# Patient Record
Sex: MALE | Race: WHITE | NOT HISPANIC OR LATINO | Employment: OTHER | URBAN - METROPOLITAN AREA
[De-identification: names, ages, dates, MRNs, and addresses within clinical notes are randomized per-mention and may not be internally consistent; named-entity substitution may affect disease eponyms.]

---

## 2017-03-01 RX ORDER — MELOXICAM 15 MG/1
TABLET ORAL
Qty: 30 TABLET | Refills: 2 | Status: SHIPPED | OUTPATIENT
Start: 2017-03-01 | End: 2017-06-02 | Stop reason: SDUPTHER

## 2017-03-10 ENCOUNTER — OFFICE VISIT (OUTPATIENT)
Dept: INTERNAL MEDICINE | Facility: CLINIC | Age: 67
End: 2017-03-10

## 2017-03-10 VITALS
HEIGHT: 70 IN | HEART RATE: 65 BPM | WEIGHT: 233 LBS | OXYGEN SATURATION: 95 % | TEMPERATURE: 97.7 F | BODY MASS INDEX: 33.36 KG/M2 | DIASTOLIC BLOOD PRESSURE: 68 MMHG | SYSTOLIC BLOOD PRESSURE: 130 MMHG

## 2017-03-10 DIAGNOSIS — M51.36 DEGENERATION OF INTERVERTEBRAL DISC OF LUMBAR REGION: ICD-10-CM

## 2017-03-10 DIAGNOSIS — I10 BENIGN ESSENTIAL HYPERTENSION: ICD-10-CM

## 2017-03-10 DIAGNOSIS — Z00.00 WELCOME TO MEDICARE PREVENTIVE VISIT: Primary | ICD-10-CM

## 2017-03-10 DIAGNOSIS — E78.00 HYPERCHOLESTEROLEMIA: ICD-10-CM

## 2017-03-10 DIAGNOSIS — F32.89 OTHER DEPRESSION: ICD-10-CM

## 2017-03-10 DIAGNOSIS — M15.9 PRIMARY OSTEOARTHRITIS INVOLVING MULTIPLE JOINTS: ICD-10-CM

## 2017-03-10 LAB
ALBUMIN SERPL-MCNC: 4.9 G/DL (ref 3.5–5.2)
ALBUMIN/GLOB SERPL: 1.8 G/DL
ALP SERPL-CCNC: 67 U/L (ref 39–117)
ALT SERPL-CCNC: 31 U/L (ref 1–41)
APPEARANCE UR: CLEAR
AST SERPL-CCNC: 27 U/L (ref 1–40)
BACTERIA #/AREA URNS HPF: NORMAL /HPF
BASOPHILS # BLD AUTO: 0.04 10*3/MM3 (ref 0–0.2)
BASOPHILS NFR BLD AUTO: 0.5 % (ref 0–1.5)
BILIRUB SERPL-MCNC: 1.5 MG/DL (ref 0.1–1.2)
BILIRUB UR QL STRIP: NEGATIVE
BUN SERPL-MCNC: 22 MG/DL (ref 8–23)
BUN/CREAT SERPL: 21 (ref 7–25)
CALCIUM SERPL-MCNC: 10.1 MG/DL (ref 8.6–10.5)
CASTS URNS MICRO: NORMAL
CHLORIDE SERPL-SCNC: 97 MMOL/L (ref 98–107)
CHOLEST SERPL-MCNC: 161 MG/DL (ref 0–200)
CO2 SERPL-SCNC: 29.1 MMOL/L (ref 22–29)
COLOR UR: YELLOW
CREAT SERPL-MCNC: 1.05 MG/DL (ref 0.76–1.27)
EOSINOPHIL # BLD AUTO: 0.3 10*3/MM3 (ref 0–0.7)
EOSINOPHIL NFR BLD AUTO: 4 % (ref 0.3–6.2)
EPI CELLS #/AREA URNS HPF: NORMAL /HPF
ERYTHROCYTE [DISTWIDTH] IN BLOOD BY AUTOMATED COUNT: 13.8 % (ref 11.5–14.5)
GLOBULIN SER CALC-MCNC: 2.8 GM/DL
GLUCOSE SERPL-MCNC: 149 MG/DL (ref 65–99)
GLUCOSE UR QL: NEGATIVE
HCT VFR BLD AUTO: 41.4 % (ref 40.4–52.2)
HDLC SERPL-MCNC: 60 MG/DL (ref 40–60)
HGB BLD-MCNC: 13.5 G/DL (ref 13.7–17.6)
HGB UR QL STRIP: NEGATIVE
IMM GRANULOCYTES # BLD: 0 10*3/MM3 (ref 0–0.03)
IMM GRANULOCYTES NFR BLD: 0 % (ref 0–0.5)
KETONES UR QL STRIP: NEGATIVE
LDLC SERPL CALC-MCNC: 78 MG/DL (ref 0–100)
LDLC/HDLC SERPL: 1.3 {RATIO}
LEUKOCYTE ESTERASE UR QL STRIP: NEGATIVE
LYMPHOCYTES # BLD AUTO: 1.78 10*3/MM3 (ref 0.9–4.8)
LYMPHOCYTES NFR BLD AUTO: 24 % (ref 19.6–45.3)
MCH RBC QN AUTO: 32.5 PG (ref 27–32.7)
MCHC RBC AUTO-ENTMCNC: 32.6 G/DL (ref 32.6–36.4)
MCV RBC AUTO: 99.5 FL (ref 79.8–96.2)
MONOCYTES # BLD AUTO: 0.48 10*3/MM3 (ref 0.2–1.2)
MONOCYTES NFR BLD AUTO: 6.5 % (ref 5–12)
NEUTROPHILS # BLD AUTO: 4.81 10*3/MM3 (ref 1.9–8.1)
NEUTROPHILS NFR BLD AUTO: 65 % (ref 42.7–76)
NITRITE UR QL STRIP: NEGATIVE
PH UR STRIP: 7 [PH] (ref 5–8)
PLATELET # BLD AUTO: 160 10*3/MM3 (ref 140–500)
POTASSIUM SERPL-SCNC: 4.6 MMOL/L (ref 3.5–5.2)
PROT SERPL-MCNC: 7.7 G/DL (ref 6–8.5)
PROT UR QL STRIP: NEGATIVE
RBC # BLD AUTO: 4.16 10*6/MM3 (ref 4.6–6)
RBC #/AREA URNS HPF: NORMAL /HPF
SODIUM SERPL-SCNC: 138 MMOL/L (ref 136–145)
SP GR UR: 1.02 (ref 1–1.03)
T4 FREE SERPL-MCNC: 1.29 NG/DL (ref 0.93–1.7)
TRIGL SERPL-MCNC: 116 MG/DL (ref 0–150)
TSH SERPL DL<=0.005 MIU/L-ACNC: 1.85 MIU/ML (ref 0.27–4.2)
UROBILINOGEN UR STRIP-MCNC: (no result) MG/DL
VLDLC SERPL CALC-MCNC: 23.2 MG/DL (ref 5–40)
WBC # BLD AUTO: 7.41 10*3/MM3 (ref 4.5–10.7)
WBC #/AREA URNS HPF: NORMAL /HPF

## 2017-03-10 PROCEDURE — G0403 EKG FOR INITIAL PREVENT EXAM: HCPCS | Performed by: INTERNAL MEDICINE

## 2017-03-10 PROCEDURE — 99214 OFFICE O/P EST MOD 30 MIN: CPT | Performed by: INTERNAL MEDICINE

## 2017-03-10 PROCEDURE — G0402 INITIAL PREVENTIVE EXAM: HCPCS | Performed by: INTERNAL MEDICINE

## 2017-03-10 NOTE — PROGRESS NOTES
Procedure     ECG 12 Lead  Date/Time: 3/10/2017 10:31 AM  Performed by: EDILIA CRUMP  Authorized by: EDILIA CRUMP   Comparison: not compared with previous ECG   Previous ECG: no previous ECG available  Rhythm: sinus rhythm  Rate: normal  Conduction: conduction normal  ST Segments: ST segments normal  T Waves: T waves normal  QRS axis: normal  Other: no other findings  Clinical impression: normal ECG

## 2017-03-21 NOTE — PROGRESS NOTES
Subjective   Taz Dickey is a 66 y.o. male.   He is here today for welcome to Medicare preventive visit along with hypertension hyperlipidemia lumbar DDD depression osteoarthritis of multiple joints and he has no new complaints  History of Present Illness   He is here today for welcome to Medicare preventive visit along with hypertension hyper lipidemia lumbar DDD depression osteoarthritis of joints and he has no new complaints  The following portions of the patient's history were reviewed and updated as appropriate: allergies, current medications, past family history, past medical history, past social history, past surgical history and problem list.    Review of Systems   All other systems reviewed and are negative.      Objective   Physical Exam   Constitutional: He is oriented to person, place, and time. Vital signs are normal. He appears well-developed and well-nourished. He is active.   HENT:   Head: Normocephalic and atraumatic.   Right Ear: Hearing, tympanic membrane, external ear and ear canal normal.   Left Ear: Hearing, tympanic membrane, external ear and ear canal normal.   Nose: Nose normal.   Mouth/Throat: Oropharynx is clear and moist.   Eyes: Conjunctivae, EOM and lids are normal. Pupils are equal, round, and reactive to light. Right eye exhibits no discharge. Left eye exhibits no discharge.   Neck: Trachea normal, normal range of motion, full passive range of motion without pain and phonation normal. Neck supple. Carotid bruit is not present. No edema present. No thyroid mass and no thyromegaly present.   Cardiovascular: Normal rate, regular rhythm, normal heart sounds, intact distal pulses and normal pulses.  Exam reveals no gallop and no friction rub.    No murmur heard.  Pulmonary/Chest: Effort normal and breath sounds normal. No respiratory distress. He has no wheezes. He has no rales.   Abdominal: Soft. Normal appearance, normal aorta and bowel sounds are normal. He exhibits no distension,  no abdominal bruit and no mass. There is no hepatosplenomegaly. There is no tenderness. There is no rebound, no guarding and no CVA tenderness. No hernia. Hernia confirmed negative in the right inguinal area and confirmed negative in the left inguinal area.   Musculoskeletal: Normal range of motion. He exhibits no edema or tenderness.       Vascular Status -  His exam exhibits right foot vasculature normal. His exam exhibits no right foot edema. His exam exhibits left foot vasculature normal. His exam exhibits no left foot edema.   Skin Integrity  -  His right foot skin is intact.     Taz 's left foot skin is intact. .  Lymphadenopathy:     He has no cervical adenopathy.     He has no axillary adenopathy.        Right: No inguinal and no supraclavicular adenopathy present.        Left: No inguinal and no supraclavicular adenopathy present.   Neurological: He is alert and oriented to person, place, and time. He has normal strength. No cranial nerve deficit or sensory deficit. He exhibits normal muscle tone. He displays a negative Romberg sign. Coordination normal.   Skin: Skin is warm, dry and intact. No cyanosis. Nails show no clubbing.   Psychiatric: He has a normal mood and affect. His speech is normal and behavior is normal. Judgment and thought content normal. Cognition and memory are normal.   Nursing note and vitals reviewed.      Assessment/Plan   Diagnoses and all orders for this visit:    Welcome to Medicare preventive visit  -     Lipid Panel With LDL / HDL Ratio  -     CBC & Differential  -     Comprehensive Metabolic Panel  -     T4, Free  -     TSH  -     Urinalysis With Microscopic  -     ECG 12 Lead    Benign essential hypertension  -     Lipid Panel With LDL / HDL Ratio  -     CBC & Differential  -     Comprehensive Metabolic Panel  -     T4, Free  -     TSH  -     Urinalysis With Microscopic    Hypercholesterolemia  -     Lipid Panel With LDL / HDL Ratio  -     CBC & Differential  -      Comprehensive Metabolic Panel  -     T4, Free  -     TSH  -     Urinalysis With Microscopic    Degeneration of intervertebral disc of lumbar region  -     Lipid Panel With LDL / HDL Ratio  -     CBC & Differential  -     Comprehensive Metabolic Panel  -     T4, Free  -     TSH  -     Urinalysis With Microscopic    Other depression  -     Lipid Panel With LDL / HDL Ratio  -     CBC & Differential  -     Comprehensive Metabolic Panel  -     T4, Free  -     TSH  -     Urinalysis With Microscopic    Primary osteoarthritis involving multiple joints  -     Lipid Panel With LDL / HDL Ratio  -     CBC & Differential  -     Comprehensive Metabolic Panel  -     T4, Free  -     TSH  -     Urinalysis With Microscopic    Other orders  -     Microscopic Examination      Welcome to Medicare preventive visit follow-up recommendations  Depression stable on current medication  Osteoarthritis of multiple joints supportive meds  Lumbar DDD supportive meds and some weight loss  Hyperlipidemia keep LDL less than 70 with proper diet exercise medication  Hypertension well-controlled on current medication

## 2017-03-21 NOTE — PROGRESS NOTES
QUICK REFERENCE INFORMATION:  The ABCs of the Annual Wellness Visit    Welcome to Medicare Visit    HEALTH RISK ASSESSMENT    1950    Recent Hospitalizations:  No recent hospitalization(s)..      Current Medical Providers:  Patient Care Team:  Conor Fontenot MD as PCP - General (Internal Medicine)  Conor Fontenot MD as PCP - Family Medicine      Smoking Status:  History   Smoking Status   • Never Smoker   Smokeless Tobacco   • Not on file       Alcohol Consumption:  History   Alcohol Use   • Yes     Comment: social       Depression Screen:   PHQ-9 Depression Screening 3/10/2017   Little interest or pleasure in doing things 0   Feeling down, depressed, or hopeless 0   Trouble falling or staying asleep, or sleeping too much 0   Feeling tired or having little energy 0   Poor appetite or overeating 0   Feeling bad about yourself - or that you are a failure or have let yourself or your family down 0   Trouble concentrating on things, such as reading the newspaper or watching television 0   Moving or speaking so slowly that other people could have noticed. Or the opposite - being so fidgety or restless that you have been moving around a lot more than usual 0   Thoughts that you would be better off dead, or of hurting yourself in some way 0   PHQ-9 Total Score 0   If you checked off any problems, how difficult have these problems made it for you to do your work, take care of things at home, or get along with other people? Not difficult at all       Health Habits and Functional and Cognitive Screening:  Functional & Cognitive Status 3/10/2017   Do you have difficulty preparing food and eating? No   Do you have difficulty bathing yourself? No   Do you have difficulty getting dressed? No   Do you have difficulty using the toilet? No   Do you have difficulty moving around from place to place? No   In the past year have you fallen or experienced a near fall? No   Do you need help using the phone?  No   Are you deaf  or do you have serious difficulty hearing?  No   Do you need help with transportation? No   Do you need help shopping? No   Do you need help preparing meals?  No   Do you need help with housework?  No   Do you need help with laundry? No   Do you need help taking your medications? No   Do you need help managing money? No       Health Habits  Current Diet: Unhealthy Diet  Dental Exam: Up to date  Eye Exam: Up to date  Exercise (times per week): 10 times per week  Current Exercise Activities Include: Gardening        Does the patient have evidence of cognitive impairment? No    Asprin use counseling?yes      Recent Lab Results:  CMP:  Lab Results   Component Value Date     (H) 03/10/2017    BUN 22 03/10/2017    CREATININE 1.05 03/10/2017    EGFRIFNONA 71 03/10/2017    EGFRIFAFRI 86 03/10/2017    BCR 21.0 03/10/2017     03/10/2017    K 4.6 03/10/2017    CO2 29.1 (H) 03/10/2017    CALCIUM 10.1 03/10/2017    PROTENTOTREF 7.7 03/10/2017    ALBUMIN 4.90 03/10/2017    LABGLOBREF 2.8 03/10/2017    LABIL2 1.8 03/10/2017    BILITOT 1.5 (H) 03/10/2017    ALKPHOS 67 03/10/2017    AST 27 03/10/2017    ALT 31 03/10/2017     Lipid Panel:  Lab Results   Component Value Date    CHLPL 161 03/10/2017    TRIG 116 03/10/2017    HDL 60 03/10/2017    VLDL 23.2 03/10/2017    LDL 78 03/10/2017     LDL:     HbA1c:  Lab Results   Component Value Date    HGBA1C 6.04 (H) 09/08/2016     Urine Microalbumin:     Visual Acuity:  No exam data present    Age-appropriate Screening Schedule:  Refer to the list below for future screening recommendations based on patient's age, sex and/or medical conditions. Orders for these recommended tests are listed in the plan section. The patient has been provided with a written plan.    Health Maintenance   Topic Date Due   • TDAP/TD VACCINES (1 - Tdap) 07/20/1970   • ZOSTER VACCINE  02/25/2016   • PNEUMOCOCCAL VACCINES (65+ LOW/MEDIUM RISK) (2 of 2 - PPSV23) 07/20/2016   • LIPID PANEL  03/10/2018   •  COLONOSCOPY  03/10/2027   • INFLUENZA VACCINE  Completed        Subjective   History of Present Illness    Taz Dickey is a 66 y.o. male an established patient presenting for a Welcome to Medicare Visit.     The following portions of the patient's history were reviewed and updated as appropriate: allergies, current medications, past family history, past medical history, past social history, past surgical history and problem list.    Outpatient Medications Prior to Visit   Medication Sig Dispense Refill   • aspirin 81 MG EC tablet Take 81 mg by mouth daily.     • atorvastatin (LIPITOR) 40 MG tablet TAKE ONE TABLET BY MOUTH DAILY 90 tablet 1   • cyclobenzaprine (FLEXERIL) 10 MG tablet Take by mouth 3 (three) times a day as needed.     • lisinopril-hydrochlorothiazide (PRINZIDE,ZESTORETIC) 20-12.5 MG per tablet TAKE ONE TABLET BY MOUTH DAILY 90 tablet 1   • meloxicam (MOBIC) 15 MG tablet TAKE ONE TABLET BY MOUTH DAILY 30 tablet 2   • metFORMIN (GLUCOPHAGE) 500 MG tablet TAKE ONE TABLET BY MOUTH DAILY WITH DINNER 90 tablet 0   • tadalafil (CIALIS) 5 MG tablet Take by mouth.       No facility-administered medications prior to visit.        Patient Active Problem List   Diagnosis   • Degeneration of intervertebral disc of lumbar region   • Lumbar radiculopathy   • Benign essential hypertension   • Depression   • Erectile dysfunction of nonorganic origin   • Hypercholesterolemia   • Hyperglycemia   • Hypertension   • Osteoarthritis   • Tinnitus of both ears   • Encounter for screening colonoscopy   • Welcome to Medicare preventive visit       Advanced Care Planning:  has NO advanced directive - not interested in additional information    Identification of Risk Factors:  Risk factors include: cardiovascular risk.    Review of Systems    Compared to one year ago, the patient feels his physical health is the same.  Compared to one year ago, the patient feels his mental health is the same.    Objective    Physical  "Exam    Vitals:    03/10/17 0940 03/10/17 1028   BP: 142/76 130/68   BP Location: Left arm    Patient Position: Sitting    Cuff Size: Adult    Pulse: 65    Temp: 97.7 °F (36.5 °C)    TempSrc: Tympanic    SpO2: 95%    Weight: 233 lb (106 kg)    Height: 70\" (177.8 cm)        Body mass index is 33.43 kg/(m^2).  Discussed the patient's BMI with him. The BMI is above average; BMI management plan is completed.    Procedure   Procedures       Assessment/Plan   Patient Self-Management and Personalized Health Advice  The patient has been provided with information about: weight management and preventive services including:   · Nutrition counseling provided.    Visit Diagnoses:    ICD-10-CM ICD-9-CM   1. Welcome to Medicare preventive visit Z00.00 V70.0   2. Benign essential hypertension I10 401.1   3. Hypercholesterolemia E78.00 272.0   4. Degeneration of intervertebral disc of lumbar region M51.36 722.52   5. Other depression F32.89    6. Primary osteoarthritis involving multiple joints M15.0 715.09       Orders Placed This Encounter   Procedures   • Lipid Panel With LDL / HDL Ratio   • Comprehensive Metabolic Panel   • T4, Free   • TSH   • Microscopic Examination   • ECG 12 Lead     This order was created via procedure documentation       Outpatient Encounter Prescriptions as of 3/10/2017   Medication Sig Dispense Refill   • aspirin 81 MG EC tablet Take 81 mg by mouth daily.     • atorvastatin (LIPITOR) 40 MG tablet TAKE ONE TABLET BY MOUTH DAILY 90 tablet 1   • cyclobenzaprine (FLEXERIL) 10 MG tablet Take by mouth 3 (three) times a day as needed.     • lisinopril-hydrochlorothiazide (PRINZIDE,ZESTORETIC) 20-12.5 MG per tablet TAKE ONE TABLET BY MOUTH DAILY 90 tablet 1   • meloxicam (MOBIC) 15 MG tablet TAKE ONE TABLET BY MOUTH DAILY 30 tablet 2   • metFORMIN (GLUCOPHAGE) 500 MG tablet TAKE ONE TABLET BY MOUTH DAILY WITH DINNER 90 tablet 0   • tadalafil (CIALIS) 5 MG tablet Take by mouth.       No facility-administered " encounter medications on file as of 3/10/2017.        Reviewed use of high risk medication in the elderly: yes  Reviewed for potential of harmful drug interactions in the elderly: yes    Follow Up:  Return in about 6 months (around 9/10/2017).     An After Visit Summary and PPPS with all of these plans were given to the patient.

## 2017-03-21 NOTE — PATIENT INSTRUCTIONS
Medicare Wellness  Personal Prevention Plan of Service     Date of Office Visit:  03/10/2017  Encounter Provider:  Conor Fontenot MD  Place of Service:  [facility]  Patient Name: Taz Dickey  :  1950    As part of the Medicare Wellness portion of your visit today, we are providing you with this personalized preventive plan of services (PPPS). This plan is based upon recommendations of the [place] Preventive Services Task Force (USPSTF) and the Advisory Committee on Immunization Practices (ACIP).    This lists the preventive care services that should be considered, and provides dates of when you are due. Items listed as completed are up-to-date and do not require any further intervention.    Health Maintenance   Topic Date Due   • TDAP/TD VACCINES (1 - Tdap) 1970   • ZOSTER VACCINE  2016   • MEDICARE ANNUAL WELLNESS  03/10/2018   • LIPID PANEL  03/10/2018   • COLONOSCOPY  03/10/2027   • HEPATITIS C SCREENING  Addressed   • INFLUENZA VACCINE  Completed   • PNEUMOCOCCAL VACCINES (65+ LOW/MEDIUM RISK)  Completed

## 2017-06-02 RX ORDER — MELOXICAM 15 MG/1
TABLET ORAL
Qty: 30 TABLET | Refills: 1 | Status: SHIPPED | OUTPATIENT
Start: 2017-06-02 | End: 2017-08-06 | Stop reason: SDUPTHER

## 2017-06-26 RX ORDER — LISINOPRIL AND HYDROCHLOROTHIAZIDE 20; 12.5 MG/1; MG/1
TABLET ORAL
Qty: 90 TABLET | Refills: 0 | Status: SHIPPED | OUTPATIENT
Start: 2017-06-26 | End: 2017-10-23 | Stop reason: SDUPTHER

## 2017-06-26 RX ORDER — ATORVASTATIN CALCIUM 40 MG/1
TABLET, FILM COATED ORAL
Qty: 90 TABLET | Refills: 0 | Status: SHIPPED | OUTPATIENT
Start: 2017-06-26 | End: 2017-10-08 | Stop reason: SDUPTHER

## 2017-08-07 RX ORDER — MELOXICAM 15 MG/1
TABLET ORAL
Qty: 30 TABLET | Refills: 0 | Status: SHIPPED | OUTPATIENT
Start: 2017-08-07 | End: 2017-09-12 | Stop reason: SDUPTHER

## 2017-09-12 RX ORDER — MELOXICAM 15 MG/1
TABLET ORAL
Qty: 30 TABLET | Refills: 0 | Status: SHIPPED | OUTPATIENT
Start: 2017-09-12 | End: 2017-10-11 | Stop reason: SDUPTHER

## 2017-09-25 ENCOUNTER — OFFICE VISIT (OUTPATIENT)
Dept: INTERNAL MEDICINE | Facility: CLINIC | Age: 67
End: 2017-09-25

## 2017-09-25 VITALS
SYSTOLIC BLOOD PRESSURE: 135 MMHG | HEIGHT: 70 IN | OXYGEN SATURATION: 95 % | TEMPERATURE: 98.2 F | BODY MASS INDEX: 30.75 KG/M2 | WEIGHT: 214.8 LBS | DIASTOLIC BLOOD PRESSURE: 78 MMHG | HEART RATE: 60 BPM

## 2017-09-25 DIAGNOSIS — M15.9 PRIMARY OSTEOARTHRITIS INVOLVING MULTIPLE JOINTS: ICD-10-CM

## 2017-09-25 DIAGNOSIS — F52.21 ERECTILE DYSFUNCTION OF NONORGANIC ORIGIN: ICD-10-CM

## 2017-09-25 DIAGNOSIS — R73.9 HYPERGLYCEMIA: ICD-10-CM

## 2017-09-25 DIAGNOSIS — F32.89 OTHER DEPRESSION: ICD-10-CM

## 2017-09-25 DIAGNOSIS — R35.1 NOCTURIA: ICD-10-CM

## 2017-09-25 DIAGNOSIS — I10 ESSENTIAL HYPERTENSION: Primary | ICD-10-CM

## 2017-09-25 DIAGNOSIS — E78.00 HYPERCHOLESTEROLEMIA: ICD-10-CM

## 2017-09-25 DIAGNOSIS — M51.36 DEGENERATION OF INTERVERTEBRAL DISC OF LUMBAR REGION: ICD-10-CM

## 2017-09-25 LAB
ALBUMIN SERPL-MCNC: 4.4 G/DL (ref 3.5–5.2)
ALBUMIN/GLOB SERPL: 2.2 G/DL
ALP SERPL-CCNC: 45 U/L (ref 39–117)
ALT SERPL-CCNC: 23 U/L (ref 1–41)
APPEARANCE UR: CLEAR
AST SERPL-CCNC: 27 U/L (ref 1–40)
BACTERIA #/AREA URNS HPF: NORMAL /HPF
BASOPHILS # BLD AUTO: 0.07 10*3/MM3 (ref 0–0.2)
BASOPHILS NFR BLD AUTO: 1.1 % (ref 0–1.5)
BILIRUB SERPL-MCNC: 0.7 MG/DL (ref 0.1–1.2)
BILIRUB UR QL STRIP: NEGATIVE
BUN SERPL-MCNC: 21 MG/DL (ref 8–23)
BUN/CREAT SERPL: 20.6 (ref 7–25)
CALCIUM SERPL-MCNC: 9.5 MG/DL (ref 8.6–10.5)
CASTS URNS MICRO: NORMAL
CHLORIDE SERPL-SCNC: 102 MMOL/L (ref 98–107)
CO2 SERPL-SCNC: 28.3 MMOL/L (ref 22–29)
COLOR UR: YELLOW
CREAT SERPL-MCNC: 1.02 MG/DL (ref 0.76–1.27)
EOSINOPHIL # BLD AUTO: 0.15 10*3/MM3 (ref 0–0.7)
EOSINOPHIL NFR BLD AUTO: 2.3 % (ref 0.3–6.2)
EPI CELLS #/AREA URNS HPF: NORMAL /HPF
ERYTHROCYTE [DISTWIDTH] IN BLOOD BY AUTOMATED COUNT: 14.5 % (ref 11.5–14.5)
GLOBULIN SER CALC-MCNC: 2 GM/DL
GLUCOSE SERPL-MCNC: 122 MG/DL (ref 65–99)
GLUCOSE UR QL: NEGATIVE
HBA1C MFR BLD: 5.8 % (ref 4.8–5.6)
HCT VFR BLD AUTO: 37.6 % (ref 40.4–52.2)
HGB BLD-MCNC: 12 G/DL (ref 13.7–17.6)
HGB UR QL STRIP: NEGATIVE
IMM GRANULOCYTES # BLD: 0.02 10*3/MM3 (ref 0–0.03)
IMM GRANULOCYTES NFR BLD: 0.3 % (ref 0–0.5)
KETONES UR QL STRIP: NEGATIVE
LEUKOCYTE ESTERASE UR QL STRIP: NEGATIVE
LYMPHOCYTES # BLD AUTO: 1.34 10*3/MM3 (ref 0.9–4.8)
LYMPHOCYTES NFR BLD AUTO: 20.8 % (ref 19.6–45.3)
MCH RBC QN AUTO: 33.1 PG (ref 27–32.7)
MCHC RBC AUTO-ENTMCNC: 31.9 G/DL (ref 32.6–36.4)
MCV RBC AUTO: 103.6 FL (ref 79.8–96.2)
MONOCYTES # BLD AUTO: 0.47 10*3/MM3 (ref 0.2–1.2)
MONOCYTES NFR BLD AUTO: 7.3 % (ref 5–12)
NEUTROPHILS # BLD AUTO: 4.38 10*3/MM3 (ref 1.9–8.1)
NEUTROPHILS NFR BLD AUTO: 68.2 % (ref 42.7–76)
NITRITE UR QL STRIP: NEGATIVE
NRBC BLD AUTO-RTO: 0 /100 WBC (ref 0–0)
PH UR STRIP: 7.5 [PH] (ref 5–8)
PLATELET # BLD AUTO: 155 10*3/MM3 (ref 140–500)
POTASSIUM SERPL-SCNC: 5.3 MMOL/L (ref 3.5–5.2)
PROT SERPL-MCNC: 6.4 G/DL (ref 6–8.5)
PROT UR QL STRIP: NEGATIVE
PSA SERPL-MCNC: 1.1 NG/ML (ref 0–4)
RBC # BLD AUTO: 3.63 10*6/MM3 (ref 4.6–6)
RBC #/AREA URNS HPF: NORMAL /HPF
SODIUM SERPL-SCNC: 140 MMOL/L (ref 136–145)
SP GR UR: 1.02 (ref 1–1.03)
T4 FREE SERPL-MCNC: 1.2 NG/DL (ref 0.93–1.7)
TSH SERPL DL<=0.005 MIU/L-ACNC: 1.74 MIU/ML (ref 0.27–4.2)
UROBILINOGEN UR STRIP-MCNC: (no result) MG/DL
WBC # BLD AUTO: 6.43 10*3/MM3 (ref 4.5–10.7)
WBC #/AREA URNS HPF: NORMAL /HPF

## 2017-09-25 PROCEDURE — 99214 OFFICE O/P EST MOD 30 MIN: CPT | Performed by: INTERNAL MEDICINE

## 2017-10-08 NOTE — PROGRESS NOTES
Subjective   Taz Dickey is a 67 y.o. male.   He is here today for hypertension hyperlipidemia lumbar DDD hyperglycemia depression osteoarthritis of multiple joints ED nocturia  History of Present Illness   He is here today for hypertension up lipidemia lumbar DDD hyperglycemia depression osteoarthritis of multiple joints ED and nocturia  The following portions of the patient's history were reviewed and updated as appropriate: allergies, current medications, past family history, past medical history, past social history, past surgical history and problem list.    Review of Systems   Genitourinary: Positive for difficulty urinating.        Erectile dysfunction   Musculoskeletal: Positive for arthralgias.   All other systems reviewed and are negative.      Objective   Physical Exam   Constitutional: He is oriented to person, place, and time. He appears well-developed and well-nourished. He is cooperative.   HENT:   Head: Normocephalic and atraumatic.   Right Ear: Hearing, tympanic membrane, external ear and ear canal normal.   Left Ear: Hearing, tympanic membrane, external ear and ear canal normal.   Nose: Nose normal.   Mouth/Throat: Uvula is midline, oropharynx is clear and moist and mucous membranes are normal.   Eyes: Conjunctivae, EOM and lids are normal. Pupils are equal, round, and reactive to light.   Neck: Phonation normal. Neck supple. Carotid bruit is not present.   Cardiovascular: Normal rate, regular rhythm and normal heart sounds.  Exam reveals no gallop and no friction rub.    No murmur heard.  Pulmonary/Chest: Effort normal and breath sounds normal. No respiratory distress.   Abdominal: Soft. Bowel sounds are normal. He exhibits no distension and no mass. There is no hepatosplenomegaly. There is no tenderness. There is no rebound and no guarding. No hernia.   Musculoskeletal: He exhibits no edema.   Neurological: He is alert and oriented to person, place, and time. Coordination and gait normal.    Skin: Skin is warm and dry.   Psychiatric: He has a normal mood and affect. His speech is normal and behavior is normal. Judgment and thought content normal.   Nursing note and vitals reviewed.      Assessment/Plan   Diagnoses and all orders for this visit:    Essential hypertension  -     Hemoglobin A1c  -     CBC & Differential  -     Comprehensive Metabolic Panel  -     T4, Free  -     TSH  -     Urinalysis With Microscopic  -     PSA    Hypercholesterolemia  -     Hemoglobin A1c  -     CBC & Differential  -     Comprehensive Metabolic Panel  -     T4, Free  -     TSH  -     Urinalysis With Microscopic  -     PSA    Degeneration of intervertebral disc of lumbar region  -     Hemoglobin A1c  -     CBC & Differential  -     Comprehensive Metabolic Panel  -     T4, Free  -     TSH  -     Urinalysis With Microscopic  -     PSA    Hyperglycemia  -     Hemoglobin A1c  -     CBC & Differential  -     Comprehensive Metabolic Panel  -     T4, Free  -     TSH  -     Urinalysis With Microscopic  -     PSA    Other depression  -     Hemoglobin A1c  -     CBC & Differential  -     Comprehensive Metabolic Panel  -     T4, Free  -     TSH  -     Urinalysis With Microscopic  -     PSA    Primary osteoarthritis involving multiple joints  -     Hemoglobin A1c  -     CBC & Differential  -     Comprehensive Metabolic Panel  -     T4, Free  -     TSH  -     Urinalysis With Microscopic  -     PSA    Erectile dysfunction of nonorganic origin  -     Hemoglobin A1c  -     CBC & Differential  -     Comprehensive Metabolic Panel  -     T4, Free  -     TSH  -     Urinalysis With Microscopic  -     PSA    Nocturia  -     Hemoglobin A1c  -     CBC & Differential  -     Comprehensive Metabolic Panel  -     T4, Free  -     TSH  -     Urinalysis With Microscopic  -     PSA    Other orders  -     Microscopic Examination      Hypertension well-controlled on current medication  Osteoporosis we'll joints supportive meds  Erectile dysfunction  supportive meds  Nocturia check PSA  Depression stable on current medication  Hyperglycemia follow hemoglobin A 1C  Lumbar DDD supportive meds physical therapy  Hyperlipidemia keep LDL less than 70 with proper diet exercise medication

## 2017-10-09 RX ORDER — ATORVASTATIN CALCIUM 40 MG/1
TABLET, FILM COATED ORAL
Qty: 90 TABLET | Refills: 0 | Status: SHIPPED | OUTPATIENT
Start: 2017-10-09 | End: 2018-01-28 | Stop reason: SDUPTHER

## 2017-10-11 DIAGNOSIS — D64.9 ANEMIA, UNSPECIFIED TYPE: Primary | ICD-10-CM

## 2017-10-11 DIAGNOSIS — D72.829 LEUKOCYTOSIS, UNSPECIFIED TYPE: ICD-10-CM

## 2017-10-11 RX ORDER — MELOXICAM 15 MG/1
TABLET ORAL
Qty: 30 TABLET | Refills: 0 | Status: SHIPPED | OUTPATIENT
Start: 2017-10-11 | End: 2017-11-27 | Stop reason: SDUPTHER

## 2017-10-16 ENCOUNTER — RESULTS ENCOUNTER (OUTPATIENT)
Dept: INTERNAL MEDICINE | Facility: CLINIC | Age: 67
End: 2017-10-16

## 2017-10-16 DIAGNOSIS — D64.9 ANEMIA, UNSPECIFIED TYPE: ICD-10-CM

## 2017-10-16 DIAGNOSIS — D72.829 LEUKOCYTOSIS, UNSPECIFIED TYPE: ICD-10-CM

## 2017-10-23 RX ORDER — LISINOPRIL AND HYDROCHLOROTHIAZIDE 20; 12.5 MG/1; MG/1
TABLET ORAL
Qty: 90 TABLET | Refills: 2 | Status: SHIPPED | OUTPATIENT
Start: 2017-10-23 | End: 2018-08-10 | Stop reason: SDUPTHER

## 2017-10-27 ENCOUNTER — CLINICAL SUPPORT (OUTPATIENT)
Dept: INTERNAL MEDICINE | Facility: CLINIC | Age: 67
End: 2017-10-27

## 2017-10-27 DIAGNOSIS — Z23 INFLUENZA VACCINE NEEDED: Primary | ICD-10-CM

## 2017-10-27 LAB
FERRITIN SERPL-MCNC: 107.8 NG/ML (ref 30–400)
FOLATE SERPL-MCNC: 6.81 NG/ML (ref 4.78–24.2)
IRON SERPL-MCNC: 84 MCG/DL (ref 59–158)
VIT B12 SERPL-MCNC: 300 PG/ML (ref 211–946)

## 2017-10-27 PROCEDURE — 90662 IIV NO PRSV INCREASED AG IM: CPT | Performed by: INTERNAL MEDICINE

## 2017-10-27 PROCEDURE — 90471 IMMUNIZATION ADMIN: CPT | Performed by: INTERNAL MEDICINE

## 2017-11-28 RX ORDER — MELOXICAM 15 MG/1
TABLET ORAL
Qty: 30 TABLET | Refills: 3 | Status: SHIPPED | OUTPATIENT
Start: 2017-11-28 | End: 2018-04-12 | Stop reason: SDUPTHER

## 2018-01-29 RX ORDER — ATORVASTATIN CALCIUM 40 MG/1
TABLET, FILM COATED ORAL
Qty: 90 TABLET | Refills: 0 | Status: SHIPPED | OUTPATIENT
Start: 2018-01-29 | End: 2018-05-07 | Stop reason: SDUPTHER

## 2018-02-28 ENCOUNTER — ON CAMPUS - OUTPATIENT (OUTPATIENT)
Dept: URBAN - METROPOLITAN AREA HOSPITAL 114 | Facility: HOSPITAL | Age: 68
End: 2018-02-28

## 2018-02-28 ENCOUNTER — HOSPITAL ENCOUNTER (OUTPATIENT)
Facility: HOSPITAL | Age: 68
Setting detail: HOSPITAL OUTPATIENT SURGERY
Discharge: HOME | End: 2018-02-28
Attending: INTERNAL MEDICINE | Admitting: INTERNAL MEDICINE

## 2018-02-28 ENCOUNTER — ANESTHESIA EVENT (OUTPATIENT)
Dept: GASTROENTEROLOGY | Facility: HOSPITAL | Age: 68
End: 2018-02-28

## 2018-02-28 ENCOUNTER — ANESTHESIA (OUTPATIENT)
Dept: GASTROENTEROLOGY | Facility: HOSPITAL | Age: 68
End: 2018-02-28

## 2018-02-28 VITALS
RESPIRATION RATE: 14 BRPM | BODY MASS INDEX: 32.86 KG/M2 | HEIGHT: 70 IN | TEMPERATURE: 98.2 F | WEIGHT: 229.5 LBS | DIASTOLIC BLOOD PRESSURE: 57 MMHG | HEART RATE: 57 BPM | SYSTOLIC BLOOD PRESSURE: 108 MMHG | OXYGEN SATURATION: 99 %

## 2018-02-28 DIAGNOSIS — Z86.010 PERSONAL HISTORY OF COLONIC POLYPS: ICD-10-CM

## 2018-02-28 DIAGNOSIS — K63.5 POLYP OF COLON: ICD-10-CM

## 2018-02-28 LAB
GLUCOSE BLDC GLUCOMTR-MCNC: 159 MG/DL (ref 70–130)
GLUCOSE BLDC GLUCOMTR-MCNC: 159 MG/DL (ref 70–130)

## 2018-02-28 PROCEDURE — 45380 COLONOSCOPY AND BIOPSY: CPT | Performed by: INTERNAL MEDICINE

## 2018-02-28 PROCEDURE — 25010000002 PROPOFOL 10 MG/ML EMULSION: Performed by: ANESTHESIOLOGY

## 2018-02-28 PROCEDURE — 88305 TISSUE EXAM BY PATHOLOGIST: CPT | Performed by: INTERNAL MEDICINE

## 2018-02-28 PROCEDURE — 82962 GLUCOSE BLOOD TEST: CPT

## 2018-02-28 RX ORDER — PROPOFOL 10 MG/ML
VIAL (ML) INTRAVENOUS AS NEEDED
Status: DISCONTINUED | OUTPATIENT
Start: 2018-02-28 | End: 2018-02-28 | Stop reason: SURG

## 2018-02-28 RX ORDER — SODIUM CHLORIDE, SODIUM LACTATE, POTASSIUM CHLORIDE, CALCIUM CHLORIDE 600; 310; 30; 20 MG/100ML; MG/100ML; MG/100ML; MG/100ML
30 INJECTION, SOLUTION INTRAVENOUS CONTINUOUS PRN
Status: DISCONTINUED | OUTPATIENT
Start: 2018-02-28 | End: 2018-02-28 | Stop reason: HOSPADM

## 2018-02-28 RX ORDER — LIDOCAINE HYDROCHLORIDE 20 MG/ML
INJECTION, SOLUTION INFILTRATION; PERINEURAL AS NEEDED
Status: DISCONTINUED | OUTPATIENT
Start: 2018-02-28 | End: 2018-02-28 | Stop reason: SURG

## 2018-02-28 RX ORDER — SODIUM CHLORIDE 0.9 % (FLUSH) 0.9 %
1-10 SYRINGE (ML) INJECTION AS NEEDED
Status: DISCONTINUED | OUTPATIENT
Start: 2018-02-28 | End: 2018-02-28 | Stop reason: HOSPADM

## 2018-02-28 RX ADMIN — PROPOFOL 200 MG: 10 INJECTION, EMULSION INTRAVENOUS at 08:40

## 2018-02-28 RX ADMIN — LIDOCAINE HYDROCHLORIDE 100 MG: 20 INJECTION, SOLUTION INFILTRATION; PERINEURAL at 08:23

## 2018-02-28 RX ADMIN — PROPOFOL 200 MG: 10 INJECTION, EMULSION INTRAVENOUS at 08:23

## 2018-02-28 RX ADMIN — SODIUM CHLORIDE, POTASSIUM CHLORIDE, SODIUM LACTATE AND CALCIUM CHLORIDE 30 ML/HR: 600; 310; 30; 20 INJECTION, SOLUTION INTRAVENOUS at 07:56

## 2018-02-28 NOTE — ANESTHESIA POSTPROCEDURE EVALUATION
"Patient: Taz Dickey    Procedure Summary     Date Anesthesia Start Anesthesia Stop Room / Location    02/28/18 0823 0855  [facility]       Procedure Diagnosis Surgeon Provider    COLONOSCOPY to TI and cecum with polypectomy (N/A ) No diagnosis on file. MD Anushka Thakkar MD          Anesthesia Type: MAC  Last vitals  BP   108/57 (02/28/18 0916)   Temp   36.8 °C (98.2 °F) (02/28/18 0857)   Pulse   57 (02/28/18 0916)   Resp   14 (02/28/18 0916)     SpO2   99 % (02/28/18 0916)     Post Anesthesia Care and Evaluation    Patient location during evaluation: bedside  Patient participation: complete - patient participated  Level of consciousness: awake and alert  Pain management: adequate  Airway patency: patent  Anesthetic complications: No anesthetic complications  PONV Status: none  Cardiovascular status: acceptable  Respiratory status: acceptable  Hydration status: acceptable    Comments: /57 (BP Location: Right arm, Patient Position: Sitting)  Pulse 57  Temp 36.8 °C (98.2 °F) (Oral)   Resp 14  Ht 177.8 cm (70\")  Wt 104 kg (229 lb 8 oz)  SpO2 99%  BMI 32.93 kg/m2        "

## 2018-02-28 NOTE — ANESTHESIA PREPROCEDURE EVALUATION
Anesthesia Evaluation     Patient summary reviewed and Nursing notes reviewed                Airway   Mallampati: I  TM distance: >3 FB  Neck ROM: limited  possible difficult intubation  Dental - normal exam   (+) edentulous    Pulmonary - normal exam   Cardiovascular - normal exam    (+) hypertension (one med) well controlled, hyperlipidemia,       Neuro/Psych  GI/Hepatic/Renal/Endo    (+) obesity,       Musculoskeletal     Abdominal  - normal exam    Bowel sounds: normal.   Substance History      OB/GYN          Other                      Anesthesia Plan    ASA 2     MAC     Anesthetic plan and risks discussed with patient.

## 2018-03-01 LAB
CYTO UR: NORMAL
LAB AP CASE REPORT: NORMAL
Lab: NORMAL
PATH REPORT.FINAL DX SPEC: NORMAL
PATH REPORT.GROSS SPEC: NORMAL

## 2018-03-20 ENCOUNTER — APPOINTMENT (OUTPATIENT)
Dept: GENERAL RADIOLOGY | Facility: HOSPITAL | Age: 68
End: 2018-03-20

## 2018-03-20 ENCOUNTER — HOSPITAL ENCOUNTER (EMERGENCY)
Facility: HOSPITAL | Age: 68
Discharge: HOME | End: 2018-03-20
Attending: EMERGENCY MEDICINE | Admitting: EMERGENCY MEDICINE

## 2018-03-20 VITALS
WEIGHT: 229 LBS | RESPIRATION RATE: 16 BRPM | HEART RATE: 72 BPM | HEIGHT: 70 IN | SYSTOLIC BLOOD PRESSURE: 126 MMHG | DIASTOLIC BLOOD PRESSURE: 80 MMHG | OXYGEN SATURATION: 95 % | BODY MASS INDEX: 32.78 KG/M2 | TEMPERATURE: 98.6 F

## 2018-03-20 DIAGNOSIS — S16.1XXA STRAIN OF NECK MUSCLE, INITIAL ENCOUNTER: ICD-10-CM
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
## 2018-03-20 PROCEDURE — 72050 X-RAY EXAM NECK SPINE 4/5VWS: CPT

## 2018-03-20 PROCEDURE — 99283 EMERGENCY DEPT VISIT LOW MDM: CPT
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
## 2018-03-20 NOTE — ED NOTES
Pt out of c-collar per ED MD when RN entered room to re-assess.  Pt resting comfortably, in no apparent distress.     Jose Cross RN  03/20/18 1037

## 2018-04-04 ENCOUNTER — OFFICE VISIT (OUTPATIENT)
Dept: INTERNAL MEDICINE | Facility: CLINIC | Age: 68
End: 2018-04-04

## 2018-04-04 VITALS
BODY MASS INDEX: 33.93 KG/M2 | OXYGEN SATURATION: 95 % | TEMPERATURE: 97.8 F | HEIGHT: 70 IN | DIASTOLIC BLOOD PRESSURE: 83 MMHG | SYSTOLIC BLOOD PRESSURE: 163 MMHG | WEIGHT: 237 LBS | HEART RATE: 72 BPM

## 2018-04-04 DIAGNOSIS — E78.00 HYPERCHOLESTEROLEMIA: ICD-10-CM

## 2018-04-04 DIAGNOSIS — F52.21 ERECTILE DYSFUNCTION OF NONORGANIC ORIGIN: ICD-10-CM

## 2018-04-04 DIAGNOSIS — R73.9 HYPERGLYCEMIA: ICD-10-CM

## 2018-04-04 DIAGNOSIS — Z00.00 MEDICARE ANNUAL WELLNESS VISIT, SUBSEQUENT: Primary | ICD-10-CM

## 2018-04-04 DIAGNOSIS — M51.36 DEGENERATION OF INTERVERTEBRAL DISC OF LUMBAR REGION: ICD-10-CM

## 2018-04-04 DIAGNOSIS — I10 ESSENTIAL HYPERTENSION: ICD-10-CM

## 2018-04-04 LAB
ALBUMIN SERPL-MCNC: 4.6 G/DL (ref 3.5–5.2)
ALBUMIN/GLOB SERPL: 1.8 G/DL
ALP SERPL-CCNC: 57 U/L (ref 39–117)
ALT SERPL-CCNC: 30 U/L (ref 1–41)
APPEARANCE UR: CLEAR
AST SERPL-CCNC: 28 U/L (ref 1–40)
BACTERIA #/AREA URNS HPF: NORMAL /HPF
BASOPHILS # BLD AUTO: 0.02 10*3/MM3 (ref 0–0.2)
BASOPHILS NFR BLD AUTO: 0.4 % (ref 0–1.5)
BILIRUB SERPL-MCNC: 1.4 MG/DL (ref 0.1–1.2)
BILIRUB UR QL STRIP: NEGATIVE
BUN SERPL-MCNC: 17 MG/DL (ref 8–23)
BUN/CREAT SERPL: 16.8 (ref 7–25)
CALCIUM SERPL-MCNC: 10.3 MG/DL (ref 8.6–10.5)
CASTS URNS MICRO: NORMAL
CHLORIDE SERPL-SCNC: 101 MMOL/L (ref 98–107)
CHOLEST SERPL-MCNC: 148 MG/DL (ref 0–200)
CO2 SERPL-SCNC: 28.5 MMOL/L (ref 22–29)
COLOR UR: YELLOW
CREAT SERPL-MCNC: 1.01 MG/DL (ref 0.76–1.27)
EOSINOPHIL # BLD AUTO: 0.14 10*3/MM3 (ref 0–0.7)
EOSINOPHIL NFR BLD AUTO: 2.8 % (ref 0.3–6.2)
EPI CELLS #/AREA URNS HPF: NORMAL /HPF
ERYTHROCYTE [DISTWIDTH] IN BLOOD BY AUTOMATED COUNT: 13.5 % (ref 11.5–14.5)
GFR SERPLBLD CREATININE-BSD FMLA CKD-EPI: 74 ML/MIN/1.73
GFR SERPLBLD CREATININE-BSD FMLA CKD-EPI: 90 ML/MIN/1.73
GLOBULIN SER CALC-MCNC: 2.5 GM/DL
GLUCOSE SERPL-MCNC: 131 MG/DL (ref 65–99)
GLUCOSE UR QL: NEGATIVE
HBA1C MFR BLD: 6.6 % (ref 4.8–5.6)
HCT VFR BLD AUTO: 40.8 % (ref 40.4–52.2)
HDLC SERPL-MCNC: 52 MG/DL (ref 40–60)
HGB BLD-MCNC: 12.6 G/DL (ref 13.7–17.6)
HGB UR QL STRIP: NEGATIVE
IMM GRANULOCYTES # BLD: 0 10*3/MM3 (ref 0–0.03)
IMM GRANULOCYTES NFR BLD: 0 % (ref 0–0.5)
KETONES UR QL STRIP: NEGATIVE
LDLC SERPL CALC-MCNC: 77 MG/DL (ref 0–100)
LDLC/HDLC SERPL: 1.49 {RATIO}
LEUKOCYTE ESTERASE UR QL STRIP: NEGATIVE
LYMPHOCYTES # BLD AUTO: 1.34 10*3/MM3 (ref 0.9–4.8)
LYMPHOCYTES NFR BLD AUTO: 27.1 % (ref 19.6–45.3)
MCH RBC QN AUTO: 32.4 PG (ref 27–32.7)
MCHC RBC AUTO-ENTMCNC: 30.9 G/DL (ref 32.6–36.4)
MCV RBC AUTO: 104.9 FL (ref 79.8–96.2)
MONOCYTES # BLD AUTO: 0.43 10*3/MM3 (ref 0.2–1.2)
MONOCYTES NFR BLD AUTO: 8.7 % (ref 5–12)
NEUTROPHILS # BLD AUTO: 3.01 10*3/MM3 (ref 1.9–8.1)
NEUTROPHILS NFR BLD AUTO: 61 % (ref 42.7–76)
NITRITE UR QL STRIP: NEGATIVE
PH UR STRIP: 8 [PH] (ref 5–8)
PLATELET # BLD AUTO: 133 10*3/MM3 (ref 140–500)
POTASSIUM SERPL-SCNC: 4.6 MMOL/L (ref 3.5–5.2)
PROT SERPL-MCNC: 7.1 G/DL (ref 6–8.5)
PROT UR QL STRIP: NEGATIVE
PSA SERPL-MCNC: 0.88 NG/ML (ref 0–4)
RBC # BLD AUTO: 3.89 10*6/MM3 (ref 4.6–6)
RBC #/AREA URNS HPF: NORMAL /HPF
SODIUM SERPL-SCNC: 144 MMOL/L (ref 136–145)
SP GR UR: 1.02 (ref 1–1.03)
T4 FREE SERPL-MCNC: 1.12 NG/DL (ref 0.93–1.7)
TRIGL SERPL-MCNC: 93 MG/DL (ref 0–150)
TSH SERPL DL<=0.005 MIU/L-ACNC: 2.55 MIU/ML (ref 0.27–4.2)
UROBILINOGEN UR STRIP-MCNC: (no result) MG/DL
VLDLC SERPL CALC-MCNC: 18.6 MG/DL (ref 5–40)
WBC # BLD AUTO: 4.94 10*3/MM3 (ref 4.5–10.7)
WBC #/AREA URNS HPF: NORMAL /HPF

## 2018-04-04 PROCEDURE — G0439 PPPS, SUBSEQ VISIT: HCPCS | Performed by: INTERNAL MEDICINE

## 2018-04-04 PROCEDURE — 96160 PT-FOCUSED HLTH RISK ASSMT: CPT | Performed by: INTERNAL MEDICINE

## 2018-04-04 PROCEDURE — 99214 OFFICE O/P EST MOD 30 MIN: CPT | Performed by: INTERNAL MEDICINE

## 2018-04-04 NOTE — PROGRESS NOTES
QUICK REFERENCE INFORMATION:  The ABCs of the Annual Wellness Visit    Subsequent Medicare Wellness Visit    HEALTH RISK ASSESSMENT    1950    Recent Hospitalizations:  No hospitalization(s) within the last year..        Current Medical Providers:  Patient Care Team:  Conor Fontenot MD as PCP - General (Internal Medicine)  Conor Fontenot MD as PCP - Family Medicine        Smoking Status:  History   Smoking Status   • Current Some Day Smoker   • Types: Cigars   Smokeless Tobacco   • Never Used       Alcohol Consumption:  History   Alcohol Use   • Yes     Comment: social       Depression Screen:   PHQ-2/PHQ-9 Depression Screening 4/4/2018   Little interest or pleasure in doing things 0   Feeling down, depressed, or hopeless 0   Trouble falling or staying asleep, or sleeping too much -   Feeling tired or having little energy -   Poor appetite or overeating -   Feeling bad about yourself - or that you are a failure or have let yourself or your family down -   Trouble concentrating on things, such as reading the newspaper or watching television -   Moving or speaking so slowly that other people could have noticed. Or the opposite - being so fidgety or restless that you have been moving around a lot more than usual -   Thoughts that you would be better off dead, or of hurting yourself in some way -   Total Score 0   If you checked off any problems, how difficult have these problems made it for you to do your work, take care of things at home, or get along with other people? -       Health Habits and Functional and Cognitive Screening:  Functional & Cognitive Status 4/4/2018   Do you have difficulty preparing food and eating? -   Do you have difficulty bathing yourself, getting dressed or grooming yourself? -   Do you have difficulty using the toilet? -   Do you have difficulty moving around from place to place? -   Do you have trouble with steps or getting out of a bed or a chair? -   In the past year have  you fallen or experienced a near fall? -   Current Diet -   Dental Exam -   Eye Exam -   Exercise (times per week) -   Current Exercise Activities Include -   Do you need help using the phone?  -   Are you deaf or do you have serious difficulty hearing?  -   Do you need help with transportation? -   Do you need help shopping? -   Do you need help preparing meals?  -   Do you need help with housework?  -   Do you need help with laundry? -   Do you need help taking your medications? -   Do you need help managing money? -   Do you ever drive or ride in a car without wearing a seat belt? -   Have you felt unusual stress, anger or loneliness in the last month? -   Who do you live with? -   If you need help, do you have trouble finding someone available to you? -   Have you been bothered in the last four weeks by sexual problems? -   Do you have difficulty concentrating, remembering or making decisions? No           Does the patient have evidence of cognitive impairment? No    Aspirin use counseling: Taking ASA appropriately as indicated      Recent Lab Results:  CMP:  Lab Results   Component Value Date     (H) 09/25/2017    BUN 21 09/25/2017    CREATININE 1.02 09/25/2017    EGFRIFNONA 73 09/25/2017    EGFRIFAFRI 89 09/25/2017    BCR 20.6 09/25/2017     09/25/2017    K 5.3 (H) 09/25/2017    CO2 28.3 09/25/2017    CALCIUM 9.5 09/25/2017    PROTENTOTREF 6.4 09/25/2017    ALBUMIN 4.40 09/25/2017    LABGLOBREF 2.0 09/25/2017    LABIL2 2.2 09/25/2017    BILITOT 0.7 09/25/2017    ALKPHOS 45 09/25/2017    AST 27 09/25/2017    ALT 23 09/25/2017     Lipid Panel:  Lab Results   Component Value Date    TRIG 116 03/10/2017    HDL 60 03/10/2017    VLDL 23.2 03/10/2017    LDLHDL 1.30 03/10/2017     HbA1c:  Lab Results   Component Value Date    HGBA1C 5.80 (H) 09/25/2017       Visual Acuity:  No exam data present    Age-appropriate Screening Schedule:  Refer to the list below for future screening recommendations based on  patient's age, sex and/or medical conditions. Orders for these recommended tests are listed in the plan section. The patient has been provided with a written plan.    Health Maintenance   Topic Date Due   • LIPID PANEL  03/10/2018   • TDAP/TD VACCINES (1 - Tdap) 09/24/2018 (Originally 7/20/1969)   • COLONOSCOPY  02/28/2028   • INFLUENZA VACCINE  Addressed   • PNEUMOCOCCAL VACCINES (65+ LOW/MEDIUM RISK)  Completed   • ZOSTER VACCINE  Addressed        Subjective   History of Present Illness    Taz Dickey is a 67 y.o. male who presents for an Subsequent Wellness Visit.    The following portions of the patient's history were reviewed and updated as appropriate: allergies, current medications, past family history, past medical history, past social history, past surgical history and problem list.    Outpatient Medications Prior to Visit   Medication Sig Dispense Refill   • aspirin 81 MG EC tablet Take 81 mg by mouth daily.     • atorvastatin (LIPITOR) 40 MG tablet TAKE ONE TABLET BY MOUTH DAILY 90 tablet 0   • lisinopril-hydrochlorothiazide (PRINZIDE,ZESTORETIC) 20-12.5 MG per tablet TAKE ONE TABLET BY MOUTH DAILY 90 tablet 2   • meloxicam (MOBIC) 15 MG tablet TAKE ONE TABLET BY MOUTH DAILY 30 tablet 3   • metFORMIN (GLUCOPHAGE) 500 MG tablet TAKE ONE TABLET BY MOUTH DAILY WITH DINNER 90 tablet 0   • tadalafil (CIALIS) 5 MG tablet Take by mouth.       No facility-administered medications prior to visit.        Patient Active Problem List   Diagnosis   • Degeneration of intervertebral disc of lumbar region   • Lumbar radiculopathy   • Benign essential hypertension   • Depression   • Erectile dysfunction of nonorganic origin   • Hypercholesterolemia   • Hyperglycemia   • Hypertension   • Osteoarthritis   • Tinnitus of both ears   • Encounter for screening colonoscopy   • Welcome to Medicare preventive visit   • Nocturia       Advance Care Planning:  has NO advance directive - not interested in additional  "information    Identification of Risk Factors:  Risk factors include: weight .    Review of Systems    Compared to one year ago, the patient feels his physical health is the same.  Compared to one year ago, the patient feels his mental health is the same.    Objective     Physical Exam    Vitals:    04/04/18 0857   BP: 163/83   BP Location: Left arm   Patient Position: Sitting   Cuff Size: Adult   Pulse: 72   Temp: 97.8 °F (36.6 °C)   TempSrc: Oral   SpO2: 95%   Weight: 108 kg (237 lb)   Height: 177.8 cm (70\")   PainSc:   4       Body mass index is 34.01 kg/m².  Discussed the patient's BMI with him. BMI is above normal parameters. Follow-up plan includes:  exercise counseling and nutrition counseling.    Assessment/Plan   Patient Self-Management and Personalized Health Advice  The patient has been provided with information about: weight management and preventive services including:   · Nutrition counseling provided.    Visit Diagnoses:  No diagnosis found.    No orders of the defined types were placed in this encounter.      Outpatient Encounter Prescriptions as of 4/4/2018   Medication Sig Dispense Refill   • aspirin 81 MG EC tablet Take 81 mg by mouth daily.     • atorvastatin (LIPITOR) 40 MG tablet TAKE ONE TABLET BY MOUTH DAILY 90 tablet 0   • lisinopril-hydrochlorothiazide (PRINZIDE,ZESTORETIC) 20-12.5 MG per tablet TAKE ONE TABLET BY MOUTH DAILY 90 tablet 2   • meloxicam (MOBIC) 15 MG tablet TAKE ONE TABLET BY MOUTH DAILY 30 tablet 3   • metFORMIN (GLUCOPHAGE) 500 MG tablet TAKE ONE TABLET BY MOUTH DAILY WITH DINNER 90 tablet 0   • tadalafil (CIALIS) 5 MG tablet Take by mouth.       No facility-administered encounter medications on file as of 4/4/2018.        Reviewed use of high risk medication in the elderly: yes  Reviewed for potential of harmful drug interactions in the elderly: yes    Follow Up:  No Follow-up on file.     An After Visit Summary and PPPS with all of these plans were given to the patient.  "

## 2018-04-04 NOTE — PATIENT INSTRUCTIONS
Medicare Wellness  Personal Prevention Plan of Service     Date of Office Visit:  2018  Encounter Provider:  Conor Fontenot MD  Place of Service:  [facility]  Patient Name: Taz Dickey  :  1950    As part of the Medicare Wellness portion of your visit today, we are providing you with this personalized preventive plan of services (PPPS). This plan is based upon recommendations of the [place] Preventive Services Task Force (USPSTF) and the Advisory Committee on Immunization Practices (ACIP).    This lists the preventive care services that should be considered, and provides dates of when you are due. Items listed as completed are up-to-date and do not require any further intervention.    Health Maintenance   Topic Date Due   • MEDICARE ANNUAL WELLNESS  03/10/2018   • LIPID PANEL  03/10/2018   • AAA SCREEN (ONE-TIME)  2018   • TDAP/TD VACCINES (1 - Tdap) 2018 (Originally 1970)   • COLONOSCOPY  2028   • HEPATITIS C SCREENING  Addressed   • INFLUENZA VACCINE  Addressed   • PNEUMOCOCCAL VACCINES (65+ LOW/MEDIUM RISK)  Completed   • ZOSTER VACCINE  Addressed       Orders Placed This Encounter   Procedures   • Hemoglobin A1c   • Lipid Panel With LDL / HDL Ratio   • Comprehensive Metabolic Panel   • T4, Free   • TSH   • PSA DIAGNOSTIC   • CBC & Differential     Order Specific Question:   Manual Differential     Answer:   No   • Urinalysis With Microscopic - Urine, Clean Catch       Return in about 1 year (around 2019) for Medicare Wellness.

## 2018-04-12 RX ORDER — MELOXICAM 15 MG/1
TABLET ORAL
Qty: 30 TABLET | Refills: 2 | Status: SHIPPED | OUTPATIENT
Start: 2018-04-12 | End: 2018-07-13 | Stop reason: SDUPTHER

## 2018-04-15 NOTE — PROGRESS NOTES
Subjective   Taz Dickey is a 67 y.o. male.   He is here today for Medicare annual wellness visit subsequent along with follow-up for hypertension hyperlipidemia lumbar DDD hyperglycemia and erectile dysfunction and everything is stable  History of Present Illness   He is here today for Medicare annual wellness visit subsequent and he is eating healthier along with follow-up for hypertension which is well-controlled on current medication hyper lipidemia which is stable on current medication lumbar DDD which is tolerable but comes and goes with cold weather and hyperglycemia which has been stable and erectile dysfunction which is an ongoing problem for which medication helps  The following portions of the patient's history were reviewed and updated as appropriate: allergies, current medications, past family history, past medical history, past social history, past surgical history and problem list.    Review of Systems   Genitourinary:        Erectile dysfunction   Musculoskeletal: Positive for back pain.   All other systems reviewed and are negative.      Objective   Physical Exam   Constitutional: He is oriented to person, place, and time. He appears well-developed and well-nourished. He is cooperative.   HENT:   Head: Normocephalic and atraumatic.   Right Ear: Hearing, tympanic membrane, external ear and ear canal normal.   Left Ear: Hearing, tympanic membrane, external ear and ear canal normal.   Nose: Nose normal.   Mouth/Throat: Uvula is midline, oropharynx is clear and moist and mucous membranes are normal.   Eyes: Conjunctivae, EOM and lids are normal. Pupils are equal, round, and reactive to light.   Neck: Phonation normal. Neck supple. Carotid bruit is not present.   Cardiovascular: Normal rate, regular rhythm and normal heart sounds.  Exam reveals no gallop and no friction rub.    No murmur heard.  Pulmonary/Chest: Effort normal and breath sounds normal. No respiratory distress.   Abdominal: Soft. Bowel  sounds are normal. He exhibits no distension and no mass. There is no hepatosplenomegaly. There is no tenderness. There is no rebound and no guarding. No hernia.   Musculoskeletal: He exhibits no edema.   Neurological: He is alert and oriented to person, place, and time. Coordination and gait normal.   Skin: Skin is warm and dry.   Psychiatric: He has a normal mood and affect. His speech is normal and behavior is normal. Judgment and thought content normal.   Nursing note and vitals reviewed.      Assessment/Plan   Diagnoses and all orders for this visit:    Medicare annual wellness visit, subsequent    Essential hypertension  -     Hemoglobin A1c  -     Lipid Panel With LDL / HDL Ratio  -     CBC & Differential  -     Comprehensive Metabolic Panel  -     T4, Free  -     TSH  -     Urinalysis With Microscopic - Urine, Clean Catch  -     PSA DIAGNOSTIC    Hypercholesterolemia  -     Hemoglobin A1c  -     Lipid Panel With LDL / HDL Ratio  -     CBC & Differential  -     Comprehensive Metabolic Panel  -     T4, Free  -     TSH  -     Urinalysis With Microscopic - Urine, Clean Catch  -     PSA DIAGNOSTIC    Degeneration of intervertebral disc of lumbar region    Hyperglycemia  -     Hemoglobin A1c    Erectile dysfunction of nonorganic origin    Other orders  -     Microscopic Examination      Medicare annual wellness visit subsequent no new real recommendations he is doing very well  Hyper lipidemia stable on current medication and we will check lipid panel and liver enzymes  Lumbar DDD stable at this time but he does have back pain occasionally but he fights throughout  Hyper glycemia has been stable and we will check hemoglobin A1c  Erectile dysfunction we will use supportive meds for this as well  Hypertension has been well-controlled on current medication no changes needed and the 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

## 2018-05-07 RX ORDER — ATORVASTATIN CALCIUM 40 MG/1
TABLET, FILM COATED ORAL
Qty: 90 TABLET | Refills: 0 | Status: SHIPPED | OUTPATIENT
Start: 2018-05-07 | End: 2018-08-04 | Stop reason: SDUPTHER

## 2018-07-16 RX ORDER — MELOXICAM 15 MG/1
TABLET ORAL
Qty: 30 TABLET | Refills: 1 | Status: SHIPPED | OUTPATIENT
Start: 2018-07-16 | End: 2018-09-24 | Stop reason: SDUPTHER

## 2018-07-20 RX ORDER — TADALAFIL 5 MG
TABLET ORAL
Qty: 30 TABLET | Refills: 1 | Status: SHIPPED | OUTPATIENT
Start: 2018-07-20 | End: 2021-01-25 | Stop reason: ALTCHOICE

## 2018-08-06 RX ORDER — ATORVASTATIN CALCIUM 40 MG/1
TABLET, FILM COATED ORAL
Qty: 90 TABLET | Refills: 0 | Status: SHIPPED | OUTPATIENT
Start: 2018-08-06 | End: 2019-03-12 | Stop reason: SDUPTHER

## 2018-08-13 RX ORDER — ATORVASTATIN CALCIUM 40 MG/1
TABLET, FILM COATED ORAL
Qty: 90 TABLET | Refills: 0 | Status: SHIPPED | OUTPATIENT
Start: 2018-08-13 | End: 2019-03-11 | Stop reason: SDUPTHER

## 2018-08-13 RX ORDER — LISINOPRIL AND HYDROCHLOROTHIAZIDE 20; 12.5 MG/1; MG/1
TABLET ORAL
Qty: 90 TABLET | Refills: 1 | Status: SHIPPED | OUTPATIENT
Start: 2018-08-13 | End: 2019-03-11 | Stop reason: SDUPTHER

## 2018-09-24 RX ORDER — MELOXICAM 15 MG/1
TABLET ORAL
Qty: 30 TABLET | Refills: 0 | Status: SHIPPED | OUTPATIENT
Start: 2018-09-24 | End: 2018-10-24 | Stop reason: SDUPTHER

## 2018-10-18 ENCOUNTER — CLINICAL SUPPORT (OUTPATIENT)
Dept: INTERNAL MEDICINE | Facility: CLINIC | Age: 68
End: 2018-10-18

## 2018-10-18 DIAGNOSIS — Z23 FLU VACCINE NEED: Primary | ICD-10-CM

## 2018-10-18 PROCEDURE — 90662 IIV NO PRSV INCREASED AG IM: CPT | Performed by: INTERNAL MEDICINE

## 2018-10-18 PROCEDURE — G0008 ADMIN INFLUENZA VIRUS VAC: HCPCS | Performed by: INTERNAL MEDICINE

## 2018-10-25 RX ORDER — MELOXICAM 15 MG/1
TABLET ORAL
Qty: 30 TABLET | Refills: 0 | Status: SHIPPED | OUTPATIENT
Start: 2018-10-25 | End: 2018-12-06 | Stop reason: SDUPTHER

## 2018-12-06 RX ORDER — MELOXICAM 15 MG/1
TABLET ORAL
Qty: 90 TABLET | Refills: 2 | Status: SHIPPED | OUTPATIENT
Start: 2018-12-06 | End: 2019-10-08

## 2019-03-12 RX ORDER — ATORVASTATIN CALCIUM 40 MG/1
TABLET, FILM COATED ORAL
Qty: 90 TABLET | Refills: 0 | Status: SHIPPED | OUTPATIENT
Start: 2019-03-12 | End: 2019-06-16 | Stop reason: SDUPTHER

## 2019-03-12 RX ORDER — LISINOPRIL AND HYDROCHLOROTHIAZIDE 20; 12.5 MG/1; MG/1
TABLET ORAL
Qty: 90 TABLET | Refills: 0 | Status: SHIPPED | OUTPATIENT
Start: 2019-03-12 | End: 2019-06-16 | Stop reason: SDUPTHER

## 2019-04-08 ENCOUNTER — OFFICE VISIT (OUTPATIENT)
Dept: INTERNAL MEDICINE | Facility: CLINIC | Age: 69
End: 2019-04-08

## 2019-04-08 VITALS
TEMPERATURE: 98.6 F | HEIGHT: 70 IN | RESPIRATION RATE: 17 BRPM | OXYGEN SATURATION: 96 % | DIASTOLIC BLOOD PRESSURE: 87 MMHG | HEART RATE: 54 BPM | WEIGHT: 234 LBS | BODY MASS INDEX: 33.5 KG/M2 | SYSTOLIC BLOOD PRESSURE: 135 MMHG

## 2019-04-08 DIAGNOSIS — I10 ESSENTIAL HYPERTENSION: Primary | ICD-10-CM

## 2019-04-08 DIAGNOSIS — R35.1 NOCTURIA: ICD-10-CM

## 2019-04-08 DIAGNOSIS — E78.00 HYPERCHOLESTEROLEMIA: ICD-10-CM

## 2019-04-08 DIAGNOSIS — R73.9 HYPERGLYCEMIA: ICD-10-CM

## 2019-04-08 LAB
ALBUMIN SERPL-MCNC: 4.9 G/DL (ref 3.5–5.2)
ALBUMIN/GLOB SERPL: 2.1 G/DL
ALP SERPL-CCNC: 59 U/L (ref 39–117)
ALT SERPL-CCNC: 33 U/L (ref 1–41)
APPEARANCE UR: CLEAR
AST SERPL-CCNC: 31 U/L (ref 1–40)
BACTERIA #/AREA URNS HPF: NORMAL /HPF
BASOPHILS # BLD AUTO: 0.05 10*3/MM3 (ref 0–0.2)
BASOPHILS NFR BLD AUTO: 1 % (ref 0–1.5)
BILIRUB SERPL-MCNC: 0.9 MG/DL (ref 0.2–1.2)
BILIRUB UR QL STRIP: NEGATIVE
BUN SERPL-MCNC: 18 MG/DL (ref 8–23)
BUN/CREAT SERPL: 17.6 (ref 7–25)
CALCIUM SERPL-MCNC: 9.8 MG/DL (ref 8.6–10.5)
CASTS URNS MICRO: NORMAL
CHLORIDE SERPL-SCNC: 100 MMOL/L (ref 98–107)
CHOLEST SERPL-MCNC: 153 MG/DL (ref 0–200)
CO2 SERPL-SCNC: 28.3 MMOL/L (ref 22–29)
COLOR UR: YELLOW
CREAT SERPL-MCNC: 1.02 MG/DL (ref 0.76–1.27)
EOSINOPHIL # BLD AUTO: 0.12 10*3/MM3 (ref 0–0.4)
EOSINOPHIL NFR BLD AUTO: 2.4 % (ref 0.3–6.2)
EPI CELLS #/AREA URNS HPF: NORMAL /HPF
ERYTHROCYTE [DISTWIDTH] IN BLOOD BY AUTOMATED COUNT: 13.3 % (ref 12.3–15.4)
GLOBULIN SER CALC-MCNC: 2.3 GM/DL
GLUCOSE SERPL-MCNC: 150 MG/DL (ref 65–99)
GLUCOSE UR QL: NEGATIVE
HBA1C MFR BLD: 6.7 % (ref 4.8–5.6)
HCT VFR BLD AUTO: 41.3 % (ref 37.5–51)
HDLC SERPL-MCNC: 56 MG/DL (ref 40–60)
HGB BLD-MCNC: 13.1 G/DL (ref 13–17.7)
HGB UR QL STRIP: NEGATIVE
IMM GRANULOCYTES # BLD AUTO: 0.01 10*3/MM3 (ref 0–0.05)
IMM GRANULOCYTES NFR BLD AUTO: 0.2 % (ref 0–0.5)
KETONES UR QL STRIP: NEGATIVE
LDLC SERPL CALC-MCNC: 78 MG/DL (ref 0–100)
LDLC/HDLC SERPL: 1.4 {RATIO}
LEUKOCYTE ESTERASE UR QL STRIP: NEGATIVE
LYMPHOCYTES # BLD AUTO: 1.08 10*3/MM3 (ref 0.7–3.1)
LYMPHOCYTES NFR BLD AUTO: 21.3 % (ref 19.6–45.3)
MCH RBC QN AUTO: 32.6 PG (ref 26.6–33)
MCHC RBC AUTO-ENTMCNC: 31.7 G/DL (ref 31.5–35.7)
MCV RBC AUTO: 102.7 FL (ref 79–97)
MONOCYTES # BLD AUTO: 0.48 10*3/MM3 (ref 0.1–0.9)
MONOCYTES NFR BLD AUTO: 9.4 % (ref 5–12)
NEUTROPHILS # BLD AUTO: 3.34 10*3/MM3 (ref 1.4–7)
NEUTROPHILS NFR BLD AUTO: 65.7 % (ref 42.7–76)
NITRITE UR QL STRIP: NEGATIVE
NRBC BLD AUTO-RTO: 0 /100 WBC (ref 0–0)
PH UR STRIP: 6.5 [PH] (ref 5–8)
PLATELET # BLD AUTO: 135 10*3/MM3 (ref 140–450)
POTASSIUM SERPL-SCNC: 4.4 MMOL/L (ref 3.5–5.2)
PROT SERPL-MCNC: 7.2 G/DL (ref 6–8.5)
PROT UR QL STRIP: NEGATIVE
PSA SERPL-MCNC: 0.83 NG/ML (ref 0–4)
RBC # BLD AUTO: 4.02 10*6/MM3 (ref 4.14–5.8)
RBC #/AREA URNS HPF: NORMAL /HPF
SODIUM SERPL-SCNC: 138 MMOL/L (ref 136–145)
SP GR UR: 1.02 (ref 1–1.03)
T4 FREE SERPL-MCNC: 1.25 NG/DL (ref 0.93–1.7)
TRIGL SERPL-MCNC: 93 MG/DL (ref 0–150)
TSH SERPL DL<=0.005 MIU/L-ACNC: 1.93 MIU/ML (ref 0.27–4.2)
UROBILINOGEN UR STRIP-MCNC: (no result) MG/DL
VLDLC SERPL CALC-MCNC: 18.6 MG/DL
WBC # BLD AUTO: 5.08 10*3/MM3 (ref 3.4–10.8)
WBC #/AREA URNS HPF: NORMAL /HPF

## 2019-04-08 PROCEDURE — 99214 OFFICE O/P EST MOD 30 MIN: CPT | Performed by: INTERNAL MEDICINE

## 2019-05-04 NOTE — PROGRESS NOTES
Subjective   Taz Dickey is a 68 y.o. male.   He is here for follow-up for hypertension hyperlipidemia nocturia hyperglycemia  History of Present Illness   He is here today for follow-up for hypertension which is stable on current medication hyperlipidemia which has been stable on Lipitor nocturia which is stable at this time on Cialis and hyperglycemia which has been stable with no evidence of diabetes at this point  The following portions of the patient's history were reviewed and updated as appropriate: allergies, current medications, past family history, past medical history, past social history, past surgical history and problem list.    Review of Systems   Constitutional: Negative for fatigue.   Endocrine: Negative for polydipsia and polyuria.   Genitourinary: Negative for difficulty urinating.   Neurological: Negative for weakness.   Psychiatric/Behavioral: Negative for dysphoric mood.   All other systems reviewed and are negative.      Objective   Physical Exam   Constitutional: He is oriented to person, place, and time. He appears well-developed and well-nourished. He is cooperative.   HENT:   Head: Normocephalic and atraumatic.   Right Ear: Hearing, tympanic membrane, external ear and ear canal normal.   Left Ear: Hearing, tympanic membrane, external ear and ear canal normal.   Nose: Nose normal.   Mouth/Throat: Uvula is midline, oropharynx is clear and moist and mucous membranes are normal.   Eyes: Conjunctivae, EOM and lids are normal. Pupils are equal, round, and reactive to light.   Neck: Phonation normal. Neck supple. Carotid bruit is not present.   Cardiovascular: Normal rate, regular rhythm and normal heart sounds. Exam reveals no gallop and no friction rub.   No murmur heard.  Pulmonary/Chest: Effort normal and breath sounds normal. No respiratory distress.   Abdominal: Soft. Bowel sounds are normal. He exhibits no distension and no mass. There is no hepatosplenomegaly. There is no tenderness.  There is no rebound and no guarding. No hernia.   Musculoskeletal: He exhibits no edema.   Neurological: He is alert and oriented to person, place, and time. Coordination and gait normal.   Skin: Skin is warm and dry.   Psychiatric: He has a normal mood and affect. His speech is normal and behavior is normal. Judgment and thought content normal.   Nursing note and vitals reviewed.      Assessment/Plan   Diagnoses and all orders for this visit:    Essential hypertension    Hypercholesterolemia  -     Lipid Panel With LDL / HDL Ratio  -     CBC & Differential  -     Comprehensive Metabolic Panel  -     T4, Free  -     TSH  -     Urinalysis With Microscopic - Urine, Clean Catch    Nocturia  -     PSA DIAGNOSTIC    Hyperglycemia  -     Hemoglobin A1c    Other orders  -     Microscopic Examination -      Hypertension well-controlled on current medication no changes  Hyperlipidemia has been stable on Lipitor we will check labs today  Nocturia stable on Cialis daily and no changes needed we will check PSA  Hyperglycemia no evidence of diabetes at this point we will check hemoglobin A1c

## 2019-06-17 RX ORDER — LISINOPRIL AND HYDROCHLOROTHIAZIDE 20; 12.5 MG/1; MG/1
TABLET ORAL
Qty: 90 TABLET | Refills: 0 | Status: SHIPPED | OUTPATIENT
Start: 2019-06-17 | End: 2019-09-22 | Stop reason: SDUPTHER

## 2019-06-17 RX ORDER — ATORVASTATIN CALCIUM 40 MG/1
TABLET, FILM COATED ORAL
Qty: 90 TABLET | Refills: 0 | Status: SHIPPED | OUTPATIENT
Start: 2019-06-17 | End: 2019-09-22 | Stop reason: SDUPTHER

## 2019-09-23 RX ORDER — LISINOPRIL AND HYDROCHLOROTHIAZIDE 20; 12.5 MG/1; MG/1
TABLET ORAL
Qty: 90 TABLET | Refills: 0 | Status: SHIPPED | OUTPATIENT
Start: 2019-09-23 | End: 2019-12-30

## 2019-09-23 RX ORDER — ATORVASTATIN CALCIUM 40 MG/1
TABLET, FILM COATED ORAL
Qty: 90 TABLET | Refills: 0 | Status: SHIPPED | OUTPATIENT
Start: 2019-09-23 | End: 2019-12-30

## 2019-10-08 ENCOUNTER — OFFICE VISIT (OUTPATIENT)
Dept: INTERNAL MEDICINE | Facility: CLINIC | Age: 69
End: 2019-10-08

## 2019-10-08 VITALS
SYSTOLIC BLOOD PRESSURE: 138 MMHG | HEART RATE: 56 BPM | OXYGEN SATURATION: 97 % | BODY MASS INDEX: 30.49 KG/M2 | HEIGHT: 70 IN | WEIGHT: 213 LBS | RESPIRATION RATE: 16 BRPM | DIASTOLIC BLOOD PRESSURE: 86 MMHG | TEMPERATURE: 98.4 F

## 2019-10-08 DIAGNOSIS — I10 BENIGN ESSENTIAL HYPERTENSION: ICD-10-CM

## 2019-10-08 DIAGNOSIS — Z23 NEEDS FLU SHOT: ICD-10-CM

## 2019-10-08 DIAGNOSIS — E78.00 HYPERCHOLESTEROLEMIA: ICD-10-CM

## 2019-10-08 DIAGNOSIS — M15.9 PRIMARY OSTEOARTHRITIS INVOLVING MULTIPLE JOINTS: ICD-10-CM

## 2019-10-08 DIAGNOSIS — E11.9 TYPE 2 DIABETES MELLITUS WITHOUT COMPLICATION, WITHOUT LONG-TERM CURRENT USE OF INSULIN (HCC): ICD-10-CM

## 2019-10-08 DIAGNOSIS — M51.36 DEGENERATION OF INTERVERTEBRAL DISC OF LUMBAR REGION: ICD-10-CM

## 2019-10-08 DIAGNOSIS — Z76.89 ENCOUNTER TO ESTABLISH CARE: Primary | ICD-10-CM

## 2019-10-08 LAB
ALBUMIN SERPL-MCNC: 4.5 G/DL (ref 3.5–5.2)
ALBUMIN/GLOB SERPL: 1.8 G/DL
ALP SERPL-CCNC: 69 U/L (ref 39–117)
ALT SERPL-CCNC: 37 U/L (ref 1–41)
AST SERPL-CCNC: 25 U/L (ref 1–40)
BASOPHILS # BLD AUTO: 0.04 10*3/MM3 (ref 0–0.2)
BASOPHILS NFR BLD AUTO: 0.9 % (ref 0–1.5)
BILIRUB SERPL-MCNC: 0.8 MG/DL (ref 0.2–1.2)
BUN SERPL-MCNC: 21 MG/DL (ref 8–23)
BUN/CREAT SERPL: 18.4 (ref 7–25)
CALCIUM SERPL-MCNC: 9.6 MG/DL (ref 8.6–10.5)
CHLORIDE SERPL-SCNC: 99 MMOL/L (ref 98–107)
CHOLEST SERPL-MCNC: 171 MG/DL (ref 0–200)
CO2 SERPL-SCNC: 27.9 MMOL/L (ref 22–29)
CREAT SERPL-MCNC: 1.14 MG/DL (ref 0.76–1.27)
EOSINOPHIL # BLD AUTO: 0.21 10*3/MM3 (ref 0–0.4)
EOSINOPHIL NFR BLD AUTO: 4.9 % (ref 0.3–6.2)
ERYTHROCYTE [DISTWIDTH] IN BLOOD BY AUTOMATED COUNT: 13.2 % (ref 12.3–15.4)
GLOBULIN SER CALC-MCNC: 2.5 GM/DL
GLUCOSE SERPL-MCNC: 124 MG/DL (ref 65–99)
HBA1C MFR BLD: 6.3 % (ref 4.8–5.6)
HCT VFR BLD AUTO: 36.2 % (ref 37.5–51)
HDLC SERPL-MCNC: 57 MG/DL (ref 40–60)
HGB BLD-MCNC: 11.9 G/DL (ref 13–17.7)
IMM GRANULOCYTES # BLD AUTO: 0.02 10*3/MM3 (ref 0–0.05)
IMM GRANULOCYTES NFR BLD AUTO: 0.5 % (ref 0–0.5)
LDLC SERPL CALC-MCNC: 90 MG/DL (ref 0–100)
LYMPHOCYTES # BLD AUTO: 0.72 10*3/MM3 (ref 0.7–3.1)
LYMPHOCYTES NFR BLD AUTO: 16.8 % (ref 19.6–45.3)
MCH RBC QN AUTO: 32.9 PG (ref 26.6–33)
MCHC RBC AUTO-ENTMCNC: 32.9 G/DL (ref 31.5–35.7)
MCV RBC AUTO: 100 FL (ref 79–97)
MONOCYTES # BLD AUTO: 0.46 10*3/MM3 (ref 0.1–0.9)
MONOCYTES NFR BLD AUTO: 10.7 % (ref 5–12)
NEUTROPHILS # BLD AUTO: 2.83 10*3/MM3 (ref 1.7–7)
NEUTROPHILS NFR BLD AUTO: 66.2 % (ref 42.7–76)
NRBC BLD AUTO-RTO: 0 /100 WBC (ref 0–0.2)
PLATELET # BLD AUTO: 146 10*3/MM3 (ref 140–450)
POTASSIUM SERPL-SCNC: 4.5 MMOL/L (ref 3.5–5.2)
PROT SERPL-MCNC: 7 G/DL (ref 6–8.5)
RBC # BLD AUTO: 3.62 10*6/MM3 (ref 4.14–5.8)
SODIUM SERPL-SCNC: 137 MMOL/L (ref 136–145)
TRIGL SERPL-MCNC: 119 MG/DL (ref 0–150)
VLDLC SERPL CALC-MCNC: 23.8 MG/DL
WBC # BLD AUTO: 4.28 10*3/MM3 (ref 3.4–10.8)

## 2019-10-08 PROCEDURE — 90653 IIV ADJUVANT VACCINE IM: CPT | Performed by: NURSE PRACTITIONER

## 2019-10-08 PROCEDURE — 99214 OFFICE O/P EST MOD 30 MIN: CPT | Performed by: NURSE PRACTITIONER

## 2019-10-08 PROCEDURE — G0008 ADMIN INFLUENZA VIRUS VAC: HCPCS | Performed by: NURSE PRACTITIONER

## 2019-10-08 RX ORDER — DICLOFENAC SODIUM 75 MG/1
TABLET, DELAYED RELEASE ORAL
COMMUNITY
Start: 2019-09-08 | End: 2020-07-31 | Stop reason: SDUPTHER

## 2019-10-08 NOTE — PATIENT INSTRUCTIONS
"Hypertension  Hypertension, commonly called high blood pressure, is when the force of blood pumping through the arteries is too strong. The arteries are the blood vessels that carry blood from the heart throughout the body. Hypertension forces the heart to work harder to pump blood and may cause arteries to become narrow or stiff. Having untreated or uncontrolled hypertension can cause heart attacks, strokes, kidney disease, and other problems.  A blood pressure reading consists of a higher number over a lower number. Ideally, your blood pressure should be below 120/80. The first (\"top\") number is called the systolic pressure. It is a measure of the pressure in your arteries as your heart beats. The second (\"bottom\") number is called the diastolic pressure. It is a measure of the pressure in your arteries as the heart relaxes.  What are the causes?  The cause of this condition is not known.  What increases the risk?  Some risk factors for high blood pressure are under your control. Others are not.  Factors you can change  · Smoking.  · Having type 2 diabetes mellitus, high cholesterol, or both.  · Not getting enough exercise or physical activity.  · Being overweight.  · Having too much fat, sugar, calories, or salt (sodium) in your diet.  · Drinking too much alcohol.  Factors that are difficult or impossible to change  · Having chronic kidney disease.  · Having a family history of high blood pressure.  · Age. Risk increases with age.  · Race. You may be at higher risk if you are -American.  · Gender. Men are at higher risk than women before age 45. After age 65, women are at higher risk than men.  · Having obstructive sleep apnea.  · Stress.  What are the signs or symptoms?  Extremely high blood pressure (hypertensive crisis) may cause:  · Headache.  · Anxiety.  · Shortness of breath.  · Nosebleed.  · Nausea and vomiting.  · Severe chest pain.  · Jerky movements you cannot control (seizures).  How is this " diagnosed?  This condition is diagnosed by measuring your blood pressure while you are seated, with your arm resting on a surface. The cuff of the blood pressure monitor will be placed directly against the skin of your upper arm at the level of your heart. It should be measured at least twice using the same arm. Certain conditions can cause a difference in blood pressure between your right and left arms.  Certain factors can cause blood pressure readings to be lower or higher than normal (elevated) for a short period of time:  · When your blood pressure is higher when you are in a health care provider's office than when you are at home, this is called white coat hypertension. Most people with this condition do not need medicines.  · When your blood pressure is higher at home than when you are in a health care provider's office, this is called masked hypertension. Most people with this condition may need medicines to control blood pressure.  If you have a high blood pressure reading during one visit or you have normal blood pressure with other risk factors:  · You may be asked to return on a different day to have your blood pressure checked again.  · You may be asked to monitor your blood pressure at home for 1 week or longer.  If you are diagnosed with hypertension, you may have other blood or imaging tests to help your health care provider understand your overall risk for other conditions.  How is this treated?  This condition is treated by making healthy lifestyle changes, such as eating healthy foods, exercising more, and reducing your alcohol intake. Your health care provider may prescribe medicine if lifestyle changes are not enough to get your blood pressure under control, and if:  · Your systolic blood pressure is above 130.  · Your diastolic blood pressure is above 80.  Your personal target blood pressure may vary depending on your medical conditions, your age, and other factors.  Follow these instructions  at home:  Eating and drinking    · Eat a diet that is high in fiber and potassium, and low in sodium, added sugar, and fat. An example eating plan is called the DASH (Dietary Approaches to Stop Hypertension) diet. To eat this way:  ? Eat plenty of fresh fruits and vegetables. Try to fill half of your plate at each meal with fruits and vegetables.  ? Eat whole grains, such as whole wheat pasta, brown rice, or whole grain bread. Fill about one quarter of your plate with whole grains.  ? Eat or drink low-fat dairy products, such as skim milk or low-fat yogurt.  ? Avoid fatty cuts of meat, processed or cured meats, and poultry with skin. Fill about one quarter of your plate with lean proteins, such as fish, chicken without skin, beans, eggs, and tofu.  ? Avoid premade and processed foods. These tend to be higher in sodium, added sugar, and fat.  · Reduce your daily sodium intake. Most people with hypertension should eat less than 1,500 mg of sodium a day.  · Limit alcohol intake to no more than 1 drink a day for nonpregnant women and 2 drinks a day for men. One drink equals 12 oz of beer, 5 oz of wine, or 1½ oz of hard liquor.  Lifestyle    · Work with your health care provider to maintain a healthy body weight or to lose weight. Ask what an ideal weight is for you.  · Get at least 30 minutes of exercise that causes your heart to beat faster (aerobic exercise) most days of the week. Activities may include walking, swimming, or biking.  · Include exercise to strengthen your muscles (resistance exercise), such as pilates or lifting weights, as part of your weekly exercise routine. Try to do these types of exercises for 30 minutes at least 3 days a week.  · Do not use any products that contain nicotine or tobacco, such as cigarettes and e-cigarettes. If you need help quitting, ask your health care provider.  · Monitor your blood pressure at home as told by your health care provider.  · Keep all follow-up visits as told by  your health care provider. This is important.  Medicines  · Take over-the-counter and prescription medicines only as told by your health care provider. Follow directions carefully. Blood pressure medicines must be taken as prescribed.  · Do not skip doses of blood pressure medicine. Doing this puts you at risk for problems and can make the medicine less effective.  · Ask your health care provider about side effects or reactions to medicines that you should watch for.  Contact a health care provider if:  · You think you are having a reaction to a medicine you are taking.  · You have headaches that keep coming back (recurring).  · You feel dizzy.  · You have swelling in your ankles.  · You have trouble with your vision.  Get help right away if:  · You develop a severe headache or confusion.  · You have unusual weakness or numbness.  · You feel faint.  · You have severe pain in your chest or abdomen.  · You vomit repeatedly.  · You have trouble breathing.  Summary  · Hypertension is when the force of blood pumping through your arteries is too strong. If this condition is not controlled, it may put you at risk for serious complications.  · Your personal target blood pressure may vary depending on your medical conditions, your age, and other factors. For most people, a normal blood pressure is less than 120/80.  · Hypertension is treated with lifestyle changes, medicines, or a combination of both. Lifestyle changes include weight loss, eating a healthy, low-sodium diet, exercising more, and limiting alcohol.  This information is not intended to replace advice given to you by your health care provider. Make sure you discuss any questions you have with your health care provider.  Document Released: 12/18/2006 Document Revised: 11/15/2017 Document Reviewed: 11/15/2017  [employer] Interactive Patient Education © 2019 [employer]  Zoster Vaccine, Recombinant injection  What is this medicine?  ZOSTER VACCINE (ZOS ter vak SEEN)  is used to prevent shingles in adults 50 years old and over. This vaccine is not used to treat shingles or nerve pain from shingles.  This medicine may be used for other purposes; ask your health care provider or pharmacist if you have questions.  COMMON BRAND NAME(S): SHINGRIX  What should I tell my health care provider before I take this medicine?  They need to know if you have any of these conditions:  -blood disorders or disease  -cancer like leukemia or lymphoma  -immune system problems or therapy  -an unusual or allergic reaction to vaccines, other medications, foods, dyes, or preservatives  -pregnant or trying to get pregnant  -breast-feeding  How should I use this medicine?  This vaccine is for injection in a muscle. It is given by a health care professional.  Talk to your pediatrician regarding the use of this medicine in children. This medicine is not approved for use in children.  Overdosage: If you think you have taken too much of this medicine contact a poison control center or emergency room at once.  NOTE: This medicine is only for you. Do not share this medicine with others.  What if I miss a dose?  Keep appointments for follow-up (booster) doses as directed. It is important not to miss your dose. Call your doctor or health care professional if you are unable to keep an appointment.  What may interact with this medicine?  -medicines that suppress your immune system  -medicines to treat cancer  -steroid medicines like prednisone or cortisone  This list may not describe all possible interactions. Give your health care provider a list of all the medicines, herbs, non-prescription drugs, or dietary supplements you use. Also tell them if you smoke, drink alcohol, or use illegal drugs. Some items may interact with your medicine.  What should I watch for while using this medicine?  Visit your doctor for regular check ups.  This vaccine, like all vaccines, may not fully protect everyone.  What side effects  may I notice from receiving this medicine?  Side effects that you should report to your doctor or health care professional as soon as possible:  -allergic reactions like skin rash, itching or hives, swelling of the face, lips, or tongue  -breathing problems  Side effects that usually do not require medical attention (report these to your doctor or health care professional if they continue or are bothersome):  -chills  -headache  -fever  -nausea, vomiting  -redness, warmth, pain, swelling or itching at site where injected  -tiredness  This list may not describe all possible side effects. Call your doctor for medical advice about side effects. You may report side effects to FDA at 1-087-VPV-5577.  Where should I keep my medicine?  This vaccine is only given in a clinic, pharmacy, doctor's office, or other health care setting and will not be stored at home.  NOTE: This sheet is a summary. It may not cover all possible information. If you have questions about this medicine, talk to your doctor, pharmacist, or health care provider.  © 2019 [employer] (2018-07-30 13:20:30)

## 2019-10-08 NOTE — PROGRESS NOTES
Subjective   Taz Dickey is a 69 y.o. male.   CC: Establish care, follow-up on hyperlipidemia, hypertension, type 2 diabetes    Patient presents for six-month follow-up and transfer of care from Dr. Fontenot.  This is a 61-year-old male former patient of Dr. Fontenot.  This patient is new to me.    He has a history of hyperlipidemia and takes atorvastatin 40 mg daily reporting excellent compliance and toleration of this medication.    For hypertension he takes lisinopril with hydrochlorothiazide 20-12.5 mg daily.  He reports that he does not routinely check his blood pressures at home.  Blood pressure today is mildly elevated at 138/86 on manual recheck.  He denies headache, visual changes, shortness of breath or chest discomfort.    He has a history of lumbar degenerative disease and osteoarthritis.  He has seen Dr. Terrell, orthopedic surgery for this issue and is now following with Dr. Pleitez, pain management.  Recently changed from meloxicam to diclofenac 75 mg daily.  He reports that he has not seen much difference in the pain with the change from meloxicam to diclofenac.  He is getting periodic epidural injections with pain management.  He is using a straight cane for ambulatory stability.  He reports that the pain often originates in the lower back and radiates around to his right leg, although sometimes he has no pain in the low back, only leg pain.    For type 2 diabetes he takes metformin 500 mg daily with dinner.  Last A1c was 6.7 on 4/8/2019.    He denies development of any other new issues today.         The following portions of the patient's history were reviewed and updated as appropriate: allergies, current medications, past family history, past medical history, past social history, past surgical history and problem list.    Review of Systems   Constitutional: Negative for activity change, chills, fatigue, fever, unexpected weight gain and unexpected weight loss.   HENT: Negative for  "congestion, hearing loss, postnasal drip, sinus pressure, sneezing, sore throat, swollen glands and tinnitus.    Eyes: Negative for photophobia, pain and visual disturbance.   Respiratory: Negative for cough, chest tightness, shortness of breath and wheezing.    Cardiovascular: Negative for chest pain, palpitations and leg swelling.   Gastrointestinal: Negative for abdominal distention, abdominal pain, constipation, diarrhea, nausea and vomiting.   Endocrine: Negative for polydipsia, polyphagia and polyuria.   Genitourinary: Negative for dysuria, frequency, hematuria and urgency.   Musculoskeletal: Positive for arthralgias and back pain.   Neurological: Negative for dizziness, weakness, numbness and headache.   All other systems reviewed and are negative.      Objective    /86 Comment: manual recheck  Pulse 56   Temp 98.4 °F (36.9 °C) (Oral)   Resp 16   Ht 177.8 cm (70\")   Wt 96.6 kg (213 lb)   SpO2 97%   BMI 30.56 kg/m²     Physical Exam   Constitutional: He is oriented to person, place, and time. He appears well-developed and well-nourished. No distress.   HENT:   Head: Normocephalic and atraumatic.   Right Ear: External ear normal.   Left Ear: External ear normal.   Nose: Nose normal.   Mouth/Throat: Oropharynx is clear and moist.   Eyes: EOM are normal. Pupils are equal, round, and reactive to light.   Neck: Normal range of motion. Neck supple. No JVD present. No tracheal deviation present. No thyromegaly present.   No carotid bruits auscultated   Cardiovascular: Normal rate, regular rhythm, normal heart sounds and intact distal pulses. Exam reveals no gallop and no friction rub.   No murmur heard.  No peripheral edema.  Posterior tib pulses 2+ and equal bilaterally.   Pulmonary/Chest: Effort normal and breath sounds normal. No stridor. No respiratory distress. He has no wheezes. He has no rales. He exhibits no tenderness.   Lungs are CTA bilaterally   Abdominal: Soft. Bowel sounds are normal. He " exhibits no distension. There is no tenderness.   Musculoskeletal: Normal range of motion.   Lymphadenopathy:     He has no cervical adenopathy.   Neurological: He is alert and oriented to person, place, and time.   Skin: Skin is warm and dry. Capillary refill takes less than 2 seconds. He is not diaphoretic.   Psychiatric: He has a normal mood and affect. His behavior is normal. Judgment and thought content normal.   Nursing note and vitals reviewed.    Current outpatient and discharge medications have been reconciled for the patient.  Reviewed by: JULIA Quesada      Assessment/Plan   Taz was seen today for follow-up.    Diagnoses and all orders for this visit:    Encounter to establish care    Benign essential hypertension  -     CBC & Differential  -     Comprehensive metabolic panel    Hypercholesterolemia  -     Lipid panel    Primary osteoarthritis involving multiple joints    Type 2 diabetes mellitus without complication, without long-term current use of insulin (CMS/[facility])  -     Hemoglobin A1c    Degeneration of intervertebral disc of lumbar region    Needs flu shot  -     Fluad Tri 65yr+    -Establish care, Healthcare maintenance: He will get his flu shot today.  We discussed the Shingrix vaccine and he will pursue this at his pharmacy and is provided with written education regarding the vaccine.    -Hypertension: Blood pressure is a bit elevated today at 138/86 and continue lisinopril hydrochlorothiazide 20-12.5 mg daily.  He does not routinely monitor it at home and I have asked him to purchase an over-the-counter blood pressure cuff and monitor his blood pressure a few times weekly we discussed dietary sodium intake and lifestyle modifications that he may make to help further lower his blood pressure.  He will let me know if he is seeing readings consistently greater than 130s over 80s at home at which time we will titrate the lisinopril with hydrochlorthiazide if needed.    -Hyperlipidemia:  Continue atorvastatin 40 mg daily.  We will check a lipid panel today and adjust therapy if needed based on labs.    -Degeneration of intervertebral disks of lumbar region, primary osteoarthritis: He is now taking diclofenac instead of meloxicam.  Reports not much change in his pain level.  Getting periodic epidural injections with Dr. Pleitez, pain management.  He sees pain management next week per patient.    -Type 2 diabetes: Continue metformin 500 mg daily.  We will check an A1c today and adjust therapy if needed based on labs.    -We will contact patient with results of his labs and any further recommendations.  Follow-up PRN and I will see him back in 6 months for follow-up on chronic conditions, Healthcare maintenance and a Medicare wellness visit.

## 2019-10-09 DIAGNOSIS — D75.89 MACROCYTOSIS: ICD-10-CM

## 2019-10-09 DIAGNOSIS — R71.0 DECREASED HEMOGLOBIN: Primary | ICD-10-CM

## 2019-10-09 NOTE — PROGRESS NOTES
Please notify patient that his blood count looks stable but is showing a slightly decreased hemoglobin level along with some signs of possible B12 or folate deficiency.  I would like to recheck a blood count along with folate and B12 level in 1 month.  I have ordered the labs, please make 1 month lab appointment.  Metabolic panel looks stable and A1c is stable at 6.3.  Please let me know of any questions or concerns.

## 2019-11-08 LAB
BASOPHILS # BLD AUTO: 0.02 10*3/MM3 (ref 0–0.2)
BASOPHILS NFR BLD AUTO: 0.2 % (ref 0–1.5)
EOSINOPHIL # BLD AUTO: 0 10*3/MM3 (ref 0–0.4)
EOSINOPHIL NFR BLD AUTO: 0 % (ref 0.3–6.2)
ERYTHROCYTE [DISTWIDTH] IN BLOOD BY AUTOMATED COUNT: 12.9 % (ref 12.3–15.4)
FOLATE SERPL-MCNC: 13 NG/ML (ref 4.78–24.2)
HCT VFR BLD AUTO: 35.6 % (ref 37.5–51)
HGB BLD-MCNC: 11.9 G/DL (ref 13–17.7)
IMM GRANULOCYTES # BLD AUTO: 0.07 10*3/MM3 (ref 0–0.05)
IMM GRANULOCYTES NFR BLD AUTO: 0.8 % (ref 0–0.5)
LYMPHOCYTES # BLD AUTO: 0.81 10*3/MM3 (ref 0.7–3.1)
LYMPHOCYTES NFR BLD AUTO: 9.3 % (ref 19.6–45.3)
MCH RBC QN AUTO: 33.6 PG (ref 26.6–33)
MCHC RBC AUTO-ENTMCNC: 33.4 G/DL (ref 31.5–35.7)
MCV RBC AUTO: 100.6 FL (ref 79–97)
MONOCYTES # BLD AUTO: 0.52 10*3/MM3 (ref 0.1–0.9)
MONOCYTES NFR BLD AUTO: 6 % (ref 5–12)
NEUTROPHILS # BLD AUTO: 7.3 10*3/MM3 (ref 1.7–7)
NEUTROPHILS NFR BLD AUTO: 83.7 % (ref 42.7–76)
NRBC BLD AUTO-RTO: 0 /100 WBC (ref 0–0.2)
PLATELET # BLD AUTO: 201 10*3/MM3 (ref 140–450)
RBC # BLD AUTO: 3.54 10*6/MM3 (ref 4.14–5.8)
VIT B12 SERPL-MCNC: 323 PG/ML (ref 211–946)
WBC # BLD AUTO: 8.72 10*3/MM3 (ref 3.4–10.8)

## 2019-11-09 ENCOUNTER — RESULTS ENCOUNTER (OUTPATIENT)
Dept: INTERNAL MEDICINE | Facility: CLINIC | Age: 69
End: 2019-11-09

## 2019-11-09 DIAGNOSIS — D75.89 MACROCYTOSIS: ICD-10-CM

## 2019-11-09 DIAGNOSIS — R71.0 DECREASED HEMOGLOBIN: ICD-10-CM

## 2019-11-12 ENCOUNTER — TELEPHONE (OUTPATIENT)
Dept: INTERNAL MEDICINE | Facility: CLINIC | Age: 69
End: 2019-11-12

## 2019-11-12 DIAGNOSIS — R79.89 ABNORMAL CBC: Primary | ICD-10-CM

## 2019-11-12 NOTE — TELEPHONE ENCOUNTER
Labs ordered    Pt scheduled    Pt stated he was going to call, back after he came back from being out of town to schedule his one month lab appt

## 2019-11-12 NOTE — TELEPHONE ENCOUNTER
----- Message from JULIA Quesada sent at 11/12/2019 12:24 PM EST -----  Please notify patient that his blood count is showing that his hemoglobin is stable and not dropping.  Folate and B12 are normal.  There are a couple of abnormalities in his white blood cells in that his lymphocytes, which are a type of white blood c  ell are a bit low.  Please order repeat CBC for 1 month to ensure resolution.

## 2019-11-12 NOTE — PROGRESS NOTES
Please notify patient that his blood count is showing that his hemoglobin is stable and not dropping.  Folate and B12 are normal.  There are a couple of abnormalities in his white blood cells in that his lymphocytes, which are a type of white blood cell are a bit low.  Please order repeat CBC for 1 month to ensure resolution.

## 2019-12-03 LAB
BASOPHILS # BLD AUTO: 0.05 10*3/MM3 (ref 0–0.2)
BASOPHILS NFR BLD AUTO: 0.9 % (ref 0–1.5)
EOSINOPHIL # BLD AUTO: 0.15 10*3/MM3 (ref 0–0.4)
EOSINOPHIL NFR BLD AUTO: 2.7 % (ref 0.3–6.2)
ERYTHROCYTE [DISTWIDTH] IN BLOOD BY AUTOMATED COUNT: 12.4 % (ref 12.3–15.4)
HCT VFR BLD AUTO: 37.9 % (ref 37.5–51)
HGB BLD-MCNC: 12.6 G/DL (ref 13–17.7)
IMM GRANULOCYTES # BLD AUTO: 0.04 10*3/MM3 (ref 0–0.05)
IMM GRANULOCYTES NFR BLD AUTO: 0.7 % (ref 0–0.5)
LYMPHOCYTES # BLD AUTO: 1.02 10*3/MM3 (ref 0.7–3.1)
LYMPHOCYTES NFR BLD AUTO: 18.4 % (ref 19.6–45.3)
MCH RBC QN AUTO: 33.8 PG (ref 26.6–33)
MCHC RBC AUTO-ENTMCNC: 33.2 G/DL (ref 31.5–35.7)
MCV RBC AUTO: 101.6 FL (ref 79–97)
MONOCYTES # BLD AUTO: 0.61 10*3/MM3 (ref 0.1–0.9)
MONOCYTES NFR BLD AUTO: 11 % (ref 5–12)
NEUTROPHILS # BLD AUTO: 3.66 10*3/MM3 (ref 1.7–7)
NEUTROPHILS NFR BLD AUTO: 66.3 % (ref 42.7–76)
NRBC BLD AUTO-RTO: 0 /100 WBC (ref 0–0.2)
PLATELET # BLD AUTO: 140 10*3/MM3 (ref 140–450)
RBC # BLD AUTO: 3.73 10*6/MM3 (ref 4.14–5.8)
WBC # BLD AUTO: 5.53 10*3/MM3 (ref 3.4–10.8)

## 2019-12-30 RX ORDER — LISINOPRIL AND HYDROCHLOROTHIAZIDE 20; 12.5 MG/1; MG/1
TABLET ORAL
Qty: 90 TABLET | Refills: 1 | Status: SHIPPED | OUTPATIENT
Start: 2019-12-30 | End: 2020-07-01

## 2019-12-30 RX ORDER — ATORVASTATIN CALCIUM 40 MG/1
TABLET, FILM COATED ORAL
Qty: 90 TABLET | Refills: 1 | Status: SHIPPED | OUTPATIENT
Start: 2019-12-30 | End: 2020-07-01

## 2020-07-01 RX ORDER — ATORVASTATIN CALCIUM 40 MG/1
TABLET, FILM COATED ORAL
Qty: 90 TABLET | Refills: 0 | Status: SHIPPED | OUTPATIENT
Start: 2020-07-01 | End: 2020-10-08

## 2020-07-01 RX ORDER — LISINOPRIL AND HYDROCHLOROTHIAZIDE 20; 12.5 MG/1; MG/1
TABLET ORAL
Qty: 90 TABLET | Refills: 0 | Status: SHIPPED | OUTPATIENT
Start: 2020-07-01 | End: 2020-10-08

## 2020-07-30 ENCOUNTER — TELEPHONE (OUTPATIENT)
Dept: INTERNAL MEDICINE | Facility: CLINIC | Age: 70
End: 2020-07-30

## 2020-07-30 NOTE — TELEPHONE ENCOUNTER
He reports he needs a newer one since that one is from almost one year ago.  He said symptoms are worse since then.  He has pain in right leg all the way to the ankle.

## 2020-07-30 NOTE — TELEPHONE ENCOUNTER
Patient calling and needs a referral for a MRI. He has surgery set up for Tues 9/1 and has to have MRI done and results back before surgery. He has to have the disk with him when he sees the DR. Rutledge call back [phone]

## 2020-07-31 ENCOUNTER — OFFICE VISIT (OUTPATIENT)
Dept: INTERNAL MEDICINE | Facility: CLINIC | Age: 70
End: 2020-07-31

## 2020-07-31 VITALS
WEIGHT: 212 LBS | TEMPERATURE: 98.4 F | DIASTOLIC BLOOD PRESSURE: 72 MMHG | OXYGEN SATURATION: 97 % | HEIGHT: 70 IN | HEART RATE: 55 BPM | SYSTOLIC BLOOD PRESSURE: 162 MMHG | RESPIRATION RATE: 16 BRPM | BODY MASS INDEX: 30.35 KG/M2

## 2020-07-31 DIAGNOSIS — Z23 NEED FOR 23-POLYVALENT PNEUMOCOCCAL POLYSACCHARIDE VACCINE: ICD-10-CM

## 2020-07-31 DIAGNOSIS — R35.1 NOCTURIA: ICD-10-CM

## 2020-07-31 DIAGNOSIS — E78.00 HYPERCHOLESTEROLEMIA: ICD-10-CM

## 2020-07-31 DIAGNOSIS — Z00.00 HEALTHCARE MAINTENANCE: ICD-10-CM

## 2020-07-31 DIAGNOSIS — F52.21 ERECTILE DYSFUNCTION OF NONORGANIC ORIGIN: ICD-10-CM

## 2020-07-31 DIAGNOSIS — Z12.5 PROSTATE CANCER SCREENING: ICD-10-CM

## 2020-07-31 DIAGNOSIS — Z00.00 MEDICARE ANNUAL WELLNESS VISIT, SUBSEQUENT: Primary | ICD-10-CM

## 2020-07-31 DIAGNOSIS — M51.36 DEGENERATION OF INTERVERTEBRAL DISC OF LUMBAR REGION: ICD-10-CM

## 2020-07-31 DIAGNOSIS — I10 BENIGN ESSENTIAL HYPERTENSION: ICD-10-CM

## 2020-07-31 DIAGNOSIS — E11.9 TYPE 2 DIABETES MELLITUS WITHOUT COMPLICATION, WITHOUT LONG-TERM CURRENT USE OF INSULIN (HCC): ICD-10-CM

## 2020-07-31 PROCEDURE — G0009 ADMIN PNEUMOCOCCAL VACCINE: HCPCS | Performed by: NURSE PRACTITIONER

## 2020-07-31 PROCEDURE — 90732 PPSV23 VACC 2 YRS+ SUBQ/IM: CPT | Performed by: NURSE PRACTITIONER

## 2020-07-31 PROCEDURE — 96160 PT-FOCUSED HLTH RISK ASSMT: CPT | Performed by: NURSE PRACTITIONER

## 2020-07-31 PROCEDURE — G0439 PPPS, SUBSEQ VISIT: HCPCS | Performed by: NURSE PRACTITIONER

## 2020-07-31 RX ORDER — DICLOFENAC SODIUM 75 MG/1
75 TABLET, DELAYED RELEASE ORAL 2 TIMES DAILY
Qty: 180 TABLET | Refills: 1 | Status: SHIPPED | OUTPATIENT
Start: 2020-07-31 | End: 2021-01-25

## 2020-07-31 RX ORDER — GABAPENTIN 300 MG/1
300 CAPSULE ORAL
COMMUNITY
Start: 2020-07-11 | End: 2021-04-29 | Stop reason: SDUPTHER

## 2020-07-31 NOTE — PROGRESS NOTES
The ABCs of the Annual Wellness Visit  Subsequent Medicare Wellness Visit    Chief Complaint   Patient presents with   • Medicare Wellness-subsequent     Pt presents here today for his medicare wellness visit.       Subjective   History of Present Illness:  Taz Dickey is a 70 y.o. male who presents for a Subsequent Medicare Wellness Visit.    HEALTH RISK ASSESSMENT    Recent Hospitalizations:  No hospitalization(s) within the last year.    Current Medical Providers:  Patient Care Team:  Jade Aranda APRN as PCP - General (Nurse Practitioner)  Jeffrey Garcia MD as Surgeon (Neurosurgery)  Wallace Pleitez MD as Consulting Physician (Pain Medicine)    Smoking Status:  Social History     Tobacco Use   Smoking Status Current Some Day Smoker   • Types: Cigars   Smokeless Tobacco Never Used       Alcohol Consumption:  Social History     Substance and Sexual Activity   Alcohol Use Yes    Comment: social       Depression Screen:   PHQ-2/PHQ-9 Depression Screening 7/31/2020   Little interest or pleasure in doing things 0   Feeling down, depressed, or hopeless 0   Trouble falling or staying asleep, or sleeping too much -   Feeling tired or having little energy -   Poor appetite or overeating -   Feeling bad about yourself - or that you are a failure or have let yourself or your family down -   Trouble concentrating on things, such as reading the newspaper or watching television -   Moving or speaking so slowly that other people could have noticed. Or the opposite - being so fidgety or restless that you have been moving around a lot more than usual -   Thoughts that you would be better off dead, or of hurting yourself in some way -   Total Score 0   If you checked off any problems, how difficult have these problems made it for you to do your work, take care of things at home, or get along with other people? -       Fall Risk Screen:  STEADI Fall Risk Assessment was completed, and patient is at LOW risk for  falls.Assessment completed on:7/31/2020    Health Habits and Functional and Cognitive Screening:  Functional & Cognitive Status 7/31/2020   Do you have difficulty preparing food and eating? No   Do you have difficulty bathing yourself, getting dressed or grooming yourself? No   Do you have difficulty using the toilet? No   Do you have difficulty moving around from place to place? No   Do you have trouble with steps or getting out of a bed or a chair? Yes   Current Diet Well Balanced Diet   Dental Exam Up to date   Eye Exam Not up to date   Exercise (times per week) 3 times per week   Current Exercise Activities Include Gardening   Do you need help using the phone?  No   Are you deaf or do you have serious difficulty hearing?  No   Do you need help with transportation? Yes   Do you need help shopping? No   Do you need help preparing meals?  No   Do you need help with housework?  No   Do you need help with laundry? No   Do you need help taking your medications? No   Do you need help managing money? No   Do you ever drive or ride in a car without wearing a seat belt? No   Have you felt unusual stress, anger or loneliness in the last month? No   Who do you live with? Spouse   If you need help, do you have trouble finding someone available to you? No   Have you been bothered in the last four weeks by sexual problems? No   Do you have difficulty concentrating, remembering or making decisions? No         Does the patient have evidence of cognitive impairment? No    Asprin use counseling:Taking ASA appropriately as indicated    Age-appropriate Screening Schedule:  Refer to the list below for future screening recommendations based on patient's age, sex and/or medical conditions. Orders for these recommended tests are listed in the plan section. The patient has been provided with a written plan.    Health Maintenance   Topic Date Due   • URINE MICROALBUMIN  1950   • TDAP/TD VACCINES (1 - Tdap) 07/20/1962   • DIABETIC  FOOT EXAM  02/25/2016   • DIABETIC EYE EXAM  02/25/2016   • ZOSTER VACCINE (2 of 2) 11/20/2017   • HEMOGLOBIN A1C  04/08/2020   • INFLUENZA VACCINE  08/01/2020   • LIPID PANEL  10/08/2020   • COLONOSCOPY  02/28/2028          The following portions of the patient's history were reviewed and updated as appropriate: allergies, current medications, past family history, past medical history, past social history, past surgical history and problem list.    Outpatient Medications Prior to Visit   Medication Sig Dispense Refill   • aspirin 81 MG EC tablet Take 81 mg by mouth daily.     • atorvastatin (LIPITOR) 40 MG tablet TAKE ONE TABLET BY MOUTH DAILY 90 tablet 0   • CIALIS 5 MG tablet TAKE ONE TABLET BY MOUTH DAILY IF NEEDED 30 tablet 1   • diclofenac (VOLTAREN) 75 MG EC tablet      • gabapentin (NEURONTIN) 300 MG capsule      • lisinopril-hydrochlorothiazide (PRINZIDE,ZESTORETIC) 20-12.5 MG per tablet TAKE ONE TABLET BY MOUTH DAILY 90 tablet 0   • metFORMIN (GLUCOPHAGE) 500 MG tablet TAKE ONE TABLET BY MOUTH DAILY WITH DUINNER 30 tablet 4     No facility-administered medications prior to visit.        Patient Active Problem List   Diagnosis   • Degeneration of intervertebral disc of lumbar region   • Lumbar radiculopathy   • Benign essential hypertension   • Erectile dysfunction of nonorganic origin   • Hypercholesterolemia   • Hyperglycemia   • Hypertension   • Osteoarthritis   • Tinnitus of both ears   • Encounter for screening colonoscopy   • Medicare annual wellness visit, subsequent   • Nocturia   • Type 2 diabetes mellitus without complication, without long-term current use of insulin (CMS/[facility])       Advanced Care Planning:  ACP discussion was held with the patient during this visit. Patient has an advance directive (not in EMR), copy requested.    Review of Systems   Constitutional: Negative for activity change, chills, fatigue, fever and unexpected weight change.   HENT: Negative for congestion, hearing loss,  "postnasal drip, sinus pressure, sinus pain, sneezing, sore throat and tinnitus.    Eyes: Negative for photophobia, pain and visual disturbance.   Respiratory: Negative for cough, chest tightness, shortness of breath and wheezing.    Cardiovascular: Negative for chest pain, palpitations and leg swelling.   Gastrointestinal: Negative for abdominal distention, abdominal pain, constipation, diarrhea, nausea and vomiting.   Endocrine: Negative for polydipsia, polyphagia and polyuria.   Genitourinary: Negative for dysuria, frequency, hematuria and urgency.   Musculoskeletal: Positive for back pain.   Neurological: Negative for dizziness, weakness, numbness and headaches.   All other systems reviewed and are negative.      Compared to one year ago, the patient feels his physical health is worse.  Compared to one year ago, the patient feels his mental health is the same.    Reviewed chart for potential of high risk medication in the elderly: yes  Reviewed chart for potential of harmful drug interactions in the elderly:yes    Objective         Vitals:    07/31/20 0835   BP: 162/72   BP Location: Left arm   Patient Position: Sitting   Cuff Size: Adult   Pulse: 55   Resp: 16   Temp: 98.4 °F (36.9 °C)   TempSrc: Oral   SpO2: 97%   Weight: 96.2 kg (212 lb)   Height: 177.8 cm (70\")       Body mass index is 30.42 kg/m².  Discussed the patient's BMI with him. The BMI is above average; no BMI management plan is appropriate..    Physical Exam   Constitutional: He is oriented to person, place, and time. He appears well-developed and well-nourished. No distress.   HENT:   Head: Normocephalic and atraumatic.   Eyes: Pupils are equal, round, and reactive to light. EOM are normal.   Neck: Normal range of motion. Neck supple. No JVD present. No tracheal deviation present. No thyromegaly present.   No carotid bruits auscultated   Cardiovascular: Normal rate, regular rhythm, normal heart sounds and intact distal pulses. Exam reveals no " gallop and no friction rub.   No murmur heard.  No peripheral edema.  Posterior tib pulses 2+ and equal bilaterally.   Pulmonary/Chest: Effort normal and breath sounds normal. No stridor. No respiratory distress. He has no wheezes. He has no rales. He exhibits no tenderness.   Lungs CTA bilaterally   Abdominal: Soft. Bowel sounds are normal. He exhibits no distension. There is no tenderness.   Musculoskeletal: Normal range of motion.   Lymphadenopathy:     He has no cervical adenopathy.   Neurological: He is alert and oriented to person, place, and time.   Skin: Skin is warm and dry. Capillary refill takes less than 2 seconds. He is not diaphoretic.   Psychiatric: He has a normal mood and affect. His behavior is normal. Judgment and thought content normal.   Nursing note and vitals reviewed.            Assessment/Plan   Medicare Risks and Personalized Health Plan  CMS Preventative Services Quick Reference  Advance Directive Discussion  Breast Cancer/Mammogram Screening  Cardiovascular risk  Chronic Pain   Dementia/Memory   Fall Risk  Hearing Problem  Inactivity/Sedentary  Obesity/Overweight   Polypharmacy  Prostate Cancer Screening     The above risks/problems have been discussed with the patient.  Pertinent information has been shared with the patient in the After Visit Summary.  Follow up plans and orders are seen below in the Assessment/Plan Section.    Diagnoses and all orders for this visit:    1. Medicare annual wellness visit, subsequent (Primary)    2. Hypercholesterolemia  -     Lipid panel    3. Benign essential hypertension  -     CBC No Differential  -     Comprehensive metabolic panel  -     TSH  -     T4, Free    4. Degeneration of intervertebral disc of lumbar region  -     diclofenac (VOLTAREN) 75 MG EC tablet; Take 1 tablet by mouth 2 (Two) Times a Day.  Dispense: 180 tablet; Refill: 1    5. Type 2 diabetes mellitus without complication, without long-term current use of insulin (CMS/[facility])  -      Hemoglobin A1c  -     MicroAlbumin, Urine, Random - Urine, Clean Catch  -     Ambulatory Referral for Diabetic Eye Exam-Ophthalmology    6. Erectile dysfunction of nonorganic origin  -     PSA DIAGNOSTIC ONLY    7. Healthcare maintenance  -     Pneumococcal Polysaccharide Vaccine 23-Valent (PPSV23) Greater Than or Equal To 3yo Subcutaneous / IM    8. Prostate cancer screening  -     PSA DIAGNOSTIC ONLY    9. Need for 23-polyvalent pneumococcal polysaccharide vaccine  -     Pneumococcal Polysaccharide Vaccine 23-Valent (PPSV23) Greater Than or Equal To 3yo Subcutaneous / IM    10. Nocturia  -     PSA DIAGNOSTIC ONLY      Follow Up:  No follow-ups on file.     An After Visit Summary and PPPS were given to the patient.     -Annual Medicare wellness visit performed.  Discussed health maintenance.  Due for Pneumo 23 today which we will administer.  PSA today.  Discussed health maintenance recommendations including diet and exercise recommendations.    -Hyperlipidemia: Continue atorvastatin 40 mg daily.  Lipid panel today and we will adjust therapy if needed based on labs.    -Hypertension: Blood pressure high today at 162/72.  He has not been checking BP at home consistently.  Endorses good compliance with his lisinopril-HCTZ 20-12.5 mg daily.  Discussed lowering dietary sodium intake and increasing physical activity.  We will have a 3-week telephone visit for follow-up on blood pressure with his home readings over the next 3 weeks.    -Lumbar degenerative disc disease: Following with pain management, Dr. Pleitez for epidural injections and gabapentin as well as Dr. Garcia, neurosurgery for upcoming possible lumbar fusion per patient.  He has an appointment with neurosurgery on 9/1/2020 for discussion on this.  He is getting an updated MRI soon.  Per patient this has been ordered by pain management.    -Type 2 diabetes: Continue metformin 500 mg daily.  A1c and microalbumin today we will adjust therapy if needed based on  labs.    -ED: Stable with Cialis, continue current therapy.    -We will contact patient with his lab results and any further recommendations.  Follow-up PRN and I will see him back in 6 months for routine health maintenance/follow-up on chronic conditions.

## 2020-07-31 NOTE — PATIENT INSTRUCTIONS
Medicare Wellness  Personal Prevention Plan of Service     Date of Office Visit:  2020  Encounter Provider:  JULIA Quesada  Place of Service:  [facility]  Patient Name: Taz Dickey  :  1950    As part of the Medicare Wellness portion of your visit today, we are providing you with this personalized preventive plan of services (PPPS). This plan is based upon recommendations of the [place] Preventive Services Task Force (USPSTF) and the Advisory Committee on Immunization Practices (ACIP).    This lists the preventive care services that should be considered, and provides dates of when you are due. Items listed as completed are up-to-date and do not require any further intervention.    Health Maintenance   Topic Date Due   • URINE MICROALBUMIN  1950   • TDAP/TD VACCINES (1 - Tdap) 1962   • DIABETIC FOOT EXAM  2016   • DIABETIC EYE EXAM  2016   • Pneumococcal Vaccine Once at 65 Years Old  2016   • ZOSTER VACCINE (2 of 2) 2017   • AAA SCREEN (ONE-TIME)  2018   • MEDICARE ANNUAL WELLNESS  2019   • HEMOGLOBIN A1C  2020   • INFLUENZA VACCINE  2020   • LIPID PANEL  10/08/2020   • COLONOSCOPY  2028   • HEPATITIS C SCREENING  Addressed       Orders Placed This Encounter   Procedures   • Pneumococcal Polysaccharide Vaccine 23-Valent (PPSV23) Greater Than or Equal To 1yo Subcutaneous / IM   • CBC No Differential   • Comprehensive metabolic panel   • Lipid panel   • TSH   • T4, Free   • Hemoglobin A1c   • MicroAlbumin, Urine, Random - Urine, Clean Catch   • PSA DIAGNOSTIC ONLY       No follow-ups on file.

## 2020-08-01 LAB
ALBUMIN SERPL-MCNC: 4.5 G/DL (ref 3.5–5.2)
ALBUMIN/GLOB SERPL: 2.1 G/DL
ALP SERPL-CCNC: 58 U/L (ref 39–117)
ALT SERPL-CCNC: 59 U/L (ref 1–41)
AST SERPL-CCNC: 38 U/L (ref 1–40)
BILIRUB SERPL-MCNC: 0.7 MG/DL (ref 0–1.2)
BUN SERPL-MCNC: 31 MG/DL (ref 8–23)
BUN/CREAT SERPL: 26.1 (ref 7–25)
CALCIUM SERPL-MCNC: 9.3 MG/DL (ref 8.6–10.5)
CHLORIDE SERPL-SCNC: 100 MMOL/L (ref 98–107)
CHOLEST SERPL-MCNC: 159 MG/DL (ref 0–200)
CO2 SERPL-SCNC: 26.8 MMOL/L (ref 22–29)
CREAT SERPL-MCNC: 1.19 MG/DL (ref 0.76–1.27)
ERYTHROCYTE [DISTWIDTH] IN BLOOD BY AUTOMATED COUNT: 13.9 % (ref 12.3–15.4)
GLOBULIN SER CALC-MCNC: 2.1 GM/DL
GLUCOSE SERPL-MCNC: 105 MG/DL (ref 65–99)
HBA1C MFR BLD: 6 % (ref 4.8–5.6)
HCT VFR BLD AUTO: 36.7 % (ref 37.5–51)
HDLC SERPL-MCNC: 59 MG/DL (ref 40–60)
HGB BLD-MCNC: 11.8 G/DL (ref 13–17.7)
LDLC SERPL CALC-MCNC: 85 MG/DL (ref 0–100)
MCH RBC QN AUTO: 32.7 PG (ref 26.6–33)
MCHC RBC AUTO-ENTMCNC: 32.2 G/DL (ref 31.5–35.7)
MCV RBC AUTO: 101.7 FL (ref 79–97)
MICROALBUMIN UR-MCNC: 36.1 UG/ML
PLATELET # BLD AUTO: 135 10*3/MM3 (ref 140–450)
POTASSIUM SERPL-SCNC: 5.2 MMOL/L (ref 3.5–5.2)
PROT SERPL-MCNC: 6.6 G/DL (ref 6–8.5)
PSA SERPL-MCNC: 0.74 NG/ML (ref 0–4)
RBC # BLD AUTO: 3.61 10*6/MM3 (ref 4.14–5.8)
SODIUM SERPL-SCNC: 136 MMOL/L (ref 136–145)
T4 FREE SERPL-MCNC: 1.35 NG/DL (ref 0.93–1.7)
TRIGL SERPL-MCNC: 75 MG/DL (ref 0–150)
TSH SERPL DL<=0.005 MIU/L-ACNC: 2.37 UIU/ML (ref 0.27–4.2)
VLDLC SERPL CALC-MCNC: 15 MG/DL
WBC # BLD AUTO: 6.1 10*3/MM3 (ref 3.4–10.8)

## 2020-08-21 ENCOUNTER — OFFICE VISIT (OUTPATIENT)
Dept: INTERNAL MEDICINE | Facility: CLINIC | Age: 70
End: 2020-08-21

## 2020-08-21 DIAGNOSIS — I10 BENIGN ESSENTIAL HYPERTENSION: Primary | ICD-10-CM

## 2020-08-21 DIAGNOSIS — R25.2 LEG CRAMPS: ICD-10-CM

## 2020-08-21 PROCEDURE — 99213 OFFICE O/P EST LOW 20 MIN: CPT | Performed by: NURSE PRACTITIONER

## 2020-08-21 NOTE — PATIENT INSTRUCTIONS
Muscle Cramps and Spasms  Muscle cramps and spasms occur when a muscle or muscles tighten and you have no control over this tightening (involuntary muscle contraction). They are a common problem and can develop in any muscle. The most common place is in the calf muscles of the leg. Muscle cramps and muscle spasms are both involuntary muscle contractions, but there are some differences between the two:  · Muscle cramps are painful. They come and go and may last for a few seconds or up to 15 minutes. Muscle cramps are often more forceful and last longer than muscle spasms.  · Muscle spasms may or may not be painful. They may also last just a few seconds or much longer.  Certain medical conditions, such as diabetes or Parkinson's disease, can make it more likely to develop cramps or spasms. However, cramps or spasms are usually not caused by a serious underlying problem. Common causes include:  · Doing more physical work or exercise than your body is ready for (overexertion).  · Overuse from repeating certain movements too many times.  · Remaining in a certain position for a long period of time.  · Improper preparation, form, or technique while playing a sport or doing an activity.  · Dehydration.  · Injury.  · Side effects of some medicines.  · Abnormally low levels of the salts and minerals in your blood (electrolytes), especially potassium and calcium. This could happen if you are taking water pills (diuretics) or if you are pregnant.  In many cases, the cause of muscle cramps or spasms is not known.  Follow these instructions at home:  Managing pain and stiffness         · Try massaging, stretching, and relaxing the affected muscle. Do this for several minutes at a time.  · If directed, apply heat to tight or tense muscles as often as told by your health care provider. Use the heat source that your health care provider recommends, such as a moist heat pack or a heating pad.  ? Place a towel between your skin and  the heat source.  ? Leave the heat on for 20-30 minutes.  ? Remove the heat if your skin turns bright red. This is especially important if you are unable to feel pain, heat, or cold. You may have a greater risk of getting burned.  · If directed, put ice on the affected area. This may help if you are sore or have pain after a cramp or spasm.  ? Put ice in a plastic bag.  ? Place a towel between your skin and the bag.  ? Leave the ice on for 20 minutes, 2-3 times a day.  · Try taking hot showers or baths to help relax tight muscles.  Eating and drinking  · Drink enough fluid to keep your urine pale yellow. Staying well hydrated may help prevent cramps or spasms.  · Eat a healthy diet that includes plenty of nutrients to help your muscles function. A healthy diet includes fruits and vegetables, lean protein, whole grains, and low-fat or nonfat dairy products.  General instructions  · If you are having frequent cramps, avoid intense exercise for several days.  · Take over-the-counter and prescription medicines only as told by your health care provider.  · Pay attention to any changes in your symptoms.  · Keep all follow-up visits as told by your health care provider. This is important.  Contact a health care provider if:  · Your cramps or spasms get more severe or happen more often.  · Your cramps or spasms do not improve over time.  Summary  · Muscle cramps and spasms occur when a muscle or muscles tighten and you have no control over this tightening (involuntary muscle contraction).  · The most common place for cramps or spasms to occur is in the calf muscles of the leg.  · Massaging, stretching, and relaxing the affected muscle may relieve the cramp or spasm.  · Drink enough fluid to keep your urine pale yellow. Staying well hydrated may help prevent cramps or spasms.  This information is not intended to replace advice given to you by your health care provider. Make sure you discuss any questions you have with your  health care provider.  Document Released: 06/09/2003 Document Revised: 05/13/2019 Document Reviewed: 05/13/2019  Elsevier Patient Education © 2020 [employer]

## 2020-08-21 NOTE — PROGRESS NOTES
"Subjective   Taz Dickey is a 70 y.o. male.   Chief Complaint   Patient presents with   • Hypertension     3 week follow up   • Muscle Pain     leg cramps, worse at night     You have chosen to receive care through a telephone visit. Do you consent to use a telephone visit for your medical care today? Yes    Patient presents for telephone visit, 3-week follow-up on high blood pressure.  This is a 70-year-old male.  I saw him in the office on 7/31/2020 at which time blood pressure was high at 162/72.  I encouraged him to lower dietary sodium, increase physical activity and continue lisinopril-hydrochlorothiazide 20-12.5 mg daily.  He has been logging his daily blood pressures since our visit.  Blood pressures have been consistently in the 130s over 80s or lower.  He denies headache, visual changes, shortness of breath or chest discomfort.  Blood pressure readings are dictated below.    He also complains of worsening bilateral lower extremity cramps, especially at night.  He has had leg cramps off and on \"for my entire life\" but over the past 2 to 3 days they have worsened.  No swelling in the legs or erythema.  These are intermittent but persistent over the past 2 days.  He has been drinking Gatorade and trying to drink plenty of water.  Denies any medication changes preceding the cramps.    He denies development of any other new issues today.        139/82    128/65    128/65    125/67    136/77    124/73       The following portions of the patient's history were reviewed and updated as appropriate: allergies, current medications, past family history, past medical history, past social history, past surgical history and problem list.    Review of Systems   Constitutional: Negative for activity change, chills, fatigue, fever, unexpected weight gain and unexpected weight loss.   HENT: Negative for congestion, hearing loss, postnasal drip, sinus pressure, sneezing, sore throat, swollen glands and tinnitus.    Eyes: " Negative for photophobia, pain and visual disturbance.   Respiratory: Negative for cough, chest tightness, shortness of breath and wheezing.    Cardiovascular: Negative for chest pain, palpitations and leg swelling.   Gastrointestinal: Negative for abdominal distention, abdominal pain, constipation, diarrhea, nausea and vomiting.   Endocrine: Negative for polydipsia, polyphagia and polyuria.   Genitourinary: Negative for dysuria, frequency, hematuria and urgency.   Musculoskeletal: Positive for myalgias (  Bilateral leg cramps x2 days).   Neurological: Negative for dizziness, weakness, numbness and headache.   All other systems reviewed and are negative.      Objective    There were no vitals filed for this visit.    Physical Exam   Constitutional: He is oriented to person, place, and time.   Neurological: He is oriented to person, place, and time.   Psychiatric: Thought content normal.     Current outpatient and discharge medications have been reconciled for the patient.  Reviewed by: JULIA Quesada      Assessment/Plan   Taz was seen today for hypertension and muscle pain.    Diagnoses and all orders for this visit:    Benign essential hypertension    Leg cramps      -Hypertension: Blood pressures at home well controlled.  Continue lisinopril-hydrochlorothiazide 20-12.5 mg daily with routine home monitoring for goal of less than 130/80.  He will watch his dietary sodium intake as well.    -Leg cramps: Encouraged increasing hydration and trying Pedialyte along with potassium rich foods.  I encouraged warm soaks before bed as well.  If this persists or worsen   as of next week he will let me know so we can get him in for an appointment for check of magnesium, vitamin D and other electrolytes.    Follow-up PRN if symptoms persist or worsen and at next scheduled office visit.    This visit was conducted via telephone therefore no physical exam was performed.    This visit has been rescheduled as a phone visit  to comply with patient safety concerns in accordance with CDC recommendations. Total time of discussion was 13 minutes.

## 2020-09-01 ENCOUNTER — OFFICE VISIT (OUTPATIENT)
Dept: NEUROSURGERY | Facility: CLINIC | Age: 70
End: 2020-09-01

## 2020-09-01 VITALS
WEIGHT: 212 LBS | SYSTOLIC BLOOD PRESSURE: 121 MMHG | BODY MASS INDEX: 30.35 KG/M2 | HEIGHT: 70 IN | HEART RATE: 70 BPM | DIASTOLIC BLOOD PRESSURE: 72 MMHG | TEMPERATURE: 97.1 F

## 2020-09-01 DIAGNOSIS — M54.16 LUMBAR RADICULOPATHY: Primary | ICD-10-CM

## 2020-09-01 PROCEDURE — 99204 OFFICE O/P NEW MOD 45 MIN: CPT | Performed by: NEUROLOGICAL SURGERY

## 2020-09-01 RX ORDER — DEXAMETHASONE 4 MG/1
8 TABLET ORAL TAKE AS DIRECTED
Qty: 2 TABLET | Refills: 0 | Status: SHIPPED | OUTPATIENT
Start: 2020-09-01 | End: 2020-09-10 | Stop reason: HOSPADM

## 2020-09-10 ENCOUNTER — HOSPITAL ENCOUNTER (OUTPATIENT)
Dept: CT IMAGING | Facility: HOSPITAL | Age: 70
Discharge: HOME | End: 2020-09-10

## 2020-09-10 ENCOUNTER — HOSPITAL ENCOUNTER (OUTPATIENT)
Dept: GENERAL RADIOLOGY | Facility: HOSPITAL | Age: 70
Discharge: HOME | End: 2020-09-10

## 2020-09-10 VITALS
TEMPERATURE: 98.6 F | WEIGHT: 215 LBS | DIASTOLIC BLOOD PRESSURE: 68 MMHG | RESPIRATION RATE: 16 BRPM | HEART RATE: 57 BPM | SYSTOLIC BLOOD PRESSURE: 122 MMHG | OXYGEN SATURATION: 98 % | BODY MASS INDEX: 30.78 KG/M2 | HEIGHT: 70 IN

## 2020-09-10 DIAGNOSIS — M54.16 LUMBAR RADICULOPATHY: ICD-10-CM

## 2020-09-10 PROCEDURE — 72114 X-RAY EXAM L-S SPINE BENDING: CPT

## 2020-09-10 PROCEDURE — 25010000003 LIDOCAINE 1 % SOLUTION: Performed by: NEUROLOGICAL SURGERY

## 2020-09-10 PROCEDURE — 72132 CT LUMBAR SPINE W/DYE: CPT

## 2020-09-10 PROCEDURE — 72240 MYELOGRAPHY NECK SPINE: CPT

## 2020-09-10 PROCEDURE — 0 IOPAMIDOL 41 % SOLUTION: Performed by: NEUROLOGICAL SURGERY

## 2020-09-10 PROCEDURE — 62304 MYELOGRAPHY LUMBAR INJECTION: CPT

## 2020-09-10 PROCEDURE — 62284 INJECTION FOR MYELOGRAM: CPT

## 2020-09-10 RX ORDER — ACETAMINOPHEN 325 MG/1
650 TABLET ORAL EVERY 4 HOURS PRN
Status: DISCONTINUED | OUTPATIENT
Start: 2020-09-10 | End: 2020-09-11 | Stop reason: HOSPADM

## 2020-09-10 RX ORDER — LIDOCAINE HYDROCHLORIDE 10 MG/ML
10 INJECTION, SOLUTION INFILTRATION; PERINEURAL ONCE
Status: COMPLETED | OUTPATIENT
Start: 2020-09-10 | End: 2020-09-10

## 2020-09-10 RX ORDER — HYDROCODONE BITARTRATE AND ACETAMINOPHEN 5; 325 MG/1; MG/1
1 TABLET ORAL EVERY 4 HOURS PRN
Status: DISCONTINUED | OUTPATIENT
Start: 2020-09-10 | End: 2020-09-11 | Stop reason: HOSPADM

## 2020-09-10 RX ADMIN — LIDOCAINE HYDROCHLORIDE 4 ML: 10 INJECTION, SOLUTION INFILTRATION; PERINEURAL at 07:24

## 2020-09-10 RX ADMIN — IOPAMIDOL 20 ML: 408 INJECTION, SOLUTION INTRATHECAL at 07:24

## 2020-09-10 NOTE — DISCHARGE INSTRUCTIONS
EDUCATION /DISCHARGE INSTRUCTIONS:    A myelogram is a special radiology procedure of the spinal cord, spinal nerves and other related structures.  You will be awake during the examination.  An area of your lower back will be cleansed with an antiseptic solution.  The physician will inject a numbing medication in your lower back.  While your back is numb, a needle will be placed in the lower back area.  A small amount of spinal fluid may be withdrawn and sent to the lab if ordered by your physician. While the needle is in the back, an injection of a contrast material (xray dye) will be given through the needle.  The contrast material will allow the physician to see the spinal cord and spinal nerves.  Once injected, the needle will be removed and a band aid will be placed over the injection site.  The table will be tilted during the process to allow the contrast material to flow to particular areas in the spine.  Following the injection and xrays, you will be taken to the CT scan where more pictures will be taken. After the procedure is finished, the contrast material will be absorbed by your body and eliminated through your kidneys.  The radiologist will study and interpret your myelogram and send the results to your physician.  Procedure risks of a myelogram include, but are not limited to:  *  Bleeding   *  seizure  *  Infection   *  Headache, possibly severe requiring  *  Contrast reaction      a blood patch  *  Nerve or cord injury  *  Paralysis and death  Benefits of the procedure:  *  Best examination for delineating pathology related to spinal cord compression from a    disc and/or nerve root compression  Alternatives to the procedure:  MRI - a non invasive procedure requiring intravenous contrast injection.  Cannot be done on patients with certain pacemakers or metal in the body.  MRI risks include possible reaction to the contrast material, movement of metal located in the body.Benefit to MRI:  Non-invasive  and usually painless procedure.  THIS EDUCATION INFORMATION WAS REVIEWED PRIOR TO PROCEDURE AND CONSENT. Patient initials________________Time__0700________________    24 hour rest period ends ____________________.  Important information following your myelogram:  * ACTIVITY:   *  Lie down with your head elevated no more than 2 pillows high today & tonight  *  Sit up to eat your meals and use the restroom, otherwise, lie down.  *  Remain less active for two to three days.  *  Do not drive for 48 hours following a myelogram  *  You may remove the bandage and shower in the morning  *  Increase your fluids for the next 24 hours.  Caffeinated drinks are encouraged.  Resume taking  (Glucophage/Metformin) in 48 hrs. Your next dose will be: _Saturday Sept 12 the evening dose_____________  Resume taking blood thinner or aspirin on __9/11/20 after 1100_______________________________    CALL YOUR PHYSICIAN FOR THE FOLLOWING:  * Pain at the injection site  * Reddness, swelling, bruising or drainage at the injection site.  * A fever by mouth of 101.0 or any new symptoms  Headaches are a common side effect after a myelogram.  If you get a headache, you should stay flat in bed and drink plenty of fluids. If the headache persist and does not go away with rest/medication, CALL Dr. Garcia at [phone]

## 2020-09-11 ENCOUNTER — TELEPHONE (OUTPATIENT)
Dept: INTERVENTIONAL RADIOLOGY/VASCULAR | Facility: HOSPITAL | Age: 70
End: 2020-09-11

## 2020-09-15 NOTE — PROGRESS NOTES
Subjective   History of Present Illness: Taz Dickey is a 70 y.o. male is here today for follow-up with a new Lumbar Myelogram that was ordered at his last office visit for back and right leg pain.    Today in the office the patient's symptoms are unchanged from his previous visit.    Patient is wearing a mask in our office today.      History of Present Illness    This patient continues with pain in his back with radiation primarily into his right leg.  The pain is sharp and stabbing and quite severe.    The following portions of the patient's history were reviewed and updated as appropriate: allergies, current medications, past family history, past medical history, past social history, past surgical history and problem list.    Review of Systems   Constitutional: Positive for activity change.   Respiratory: Negative for chest tightness and shortness of breath.    Cardiovascular: Negative for chest pain.   Musculoskeletal: Positive for back pain, gait problem and myalgias.        Buttocks and leg pain       I have reviewed the review of systems as documented by my MA.      Objective     Vitals:    09/17/20 1033   BP: 129/74   Pulse: 75   Temp: 97.7 °F (36.5 °C)     There is no height or weight on file to calculate BMI.      Physical Exam  Eyes:      Extraocular Movements: EOM normal.      Pupils: Pupils are equal, round, and reactive to light.   Neurological:      Mental Status: He is alert and oriented to person, place, and time.      Coordination: Finger-Nose-Finger Test and Heel to Shin Test normal.      Gait: Gait is intact.      Deep Tendon Reflexes:      Reflex Scores:       Tricep reflexes are 2+ on the right side and 2+ on the left side.       Bicep reflexes are 2+ on the right side and 2+ on the left side.       Brachioradialis reflexes are 2+ on the right side and 2+ on the left side.       Patellar reflexes are 2+ on the right side and 2+ on the left side.       Achilles reflexes are 2+ on the right  side and 2+ on the left side.  Psychiatric:         Speech: Speech normal.       Neurologic Exam     Mental Status   Oriented to person, place, and time.   Registration of memory: Good recent and remote memory.   Attention: normal. Concentration: normal.   Speech: speech is normal   Level of consciousness: alert  Knowledge: consistent with education.     Cranial Nerves     CN II   Visual fields full to confrontation.   Visual acuity: normal    CN III, IV, VI   Pupils are equal, round, and reactive to light.  Extraocular motions are normal.     CN V   Facial sensation intact.   Right corneal reflex: normal  Left corneal reflex: normal    CN VII   Facial expression full, symmetric.   Right facial weakness: none  Left facial weakness: none    CN VIII   Hearing: intact    CN IX, X   Palate: symmetric    CN XI   Right sternocleidomastoid strength: normal  Left sternocleidomastoid strength: normal    CN XII   Tongue: not atrophic  Tongue deviation: none    Motor Exam   Muscle bulk: normal  Right arm tone: normal  Left arm tone: normal  Right leg tone: normal  Left leg tone: normal    Strength   Strength 5/5 except as noted.     Sensory Exam   Light touch normal.     Gait, Coordination, and Reflexes     Gait  Gait: normal    Coordination   Finger to nose coordination: normal  Heel to shin coordination: normal    Reflexes   Right brachioradialis: 2+  Left brachioradialis: 2+  Right biceps: 2+  Left biceps: 2+  Right triceps: 2+  Left triceps: 2+  Right patellar: 2+  Left patellar: 2+  Right achilles: 2+  Left achilles: 2+  Right : 2+  Left : 2+          Assessment/Plan   Independent Review of Radiographic Studies:      I personally reviewed the images from the following studies.    I reviewed his plain films, myelogram, and CT scan myself.  The plain films did not show any evidence of abnormal movement on flexion and extension but there is a grade 1 spondylolisthesis of L4 on L5.  On the myelogram itself there is  severe stenosis at the L4-5 level.  The other levels mostly look okay.  There is a little lateral recess stenosis at L2-3.  On the post myelographic CT scan the lower thoracic spine looks okay.  The upper lumbar spine down to L4 looks okay as well.  L4-5 shows severe stenosis and L5-S1 mostly looks okay.  I think that if any surgery were done he would require a decompression and fusion at L4-5.  I do not think he has had previous surgery at that level.    Medical Decision Making:      I told the patient about the imaging.  I told him we could try some epidural blocks and physical therapy but they are less likely to work with this degree of stenosis.  His other option is to proceed with surgery.  I told the patient about the risks, complications and expected outcome of the lumbar surgery.  I explained that there was an 80% chance of getting rid of the pain in the leg.  I explained that there would still be back pain after the surgery.  Initially this will be quite severe but will improve over time.  There is a 2 or 3% chance of infection, bleeding, CSF leak, damage to the nerve as a result of surgery, paralysis, as well as anesthetic risk.  There is a 10% chance of recurrent problems.  There is a 10% chance of nonunion or failure of the instrumentation.  We discussed the postoperative hospital and home course.  The patient does ask to proceed.    He will need to be scheduled for a: Lumbar 4 5 laminectomy with a posterior lateral fusion and instrumentation and possible interbody fusion    Taz was seen today for follow-up.    Diagnoses and all orders for this visit:    Lumbar radiculopathy      Return for 2-3 week post op.

## 2020-09-17 ENCOUNTER — PREP FOR SURGERY (OUTPATIENT)
Dept: OTHER | Facility: HOSPITAL | Age: 70
End: 2020-09-17

## 2020-09-17 ENCOUNTER — OFFICE VISIT (OUTPATIENT)
Dept: NEUROSURGERY | Facility: CLINIC | Age: 70
End: 2020-09-17

## 2020-09-17 VITALS — SYSTOLIC BLOOD PRESSURE: 129 MMHG | DIASTOLIC BLOOD PRESSURE: 74 MMHG | HEART RATE: 75 BPM | TEMPERATURE: 97.7 F

## 2020-09-17 DIAGNOSIS — M54.16 LUMBAR RADICULOPATHY: Primary | ICD-10-CM

## 2020-09-17 PROCEDURE — 99213 OFFICE O/P EST LOW 20 MIN: CPT | Performed by: NEUROLOGICAL SURGERY

## 2020-09-17 RX ORDER — CEFAZOLIN SODIUM 2 G/100ML
2 INJECTION, SOLUTION INTRAVENOUS ONCE
Status: CANCELLED | OUTPATIENT
Start: 2020-11-18 | End: 2020-09-17

## 2020-10-08 RX ORDER — LISINOPRIL AND HYDROCHLOROTHIAZIDE 20; 12.5 MG/1; MG/1
TABLET ORAL
Qty: 90 TABLET | Refills: 0 | Status: SHIPPED | OUTPATIENT
Start: 2020-10-08 | End: 2021-01-18

## 2020-10-08 RX ORDER — ATORVASTATIN CALCIUM 40 MG/1
TABLET, FILM COATED ORAL
Qty: 90 TABLET | Refills: 0 | Status: SHIPPED | OUTPATIENT
Start: 2020-10-08 | End: 2021-01-18

## 2020-10-23 ENCOUNTER — TELEPHONE (OUTPATIENT)
Dept: NEUROSURGERY | Facility: CLINIC | Age: 70
End: 2020-10-23

## 2020-10-23 NOTE — TELEPHONE ENCOUNTER
I l/m for pt to tell him that his 11/5 visit has been rescheduled. Dr. Garcia is out of the office that week. I also changed that visit to an in office visit because I need his surgery forms completed.

## 2020-11-02 ENCOUNTER — TRANSCRIBE ORDERS (OUTPATIENT)
Dept: PREADMISSION TESTING | Facility: HOSPITAL | Age: 70
End: 2020-11-02

## 2020-11-02 DIAGNOSIS — Z01.818 OTHER SPECIFIED PRE-OPERATIVE EXAMINATION: Primary | ICD-10-CM

## 2020-11-04 NOTE — PROGRESS NOTES
Subjective   History of Present Illness: Taz Dickey is a 70 y.o. male is here today for follow-up. Mr. Dickey was last seen 9/17/2020 for back and right leg pain.    You have chosen to receive care through a telephone visit. Do you consent to use a telephone visit for your medical care today? Yes    We had a telephone visit today.  The patient was at home and I was in the office.  We talked for 5 minutes.    History of Present Illness     The patient continues with pain in his back with radiation down his right leg.  It goes into his right posterior lateral thigh posterior lateral calf and into his right ankle.  The pain is sharp and stabbing and quite severe.    The following portions of the patient's history were reviewed and updated as appropriate: allergies, current medications, past family history, past medical history, past social history, past surgical history and problem list.    Review of Systems   Respiratory: Negative for chest tightness and shortness of breath.    Cardiovascular: Negative for chest pain.   Musculoskeletal: Positive for back pain.        Right leg pain   Neurological: Negative.        I have reviewed the review of systems as documented by my MA.      Objective         Physical Exam  Neurological:      Mental Status: He is oriented to person, place, and time.       Neurologic Exam     Mental Status   Oriented to person, place, and time.           Assessment/Plan   Independent Review of Radiographic Studies:      I personally reviewed the images from the following studies.    I again reviewed his plain films, myelogram, and CT scan myself.  There is severe stenosis at L4-5.  There is a grade 1 spondylolisthesis at that level.  It does not appear he has had previous surgery at that level.    Medical Decision Making:      I again discussed the surgery with him.  I told the patient about the risks, complications and expected outcome of the lumbar surgery.  I explained that there was an 80%  chance of getting rid of the pain in the leg.  I explained that there would still be back pain after the surgery.  Initially this will be quite severe but will improve over time.  There is a 2 or 3% chance of infection, bleeding, CSF leak, damage to the nerve as a result of surgery, paralysis, as well as anesthetic risk.  There is a 10% chance of recurrent problems.  There is a 10% chance of nonunion or failure of the instrumentation.  We discussed the postoperative hospital and home course.  The patient does ask proceed.    He will need to be scheduled for a: Lumbar 4 5 laminectomy and fusion with instrumentation    Diagnoses and all orders for this visit:    1. Lumbar radiculopathy (Primary)    2. Spondylolisthesis of lumbar region      Return for 2-3 week post op.

## 2020-11-05 ENCOUNTER — APPOINTMENT (OUTPATIENT)
Dept: PREADMISSION TESTING | Facility: HOSPITAL | Age: 70
End: 2020-11-05

## 2020-11-05 VITALS
SYSTOLIC BLOOD PRESSURE: 131 MMHG | WEIGHT: 215.7 LBS | RESPIRATION RATE: 18 BRPM | OXYGEN SATURATION: 98 % | BODY MASS INDEX: 30.88 KG/M2 | DIASTOLIC BLOOD PRESSURE: 67 MMHG | TEMPERATURE: 98.5 F | HEIGHT: 70 IN | HEART RATE: 73 BPM

## 2020-11-05 LAB
ANION GAP SERPL CALCULATED.3IONS-SCNC: 7.3 MMOL/L (ref 5–15)
BUN SERPL-MCNC: 25 MG/DL (ref 8–23)
BUN/CREAT SERPL: 23.1 (ref 7–25)
CALCIUM SPEC-SCNC: 9.5 MG/DL (ref 8.6–10.5)
CHLORIDE SERPL-SCNC: 100 MMOL/L (ref 98–107)
CO2 SERPL-SCNC: 28.7 MMOL/L (ref 22–29)
CREAT SERPL-MCNC: 1.08 MG/DL (ref 0.76–1.27)
DEPRECATED RDW RBC AUTO: 49 FL (ref 37–54)
ERYTHROCYTE [DISTWIDTH] IN BLOOD BY AUTOMATED COUNT: 12.8 % (ref 12.3–15.4)
GFR SERPL CREATININE-BSD FRML MDRD: 68 ML/MIN/1.73
GLUCOSE SERPL-MCNC: 89 MG/DL (ref 65–99)
HCT VFR BLD AUTO: 35 % (ref 37.5–51)
HGB BLD-MCNC: 11.8 G/DL (ref 13–17.7)
MCH RBC QN AUTO: 34.7 PG (ref 26.6–33)
MCHC RBC AUTO-ENTMCNC: 33.7 G/DL (ref 31.5–35.7)
MCV RBC AUTO: 102.9 FL (ref 79–97)
PLATELET # BLD AUTO: 177 10*3/MM3 (ref 140–450)
PMV BLD AUTO: 10.4 FL (ref 6–12)
POTASSIUM SERPL-SCNC: 5.1 MMOL/L (ref 3.5–5.2)
RBC # BLD AUTO: 3.4 10*6/MM3 (ref 4.14–5.8)
SODIUM SERPL-SCNC: 136 MMOL/L (ref 136–145)
WBC # BLD AUTO: 6.15 10*3/MM3 (ref 3.4–10.8)

## 2020-11-05 PROCEDURE — 85027 COMPLETE CBC AUTOMATED: CPT | Performed by: NEUROLOGICAL SURGERY

## 2020-11-05 PROCEDURE — 93010 ELECTROCARDIOGRAM REPORT: CPT | Performed by: INTERNAL MEDICINE

## 2020-11-05 PROCEDURE — 93005 ELECTROCARDIOGRAM TRACING: CPT

## 2020-11-05 PROCEDURE — 36415 COLL VENOUS BLD VENIPUNCTURE: CPT

## 2020-11-05 PROCEDURE — 80048 BASIC METABOLIC PNL TOTAL CA: CPT | Performed by: NEUROLOGICAL SURGERY

## 2020-11-05 RX ORDER — ACETAMINOPHEN 500 MG
500 TABLET ORAL EVERY 6 HOURS PRN
COMMUNITY

## 2020-11-05 NOTE — DISCHARGE INSTRUCTIONS
Take the following medications the morning of surgery:  GABAPENTIN    If you are on prescription narcotic pain medication to control your pain you may also take that medication the morning of surgery.    General Instructions: CLEAR LIQUIDS UNTIL 6:30 AM MORNING OF SURGERY  • Do not eat solid food after midnight the night before surgery.  • You may drink clear liquids day of surgery but must stop at least one hour before your hospital arrival time.  • It is beneficial for you to have a clear drink that contains carbohydrates the day of surgery.  We suggest a 12 to 20 ounce bottle of Gatorade or Powerade for non-diabetic patients or a 12 to 20 ounce bottle of G2 or Powerade Zero for diabetic patients. (Pediatric patients, are not advised to drink a 12 to 20 ounce carbohydrate drink)    Clear liquids are liquids you can see through.  Nothing red in color.     Plain water                               Sports drinks  Sodas                                   Gelatin (Jell-O)  Fruit juices without pulp such as white grape juice and apple juice  Popsicles that contain no fruit or yogurt  Tea or coffee (no cream or milk added)  Gatorade / Powerade  G2 / Powerade Zero    • Infants may have breast milk up to four hours before surgery.  • Infants drinking formula may drink formula up to six hours before surgery.   • Patients who avoid smoking, chewing tobacco and alcohol for 4 weeks prior to surgery have a reduced risk of post-operative complications.  Quit smoking as many days before surgery as you can.  • Do not smoke, use chewing tobacco or drink alcohol the day of surgery.   • If applicable bring your C-PAP/ BI-PAP machine.  • Bring any papers given to you in the doctor’s office.  • Wear clean comfortable clothes.  • Do not wear contact lenses, false eyelashes or make-up.  Bring a case for your glasses.   • Bring crutches or walker if applicable.  • Remove all piercings.  Leave jewelry and any other valuables at  home.  • Hair extensions with metal clips must be removed prior to surgery.  • The Pre-Admission Testing nurse will instruct you to bring medications if unable to obtain an accurate list in Pre-Admission Testing.        If you were given a blood bank ID arm band remember to bring it with you the day of surgery.    Preventing a Surgical Site Infection:  • For 2 to 3 days before surgery, avoid shaving with a razor because the razor can irritate skin and make it easier to develop an infection.    • Any areas of open skin can increase the risk of a post-operative wound infection by allowing bacteria to enter and travel throughout the body.  Notify your surgeon if you have any skin wounds / rashes even if it is not near the expected surgical site.  The area will need assessed to determine if surgery should be delayed until it is healed.  • The night prior to surgery shower using a fresh bar of anti-bacterial soap (such as Dial) and clean washcloth.  Sleep in a clean bed with clean clothing.  Do not allow pets to sleep with you.  • Shower on the morning of surgery using a fresh bar of anti-bacterial soap (such as Dial) and clean washcloth.  Dry with a clean towel and dress in clean clothing.  • Ask your surgeon if you will be receiving antibiotics prior to surgery.  • Make sure you, your family, and all healthcare providers clean their hands with soap and water or an alcohol based hand  before caring for you or your wound.    Day of surgery: 11/18/2020 ARRIVAL TIME 7:30 AM  Your arrival time is approximately two hours before your scheduled surgery time.  Upon arrival, a Pre-op nurse and Anesthesiologist will review your health history, obtain vital signs, and answer questions you may have.  The only belongings needed at this time will be a list of your home medications and if applicable your C-PAP/BI-PAP machine.  If you are staying overnight your family can leave the rest of your belongings in the car and bring  them to your room later.  A Pre-op nurse will start an IV and you may receive medication in preparation for surgery, including something to help you relax.  While you are in surgery your family should notify the waiting room  if they leave the waiting room area and provide a contact phone number.    Please be aware that surgery does come with discomfort.  We want to make every effort to control your discomfort so please discuss any uncontrolled symptoms with your nurse.   Your doctor will most likely have prescribed pain medications.      If you are going home after surgery you will receive individualized written care instructions before being discharged.  A responsible adult must drive you to and from the hospital on the day of your surgery and stay with you for 24 hours.    If you are staying overnight following surgery, you will be transported to your hospital room following the recovery period.  [facility] has all private rooms.    If you have any questions please call Pre-Admission Testing at [phone].  Deductibles and co-payments are collected on the day of service. Please be prepared to pay the required co-pay, deductible or deposit on the day of service as defined by your plan.    Patient Education for Self-Quarantine Process    Following your COVID testing, we strongly recommend that you do not leave your home after you have been tested for COVID except to get medical care. This includes not going to work, school or to public areas.  If this is not possible for you to do please limit your activities to only required outings.  Be sure to wear a mask when you are with other people, practice social distancing and wash your hands frequently.      The following items provide additional details to keep you safe.  • Wash your hands with soap and water frequently for at least 20 seconds.   • Avoid touching your eyes, nose and mouth with unwashed hands.  • Do not share anything -  utensils, towels, food from the same bowl.   • Have your own utensils, drinking glass, dishes, towels and bedding.   • Do not have visitors.   • Do use FaceTime to stay in touch with family and friends.  • You should stay in a specific room away from others if possible.   • Stay at least 6 feet away from others in the home if you cannot have a dedicated room to yourself.   • Do not snuggle with your pet. While the CDC says there is no evidence that pets can spread COVID-19 or be infected from humans, it is probably best to avoid “petting, snuggling, being kissed or licked and sharing food (during self-quarantine)”, according to the CDC.   • Sanitize household surfaces daily. Include all high touch areas (door handles, light switches, phones, countertops, etc.)  • Do not share a bathroom with others, if possible.   • Wear a mask around others in your home if you are unable to stay in a separate room or 6 feet apart. If  you are unable to wear a mask, have your family member wear a mask if they must be within 6 feet of you.   Call your surgeon immediately if you experience any of the following symptoms:  • Sore Throat  • Shortness of Breath or difficulty breathing  • Cough  • Chills  • Body soreness or muscle pain  • Headache  • Fever  • New loss of taste or smell  • Do not arrive for your surgery ill.  Your procedure will need to be rescheduled to another time.  You will need to call your physician before the day of surgery to avoid any unnecessary exposure to hospital staff as well as other patients.    CHLORHEXIDINE CLOTH INSTRUCTIONS  The morning of surgery follow these instructions using the Chlorhexidine cloths you've been given.  These steps reduce bacteria on the body.  Do not use the cloths near your eyes, ears mouth, genitalia or on open wounds.  Throw the cloths away after use but do not try to flush them down a toilet.      • Open and remove one cloth at a time from the package.    • Leave the cloth  unfolded and begin the bathing.  • Massage the skin with the cloths using gentle pressure to remove bacteria.  Do not scrub harshly.   • Follow the steps below with one 2% CHG cloth per area (6 total cloths).  • One cloth for neck, shoulders and chest.  • One cloth for both arms, hands, fingers and underarms (do underarms last).  • One cloth for the abdomen followed by groin.  • One cloth for right leg and foot including between the toes.  • One cloth for left leg and foot including between the toes.  • The last cloth is to be used for the back of the neck, back and buttocks.    Allow the CHG to air dry 3 minutes on the skin which will give it time to work and decrease the chance of irritation.  The skin may feel sticky until it is dry.  Do not rinse with water or any other liquid or you will lose the beneficial effects of the CHG.  If mild skin irritation occurs, do rinse the skin to remove the CHG.  Report this to the nurse at time of admission.  Do not apply lotions, creams, ointments, deodorants or perfumes after using the clothes. Dress in clean clothes before coming to the hospital.

## 2020-11-10 ENCOUNTER — OFFICE VISIT (OUTPATIENT)
Dept: NEUROSURGERY | Facility: CLINIC | Age: 70
End: 2020-11-10

## 2020-11-10 DIAGNOSIS — M54.16 LUMBAR RADICULOPATHY: Primary | ICD-10-CM

## 2020-11-10 DIAGNOSIS — M43.16 SPONDYLOLISTHESIS OF LUMBAR REGION: ICD-10-CM

## 2020-11-10 LAB — QT INTERVAL: 442 MS

## 2020-11-10 PROCEDURE — 99441 PR PHYS/QHP TELEPHONE EVALUATION 5-10 MIN: CPT | Performed by: NEUROLOGICAL SURGERY

## 2020-11-16 ENCOUNTER — LAB (OUTPATIENT)
Dept: LAB | Facility: HOSPITAL | Age: 70
End: 2020-11-16

## 2020-11-16 DIAGNOSIS — Z01.818 OTHER SPECIFIED PRE-OPERATIVE EXAMINATION: ICD-10-CM

## 2020-11-16 PROCEDURE — C9803 HOPD COVID-19 SPEC COLLECT: HCPCS

## 2020-11-16 PROCEDURE — U0004 COV-19 TEST NON-CDC HGH THRU: HCPCS

## 2020-11-17 ENCOUNTER — ANESTHESIA EVENT (OUTPATIENT)
Dept: PERIOP | Facility: HOSPITAL | Age: 70
End: 2020-11-17

## 2020-11-17 LAB — SARS-COV-2 RNA RESP QL NAA+PROBE: NOT DETECTED

## 2020-11-18 ENCOUNTER — APPOINTMENT (OUTPATIENT)
Dept: GENERAL RADIOLOGY | Facility: HOSPITAL | Age: 70
End: 2020-11-18

## 2020-11-18 ENCOUNTER — HOSPITAL ENCOUNTER (OUTPATIENT)
Facility: HOSPITAL | Age: 70
Setting detail: OBSERVATION
Discharge: HOME | End: 2020-11-23
Attending: NEUROLOGICAL SURGERY | Admitting: NEUROLOGICAL SURGERY

## 2020-11-18 ENCOUNTER — ANESTHESIA (OUTPATIENT)
Dept: PERIOP | Facility: HOSPITAL | Age: 70
End: 2020-11-18

## 2020-11-18 DIAGNOSIS — M54.16 LUMBAR RADICULOPATHY: Primary | ICD-10-CM

## 2020-11-18 DIAGNOSIS — M43.16 SPONDYLOLISTHESIS OF LUMBAR REGION: ICD-10-CM

## 2020-11-18 DIAGNOSIS — M51.36 DEGENERATION OF INTERVERTEBRAL DISC OF LUMBAR REGION: ICD-10-CM

## 2020-11-18 LAB
GLUCOSE BLDC GLUCOMTR-MCNC: 123 MG/DL (ref 70–130)
GLUCOSE BLDC GLUCOMTR-MCNC: 135 MG/DL (ref 70–130)

## 2020-11-18 PROCEDURE — 63710000001 ASPIRIN 81 MG TABLET DELAYED-RELEASE: Performed by: NEUROLOGICAL SURGERY

## 2020-11-18 PROCEDURE — 63710000001 HYDROCHLOROTHIAZIDE 12.5 MG CAPSULE 100 EACH BOTTLE: Performed by: NEUROLOGICAL SURGERY

## 2020-11-18 PROCEDURE — 25010000002 MIDAZOLAM PER 1 MG: Performed by: ANESTHESIOLOGY

## 2020-11-18 PROCEDURE — 22633 ARTHRD CMBN 1NTRSPC LUMBAR: CPT | Performed by: NEUROLOGICAL SURGERY

## 2020-11-18 PROCEDURE — 25010000002 MORPHINE PER 10 MG: Performed by: NEUROLOGICAL SURGERY

## 2020-11-18 PROCEDURE — 25810000003 SODIUM CHLORIDE 0.9 % WITH KCL 20 MEQ 20-0.9 MEQ/L-% SOLUTION: Performed by: NEUROLOGICAL SURGERY

## 2020-11-18 PROCEDURE — 25010000002 HYDROMORPHONE PER 4 MG: Performed by: NURSE ANESTHETIST, CERTIFIED REGISTERED

## 2020-11-18 PROCEDURE — 63710000001 ATORVASTATIN 20 MG TABLET: Performed by: NEUROLOGICAL SURGERY

## 2020-11-18 PROCEDURE — 25010000002 MORPHINE PER 10 MG: Performed by: NURSE PRACTITIONER

## 2020-11-18 PROCEDURE — C1713 ANCHOR/SCREW BN/BN,TIS/BN: HCPCS | Performed by: NEUROLOGICAL SURGERY

## 2020-11-18 PROCEDURE — A9270 NON-COVERED ITEM OR SERVICE: HCPCS | Performed by: NEUROLOGICAL SURGERY

## 2020-11-18 PROCEDURE — 25010000002 PROPOFOL 10 MG/ML EMULSION: Performed by: NURSE ANESTHETIST, CERTIFIED REGISTERED

## 2020-11-18 PROCEDURE — 25010000002 VANCOMYCIN 1 G RECONSTITUTED SOLUTION 1 EACH VIAL: Performed by: NEUROLOGICAL SURGERY

## 2020-11-18 PROCEDURE — G0378 HOSPITAL OBSERVATION PER HR: HCPCS

## 2020-11-18 PROCEDURE — 25010000002 HEPARIN (PORCINE) PER 1000 UNITS: Performed by: NEUROLOGICAL SURGERY

## 2020-11-18 PROCEDURE — 63710000001 GABAPENTIN 300 MG CAPSULE: Performed by: NEUROLOGICAL SURGERY

## 2020-11-18 PROCEDURE — 25010000003 CEFAZOLIN IN DEXTROSE 2-4 GM/100ML-% SOLUTION: Performed by: NEUROLOGICAL SURGERY

## 2020-11-18 PROCEDURE — 22840 INSERT SPINE FIXATION DEVICE: CPT | Performed by: NEUROLOGICAL SURGERY

## 2020-11-18 PROCEDURE — 63710000001 HYDROCODONE-ACETAMINOPHEN 5-325 MG TABLET: Performed by: NEUROLOGICAL SURGERY

## 2020-11-18 PROCEDURE — 25010000002 LIDOCAINE PER 10 MG: Performed by: NURSE ANESTHETIST, CERTIFIED REGISTERED

## 2020-11-18 PROCEDURE — 25010000002 MAGNESIUM SULFATE PER 500 MG OF MAGNESIUM: Performed by: NURSE ANESTHETIST, CERTIFIED REGISTERED

## 2020-11-18 PROCEDURE — 82962 GLUCOSE BLOOD TEST: CPT

## 2020-11-18 PROCEDURE — 25010000002 FENTANYL CITRATE (PF) 100 MCG/2ML SOLUTION: Performed by: NURSE ANESTHETIST, CERTIFIED REGISTERED

## 2020-11-18 PROCEDURE — 76000 FLUOROSCOPY <1 HR PHYS/QHP: CPT

## 2020-11-18 PROCEDURE — 63710000001 LISINOPRIL 20 MG TABLET 1,000 EACH BOTTLE: Performed by: NEUROLOGICAL SURGERY

## 2020-11-18 PROCEDURE — 72100 X-RAY EXAM L-S SPINE 2/3 VWS: CPT

## 2020-11-18 PROCEDURE — 25010000002 NEOSTIGMINE PER 0.5 MG: Performed by: NURSE ANESTHETIST, CERTIFIED REGISTERED

## 2020-11-18 PROCEDURE — 22853 INSJ BIOMECHANICAL DEVICE: CPT | Performed by: NEUROLOGICAL SURGERY

## 2020-11-18 PROCEDURE — 25010000002 ONDANSETRON PER 1 MG: Performed by: NURSE ANESTHETIST, CERTIFIED REGISTERED

## 2020-11-18 PROCEDURE — 25810000003 SODIUM CHLORIDE 0.9 % WITH KCL 20 MEQ 20-0.9 MEQ/L-% SOLUTION: Performed by: NURSE PRACTITIONER

## 2020-11-18 DEVICE — DBM 7509145 MAGNIFUSE 1.75 X 5 CM
Type: IMPLANTABLE DEVICE | Site: SPINE LUMBAR | Status: FUNCTIONAL
Brand: MAGNIFUSE® BONE GRAFT

## 2020-11-18 DEVICE — SPACR TLIF/DLIF ADAPTIX 24X11MM: Type: IMPLANTABLE DEVICE | Status: FUNCTIONAL

## 2020-11-18 DEVICE — SET SCREW 5440030 4.75 TI NS BRK OFF
Type: IMPLANTABLE DEVICE | Status: FUNCTIONAL
Brand: CD HORIZON® SPINAL SYSTEM

## 2020-11-18 DEVICE — FLOSEAL HEMOSTATIC MATRIX, 5ML
Type: IMPLANTABLE DEVICE | Site: SPINE LUMBAR | Status: FUNCTIONAL
Brand: FLOSEAL HEMOSTATIC MATRIX

## 2020-11-18 DEVICE — SSC BONE WAX
Type: IMPLANTABLE DEVICE | Site: SPINE LUMBAR | Status: FUNCTIONAL
Brand: SSC BONE WAX

## 2020-11-18 DEVICE — ROD 1475501030 4.75 CCM NS CURV 30MM
Type: IMPLANTABLE DEVICE | Status: FUNCTIONAL
Brand: CD HORIZON® SPINAL SYSTEM

## 2020-11-18 DEVICE — PUTTY DBM GRAFTON 6CC: Type: IMPLANTABLE DEVICE | Status: FUNCTIONAL

## 2020-11-18 DEVICE — SCREW 54840046555 4.75 ATS MAS 6.5X55
Type: IMPLANTABLE DEVICE | Status: FUNCTIONAL
Brand: CD HORIZON® SPINAL SYSTEM

## 2020-11-18 DEVICE — SEALANT WND FIBRIN TISSEEL PREFIL/SYR/PRIMAFZ 4ML: Type: IMPLANTABLE DEVICE | Site: SPINE LUMBAR | Status: FUNCTIONAL

## 2020-11-18 RX ORDER — SODIUM CHLORIDE 0.9 % (FLUSH) 0.9 %
3 SYRINGE (ML) INJECTION EVERY 12 HOURS SCHEDULED
Status: DISCONTINUED | OUTPATIENT
Start: 2020-11-18 | End: 2020-11-18 | Stop reason: HOSPADM

## 2020-11-18 RX ORDER — LIDOCAINE HYDROCHLORIDE 20 MG/ML
INJECTION, SOLUTION INFILTRATION; PERINEURAL AS NEEDED
Status: DISCONTINUED | OUTPATIENT
Start: 2020-11-18 | End: 2020-11-18 | Stop reason: SURG

## 2020-11-18 RX ORDER — PROMETHAZINE HYDROCHLORIDE 25 MG/1
25 SUPPOSITORY RECTAL ONCE AS NEEDED
Status: DISCONTINUED | OUTPATIENT
Start: 2020-11-18 | End: 2020-11-18 | Stop reason: HOSPADM

## 2020-11-18 RX ORDER — SODIUM CHLORIDE 9 MG/ML
INJECTION, SOLUTION INTRAVENOUS AS NEEDED
Status: DISCONTINUED | OUTPATIENT
Start: 2020-11-18 | End: 2020-11-18 | Stop reason: HOSPADM

## 2020-11-18 RX ORDER — DIPHENHYDRAMINE HYDROCHLORIDE 50 MG/ML
12.5 INJECTION INTRAMUSCULAR; INTRAVENOUS
Status: DISCONTINUED | OUTPATIENT
Start: 2020-11-18 | End: 2020-11-18 | Stop reason: HOSPADM

## 2020-11-18 RX ORDER — NALOXONE HCL 0.4 MG/ML
0.4 VIAL (ML) INJECTION
Status: DISCONTINUED | OUTPATIENT
Start: 2020-11-18 | End: 2020-11-20

## 2020-11-18 RX ORDER — ATORVASTATIN CALCIUM 20 MG/1
40 TABLET, FILM COATED ORAL NIGHTLY
Status: DISCONTINUED | OUTPATIENT
Start: 2020-11-18 | End: 2020-11-19

## 2020-11-18 RX ORDER — EPHEDRINE SULFATE 50 MG/ML
INJECTION, SOLUTION INTRAVENOUS AS NEEDED
Status: DISCONTINUED | OUTPATIENT
Start: 2020-11-18 | End: 2020-11-18 | Stop reason: SURG

## 2020-11-18 RX ORDER — KETAMINE HYDROCHLORIDE 10 MG/ML
INJECTION INTRAMUSCULAR; INTRAVENOUS AS NEEDED
Status: DISCONTINUED | OUTPATIENT
Start: 2020-11-18 | End: 2020-11-18 | Stop reason: SURG

## 2020-11-18 RX ORDER — ONDANSETRON 2 MG/ML
4 INJECTION INTRAMUSCULAR; INTRAVENOUS ONCE AS NEEDED
Status: DISCONTINUED | OUTPATIENT
Start: 2020-11-18 | End: 2020-11-18 | Stop reason: HOSPADM

## 2020-11-18 RX ORDER — DIPHENHYDRAMINE HCL 25 MG
25 CAPSULE ORAL
Status: DISCONTINUED | OUTPATIENT
Start: 2020-11-18 | End: 2020-11-18 | Stop reason: HOSPADM

## 2020-11-18 RX ORDER — HYDROCODONE BITARTRATE AND ACETAMINOPHEN 7.5; 325 MG/1; MG/1
1 TABLET ORAL ONCE AS NEEDED
Status: DISCONTINUED | OUTPATIENT
Start: 2020-11-18 | End: 2020-11-18 | Stop reason: HOSPADM

## 2020-11-18 RX ORDER — FAMOTIDINE 10 MG/ML
20 INJECTION, SOLUTION INTRAVENOUS ONCE
Status: COMPLETED | OUTPATIENT
Start: 2020-11-18 | End: 2020-11-18

## 2020-11-18 RX ORDER — GLYCOPYRROLATE 0.2 MG/ML
INJECTION INTRAMUSCULAR; INTRAVENOUS AS NEEDED
Status: DISCONTINUED | OUTPATIENT
Start: 2020-11-18 | End: 2020-11-18 | Stop reason: SURG

## 2020-11-18 RX ORDER — ASPIRIN 81 MG/1
81 TABLET ORAL DAILY
Status: DISCONTINUED | OUTPATIENT
Start: 2020-11-18 | End: 2020-11-23 | Stop reason: HOSPADM

## 2020-11-18 RX ORDER — MIDAZOLAM HYDROCHLORIDE 1 MG/ML
1 INJECTION INTRAMUSCULAR; INTRAVENOUS
Status: COMPLETED | OUTPATIENT
Start: 2020-11-18 | End: 2020-11-18

## 2020-11-18 RX ORDER — PROPOFOL 10 MG/ML
VIAL (ML) INTRAVENOUS AS NEEDED
Status: DISCONTINUED | OUTPATIENT
Start: 2020-11-18 | End: 2020-11-18 | Stop reason: SURG

## 2020-11-18 RX ORDER — MORPHINE SULFATE 2 MG/ML
2 INJECTION, SOLUTION INTRAMUSCULAR; INTRAVENOUS EVERY 4 HOURS PRN
Status: DISCONTINUED | OUTPATIENT
Start: 2020-11-18 | End: 2020-11-20

## 2020-11-18 RX ORDER — SODIUM CHLORIDE 0.9 % (FLUSH) 0.9 %
3 SYRINGE (ML) INJECTION EVERY 12 HOURS SCHEDULED
Status: DISCONTINUED | OUTPATIENT
Start: 2020-11-18 | End: 2020-11-20

## 2020-11-18 RX ORDER — HYDRALAZINE HYDROCHLORIDE 10 MG/1
20 TABLET, FILM COATED ORAL ONCE AS NEEDED
Status: DISCONTINUED | OUTPATIENT
Start: 2020-11-18 | End: 2020-11-18 | Stop reason: HOSPADM

## 2020-11-18 RX ORDER — SODIUM CHLORIDE 0.9 % (FLUSH) 0.9 %
3-10 SYRINGE (ML) INJECTION AS NEEDED
Status: DISCONTINUED | OUTPATIENT
Start: 2020-11-18 | End: 2020-11-18 | Stop reason: HOSPADM

## 2020-11-18 RX ORDER — LIDOCAINE HYDROCHLORIDE 10 MG/ML
0.5 INJECTION, SOLUTION EPIDURAL; INFILTRATION; INTRACAUDAL; PERINEURAL ONCE AS NEEDED
Status: DISCONTINUED | OUTPATIENT
Start: 2020-11-18 | End: 2020-11-18 | Stop reason: HOSPADM

## 2020-11-18 RX ORDER — GABAPENTIN 300 MG/1
600 CAPSULE ORAL EVERY 8 HOURS SCHEDULED
Status: DISCONTINUED | OUTPATIENT
Start: 2020-11-18 | End: 2020-11-20

## 2020-11-18 RX ORDER — HYDROMORPHONE HYDROCHLORIDE 1 MG/ML
0.5 INJECTION, SOLUTION INTRAMUSCULAR; INTRAVENOUS; SUBCUTANEOUS
Status: DISCONTINUED | OUTPATIENT
Start: 2020-11-18 | End: 2020-11-18 | Stop reason: HOSPADM

## 2020-11-18 RX ORDER — NALOXONE HCL 0.4 MG/ML
0.2 VIAL (ML) INJECTION AS NEEDED
Status: DISCONTINUED | OUTPATIENT
Start: 2020-11-18 | End: 2020-11-18 | Stop reason: HOSPADM

## 2020-11-18 RX ORDER — ONDANSETRON 4 MG/1
4 TABLET, FILM COATED ORAL EVERY 6 HOURS PRN
Status: DISCONTINUED | OUTPATIENT
Start: 2020-11-18 | End: 2020-11-21

## 2020-11-18 RX ORDER — EPHEDRINE SULFATE 50 MG/ML
5 INJECTION, SOLUTION INTRAVENOUS ONCE AS NEEDED
Status: DISCONTINUED | OUTPATIENT
Start: 2020-11-18 | End: 2020-11-18 | Stop reason: HOSPADM

## 2020-11-18 RX ORDER — HYDROCODONE BITARTRATE AND ACETAMINOPHEN 5; 325 MG/1; MG/1
1 TABLET ORAL EVERY 4 HOURS PRN
Status: DISCONTINUED | OUTPATIENT
Start: 2020-11-18 | End: 2020-11-23 | Stop reason: HOSPADM

## 2020-11-18 RX ORDER — FLUMAZENIL 0.1 MG/ML
0.2 INJECTION INTRAVENOUS AS NEEDED
Status: DISCONTINUED | OUTPATIENT
Start: 2020-11-18 | End: 2020-11-18 | Stop reason: HOSPADM

## 2020-11-18 RX ORDER — ROCURONIUM BROMIDE 10 MG/ML
INJECTION, SOLUTION INTRAVENOUS AS NEEDED
Status: DISCONTINUED | OUTPATIENT
Start: 2020-11-18 | End: 2020-11-18 | Stop reason: SURG

## 2020-11-18 RX ORDER — OXYCODONE AND ACETAMINOPHEN 7.5; 325 MG/1; MG/1
1 TABLET ORAL ONCE AS NEEDED
Status: DISCONTINUED | OUTPATIENT
Start: 2020-11-18 | End: 2020-11-18 | Stop reason: HOSPADM

## 2020-11-18 RX ORDER — LIDOCAINE HYDROCHLORIDE ANHYDROUS AND DEXTROSE MONOHYDRATE 5; 400 G/100ML; MG/100ML
INJECTION, SOLUTION INTRAVENOUS CONTINUOUS PRN
Status: DISCONTINUED | OUTPATIENT
Start: 2020-11-18 | End: 2020-11-18 | Stop reason: SURG

## 2020-11-18 RX ORDER — SODIUM CHLORIDE 9 MG/ML
INJECTION, SOLUTION INTRAVENOUS CONTINUOUS PRN
Status: DISCONTINUED | OUTPATIENT
Start: 2020-11-18 | End: 2020-11-18 | Stop reason: SURG

## 2020-11-18 RX ORDER — ONDANSETRON 2 MG/ML
4 INJECTION INTRAMUSCULAR; INTRAVENOUS EVERY 6 HOURS PRN
Status: DISCONTINUED | OUTPATIENT
Start: 2020-11-18 | End: 2020-11-23 | Stop reason: HOSPADM

## 2020-11-18 RX ORDER — HEPARIN SODIUM 10000 [USP'U]/ML
INJECTION, SOLUTION INTRAVENOUS; SUBCUTANEOUS AS NEEDED
Status: DISCONTINUED | OUTPATIENT
Start: 2020-11-18 | End: 2020-11-18 | Stop reason: HOSPADM

## 2020-11-18 RX ORDER — MAGNESIUM SULFATE HEPTAHYDRATE 500 MG/ML
INJECTION, SOLUTION INTRAMUSCULAR; INTRAVENOUS AS NEEDED
Status: DISCONTINUED | OUTPATIENT
Start: 2020-11-18 | End: 2020-11-18 | Stop reason: SURG

## 2020-11-18 RX ORDER — MAGNESIUM HYDROXIDE 1200 MG/15ML
LIQUID ORAL AS NEEDED
Status: DISCONTINUED | OUTPATIENT
Start: 2020-11-18 | End: 2020-11-18 | Stop reason: HOSPADM

## 2020-11-18 RX ORDER — LABETALOL HYDROCHLORIDE 5 MG/ML
5 INJECTION, SOLUTION INTRAVENOUS
Status: DISCONTINUED | OUTPATIENT
Start: 2020-11-18 | End: 2020-11-18 | Stop reason: HOSPADM

## 2020-11-18 RX ORDER — SODIUM CHLORIDE AND POTASSIUM CHLORIDE 150; 900 MG/100ML; MG/100ML
50 INJECTION, SOLUTION INTRAVENOUS CONTINUOUS
Status: DISCONTINUED | OUTPATIENT
Start: 2020-11-18 | End: 2020-11-19

## 2020-11-18 RX ORDER — FENTANYL CITRATE 50 UG/ML
50 INJECTION, SOLUTION INTRAMUSCULAR; INTRAVENOUS
Status: DISCONTINUED | OUTPATIENT
Start: 2020-11-18 | End: 2020-11-18 | Stop reason: HOSPADM

## 2020-11-18 RX ORDER — HYDROMORPHONE HCL 110MG/55ML
PATIENT CONTROLLED ANALGESIA SYRINGE INTRAVENOUS AS NEEDED
Status: DISCONTINUED | OUTPATIENT
Start: 2020-11-18 | End: 2020-11-18 | Stop reason: SURG

## 2020-11-18 RX ORDER — FENTANYL CITRATE 50 UG/ML
INJECTION, SOLUTION INTRAMUSCULAR; INTRAVENOUS AS NEEDED
Status: DISCONTINUED | OUTPATIENT
Start: 2020-11-18 | End: 2020-11-18 | Stop reason: SURG

## 2020-11-18 RX ORDER — SODIUM CHLORIDE 0.9 % (FLUSH) 0.9 %
10 SYRINGE (ML) INJECTION AS NEEDED
Status: DISCONTINUED | OUTPATIENT
Start: 2020-11-18 | End: 2020-11-23 | Stop reason: HOSPADM

## 2020-11-18 RX ORDER — SODIUM CHLORIDE, SODIUM LACTATE, POTASSIUM CHLORIDE, CALCIUM CHLORIDE 600; 310; 30; 20 MG/100ML; MG/100ML; MG/100ML; MG/100ML
9 INJECTION, SOLUTION INTRAVENOUS CONTINUOUS
Status: DISCONTINUED | OUTPATIENT
Start: 2020-11-18 | End: 2020-11-18

## 2020-11-18 RX ORDER — PROMETHAZINE HYDROCHLORIDE 25 MG/1
25 TABLET ORAL ONCE AS NEEDED
Status: DISCONTINUED | OUTPATIENT
Start: 2020-11-18 | End: 2020-11-18 | Stop reason: HOSPADM

## 2020-11-18 RX ORDER — CEFAZOLIN SODIUM 2 G/100ML
2 INJECTION, SOLUTION INTRAVENOUS ONCE
Status: COMPLETED | OUTPATIENT
Start: 2020-11-18 | End: 2020-11-18

## 2020-11-18 RX ORDER — ONDANSETRON 2 MG/ML
INJECTION INTRAMUSCULAR; INTRAVENOUS AS NEEDED
Status: DISCONTINUED | OUTPATIENT
Start: 2020-11-18 | End: 2020-11-18 | Stop reason: SURG

## 2020-11-18 RX ADMIN — KETAMINE HYDROCHLORIDE 50 MG: 10 INJECTION INTRAMUSCULAR; INTRAVENOUS at 10:32

## 2020-11-18 RX ADMIN — CEFAZOLIN SODIUM 2 G: 2 INJECTION, SOLUTION INTRAVENOUS at 09:56

## 2020-11-18 RX ADMIN — GABAPENTIN 600 MG: 300 CAPSULE ORAL at 20:19

## 2020-11-18 RX ADMIN — EPHEDRINE SULFATE 10 MG: 50 INJECTION INTRAVENOUS at 11:23

## 2020-11-18 RX ADMIN — PROPOFOL 25 MCG/KG/MIN: 10 INJECTION, EMULSION INTRAVENOUS at 11:37

## 2020-11-18 RX ADMIN — HYDROCODONE BITARTRATE AND ACETAMINOPHEN 1 TABLET: 5; 325 TABLET ORAL at 15:51

## 2020-11-18 RX ADMIN — ONDANSETRON HYDROCHLORIDE 4 MG: 2 SOLUTION INTRAMUSCULAR; INTRAVENOUS at 13:06

## 2020-11-18 RX ADMIN — MORPHINE SULFATE 2 MG: 2 INJECTION, SOLUTION INTRAMUSCULAR; INTRAVENOUS at 17:15

## 2020-11-18 RX ADMIN — ROCURONIUM BROMIDE 50 MG: 10 INJECTION INTRAVENOUS at 10:03

## 2020-11-18 RX ADMIN — SODIUM CHLORIDE: 9 INJECTION, SOLUTION INTRAVENOUS at 12:56

## 2020-11-18 RX ADMIN — ATORVASTATIN CALCIUM 40 MG: 20 TABLET, FILM COATED ORAL at 20:19

## 2020-11-18 RX ADMIN — LISINOPRIL: 20 TABLET ORAL at 17:15

## 2020-11-18 RX ADMIN — NEOSTIGMINE METHYLSULFATE 3 MG: 1 INJECTION INTRAMUSCULAR; INTRAVENOUS; SUBCUTANEOUS at 12:48

## 2020-11-18 RX ADMIN — ASPIRIN 81 MG: 81 TABLET, COATED ORAL at 15:51

## 2020-11-18 RX ADMIN — SODIUM CHLORIDE, POTASSIUM CHLORIDE, SODIUM LACTATE AND CALCIUM CHLORIDE 9 ML/HR: 600; 310; 30; 20 INJECTION, SOLUTION INTRAVENOUS at 08:21

## 2020-11-18 RX ADMIN — FENTANYL CITRATE 100 MCG: 50 INJECTION INTRAMUSCULAR; INTRAVENOUS at 09:58

## 2020-11-18 RX ADMIN — FENTANYL CITRATE 50 MCG: 50 INJECTION, SOLUTION INTRAMUSCULAR; INTRAVENOUS at 14:20

## 2020-11-18 RX ADMIN — KETAMINE HYDROCHLORIDE 10 MG: 10 INJECTION INTRAMUSCULAR; INTRAVENOUS at 11:30

## 2020-11-18 RX ADMIN — HYDROCODONE BITARTRATE AND ACETAMINOPHEN 1 TABLET: 5; 325 TABLET ORAL at 20:19

## 2020-11-18 RX ADMIN — MIDAZOLAM 1 MG: 1 INJECTION INTRAMUSCULAR; INTRAVENOUS at 08:30

## 2020-11-18 RX ADMIN — PROPOFOL 150 MG: 10 INJECTION, EMULSION INTRAVENOUS at 10:02

## 2020-11-18 RX ADMIN — MORPHINE SULFATE 2 MG: 2 INJECTION, SOLUTION INTRAMUSCULAR; INTRAVENOUS at 21:42

## 2020-11-18 RX ADMIN — FENTANYL CITRATE 50 MCG: 50 INJECTION, SOLUTION INTRAMUSCULAR; INTRAVENOUS at 13:55

## 2020-11-18 RX ADMIN — HYDROMORPHONE HYDROCHLORIDE 0.5 MG: 2 INJECTION, SOLUTION INTRAMUSCULAR; INTRAVENOUS; SUBCUTANEOUS at 12:54

## 2020-11-18 RX ADMIN — GLYCOPYRROLATE 0.6 MG: 0.2 INJECTION INTRAMUSCULAR; INTRAVENOUS at 12:48

## 2020-11-18 RX ADMIN — LIDOCAINE HYDROCHLORIDE 100 MG: 20 INJECTION, SOLUTION INFILTRATION; PERINEURAL at 10:02

## 2020-11-18 RX ADMIN — FAMOTIDINE 20 MG: 10 INJECTION INTRAVENOUS at 08:30

## 2020-11-18 RX ADMIN — EPHEDRINE SULFATE 10 MG: 50 INJECTION INTRAVENOUS at 12:29

## 2020-11-18 RX ADMIN — GABAPENTIN 600 MG: 300 CAPSULE ORAL at 15:51

## 2020-11-18 RX ADMIN — MAGNESIUM SULFATE HEPTAHYDRATE 2 G: 500 INJECTION, SOLUTION INTRAMUSCULAR; INTRAVENOUS at 10:36

## 2020-11-18 RX ADMIN — HYDROMORPHONE HYDROCHLORIDE 0.5 MG: 2 INJECTION, SOLUTION INTRAMUSCULAR; INTRAVENOUS; SUBCUTANEOUS at 13:00

## 2020-11-18 RX ADMIN — LIDOCAINE HYDROCHLORIDE ANHYDROUS AND DEXTROSE MONOHYDRATE 2 MG/MIN: .4; 5 INJECTION, SOLUTION INTRAVENOUS at 10:38

## 2020-11-18 RX ADMIN — POTASSIUM CHLORIDE AND SODIUM CHLORIDE 100 ML/HR: 900; 150 INJECTION, SOLUTION INTRAVENOUS at 17:15

## 2020-11-18 RX ADMIN — EPHEDRINE SULFATE 10 MG: 50 INJECTION INTRAVENOUS at 13:27

## 2020-11-18 RX ADMIN — ROCURONIUM BROMIDE 10 MG: 10 INJECTION INTRAVENOUS at 12:01

## 2020-11-18 RX ADMIN — SODIUM CHLORIDE, PRESERVATIVE FREE 3 ML: 5 INJECTION INTRAVENOUS at 20:19

## 2020-11-18 RX ADMIN — MIDAZOLAM 1 MG: 1 INJECTION INTRAMUSCULAR; INTRAVENOUS at 08:56

## 2020-11-18 RX ADMIN — KETAMINE HYDROCHLORIDE 10 MG: 10 INJECTION INTRAMUSCULAR; INTRAVENOUS at 12:36

## 2020-11-18 NOTE — ANESTHESIA POSTPROCEDURE EVALUATION
"Patient: Taz Dickey    Procedure Summary     Date: 11/18/20 Room / Location: [facility] OR  / [facility] MAIN OR    Anesthesia Start: 0956 Anesthesia Stop: 1341    Procedure: Lumbar 4 5 laminectomy with a posterior lateral fusion and instrumentation and interbody fusion (N/A Spine Lumbar) Diagnosis:       Lumbar radiculopathy      (Lumbar radiculopathy [M54.16])    Surgeon: Jeffrey Garcia MD Provider: Cheryl Jung MD    Anesthesia Type: general ASA Status: 3          Anesthesia Type: general    Vitals  Vitals Value Taken Time   /77 11/18/20 1430   Temp 36.7 °C (98 °F) 11/18/20 1334   Pulse 76 11/18/20 1443   Resp 16 11/18/20 1415   SpO2 96 % 11/18/20 1443   Vitals shown include unvalidated device data.        Post Anesthesia Care and Evaluation    Patient location during evaluation: bedside  Patient participation: complete - patient participated  Level of consciousness: awake  Pain management: adequate  Airway patency: patent  Anesthetic complications: No anesthetic complications    Cardiovascular status: acceptable  Respiratory status: acceptable  Hydration status: acceptable    Comments: */77 (BP Location: Left arm, Patient Position: Lying)   Pulse 75   Temp 36.7 °C (98 °F) (Oral)   Resp 16   Ht 177.8 cm (70\")   Wt 96.8 kg (213 lb 4.8 oz)   SpO2 99%   BMI 30.61 kg/m²         "

## 2020-11-18 NOTE — ANESTHESIA PREPROCEDURE EVALUATION
Anesthesia Evaluation     history of anesthetic complications: PONV               Airway   Mallampati: I  TM distance: >3 FB  Neck ROM: full  No difficulty expected  Dental    (+) upper dentures and lower dentures    Pulmonary - normal exam   (+) a smoker Current,   Cardiovascular - normal exam    (+) hypertension, DVT, hyperlipidemia,       Neuro/Psych  (+) numbness, psychiatric history,     GI/Hepatic/Renal/Endo    (+)   diabetes mellitus type 2,     Musculoskeletal     Abdominal    Substance History      OB/GYN          Other   arthritis,                      Anesthesia Plan    ASA 3     general     intravenous induction     Anesthetic plan, all risks, benefits, and alternatives have been provided, discussed and informed consent has been obtained with: patient.

## 2020-11-18 NOTE — ANESTHESIA PROCEDURE NOTES
Airway  Urgency: elective    Date/Time: 11/18/2020 10:05 AM  Airway not difficult    General Information and Staff    Patient location during procedure: OR  Anesthesiologist: Cheryl Jung MD  CRNA: Vy Warren CRNA    Indications and Patient Condition  Indications for airway management: airway protection    Preoxygenated: yes  Mask difficulty assessment: 2 - vent by mask + OA or adjuvant +/- NMBA    Final Airway Details  Final airway type: endotracheal airway      Successful airway: ETT  Cuffed: yes   Successful intubation technique: direct laryngoscopy  Facilitating devices/methods: cricoid pressure  Endotracheal tube insertion site: oral  Blade: Victoria  Blade size: 2  ETT size (mm): 7.5  Cormack-Lehane Classification: grade I - full view of glottis  Placement verified by: chest auscultation and capnometry   Cuff volume (mL): 5  Measured from: lips  ETT/EBT  to lips (cm): 22  Number of attempts at approach: 1  Assessment: lips, teeth, and gum same as pre-op    Additional Comments  EBBS: ETCO2+: Atraumatic intubation

## 2020-11-19 ENCOUNTER — APPOINTMENT (OUTPATIENT)
Dept: GENERAL RADIOLOGY | Facility: HOSPITAL | Age: 70
End: 2020-11-19

## 2020-11-19 LAB
BASOPHILS # BLD AUTO: 0.05 10*3/MM3 (ref 0–0.2)
BASOPHILS NFR BLD AUTO: 0.8 % (ref 0–1.5)
DEPRECATED RDW RBC AUTO: 44.9 FL (ref 37–54)
EOSINOPHIL # BLD AUTO: 0.09 10*3/MM3 (ref 0–0.4)
EOSINOPHIL NFR BLD AUTO: 1.5 % (ref 0.3–6.2)
ERYTHROCYTE [DISTWIDTH] IN BLOOD BY AUTOMATED COUNT: 12.2 % (ref 12.3–15.4)
GLUCOSE BLDC GLUCOMTR-MCNC: 140 MG/DL (ref 70–130)
GLUCOSE BLDC GLUCOMTR-MCNC: 141 MG/DL (ref 70–130)
GLUCOSE BLDC GLUCOMTR-MCNC: 158 MG/DL (ref 70–130)
HCT VFR BLD AUTO: 28.2 % (ref 37.5–51)
HGB BLD-MCNC: 9.4 G/DL (ref 13–17.7)
IMM GRANULOCYTES # BLD AUTO: 0.02 10*3/MM3 (ref 0–0.05)
IMM GRANULOCYTES NFR BLD AUTO: 0.3 % (ref 0–0.5)
LYMPHOCYTES # BLD AUTO: 0.92 10*3/MM3 (ref 0.7–3.1)
LYMPHOCYTES NFR BLD AUTO: 15.4 % (ref 19.6–45.3)
MCH RBC QN AUTO: 33.8 PG (ref 26.6–33)
MCHC RBC AUTO-ENTMCNC: 33.3 G/DL (ref 31.5–35.7)
MCV RBC AUTO: 101.4 FL (ref 79–97)
MONOCYTES # BLD AUTO: 0.71 10*3/MM3 (ref 0.1–0.9)
MONOCYTES NFR BLD AUTO: 11.9 % (ref 5–12)
NEUTROPHILS NFR BLD AUTO: 4.18 10*3/MM3 (ref 1.7–7)
NEUTROPHILS NFR BLD AUTO: 70.1 % (ref 42.7–76)
NRBC BLD AUTO-RTO: 0 /100 WBC (ref 0–0.2)
PLATELET # BLD AUTO: 139 10*3/MM3 (ref 140–450)
PMV BLD AUTO: 10.4 FL (ref 6–12)
RBC # BLD AUTO: 2.78 10*6/MM3 (ref 4.14–5.8)
WBC # BLD AUTO: 5.97 10*3/MM3 (ref 3.4–10.8)

## 2020-11-19 PROCEDURE — 25010000002 MORPHINE PER 10 MG: Performed by: NURSE PRACTITIONER

## 2020-11-19 PROCEDURE — 63710000001 METHOCARBAMOL 750 MG TABLET: Performed by: NURSE PRACTITIONER

## 2020-11-19 PROCEDURE — 82962 GLUCOSE BLOOD TEST: CPT

## 2020-11-19 PROCEDURE — 85025 COMPLETE CBC W/AUTO DIFF WBC: CPT | Performed by: NEUROLOGICAL SURGERY

## 2020-11-19 PROCEDURE — 63710000001 ASPIRIN 81 MG TABLET DELAYED-RELEASE: Performed by: NEUROLOGICAL SURGERY

## 2020-11-19 PROCEDURE — A9270 NON-COVERED ITEM OR SERVICE: HCPCS | Performed by: NURSE PRACTITIONER

## 2020-11-19 PROCEDURE — A9270 NON-COVERED ITEM OR SERVICE: HCPCS | Performed by: NEUROLOGICAL SURGERY

## 2020-11-19 PROCEDURE — 99024 POSTOP FOLLOW-UP VISIT: CPT | Performed by: NURSE PRACTITIONER

## 2020-11-19 PROCEDURE — 63710000001 GABAPENTIN 300 MG CAPSULE: Performed by: NEUROLOGICAL SURGERY

## 2020-11-19 PROCEDURE — 63710000001 ATORVASTATIN 20 MG TABLET: Performed by: HOSPITALIST

## 2020-11-19 PROCEDURE — 72100 X-RAY EXAM L-S SPINE 2/3 VWS: CPT

## 2020-11-19 PROCEDURE — A9270 NON-COVERED ITEM OR SERVICE: HCPCS | Performed by: HOSPITALIST

## 2020-11-19 PROCEDURE — G0378 HOSPITAL OBSERVATION PER HR: HCPCS

## 2020-11-19 PROCEDURE — 25010000002 MORPHINE PER 10 MG: Performed by: NEUROLOGICAL SURGERY

## 2020-11-19 PROCEDURE — 63710000001 HYDROCODONE-ACETAMINOPHEN 5-325 MG TABLET: Performed by: NEUROLOGICAL SURGERY

## 2020-11-19 RX ORDER — METHOCARBAMOL 750 MG/1
750 TABLET, FILM COATED ORAL 4 TIMES DAILY
Status: DISCONTINUED | OUTPATIENT
Start: 2020-11-19 | End: 2020-11-20

## 2020-11-19 RX ORDER — ATORVASTATIN CALCIUM 20 MG/1
10 TABLET, FILM COATED ORAL NIGHTLY
Status: DISCONTINUED | OUTPATIENT
Start: 2020-11-19 | End: 2020-11-23 | Stop reason: HOSPADM

## 2020-11-19 RX ORDER — PANTOPRAZOLE SODIUM 40 MG/1
40 TABLET, DELAYED RELEASE ORAL
Status: DISCONTINUED | OUTPATIENT
Start: 2020-11-20 | End: 2020-11-23 | Stop reason: HOSPADM

## 2020-11-19 RX ADMIN — MORPHINE SULFATE 2 MG: 2 INJECTION, SOLUTION INTRAMUSCULAR; INTRAVENOUS at 19:59

## 2020-11-19 RX ADMIN — SODIUM CHLORIDE, PRESERVATIVE FREE 3 ML: 5 INJECTION INTRAVENOUS at 20:01

## 2020-11-19 RX ADMIN — METHOCARBAMOL TABLETS 750 MG: 750 TABLET, COATED ORAL at 17:45

## 2020-11-19 RX ADMIN — HYDROCODONE BITARTRATE AND ACETAMINOPHEN 1 TABLET: 5; 325 TABLET ORAL at 05:21

## 2020-11-19 RX ADMIN — GABAPENTIN 600 MG: 300 CAPSULE ORAL at 05:21

## 2020-11-19 RX ADMIN — GABAPENTIN 600 MG: 300 CAPSULE ORAL at 13:07

## 2020-11-19 RX ADMIN — HYDROCODONE BITARTRATE AND ACETAMINOPHEN 1 TABLET: 5; 325 TABLET ORAL at 01:10

## 2020-11-19 RX ADMIN — MORPHINE SULFATE 2 MG: 2 INJECTION, SOLUTION INTRAMUSCULAR; INTRAVENOUS at 11:14

## 2020-11-19 RX ADMIN — ASPIRIN 81 MG: 81 TABLET, COATED ORAL at 08:48

## 2020-11-19 RX ADMIN — MORPHINE SULFATE 2 MG: 2 INJECTION, SOLUTION INTRAMUSCULAR; INTRAVENOUS at 01:49

## 2020-11-19 RX ADMIN — METHOCARBAMOL TABLETS 750 MG: 750 TABLET, COATED ORAL at 20:01

## 2020-11-19 RX ADMIN — HYDROCODONE BITARTRATE AND ACETAMINOPHEN 1 TABLET: 5; 325 TABLET ORAL at 21:50

## 2020-11-19 RX ADMIN — GABAPENTIN 600 MG: 300 CAPSULE ORAL at 21:52

## 2020-11-19 RX ADMIN — HYDROCODONE BITARTRATE AND ACETAMINOPHEN 1 TABLET: 5; 325 TABLET ORAL at 08:51

## 2020-11-19 RX ADMIN — MORPHINE SULFATE 2 MG: 2 INJECTION, SOLUTION INTRAMUSCULAR; INTRAVENOUS at 06:18

## 2020-11-19 RX ADMIN — HYDROCODONE BITARTRATE AND ACETAMINOPHEN 1 TABLET: 5; 325 TABLET ORAL at 13:07

## 2020-11-19 RX ADMIN — ATORVASTATIN CALCIUM 10 MG: 20 TABLET, FILM COATED ORAL at 20:01

## 2020-11-19 RX ADMIN — HYDROCODONE BITARTRATE AND ACETAMINOPHEN 1 TABLET: 5; 325 TABLET ORAL at 17:45

## 2020-11-20 LAB
ALBUMIN SERPL-MCNC: 3.6 G/DL (ref 3.5–5.2)
ALBUMIN/GLOB SERPL: 1.3 G/DL
ALP SERPL-CCNC: 57 U/L (ref 39–117)
ALT SERPL W P-5'-P-CCNC: 17 U/L (ref 1–41)
ANION GAP SERPL CALCULATED.3IONS-SCNC: 7.4 MMOL/L (ref 5–15)
AST SERPL-CCNC: 23 U/L (ref 1–40)
BILIRUB SERPL-MCNC: 1.2 MG/DL (ref 0–1.2)
BUN SERPL-MCNC: 12 MG/DL (ref 8–23)
BUN/CREAT SERPL: 16.2 (ref 7–25)
CALCIUM SPEC-SCNC: 8.7 MG/DL (ref 8.6–10.5)
CHLORIDE SERPL-SCNC: 96 MMOL/L (ref 98–107)
CHOLEST SERPL-MCNC: 137 MG/DL (ref 0–200)
CO2 SERPL-SCNC: 27.6 MMOL/L (ref 22–29)
CREAT SERPL-MCNC: 0.74 MG/DL (ref 0.76–1.27)
DEPRECATED RDW RBC AUTO: 43.9 FL (ref 37–54)
ERYTHROCYTE [DISTWIDTH] IN BLOOD BY AUTOMATED COUNT: 12 % (ref 12.3–15.4)
GFR SERPL CREATININE-BSD FRML MDRD: 105 ML/MIN/1.73
GLOBULIN UR ELPH-MCNC: 2.7 GM/DL
GLUCOSE BLDC GLUCOMTR-MCNC: 137 MG/DL (ref 70–130)
GLUCOSE BLDC GLUCOMTR-MCNC: 151 MG/DL (ref 70–130)
GLUCOSE BLDC GLUCOMTR-MCNC: 152 MG/DL (ref 70–130)
GLUCOSE BLDC GLUCOMTR-MCNC: 188 MG/DL (ref 70–130)
GLUCOSE SERPL-MCNC: 140 MG/DL (ref 65–99)
HBA1C MFR BLD: 5.4 % (ref 4.8–5.6)
HCT VFR BLD AUTO: 31.1 % (ref 37.5–51)
HDLC SERPL-MCNC: 55 MG/DL (ref 40–60)
HGB BLD-MCNC: 10.2 G/DL (ref 13–17.7)
LDLC SERPL CALC-MCNC: 68 MG/DL (ref 0–100)
LDLC/HDLC SERPL: 1.24 {RATIO}
MCH RBC QN AUTO: 32.9 PG (ref 26.6–33)
MCHC RBC AUTO-ENTMCNC: 32.8 G/DL (ref 31.5–35.7)
MCV RBC AUTO: 100.3 FL (ref 79–97)
NT-PROBNP SERPL-MCNC: 638.5 PG/ML (ref 0–900)
PLATELET # BLD AUTO: 160 10*3/MM3 (ref 140–450)
PMV BLD AUTO: 10.4 FL (ref 6–12)
POTASSIUM SERPL-SCNC: 4.1 MMOL/L (ref 3.5–5.2)
PROT SERPL-MCNC: 6.3 G/DL (ref 6–8.5)
RBC # BLD AUTO: 3.1 10*6/MM3 (ref 4.14–5.8)
SODIUM SERPL-SCNC: 131 MMOL/L (ref 136–145)
TRIGL SERPL-MCNC: 68 MG/DL (ref 0–150)
TSH SERPL DL<=0.05 MIU/L-ACNC: 0.4 UIU/ML (ref 0.27–4.2)
VLDLC SERPL-MCNC: 14 MG/DL (ref 5–40)
WBC # BLD AUTO: 10.62 10*3/MM3 (ref 3.4–10.8)

## 2020-11-20 PROCEDURE — 84443 ASSAY THYROID STIM HORMONE: CPT | Performed by: HOSPITALIST

## 2020-11-20 PROCEDURE — A9270 NON-COVERED ITEM OR SERVICE: HCPCS | Performed by: HOSPITALIST

## 2020-11-20 PROCEDURE — 63710000001 ASPIRIN 81 MG TABLET DELAYED-RELEASE: Performed by: NEUROLOGICAL SURGERY

## 2020-11-20 PROCEDURE — 63710000001 GABAPENTIN 300 MG CAPSULE: Performed by: NEUROLOGICAL SURGERY

## 2020-11-20 PROCEDURE — 97162 PT EVAL MOD COMPLEX 30 MIN: CPT | Performed by: PHYSICAL THERAPIST

## 2020-11-20 PROCEDURE — 25010000002 MORPHINE PER 10 MG: Performed by: NURSE PRACTITIONER

## 2020-11-20 PROCEDURE — 80053 COMPREHEN METABOLIC PANEL: CPT | Performed by: HOSPITALIST

## 2020-11-20 PROCEDURE — 99024 POSTOP FOLLOW-UP VISIT: CPT | Performed by: NURSE PRACTITIONER

## 2020-11-20 PROCEDURE — A9270 NON-COVERED ITEM OR SERVICE: HCPCS | Performed by: NURSE PRACTITIONER

## 2020-11-20 PROCEDURE — 82962 GLUCOSE BLOOD TEST: CPT

## 2020-11-20 PROCEDURE — 80061 LIPID PANEL: CPT | Performed by: HOSPITALIST

## 2020-11-20 PROCEDURE — A9270 NON-COVERED ITEM OR SERVICE: HCPCS | Performed by: NEUROLOGICAL SURGERY

## 2020-11-20 PROCEDURE — 83880 ASSAY OF NATRIURETIC PEPTIDE: CPT | Performed by: HOSPITALIST

## 2020-11-20 PROCEDURE — 63710000001 GABAPENTIN 300 MG CAPSULE: Performed by: NURSE PRACTITIONER

## 2020-11-20 PROCEDURE — 63710000001 INSULIN LISPRO (HUMAN) PER 5 UNITS: Performed by: HOSPITALIST

## 2020-11-20 PROCEDURE — G0378 HOSPITAL OBSERVATION PER HR: HCPCS

## 2020-11-20 PROCEDURE — 83036 HEMOGLOBIN GLYCOSYLATED A1C: CPT | Performed by: HOSPITALIST

## 2020-11-20 PROCEDURE — 85027 COMPLETE CBC AUTOMATED: CPT | Performed by: NURSE PRACTITIONER

## 2020-11-20 PROCEDURE — 63710000001 HYDROCODONE-ACETAMINOPHEN 5-325 MG TABLET: Performed by: NEUROLOGICAL SURGERY

## 2020-11-20 PROCEDURE — 97110 THERAPEUTIC EXERCISES: CPT | Performed by: PHYSICAL THERAPIST

## 2020-11-20 PROCEDURE — 63710000001 PANTOPRAZOLE 40 MG TABLET DELAYED-RELEASE: Performed by: HOSPITALIST

## 2020-11-20 PROCEDURE — 63710000001 METHOCARBAMOL 500 MG TABLET: Performed by: NURSE PRACTITIONER

## 2020-11-20 PROCEDURE — 63710000001 METHOCARBAMOL 750 MG TABLET: Performed by: NURSE PRACTITIONER

## 2020-11-20 RX ORDER — METHOCARBAMOL 500 MG/1
1000 TABLET, FILM COATED ORAL EVERY 6 HOURS SCHEDULED
Status: COMPLETED | OUTPATIENT
Start: 2020-11-20 | End: 2020-11-21

## 2020-11-20 RX ORDER — GABAPENTIN 300 MG/1
600 CAPSULE ORAL
Status: DISCONTINUED | OUTPATIENT
Start: 2020-11-20 | End: 2020-11-20

## 2020-11-20 RX ORDER — GABAPENTIN 300 MG/1
900 CAPSULE ORAL NIGHTLY
Status: DISCONTINUED | OUTPATIENT
Start: 2020-11-20 | End: 2020-11-23 | Stop reason: HOSPADM

## 2020-11-20 RX ORDER — GABAPENTIN 300 MG/1
600 CAPSULE ORAL
Status: DISCONTINUED | OUTPATIENT
Start: 2020-11-20 | End: 2020-11-23 | Stop reason: HOSPADM

## 2020-11-20 RX ADMIN — GABAPENTIN 600 MG: 300 CAPSULE ORAL at 06:22

## 2020-11-20 RX ADMIN — INSULIN LISPRO 2 UNITS: 100 INJECTION, SOLUTION INTRAVENOUS; SUBCUTANEOUS at 11:14

## 2020-11-20 RX ADMIN — MORPHINE SULFATE 2 MG: 2 INJECTION, SOLUTION INTRAMUSCULAR; INTRAVENOUS at 00:07

## 2020-11-20 RX ADMIN — PANTOPRAZOLE SODIUM 40 MG: 40 TABLET, DELAYED RELEASE ORAL at 06:22

## 2020-11-20 RX ADMIN — HYDROCODONE BITARTRATE AND ACETAMINOPHEN 1 TABLET: 5; 325 TABLET ORAL at 13:42

## 2020-11-20 RX ADMIN — MORPHINE SULFATE 2 MG: 2 INJECTION, SOLUTION INTRAMUSCULAR; INTRAVENOUS at 04:25

## 2020-11-20 RX ADMIN — HYDROCODONE BITARTRATE AND ACETAMINOPHEN 1 TABLET: 5; 325 TABLET ORAL at 06:22

## 2020-11-20 RX ADMIN — HYDROCODONE BITARTRATE AND ACETAMINOPHEN 1 TABLET: 5; 325 TABLET ORAL at 10:07

## 2020-11-20 RX ADMIN — ASPIRIN 81 MG: 81 TABLET, COATED ORAL at 08:25

## 2020-11-20 RX ADMIN — ATORVASTATIN CALCIUM 10 MG: 20 TABLET, FILM COATED ORAL at 21:45

## 2020-11-20 RX ADMIN — GABAPENTIN 900 MG: 300 CAPSULE ORAL at 21:45

## 2020-11-20 RX ADMIN — SODIUM CHLORIDE, PRESERVATIVE FREE 3 ML: 5 INJECTION INTRAVENOUS at 08:25

## 2020-11-20 RX ADMIN — HYDROCODONE BITARTRATE AND ACETAMINOPHEN 1 TABLET: 5; 325 TABLET ORAL at 21:45

## 2020-11-20 RX ADMIN — HYDROCODONE BITARTRATE AND ACETAMINOPHEN 1 TABLET: 5; 325 TABLET ORAL at 17:34

## 2020-11-20 RX ADMIN — METHOCARBAMOL TABLETS 750 MG: 750 TABLET, COATED ORAL at 08:25

## 2020-11-20 RX ADMIN — METHOCARBAMOL TABLETS 1000 MG: 500 TABLET, COATED ORAL at 12:46

## 2020-11-20 RX ADMIN — GABAPENTIN 600 MG: 300 CAPSULE ORAL at 15:26

## 2020-11-20 RX ADMIN — METHOCARBAMOL TABLETS 1000 MG: 500 TABLET, COATED ORAL at 17:34

## 2020-11-21 LAB
ANION GAP SERPL CALCULATED.3IONS-SCNC: 5.9 MMOL/L (ref 5–15)
BUN SERPL-MCNC: 16 MG/DL (ref 8–23)
BUN/CREAT SERPL: 20.5 (ref 7–25)
CALCIUM SPEC-SCNC: 8.8 MG/DL (ref 8.6–10.5)
CHLORIDE SERPL-SCNC: 94 MMOL/L (ref 98–107)
CO2 SERPL-SCNC: 30.1 MMOL/L (ref 22–29)
CREAT SERPL-MCNC: 0.78 MG/DL (ref 0.76–1.27)
DEPRECATED RDW RBC AUTO: 42.6 FL (ref 37–54)
ERYTHROCYTE [DISTWIDTH] IN BLOOD BY AUTOMATED COUNT: 11.8 % (ref 12.3–15.4)
GFR SERPL CREATININE-BSD FRML MDRD: 99 ML/MIN/1.73
GLUCOSE BLDC GLUCOMTR-MCNC: 151 MG/DL (ref 70–130)
GLUCOSE BLDC GLUCOMTR-MCNC: 184 MG/DL (ref 70–130)
GLUCOSE BLDC GLUCOMTR-MCNC: 211 MG/DL (ref 70–130)
GLUCOSE BLDC GLUCOMTR-MCNC: 244 MG/DL (ref 70–130)
GLUCOSE SERPL-MCNC: 143 MG/DL (ref 65–99)
HCT VFR BLD AUTO: 29.6 % (ref 37.5–51)
HGB BLD-MCNC: 9.9 G/DL (ref 13–17.7)
MCH RBC QN AUTO: 32.9 PG (ref 26.6–33)
MCHC RBC AUTO-ENTMCNC: 33.4 G/DL (ref 31.5–35.7)
MCV RBC AUTO: 98.3 FL (ref 79–97)
PLATELET # BLD AUTO: 160 10*3/MM3 (ref 140–450)
PMV BLD AUTO: 10.3 FL (ref 6–12)
POTASSIUM SERPL-SCNC: 4 MMOL/L (ref 3.5–5.2)
RBC # BLD AUTO: 3.01 10*6/MM3 (ref 4.14–5.8)
SODIUM SERPL-SCNC: 130 MMOL/L (ref 136–145)
WBC # BLD AUTO: 8.99 10*3/MM3 (ref 3.4–10.8)

## 2020-11-21 PROCEDURE — 99024 POSTOP FOLLOW-UP VISIT: CPT | Performed by: NURSE PRACTITIONER

## 2020-11-21 PROCEDURE — G0378 HOSPITAL OBSERVATION PER HR: HCPCS

## 2020-11-21 PROCEDURE — 80048 BASIC METABOLIC PNL TOTAL CA: CPT | Performed by: HOSPITALIST

## 2020-11-21 PROCEDURE — 85027 COMPLETE CBC AUTOMATED: CPT | Performed by: NURSE PRACTITIONER

## 2020-11-21 PROCEDURE — 97535 SELF CARE MNGMENT TRAINING: CPT

## 2020-11-21 PROCEDURE — 82962 GLUCOSE BLOOD TEST: CPT

## 2020-11-21 PROCEDURE — 97530 THERAPEUTIC ACTIVITIES: CPT

## 2020-11-21 PROCEDURE — 97110 THERAPEUTIC EXERCISES: CPT | Performed by: PHYSICAL THERAPIST

## 2020-11-21 PROCEDURE — 25010000003 HYDROCORTISONE SOD SUCCINATE PF 250 MG RECONSTITUTED SOLUTION: Performed by: NURSE PRACTITIONER

## 2020-11-21 PROCEDURE — 97166 OT EVAL MOD COMPLEX 45 MIN: CPT

## 2020-11-21 PROCEDURE — 63710000001 INSULIN LISPRO (HUMAN) PER 5 UNITS: Performed by: HOSPITALIST

## 2020-11-21 RX ADMIN — HYDROCODONE BITARTRATE AND ACETAMINOPHEN 1 TABLET: 5; 325 TABLET ORAL at 13:05

## 2020-11-21 RX ADMIN — HYDROCODONE BITARTRATE AND ACETAMINOPHEN 1 TABLET: 5; 325 TABLET ORAL at 09:28

## 2020-11-21 RX ADMIN — ASPIRIN 81 MG: 81 TABLET, COATED ORAL at 09:30

## 2020-11-21 RX ADMIN — METHOCARBAMOL TABLETS 1000 MG: 500 TABLET, COATED ORAL at 11:38

## 2020-11-21 RX ADMIN — INSULIN LISPRO 3 UNITS: 100 INJECTION, SOLUTION INTRAVENOUS; SUBCUTANEOUS at 16:54

## 2020-11-21 RX ADMIN — HYDROCODONE BITARTRATE AND ACETAMINOPHEN 1 TABLET: 5; 325 TABLET ORAL at 05:28

## 2020-11-21 RX ADMIN — GABAPENTIN 900 MG: 300 CAPSULE ORAL at 20:49

## 2020-11-21 RX ADMIN — HYDROCODONE BITARTRATE AND ACETAMINOPHEN 1 TABLET: 5; 325 TABLET ORAL at 16:54

## 2020-11-21 RX ADMIN — GABAPENTIN 600 MG: 300 CAPSULE ORAL at 05:31

## 2020-11-21 RX ADMIN — METHOCARBAMOL TABLETS 1000 MG: 500 TABLET, COATED ORAL at 00:17

## 2020-11-21 RX ADMIN — HYDROCORTISONE SODIUM SUCCINATE 250 MG: 250 INJECTION, POWDER, FOR SOLUTION INTRAMUSCULAR; INTRAVENOUS at 18:47

## 2020-11-21 RX ADMIN — METHOCARBAMOL TABLETS 1000 MG: 500 TABLET, COATED ORAL at 18:47

## 2020-11-21 RX ADMIN — PANTOPRAZOLE SODIUM 40 MG: 40 TABLET, DELAYED RELEASE ORAL at 05:29

## 2020-11-21 RX ADMIN — ATORVASTATIN CALCIUM 10 MG: 20 TABLET, FILM COATED ORAL at 20:49

## 2020-11-21 RX ADMIN — HYDROCODONE BITARTRATE AND ACETAMINOPHEN 1 TABLET: 5; 325 TABLET ORAL at 21:16

## 2020-11-21 RX ADMIN — HYDROCORTISONE SODIUM SUCCINATE 250 MG: 250 INJECTION, POWDER, FOR SOLUTION INTRAMUSCULAR; INTRAVENOUS at 13:13

## 2020-11-21 RX ADMIN — INSULIN LISPRO 2 UNITS: 100 INJECTION, SOLUTION INTRAVENOUS; SUBCUTANEOUS at 09:28

## 2020-11-21 RX ADMIN — METHOCARBAMOL TABLETS 1000 MG: 500 TABLET, COATED ORAL at 05:29

## 2020-11-21 RX ADMIN — GABAPENTIN 600 MG: 300 CAPSULE ORAL at 13:08

## 2020-11-22 LAB
ANION GAP SERPL CALCULATED.3IONS-SCNC: 7.6 MMOL/L (ref 5–15)
BUN SERPL-MCNC: 20 MG/DL (ref 8–23)
BUN/CREAT SERPL: 26.3 (ref 7–25)
CALCIUM SPEC-SCNC: 9.2 MG/DL (ref 8.6–10.5)
CHLORIDE SERPL-SCNC: 93 MMOL/L (ref 98–107)
CO2 SERPL-SCNC: 30.4 MMOL/L (ref 22–29)
CREAT SERPL-MCNC: 0.76 MG/DL (ref 0.76–1.27)
DEPRECATED RDW RBC AUTO: 41.7 FL (ref 37–54)
ERYTHROCYTE [DISTWIDTH] IN BLOOD BY AUTOMATED COUNT: 11.5 % (ref 12.3–15.4)
GFR SERPL CREATININE-BSD FRML MDRD: 102 ML/MIN/1.73
GLUCOSE BLDC GLUCOMTR-MCNC: 165 MG/DL (ref 70–130)
GLUCOSE BLDC GLUCOMTR-MCNC: 189 MG/DL (ref 70–130)
GLUCOSE BLDC GLUCOMTR-MCNC: 195 MG/DL (ref 70–130)
GLUCOSE BLDC GLUCOMTR-MCNC: 223 MG/DL (ref 70–130)
GLUCOSE SERPL-MCNC: 181 MG/DL (ref 65–99)
HCT VFR BLD AUTO: 31.1 % (ref 37.5–51)
HGB BLD-MCNC: 10.2 G/DL (ref 13–17.7)
MCH RBC QN AUTO: 32.2 PG (ref 26.6–33)
MCHC RBC AUTO-ENTMCNC: 32.8 G/DL (ref 31.5–35.7)
MCV RBC AUTO: 98.1 FL (ref 79–97)
PLATELET # BLD AUTO: 185 10*3/MM3 (ref 140–450)
PMV BLD AUTO: 10.4 FL (ref 6–12)
POTASSIUM SERPL-SCNC: 4.1 MMOL/L (ref 3.5–5.2)
RBC # BLD AUTO: 3.17 10*6/MM3 (ref 4.14–5.8)
SODIUM SERPL-SCNC: 131 MMOL/L (ref 136–145)
WBC # BLD AUTO: 7.07 10*3/MM3 (ref 3.4–10.8)

## 2020-11-22 PROCEDURE — 85027 COMPLETE CBC AUTOMATED: CPT | Performed by: NURSE PRACTITIONER

## 2020-11-22 PROCEDURE — 63710000001 INSULIN LISPRO (HUMAN) PER 5 UNITS: Performed by: HOSPITALIST

## 2020-11-22 PROCEDURE — G0378 HOSPITAL OBSERVATION PER HR: HCPCS

## 2020-11-22 PROCEDURE — 63710000001 INSULIN GLARGINE PER 5 UNITS: Performed by: HOSPITALIST

## 2020-11-22 PROCEDURE — 80048 BASIC METABOLIC PNL TOTAL CA: CPT | Performed by: HOSPITALIST

## 2020-11-22 PROCEDURE — 97110 THERAPEUTIC EXERCISES: CPT | Performed by: PHYSICAL THERAPIST

## 2020-11-22 PROCEDURE — 82962 GLUCOSE BLOOD TEST: CPT

## 2020-11-22 PROCEDURE — 25010000003 HYDROCORTISONE SOD SUCCINATE PF 250 MG RECONSTITUTED SOLUTION: Performed by: NURSE PRACTITIONER

## 2020-11-22 PROCEDURE — 99024 POSTOP FOLLOW-UP VISIT: CPT | Performed by: NURSE PRACTITIONER

## 2020-11-22 RX ORDER — METHOCARBAMOL 500 MG/1
1000 TABLET, FILM COATED ORAL EVERY 6 HOURS SCHEDULED
Status: DISCONTINUED | OUTPATIENT
Start: 2020-11-22 | End: 2020-11-23 | Stop reason: HOSPADM

## 2020-11-22 RX ORDER — INSULIN GLARGINE 100 [IU]/ML
10 INJECTION, SOLUTION SUBCUTANEOUS NIGHTLY
Status: DISCONTINUED | OUTPATIENT
Start: 2020-11-22 | End: 2020-11-23

## 2020-11-22 RX ADMIN — HYDROCORTISONE SODIUM SUCCINATE 250 MG: 250 INJECTION, POWDER, FOR SOLUTION INTRAMUSCULAR; INTRAVENOUS at 06:35

## 2020-11-22 RX ADMIN — HYDROCORTISONE SODIUM SUCCINATE 250 MG: 250 INJECTION, POWDER, FOR SOLUTION INTRAMUSCULAR; INTRAVENOUS at 20:06

## 2020-11-22 RX ADMIN — HYDROCODONE BITARTRATE AND ACETAMINOPHEN 1 TABLET: 5; 325 TABLET ORAL at 06:35

## 2020-11-22 RX ADMIN — ASPIRIN 81 MG: 81 TABLET, COATED ORAL at 08:16

## 2020-11-22 RX ADMIN — HYDROCORTISONE SODIUM SUCCINATE 250 MG: 250 INJECTION, POWDER, FOR SOLUTION INTRAMUSCULAR; INTRAVENOUS at 00:43

## 2020-11-22 RX ADMIN — INSULIN LISPRO 2 UNITS: 100 INJECTION, SOLUTION INTRAVENOUS; SUBCUTANEOUS at 16:30

## 2020-11-22 RX ADMIN — GABAPENTIN 900 MG: 300 CAPSULE ORAL at 22:02

## 2020-11-22 RX ADMIN — GABAPENTIN 600 MG: 300 CAPSULE ORAL at 06:34

## 2020-11-22 RX ADMIN — HYDROCODONE BITARTRATE AND ACETAMINOPHEN 1 TABLET: 5; 325 TABLET ORAL at 14:42

## 2020-11-22 RX ADMIN — INSULIN GLARGINE 10 UNITS: 100 INJECTION, SOLUTION SUBCUTANEOUS at 22:01

## 2020-11-22 RX ADMIN — INSULIN LISPRO 2 UNITS: 100 INJECTION, SOLUTION INTRAVENOUS; SUBCUTANEOUS at 08:16

## 2020-11-22 RX ADMIN — INSULIN LISPRO 2 UNITS: 100 INJECTION, SOLUTION INTRAVENOUS; SUBCUTANEOUS at 12:37

## 2020-11-22 RX ADMIN — HYDROCODONE BITARTRATE AND ACETAMINOPHEN 1 TABLET: 5; 325 TABLET ORAL at 18:24

## 2020-11-22 RX ADMIN — PANTOPRAZOLE SODIUM 40 MG: 40 TABLET, DELAYED RELEASE ORAL at 06:35

## 2020-11-22 RX ADMIN — HYDROCODONE BITARTRATE AND ACETAMINOPHEN 1 TABLET: 5; 325 TABLET ORAL at 10:46

## 2020-11-22 RX ADMIN — HYDROCORTISONE SODIUM SUCCINATE 250 MG: 250 INJECTION, POWDER, FOR SOLUTION INTRAMUSCULAR; INTRAVENOUS at 12:37

## 2020-11-22 RX ADMIN — METHOCARBAMOL TABLETS 1000 MG: 500 TABLET, COATED ORAL at 13:45

## 2020-11-22 RX ADMIN — GABAPENTIN 600 MG: 300 CAPSULE ORAL at 12:36

## 2020-11-22 RX ADMIN — METHOCARBAMOL TABLETS 1000 MG: 500 TABLET, COATED ORAL at 20:06

## 2020-11-22 RX ADMIN — ATORVASTATIN CALCIUM 10 MG: 20 TABLET, FILM COATED ORAL at 20:06

## 2020-11-23 ENCOUNTER — READMISSION MANAGEMENT (OUTPATIENT)
Dept: CALL CENTER | Facility: HOSPITAL | Age: 70
End: 2020-11-23

## 2020-11-23 VITALS
RESPIRATION RATE: 16 BRPM | DIASTOLIC BLOOD PRESSURE: 72 MMHG | TEMPERATURE: 97.1 F | OXYGEN SATURATION: 98 % | HEART RATE: 80 BPM | SYSTOLIC BLOOD PRESSURE: 129 MMHG | WEIGHT: 213.41 LBS | BODY MASS INDEX: 30.55 KG/M2 | HEIGHT: 70 IN

## 2020-11-23 LAB
ANION GAP SERPL CALCULATED.3IONS-SCNC: 6.1 MMOL/L (ref 5–15)
BUN SERPL-MCNC: 26 MG/DL (ref 8–23)
BUN/CREAT SERPL: 37.1 (ref 7–25)
CALCIUM SPEC-SCNC: 9.3 MG/DL (ref 8.6–10.5)
CHLORIDE SERPL-SCNC: 99 MMOL/L (ref 98–107)
CO2 SERPL-SCNC: 31.9 MMOL/L (ref 22–29)
CREAT SERPL-MCNC: 0.7 MG/DL (ref 0.76–1.27)
DEPRECATED RDW RBC AUTO: 42.8 FL (ref 37–54)
ERYTHROCYTE [DISTWIDTH] IN BLOOD BY AUTOMATED COUNT: 11.9 % (ref 12.3–15.4)
GFR SERPL CREATININE-BSD FRML MDRD: 112 ML/MIN/1.73
GLUCOSE BLDC GLUCOMTR-MCNC: 196 MG/DL (ref 70–130)
GLUCOSE BLDC GLUCOMTR-MCNC: 218 MG/DL (ref 70–130)
GLUCOSE SERPL-MCNC: 189 MG/DL (ref 65–99)
HCT VFR BLD AUTO: 30 % (ref 37.5–51)
HGB BLD-MCNC: 10.1 G/DL (ref 13–17.7)
MCH RBC QN AUTO: 33.1 PG (ref 26.6–33)
MCHC RBC AUTO-ENTMCNC: 33.7 G/DL (ref 31.5–35.7)
MCV RBC AUTO: 98.4 FL (ref 79–97)
PLATELET # BLD AUTO: 213 10*3/MM3 (ref 140–450)
PMV BLD AUTO: 10.1 FL (ref 6–12)
POTASSIUM SERPL-SCNC: 4.2 MMOL/L (ref 3.5–5.2)
RBC # BLD AUTO: 3.05 10*6/MM3 (ref 4.14–5.8)
SODIUM SERPL-SCNC: 137 MMOL/L (ref 136–145)
WBC # BLD AUTO: 6.81 10*3/MM3 (ref 3.4–10.8)

## 2020-11-23 PROCEDURE — G0378 HOSPITAL OBSERVATION PER HR: HCPCS

## 2020-11-23 PROCEDURE — 63710000001 INSULIN LISPRO (HUMAN) PER 5 UNITS: Performed by: HOSPITALIST

## 2020-11-23 PROCEDURE — 85027 COMPLETE CBC AUTOMATED: CPT | Performed by: NURSE PRACTITIONER

## 2020-11-23 PROCEDURE — 82962 GLUCOSE BLOOD TEST: CPT

## 2020-11-23 PROCEDURE — 99024 POSTOP FOLLOW-UP VISIT: CPT | Performed by: NURSE PRACTITIONER

## 2020-11-23 PROCEDURE — 97110 THERAPEUTIC EXERCISES: CPT

## 2020-11-23 PROCEDURE — 97535 SELF CARE MNGMENT TRAINING: CPT

## 2020-11-23 PROCEDURE — 25010000003 HYDROCORTISONE SOD SUCCINATE PF 250 MG RECONSTITUTED SOLUTION: Performed by: NURSE PRACTITIONER

## 2020-11-23 PROCEDURE — 80048 BASIC METABOLIC PNL TOTAL CA: CPT | Performed by: HOSPITALIST

## 2020-11-23 PROCEDURE — 97530 THERAPEUTIC ACTIVITIES: CPT

## 2020-11-23 RX ORDER — INSULIN GLARGINE 100 [IU]/ML
15 INJECTION, SOLUTION SUBCUTANEOUS NIGHTLY
Status: DISCONTINUED | OUTPATIENT
Start: 2020-11-23 | End: 2020-11-23 | Stop reason: HOSPADM

## 2020-11-23 RX ORDER — HYDROCODONE BITARTRATE AND ACETAMINOPHEN 5; 325 MG/1; MG/1
1 TABLET ORAL EVERY 4 HOURS PRN
Qty: 40 TABLET | Refills: 0 | Status: SHIPPED | OUTPATIENT
Start: 2020-11-23 | End: 2020-11-30

## 2020-11-23 RX ORDER — METHOCARBAMOL 500 MG/1
1000 TABLET, FILM COATED ORAL EVERY 6 HOURS SCHEDULED
Qty: 40 TABLET | Refills: 0 | Status: SHIPPED | OUTPATIENT
Start: 2020-11-23 | End: 2020-11-30 | Stop reason: SDUPTHER

## 2020-11-23 RX ORDER — METHYLPREDNISOLONE 4 MG/1
TABLET ORAL
Qty: 21 TABLET | Refills: 0 | Status: SHIPPED | OUTPATIENT
Start: 2020-11-23 | End: 2021-01-25

## 2020-11-23 RX ADMIN — GABAPENTIN 600 MG: 300 CAPSULE ORAL at 05:51

## 2020-11-23 RX ADMIN — INSULIN LISPRO 3 UNITS: 100 INJECTION, SOLUTION INTRAVENOUS; SUBCUTANEOUS at 07:55

## 2020-11-23 RX ADMIN — METHOCARBAMOL TABLETS 1000 MG: 500 TABLET, COATED ORAL at 02:34

## 2020-11-23 RX ADMIN — METHOCARBAMOL TABLETS 1000 MG: 500 TABLET, COATED ORAL at 07:55

## 2020-11-23 RX ADMIN — METHOCARBAMOL TABLETS 1000 MG: 500 TABLET, COATED ORAL at 13:18

## 2020-11-23 RX ADMIN — HYDROCORTISONE SODIUM SUCCINATE 250 MG: 250 INJECTION, POWDER, FOR SOLUTION INTRAMUSCULAR; INTRAVENOUS at 07:55

## 2020-11-23 RX ADMIN — ASPIRIN 81 MG: 81 TABLET, COATED ORAL at 07:55

## 2020-11-23 RX ADMIN — HYDROCODONE BITARTRATE AND ACETAMINOPHEN 1 TABLET: 5; 325 TABLET ORAL at 05:51

## 2020-11-23 RX ADMIN — HYDROCORTISONE SODIUM SUCCINATE 250 MG: 250 INJECTION, POWDER, FOR SOLUTION INTRAMUSCULAR; INTRAVENOUS at 02:34

## 2020-11-23 RX ADMIN — PANTOPRAZOLE SODIUM 40 MG: 40 TABLET, DELAYED RELEASE ORAL at 05:51

## 2020-11-23 RX ADMIN — INSULIN LISPRO 2 UNITS: 100 INJECTION, SOLUTION INTRAVENOUS; SUBCUTANEOUS at 11:34

## 2020-11-23 NOTE — OUTREACH NOTE
Prep Survey      Responses   [facility] facility patient discharged from?  [place]   Is LACE score < 7 ?  Yes   Eligibility  [facility]   Date of Admission  11/18/20   Date of Discharge  11/23/20   Discharge Disposition  Home or Self Care   Discharge diagnosis  Lumbar laminectomy and fusion   Does the patient have one of the following disease processes/diagnoses(primary or secondary)?  General Surgery   Does the patient have Home health ordered?  Yes   What is the Home health agency?   [facility]   Is there a DME ordered?  No   Prep survey completed?  Yes          Vanessa Larose RN

## 2020-11-24 ENCOUNTER — TRANSITIONAL CARE MANAGEMENT TELEPHONE ENCOUNTER (OUTPATIENT)
Dept: CALL CENTER | Facility: HOSPITAL | Age: 70
End: 2020-11-24

## 2020-11-24 NOTE — OUTREACH NOTE
Call Center TCM Note      Responses   [facility] patient discharged from?  [place]   Does the patient have one of the following disease processes/diagnoses(primary or secondary)?  General Surgery   TCM attempt successful?  Yes   Discharge diagnosis  Lumbar laminectomy and fusion   Meds reviewed with patient/caregiver?  Yes   Is the patient having any side effects they believe may be caused by any medication additions or changes?  No   Does the patient have all medications related to this admission filled (includes all antibiotics, pain medications, etc.)  Yes   Is the patient taking all medications as directed (includes completed medication regime)?  Yes   Does the patient have a follow up appointment scheduled with their surgeon?  Yes   Has the patient kept scheduled appointments due by today?  Yes   Comments  POST OP 12/03/2020   What is the Home health agency?   [facility]   Has home health visited the patient within 72 hours of discharge?  Yes   Psychosocial issues?  No   Did the patient receive a copy of their discharge instructions?  Yes   Nursing interventions  Reviewed instructions with patient   What is the patient's perception of their health status since discharge?  Improving   Nursing interventions  Nurse provided patient education   Is the patient /caregiver able to teach back basic post-op care?  Continue use of incentive spirometry at least 1 week post discharge, Drive as instructed by MD in discharge instructions, No tub bath, swimming, or hot tub until instructed by MD, Do not remove steri-strips, Practice 'cough and deep breath', Take showers only when approved by MD-sponge bathe until then, Keep incision areas clean,dry and protected, Lifting as instructed by MD in discharge instructions   Is the patient/caregiver able to teach back signs and symptoms of incisional infection?  Increased redness, swelling or pain at the incisonal site, Fever, Increased drainage or bleeding, Pus or odor from  incision   Is the patient/caregiver able to teach back steps to recovery at home?  Set small, achievable goals for return to baseline health, Eat a well-balance diet, Weigh daily, Make a list of questions for surgeon's appointment, Rest and rebuild strength, gradually increase activity, Practice good oral hygiene   Is the patient/caregiver able to teach back the hierarchy of who to call/visit for symptoms/problems? PCP, Specialist, Home health nurse, Urgent Care, ED, 911  Yes   Additional teach back comments  Pt knows he needs to quit smoking   Wrap up additional comments  Pt doing well s/p Lumbar Fusion sx. [facility] has seen pt and checked incision, good report there. Pt has post op on 12/03/2020 with ortho surgeon, and for now wishes to keep scheduled fwp with PCP in 01/2021          Maida Mills MA    11/24/2020, 11:58 EST

## 2020-11-25 ENCOUNTER — TELEPHONE (OUTPATIENT)
Dept: NEUROSURGERY | Facility: CLINIC | Age: 70
End: 2020-11-25

## 2020-11-25 NOTE — TELEPHONE ENCOUNTER
Patient called today about being constipated, he has not had a bowel movement since surgery. I asked him if he was taking any stool softeners? He said no he is not. I recommended that he take some stool softeners while he is taking the Norco, I also gave him other options that include milk of magnesia, Miralax, stool softeners such as Colace, and rectal suppositories. He will try the stool softeners first and I told him that someone is always on call for our office if he has any problems later on.

## 2020-11-30 RX ORDER — METHOCARBAMOL 500 MG/1
1000 TABLET, FILM COATED ORAL EVERY 6 HOURS SCHEDULED
Qty: 40 TABLET | Refills: 0 | Status: SHIPPED | OUTPATIENT
Start: 2020-11-30 | End: 2020-12-07

## 2020-12-03 ENCOUNTER — OFFICE VISIT (OUTPATIENT)
Dept: NEUROSURGERY | Facility: CLINIC | Age: 70
End: 2020-12-03

## 2020-12-03 VITALS
WEIGHT: 195 LBS | HEART RATE: 92 BPM | HEIGHT: 70 IN | BODY MASS INDEX: 27.92 KG/M2 | DIASTOLIC BLOOD PRESSURE: 87 MMHG | SYSTOLIC BLOOD PRESSURE: 129 MMHG | TEMPERATURE: 98.2 F

## 2020-12-03 DIAGNOSIS — Z09 SURGICAL FOLLOWUP VISIT: Primary | ICD-10-CM

## 2020-12-03 DIAGNOSIS — M54.16 LEFT LUMBAR RADICULITIS: ICD-10-CM

## 2020-12-03 PROCEDURE — 99024 POSTOP FOLLOW-UP VISIT: CPT | Performed by: NURSE PRACTITIONER

## 2020-12-03 NOTE — PROGRESS NOTES
"Subjective   History of Present Illness: Taz Dickey is a 70 y.o. male is here today for follow-up.  He is 2 weeks out form an L4-5 laminectomy by Dr. Garcia 11/18/20.     He is currently getting PT twice a week. He denies any problems with his incision. He has had no fever or chills. He takes Hydrocodone 5/325 prn and Gabapentin 600/600/900.     History of Present Illness     Mr. Dickey is now 2 weeks out from the above-stated surgery.  He had quite a bit of leg pain in the immediate postop phase.  He was started on IV steroids and muscle relaxers for the back pain.  He was eventually sent home with an oral steroid taper which she completed.  Despite all this, the patient is still having persistent pain in the left buttock and the posterior thigh and calf.  The right leg pain that he was having before surgery has resolved.  The pain in his leg is present with walking and is inhibiting his ability to stand for any prolonged period of time. He states that he has lost about 30 pounds since surgery.  He states that he has gained approximately 4 to 5 pounds back.         Review of Systems   Musculoskeletal: Positive for back pain and gait problem.   Neurological: Positive for weakness.       Objective     Vitals:    12/03/20 1435   BP: 129/87   Pulse: 92   Temp: 98.2 °F (36.8 °C)   Weight: 88.5 kg (195 lb)   Height: 177.8 cm (70\")     Body mass index is 27.98 kg/m².      Physical Exam     Lumbar incision is healing well.  No redness, swelling, or drainage.  No calf swelling or tenderness to palpation bilaterally.    Assessment/Plan     Medical Decision Making:      Mr. Dickey was seen in the office today for postoperative evaluation.  Given the new, persistent pain in the left leg, I have ordered a CT scan of the lumbar spine without contrast.  I discussed this with Dr. Garcia. He will see the patient back in the office once the CT has been completed.      Diagnoses and all orders for this visit:    1. Surgical followup " visit (Primary)  -     CT Lumbar Spine Without Contrast; Future    2. Left lumbar radiculitis  -     CT Lumbar Spine Without Contrast; Future      Return for after radiographic imaging, Dr. Garcia.

## 2020-12-07 ENCOUNTER — APPOINTMENT (OUTPATIENT)
Dept: CT IMAGING | Facility: HOSPITAL | Age: 70
End: 2020-12-07

## 2020-12-07 ENCOUNTER — HOSPITAL ENCOUNTER (OUTPATIENT)
Dept: CT IMAGING | Facility: HOSPITAL | Age: 70
Discharge: HOME | End: 2020-12-07
Admitting: NURSE PRACTITIONER

## 2020-12-07 DIAGNOSIS — Z09 SURGICAL FOLLOWUP VISIT: ICD-10-CM

## 2020-12-07 DIAGNOSIS — M54.16 LEFT LUMBAR RADICULITIS: ICD-10-CM

## 2020-12-07 PROCEDURE — 72131 CT LUMBAR SPINE W/O DYE: CPT

## 2020-12-08 NOTE — PROGRESS NOTES
Spoke with Dr. Garcia about the CT results. Dr. Garcia will evaluate the patient further/make recommendations at scheduled office visit in two days.

## 2020-12-09 NOTE — PROGRESS NOTES
"Subjective   History of Present Illness: Taz Dickey is a 70 y.o. male is here today for follow-up with a new CT Lumbar that was ordered at his last visit with Cheryl 12/3/2020 for back pain. Mr. Dickey had Bilateral L4-5 laminectomy, medial facetectomy, aggressive foraminotomies with a posterior lateral fusion and interbody fusion and instrumentation done 11/18/2020.    Today his pain has improved.    Patient is wearing a mask in our office today.    History of Present Illness    This patient returns today.  When he was here last he was having severe pain in his left leg to where he could not hardly even stand on it.  That has improved.  He still has a little pain in his right leg and some back pain as expected.    The following portions of the patient's history were reviewed and updated as appropriate: allergies, current medications, past family history, past medical history, past social history, past surgical history and problem list.    Review of Systems   Constitutional: Positive for activity change.   Respiratory: Negative for chest tightness and shortness of breath.    Cardiovascular: Negative for chest pain.   Musculoskeletal: Positive for back pain and gait problem.       I have reviewed the review of systems as documented by my MA.      Objective     Vitals:    12/10/20 1344   BP: 128/76   Pulse: 80   Temp: 98.5 °F (36.9 °C)   Weight: 92.5 kg (204 lb)   Height: 177.8 cm (70\")     Body mass index is 29.27 kg/m².      Physical Exam  Neurological:      Mental Status: He is alert and oriented to person, place, and time.       Neurologic Exam     Mental Status   Oriented to person, place, and time.           Assessment/Plan   Independent Review of Radiographic Studies:      I personally reviewed the images from the following studies.    I reviewed his CT of the lumbar spine done on 7 December.  This does show that the screw at L4 on the right side is somewhat laterally placed and there is a fracture of the " transverse process but he is supported by cages.  There is no evidence of instability.  There is nothing on the left side.    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  I recommended we wait another couple of weeks and then get him started on some formal lumbar physical therapy for leg strengthening and stretching.  I will see him again in about 6 weeks with an x-ray.    Diagnoses and all orders for this visit:    1. Spondylolisthesis of lumbar region (Primary)  -     Ambulatory Referral to Physical Therapy  -     XR Spine Lumbar 2 or 3 View; Future      Return in about 6 weeks (around 1/21/2021).

## 2020-12-10 ENCOUNTER — OFFICE VISIT (OUTPATIENT)
Dept: NEUROSURGERY | Facility: CLINIC | Age: 70
End: 2020-12-10

## 2020-12-10 VITALS
WEIGHT: 204 LBS | BODY MASS INDEX: 29.2 KG/M2 | HEIGHT: 70 IN | SYSTOLIC BLOOD PRESSURE: 128 MMHG | HEART RATE: 80 BPM | TEMPERATURE: 98.5 F | DIASTOLIC BLOOD PRESSURE: 76 MMHG

## 2020-12-10 DIAGNOSIS — M43.16 SPONDYLOLISTHESIS OF LUMBAR REGION: Primary | ICD-10-CM

## 2020-12-10 PROCEDURE — 99024 POSTOP FOLLOW-UP VISIT: CPT | Performed by: NEUROLOGICAL SURGERY

## 2020-12-29 ENCOUNTER — HOSPITAL ENCOUNTER (OUTPATIENT)
Dept: PHYSICAL THERAPY | Facility: HOSPITAL | Age: 70
Setting detail: THERAPIES SERIES
Discharge: HOME | End: 2020-12-29

## 2020-12-29 DIAGNOSIS — Z98.1 S/P LUMBAR FUSION: Primary | ICD-10-CM

## 2020-12-29 DIAGNOSIS — M53.86 DECREASED ROM OF LUMBAR SPINE: ICD-10-CM

## 2020-12-29 DIAGNOSIS — Z47.89 ORTHOPEDIC AFTERCARE: ICD-10-CM

## 2020-12-29 PROCEDURE — 97110 THERAPEUTIC EXERCISES: CPT

## 2020-12-29 PROCEDURE — 97162 PT EVAL MOD COMPLEX 30 MIN: CPT

## 2020-12-29 NOTE — THERAPY EVALUATION
Outpatient Physical Therapy Ortho Initial Evaluation  [facility]     Patient Name: Taz Dickey  : 1950  MRN: [id number]  Today's Date: 2020      Visit Date: 2020    Patient Active Problem List   Diagnosis   • Spondylolisthesis of lumbar region   • Lumbar radiculopathy   • Benign essential hypertension   • Erectile dysfunction of nonorganic origin   • Hypercholesterolemia   • Hyperglycemia   • Hypertension   • Osteoarthritis   • Tinnitus of both ears   • Encounter for screening colonoscopy   • Medicare annual wellness visit, subsequent   • Nocturia   • Type 2 diabetes mellitus without complication, without long-term current use of insulin (CMS/[facility])        Past Medical History:   Diagnosis Date   • Arthritis    • Colon polyps    • Depression    • DVT (deep venous thrombosis) (CMS/[facility])     IN LEFT LEG   • Full dentures    • Hyperlipidemia    • Hypertension    • Numbness and tingling     RIGHT FOOT   • PONV (postoperative nausea and vomiting)    • Right leg pain    • Type 2 diabetes mellitus without complication, without long-term current use of insulin (CMS/[facility]) 10/8/2019        Past Surgical History:   Procedure Laterality Date   • CERVICAL DISCECTOMY LAMINECTOMY DECOMPRESSION POSTERIOR FUSION WITH INSTRUMENTATION     • COLONOSCOPY N/A 2018    Procedure: COLONOSCOPY to TI and cecum with polypectomy;  Surgeon: Anil Garces MD;  Location: [facility] ENDOSCOPY;  Service:    • JOINT REPLACEMENT      LEFT HIP   • KNEE ARTHROSCOPY Left    • LUMBAR DISCECTOMY FUSION INSTRUMENTATION N/A 2020    Procedure: Lumbar 4 5 laminectomy with a posterior lateral fusion and instrumentation and interbody fusion;  Surgeon: Jeffrey Garcia MD;  Location: [facility] MAIN OR;  Service: Neurosurgery;  Laterality: N/A;   • VASECTOMY         Visit Dx:     ICD-10-CM ICD-9-CM   1. S/P lumbar fusion  Z98.1 V45.4   2. Orthopedic aftercare  Z47.89 V54.9   3. Decreased ROM of lumbar spine  M53.86 719.58  "        Patient History     Row Name 12/29/20 0800             History    Chief Complaint  Joint stiffness;Pain  -      Type of Pain  Back pain  -      Date Current Problem(s) Began  11/18/20 chronic 9/1/2019  -      Brief Description of Current Complaint  Pt. presents to therapy s/p L4-5 lami with fusion 11/18/2020. Pt. states he is getting better with mobility at this time, difficulty remains at end of the day trying to bring R LE into bed, sitting for long periods of time. Pt. states prior to surgery he was using SPC, he is currently using rwx for ambulation. Pt. states that pain is still present in R LE that was present prior, however, duration of pain is less. Pt. states pain is \"jerky\" and sudden but does not last. Pt. had tried epidurals prior to surgery for pain relief which were helpful temporarily but did not provide lasting relief. Pt. has had HH PT after surgery which were focused on walking and bed mobility.   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  epidural  -      Related/Recent Hospitalizations  Yes  -      Surgery/Hospitalization  11/18/2020  -         Pain     Pain Location  Back  -      Pain at Present  0  -      Pain at Worst  9  -      Pain Frequency  Intermittent  -      Pain Description  Shooting;Sharp  -      What Performance Factors Make the Current Problem(s) WORSE?  getting into/out of bed  -      What Performance Factors Make the Current Problem(s) BETTER?  standing up or laying flat  -      Tolerance Time- Standing  30 minutes  -      Tolerance Time- Sitting  30 minutes  -      Tolerance Time- Walking  1 mile  -      Is your sleep disturbed?  No  -      Difficulties at work?  not " working  -      Difficulties with ADL's?  yes  -         Fall Risk Assessment    Any falls in the past year:  No  -MH         Services    Prior Rehab/Home Health Experiences  Yes  -      When was the prior experience with Rehab/Home Health  2016  -      Where was the prior experience with Rehab/[facility] Health  [facility]  -         Daily Activities    Primary Language  English  -      How does patient learn best?  Listening;Demonstration;Reading  -      Does patient have problems with the following?  None  -      Barriers to learning  None  -      Pt Participated in POC and Goals  Yes  -         Safety    Are you being hurt, hit, or frightened by anyone at home or in your life?  No  -MH      Are you being neglected by a caregiver  No  -        User Key  (r) = Recorded By, (t) = Taken By, (c) = Cosigned By    Initials Name Provider Type    Beverly Santoro, PT Physical Therapist          PT Ortho     Row Name 12/29/20 0800       Subjective Pain    Pre-Treatment Pain Level  0  -    Post-Treatment Pain Level  0  -    Subjective Pain Comment  with movement of R LE 9/10  -       Posture/Observations    Observations  Incision healing  -    Posture/Observations Comments  forward flexed; well healed incision  -       DTR- Lower Quarter Clearing    Patellar tendon (L2-4)  Right:;0- No response;Left:;1- Minimal response  -    Achilles tendon (S1-2)  Bilateral:;2- Normal response  -       Sensory Screen for Light Touch- Lower Quarter Clearing    L1 (inguinal area)  Bilateral:;Intact  -    L2 (anterior mid thigh)  Bilateral:;Intact  -    L3 (distal anterior thigh)  Bilateral:;Intact  -    L4 (medial lower leg/foot)  Bilateral:;Intact  -    L5 (lateral lower leg/great toe)  Bilateral:;Intact  -       Myotomal Screen- Lower Quarter Clearing    Hip flexion (L2)  Left:;4+ (Good +);Right:;4 (Good)  -    Knee extension (L3)  Left:;4+ (Good +);Right:;4 (Good)  -    Ankle DF  (L4)  Bilateral:;5 (Normal)  -    Ankle PF (S1)  Bilateral:;5 (Normal)  -    Knee flexion (S2)  Left:;4 (Good);Right:;3+ (Fair +)  -       Lumbar ROM Screen- Lower Quarter Clearing    Lumbar Flexion  Impaired 50% of full  -MH    Lumbar Extension  Impaired to neutral  -    Lumbar Lateral Flexion  Impaired 25% of full   -MH    Lumbar Rotation  Impaired 50% of full  -MH       Lumbosacral Palpation    SI  Tender  -    Piriformis  Guarded/taut  -    Quadratus Lumborum  Right:;Guarded/taut;Tender  -    Erector Spinae (Paraspinals)  Right:;Tender;Guarded/taut  -MH       General ROM    GENERAL ROM COMMENTS  B hip and knee WFL  -       MMT (Manual Muscle Testing)    Rt Lower Ext  Rt Hip Extension;Rt Hip ABduction  -MH    Lt Lower Ext  Lt Hip Extension;Lt Hip ABduction  -MH       MMT Right Lower Ext    Rt Hip Extension MMT, Gross Movement  -- unable to assess  -MH    Rt Hip ABduction MMT, Gross Movement  -- unable assess  -MH       MMT Left Lower Ext    Lt Hip Extension MMT, Gross Movement  -- unable to assess  -MH    Lt Hip ABduction MMT, Gross Movement  -- unable to assess  -       Flexibility    Flexibility Tested?  Lower Extremity  -       Lower Extremity Flexibility    Hamstrings  Left:;Moderately limited;Right: R difficult to assess secondary to acuity of pain  -       Balance Skills Training    SLS  L LE <3 sec; R LE unable  -       Gait/Stairs (Locomotion)    Assistive Device (Gait)  walker, front-wheeled  -    Pattern (Gait)  step-through  -    Deviations/Abnormal Patterns (Gait)  antalgic;stride length decreased;gait speed decreased  -    Comment (Gait/Stairs)  decreased foot clearnace bilaterally, decreased toe off, forward flexed posture,. shortened stride length on L  -MH      User Key  (r) = Recorded By, (t) = Taken By, (c) = Cosigned By    Initials Name Provider Type    Beverly Santoro PT Physical Therapist                      Therapy Education  Education Details: Educated on  PT role and POC; discussed anatomy of spine/relation to symptoms. Expected outcomes/timeframe for healing. Access Code: [email or web address]  Given: HEP, Symptoms/condition management, Pain management, Posture/body mechanics, Mobility training  Program: New  How Provided: Verbal, Demonstration, Written  Provided to: Patient  Level of Understanding: Teach back education performed, Verbalized, Demonstrated     PT OP Goals     Row Name 12/29/20 0900          PT Short Term Goals    STG Date to Achieve  01/12/21  -     STG 1  Pt. Will be independent with initial HEP to improve self-management of condition.  -     STG 1 Progress  New  [facility]     STG 2  Pt. Will demonstrate proper log roll technique without cueing to reduce lumbar strain and preserve spine.  -     STG 2 Progress  New  [facility]     STG 3  Pt. will ambulate with swing-through gait pattern and upright posture with rwx to improve mechanics to safely transition to ambulation with SPC.  -     STG 3 Progress  [facility]        Long Term Goals    LTG 1  Pt. Will be independent with advanced HEP to improve long-term management of condition and independence.  -     LTG 1 Progress  New  [facility]     LTG 2  Pt. Will score </= 40% on Modified Oswestry (from 74% on initial evaluation) to indicate improved perception of disability.  -     LTG 2 Progress  New  [facility]     LTG 3  Pt. will demonstrate near normal gait pattern with equal stride length and heel strike with SPC to improve mobility and ability to participate in community activities.  -     LTG 3 Progress  New  [facility]     LTG 4  Pt. will report intensity of pain </= 4/10 pain down into R LE to improve QOL and ease with transitional movements.  -     LTG 5  Pt. will increase R LE strength to >/= 4+/5 to improve ease with transtional movements and stability.  -[facility] 5 Progress  New  [facility]        Time Calculation    PT Goal Re-Cert Due Date  03/29/21  -       User Key  (r) = Recorded By, (t) = Taken By, (c) = Cosigned By     "Initials Name Provider Type     Beverly Garcia, PT Physical Therapist          PT Assessment/Plan     Row Name 12/29/20 0925          PT Assessment    Functional Limitations  Impaired gait;Impaired locomotion;Performance in work activities;Performance in leisure activities  -     Impairments  Balance;Endurance;Gait;Impaired flexibility;Impaired muscle power;Impaired reflex integrity;Joint mobility;Locomotion;Muscle strength;Pain;Poor body mechanics;Posture;Range of motion  -     Assessment Comments  Taz Dickey is a 70 y.o. year-old male referred to physical therapy s/p L4-5 laminectomy with fusion 11/18/2020. Pt. Reports pain began in September 2019 with unknown MICHAEL, pt. States he tried epidurals and was unsuccessful with long term pain relief and therefore decided to undergo surgery. Pt. Reports pain was primarily into R LE prior to surgery which has persisted since, however, duration of symptoms has decreased. Pt. States pain is \"jerky\" and shooting in nature with intensity of 9/10 when it occurs. Pain typically occurs with transitional movements and moving R LE.  He presents with a evolving clinical presentation. He has comorbidities and personal factors of chronicity of symptoms, previous failed conservative treatment, dural tear following surgery, and reports of losing 35 lbs. While in hospital and upon D/C that may affect his progress in the plan of care. Self scored disability measure of Modified Oswestry was a 74% (where 100% is full disability). He demonstrated decreased lumbar ROM, poor gait mechanics, difficulty with transitional movements, absent patellar reflex on R, intensity of pain with movement of R LE impacting objective assessment. Pt. Denies any red flags, incision well healing. Signs and symptoms are consistent with referring diagnosis. He is appropriate for skilled therapy services at this time to address deficits and improve ease with ADLs and improve mobility as pt. Would like to " be able to return to working within his [employer] business.  -     Please refer to paper survey for additional self-reported information  Yes  -MH     Rehab Potential  Fair  -     Patient/caregiver participated in establishment of treatment plan and goals  Yes  -     Patient would benefit from skilled therapy intervention  Yes  -        PT Plan    PT Frequency  2x/week  -     Predicted Duration of Therapy Intervention (PT)  6 weeks (12 visits)  -     Planned CPT's?  PT EVAL MOD COMPLELITY: 95762;PT RE-EVAL: 44334;PT THER PROC EA 15 MIN: 35088;PT THER ACT EA 15 MIN: 89549;PT MANUAL THERAPY EA 15 MIN: 89327;PT NEUROMUSC RE-EDUCATION EA 15 MIN: 68403;PT GAIT TRAINING EA 15 MIN: 64850;PT HOT OR COLD PACK TREAT MCARE;PT ELECTRICAL STIM UNATTEND: ;PT ULTRASOUND EA 15 MIN: 59105;PT THER PROC GROUP: 73860;PT THER MASS EA 15 MIN: 65189;PT THER SUPP EA 15 MIN  -     PT Plan Comments  Assess response to initial HEP; progress as able (consider warm up on NuStep, mini bridge, hip add, S/L arc small range, H/L abd) Manual PRN; E-Stim for pain control if needed?  -       User Key  (r) = Recorded By, (t) = Taken By, (c) = Cosigned By    Initials Name Provider Type     Beverly Garcia, PT Physical Therapist            OP Exercises     Row Name 12/29/20 0800             Subjective Pain    Pre-Treatment Pain Level  0  -MH      Post-Treatment Pain Level  0  -      Subjective Pain Comment  with movement of R LE 9/10  -         Total Minutes    13108 - PT Therapeutic Exercise Minutes  10  -MH         Exercise 1    Exercise Name 1  LTR  -MH      Cueing 1  Verbal  -MH      Reps 1  10e  -MH         Exercise 2    Exercise Name 2  PPT  -MH      Cueing 2  Verbal  -      Reps 2  10  -MH      Time 2  5  -MH         Exercise 3    Exercise Name 3  seated sciatic nerve floss   -MH      Cueing 3  Verbal  -MH      Reps 3  10  -MH        User Key  (r) = Recorded By, (t) = Taken By, (c) = Cosigned By    Initials Name  Provider Type     Beverly Garcia, PT Physical Therapist                        Outcome Measure Options: Modifed Owestry  Modified Oswestry  Modified Oswestry Score/Comments: 74% (37/50)      Time Calculation:     Start Time: 0831  Stop Time: 0914  Time Calculation (min): 43 min  Total Timed Code Minutes- PT: 10 minute(s)     Therapy Charges for Today     Code Description Service Date Service Provider Modifiers Qty    78839541952 HC PT THER PROC EA 15 MIN 12/29/2020 Beverly Garcia, PT GP 1    [id number]  PT EVAL MOD COMPLEXITY 2 12/29/2020 Beverly Garcia, PT GP 1          PT G-Codes  Outcome Measure Options: Modifed Owestry  Modified Oswestry Score/Comments: 74% (37/50)         Beverly Garcia PT  12/29/2020

## 2021-01-06 ENCOUNTER — HOSPITAL ENCOUNTER (OUTPATIENT)
Dept: PHYSICAL THERAPY | Facility: HOSPITAL | Age: 71
Setting detail: THERAPIES SERIES
Discharge: HOME | End: 2021-01-06

## 2021-01-06 DIAGNOSIS — Z98.1 S/P LUMBAR FUSION: Primary | ICD-10-CM

## 2021-01-06 DIAGNOSIS — M53.86 DECREASED ROM OF LUMBAR SPINE: ICD-10-CM

## 2021-01-06 DIAGNOSIS — Z47.89 ORTHOPEDIC AFTERCARE: ICD-10-CM

## 2021-01-06 PROCEDURE — 97140 MANUAL THERAPY 1/> REGIONS: CPT | Performed by: PHYSICAL THERAPIST

## 2021-01-06 PROCEDURE — 97110 THERAPEUTIC EXERCISES: CPT | Performed by: PHYSICAL THERAPIST

## 2021-01-06 NOTE — THERAPY TREATMENT NOTE
Outpatient Physical Therapy Ortho Treatment Note  [facility]     Patient Name: Taz Dickey  : 1950  MRN: [id number]  Today's Date: 2021      Visit Date: 2021    Visit Dx:    ICD-10-CM ICD-9-CM   1. S/P lumbar fusion  Z98.1 V45.4   2. Orthopedic aftercare  Z47.89 V54.9   3. Decreased ROM of lumbar spine  M53.86 719.58       Patient Active Problem List   Diagnosis   • Spondylolisthesis of lumbar region   • Lumbar radiculopathy   • Benign essential hypertension   • Erectile dysfunction of nonorganic origin   • Hypercholesterolemia   • Hyperglycemia   • Hypertension   • Osteoarthritis   • Tinnitus of both ears   • Encounter for screening colonoscopy   • Medicare annual wellness visit, subsequent   • Nocturia   • Type 2 diabetes mellitus without complication, without long-term current use of insulin (CMS/[facility])        Past Medical History:   Diagnosis Date   • Arthritis    • Colon polyps    • Depression    • DVT (deep venous thrombosis) (CMS/[facility])     IN LEFT LEG   • Full dentures    • Hyperlipidemia    • Hypertension    • Numbness and tingling     RIGHT FOOT   • PONV (postoperative nausea and vomiting)    • Right leg pain    • Type 2 diabetes mellitus without complication, without long-term current use of insulin (CMS/[facility]) 10/8/2019        Past Surgical History:   Procedure Laterality Date   • CERVICAL DISCECTOMY LAMINECTOMY DECOMPRESSION POSTERIOR FUSION WITH INSTRUMENTATION     • COLONOSCOPY N/A 2018    Procedure: COLONOSCOPY to TI and cecum with polypectomy;  Surgeon: Anil Garces MD;  Location: [facility] ENDOSCOPY;  Service:    • JOINT REPLACEMENT      LEFT HIP   • KNEE ARTHROSCOPY Left    • LUMBAR DISCECTOMY FUSION INSTRUMENTATION N/A 2020    Procedure: Lumbar 4 5 laminectomy with a posterior lateral fusion and instrumentation and interbody fusion;  Surgeon: Jeffrey Garcia MD;  Location: [facility] MAIN OR;  Service: Neurosurgery;  Laterality: N/A;   • VASECTOMY         PT Ortho      Row Name 01/06/21 0915       Subjective Comments    Subjective Comments  Reports no LBP, but c/o R LE pain to knee that occurs with walking & causes difficulty getting into the car.  -JS       Subjective Pain    Able to rate subjective pain?  yes  -JS    Pre-Treatment Pain Level  9  -JS    Subjective Pain Comment  9/10 with walking, 0/10 at rest  -JS       Posture/Observations    Posture/Observations Comments  Ambulates with rolling walker, back brace when up  -JS       Transfers    Comment (Transfers)  Sit->Supine with min A, increased R LE pain. Requires cueing for log rolling   -JS       Gait/Stairs (Locomotion)    Comment (Gait/Stairs)  Amb with increased trunk lean & increased UE support during R stance phase, shortened step length on L.   -JS      User Key  (r) = Recorded By, (t) = Taken By, (c) = Cosigned By    Initials Name Provider Type    Mary Michele, PT Physical Therapist                      PT Assessment/Plan     Row Name 01/06/21 0915          PT Assessment    Assessment Comments  Pt presents with continued use of rolling walker for ambulation with R LE pain limiting mobility.  Gaurding with functional movements and transfers with difficulty lifting R LE onto Nustep and difficulty performing log rolling from sit to supine. Much improvement in mechanics with supine to sit transfer after cues. Pt able to add further core strengthening & LE strengthening without increased symptoms.  -JS        PT Plan    PT Plan Comments  Continue core/LE strengthening, ROM, and functional transfer training.  -JS       User Key  (r) = Recorded By, (t) = Taken By, (c) = Cosigned By    Initials Name Provider Type    Mary Michele, PT Physical Therapist            OP Exercises     Row Name 01/06/21 0915             Subjective Comments    Subjective Comments  Reports no LBP, but c/o R LE pain to knee that occurs with walking & causes difficulty getting into the car.  -JS         Subjective Pain    Able to rate  subjective pain?  yes  -JS      Pre-Treatment Pain Level  9  -JS      Subjective Pain Comment  9/10 with walking, 0/10 at rest  -JS         Total Minutes    01403 - PT Therapeutic Exercise Minutes  30  -JS      89580 - PT Manual Therapy Minutes  15  -JS         Exercise 1    Exercise Name 1  LTR  -JS      Cueing 1  Verbal  -JS      Reps 1  10  -JS         Exercise 2    Exercise Name 2  PPT  -JS      Cueing 2  Verbal  -JS      Reps 2  10  -JS      Time 2  5  -JS         Exercise 3    Exercise Name 3  seated sciatic nerve floss   -JS      Cueing 3  Verbal  -JS      Reps 3  10  -JS         Exercise 4    Exercise Name 4  NuStep L1  -JS      Time 4  5  -JS      Additional Comments  Requires assistance placing R foot  -JS         Exercise 5    Exercise Name 5  Seated LAQ  -JS      Reps 5  10  -JS         Exercise 6    Exercise Name 6  Hooklying hip abd  -JS      Cueing 6  Verbal;Tactile;Demo  -JS      Reps 6  10  -JS      Additional Comments  RTB  -JS         Exercise 7    Exercise Name 7  Hooklying hip add  -JS      Cueing 7  Verbal;Tactile;Demo  -JS      Reps 7  10  -JS      Additional Comments  Small ball  -JS        User Key  (r) = Recorded By, (t) = Taken By, (c) = Cosigned By    Initials Name Provider Type    JS Mary Sanabria, PT Physical Therapist                      Manual Rx (last 36 hours)      Manual Treatments     Row Name 01/06/21 0915             Total Minutes    01823 - PT Manual Therapy Minutes  15  -JS         Manual Rx 1    Manual Rx 1 Location  STM R lumbar spine, piriformis, ITB  -JS      Manual Rx 1 Duration  15 min  -JS        User Key  (r) = Recorded By, (t) = Taken By, (c) = Cosigned By    Initials Name Provider Type    Mary Micheel, PT Physical Therapist          PT OP Goals     Row Name 01/06/21 0915          PT Short Term Goals    STG Date to Achieve  01/12/21  -JS     STG 1  Pt. Will be independent with initial HEP to improve self-management of condition.  -JS     STG 1 Progress  Ongoing  -      STG 2  Pt. Will demonstrate proper log roll technique without cueing to reduce lumbar strain and preserve spine.  -JS     STG 2 Progress  Ongoing  -JS     STG 3  Pt. will ambulate with swing-through gait pattern and upright posture with rwx to improve mechanics to safely transition to ambulation with SPC.  -JS     STG 3 Progress  Ongoing  -JS        Long Term Goals    LTG 1  Pt. Will be independent with advanced HEP to improve long-term management of condition and independence.  -JS     LTG 1 Progress  Ongoing  -JS     LTG 2  Pt. Will score </= 40% on Modified Oswestry (from 74% on initial evaluation) to indicate improved perception of disability.  -JS     LTG 2 Progress  Ongoing  -JS     LTG 3  Pt. will demonstrate near normal gait pattern with equal stride length and heel strike with SPC to improve mobility and ability to participate in community activities.  -JS     LTG 3 Progress  Ongoing  -JS     LTG 4  Pt. will report intensity of pain </= 4/10 pain down into R LE to improve QOL and ease with transitional movements.  -JS     LTG 4 Progress  Ongoing  -JS     LTG 5  Pt. will increase R LE strength to >/= 4+/5 to improve ease with transtional movements and stability.  -JS     LTG 5 Progress  Ongoing  -JS       User Key  (r) = Recorded By, (t) = Taken By, (c) = Cosigned By    Initials Name Provider Type    Mary Michele PT Physical Therapist          Therapy Education  Education Details: Review HEP adding new exercises with written instruction. YTB issued (red not available)  Given: HEP  Program: Reinforced  How Provided: Verbal, Demonstration, Written  Provided to: Patient  Level of Understanding: Teach back education performed, Verbalized, Demonstrated              Time Calculation:   Start Time: 0915  Stop Time: 1000  Time Calculation (min): 45 min  Therapy Charges for Today     Code Description Service Date Service Provider Modifiers Qty    83228725004 HC PT THER PROC EA 15 MIN 1/6/2021 Mary Sanabria, TOLU GP  2    [id number]  PT MANUAL THERAPY EA 15 MIN 1/6/2021 Mary Sanabria, PT GP 1                    Mary Sanabria, PT  1/6/2021

## 2021-01-08 ENCOUNTER — HOSPITAL ENCOUNTER (OUTPATIENT)
Dept: PHYSICAL THERAPY | Facility: HOSPITAL | Age: 71
Setting detail: THERAPIES SERIES
Discharge: HOME | End: 2021-01-08

## 2021-01-08 DIAGNOSIS — Z98.1 S/P LUMBAR FUSION: Primary | ICD-10-CM

## 2021-01-08 DIAGNOSIS — Z47.89 ORTHOPEDIC AFTERCARE: ICD-10-CM

## 2021-01-08 DIAGNOSIS — M53.86 DECREASED ROM OF LUMBAR SPINE: ICD-10-CM

## 2021-01-08 PROCEDURE — 97110 THERAPEUTIC EXERCISES: CPT | Performed by: PHYSICAL THERAPIST

## 2021-01-08 PROCEDURE — 97140 MANUAL THERAPY 1/> REGIONS: CPT | Performed by: PHYSICAL THERAPIST

## 2021-01-08 NOTE — THERAPY TREATMENT NOTE
Outpatient Physical Therapy Ortho Treatment Note  [facility]     Patient Name: Taz Dickey  : 1950  MRN: [id number]  Today's Date: 2021      Visit Date: 2021    Visit Dx:    ICD-10-CM ICD-9-CM   1. S/P lumbar fusion  Z98.1 V45.4   2. Orthopedic aftercare  Z47.89 V54.9   3. Decreased ROM of lumbar spine  M53.86 719.58       Patient Active Problem List   Diagnosis   • Spondylolisthesis of lumbar region   • Lumbar radiculopathy   • Benign essential hypertension   • Erectile dysfunction of nonorganic origin   • Hypercholesterolemia   • Hyperglycemia   • Hypertension   • Osteoarthritis   • Tinnitus of both ears   • Encounter for screening colonoscopy   • Medicare annual wellness visit, subsequent   • Nocturia   • Type 2 diabetes mellitus without complication, without long-term current use of insulin (CMS/[facility])        Past Medical History:   Diagnosis Date   • Arthritis    • Colon polyps    • Depression    • DVT (deep venous thrombosis) (CMS/[facility])     IN LEFT LEG   • Full dentures    • Hyperlipidemia    • Hypertension    • Numbness and tingling     RIGHT FOOT   • PONV (postoperative nausea and vomiting)    • Right leg pain    • Type 2 diabetes mellitus without complication, without long-term current use of insulin (CMS/[facility]) 10/8/2019        Past Surgical History:   Procedure Laterality Date   • CERVICAL DISCECTOMY LAMINECTOMY DECOMPRESSION POSTERIOR FUSION WITH INSTRUMENTATION     • COLONOSCOPY N/A 2018    Procedure: COLONOSCOPY to TI and cecum with polypectomy;  Surgeon: Anil Garces MD;  Location: [facility] ENDOSCOPY;  Service:    • JOINT REPLACEMENT      LEFT HIP   • KNEE ARTHROSCOPY Left    • LUMBAR DISCECTOMY FUSION INSTRUMENTATION N/A 2020    Procedure: Lumbar 4 5 laminectomy with a posterior lateral fusion and instrumentation and interbody fusion;  Surgeon: Jeffrey Garcia MD;  Location: [facility] MAIN OR;  Service: Neurosurgery;  Laterality: N/A;   • VASECTOMY         PT Ortho   "   Row Name 01/08/21 0830       Subjective Comments    Subjective Comments  \"I felt good after last visit, but you wore me out\". R LE pain to knee today.  -JS       Subjective Pain    Able to rate subjective pain?  yes  -JS    Pre-Treatment Pain Level  3  -JS      User Key  (r) = Recorded By, (t) = Taken By, (c) = Cosigned By    Initials Name Provider Type    Mary Michele PT Physical Therapist                      PT Assessment/Plan     Row Name 01/08/21 0830          PT Assessment    Assessment Comments  Pt presents with continued R hip & LE pain though improvement in subjective report of pain from last visit. Demonstrates improvement in ability to perform sit<->supine transfer today though continues to requires minimal assistance & cueing for sit -> supine portion of transfer.  Ambulates with decreased core stabilization and R weight shifting with increased trunk flexion & UE support required during R stance phase of gait. Much improvement following pre-gait march in place & gait training with cues for core stabilization & upright posture.  -JS        PT Plan    PT Plan Comments  Continue further gait training, core stabilization, strengthening & ROM. Continue further transfer training with supine<-> sit transfer.Consider sit to stand & sidestepping next visit.  -JS       User Key  (r) = Recorded By, (t) = Taken By, (c) = Cosigned By    Initials Name Provider Type    Mary Michele PT Physical Therapist            OP Exercises     Row Name 01/08/21 0830             Subjective Comments    Subjective Comments  \"I felt good after last visit, but you wore me out\". R LE pain to knee today.  -JS         Subjective Pain    Able to rate subjective pain?  yes  -JS      Pre-Treatment Pain Level  3  -JS         Total Minutes    67899 - Gait Training Minutes   5  -JS      08950 - PT Therapeutic Exercise Minutes  30  -JS      73619 - PT Manual Therapy Minutes  10  -JS         Exercise 1    Exercise Name 1  LTR  -JS      " Cueing 1  Verbal;Tactile  -JS      Reps 1  10  -JS      Additional Comments  Restricted mobility & R hip tightness/pain rotating to L  -JS         Exercise 2    Exercise Name 2  PPT  -JS      Cueing 2  Verbal  -JS      Reps 2  10  -JS      Time 2  5  -JS         Exercise 3    Exercise Name 3  seated sciatic nerve floss   -JS      Cueing 3  Verbal  -JS      Reps 3  10  -JS         Exercise 4    Exercise Name 4  NuStep L1  -JS      Time 4  5  -JS      Additional Comments  Minimal assistance placing R foot on pedal  -JS         Exercise 5    Exercise Name 5  Seated LAQ  -JS      Cueing 5  Verbal  -JS      Reps 5  10  -JS         Exercise 6    Exercise Name 6  Hooklying hip abd  -JS      Cueing 6  Verbal;Tactile;Demo  -JS      Reps 6  10  -JS      Additional Comments  RTB  -JS         Exercise 7    Exercise Name 7  Hooklying hip add  -JS      Cueing 7  Verbal;Tactile;Demo  -JS      Reps 7  10  -JS      Additional Comments  Small ball  -JS         Exercise 8    Exercise Name 8  TrA isometric in hooklying  -JS      Cueing 8  Verbal;Tactile;Demo  -JS      Reps 8  10  -JS      Time 8  5 sec hold  -JS         Exercise 9    Exercise Name 9  March in place with TrA- Pregait ex  -JS      Cueing 9  Verbal;Demo cues for TrA, upright posture  -JS      Reps 9  10  -JS      Additional Comments  slow speed with focus on posture  -JS         Exercise 10    Exercise Name 10  Gait training with rollilng walker  -JS      Cueing 10  Verbal;Demo Cues for TrA, upright posture jael during R stance phase  -JS      Time 10  5 min  -JS        User Key  (r) = Recorded By, (t) = Taken By, (c) = Cosigned By    Initials Name Provider Type    JS Mary Sanabria, PT Physical Therapist                      Manual Rx (last 36 hours)      Manual Treatments     Row Name 01/08/21 0830             Total Minutes    08599 - PT Manual Therapy Minutes  10  -JS         Manual Rx 1    Manual Rx 1 Location  STM R lumbar spine, piriformis, ITB in L sidelying with  pillow between knees  -JS      Manual Rx 1 Duration  10   -JS        User Key  (r) = Recorded By, (t) = Taken By, (c) = Cosigned By    Initials Name Provider Type    Mary Michele, PT Physical Therapist          PT OP Goals     Row Name 01/08/21 0830          PT Short Term Goals    STG Date to Achieve  01/12/21  -JS     STG 1  Pt. Will be independent with initial HEP to improve self-management of condition.  -JS     STG 1 Progress  Ongoing;Progressing  -JS     STG 1 Progress Comments  Continued need for intermittent cueing  -JS     STG 2  Pt. Will demonstrate proper log roll technique without cueing to reduce lumbar strain and preserve spine.  -JS     STG 2 Progress  Ongoing;Progressing  -JS     STG 2 Progress Comments  Improved ability to perform log roll, though continued cue & assistance for sit to supine  -JS     STG 3  Pt. will ambulate with swing-through gait pattern and upright posture with rwx to improve mechanics to safely transition to ambulation with SPC.  -JS     STG 3 Progress  Ongoing  -JS        Long Term Goals    LTG 1  Pt. Will be independent with advanced HEP to improve long-term management of condition and independence.  -JS     LTG 1 Progress  Ongoing  -JS     LTG 2  Pt. Will score </= 40% on Modified Oswestry (from 74% on initial evaluation) to indicate improved perception of disability.  -JS     LTG 2 Progress  Ongoing  -JS     LTG 3  Pt. will demonstrate near normal gait pattern with equal stride length and heel strike with SPC to improve mobility and ability to participate in community activities.  -JS     LTG 3 Progress  Ongoing  -JS     LTG 4  Pt. will report intensity of pain </= 4/10 pain down into R LE to improve QOL and ease with transitional movements.  -JS     LTG 4 Progress  Ongoing  -JS     LTG 5  Pt. will increase R LE strength to >/= 4+/5 to improve ease with transtional movements and stability.  -JS     LTG 5 Progress  Ongoing  -JS       User Key  (r) = Recorded By, (t) = Taken  By, (c) = Cosigned By    Initials Name Provider Type    Mary Michele, PT Physical Therapist          Therapy Education  Education Details: Reviewed HEP, progressing core strengthening/stabilization & education on gait training with focus on core stab & posture  Given: HEP, Mobility training  Program: Reinforced  How Provided: Verbal, Demonstration  Provided to: Patient  Level of Understanding: Teach back education performed, Verbalized, Demonstrated              Time Calculation:   Start Time: 0830  Stop Time: 0915  Time Calculation (min): 45 min  Therapy Charges for Today     Code Description Service Date Service Provider Modifiers Qty    48203061194  PT THER PROC EA 15 MIN 1/8/2021 Mary Sanabria, PT GP 2    [id number] HC PT MANUAL THERAPY EA 15 MIN 1/8/2021 Mary Sanabria, PT GP 1                    Mary Sanabria PT  1/8/2021

## 2021-01-11 ENCOUNTER — HOSPITAL ENCOUNTER (OUTPATIENT)
Dept: PHYSICAL THERAPY | Facility: HOSPITAL | Age: 71
Setting detail: THERAPIES SERIES
Discharge: HOME | End: 2021-01-11

## 2021-01-11 DIAGNOSIS — Z98.1 S/P LUMBAR FUSION: Primary | ICD-10-CM

## 2021-01-11 DIAGNOSIS — M53.86 DECREASED ROM OF LUMBAR SPINE: ICD-10-CM

## 2021-01-11 DIAGNOSIS — Z47.89 ORTHOPEDIC AFTERCARE: ICD-10-CM

## 2021-01-11 PROCEDURE — 97110 THERAPEUTIC EXERCISES: CPT

## 2021-01-11 PROCEDURE — 97530 THERAPEUTIC ACTIVITIES: CPT

## 2021-01-11 NOTE — THERAPY TREATMENT NOTE
Outpatient Physical Therapy Ortho Treatment Note  [facility]     Patient Name: Taz Dickey  : 1950  MRN: [id number]  Today's Date: 2021      Visit Date: 2021    Visit Dx:    ICD-10-CM ICD-9-CM   1. S/P lumbar fusion  Z98.1 V45.4   2. Orthopedic aftercare  Z47.89 V54.9   3. Decreased ROM of lumbar spine  M53.86 719.58       Patient Active Problem List   Diagnosis   • Spondylolisthesis of lumbar region   • Lumbar radiculopathy   • Benign essential hypertension   • Erectile dysfunction of nonorganic origin   • Hypercholesterolemia   • Hyperglycemia   • Hypertension   • Osteoarthritis   • Tinnitus of both ears   • Encounter for screening colonoscopy   • Medicare annual wellness visit, subsequent   • Nocturia   • Type 2 diabetes mellitus without complication, without long-term current use of insulin (CMS/[facility])        Past Medical History:   Diagnosis Date   • Arthritis    • Colon polyps    • Depression    • DVT (deep venous thrombosis) (CMS/[facility])     IN LEFT LEG   • Full dentures    • Hyperlipidemia    • Hypertension    • Numbness and tingling     RIGHT FOOT   • PONV (postoperative nausea and vomiting)    • Right leg pain    • Type 2 diabetes mellitus without complication, without long-term current use of insulin (CMS/[facility]) 10/8/2019        Past Surgical History:   Procedure Laterality Date   • CERVICAL DISCECTOMY LAMINECTOMY DECOMPRESSION POSTERIOR FUSION WITH INSTRUMENTATION     • COLONOSCOPY N/A 2018    Procedure: COLONOSCOPY to TI and cecum with polypectomy;  Surgeon: Anil Garces MD;  Location: [facility] ENDOSCOPY;  Service:    • JOINT REPLACEMENT      LEFT HIP   • KNEE ARTHROSCOPY Left    • LUMBAR DISCECTOMY FUSION INSTRUMENTATION N/A 2020    Procedure: Lumbar 4 5 laminectomy with a posterior lateral fusion and instrumentation and interbody fusion;  Surgeon: Jeffrey Garcia MD;  Location: [facility] MAIN OR;  Service: Neurosurgery;  Laterality: N/A;   • VASECTOMY    "                      PT Assessment/Plan     Row Name 01/11/21 0900          PT Assessment    Assessment Comments  Pt continues to report cimilar pain as last session with 9/10 pain with \"uncontrolled\" movements however log roll technique improved pain if performing to the left. Added weight shifting  with sit to stand to encourage improved R LE weight bearing. Also added side steps, pt with difficulty going L d/t dec strength RLE in WBing. He requires seated rest at end of standing activities d/t fatigue, denies significantly increased pain. He remains a good candidate for skilled PT to address limitations in strength, functional mobillity, and pain.  -RS        PT Plan    PT Plan Comments  Cont standing strengthening as able  -RS       User Key  (r) = Recorded By, (t) = Taken By, (c) = Cosigned By    Initials Name Provider Type    RS Sienna Oleary, PT Physical Therapist            OP Exercises     Row Name 01/11/21 0800             Subjective Comments    Subjective Comments  Felt ok after last time, gets stiff when sits too long.  -RS         Subjective Pain    Able to rate subjective pain?  yes  -RS      Pre-Treatment Pain Level  3  -RS         Total Minutes    70190 - PT Therapeutic Exercise Minutes  32  -RS      75815 - PT Therapeutic Activity Minutes  8  -RS         Exercise 1    Exercise Name 1  LTR  -RS      Cueing 1  Verbal;Tactile  -RS      Reps 1  10  -RS      Additional Comments  Restricted mobility & R hip tightness/pain rotating to L  -RS         Exercise 2    Exercise Name 2  PPT  -RS      Cueing 2  Verbal  -RS      Reps 2  10  -RS      Time 2  5  -RS         Exercise 3    Exercise Name 3  seated sciatic nerve floss   -RS      Cueing 3  Verbal  -RS      Reps 3  10  -RS      Additional Comments  AP  -RS         Exercise 4    Exercise Name 4  NuStep L1  -RS      Reps 4  UE and LE  -RS      Time 4  5  -RS      Additional Comments  assist with RLE  -RS         Exercise 5    Exercise Name 5  Seated LAQ "  -RS      Cueing 5  Verbal  -RS      Reps 5  15  -RS         Exercise 6    Exercise Name 6  Hooklying hip abd  -RS      Cueing 6  Verbal;Tactile;Demo  -RS      Sets 6  2  -RS      Reps 6  10  -RS      Additional Comments  RTB  -RS         Exercise 7    Exercise Name 7  Hooklying hip add  -RS      Cueing 7  Verbal;Tactile;Demo  -RS      Reps 7  10  -RS      Additional Comments  small ball  -RS         Exercise 8    Exercise Name 8  TrA isometric in hooklying  -RS      Cueing 8  Verbal;Tactile;Demo  -RS      Reps 8  10  -RS      Time 8  5 sec hold  -RS         Exercise 9    Exercise Name 9  STS from mat with foam under hips  -RS      Cueing 9  Verbal;Demo  -RS      Sets 9  2  -RS      Reps 9  4  -RS      Time 9  green band around hips to encourage wt shift R  -RS         Exercise 10    Exercise Name 10  Gait training with rollilng walker  -RS      Cueing 10  Verbal;Demo Cues for TrA, upright posture jael during R stance phase  -RS      Time 10  3 min  -RS      Additional Comments  cues for upright posture  -RS         Exercise 11    Exercise Name 11  side steps at mirror  -RS      Cueing 11  Verbal;Demo  -RS      Reps 11  3x3 steps each way  -RS      Additional Comments  cues for toes forward  -RS         Exercise 12    Exercise Name 12  wt shifting GTB at hips to encourage RLE WB  -RS      Reps 12  5  -RS      Additional Comments  in standing  -RS        User Key  (r) = Recorded By, (t) = Taken By, (c) = Cosigned By    Initials Name Provider Type    RS Sienna Oleary PT Physical Therapist                      Manual Rx (last 36 hours)      Manual Treatments     Row Name 01/11/21 0700             Manual Rx 1    Manual Rx 1 Location  --  -RS      Manual Rx 1 Duration  --  -RS        User Key  (r) = Recorded By, (t) = Taken By, (c) = Cosigned By    Initials Name Provider Type    RS Sienna Oleary PT Physical Therapist          PT OP Goals     Row Name 01/11/21 0800          PT Short Term Goals    STG Date to  Achieve  01/12/21  -RS     STG 1  Pt. Will be independent with initial HEP to improve self-management of condition.  -RS     STG 1 Progress  Ongoing;Progressing  -RS     STG 2  Pt. Will demonstrate proper log roll technique without cueing to reduce lumbar strain and preserve spine.  -RS     STG 2 Progress  Ongoing;Progressing  -RS     STG 3  Pt. will ambulate with swing-through gait pattern and upright posture with rwx to improve mechanics to safely transition to ambulation with SPC.  -RS     STG 3 Progress  Ongoing  -RS     STG 3 Progress Comments  limited d/t R LE limitations in strength and mobility  -RS        Long Term Goals    LTG 1  Pt. Will be independent with advanced HEP to improve long-term management of condition and independence.  -RS     LTG 1 Progress  Ongoing  -RS     LTG 2  Pt. Will score </= 40% on Modified Oswestry (from 74% on initial evaluation) to indicate improved perception of disability.  -RS     LTG 2 Progress  Ongoing  -RS     LTG 3  Pt. will demonstrate near normal gait pattern with equal stride length and heel strike with SPC to improve mobility and ability to participate in community activities.  -RS     LTG 3 Progress  Ongoing  -RS     LTG 4  Pt. will report intensity of pain </= 4/10 pain down into R LE to improve QOL and ease with transitional movements.  -RS     LTG 4 Progress  Ongoing  -RS     LTG 5  Pt. will increase R LE strength to >/= 4+/5 to improve ease with transtional movements and stability.  -RS     LTG 5 Progress  Ongoing  -RS       User Key  (r) = Recorded By, (t) = Taken By, (c) = Cosigned By    Initials Name Provider Type    RS Sienna Oleary PT Physical Therapist                         Time Calculation:   Start Time: 0830  Stop Time: 0915  Time Calculation (min): 45 min  Therapy Charges for Today     Code Description Service Date Service Provider Modifiers Qty    [id number]  PT THER PROC EA 15 MIN 1/11/2021 Sienna Oleary, PT GP 2    [id number]  PT  THERAPEUTIC ACT EA 15 MIN 1/11/2021 Sienna Oleary, PT GP 1                    Sienna Oleary, PT  1/11/2021

## 2021-01-14 ENCOUNTER — HOSPITAL ENCOUNTER (OUTPATIENT)
Dept: PHYSICAL THERAPY | Facility: HOSPITAL | Age: 71
Setting detail: THERAPIES SERIES
Discharge: HOME | End: 2021-01-14

## 2021-01-14 DIAGNOSIS — Z98.1 S/P LUMBAR FUSION: Primary | ICD-10-CM

## 2021-01-14 DIAGNOSIS — M53.86 DECREASED ROM OF LUMBAR SPINE: ICD-10-CM

## 2021-01-14 DIAGNOSIS — Z47.89 ORTHOPEDIC AFTERCARE: ICD-10-CM

## 2021-01-14 PROCEDURE — 97530 THERAPEUTIC ACTIVITIES: CPT

## 2021-01-14 PROCEDURE — 97110 THERAPEUTIC EXERCISES: CPT

## 2021-01-14 NOTE — THERAPY TREATMENT NOTE
Outpatient Physical Therapy Ortho Treatment Note  [facility]     Patient Name: Taz Dickey  : 1950  MRN: [id number]  Today's Date: 2021      Visit Date: 2021    Visit Dx:    ICD-10-CM ICD-9-CM   1. S/P lumbar fusion  Z98.1 V45.4   2. Orthopedic aftercare  Z47.89 V54.9   3. Decreased ROM of lumbar spine  M53.86 719.58       Patient Active Problem List   Diagnosis   • Spondylolisthesis of lumbar region   • Lumbar radiculopathy   • Benign essential hypertension   • Erectile dysfunction of nonorganic origin   • Hypercholesterolemia   • Hyperglycemia   • Hypertension   • Osteoarthritis   • Tinnitus of both ears   • Encounter for screening colonoscopy   • Medicare annual wellness visit, subsequent   • Nocturia   • Type 2 diabetes mellitus without complication, without long-term current use of insulin (CMS/[facility])        Past Medical History:   Diagnosis Date   • Arthritis    • Colon polyps    • Depression    • DVT (deep venous thrombosis) (CMS/[facility])     IN LEFT LEG   • Full dentures    • Hyperlipidemia    • Hypertension    • Numbness and tingling     RIGHT FOOT   • PONV (postoperative nausea and vomiting)    • Right leg pain    • Type 2 diabetes mellitus without complication, without long-term current use of insulin (CMS/[facility]) 10/8/2019        Past Surgical History:   Procedure Laterality Date   • CERVICAL DISCECTOMY LAMINECTOMY DECOMPRESSION POSTERIOR FUSION WITH INSTRUMENTATION     • COLONOSCOPY N/A 2018    Procedure: COLONOSCOPY to TI and cecum with polypectomy;  Surgeon: Anil Garces MD;  Location: [facility] ENDOSCOPY;  Service:    • JOINT REPLACEMENT      LEFT HIP   • KNEE ARTHROSCOPY Left    • LUMBAR DISCECTOMY FUSION INSTRUMENTATION N/A 2020    Procedure: Lumbar 4 5 laminectomy with a posterior lateral fusion and instrumentation and interbody fusion;  Surgeon: Jeffrey Garcia MD;  Location: [facility] MAIN OR;  Service: Neurosurgery;  Laterality: N/A;   • VASECTOMY    "                      PT Assessment/Plan     Row Name 01/14/21 1046          PT Assessment    Assessment Comments  Pt. presents with similar reports of intermittent 9/10 \"shooting\" pains, however, reports reduced soreness following last session and has noticed improved mobility. Pt. tolerated session well and required no seated rest breaks with standing activity this date. Continued to cue patient for log roll technique and standing posture as pt. remains forward flexed with rwx. Pt. tolerated addition/progression of ther ex this date to work on lateral weight shift to R and increase stance time on R to normalize gait mechanics.  -        PT Plan    PT Plan Comments  Continue to progress standing ther ex; work on gait mechanics  -       User Key  (r) = Recorded By, (t) = Taken By, (c) = Cosigned By    Initials Name Provider Type     Beverly Garcia, PT Physical Therapist            OP Exercises     Row Name 01/14/21 0900             Subjective Comments    Subjective Comments  I feel pretty good, still get those shooting pains 9/10 but I have tried walking without my walker, it didnt look pretty but I wanted to see if I could  -         Subjective Pain    Able to rate subjective pain?  yes  -      Pre-Treatment Pain Level  3  -         Total Minutes    05168 - PT Therapeutic Exercise Minutes  35  -MH      46137 - PT Therapeutic Activity Minutes  10  -MH         Exercise 1    Exercise Name 1  LTR  -MH      Cueing 1  Verbal;Tactile  -MH      Reps 1  10  -MH         Exercise 2    Exercise Name 2  PPT  -MH      Cueing 2  Verbal  -MH      Reps 2  10  -MH      Time 2  5  -MH         Exercise 3    Exercise Name 3  seated sciatic nerve floss   -MH      Cueing 3  Verbal  -MH      Reps 3  10  -MH         Exercise 4    Exercise Name 4  NuStep L1  -MH      Reps 4  UE and LE  -MH      Time 4  5  -MH         Exercise 5    Exercise Name 5  Seated LAQ  -MH      Cueing 5  Verbal  -MH      Reps 5  15  -MH         Exercise " "6    Exercise Name 6  Hooklying hip abd  -MH      Cueing 6  Verbal;Tactile;Demo  -MH      Sets 6  2  -MH      Reps 6  10  -MH      Additional Comments  RTB  -MH         Exercise 7    Exercise Name 7  Hooklying hip add  -MH      Cueing 7  Verbal;Tactile;Demo  -MH      Reps 7  10  -MH      Additional Comments  small ball  -MH         Exercise 8    Exercise Name 8  TrA isometric in hooklying  -MH      Cueing 8  Verbal;Tactile;Demo  -MH      Reps 8  10  -MH      Time 8  5 sec hold  -MH         Exercise 9    Exercise Name 9  STS from mat with foam under hips  -MH      Cueing 9  Verbal;Demo  -MH      Sets 9  2  -MH      Reps 9  4  -MH      Time 9  green band around hips to encourage wt shift R  -MH         Exercise 10    Exercise Name 10  Gait training with rollilng walker  -      Cueing 10  Verbal;Demo Cues for TrA, upright posture jael during R stance phase  -      Time 10  3 min  -MH         Exercise 11    Exercise Name 11  side steps at mirror  -MH      Cueing 11  Verbal;Demo  -      Reps 11  3x3 steps each way  -MH      Additional Comments  toes forward  -         Exercise 12    Exercise Name 12  wt shifting GTB at hips to encourage RLE WB  -MH      Reps 12  10  -MH      Additional Comments  in standing  -MH         Exercise 13    Exercise Name 13  small step up  -      Cueing 13  Verbal  -MH      Reps 13  10e  -MH      Additional Comments  B UE support; 4\"  -MH         Exercise 14    Exercise Name 14  tandem stance front foot on foam  -MH      Cueing 14  Verbal  -MH      Reps 14  2  -MH      Time 14  20  -MH         Exercise 15    Exercise Name 15  becky becky in // bars  -MH      Cueing 15  Verbal;Demo  -MH      Reps 15  10  -MH      Additional Comments  focus on weight shift on R   -MH        User Key  (r) = Recorded By, (t) = Taken By, (c) = Cosigned By    Initials Name Provider Type    MH Beverly Garcia, PT Physical Therapist                       PT OP Goals     Row Name 01/14/21 0900          PT Short " Term Goals    STG Date to Achieve  01/12/21  -     STG 1  Pt. Will be independent with initial HEP to improve self-management of condition.  -     STG 1 Progress  Ongoing;Progressing  -     STG 2  Pt. Will demonstrate proper log roll technique without cueing to reduce lumbar strain and preserve spine.  -     STG 2 Progress  Ongoing;Progressing  -     STG 3  Pt. will ambulate with swing-through gait pattern and upright posture with rwx to improve mechanics to safely transition to ambulation with SPC.  -     STG 3 Progress  Ongoing  -        Long Term Goals    LTG 1  Pt. Will be independent with advanced HEP to improve long-term management of condition and independence.  -     LTG 1 Progress  Ongoing  -     LTG 2  Pt. Will score </= 40% on Modified Oswestry (from 74% on initial evaluation) to indicate improved perception of disability.  -     LTG 2 Progress  Ongoing  -     LTG 3  Pt. will demonstrate near normal gait pattern with equal stride length and heel strike with SPC to improve mobility and ability to participate in community activities.  -     LTG 3 Progress  Ongoing  -     LTG 4  Pt. will report intensity of pain </= 4/10 pain down into R LE to improve QOL and ease with transitional movements.  -     LTG 4 Progress  Ongoing  -     LTG 5  Pt. will increase R LE strength to >/= 4+/5 to improve ease with transtional movements and stability.  -     LTG 5 Progress  Ongoing  -       User Key  (r) = Recorded By, (t) = Taken By, (c) = Cosigned By    Initials Name Provider Type    Beverly Santoro, PT Physical Therapist          Therapy Education  Education Details: Continued to review log roll and standing posture  Given: Mobility training, Symptoms/condition management, Posture/body mechanics  Program: Reinforced  How Provided: Verbal, Demonstration  Provided to: Patient  Level of Understanding: Verbalized, Demonstrated              Time Calculation:   Start Time: 0958  Stop Time:  1044  Time Calculation (min): 46 min  Total Timed Code Minutes- PT: 45 minute(s)  Therapy Charges for Today     Code Description Service Date Service Provider Modifiers Qty    [id number]  PT THERAPEUTIC ACT EA 15 MIN 1/14/2021 Beverly Garcia, PT GP 1    [id number]  PT THER PROC EA 15 MIN 1/14/2021 Beverly Garcia, PT GP 2                    Beverly Garcia, PT  1/14/2021

## 2021-01-18 ENCOUNTER — HOSPITAL ENCOUNTER (OUTPATIENT)
Dept: GENERAL RADIOLOGY | Facility: HOSPITAL | Age: 71
Discharge: HOME | End: 2021-01-18
Admitting: NEUROLOGICAL SURGERY

## 2021-01-18 ENCOUNTER — HOSPITAL ENCOUNTER (OUTPATIENT)
Dept: PHYSICAL THERAPY | Facility: HOSPITAL | Age: 71
Setting detail: THERAPIES SERIES
Discharge: HOME | End: 2021-01-18

## 2021-01-18 DIAGNOSIS — Z98.1 S/P LUMBAR FUSION: Primary | ICD-10-CM

## 2021-01-18 DIAGNOSIS — M53.86 DECREASED ROM OF LUMBAR SPINE: ICD-10-CM

## 2021-01-18 DIAGNOSIS — Z47.89 ORTHOPEDIC AFTERCARE: ICD-10-CM

## 2021-01-18 DIAGNOSIS — M43.16 SPONDYLOLISTHESIS OF LUMBAR REGION: ICD-10-CM

## 2021-01-18 PROCEDURE — 97110 THERAPEUTIC EXERCISES: CPT

## 2021-01-18 PROCEDURE — 72100 X-RAY EXAM L-S SPINE 2/3 VWS: CPT

## 2021-01-18 PROCEDURE — 97116 GAIT TRAINING THERAPY: CPT

## 2021-01-18 RX ORDER — LISINOPRIL AND HYDROCHLOROTHIAZIDE 20; 12.5 MG/1; MG/1
1 TABLET ORAL NIGHTLY
Qty: 90 TABLET | Refills: 1 | Status: SHIPPED | OUTPATIENT
Start: 2021-01-18 | End: 2021-08-03 | Stop reason: SDUPTHER

## 2021-01-18 RX ORDER — ATORVASTATIN CALCIUM 40 MG/1
TABLET, FILM COATED ORAL
Qty: 90 TABLET | Refills: 1 | Status: SHIPPED | OUTPATIENT
Start: 2021-01-18 | End: 2021-08-03 | Stop reason: SDUPTHER

## 2021-01-18 NOTE — THERAPY TREATMENT NOTE
Outpatient Physical Therapy Ortho Treatment Note  [facility]     Patient Name: Taz Dickey  : 1950  MRN: [id number]  Today's Date: 2021      Visit Date: 2021    Visit Dx:    ICD-10-CM ICD-9-CM   1. S/P lumbar fusion  Z98.1 V45.4   2. Orthopedic aftercare  Z47.89 V54.9   3. Decreased ROM of lumbar spine  M53.86 719.58       Patient Active Problem List   Diagnosis   • Spondylolisthesis of lumbar region   • Lumbar radiculopathy   • Benign essential hypertension   • Erectile dysfunction of nonorganic origin   • Hypercholesterolemia   • Hyperglycemia   • Hypertension   • Osteoarthritis   • Tinnitus of both ears   • Encounter for screening colonoscopy   • Medicare annual wellness visit, subsequent   • Nocturia   • Type 2 diabetes mellitus without complication, without long-term current use of insulin (CMS/[facility])        Past Medical History:   Diagnosis Date   • Arthritis    • Colon polyps    • Depression    • DVT (deep venous thrombosis) (CMS/[facility])     IN LEFT LEG   • Full dentures    • Hyperlipidemia    • Hypertension    • Numbness and tingling     RIGHT FOOT   • PONV (postoperative nausea and vomiting)    • Right leg pain    • Type 2 diabetes mellitus without complication, without long-term current use of insulin (CMS/[facility]) 10/8/2019        Past Surgical History:   Procedure Laterality Date   • CERVICAL DISCECTOMY LAMINECTOMY DECOMPRESSION POSTERIOR FUSION WITH INSTRUMENTATION     • COLONOSCOPY N/A 2018    Procedure: COLONOSCOPY to TI and cecum with polypectomy;  Surgeon: Anil Garces MD;  Location: [facility] ENDOSCOPY;  Service:    • JOINT REPLACEMENT      LEFT HIP   • KNEE ARTHROSCOPY Left    • LUMBAR DISCECTOMY FUSION INSTRUMENTATION N/A 2020    Procedure: Lumbar 4 5 laminectomy with a posterior lateral fusion and instrumentation and interbody fusion;  Surgeon: Jeffrey Garcia MD;  Location: [facility] MAIN OR;  Service: Neurosurgery;  Laterality: N/A;   • VASECTOMY    "                      PT Assessment/Plan     Row Name 01/18/21 0900          PT Assessment    Assessment Comments  Pt reports soreness for about 1 day after last session however has noticed slight decrease in frequency of shooting pain in R LE. He continues to demonstrate antalgic gait with step to pattern and forward trunk flexion when using the walker. Pt had radiographs after todays appointments, therefore performed gait training over  2 ramps with RW while ambulating to other building, cues for upright posture and equal \"step sounds\" as pt tends to scuff L foot. Pt tolerates this treatment session well however continues to be limited in functional activity tolerance, strength, and balance and demonstrates the need for continued skilled PT.  -RS        PT Plan    PT Plan Comments  COntinue small step up, gait training  -RS       User Key  (r) = Recorded By, (t) = Taken By, (c) = Cosigned By    Initials Name Provider Type    RS Sienna Oleary, PT Physical Therapist            OP Exercises     Row Name 01/18/21 0800             Subjective Comments    Subjective Comments  Felt sore for a day after but that is typical, less of the shooting/severe pain  -RS         Subjective Pain    Able to rate subjective pain?  yes  -RS      Pre-Treatment Pain Level  3  -RS         Total Minutes    55988 - Gait Training Minutes   8  -RS      84701 - PT Therapeutic Exercise Minutes  33  -RS         Exercise 1    Exercise Name 1  LTR  -RS      Cueing 1  Verbal;Tactile  -RS      Reps 1  10  -RS         Exercise 2    Exercise Name 2  PPT  -RS      Cueing 2  Verbal  -RS      Reps 2  10  -RS      Time 2  5  -RS         Exercise 3    Exercise Name 3  seated sciatic nerve floss   -RS      Cueing 3  Verbal  -RS      Reps 3  10  -RS         Exercise 4    Exercise Name 4  NuStep L1  -RS      Reps 4  UE and LE  -RS      Time 4  5  -RS         Exercise 5    Exercise Name 5  Seated LAQ  -RS      Cueing 5  Verbal  -RS      Reps 5  10  -RS      " Additional Comments  2#  -RS         Exercise 6    Exercise Name 6  Hooklying hip abd  -RS      Cueing 6  Verbal;Tactile;Demo  -RS      Sets 6  2  -RS      Reps 6  10  -RS      Additional Comments  GTB  -RS         Exercise 7    Exercise Name 7  Hooklying hip add  -RS      Cueing 7  Verbal;Tactile;Demo  -RS      Reps 7  10  -RS      Additional Comments  small ball  -RS         Exercise 8    Exercise Name 8  TrA isometric in hooklying  -RS      Cueing 8  --  -RS      Reps 8  10  -RS      Time 8  --  -RS         Exercise 9    Exercise Name 9  STS from mat with foam under hips  -RS      Cueing 9  Verbal;Demo  -RS      Sets 9  2  -RS      Reps 9  4  -RS      Time 9  green band around hips to encourage wt shift R  -RS         Exercise 10    Exercise Name 10  Gait training with rollilng walker  -RS      Cueing 10  Verbal;Demo cues for equal step sounds, upright trunk  -RS      Time 10  8 min  -RS      Additional Comments  2 ramps, walking over to main hospital for radiographs  -RS         Exercise 11    Exercise Name 11  side steps at mirror  -RS      Cueing 11  Verbal;Demo  -RS      Reps 11  3x3 steps each way  -RS      Additional Comments  toes forward  -RS         Exercise 12    Exercise Name 12  wt shifting GTB at hips to encourage RLE WB  -RS      Reps 12  10  -RS      Additional Comments  in standing  -RS         Exercise 13    Exercise Name 13  small step up  -RS      Cueing 13  --  -RS      Reps 13  --  -RS      Additional Comments  next time  -RS         Exercise 14    Exercise Name 14  tandem stance front foot on foam  -RS      Cueing 14  --  -RS      Reps 14  --  -RS      Time 14  --  -RS      Additional Comments  next time  -RS         Exercise 15    Exercise Name 15  becky becky in // bars  -RS      Cueing 15  --  -RS      Reps 15  --  -RS      Additional Comments  next time  -RS        User Key  (r) = Recorded By, (t) = Taken By, (c) = Cosigned By    Initials Name Provider Type    RS Sienna Oleary, PT  Physical Therapist                       PT OP Goals     Row Name 01/18/21 0900          PT Short Term Goals    STG Date to Achieve  01/12/21  -RS     STG 1  Pt. Will be independent with initial HEP to improve self-management of condition.  -RS     STG 1 Progress  Ongoing;Progressing  -RS     STG 2  Pt. Will demonstrate proper log roll technique without cueing to reduce lumbar strain and preserve spine.  -RS     STG 2 Progress  Ongoing;Progressing  -RS     STG 2 Progress Comments  req inc time but able to complete from mat  -RS     STG 3  Pt. will ambulate with swing-through gait pattern and upright posture with rwx to improve mechanics to safely transition to ambulation with SPC.  -RS     STG 3 Progress  Ongoing  -RS     STG 3 Progress Comments  step to pattern  -RS        Long Term Goals    LTG 1  Pt. Will be independent with advanced HEP to improve long-term management of condition and independence.  -RS     LTG 1 Progress  Ongoing  -RS     LTG 2  Pt. Will score </= 40% on Modified Oswestry (from 74% on initial evaluation) to indicate improved perception of disability.  -RS     LTG 2 Progress  Ongoing  -RS     LTG 3  Pt. will demonstrate near normal gait pattern with equal stride length and heel strike with SPC to improve mobility and ability to participate in community activities.  -RS     LTG 3 Progress  Ongoing  -RS     LTG 4  Pt. will report intensity of pain </= 4/10 pain down into R LE to improve QOL and ease with transitional movements.  -RS     LTG 4 Progress  Ongoing  -RS     LTG 5  Pt. will increase R LE strength to >/= 4+/5 to improve ease with transtional movements and stability.  -RS     LTG 5 Progress  Ongoing  -RS       User Key  (r) = Recorded By, (t) = Taken By, (c) = Cosigned By    Initials Name Provider Type    Sienna Klein, PT Physical Therapist                         Time Calculation:   Start Time: 0830  Stop Time: 0915  Time Calculation (min): 45 min  Therapy Charges for Today      Code Description Service Date Service Provider Modifiers Qty    [id number]  PT THER PROC EA 15 MIN 1/18/2021 Sienna Oleary, PT GP 2    [id number] HC GAIT TRAINING EA 15 MIN 1/18/2021 Sienna Oleary, PT GP 1                    Sienna Oleary, PT  1/18/2021

## 2021-01-18 NOTE — PROGRESS NOTES
"Subjective   Patient ID: Taz Dickey is a 70 y.o. male is here today for follow-up with a new Lumbar XR. Mr. Dickey had Bilateral L4-5 laminectomy, medial facetectomy, aggressive foraminotomies with posterior lateral fusion and interbody fusion and instrumentation done on 11/18/2020. He was referred to physical therapy.    Today he is feeling stronger. He says the nerve pain is better but when he gets in certain positions it gets bad. Patient denies bowel/bladder incontinence.      Patient, provider and MA are all wearing a mask in our office today.    History of Present Illness     This patient is gradually beginning to feel a little bit better.  As the pain that we he was having in his leg has improved over the last several days.    The following portions of the patient's history were reviewed and updated as appropriate: allergies, current medications, past family history, past medical history, past social history, past surgical history and problem list.    Review of Systems   Constitutional: Negative for chills and fever.   HENT: Negative for congestion.    Genitourinary: Negative for difficulty urinating and dysuria.   Musculoskeletal: Positive for back pain and gait problem. Negative for neck pain.   Neurological: Positive for weakness and numbness.        N/T Bilateral feet       I have reviewed the review of systems as documented by my MA.      Objective     Vitals:    01/21/21 1149   BP: 124/79   Cuff Size: Adult   Pulse: 96   Temp: 97.1 °F (36.2 °C)   Weight: 92.5 kg (204 lb)   Height: 177.8 cm (70\")     Body mass index is 29.27 kg/m².      Physical Exam  Neurological:      Mental Status: He is alert and oriented to person, place, and time.       Neurologic Exam     Mental Status   Oriented to person, place, and time.           Assessment/Plan   Independent Review of Radiographic Studies:      I personally reviewed the images from the following studies.    I reviewed his x-rays done on the 18th.  This " seems to show good alignment at L4-5 and good posterior lateral fusion.    Medical Decision Making:      I told the patient I think his recovery is going okay.  He is only a little over 2 months out from surgery.  His x-rays look good.  We will plan to check him again in about 4 weeks with another x-ray.    Diagnoses and all orders for this visit:    1. Follow-up examination following surgery (Primary)  -     XR Spine Lumbar Complete With Flex & Ext; Future      Return in about 4 weeks (around 2/18/2021).

## 2021-01-21 ENCOUNTER — OFFICE VISIT (OUTPATIENT)
Dept: NEUROSURGERY | Facility: CLINIC | Age: 71
End: 2021-01-21

## 2021-01-21 ENCOUNTER — HOSPITAL ENCOUNTER (OUTPATIENT)
Dept: PHYSICAL THERAPY | Facility: HOSPITAL | Age: 71
Setting detail: THERAPIES SERIES
Discharge: HOME | End: 2021-01-21

## 2021-01-21 VITALS
SYSTOLIC BLOOD PRESSURE: 124 MMHG | TEMPERATURE: 97.1 F | DIASTOLIC BLOOD PRESSURE: 79 MMHG | BODY MASS INDEX: 29.2 KG/M2 | HEIGHT: 70 IN | WEIGHT: 204 LBS | HEART RATE: 96 BPM

## 2021-01-21 DIAGNOSIS — Z09 FOLLOW-UP EXAMINATION FOLLOWING SURGERY: Primary | ICD-10-CM

## 2021-01-21 DIAGNOSIS — Z98.1 S/P LUMBAR FUSION: Primary | ICD-10-CM

## 2021-01-21 DIAGNOSIS — Z47.89 ORTHOPEDIC AFTERCARE: ICD-10-CM

## 2021-01-21 DIAGNOSIS — M53.86 DECREASED ROM OF LUMBAR SPINE: ICD-10-CM

## 2021-01-21 PROCEDURE — 97530 THERAPEUTIC ACTIVITIES: CPT

## 2021-01-21 PROCEDURE — 97110 THERAPEUTIC EXERCISES: CPT

## 2021-01-21 PROCEDURE — 99024 POSTOP FOLLOW-UP VISIT: CPT | Performed by: NEUROLOGICAL SURGERY

## 2021-01-21 NOTE — THERAPY TREATMENT NOTE
Outpatient Physical Therapy Ortho Treatment Note  [facility]     Patient Name: Taz Dickey  : 1950  MRN: [id number]  Today's Date: 2021      Visit Date: 2021    Visit Dx:    ICD-10-CM ICD-9-CM   1. S/P lumbar fusion  Z98.1 V45.4   2. Orthopedic aftercare  Z47.89 V54.9   3. Decreased ROM of lumbar spine  M53.86 719.58       Patient Active Problem List   Diagnosis   • Spondylolisthesis of lumbar region   • Lumbar radiculopathy   • Benign essential hypertension   • Erectile dysfunction of nonorganic origin   • Hypercholesterolemia   • Hyperglycemia   • Hypertension   • Osteoarthritis   • Tinnitus of both ears   • Encounter for screening colonoscopy   • Medicare annual wellness visit, subsequent   • Nocturia   • Type 2 diabetes mellitus without complication, without long-term current use of insulin (CMS/[facility])        Past Medical History:   Diagnosis Date   • Arthritis    • Colon polyps    • Depression    • DVT (deep venous thrombosis) (CMS/[facility])     IN LEFT LEG   • Full dentures    • Hyperlipidemia    • Hypertension    • Numbness and tingling     RIGHT FOOT   • PONV (postoperative nausea and vomiting)    • Right leg pain    • Type 2 diabetes mellitus without complication, without long-term current use of insulin (CMS/[facility]) 10/8/2019        Past Surgical History:   Procedure Laterality Date   • CERVICAL DISCECTOMY LAMINECTOMY DECOMPRESSION POSTERIOR FUSION WITH INSTRUMENTATION     • COLONOSCOPY N/A 2018    Procedure: COLONOSCOPY to TI and cecum with polypectomy;  Surgeon: Anil Garces MD;  Location: [facility] ENDOSCOPY;  Service:    • JOINT REPLACEMENT      LEFT HIP   • KNEE ARTHROSCOPY Left    • LUMBAR DISCECTOMY FUSION INSTRUMENTATION N/A 2020    Procedure: Lumbar 4 5 laminectomy with a posterior lateral fusion and instrumentation and interbody fusion;  Surgeon: Jeffrey Garcia MD;  Location: [facility] MAIN OR;  Service: Neurosurgery;  Laterality: N/A;   • VASECTOMY    "                      PT Assessment/Plan     Row Name 01/21/21 1135          PT Assessment    Assessment Comments  Pt. continues to report soreness following therapy but overall reduction in frequency of \"shooting\" pains. Worked on gait mechcanics and improving upright posture, cues for heel strike to toe off and weight shift onto R during gait. Pt. continues with decreased foot clerance on R, but does improve when shoes are tied, therefore encouraged pt. to keep shoes tighter for improved safety. Pt. with increased difficulty with slow marches and demonstrates weak hip flexion R>L. Pt. does fatigue quickly but required no seated rest breaks throughout session.  -        PT Plan    PT Plan Comments  consider wall wash for upright posture  -       User Key  (r) = Recorded By, (t) = Taken By, (c) = Cosigned By    Initials Name Provider Type     Beverly Garcia, PT Physical Therapist            OP Exercises     Row Name 01/21/21 1000             Subjective Comments    Subjective Comments  I was sore for a day and a half last time, but the frequency continues to decrease with the shooting pains  -         Subjective Pain    Able to rate subjective pain?  yes  -      Pre-Treatment Pain Level  3  -      Subjective Pain Comment  stiffness  -         Total Minutes    76771 - PT Therapeutic Exercise Minutes  30  -MH      47603 - PT Therapeutic Activity Minutes  12  -MH         Exercise 1    Exercise Name 1  LTR  -      Cueing 1  Verbal;Tactile  -      Reps 1  10  -         Exercise 2    Exercise Name 2  PPT  -      Cueing 2  Verbal  -      Reps 2  10  -MH      Time 2  5  -MH         Exercise 3    Exercise Name 3  seated sciatic nerve floss   -      Cueing 3  Verbal  -      Reps 3  10  -      Additional Comments  AP  -         Exercise 4    Exercise Name 4  NuStep L3  -      Reps 4  UE and LE  -      Time 4  5  -MH      Additional Comments  assist with placing R LE on pedal  -         " "Exercise 5    Exercise Name 5  Seated LAQ  -      Cueing 5  Verbal  -MH      Reps 5  15e  -MH      Additional Comments  2#  -         Exercise 6    Exercise Name 6  Hooklying hip abd  -      Cueing 6  Verbal;Tactile;Demo  -      Sets 6  2  -MH      Reps 6  10  -MH      Additional Comments  GTB  -         Exercise 7    Exercise Name 7  Hooklying hip add  -      Cueing 7  Verbal;Tactile;Demo  -      Reps 7  10  -      Time 7  3-5 sec  -      Additional Comments  small ball + glute set  -         Exercise 8    Exercise Name 8  TrA isometric in hooklying  -      Reps 8  10  -MH      Time 8  3-5 sec  -         Exercise 9    Exercise Name 9  STS from mat with foam under hips  -      Cueing 9  Verbal;Demo  -      Sets 9  2  -MH      Reps 9  4  -MH      Time 9  green band around hips to encourage wt shift R  -         Exercise 10    Exercise Name 10  Gait training with rolling walker  -      Cueing 10  Verbal;Demo cues for equal step sounds, upright trunk  -      Time 10  --  -      Additional Comments  heel strike, glute squeeze when shifting weight, longer step length   -         Exercise 11    Exercise Name 11  side steps at mirror  -      Cueing 11  --  -MH      Reps 11  --  -MH      Additional Comments  resume next session  -         Exercise 12    Exercise Name 12  wt shifting GTB at hips to encourage RLE WB  -      Reps 12  10  -      Additional Comments  in standing  -         Exercise 13    Exercise Name 13  small step up  -      Cueing 13  Verbal  -      Reps 13  10e  -      Additional Comments  4\"  -         Exercise 14    Exercise Name 14  tandem stance front foot on foam  -      Additional Comments  next time  -         Exercise 15    Exercise Name 15  becky becky in // bars  -         Exercise 16    Exercise Name 16  anterior lateral weight shift   -      Cueing 16  Verbal  -      Reps 16  10  -MH      Additional Comments  GTB around waist, squeeze " quad   -         Exercise 17    Exercise Name 17  worked on standing posture, tucking hips, upright trunk  -      Time 17  2 min  -      Additional Comments  avoid flexing at hips and leaning forward onto walker  -         Exercise 18    Exercise Name 18  slow, high march at counter  -      Cueing 18  Verbal  -      Reps 18  2 laps  -      Additional Comments  increased difficulty with R  -        User Key  (r) = Recorded By, (t) = Taken By, (c) = Cosigned By    Initials Name Provider Type     Beverly Garcia, PT Physical Therapist                       PT OP Goals     Row Name 01/21/21 1100          PT Short Term Goals    STG Date to Achieve  01/12/21  -     STG 1  Pt. Will be independent with initial HEP to improve self-management of condition.  -     STG 1 Progress  Ongoing;Progressing  -     STG 2  Pt. Will demonstrate proper log roll technique without cueing to reduce lumbar strain and preserve spine.  -     STG 2 Progress  Met  -     STG 3  Pt. will ambulate with swing-through gait pattern and upright posture with rwx to improve mechanics to safely transition to ambulation with SPC.  -     STG 3 Progress  Ongoing  -        Long Term Goals    LTG 1  Pt. Will be independent with advanced HEP to improve long-term management of condition and independence.  -     LTG 1 Progress  Ongoing  -     LTG 2  Pt. Will score </= 40% on Modified Oswestry (from 74% on initial evaluation) to indicate improved perception of disability.  -     LTG 2 Progress  Ongoing  [facility]     LTG 3  Pt. will demonstrate near normal gait pattern with equal stride length and heel strike with SPC to improve mobility and ability to participate in community activities.  -     LTG 3 Progress  Ongoing  [facility]     LTG 4  Pt. will report intensity of pain </= 4/10 pain down into R LE to improve QOL and ease with transitional movements.  -     LTG 4 Progress  Ongoing  [facility]     LTG 5  Pt. will increase R LE strength to  >/= 4+/5 to improve ease with transtional movements and stability.  -     LTG 5 Progress  Ongoing  -       User Key  (r) = Recorded By, (t) = Taken By, (c) = Cosigned By    Initials Name Provider Type     Beverly Garcia PT Physical Therapist          Therapy Education  Education Details: Proper standing posture, emphasis on weight shift and heel strike with gait  Given: Mobility training, Symptoms/condition management, Posture/body mechanics  Program: Reinforced  How Provided: Verbal, Demonstration  Provided to: Patient  Level of Understanding: Verbalized, Demonstrated              Time Calculation:   Start Time: 1047  Stop Time: 1130  Time Calculation (min): 43 min  Total Timed Code Minutes- PT: 42 minute(s)  Therapy Charges for Today     Code Description Service Date Service Provider Modifiers Qty    [id number]  PT THERAPEUTIC ACT EA 15 MIN 1/21/2021 Beverly Garcia, PT GP 1    [id number]  PT THER PROC EA 15 MIN 1/21/2021 Beverly Garcia, PT GP 2                    Beverly Garcia PT  1/21/2021

## 2021-01-25 ENCOUNTER — OFFICE VISIT (OUTPATIENT)
Dept: INTERNAL MEDICINE | Facility: CLINIC | Age: 71
End: 2021-01-25

## 2021-01-25 VITALS
HEART RATE: 78 BPM | TEMPERATURE: 97.8 F | OXYGEN SATURATION: 99 % | DIASTOLIC BLOOD PRESSURE: 78 MMHG | WEIGHT: 210 LBS | BODY MASS INDEX: 30.06 KG/M2 | SYSTOLIC BLOOD PRESSURE: 132 MMHG | RESPIRATION RATE: 16 BRPM | HEIGHT: 70 IN

## 2021-01-25 DIAGNOSIS — E11.9 TYPE 2 DIABETES MELLITUS WITHOUT COMPLICATION, WITHOUT LONG-TERM CURRENT USE OF INSULIN (HCC): ICD-10-CM

## 2021-01-25 DIAGNOSIS — Z00.00 HEALTHCARE MAINTENANCE: ICD-10-CM

## 2021-01-25 DIAGNOSIS — M15.9 PRIMARY OSTEOARTHRITIS INVOLVING MULTIPLE JOINTS: ICD-10-CM

## 2021-01-25 DIAGNOSIS — I10 BENIGN ESSENTIAL HYPERTENSION: Primary | ICD-10-CM

## 2021-01-25 DIAGNOSIS — L29.9 ITCHING: ICD-10-CM

## 2021-01-25 DIAGNOSIS — E78.00 HYPERCHOLESTEROLEMIA: ICD-10-CM

## 2021-01-25 DIAGNOSIS — M43.16 SPONDYLOLISTHESIS OF LUMBAR REGION: ICD-10-CM

## 2021-01-25 DIAGNOSIS — F52.21 ERECTILE DYSFUNCTION OF NONORGANIC ORIGIN: ICD-10-CM

## 2021-01-25 LAB
ALBUMIN SERPL-MCNC: 4.4 G/DL (ref 3.5–5.2)
ALBUMIN/GLOB SERPL: 1.8 G/DL
ALP SERPL-CCNC: 84 U/L (ref 39–117)
ALT SERPL-CCNC: 19 U/L (ref 1–41)
AST SERPL-CCNC: 21 U/L (ref 1–40)
BILIRUB SERPL-MCNC: 0.8 MG/DL (ref 0–1.2)
BUN SERPL-MCNC: 14 MG/DL (ref 8–23)
BUN/CREAT SERPL: 15.6 (ref 7–25)
CALCIUM SERPL-MCNC: 9.9 MG/DL (ref 8.6–10.5)
CHLORIDE SERPL-SCNC: 99 MMOL/L (ref 98–107)
CO2 SERPL-SCNC: 30 MMOL/L (ref 22–29)
CREAT SERPL-MCNC: 0.9 MG/DL (ref 0.76–1.27)
ERYTHROCYTE [DISTWIDTH] IN BLOOD BY AUTOMATED COUNT: 13 % (ref 12.3–15.4)
GLOBULIN SER CALC-MCNC: 2.5 GM/DL
GLUCOSE SERPL-MCNC: 111 MG/DL (ref 65–99)
HCT VFR BLD AUTO: 38.1 % (ref 37.5–51)
HGB BLD-MCNC: 12.5 G/DL (ref 13–17.7)
MCH RBC QN AUTO: 31.6 PG (ref 26.6–33)
MCHC RBC AUTO-ENTMCNC: 32.8 G/DL (ref 31.5–35.7)
MCV RBC AUTO: 96.5 FL (ref 79–97)
PLATELET # BLD AUTO: 200 10*3/MM3 (ref 140–450)
POTASSIUM SERPL-SCNC: 4.4 MMOL/L (ref 3.5–5.2)
PROT SERPL-MCNC: 6.9 G/DL (ref 6–8.5)
RBC # BLD AUTO: 3.95 10*6/MM3 (ref 4.14–5.8)
SODIUM SERPL-SCNC: 140 MMOL/L (ref 136–145)
WBC # BLD AUTO: 5.78 10*3/MM3 (ref 3.4–10.8)

## 2021-01-25 PROCEDURE — 99214 OFFICE O/P EST MOD 30 MIN: CPT | Performed by: NURSE PRACTITIONER

## 2021-01-25 RX ORDER — TADALAFIL 5 MG/1
5 TABLET ORAL DAILY PRN
Qty: 30 TABLET | Refills: 2 | Status: SHIPPED | OUTPATIENT
Start: 2021-01-25

## 2021-01-25 RX ORDER — HYDROXYZINE HYDROCHLORIDE 25 MG/1
25 TABLET, FILM COATED ORAL EVERY 8 HOURS PRN
Qty: 30 TABLET | Refills: 2 | Status: SHIPPED | OUTPATIENT
Start: 2021-01-25 | End: 2021-08-03

## 2021-01-25 NOTE — PROGRESS NOTES
"Chief Complaint  Hypertension    Subjective          Taz Dickey presents to [facility] for   Patient presents for 6-month follow-up.  This is a 70-year-old male.    He has lumbar spondylosis and had a lumbar fusion with Dr. Garcia, neurosurgery in November 2020.  Previously was seeing pain management but is not anymore since the surgery.  He currently takes gabapentin 600 mg in the morning, 600 mg midday and 900 mg at night.  This was previously prescribed by pain management.  He would like me to take over this prescription.  He is currently working with physical therapy postoperatively.  Still using a standard walker but making progress as far as strength and mobility.    He has generalized osteoarthritis.  This has been relatively well controlled recently.    He has type 2 diabetes and takes Metformin 500 mg daily.  Last A1c checked on 11/20/2020 was 5.4, well-controlled.    He has hypertension and takes lisinopril-HCTZ 20-12.5 mg daily reporting good compliance with this medication.  Blood pressures have been well controlled.    He has hyperlipidemia and takes atorvastatin 40 mg daily reporting good compliance with this medication.  Lipid panel on 11/20/2020 was well controlled.    Reports that he is doing well and denies development of any other new issues today.      Objective   Vital Signs:   /78   Pulse 78   Temp 97.8 °F (36.6 °C) (Skin)   Resp 16   Ht 177.8 cm (70\")   Wt 95.3 kg (210 lb)   SpO2 99%   BMI 30.13 kg/m²     Physical Exam  Vitals signs and nursing note reviewed.   Constitutional:       Appearance: He is well-developed.   HENT:      Head: Atraumatic.   Eyes:      Pupils: Pupils are equal, round, and reactive to light.   Neck:      Musculoskeletal: Normal range of motion and neck supple.   Cardiovascular:      Rate and Rhythm: Normal rate and regular rhythm.      Pulses: Normal pulses.           Dorsalis pedis pulses are 2+ on the right side and " 2+ on the left side.        Posterior tibial pulses are 2+ on the right side and 2+ on the left side.      Heart sounds: Normal heart sounds.      Comments: No peripheral edema.  Pulmonary:      Effort: Pulmonary effort is normal. No respiratory distress.      Breath sounds: Normal breath sounds. No stridor. No wheezing, rhonchi or rales.   Chest:      Chest wall: No tenderness.   Abdominal:      General: Bowel sounds are normal. There is no distension.      Palpations: Abdomen is soft. There is no mass.      Tenderness: There is no abdominal tenderness. There is no right CVA tenderness, left CVA tenderness, guarding or rebound.      Hernia: No hernia is present.   Musculoskeletal: Normal range of motion.      Right foot: Normal range of motion. No deformity, bunion, Charcot foot, foot drop or prominent metatarsal heads.      Left foot: Normal range of motion. No deformity, bunion, Charcot foot, foot drop or prominent metatarsal heads.   Feet:      Right foot:      Protective Sensation: 8 sites tested. 8 sites sensed.      Skin integrity: Skin integrity normal.      Left foot:      Protective Sensation: 8 sites tested. 8 sites sensed.      Skin integrity: Skin integrity normal.      Comments: Diabetic Foot Exam Performed    Skin:     General: Skin is warm and dry.      Capillary Refill: Capillary refill takes less than 2 seconds.   Neurological:      General: No focal deficit present.      Mental Status: He is alert and oriented to person, place, and time. Mental status is at baseline.   Psychiatric:         Mood and Affect: Mood normal.         Behavior: Behavior normal.         Thought Content: Thought content normal.         Judgment: Judgment normal.        Result Review :   The following data was reviewed by: JULIA Quesada on 01/25/2021:  Common labs    Common Labsle 11/21/20 11/21/20 11/22/20 11/22/20 11/23/20 11/23/20    0445 0446 0430 0430 0521 0521   BUN 16   20  26 (A)   Creatinine 0.78   0.76  0.70  (A)   eGFR Non  Am 99   102  112   Sodium 130 (A)   131 (A)  137   Potassium 4.0   4.1  4.2   Chloride 94 (A)   93 (A)  99   Calcium 8.8   9.2  9.3   WBC  8.99 7.07  6.81    Hemoglobin  9.9 (A) 10.2 (A)  10.1 (A)    Hematocrit  29.6 (A) 31.1 (A)  30.0 (A)    Platelets  160 185  213    (A) Abnormal value            Data reviewed: Consultant notes Dr. Garcia, neurosurgery Nov surgery and office notes     Current outpatient and discharge medications have been reconciled for the patient.  Reviewed by: JULIA Quesada           Assessment and Plan    Problem List Items Addressed This Visit        Cardiac and Vasculature    Benign essential hypertension - Primary (Chronic)    Current Assessment & Plan     Well-controlled with lisinopril-HCTZ, continue current therapy with routine monitoring recommended for goal of less than 130/80.  DASH diet.         Relevant Orders    CBC No Differential    Comprehensive metabolic panel    Hypercholesterolemia (Chronic)    Current Assessment & Plan     Lipid panel normal on 11/20/2020.  Continue atorvastatin 40 mg daily, recheck of lipids at next follow-up.            Endocrine and Metabolic    Type 2 diabetes mellitus without complication, without long-term current use of insulin (CMS/[facility]) (Chronic)    Current Assessment & Plan     Well-controlled, last A1c 11/20/2020 was 5.4.  Continue Metformin 500 mg daily and recheck of A1c at next follow-up.            Genitourinary and Reproductive     Erectile dysfunction of nonorganic origin (Chronic)    Current Assessment & Plan     Refilled prescription for generic Cialis, tadalafil, 5 mg daily as needed.  Discussed risks of this medication and potential side effects.         Relevant Medications    tadalafil (CIALIS) 5 MG tablet       Health Encounters    Healthcare maintenance (Chronic)    Current Assessment & Plan     Routine diabetic eye exams recommended.    Diabetic foot exam performed.    Routine flu shots.             Musculoskeletal and Injuries    Spondylolisthesis of lumbar region (Chronic)    Current Assessment & Plan     Status post lumbar fusion in November 2020 with neurosurgery.  He will continue routine neurosurgery follow-ups and physical therapy.  No longer seeing pain management and he would like me to take over the gabapentin prescription.  I discussed this medication with him and potential side effects and we will continue his current dosing of 600 mg in the morning, 600 mg midday and 900 mg in the evening.  Routine [place] reviews and he signed a controlled substance agreement today.  This controls his radiculopathy and neuropathy well per patient.         Osteoarthritis (Chronic)    Current Assessment & Plan     Stable, can continue as needed Tylenol.           Other Visit Diagnoses     Itching        Present x2 weeks, no new medications.  No rash.  Prescribed hydroxyzine, follow-up if symptoms persist or worsen.    Relevant Medications    hydrOXYzine (ATARAX) 25 MG tablet        We will contact patient with his lab results and any further recommendations.  Follow-up as needed and I will see him back in 6 months for Medicare wellness visit.    Follow Up   Return in about 6 months (around 7/25/2021) for Medicare Wellness.  Patient was given instructions and counseling regarding his condition or for health maintenance advice. Please see specific information pulled into the AVS if appropriate.

## 2021-01-25 NOTE — ASSESSMENT & PLAN NOTE
Status post lumbar fusion in November 2020 with neurosurgery.  He will continue routine neurosurgery follow-ups and physical therapy.  No longer seeing pain management and he would like me to take over the gabapentin prescription.  I discussed this medication with him and potential side effects and we will continue his current dosing of 600 mg in the morning, 600 mg midday and 900 mg in the evening.  Routine [facility] reviews and he signed a controlled substance agreement today.  This controls his radiculopathy and neuropathy well per patient.

## 2021-01-25 NOTE — ASSESSMENT & PLAN NOTE
Well-controlled with lisinopril-HCTZ, continue current therapy with routine monitoring recommended for goal of less than 130/80.  DASH diet.

## 2021-01-25 NOTE — ASSESSMENT & PLAN NOTE
Well-controlled, last A1c 11/20/2020 was 5.4.  Continue Metformin 500 mg daily and recheck of A1c at next follow-up.

## 2021-01-25 NOTE — ASSESSMENT & PLAN NOTE
Refilled prescription for generic Cialis, tadalafil, 5 mg daily as needed.  Discussed risks of this medication and potential side effects.

## 2021-01-25 NOTE — ASSESSMENT & PLAN NOTE
Lipid panel normal on 11/20/2020.  Continue atorvastatin 40 mg daily, recheck of lipids at next follow-up.

## 2021-01-26 NOTE — PROGRESS NOTES
Good morning Mr. Dickey, your labs are back.  Hemoglobin level has improved by over 2 points in the past month which is great.  Normal white blood cells.  Metabolic panel looks stable including kidney, liver function and electrolytes.  Let me know if you have any questions or concerns and we will recheck routinely.  Have a great day,    JULIA Quesada

## 2021-02-01 ENCOUNTER — HOSPITAL ENCOUNTER (OUTPATIENT)
Dept: PHYSICAL THERAPY | Facility: HOSPITAL | Age: 71
Setting detail: THERAPIES SERIES
Discharge: HOME | End: 2021-02-01

## 2021-02-01 DIAGNOSIS — Z98.1 S/P LUMBAR FUSION: Primary | ICD-10-CM

## 2021-02-01 DIAGNOSIS — Z47.89 ORTHOPEDIC AFTERCARE: ICD-10-CM

## 2021-02-01 DIAGNOSIS — M53.86 DECREASED ROM OF LUMBAR SPINE: ICD-10-CM

## 2021-02-01 PROCEDURE — 97530 THERAPEUTIC ACTIVITIES: CPT

## 2021-02-01 PROCEDURE — 97110 THERAPEUTIC EXERCISES: CPT

## 2021-02-01 NOTE — THERAPY PROGRESS REPORT/RE-CERT
Outpatient Physical Therapy Ortho Progress Note  [facility]     Patient Name: Taz Dickey  : 1950  MRN: [id number]  Today's Date: 2021      Visit Date: 2021    Visit Dx:    ICD-10-CM ICD-9-CM   1. S/P lumbar fusion  Z98.1 V45.4   2. Orthopedic aftercare  Z47.89 V54.9   3. Decreased ROM of lumbar spine  M53.86 724.9       Patient Active Problem List   Diagnosis   • Spondylolisthesis of lumbar region   • Lumbar radiculopathy   • Benign essential hypertension   • Erectile dysfunction of nonorganic origin   • Hypercholesterolemia   • Osteoarthritis   • Tinnitus of both ears   • Encounter for screening colonoscopy   • Medicare annual wellness visit, subsequent   • Nocturia   • Type 2 diabetes mellitus without complication, without long-term current use of insulin (CMS/[facility])   • Healthcare maintenance        Past Medical History:   Diagnosis Date   • Arthritis    • Colon polyps    • Depression    • DVT (deep venous thrombosis) (CMS/[facility])     IN LEFT LEG   • Full dentures    • Hyperlipidemia    • Hypertension    • Numbness and tingling     RIGHT FOOT   • PONV (postoperative nausea and vomiting)    • Right leg pain    • Type 2 diabetes mellitus without complication, without long-term current use of insulin (CMS/[facility]) 10/8/2019        Past Surgical History:   Procedure Laterality Date   • CERVICAL DISCECTOMY LAMINECTOMY DECOMPRESSION POSTERIOR FUSION WITH INSTRUMENTATION     • COLONOSCOPY N/A 2018    Procedure: COLONOSCOPY to TI and cecum with polypectomy;  Surgeon: Anil Garces MD;  Location: [facility] ENDOSCOPY;  Service:    • JOINT REPLACEMENT      LEFT HIP   • KNEE ARTHROSCOPY Left    • LUMBAR DISCECTOMY FUSION INSTRUMENTATION N/A 2020    Procedure: Lumbar 4 5 laminectomy with a posterior lateral fusion and instrumentation and interbody fusion;  Surgeon: Jeffrey Garcia MD;  Location: [facility] MAIN OR;  Service: Neurosurgery;  Laterality: N/A;   • VASECTOMY                         PT  Assessment/Plan     Row Name 02/01/21 0952          PT Assessment    Functional Limitations  Impaired gait;Impaired locomotion;Performance in work activities;Performance in leisure activities  -     Impairments  Balance;Endurance;Gait;Impaired flexibility;Impaired muscle power;Impaired reflex integrity;Joint mobility;Locomotion;Muscle strength;Pain;Poor body mechanics;Posture;Range of motion  -     Assessment Comments  Taz Dickey has been seen for 8 physical therapy sessions s/p L4-5 lami with fusion 11/18/2020. Treatment has included therapeutic exercise, therapeutic activity, gait training and patient education with home exercise program . Progress to physical therapy goals is fair as pt. Has met 2/3 STGs, and progressing to 5 LTGs. Pt. Continues to ambulate with step-through pattern with rwx, although requiring less use of UE during ambulation for support. Pt. Notes reduced intensity and frequency in shooting pains down R LE and improved ease with STS transition and rolling bilaterally. Cues needed to improve weight shift to R with gait and STS. Pt. voices frustration over slow progress as he was hopeful to be ambulating without AD at this time, reminded pt. Of expectations/timeframe for healing.  He will benefit from continued skilled physical therapy to address remaining impairments and functional limitations.  -     Patient would benefit from skilled therapy intervention  Yes  -        PT Plan    PT Frequency  2x/week  -     Predicted Duration of Therapy Intervention (PT)  10-12 visits  -     Planned CPT's?  PT RE-EVAL: 59260;PT THER PROC EA 15 MIN: 89391;PT THER ACT EA 15 MIN: 60273;PT MANUAL THERAPY EA 15 MIN: 62858;PT NEUROMUSC RE-EDUCATION EA 15 MIN: 50886;PT GAIT TRAINING EA 15 MIN: 22624;PT SELF CARE/HOME MGMT/TRAIN EA 15: 63330;PT HOT OR COLD PACK TREAT [facility];PT ELECTRICAL STIM UNATTEND: ;PT ULTRASOUND EA 15 MIN: 77287;PT THER MASS EA 15 MIN: 01818;PT THER SUPP EA 15 MIN  [facility]      PT Plan Comments  progress as appropriate; practice ambulation with cane when appropriate  -       User Key  (r) = Recorded By, (t) = Taken By, (c) = Cosigned By    Initials Name Provider Type    Beverly Santoro, PT Physical Therapist            OP Exercises     Row Name 02/01/21 0800             Subjective Comments    Subjective Comments  I had a rough couple days with my knee, but the stabbing, shooting pains have decreased in intensity and frequency  -         Subjective Pain    Able to rate subjective pain?  yes  -      Pre-Treatment Pain Level  3  -MH         Total Minutes    22072 - PT Therapeutic Exercise Minutes  30  -MH      15621 - PT Therapeutic Activity Minutes  15  -MH         Exercise 1    Exercise Name 1  LTR  -MH      Cueing 1  Verbal;Tactile  -MH      Reps 1  10  -MH         Exercise 2    Exercise Name 2  PPT  -MH      Cueing 2  Verbal  -MH      Reps 2  10  -MH      Time 2  5  -MH         Exercise 3    Exercise Name 3  --  -MH      Cueing 3  --  -MH      Reps 3  --  -MH         Exercise 4    Exercise Name 4  NuStep L5  -MH      Reps 4  UE and LE  -MH      Time 4  5  -MH         Exercise 5    Exercise Name 5  Seated LAQ  -MH      Cueing 5  Verbal  -MH      Reps 5  15e  -MH      Additional Comments  3#  -MH         Exercise 6    Exercise Name 6  S/L clam  -MH      Cueing 6  Verbal;Tactile  -MH      Reps 6  10e  -MH         Exercise 7    Exercise Name 7  hip add + bridge  -MH      Cueing 7  Verbal;Tactile;Demo  -MH      Reps 7  10  -MH      Time 7  3-5 sec  -MH         Exercise 9    Exercise Name 9  STS from mat with foam under hips  -      Cueing 9  Verbal;Demo  -MH      Sets 9  2  -MH      Reps 9  5  -MH      Time 9  blue band around hips to encourage wt shift R  -MH         Exercise 10    Exercise Name 10  Gait training with rolling walker  -      Cueing 10  Verbal;Demo cues for equal step sounds, upright trunk  -         Exercise 11    Exercise Name 11  side steps in // bars  -    "   Cueing 11  Verbal  -      Reps 11  3 laps  -      Additional Comments  toes forward  -         Exercise 12    Exercise Name 12  wt shifting BTB at hips to encourage RLE WB  -      Reps 12  10  -      Additional Comments  in standing  -         Exercise 13    Exercise Name 13  small step up  -      Cueing 13  Verbal  -      Reps 13  10e  -      Additional Comments  4\" quick stretch to quad and tazctile cues at glutes on R for power/muscle recruitment  -         Exercise 14    Exercise Name 14  wall wash  -      Cueing 14  Verbal  -      Reps 14  10  -      Additional Comments  upright posture  -         Exercise 15    Exercise Name 15  retro step R with opp arm swing  -      Cueing 15  Verbal;Demo  -      Reps 15  10  -      Additional Comments  quad set  -         Exercise 16    Exercise Name 16  --  -      Cueing 16  --  -      Reps 16  --  -         Exercise 17    Exercise Name 17  standing HS curls  -      Cueing 17  Verbal  -      Reps 17  10e  -      Additional Comments  3#  -         Exercise 18    Exercise Name 18  slow, high march at counter  -      Cueing 18  Verbal  -      Reps 18  2 laps  -      Additional Comments  increased difficulty with stance on R  -        User Key  (r) = Recorded By, (t) = Taken By, (c) = Cosigned By    Initials Name Provider Type    Beverly Santoro, PT Physical Therapist                       PT OP Goals     Row Name 02/01/21 0900          PT Short Term Goals    STG Date to Achieve  01/12/21  -     STG 1  Pt. Will be independent with initial HEP to improve self-management of condition.  -     STG 1 Progress  Met  -     STG 2  Pt. Will demonstrate proper log roll technique without cueing to reduce lumbar strain and preserve spine.  -     STG 2 Progress  Met  [facility]     STG 3  Pt. will ambulate with swing-through gait pattern and upright posture with rwx to improve mechanics to safely transition to ambulation with " SPC.  -     STG 3 Progress  Ongoing;Progressing  -     STG 3 Progress Comments  step-through  -        Long Term Goals    LTG 1  Pt. Will be independent with advanced HEP to improve long-term management of condition and independence.  -     LTG 1 Progress  Ongoing  -     LTG 2  Pt. Will score </= 40% on Modified Oswestry (from 74% on initial evaluation) to indicate improved perception of disability.  -     LTG 2 Progress  Ongoing  -     LTG 3  Pt. will demonstrate near normal gait pattern with equal stride length and heel strike with SPC to improve mobility and ability to participate in community activities.  -     LTG 3 Progress  Ongoing  -     LTG 4  Pt. will report intensity of pain </= 4/10 pain down into R LE to improve QOL and ease with transitional movements.  -     LTG 4 Progress  Ongoing  -     LTG 4 Progress Comments  less frequent and intense  -     LTG 5  Pt. will increase R LE strength to >/= 4+/5 to improve ease with transtional movements and stability.  -     LTG 5 Progress  Ongoing  -       User Key  (r) = Recorded By, (t) = Taken By, (c) = Cosigned By    Initials Name Provider Type     Beverly Garcia PT Physical Therapist          Therapy Education  Education Details: Timeframe for healing/expectations  Given: Mobility training, Symptoms/condition management, Posture/body mechanics  Program: Reinforced  How Provided: Verbal, Demonstration  Provided to: Patient  Level of Understanding: Verbalized, Demonstrated              Time Calculation:   Start Time: 0831  Stop Time: 0917  Time Calculation (min): 46 min  Total Timed Code Minutes- PT: 45 minute(s)  Therapy Charges for Today     Code Description Service Date Service Provider Modifiers Qty    28727356415  PT THERAPEUTIC ACT EA 15 MIN 2/1/2021 Beverly Garcia, PT GP 1    [id number]  PT THER PROC EA 15 MIN 2/1/2021 Beverly Garcia, PT GP 2                    Beverly Garcia PT  2/1/2021

## 2021-02-04 ENCOUNTER — HOSPITAL ENCOUNTER (OUTPATIENT)
Dept: PHYSICAL THERAPY | Facility: HOSPITAL | Age: 71
Setting detail: THERAPIES SERIES
Discharge: HOME | End: 2021-02-04

## 2021-02-04 DIAGNOSIS — Z98.1 S/P LUMBAR FUSION: Primary | ICD-10-CM

## 2021-02-04 DIAGNOSIS — M53.86 DECREASED ROM OF LUMBAR SPINE: ICD-10-CM

## 2021-02-04 DIAGNOSIS — Z47.89 ORTHOPEDIC AFTERCARE: ICD-10-CM

## 2021-02-04 PROCEDURE — 97530 THERAPEUTIC ACTIVITIES: CPT

## 2021-02-04 PROCEDURE — 97110 THERAPEUTIC EXERCISES: CPT

## 2021-02-04 NOTE — THERAPY TREATMENT NOTE
Outpatient Physical Therapy Ortho Treatment Note  [facility]     Patient Name: Taz Dickey  : 1950  MRN: [id number]  Today's Date: 2021      Visit Date: 2021    Visit Dx:    ICD-10-CM ICD-9-CM   1. S/P lumbar fusion  Z98.1 V45.4   2. Orthopedic aftercare  Z47.89 V54.9   3. Decreased ROM of lumbar spine  M53.86 724.9       Patient Active Problem List   Diagnosis   • Spondylolisthesis of lumbar region   • Lumbar radiculopathy   • Benign essential hypertension   • Erectile dysfunction of nonorganic origin   • Hypercholesterolemia   • Osteoarthritis   • Tinnitus of both ears   • Encounter for screening colonoscopy   • Medicare annual wellness visit, subsequent   • Nocturia   • Type 2 diabetes mellitus without complication, without long-term current use of insulin (CMS/[facility])   • Healthcare maintenance        Past Medical History:   Diagnosis Date   • Arthritis    • Colon polyps    • Depression    • DVT (deep venous thrombosis) (CMS/[facility])     IN LEFT LEG   • Full dentures    • Hyperlipidemia    • Hypertension    • Numbness and tingling     RIGHT FOOT   • PONV (postoperative nausea and vomiting)    • Right leg pain    • Type 2 diabetes mellitus without complication, without long-term current use of insulin (CMS/[facility]) 10/8/2019        Past Surgical History:   Procedure Laterality Date   • CERVICAL DISCECTOMY LAMINECTOMY DECOMPRESSION POSTERIOR FUSION WITH INSTRUMENTATION     • COLONOSCOPY N/A 2018    Procedure: COLONOSCOPY to TI and cecum with polypectomy;  Surgeon: Anil Garces MD;  Location: [facility] ENDOSCOPY;  Service:    • JOINT REPLACEMENT      LEFT HIP   • KNEE ARTHROSCOPY Left    • LUMBAR DISCECTOMY FUSION INSTRUMENTATION N/A 2020    Procedure: Lumbar 4 5 laminectomy with a posterior lateral fusion and instrumentation and interbody fusion;  Surgeon: Jeffrey Garcia MD;  Location: [facility] MAIN OR;  Service: Neurosurgery;  Laterality: N/A;   • VASECTOMY                         PT  Assessment/Plan     Row Name 02/04/21 1000          PT Assessment    Assessment Comments  Mr. Dickey continues to progress toward functional goals, he demonstrates improved transitional position performance when lifting RLE as well as improved gait pattern with verbal cues. Focused on RLE WB tolerance  with weight shifting, TKE, becky becky, and gait training in // bars with LUE. Pt requires 3 short seated rest breaks however overall tolerance for standing activities is improving. He remains appropraite for skilled PT d/t limitations in safety with ambulation/home navigation, inc pain, dec strength.  -RS        PT Plan    PT Plan Comments  COnt to gait train with single UE, resume small step ups, weight shifting  -RS       User Key  (r) = Recorded By, (t) = Taken By, (c) = Cosigned By    Initials Name Provider Type    RS Sienna Oleary, PT Physical Therapist            OP Exercises     Row Name 02/04/21 0900             Subjective Comments    Subjective Comments  Pt abble to move R LE better when getting into/out of car. Shooting pain is still there but significantly less freuent.  -RS         Subjective Pain    Able to rate subjective pain?  yes  -RS      Subjective Pain Comment  2-3  -RS         Total Minutes    90868 - PT Therapeutic Exercise Minutes  25  -RS      97688 - PT Therapeutic Activity Minutes  18  -RS         Exercise 1    Exercise Name 1  LTR  -RS      Cueing 1  Verbal;Tactile  -RS      Reps 1  10  -RS         Exercise 2    Exercise Name 2  --  -RS      Cueing 2  --  -RS      Reps 2  --  -RS      Time 2  --  -RS         Exercise 4    Exercise Name 4  NuStep L5  -RS      Reps 4  UE and LE  -RS      Time 4  5  -RS         Exercise 5    Exercise Name 5  Seated LAQ  -RS      Cueing 5  --  -RS      Reps 5  --  -RS      Additional Comments  next time  -RS         Exercise 6    Exercise Name 6  S/L clam  -RS      Cueing 6  Verbal;Tactile  -RS      Reps 6  15 ea  -RS         Exercise 7    Exercise Name 7  hip  "add + bridge  -RS      Cueing 7  Verbal;Tactile;Demo  -RS      Reps 7  10  -RS      Time 7  3-5 sec  -RS         Exercise 9    Exercise Name 9  STS from mat with foam under hips  -RS      Cueing 9  Verbal;Demo  -RS      Sets 9  3  -RS      Reps 9  5  -RS      Time 9  blue band around hips to encourage wt shift R  -RS      Additional Comments  last set no band, 1st set no foam and using hands, second and third set foam and no UE  -RS         Exercise 10    Exercise Name 10  Gait training with rolling walker  -RS      Cueing 10  Verbal;Demo cues for equal step sounds, upright trunk  -RS      Time 10  7 min  -RS      Additional Comments  cues for upright posture and to \"push walker like a grocery cart\"  -RS         Exercise 11    Exercise Name 11  side steps in // bars  -RS      Cueing 11  Verbal  -RS      Reps 11  3 laps  -RS      Additional Comments  toes forward  -RS         Exercise 12    Exercise Name 12  weight shifting A/P transitioning to becky becky  -RS      Reps 12  --  -RS      Time 12  3 min  -RS         Exercise 13    Exercise Name 13  small step up  -RS      Cueing 13  --  -RS      Reps 13  --  -RS      Additional Comments  --  -RS         Exercise 14    Exercise Name 14  --  -RS      Cueing 14  --  -RS      Reps 14  --  -RS         Exercise 15    Exercise Name 15  retro step R with opp arm swing  -RS      Cueing 15  Verbal;Demo  -RS      Reps 15  10  -RS         Exercise 16    Exercise Name 16  TKE with TB  -RS      Cueing 16  Verbal;Demo  -RS      Reps 16  10  -RS      Additional Comments  RLE, GTB, tactile cue at quad  -RS         Exercise 17    Exercise Name 17  gait in // bars LUE on bar only  -RS      Cueing 17  Verbal;Demo  -RS      Reps 17  4 laps  -RS      Additional Comments  cues for upright posture  -RS         Exercise 18    Exercise Name 18  slow, high march at counter  -RS      Cueing 18  Verbal  -RS      Reps 18  2 laps  -RS        User Key  (r) = Recorded By, (t) = Taken By, (c) = Cosigned " By    Initials Name Provider Type    Sienna Klein PT Physical Therapist                       PT OP Goals     Row Name 02/04/21 0900          PT Short Term Goals    STG Date to Achieve  01/12/21  -RS     STG 1  Pt. Will be independent with initial HEP to improve self-management of condition.  -RS     STG 1 Progress  Met  -RS     STG 2  Pt. Will demonstrate proper log roll technique without cueing to reduce lumbar strain and preserve spine.  -RS     STG 2 Progress  Met  -RS     STG 3  Pt. will ambulate with swing-through gait pattern and upright posture with rwx to improve mechanics to safely transition to ambulation with SPC.  -RS     STG 3 Progress  Ongoing;Progressing  -RS     STG 3 Progress Comments  step through, improves with cues  -RS        Long Term Goals    LTG 1  Pt. Will be independent with advanced HEP to improve long-term management of condition and independence.  -RS     LTG 1 Progress  Ongoing  -RS     LTG 2  Pt. Will score </= 40% on Modified Oswestry (from 74% on initial evaluation) to indicate improved perception of disability.  -RS     LTG 2 Progress  Ongoing  -RS     LTG 3  Pt. will demonstrate near normal gait pattern with equal stride length and heel strike with SPC to improve mobility and ability to participate in community activities.  -RS     LTG 3 Progress  Ongoing  -RS     LTG 4  Pt. will report intensity of pain </= 4/10 pain down into R LE to improve QOL and ease with transitional movements.  -RS     LTG 4 Progress  Ongoing  -RS     LTG 5  Pt. will increase R LE strength to >/= 4+/5 to improve ease with transtional movements and stability.  -RS     LTG 5 Progress  Ongoing  -RS       User Key  (r) = Recorded By, (t) = Taken By, (c) = Cosigned By    Initials Name Provider Type    Sienna Klein, TOLU Physical Therapist                         Time Calculation:   Start Time: 0900  Stop Time: 0945  Time Calculation (min): 45 min  Therapy Charges for Today     Code Description  Service Date Service Provider Modifiers Qty    62415257567 HC PT THER PROC EA 15 MIN 2/4/2021 Sienna Oleary, PT  2    [id number] HC PT THERAPEUTIC ACT EA 15 MIN 2/4/2021 Sienna Oleary, PT  1                    Sienna Oleary, PT  2/4/2021

## 2021-02-08 ENCOUNTER — TELEPHONE (OUTPATIENT)
Dept: INTERNAL MEDICINE | Facility: CLINIC | Age: 71
End: 2021-02-08

## 2021-02-08 ENCOUNTER — TRANSCRIBE ORDERS (OUTPATIENT)
Dept: PHYSICAL THERAPY | Facility: HOSPITAL | Age: 71
End: 2021-02-08

## 2021-02-08 ENCOUNTER — HOSPITAL ENCOUNTER (OUTPATIENT)
Dept: PHYSICAL THERAPY | Facility: HOSPITAL | Age: 71
Setting detail: THERAPIES SERIES
Discharge: HOME | End: 2021-02-08

## 2021-02-08 DIAGNOSIS — M53.86 DECREASED ROM OF LUMBAR SPINE: ICD-10-CM

## 2021-02-08 DIAGNOSIS — Z98.1 S/P LUMBAR FUSION: Primary | ICD-10-CM

## 2021-02-08 DIAGNOSIS — Z47.89 ORTHOPEDIC AFTERCARE: ICD-10-CM

## 2021-02-08 PROCEDURE — 97110 THERAPEUTIC EXERCISES: CPT

## 2021-02-08 PROCEDURE — 97530 THERAPEUTIC ACTIVITIES: CPT

## 2021-02-08 NOTE — TELEPHONE ENCOUNTER
Perfect, do you mind bringing it back and putting it on the folder on my desk when you get a chance? Thank you!

## 2021-02-08 NOTE — TELEPHONE ENCOUNTER
Patient aware form has been completed and signed by Jade DUMONT. Place at the  ready for . Patient states he will be by tomorrow

## 2021-02-08 NOTE — THERAPY PROGRESS REPORT/RE-CERT
Outpatient Physical Therapy Ortho Progress Note  [facility]     Patient Name: Taz Dickey  : 1950  MRN: [id number]  Today's Date: 2021      Visit Date: 2021    Visit Dx:    ICD-10-CM ICD-9-CM   1. S/P lumbar fusion  Z98.1 V45.4   2. Orthopedic aftercare  Z47.89 V54.9   3. Decreased ROM of lumbar spine  M53.86 724.9       Patient Active Problem List   Diagnosis   • Spondylolisthesis of lumbar region   • Lumbar radiculopathy   • Benign essential hypertension   • Erectile dysfunction of nonorganic origin   • Hypercholesterolemia   • Osteoarthritis   • Tinnitus of both ears   • Encounter for screening colonoscopy   • Medicare annual wellness visit, subsequent   • Nocturia   • Type 2 diabetes mellitus without complication, without long-term current use of insulin (CMS/[facility])   • Healthcare maintenance        Past Medical History:   Diagnosis Date   • Arthritis    • Colon polyps    • Depression    • DVT (deep venous thrombosis) (CMS/[facility])     IN LEFT LEG   • Full dentures    • Hyperlipidemia    • Hypertension    • Numbness and tingling     RIGHT FOOT   • PONV (postoperative nausea and vomiting)    • Right leg pain    • Type 2 diabetes mellitus without complication, without long-term current use of insulin (CMS/[facility]) 10/8/2019        Past Surgical History:   Procedure Laterality Date   • CERVICAL DISCECTOMY LAMINECTOMY DECOMPRESSION POSTERIOR FUSION WITH INSTRUMENTATION     • COLONOSCOPY N/A 2018    Procedure: COLONOSCOPY to TI and cecum with polypectomy;  Surgeon: Anil Garces MD;  Location: [facility] ENDOSCOPY;  Service:    • JOINT REPLACEMENT      LEFT HIP   • KNEE ARTHROSCOPY Left    • LUMBAR DISCECTOMY FUSION INSTRUMENTATION N/A 2020    Procedure: Lumbar 4 5 laminectomy with a posterior lateral fusion and instrumentation and interbody fusion;  Surgeon: Jeffrey Garcia MD;  Location: [facility] MAIN OR;  Service: Neurosurgery;  Laterality: N/A;   • VASECTOMY         PT Ortho     Row  Name 02/08/21 0800       Myotomal Screen- Lower Quarter Clearing    Hip flexion (L2)  Left:;4+ (Good +);Right:;4- (Good -)  -    Knee extension (L3)  Bilateral:;4+ (Good +)  -    Ankle DF (L4)  Bilateral:;5 (Normal)  -    Knee flexion (S2)  Left:;4 (Good);Right:;3+ (Fair +)  -      User Key  (r) = Recorded By, (t) = Taken By, (c) = Cosigned By    Initials Name Provider Type     Beverly Garcia, PT Physical Therapist                      PT Assessment/Plan     Row Name 02/08/21 1050          PT Assessment    Functional Limitations  Impaired gait;Impaired locomotion;Performance in work activities;Performance in leisure activities  -     Impairments  Balance;Endurance;Gait;Impaired flexibility;Impaired muscle power;Impaired reflex integrity;Joint mobility;Locomotion;Muscle strength;Pain;Poor body mechanics;Posture;Range of motion  -     Assessment Comments  Taz Dickey has been seen for 10 physical therapy sessions s/p L4/5 lami with fusion 11/18/2020. Treatment has included therapeutic exercise, therapeutic activity, gait training and patient education with home exercise program . Progress to physical therapy goals is fair as pt. Has met 2/3 STGs, and progressing to 5 LTGs. Pt. Demonstrates improved ease with transitional movements including STS, supine<>sit, and rolling bilaterally. Pt. Overall pain levels reduced with primary complaint of stiffness, intensity and frequency of shooting pain decreasing and improvements in strength and function as indicated by Modified Oswestry score of 42% (down from 74% at initial evaluation, where 0% is no disability). Pt. Continues to ambulate with rwx and step-though gait pattern, cues to increase step length, upright posture, and to reduce use of UE on walker. Pt. Demonstrates weakness R LE> L LE, although improving with cues to weight shift and emphasis on WB'ing on R. Pt.  He will benefit from continued skilled physical therapy to address remaining impairments  and functional limitations and to improve balance and safety with ADLs.  -     Patient/caregiver participated in establishment of treatment plan and goals  Yes  -     Patient would benefit from skilled therapy intervention  Yes  -        PT Plan    PT Frequency  2x/week  -     Predicted Duration of Therapy Intervention (PT)  12 visits  -     Planned CPT's?  PT RE-EVAL: 57860;PT THER PROC EA 15 MIN: 79812;PT THER ACT EA 15 MIN: 88497;PT MANUAL THERAPY EA 15 MIN: 82782;PT NEUROMUSC RE-EDUCATION EA 15 MIN: 68504;PT GAIT TRAINING EA 15 MIN: 98966;PT HOT OR COLD PACK TREAT MCARE;PT SELF CARE/HOME MGMT/TRAIN EA 15: 96948;PT ELECTRICAL STIM UNATTEND: ;PT ULTRASOUND EA 15 MIN: 64977;PT THER MASS EA 15 MIN: 43324  -     PT Plan Comments  Continue with gait training, when appropriate progress to quad cane. Consider lateral step ups  -       User Key  (r) = Recorded By, (t) = Taken By, (c) = Cosigned By    Initials Name Provider Type     Beverly Garcia, PT Physical Therapist            OP Exercises     Row Name 02/08/21 0800             Subjective Comments    Subjective Comments  I think I am doing better, it is still stiff when standing too long  -         Subjective Pain    Able to rate subjective pain?  yes  -      Pre-Treatment Pain Level  3  -         Total Minutes    26165 - PT Therapeutic Exercise Minutes  30  -      94652 - PT Therapeutic Activity Minutes  12  -MH         Exercise 1    Exercise Name 1  --  -MH      Cueing 1  --  -MH      Reps 1  --  -         Exercise 4    Exercise Name 4  NuStep L5  -      Reps 4  UE and LE  -      Time 4  5  -         Exercise 5    Exercise Name 5  Seated LAQ  -      Cueing 5  Verbal  -MH      Reps 5  15e  -      Additional Comments  4#  -         Exercise 6    Exercise Name 6  S/L clam  -      Cueing 6  Verbal;Tactile  -MH      Reps 6  15 ea  -         Exercise 7    Exercise Name 7  hip add + bridge  -      Cueing 7   "Verbal;Tactile;Demo  -      Reps 7  15  -MH      Time 7  3-5 sec  -         Exercise 9    Exercise Name 9  STS from mat with foam under hips  -      Cueing 9  Verbal;Demo  -      Sets 9  3  -MH      Reps 9  5  -      Time 9  blue band around hips to encourage wt shift R  -      Additional Comments  slight staggered stance with R LE back  -         Exercise 10    Exercise Name 10  Gait training with rolling walker  -      Cueing 10  Verbal;Demo cues for equal step sounds, upright trunk  -      Time 10  5 min  -      Additional Comments  tactile cues for posture, increase step length on L, push rwx like cart, try not to bear weight through walker  -         Exercise 11    Exercise Name 11  side steps in // bars  -      Cueing 11  Verbal  -      Reps 11  3 laps  -      Additional Comments  toes forward  -         Exercise 12    Exercise Name 12  weight shifting A/P transitioning to becky becky  -      Time 12  --  -      Additional Comments  next time  -         Exercise 13    Exercise Name 13  small step up  -      Cueing 13  Verbal  -      Reps 13  10e  -      Additional Comments  4\", quick stretch to R quad on up, tactile cues at glutes. POWER up; B UE on // bars  -         Exercise 15    Exercise Name 15  retro step R with opp arm swing  -      Cueing 15  --  -      Reps 15  --  -      Additional Comments  next time  -         Exercise 16    Exercise Name 16  TKE with TB  -      Cueing 16  Verbal;Demo  -      Reps 16  10  -MH      Additional Comments  R LE, BTB, tactile cue at quad  -         Exercise 17    Exercise Name 17  gait in // bars LUE on bar only  -      Cueing 17  Verbal;Demo  -      Reps 17  4 laps  -         Exercise 18    Exercise Name 18  slow, high march at counter  -      Cueing 18  Verbal  -      Reps 18  2 laps  -      Additional Comments  L UE on // bars  -        User Key  (r) = Recorded By, (t) = Taken By, (c) = Cosigned By    " Initials Name Provider Type     NichoBeverly lunsford, PT Physical Therapist                       PT OP Goals     Row Name 02/08/21 0800          PT Short Term Goals    STG Date to Achieve  01/12/21  -     STG 1  Pt. Will be independent with initial HEP to improve self-management of condition.  -     STG 1 Progress  Met  -     STG 2  Pt. Will demonstrate proper log roll technique without cueing to reduce lumbar strain and preserve spine.  -     STG 2 Progress  Met  -     STG 3  Pt. will ambulate with swing-through gait pattern and upright posture with rwx to improve mechanics to safely transition to ambulation with SPC.  -     STG 3 Progress  Ongoing;Progressing  -     STG 3 Progress Comments  step-through, improves with cues. Continues to use rwx.  -        Long Term Goals    LTG 1  Pt. Will be independent with advanced HEP to improve long-term management of condition and independence.  -     LTG 1 Progress  Ongoing;Progressing  -     LTG 2  Pt. Will score </= 40% on Modified Oswestry (from 74% on initial evaluation) to indicate improved perception of disability.  -     LTG 2 Progress  Ongoing;Progressing  -     LTG 2 Progress Comments  42%  -     LTG 3  Pt. will demonstrate near normal gait pattern with equal stride length and heel strike with SPC to improve mobility and ability to participate in community activities.  -     LTG 3 Progress  Ongoing  -     LTG 3 Progress Comments  continues to ambulate with rwx for safety; working on 1 UE support in parallel bars  -     LTG 4  Pt. will report intensity of pain </= 4/10 pain down into R LE to improve QOL and ease with transitional movements.  -     [facility]  shooting pains 7/10, down from 9/10 at eval. Less frequent and intense  -     LT 5  Pt. will increase R LE strength to >/= 4+/5 to improve ease with transtional movements and stability.  -     LTG 5 Progress  Ongoing  -[facility]  5 Progress Comments  see ortho  -MH       User Key  (r) = Recorded By, (t) = Taken By, (c) = Cosigned By    Initials Name Provider Type     Beverly Garcia, TOLU Physical Therapist          Therapy Education  Education Details: Hold on progression to cane at home until practiced in clinic and demosntrates appropriaate balance and safety  Given: Mobility training, Symptoms/condition management, Posture/body mechanics  Program: Reinforced  How Provided: Verbal, Demonstration  Provided to: Patient  Level of Understanding: Verbalized, Demonstrated    Outcome Measure Options: Modifed Owestry  Modified Oswestry  Modified Oswestry Score/Comments: 42% (21/50)      Time Calculation:   Start Time: 0835  Stop Time: 0917  Time Calculation (min): 42 min  Total Timed Code Minutes- PT: 42 minute(s)  Therapy Charges for Today     Code Description Service Date Service Provider Modifiers Qty    [id number]  PT THERAPEUTIC ACT EA 15 MIN 2/8/2021 Beverly Garcia, PT GP 1    [id number]  PT THER PROC EA 15 MIN 2/8/2021 Beverly Garcia, PT GP 2          PT G-Codes  Outcome Measure Options: Modifed Owestry  Modified Oswestry Score/Comments: 42% (21/50)         Beverly Garcia PT  2/8/2021

## 2021-02-08 NOTE — TELEPHONE ENCOUNTER
PATIENT IS REQUESTING TO GET A HANDI CAP STICKER FROM [facility].         PATIENT CALL BACK:[phone]

## 2021-02-15 ENCOUNTER — HOSPITAL ENCOUNTER (OUTPATIENT)
Dept: PHYSICAL THERAPY | Facility: HOSPITAL | Age: 71
Setting detail: THERAPIES SERIES
Discharge: HOME | End: 2021-02-15

## 2021-02-15 DIAGNOSIS — Z98.1 S/P LUMBAR FUSION: Primary | ICD-10-CM

## 2021-02-15 DIAGNOSIS — Z47.89 ORTHOPEDIC AFTERCARE: ICD-10-CM

## 2021-02-15 DIAGNOSIS — M53.86 DECREASED ROM OF LUMBAR SPINE: ICD-10-CM

## 2021-02-15 PROCEDURE — 97530 THERAPEUTIC ACTIVITIES: CPT

## 2021-02-15 PROCEDURE — 97110 THERAPEUTIC EXERCISES: CPT

## 2021-02-15 NOTE — THERAPY TREATMENT NOTE
Outpatient Physical Therapy Ortho Treatment Note  [facility]     Patient Name: Taz Dickey  : 1950  MRN: [id number]  Today's Date: 2/15/2021      Visit Date: 02/15/2021    Visit Dx:    ICD-10-CM ICD-9-CM   1. S/P lumbar fusion  Z98.1 V45.4   2. Orthopedic aftercare  Z47.89 V54.9   3. Decreased ROM of lumbar spine  M53.86 724.9       Patient Active Problem List   Diagnosis   • Spondylolisthesis of lumbar region   • Lumbar radiculopathy   • Benign essential hypertension   • Erectile dysfunction of nonorganic origin   • Hypercholesterolemia   • Osteoarthritis   • Tinnitus of both ears   • Encounter for screening colonoscopy   • Medicare annual wellness visit, subsequent   • Nocturia   • Type 2 diabetes mellitus without complication, without long-term current use of insulin (CMS/[facility])   • Healthcare maintenance        Past Medical History:   Diagnosis Date   • Arthritis    • Colon polyps    • Depression    • DVT (deep venous thrombosis) (CMS/[facility])     IN LEFT LEG   • Full dentures    • Hyperlipidemia    • Hypertension    • Numbness and tingling     RIGHT FOOT   • PONV (postoperative nausea and vomiting)    • Right leg pain    • Type 2 diabetes mellitus without complication, without long-term current use of insulin (CMS/[facility]) 10/8/2019        Past Surgical History:   Procedure Laterality Date   • CERVICAL DISCECTOMY LAMINECTOMY DECOMPRESSION POSTERIOR FUSION WITH INSTRUMENTATION     • COLONOSCOPY N/A 2018    Procedure: COLONOSCOPY to TI and cecum with polypectomy;  Surgeon: Anil Garces MD;  Location: [facility] ENDOSCOPY;  Service:    • JOINT REPLACEMENT      LEFT HIP   • KNEE ARTHROSCOPY Left    • LUMBAR DISCECTOMY FUSION INSTRUMENTATION N/A 2020    Procedure: Lumbar 4 5 laminectomy with a posterior lateral fusion and instrumentation and interbody fusion;  Surgeon: Jeffrey Garcia MD;  Location: [facility] MAIN OR;  Service: Neurosurgery;  Laterality: N/A;   • VASECTOMY                          PT Assessment/Plan     Row Name 02/15/21 1232          PT Assessment    Assessment Comments  Mr. Dickey continues to progress with core and LE strengthening following a Lumbar fusion 11/18.  He continues to use a RWx, but is working toward using a cane. He shows no LOB, but does have some pain in R knee with sit-stand activity today,when we shifted more weight to R.  Also experienced pain with becky-becky step when weight was shifted to R LE.  Doing well with PPT in supine.  -LP     Please refer to paper survey for additional self-reported information  Yes  -LP     Rehab Potential  Good  -LP     Patient/caregiver participated in establishment of treatment plan and goals  Yes  -LP     Patient would benefit from skilled therapy intervention  Yes  -LP        PT Plan    PT Frequency  2x/week  -LP     Predicted Duration of Therapy Intervention (PT)  4 weeks  -LP     PT Plan Comments  Continue with current POC  -LP       User Key  (r) = Recorded By, (t) = Taken By, (c) = Cosigned By    Initials Name Provider Type    LP Vale Rivera, PT Physical Therapist            OP Exercises     Row Name 02/15/21 0900             Subjective Comments    Subjective Comments  I've been having some pain in the knee for the past week or so.  -LP         Subjective Pain    Able to rate subjective pain?  yes  -LP      Pre-Treatment Pain Level  3  -LP         Total Minutes    66626 - PT Therapeutic Exercise Minutes  30  -LP      37646 - PT Therapeutic Activity Minutes  12  -LP         Exercise 1    Exercise Name 1  LTR  -LP      Cueing 1  Verbal;Tactile  -LP      Reps 1  10  -LP      Additional Comments  limited range  -LP         Exercise 2    Exercise Name 2  PPT  -LP      Cueing 2  Verbal  -LP      Reps 2  10  -LP      Time 2  5s  -LP         Exercise 4    Exercise Name 4  NuStep L5  -LP      Cueing 4  Verbal  -LP      Time 4  5  -LP      Additional Comments  L5  -LP         Exercise 5    Exercise Name 5  Seated LAQ   "-LP      Cueing 5  Verbal  -LP      Reps 5  15  -LP      Additional Comments  4#  -LP         Exercise 6    Exercise Name 6  S/L clam  -LP      Cueing 6  Verbal;Tactile  -LP      Reps 6  15  -LP      Additional Comments  B  -LP         Exercise 7    Exercise Name 7  hip add + bridge  -LP      Cueing 7  Verbal;Tactile  -LP      Reps 7  15  -LP      Time 7  3-5 s  -LP      Additional Comments  cuing for T-A  -LP         Exercise 9    Exercise Name 9  STS from mat with foam under hips  -LP      Cueing 9  Verbal;Tactile;Demo  -LP      Reps 9  3  -LP      Additional Comments  tried R LE back, but pt continuously had pain in R knee.  Stopped after 3.  -LP         Exercise 10    Exercise Name 10  Gait training with rolling walker  -LP      Cueing 10  Verbal;Demo  -LP      Time 10  600'  -LP      Additional Comments  cues for standing straighter. Pt trying not to push on Rwx as much  -LP         Exercise 11    Exercise Name 11  side steps in // bars  -LP      Cueing 11  Verbal;Demo  -LP      Reps 11  4 laps  -LP      Additional Comments  cuing for toes  -LP         Exercise 12    Exercise Name 12  weight shifting A/P transitioning to becky becky  -LP      Cueing 12  Verbal;Tactile;Demo  -LP      Reps 12  10-15  -LP      Time 12  tactile cuing for weight shift and hip rotation  -LP      Additional Comments  tried with either leg forward- more trouble with R leg stationary-knee pain again.  -LP         Exercise 13    Exercise Name 13  small step up  -LP      Cueing 13  Verbal  -LP      Reps 13  10  -LP      Additional Comments  4\"step  -LP         Exercise 14    Exercise Name 14  lateral step up 4\"  -LP      Cueing 14  Verbal  -LP      Reps 14  7  -LP      Additional Comments  using bar  -LP         Exercise 16    Exercise Name 16  TKE with TB  -LP      Cueing 16  Verbal;Tactile;Demo  -LP      Reps 16  10  -LP      Additional Comments  GTB, pain in R knee again.  -LP         Exercise 17    Exercise Name 17  gait in // bars LUE on " bar only  -LP      Cueing 17  Verbal;Demo  -LP      Reps 17  4 laps  -LP      Additional Comments  helping to simulate/instruct for cane use  -LP        User Key  (r) = Recorded By, (t) = Taken By, (c) = Cosigned By    Initials Name Provider Type    Vale Gil, PT Physical Therapist                       PT OP Goals     Row Name 02/15/21 1200          PT Short Term Goals    STG 1  Pt. Will be independent with initial HEP to improve self-management of condition.  -LP     STG 1 Progress  Met  -LP     STG 2  Pt. Will demonstrate proper log roll technique without cueing to reduce lumbar strain and preserve spine.  -LP     STG 2 Progress  Met  -LP     STG 3  Pt. will ambulate with swing-through gait pattern and upright posture with rwx to improve mechanics to safely transition to ambulation with SPC.  -LP     STG 3 Progress  Ongoing;Progressing  -LP     STG 3 Progress Comments  using swing thru more, but still requiring some cuing for posture, which he is better with in IIbars  -LP        Long Term Goals    LTG 1  Pt. Will be independent with advanced HEP to improve long-term management of condition and independence.  -LP     LTG 1 Progress  Ongoing;Progressing  -LP     LTG 1 Progress Comments  demonstrates good knowledge of his HEP  -LP     LTG 2  Pt. Will score </= 40% on Modified Oswestry (from 74% on initial evaluation) to indicate improved perception of disability.  -LP     LTG 2 Progress  Ongoing;Progressing  -LP     LTG 3  Pt. will demonstrate near normal gait pattern with equal stride length and heel strike with SPC to improve mobility and ability to participate in community activities.  -LP     LTG 3 Progress  Ongoing  -LP     LTG 3 Progress Comments  working toward cane assist  -LP     LTG 4  Pt. will report intensity of pain </= 4/10 pain down into R LE to improve QOL and ease with transitional movements.  -LP     LTG 4 Progress  Ongoing  -LP     LTG 4 Progress Comments  Reporting a pain in his R  knee with certain positions.  -LP     LTG 5  Pt. will increase R LE strength to >/= 4+/5 to improve ease with transtional movements and stability.  -LP     LTG 5 Progress  Ongoing  -LP       User Key  (r) = Recorded By, (t) = Taken By, (c) = Cosigned By    Initials Name Provider Type    LP Vale Rivera PT Physical Therapist          Therapy Education  Education Details: use of cane, posture  Given: HEP, Posture/body mechanics, Symptoms/condition management, Fall prevention and home safety  Program: Reinforced  How Provided: Verbal, Demonstration  Provided to: Patient  Level of Understanding: Teach back education performed              Time Calculation:   Start Time: 0915  Stop Time: 1000  Time Calculation (min): 45 min  Therapy Charges for Today     Code Description Service Date Service Provider Modifiers Qty    [id number]  PT THER PROC EA 15 MIN 2/15/2021 Vale Rivera PT GP 2    [id number]  PT THERAPEUTIC ACT EA 15 MIN 2/15/2021 Vale Rivera, PT GP 1                    Vale Rivera PT  2/15/2021

## 2021-02-17 ENCOUNTER — HOSPITAL ENCOUNTER (OUTPATIENT)
Dept: PHYSICAL THERAPY | Facility: HOSPITAL | Age: 71
Setting detail: THERAPIES SERIES
Discharge: HOME | End: 2021-02-17

## 2021-02-17 ENCOUNTER — HOSPITAL ENCOUNTER (OUTPATIENT)
Dept: GENERAL RADIOLOGY | Facility: HOSPITAL | Age: 71
Discharge: HOME | End: 2021-02-17
Admitting: NEUROLOGICAL SURGERY

## 2021-02-17 DIAGNOSIS — Z47.89 ORTHOPEDIC AFTERCARE: ICD-10-CM

## 2021-02-17 DIAGNOSIS — Z09 FOLLOW-UP EXAMINATION FOLLOWING SURGERY: ICD-10-CM

## 2021-02-17 DIAGNOSIS — Z98.1 S/P LUMBAR FUSION: Primary | ICD-10-CM

## 2021-02-17 DIAGNOSIS — M53.86 DECREASED ROM OF LUMBAR SPINE: ICD-10-CM

## 2021-02-17 PROCEDURE — 97110 THERAPEUTIC EXERCISES: CPT

## 2021-02-17 PROCEDURE — 97530 THERAPEUTIC ACTIVITIES: CPT

## 2021-02-17 PROCEDURE — 72114 X-RAY EXAM L-S SPINE BENDING: CPT

## 2021-02-17 NOTE — THERAPY PROGRESS REPORT/RE-CERT
Outpatient Physical Therapy Ortho Progress Note  [facility]     Patient Name: Taz Dickey  : 1950  MRN: [id number]  Today's Date: 2021      Visit Date: 2021    Visit Dx:    ICD-10-CM ICD-9-CM   1. S/P lumbar fusion  Z98.1 V45.4   2. Orthopedic aftercare  Z47.89 V54.9   3. Decreased ROM of lumbar spine  M53.86 724.9       Patient Active Problem List   Diagnosis   • Spondylolisthesis of lumbar region   • Lumbar radiculopathy   • Benign essential hypertension   • Erectile dysfunction of nonorganic origin   • Hypercholesterolemia   • Osteoarthritis   • Tinnitus of both ears   • Encounter for screening colonoscopy   • Medicare annual wellness visit, subsequent   • Nocturia   • Type 2 diabetes mellitus without complication, without long-term current use of insulin (CMS/[facility])   • Healthcare maintenance        Past Medical History:   Diagnosis Date   • Arthritis    • Colon polyps    • Depression    • DVT (deep venous thrombosis) (CMS/[facility])     IN LEFT LEG   • Full dentures    • Hyperlipidemia    • Hypertension    • Numbness and tingling     RIGHT FOOT   • PONV (postoperative nausea and vomiting)    • Right leg pain    • Type 2 diabetes mellitus without complication, without long-term current use of insulin (CMS/[facility]) 10/8/2019        Past Surgical History:   Procedure Laterality Date   • CERVICAL DISCECTOMY LAMINECTOMY DECOMPRESSION POSTERIOR FUSION WITH INSTRUMENTATION     • COLONOSCOPY N/A 2018    Procedure: COLONOSCOPY to TI and cecum with polypectomy;  Surgeon: Anil Garces MD;  Location: [facility] ENDOSCOPY;  Service:    • JOINT REPLACEMENT      LEFT HIP   • KNEE ARTHROSCOPY Left    • LUMBAR DISCECTOMY FUSION INSTRUMENTATION N/A 2020    Procedure: Lumbar 4 5 laminectomy with a posterior lateral fusion and instrumentation and interbody fusion;  Surgeon: Jeffrey Garcia MD;  Location: [facility] MAIN OR;  Service: Neurosurgery;  Laterality: N/A;   • VASECTOMY                         PT  Assessment/Plan     Row Name 02/17/21 1047          PT Assessment    Functional Limitations  Impaired gait;Impaired locomotion;Performance in work activities;Performance in leisure activities  -     Impairments  Balance;Endurance;Gait;Impaired flexibility;Impaired muscle power;Impaired reflex integrity;Joint mobility;Locomotion;Muscle strength;Pain;Poor body mechanics;Posture;Range of motion  -     Assessment Comments  Taz Dickey has been seen for 12 physical therapy sessions s/p L4/5 lami with fusion 11/18/2020. Treatment has included therapeutic exercise, therapeutic activity, gait training and patient education with home exercise program . Progress to physical therapy goals is fair as pt. Has met 2/3 STGs, and 1/4 LTGs. He reports improved pain levels with reduction in frequency and intensity of pain with highest level 6/10. Pt. Continues to ambulate with rwx for safety but is progressing toward swing through gait, however is step-through when in community. Working with pt. On single UE support in parallel bars and at ballet bar to progress toward use of cane when appropriate. Pt. Continues with B LE weakness R>L, however, requires less assistance during transitional movements and improved weight bearing through R with gait and STS. He will benefit from continued skilled physical therapy to address remaining impairments and functional limitations.  -     Patient/caregiver participated in establishment of treatment plan and goals  Yes  -     Patient would benefit from skilled therapy intervention  Yes  -        PT Plan    PT Frequency  2x/week  -     Predicted Duration of Therapy Intervention (PT)  12 visits  -     Planned CPT's?  PT RE-EVAL: 44755;PT THER PROC EA 15 MIN: 00231;PT THER ACT EA 15 MIN: 03595;PT MANUAL THERAPY EA 15 MIN: 85349;PT NEUROMUSC RE-EDUCATION EA 15 MIN: 35860;PT GAIT TRAINING EA 15 MIN: 55135;PT SELF CARE/HOME MGMT/TRAIN EA 15: 26810;PT HOT OR COLD PACK TREAT [facility];PT  ELECTRICAL STIM UNATTEND: ;PT ULTRASOUND EA 15 MIN: 00854;PT SELF CARE/MGMT/TRAIN 15 MIN: 63181;PT THER PROC GROUP: 47133;PT THER MASS EA 15 MIN: 20255  -       User Key  (r) = Recorded By, (t) = Taken By, (c) = Cosigned By    Initials Name Provider Type     Beverly Garcia, PT Physical Therapist            OP Exercises     Row Name 02/17/21 0900             Subjective Comments    Subjective Comments  Doing better, my knees hurt  -         Subjective Pain    Able to rate subjective pain?  yes  -MH      Pre-Treatment Pain Level  3  -MH         Total Minutes    92371 - PT Therapeutic Exercise Minutes  30  -MH      91620 - PT Therapeutic Activity Minutes  15  -MH         Exercise 1    Exercise Name 1  LTR  -MH      Cueing 1  Verbal;Tactile  -MH      Reps 1  10  -MH         Exercise 2    Exercise Name 2  sciatic nerve floss  -MH      Cueing 2  Verbal  -MH      Reps 2  10  -MH      Time 2  5s  -MH         Exercise 4    Exercise Name 4  NuStep L5  -MH      Cueing 4  Verbal  -MH      Time 4  5  -MH         Exercise 5    Exercise Name 5  Seated LAQ  -MH      Cueing 5  Verbal  -MH      Reps 5  15  -MH      Additional Comments  5#  -MH         Exercise 6    Exercise Name 6  --  -MH      Cueing 6  --  -MH      Reps 6  --  -MH         Exercise 7    Exercise Name 7  --  -MH      Cueing 7  --  -MH      Reps 7  --  -MH      Time 7  --  -MH         Exercise 9    Exercise Name 9  STS from mat with foam under hips  -MH      Cueing 9  Verbal;Tactile;Demo  -MH      Sets 9  2  -MH      Reps 9  5  -MH      Additional Comments  R LE nack, BTB around hips  -MH         Exercise 10    Exercise Name 10  Gait training with rolling walker  -MH      Cueing 10  Verbal;Demo  -MH      Time 10  community  -      Additional Comments  from clinic to get X-Ray  -MH         Exercise 11    Exercise Name 11  side steps at ballet bar  -MH      Cueing 11  Verbal;Demo  -MH      Reps 11  3 laps  -MH      Additional Comments  cues for toes  -MH    "      Exercise 12    Exercise Name 12  [facility]  -      Cueing 12  Verbal;Tactile;Demo  -      Reps 12  10-15  -      Time 12  tactile cuing for weight shift and hip rotation  -      Additional Comments  L UE only  -         Exercise 13    Exercise Name 13  small step up  -      Cueing 13  Verbal  -      Reps 13  10  -      Time 13  increased difficulty with step up on R  -      Additional Comments  4\" step, quick stretch to quads and cued at glutes  -         Exercise 14    Exercise Name 14  lateral step up 4\"  -      Cueing 14  Verbal  -      Reps 14  10  -      Time 14  B UE for up on R  -      Additional Comments  using ballet bar  -         Exercise 16    Exercise Name 16  TKE with TB  -      Cueing 16  Verbal;Tactile;[facility]      Reps 16  10  -      Additional Comments  GTB  -         Exercise 17    Exercise Name 17  gait at ballet bar LUE on bar only  -      Cueing 17  Verbal;Demo  -      Reps 17  4 laps  -      Time 17  L UE to simulate use of cane  -      Additional Comments  after [facility]        User Key  (r) = Recorded By, (t) = Taken By, (c) = Cosigned By    Initials Name Provider Type     Beverly Garcia, PT Physical Therapist                       PT OP Goals     Row Name 02/17/21 1000          PT Short Term Goals    STG 1  Pt. Will be independent with initial HEP to improve self-management of condition.  -     STG 1 Progress  Met  -     STG 2  Pt. Will demonstrate proper log roll technique without cueing to reduce lumbar strain and preserve spine.  -     STG 2 Progress  Met  [facility]     STG 3  Pt. will ambulate with swing-through gait pattern and upright posture with rwx to improve mechanics to safely transition to ambulation with SPC.  -     STG 3 Progress  Ongoing;Progressing  -     STG 3 Progress Comments  working on swing through, more consistent and cues for upright posture more consistently  -        Long Term Goals    LTG 1  Pt. Will " be independent with advanced HEP to improve long-term management of condition and independence.  -     LTG 1 Progress  Met  -     LTG 2  Pt. Will score </= 40% on Modified Oswestry (from 74% on initial evaluation) to indicate improved perception of disability.  -     LTG 2 Progress  Ongoing;Progressing  -     LTG 2 Progress Comments  44%  -     LTG 3  Pt. will demonstrate near normal gait pattern with equal stride length and heel strike with SPC to improve mobility and ability to participate in community activities.  -     LTG 3 Progress  Ongoing  -     LTG 3 Progress Comments  using 1 UE in parallel bars or at ballet bar to simulate use of cane  [facility]  Pt. will report intensity of pain </= 4/10 pain down into R LE to improve QOL and ease with transitional movements.  -     LTG 4 Progress  Ongoing  -     LTG 4 Progress Comments  6/10  -[facility] 5  Pt. will increase R LE strength to >/= 4+/5 to improve ease with transtional movements and stability.  [facility]  Ongoing  -       User Key  (r) = Recorded By, (t) = Taken By, (c) = Cosigned By    Initials Name Provider Type     Beverly Garcia PT Physical Therapist          Therapy Education  Given: Posture/body mechanics, Mobility training  Program: Reinforced  How Provided: Verbal, Demonstration  Provided to: Patient  Level of Understanding: Verbalized, Demonstrated    Outcome Measure Options: Modifed Owestry  Modified Oswestry  Modified Oswestry Score/Comments: 44% (22/50)      Time Calculation:   Start Time: 0945  Stop Time: 1035  Time Calculation (min): 50 min  Total Timed Code Minutes- PT: 45 minute(s)  Therapy Charges for Today     Code Description Service Date Service Provider Modifiers Qty    [id number]  PT THERAPEUTIC ACT EA 15 MIN 2/17/2021 Beverly Garcia, PT GP 1    [id number]  PT THER PROC EA 15 MIN 2/17/2021 Beverly Garcia, PT GP 2          PT G-Codes  Outcome Measure Options: Modifed Owestry  Modified  Oswestry Score/Comments: 44% (22/50)         Beverly Garcia, PT  2/17/2021

## 2021-02-17 NOTE — PROGRESS NOTES
"Subjective   Patient ID: Taz Dickey is a 70 y.o. male is here today for 4 week follow-up with new XR Lumbar Spine.    Today patient is having pain in his R leg that radiates down to the R foot along with N/T. Patient can see a difference since he's started PT.     Patient, Provider, and MA are all wearing a mask in our office today.     History of Present Illness     This patient continues with pain in his back with radiation into his right leg.  He feels that he is better most of the pain that he has is around the knee and the lower thigh and upper calf.    The following portions of the patient's history were reviewed and updated as appropriate: allergies, current medications, past family history, past medical history, past social history, past surgical history and problem list.    Review of Systems   Constitutional: Negative for chills and fever.   HENT: Negative for congestion.    Genitourinary: Negative for difficulty urinating and dysuria.   Musculoskeletal: Positive for gait problem. Negative for back pain, neck pain and neck stiffness.   Neurological: Positive for weakness and numbness.        R leg/foot       I have reviewed the review of systems as documented by my MA.      Objective     Vitals:    02/23/21 1343   BP: 144/78   Cuff Size: Adult   Pulse: 75   Temp: 97.1 °F (36.2 °C)   Weight: 95.3 kg (210 lb)   Height: 177.8 cm (70\")     Body mass index is 30.13 kg/m².      Physical Exam  Neurological:      Mental Status: He is alert and oriented to person, place, and time.       Neurologic Exam     Mental Status   Oriented to person, place, and time.           Assessment/Plan   Independent Review of Radiographic Studies:      I personally reviewed the images from the following studies.    I reviewed his x-rays which were done on the 17th.  This shows good alignment of the construct at L4-5 with what appears to be solid fusion.    Medical Decision Making:      I told the patient about the imaging.  I " told him that from my point of view I think the surgery went well.  He does seem to be improving but I would like to check a bone scan just to be sure he does not have some major arthritic issue in his right knee.  We can do a telephone visit to go over those results and then I will see him again in about 3 months with another x-ray of his lumbar spine.    Diagnoses and all orders for this visit:    1. Lumbar radiculopathy (Primary)  -     XR Spine Lumbar Complete With Flex & Ext; Future    2. Acute pain of right knee  -     NM Bone Scan Whole Body; Future      Return in about 3 months (around 5/23/2021) for After radiology test.

## 2021-02-22 ENCOUNTER — HOSPITAL ENCOUNTER (OUTPATIENT)
Dept: PHYSICAL THERAPY | Facility: HOSPITAL | Age: 71
Setting detail: THERAPIES SERIES
Discharge: HOME | End: 2021-02-22

## 2021-02-22 DIAGNOSIS — M53.86 DECREASED ROM OF LUMBAR SPINE: ICD-10-CM

## 2021-02-22 DIAGNOSIS — Z98.1 S/P LUMBAR FUSION: Primary | ICD-10-CM

## 2021-02-22 DIAGNOSIS — Z47.89 ORTHOPEDIC AFTERCARE: ICD-10-CM

## 2021-02-22 PROCEDURE — 97530 THERAPEUTIC ACTIVITIES: CPT

## 2021-02-22 PROCEDURE — 97110 THERAPEUTIC EXERCISES: CPT

## 2021-02-22 NOTE — THERAPY TREATMENT NOTE
Outpatient Physical Therapy Ortho Treatment Note  [facility]     Patient Name: Taz Dickey  : 1950  MRN: [id number]  Today's Date: 2021      Visit Date: 2021    Visit Dx:    ICD-10-CM ICD-9-CM   1. S/P lumbar fusion  Z98.1 V45.4   2. Orthopedic aftercare  Z47.89 V54.9   3. Decreased ROM of lumbar spine  M53.86 724.9       Patient Active Problem List   Diagnosis   • Spondylolisthesis of lumbar region   • Lumbar radiculopathy   • Benign essential hypertension   • Erectile dysfunction of nonorganic origin   • Hypercholesterolemia   • Osteoarthritis   • Tinnitus of both ears   • Encounter for screening colonoscopy   • Medicare annual wellness visit, subsequent   • Nocturia   • Type 2 diabetes mellitus without complication, without long-term current use of insulin (CMS/[facility])   • Healthcare maintenance        Past Medical History:   Diagnosis Date   • Arthritis    • Colon polyps    • Depression    • DVT (deep venous thrombosis) (CMS/[facility])     IN LEFT LEG   • Full dentures    • Hyperlipidemia    • Hypertension    • Numbness and tingling     RIGHT FOOT   • PONV (postoperative nausea and vomiting)    • Right leg pain    • Type 2 diabetes mellitus without complication, without long-term current use of insulin (CMS/[facility]) 10/8/2019        Past Surgical History:   Procedure Laterality Date   • CERVICAL DISCECTOMY LAMINECTOMY DECOMPRESSION POSTERIOR FUSION WITH INSTRUMENTATION     • COLONOSCOPY N/A 2018    Procedure: COLONOSCOPY to TI and cecum with polypectomy;  Surgeon: Anil Garces MD;  Location: [facility] ENDOSCOPY;  Service:    • JOINT REPLACEMENT      LEFT HIP   • KNEE ARTHROSCOPY Left    • LUMBAR DISCECTOMY FUSION INSTRUMENTATION N/A 2020    Procedure: Lumbar 4 5 laminectomy with a posterior lateral fusion and instrumentation and interbody fusion;  Surgeon: Jeffrey Garcia MD;  Location: [facility] MAIN OR;  Service: Neurosurgery;  Laterality: N/A;   • VASECTOMY                          PT Assessment/Plan     Row Name 02/22/21 0919          PT Assessment    Assessment Comments  Mr Dickey is continuing to progress toward goals, following L-fusion.  He has most difficulty with pain in R LE, focusing around his knee.  Pt walks safely with st. cane, but still has more pain than when he uses his wx.  Posture is much better with cane.  -LP     Please refer to paper survey for additional self-reported information  Yes  -LP     Rehab Potential  Good  -LP     Patient/caregiver participated in establishment of treatment plan and goals  Yes  -LP     Patient would benefit from skilled therapy intervention  Yes  -LP        PT Plan    PT Frequency  2x/week  -LP     Predicted Duration of Therapy Intervention (PT)  4weeks  -LP     PT Plan Comments  cont. per current POC  -LP       User Key  (r) = Recorded By, (t) = Taken By, (c) = Cosigned By    Initials Name Provider Type    LP Vale Rivera, PT Physical Therapist            OP Exercises     Row Name 02/22/21 0800             Subjective Pain    Able to rate subjective pain?  yes  -LP      Pre-Treatment Pain Level  3  -LP      Subjective Pain Comment  6-7/10 pain in R knee intermittently-when he got up to stand  -LP         Total Minutes    64440 - PT Therapeutic Exercise Minutes  30  -LP      02818 - PT Therapeutic Activity Minutes  15  -LP         Exercise 1    Exercise Name 1  LTR  -LP      Cueing 1  Verbal;Tactile  -LP      Reps 1  5  -LP         Exercise 2    Exercise Name 2  sciatic nerve floss  -LP      Cueing 2  Verbal  -LP      Reps 2  10  -LP      Time 2  5s  -LP      Additional Comments  passively  -LP         Exercise 3    Exercise Name 3  HL hip add with ball and TA  -LP      Cueing 3  Verbal;Demo  -LP      Reps 3  10  -LP      Time 3  3s  -LP         Exercise 4    Exercise Name 4  NuStep L5  -LP      Cueing 4  Verbal  -LP      Time 4  5  -LP      Additional Comments  L5  -LP         Exercise 5    Exercise Name 5  Seated LAQ   "-LP      Cueing 5  Verbal  -LP      Reps 5  15  -LP      Additional Comments  5#  -LP         Exercise 7    Exercise Name 7  hip add + bridge  -LP      Cueing 7  Verbal;Tactile  -LP      Reps 7  10  -LP      Time 7  3-5s  -LP         Exercise 9    Exercise Name 9  STS from mat with foam under hips  -LP      Cueing 9  Verbal;Tactile;Demo  -LP      Sets 9  1  -LP      Reps 9  5  -LP      Additional Comments  keeping R LE back- no pain this time.  -LP         Exercise 10    Exercise Name 10  Gait training with str. cane  -LP      Cueing 10  Verbal;Demo  -LP      Time 10  350  -LP      Additional Comments  good sequencing, doesn't lean, good swing thru.  Just has more discomfort in R LE wth cane vs. wx  -LP         Exercise 11    Exercise Name 11  side steps at ballet bar  -LP      Cueing 11  Verbal;Demo  -LP      Reps 11  3  -LP         Exercise 12    Exercise Name 12  becky becky step  -LP      Cueing 12  Verbal;Tactile;Demo  -LP      Reps 12  10-15  -LP      Time 12  tactile cuing for weight shift  -LP         Exercise 13    Exercise Name 13  step up 4\"-leading with R  -LP      Cueing 13  Verbal;Demo  -LP      Reps 13  12  -LP      Additional Comments  tried step-over-did ok.  @ hand hold with all  -LP         Exercise 14    Exercise Name 14  lateral step up 4\"  -LP      Cueing 14  Verbal  -LP      Reps 14  10  -LP      Additional Comments  used hands  -LP         Exercise 16    Exercise Name 16  TKE with TB  -LP      Cueing 16  Verbal;Tactile;Demo  -LP      Reps 16  12  -LP      Additional Comments  GTB  -LP         Exercise 17    Exercise Name 17  gait in II bars  -LP      Cueing 17  Verbal;Demo  -LP      Reps 17  4-6 laps  -LP      Additional Comments  some marching steps using L hand onle intermittently  -LP        User Key  (r) = Recorded By, (t) = Taken By, (c) = Cosigned By    Initials Name Provider Type    Vale Gil, PT Physical Therapist                           Therapy Education  Given: HEP, " Symptoms/condition management, Fall prevention and home safety, Posture/body mechanics  Program: Reinforced, Progressed  How Provided: Verbal, Demonstration  Provided to: Patient  Level of Understanding: Teach back education performed              Time Calculation:   Start Time: 0830  Stop Time: 0915  Time Calculation (min): 45 min  Therapy Charges for Today     Code Description Service Date Service Provider Modifiers Qty    [id number]  PT THER PROC EA 15 MIN 2/22/2021 Vale Rivera, PT GP 2    [id number]  PT THERAPEUTIC ACT EA 15 MIN 2/22/2021 Vale Rivera, PT GP 1                    Vale Rivera, PT  2/22/2021

## 2021-02-23 ENCOUNTER — OFFICE VISIT (OUTPATIENT)
Dept: NEUROSURGERY | Facility: CLINIC | Age: 71
End: 2021-02-23

## 2021-02-23 VITALS
BODY MASS INDEX: 30.06 KG/M2 | TEMPERATURE: 97.1 F | HEIGHT: 70 IN | DIASTOLIC BLOOD PRESSURE: 78 MMHG | HEART RATE: 75 BPM | WEIGHT: 210 LBS | SYSTOLIC BLOOD PRESSURE: 144 MMHG

## 2021-02-23 DIAGNOSIS — M25.561 ACUTE PAIN OF RIGHT KNEE: ICD-10-CM

## 2021-02-23 DIAGNOSIS — M54.16 LUMBAR RADICULOPATHY: Primary | ICD-10-CM

## 2021-02-23 PROCEDURE — 99213 OFFICE O/P EST LOW 20 MIN: CPT | Performed by: NEUROLOGICAL SURGERY

## 2021-02-25 ENCOUNTER — HOSPITAL ENCOUNTER (OUTPATIENT)
Dept: PHYSICAL THERAPY | Facility: HOSPITAL | Age: 71
Setting detail: THERAPIES SERIES
Discharge: HOME | End: 2021-02-25

## 2021-02-25 DIAGNOSIS — Z98.1 S/P LUMBAR FUSION: Primary | ICD-10-CM

## 2021-02-25 DIAGNOSIS — M53.86 DECREASED ROM OF LUMBAR SPINE: ICD-10-CM

## 2021-02-25 DIAGNOSIS — Z47.89 ORTHOPEDIC AFTERCARE: ICD-10-CM

## 2021-02-25 PROCEDURE — 97530 THERAPEUTIC ACTIVITIES: CPT

## 2021-02-25 PROCEDURE — 97110 THERAPEUTIC EXERCISES: CPT

## 2021-02-25 NOTE — THERAPY TREATMENT NOTE
Outpatient Physical Therapy Ortho Treatment Note  [facility]     Patient Name: Taz Dickey  : 1950  MRN: [id number]  Today's Date: 2021      Visit Date: 2021    Visit Dx:    ICD-10-CM ICD-9-CM   1. S/P lumbar fusion  Z98.1 V45.4   2. Orthopedic aftercare  Z47.89 V54.9   3. Decreased ROM of lumbar spine  M53.86 724.9       Patient Active Problem List   Diagnosis   • Spondylolisthesis of lumbar region   • Lumbar radiculopathy   • Benign essential hypertension   • Erectile dysfunction of nonorganic origin   • Hypercholesterolemia   • Osteoarthritis   • Tinnitus of both ears   • Encounter for screening colonoscopy   • Medicare annual wellness visit, subsequent   • Nocturia   • Type 2 diabetes mellitus without complication, without long-term current use of insulin (CMS/Formerly [facility])   • Healthcare maintenance   • Acute pain of right knee        Past Medical History:   Diagnosis Date   • Arthritis    • Colon polyps    • Depression    • DVT (deep venous thrombosis) (CMS/Formerly [facility])     IN LEFT LEG   • Full dentures    • Hyperlipidemia    • Hypertension    • Numbness and tingling     RIGHT FOOT   • PONV (postoperative nausea and vomiting)    • Right leg pain    • Type 2 diabetes mellitus without complication, without long-term current use of insulin (CMS/Formerly [facility]) 10/8/2019        Past Surgical History:   Procedure Laterality Date   • CERVICAL DISCECTOMY LAMINECTOMY DECOMPRESSION POSTERIOR FUSION WITH INSTRUMENTATION     • COLONOSCOPY N/A 2018    Procedure: COLONOSCOPY to TI and cecum with polypectomy;  Surgeon: Anil Garces MD;  Location[place] [facility];  Service:    • JOINT REPLACEMENT      LEFT HIP   • KNEE ARTHROSCOPY Left    • LUMBAR DISCECTOMY FUSION INSTRUMENTATION N/A 2020    Procedure: Lumbar 4 5 laminectomy with a posterior lateral fusion and instrumentation and interbody fusion;  Surgeon: Jeffrey Garcia MD;  Location: [facility];  Service: Neurosurgery;  Laterality: N/A;   •  "VASECTOMY                         PT Assessment/Plan     Row Name 02/25/21 0900          PT Assessment    Assessment Comments  Mr. Dickey continues to progress well with therapy. Patient is improving in his gait while using his RW via step through gait pattern. Progressing patients ability to ambulate with SPC while in therapy. Continues to havve RLE pain with intermittent \"popping\". Mr. Dickey continues to benefit from skilled therapy to address functional impariments and promote community independence.  -RS (r) AB (t) RS (c)        PT Plan    PT Plan Comments  Continue to progress ability to ambulate with SPC with correct kinematics.   -RS (r) AB (t) RS (c)       User Key  (r) = Recorded By, (t) = Taken By, (c) = Cosigned By    Initials Name Provider Type    RS Sienna Oleary, PT Physical Therapist    AB Cole Bates, PT Student PT Student            OP Exercises     Row Name 02/25/21 0900             Subjective Comments    Subjective Comments  Reports that I am doing ok. I tried using my cane yesterday for a little bit.  -RS (r) AB (t) RS (c)         Subjective Pain    Able to rate subjective pain?  yes  -RS (r) AB (t) RS (c)      Pre-Treatment Pain Level  2  -RS (r) AB (t) RS (c)      Subjective Pain Comment  7/10 intermittent pain in the knee with movement  -RS (r) AB (t) RS (c)         Total Minutes    73853 - PT Therapeutic Exercise Minutes  30  -RS (r) AB (t) RS (c)      34990 - PT Therapeutic Activity Minutes  15  -RS (r) AB (t) RS (c)         Exercise 1    Exercise Name 1  LTR  -RS (r) AB (t) RS (c)      Cueing 1  Verbal;Tactile  -RS (r) AB (t) RS (c)      Reps 1  10 e  -RS (r) AB (t) RS (c)         Exercise 2    Exercise Name 2  sciatic nerve floss  -RS (r) AB (t) RS (c)      Cueing 2  Verbal  -RS (r) AB (t) RS (c)      Reps 2  10  -RS (r) AB (t) RS (c)      Time 2  5s  -RS (r) AB (t) RS (c)      Additional Comments  passively  -RS (r) AB (t) RS (c)         Exercise 3    Exercise Name 3  HL hip add " "with ball and TA  -RS (r) AB (t) RS (c)      Cueing 3  Verbal;Demo  -RS (r) AB (t) RS (c)      Reps 3  10  -RS (r) AB (t) RS (c)      Time 3  3s  -RS (r) AB (t) RS (c)         Exercise 4    Exercise Name 4  NuStep L5  -RS (r) AB (t) RS (c)      Cueing 4  Verbal  -RS (r) AB (t) RS (c)      Time 4  5  -RS (r) AB (t) RS (c)      Additional Comments  L5  -RS (r) AB (t) RS (c)         Exercise 5    Exercise Name 5  Seated LAQ  -RS (r) AB (t) RS (c)      Cueing 5  Verbal  -RS (r) AB (t) RS (c)      Reps 5  15  -RS (r) AB (t) RS (c)      Additional Comments  5#  -RS (r) AB (t) RS (c)         Exercise 7    Exercise Name 7  hip add + bridge  -RS (r) AB (t) RS (c)      Cueing 7  Verbal;Tactile  -RS (r) AB (t) RS (c)      Reps 7  10  -RS (r) AB (t) RS (c)      Time 7  3-5s  -RS (r) AB (t) RS (c)         Exercise 9    Exercise Name 9  STS from mat with foam under hips  -RS (r) AB (t) RS (c)      Cueing 9  Verbal;Tactile;Demo  -RS (r) AB (t) RS (c)      Sets 9  2  -RS (r) AB (t) RS (c)      Reps 9  5  -RS (r) AB (t) RS (c)      Additional Comments  RLE back  -RS (r) AB (t) RS (c)         Exercise 10    Exercise Name 10  Gait training with str. cane  -RS (r) AB (t) RS (c)      Cueing 10  Verbal;Demo  -RS (r) AB (t) RS (c)      Time 10  5 min  -RS (r) AB (t) RS (c)      Additional Comments  Cues for equal step length, proper stepping pattern, and heel/toe strike  -RS (r) AB (t) RS (c)         Exercise 11    Exercise Name 11  side steps at ballet bar  -RS (r) AB (t) RS (c)      Cueing 11  Verbal;Demo  -RS (r) AB (t) RS (c)      Reps 11  3  -RS (r) AB (t) RS (c)      Additional Comments  cueing for toes  -RS (r) AB (t) RS (c)         Exercise 12    Exercise Name 12  becky becky step  -RS (r) AB (t) RS (c)      Cueing 12  Verbal;Tactile;Demo  -RS (r) AB (t) RS (c)      Reps 12  10-15  -RS (r) AB (t) RS (c)      Time 12  tactile cuing for weight shift  -RS (r) AB (t) RS (c)         Exercise 13    Exercise Name 13  step up 4\"-leading with " "R  -RS (r) AB (t) RS (c)      Cueing 13  Verbal;Demo  -RS (r) AB (t) RS (c)      Reps 13  12  -RS (r) AB (t) RS (c)      Additional Comments  BUE hand support  -RS (r) AB (t) RS (c)         Exercise 14    Exercise Name 14  lateral step up 4\"  -RS (r) AB (t) RS (c)      Cueing 14  Verbal  -RS (r) AB (t) RS (c)      Reps 14  10  -RS (r) AB (t) RS (c)      Additional Comments  in // bars  -RS (r) AB (t) RS (c)         Exercise 15    Exercise Name 15  HL Hip ABD w/ RTB  -RS (r) AB (t) RS (c)      Reps 15  10  -RS (r) AB (t) RS (c)         Exercise 16    Exercise Name 16  TKE with TB  -RS (r) AB (t) RS (c)      Cueing 16  Verbal;Tactile;Demo  -RS (r) AB (t) RS (c)      Reps 16  12  -RS (r) AB (t) RS (c)      Additional Comments  GTB  -RS (r) AB (t) RS (c)         Exercise 17    Exercise Name 17  Anti rotations w/ RTB  -RS (r) AB (t) RS (c)      Cueing 17  Verbal;Demo  -RS (r) AB (t) RS (c)      Reps 17  10 e  -RS (r) AB (t) RS (c)      Additional Comments  Patient holds, therapist moves to apply tension  -RS (r) AB (t) RS (c)        User Key  (r) = Recorded By, (t) = Taken By, (c) = Cosigned By    Initials Name Provider Type    RS Sienna Oleary, PT Physical Therapist    AB Cole Bates, PT Student PT Student                       PT OP Goals     Row Name 02/25/21 0900          PT Short Term Goals    STG 1  Pt. Will be independent with initial HEP to improve self-management of condition.  -RS (r) AB (t) RS (c)     STG 1 Progress  Met  -RS (r) AB (t) RS (c)     STG 2  Pt. Will demonstrate proper log roll technique without cueing to reduce lumbar strain and preserve spine.  -RS (r) AB (t) RS (c)     STG 2 Progress  Met  -RS (r) AB (t) RS (c)     STG 3  Pt. will ambulate with swing-through gait pattern and upright posture with rwx to improve mechanics to safely transition to ambulation with SPC.  -RS (r) AB (t) RS (c)     STG 3 Progress  Ongoing;Progressing  -RS (r) AB (t) RS (c)        Long Term Goals    LTG 1  Pt. " Will be independent with advanced HEP to improve long-term management of condition and independence.  -RS (r) AB (t) RS (c)     LTG 1 Progress  Met  -RS (r) AB (t) RS (c)     LTG 2  Pt. Will score </= 40% on Modified Oswestry (from 74% on initial evaluation) to indicate improved perception of disability.  -RS (r) AB (t) RS (c)     LTG 2 Progress  Ongoing;Progressing  -RS (r) AB (t) RS (c)     LTG 3  Pt. will demonstrate near normal gait pattern with equal stride length and heel strike with SPC to improve mobility and ability to participate in community activities.  -RS (r) AB (t) RS (c)     LTG 3 Progress  Ongoing  -RS (r) AB (t) RS (c)     LTG 4  Pt. will report intensity of pain </= 4/10 pain down into R LE to improve QOL and ease with transitional movements.  -RS (r) AB (t) RS (c)     LTG 4 Progress  Ongoing  -RS (r) AB (t) RS (c)     LTG 5  Pt. will increase R LE strength to >/= 4+/5 to improve ease with transtional movements and stability.  -RS (r) AB (t) RS (c)     LTG 5 Progress  Ongoing  -RS (r) AB (t) RS (c)       User Key  (r) = Recorded By, (t) = Taken By, (c) = Cosigned By    Initials Name Provider Type    RS Sienna Oleary, PT Physical Therapist    Cole Salguero PT Student PT Student                         Time Calculation:   Start Time: 0900  Stop Time: 0945  Time Calculation (min): 45 min  Total Timed Code Minutes- PT: 40 minute(s)  Therapy Charges for Today     Code Description Service Date Service Provider Modifiers Qty    85510882922  PT THER PROC EA 15 MIN 2/25/2021 Cole Bates, PT Student GP 2    [id number]  PT THERAPEUTIC ACT EA 15 MIN 2/25/2021 Cole Bates PT Student GP 1                    TOLU Chaudhry  2/25/2021

## 2021-03-01 ENCOUNTER — HOSPITAL ENCOUNTER (OUTPATIENT)
Dept: PHYSICAL THERAPY | Facility: HOSPITAL | Age: 71
Setting detail: THERAPIES SERIES
Discharge: HOME | End: 2021-03-01

## 2021-03-01 DIAGNOSIS — M53.86 DECREASED ROM OF LUMBAR SPINE: ICD-10-CM

## 2021-03-01 DIAGNOSIS — Z98.1 S/P LUMBAR FUSION: Primary | ICD-10-CM

## 2021-03-01 DIAGNOSIS — Z47.89 ORTHOPEDIC AFTERCARE: ICD-10-CM

## 2021-03-01 PROCEDURE — 97530 THERAPEUTIC ACTIVITIES: CPT

## 2021-03-01 PROCEDURE — 97116 GAIT TRAINING THERAPY: CPT

## 2021-03-01 PROCEDURE — 97110 THERAPEUTIC EXERCISES: CPT

## 2021-03-01 NOTE — THERAPY TREATMENT NOTE
Outpatient Physical Therapy Ortho Treatment Note  [facility]     Patient Name: Taz Dickey  : 1950  MRN: [id number]  Today's Date: 3/1/2021      Visit Date: 2021    Visit Dx:    ICD-10-CM ICD-9-CM   1. S/P lumbar fusion  Z98.1 V45.4   2. Orthopedic aftercare  Z47.89 V54.9   3. Decreased ROM of lumbar spine  M53.86 724.9       Patient Active Problem List   Diagnosis   • Spondylolisthesis of lumbar region   • Lumbar radiculopathy   • Benign essential hypertension   • Erectile dysfunction of nonorganic origin   • Hypercholesterolemia   • Osteoarthritis   • Tinnitus of both ears   • Encounter for screening colonoscopy   • Medicare annual wellness visit, subsequent   • Nocturia   • Type 2 diabetes mellitus without complication, without long-term current use of insulin (CMS/[facility])   • Healthcare maintenance   • Acute pain of right knee        Past Medical History:   Diagnosis Date   • Arthritis    • Colon polyps    • Depression    • DVT (deep venous thrombosis) (CMS/[facility])     IN LEFT LEG   • Full dentures    • Hyperlipidemia    • Hypertension    • Numbness and tingling     RIGHT FOOT   • PONV (postoperative nausea and vomiting)    • Right leg pain    • Type 2 diabetes mellitus without complication, without long-term current use of insulin (CMS/[facility]) 10/8/2019        Past Surgical History:   Procedure Laterality Date   • CERVICAL DISCECTOMY LAMINECTOMY DECOMPRESSION POSTERIOR FUSION WITH INSTRUMENTATION     • COLONOSCOPY N/A 2018    Procedure: COLONOSCOPY to TI and cecum with polypectomy;  Surgeon: Anil Garces MD;  Location: [facility];  Service:    • JOINT REPLACEMENT      LEFT HIP   • KNEE ARTHROSCOPY Left    • LUMBAR DISCECTOMY FUSION INSTRUMENTATION N/A 2020    Procedure: Lumbar 4 5 laminectomy with a posterior lateral fusion and instrumentation and interbody fusion;  Surgeon: Jeffrey Garcia MD;  Location: [facility];  Service: Neurosurgery;  Laterality: N/A;   •  VASECTOMY                         PT Assessment/Plan     Row Name 03/01/21 0928          PT Assessment    Assessment Comments  Mr. Dickey presents to therapy with reports of trialing cane while walking into grocery store, pt. states he feels he may have over done it but it feeling stronger than before. Continued with ambulation with SPC in clinic with pt. demonstrating improved gait speed and upright posture with first lap in clinic, as pt. fatigues demonstrates forward flexed posture with step-to gait pattern. Pt. heel strike improved comparative to beginning of formal therpay with less reliance on AD for support. Pt. encouraged to perform reciprocal arm swing and reduce lateral trunk lean during ambulation. Spent session primarily in standing and challenged balance with non-compliant surfaces. Pt. notes ease with small step up and no longer requiring tactile cues for motor recruitment. Pt. R hip making audible pops at time but pt. reports only occasional pain with popping. Encouraged pt. to discuss hip and knee pain with MD to determine if anything additional can be done to help with the intermittent shooting pains.  -        PT Plan    PT Plan Comments  Progress with SPC and balance challenges  -       User Key  (r) = Recorded By, (t) = Taken By, (c) = Cosigned By    Initials Name Provider Type     Beverly Garcia, PT Physical Therapist            OP Exercises     Row Name 03/01/21 0800             Subjective Comments    Subjective Comments  I used my cane for a short distance into the grocery store but then used the buggie and I just think I got worn out  -         Subjective Pain    Able to rate subjective pain?  yes  -      Pre-Treatment Pain Level  2  -         Total Minutes    50164 - Gait Training Minutes   8  -      75227 - PT Therapeutic Exercise Minutes  30  -      69072 - PT Therapeutic Activity Minutes  16  -         Exercise 1    Exercise Name 1  --  -      Cueing 1  --  -       "Reps 1  --  -MH         Exercise 2    Exercise Name 2  --  -MH      Cueing 2  --  -MH      Reps 2  --  -MH      Time 2  --  -MH         Exercise 3    Exercise Name 3  seated EOB HS stretch  -MH      Cueing 3  Verbal  -MH      Reps 3  3e  -MH      Time 3  20sec  -MH         Exercise 4    Exercise Name 4  NuStep L5  -MH      Cueing 4  Verbal  -MH      Time 4  5  -MH      Additional Comments  L5 B UE/LE  -MH         Exercise 5    Exercise Name 5  Seated LAQ  -MH      Cueing 5  Verbal  -MH      Reps 5  15  -MH      Additional Comments  5#  -MH         Exercise 6    Exercise Name 6  narrow RHETT on airex  -MH      Cueing 6  Verbal  -MH      Reps 6  3  -MH      Time 6  30sec  -MH         Exercise 7    Exercise Name 7  semi-tandem stance  -MH      Cueing 7  Verbal;Demo  -MH      Reps 7  2e  -MH      Time 7  15sec  -MH      Additional Comments  front foot on airex  -MH         Exercise 9    Exercise Name 9  STS from mat with foam under hips  -      Cueing 9  Verbal;Tactile;Demo  -      Sets 9  2  -MH      Reps 9  5  -MH      Additional Comments  R LE back  -         Exercise 10    Exercise Name 10  Gait training with str. cane  -      Cueing 10  Verbal;Demo  -      Sets 10  cues for arm swing, step length, upright posture  -      Time 10  8 min  -MH      Additional Comments  in // bars and laps around clinic  -         Exercise 11    Exercise Name 11  side steps at ballet bar  -      Cueing 11  Verbal;Demo  -MH      Reps 11  3  -MH      Additional Comments  YTB at knees; cues for toes  -         Exercise 12    Exercise Name 12  becky becky step  -      Cueing 12  Verbal;Tactile;Demo  -      Reps 12  10-15  -MH      Time 12  tactile cuing for weight shift  -      Additional Comments  prior to gait training with cane  -MH         Exercise 13    Exercise Name 13  step up 4\"-leading with R  -MH      Cueing 13  Verbal;Demo  -      Reps 13  12  -MH      Additional Comments  B UE support; POWER up  -         " "Exercise 14    Exercise Name 14  lateral step up 4\"  -      Cueing 14  Verbal  -      Reps 14  12  -      Additional Comments  in // bars  -         Exercise 15    Exercise Name 15  BIG step with B shoulder flexion  -      Cueing 15  Verbal;Demo  -      Reps 15  10  -      Additional Comments  chest up  -         Exercise 16    Exercise Name 16  TKE with TB  -      Cueing 16  Verbal;Tactile;Demo  [facility]      Reps 16  12  -      Additional Comments  GTB  -         Exercise 17    Exercise Name 17  Anti rotations w/ RTB  -      Cueing 17  Verbal;Demo  -      Reps 17  10 e  -        User Key  (r) = Recorded By, (t) = Taken By, (c) = Cosigned By    Initials Name Provider Type     Beverly Garcia, PT Physical Therapist                       PT OP Goals     Row Name 03/01/21 0800          PT Short Term Goals    STG 1  Pt. Will be independent with initial HEP to improve self-management of condition.  -     STG 1 Progress  Met  -     STG 2  Pt. Will demonstrate proper log roll technique without cueing to reduce lumbar strain and preserve spine.  -     STG 2 Progress  Met  [facility]     STG 3  Pt. will ambulate with swing-through gait pattern and upright posture with rwx to improve mechanics to safely transition to ambulation with SPC.  -     STG 3 Progress  Ongoing;Progressing  -        Long Term Goals    LTG 1  Pt. Will be independent with advanced HEP to improve long-term management of condition and independence.  -     LTG 1 Progress  Met  [facility]     LTG 2  Pt. Will score </= 40% on Modified Oswestry (from 74% on initial evaluation) to indicate improved perception of disability.  -     LTG 2 Progress  Ongoing;Progressing  -     LTG 3  Pt. will demonstrate near normal gait pattern with equal stride length and heel strike with SPC to improve mobility and ability to participate in community activities.  -     LTG 3 Progress  Ongoing  -     LTG 3 Progress Comments  working with [employer]  -  "    LTG 4  Pt. will report intensity of pain </= 4/10 pain down into R LE to improve QOL and ease with transitional movements.  -     LTG 4 Progress  Ongoing  -     LT 5  Pt. will increase R LE strength to >/= 4+/5 to improve ease with transtional movements and stability.  -     LTG 5 Progress  Ongoing  -       User Key  (r) = Recorded By, (t) = Taken By, (c) = Cosigned By    Initials Name Provider Type     Beverly Garcia PT Physical Therapist          Therapy Education  Education Details: Educated on use of cane and importance of short distances intially to build endurance. Practice on level surfaces and in space where pt. is able to rest after to reduce fatigue and compensation patterns  Given: Mobility training, Posture/body mechanics  Program: Reinforced  How Provided: Verbal, Demonstration  Provided to: Patient  Level of Understanding: Verbalized, Demonstrated              Time Calculation:   Start Time: 0829  Stop Time: 0924  Time Calculation (min): 55 min  Total Timed Code Minutes- PT: 54 minute(s)  Therapy Charges for Today     Code Description Service Date Service Provider Modifiers Qty    37185308561  PT THERAPEUTIC ACT EA 15 MIN 3/1/2021 Beverly Garcia, PT GP 1    [id number] HC GAIT TRAINING EA 15 MIN 3/1/2021 Beverly Garcia, PT GP 1    [id number]  PT THER PROC EA 15 MIN 3/1/2021 Beverly Garcia, PT GP 2                    Beverly Garcia PT  3/1/2021

## 2021-03-03 NOTE — PROGRESS NOTES
Subjective   Patient ID: Taz Dickey is a 70 y.o. male is here today via telephone for follow-up with a new bone scan that was ordered 02.23.2021.    You have chosen to receive care through a telephone visit. Do you consent to use a telephone visit for your medical care today? Yes    We did a telephone visit today.  The patient was at home and I was in the office.  We talked for 5 minutes.    History of Present Illness     The patient continues with pain primarily in his right leg.  He has pain in his right hip and his right knee.  He has some radiating pain as well.  He is improving however.    The following portions of the patient's history were reviewed and updated as appropriate: allergies, current medications, past family history, past medical history, past social history, past surgical history and problem list.    Review of Systems   Constitutional: Negative for chills and fever.   HENT: Negative for congestion.    Genitourinary: Negative for difficulty urinating and dysuria.   Musculoskeletal: Positive for gait problem. Negative for back pain, neck pain and neck stiffness.   Neurological: Positive for weakness and numbness. Negative for headaches.       I have reviewed the review of systems as documented by my MA.      Objective     There were no vitals filed for this visit.  There is no height or weight on file to calculate BMI.      Physical Exam  Neurological:      Mental Status: He is oriented to person, place, and time.       Neurologic Exam     Mental Status   Oriented to person, place, and time.           Assessment/Plan   Independent Review of Radiographic Studies:      I personally reviewed the images from the following studies.    I reviewed his bone scan.  This shows marked increased uptake in his right hip but he has fairly significant increased uptake in his knees as well.  It seems to be worse on the right than the left.    Medical Decision Making:      I told the patient that we will check  him again in about 2 months but in the meantime I think he should be reevaluated by his orthopedic surgeon.  We will get him an appointment to that effect.    Diagnoses and all orders for this visit:    1. Acute pain of right knee (Primary)  -     Ambulatory Referral to Orthopedic Surgery    2. Lumbar radiculopathy      Return in about 2 months (around 5/9/2021).

## 2021-03-04 ENCOUNTER — HOSPITAL ENCOUNTER (OUTPATIENT)
Dept: PHYSICAL THERAPY | Facility: HOSPITAL | Age: 71
Setting detail: THERAPIES SERIES
Discharge: HOME | End: 2021-03-04

## 2021-03-04 DIAGNOSIS — M53.86 DECREASED ROM OF LUMBAR SPINE: ICD-10-CM

## 2021-03-04 DIAGNOSIS — Z47.89 ORTHOPEDIC AFTERCARE: ICD-10-CM

## 2021-03-04 DIAGNOSIS — Z98.1 S/P LUMBAR FUSION: Primary | ICD-10-CM

## 2021-03-04 PROCEDURE — 97116 GAIT TRAINING THERAPY: CPT

## 2021-03-04 PROCEDURE — 97530 THERAPEUTIC ACTIVITIES: CPT

## 2021-03-04 PROCEDURE — 97110 THERAPEUTIC EXERCISES: CPT

## 2021-03-04 NOTE — THERAPY TREATMENT NOTE
Outpatient Physical Therapy Ortho Treatment Note  [facility]     Patient Name: Taz Dickey  : 1950  MRN: [id number]  Today's Date: 3/4/2021      Visit Date: 2021    Visit Dx:    ICD-10-CM ICD-9-CM   1. S/P lumbar fusion  Z98.1 V45.4   2. Orthopedic aftercare  Z47.89 V54.9   3. Decreased ROM of lumbar spine  M53.86 724.9       Patient Active Problem List   Diagnosis   • Spondylolisthesis of lumbar region   • Lumbar radiculopathy   • Benign essential hypertension   • Erectile dysfunction of nonorganic origin   • Hypercholesterolemia   • Osteoarthritis   • Tinnitus of both ears   • Encounter for screening colonoscopy   • Medicare annual wellness visit, subsequent   • Nocturia   • Type 2 diabetes mellitus without complication, without long-term current use of insulin (CMS/[facility])   • Healthcare maintenance   • Acute pain of right knee        Past Medical History:   Diagnosis Date   • Arthritis    • Colon polyps    • Depression    • DVT (deep venous thrombosis) (CMS/[facility])     IN LEFT LEG   • Full dentures    • Hyperlipidemia    • Hypertension    • Numbness and tingling     RIGHT FOOT   • PONV (postoperative nausea and vomiting)    • Right leg pain    • Type 2 diabetes mellitus without complication, without long-term current use of insulin (CMS/[facility]) 10/8/2019        Past Surgical History:   Procedure Laterality Date   • CERVICAL DISCECTOMY LAMINECTOMY DECOMPRESSION POSTERIOR FUSION WITH INSTRUMENTATION     • COLONOSCOPY N/A 2018    Procedure: COLONOSCOPY to TI and cecum with polypectomy;  Surgeon: Anil Garces MD;  Location: [facility] ENDOSCOPY;  Service:    • JOINT REPLACEMENT      LEFT HIP   • KNEE ARTHROSCOPY Left    • LUMBAR DISCECTOMY FUSION INSTRUMENTATION N/A 2020    Procedure: Lumbar 4 5 laminectomy with a posterior lateral fusion and instrumentation and interbody fusion;  Surgeon: Jeffrey Garcia MD;  Location: [facility] MAIN OR;  Service: Neurosurgery;  Laterality: N/A;   •  VASECTOMY                         PT Assessment/Plan     Row Name 03/04/21 1000          PT Assessment    Assessment Comments  Pt reports to therapy with SPC and states that he was doing a lot of walking with his cane yesterday. He was out in the yard trying to  after the dog with only the support from a shovel. Educated patient on using the proper AD (SPC in home/known environments for short times and RW in yard/community) in order to decrease risk for falls. Progressed HEP to include standing HS curls. During sessions gait training, focused on avoidance of obstacles, bilateral arm swing, increase in gait speed. Patient still has intermittent bouts of sharp pain in RLE.  -RS        PT Plan    PT Plan Comments  Progress gait mechanics and safe mobility.   -RS (r) AB (t) RS (c)       User Key  (r) = Recorded By, (t) = Taken By, (c) = Cosigned By    Initials Name Provider Type    RS Sienna Oleary, PT Physical Therapist    AB Cole Bates, PT Student PT Student            OP Exercises     Row Name 03/04/21 0900             Subjective Pain    Able to rate subjective pain?  yes  -RS (r) AB (t) RS (c)      Pre-Treatment Pain Level  3  -RS (r) AB (t) RS (c)         Total Minutes    64874 - Gait Training Minutes   8  -RS (r) AB (t) RS (c)      64642 - PT Therapeutic Exercise Minutes  25  -RS (r) AB (t) RS (c)      11790 - PT Therapeutic Activity Minutes  20  -RS (r) AB (t) RS (c)         Exercise 3    Exercise Name 3  seated EOB HS stretch  -RS (r) AB (t) RS (c)      Cueing 3  Verbal  -RS (r) AB (t) RS (c)      Reps 3  3e  -RS (r) AB (t) RS (c)      Time 3  20sec  -RS (r) AB (t) RS (c)         Exercise 4    Exercise Name 4  NuStep L5  -RS (r) AB (t) RS (c)      Cueing 4  Verbal  -RS (r) AB (t) RS (c)      Time 4  5  -RS (r) AB (t) RS (c)      Additional Comments  L5 B UE/LE  -RS (r) AB (t) RS (c)         Exercise 5    Exercise Name 5  Seated LAQ  -RS (r) AB (t) RS (c)      Cueing 5  Verbal  -RS (r) AB (t) RS  "(c)      Sets 5  2  -RS (r) AB (t) RS (c)      Reps 5  10  -RS (r) AB (t) RS (c)      Additional Comments  5#  -RS (r) AB (t) RS (c)         Exercise 6    Exercise Name 6  narrow RHETT on airex  -RS (r) AB (t) RS (c)      Cueing 6  Verbal  -RS (r) AB (t) RS (c)      Sets 6  2  -RS (r) AB (t) RS (c)      Reps 6  3  -RS (r) AB (t) RS (c)      Time 6  30sec  -RS (r) AB (t) RS (c)         Exercise 9    Exercise Name 9  STS from mat with foam under hips  -RS (r) AB (t) RS (c)      Cueing 9  Verbal;Tactile;Demo  -RS (r) AB (t) RS (c)      Sets 9  2  -RS (r) AB (t) RS (c)      Reps 9  5  -RS (r) AB (t) RS (c)      Additional Comments  RLE back  -RS (r) AB (t) RS (c)         Exercise 10    Exercise Name 10  Gait training with str. cane  -RS (r) AB (t) RS (c)      Cueing 10  Verbal;Demo  -RS (r) AB (t) RS (c)      Sets 10  cues for arm swing, step length, upright posture  -RS (r) AB (t) RS (c)      Time 10  8 min  -RS (r) AB (t) RS (c)      Additional Comments  long hallway, obstacle course through cones   -RS (r) AB (t) RS (c)         Exercise 13    Exercise Name 13  step up 4\"-leading with R  -RS (r) AB (t) RS (c)      Cueing 13  Verbal;Demo  -RS (r) AB (t) RS (c)      Reps 13  12  -RS (r) AB (t) RS (c)      Additional Comments  BUE support  -RS (r) AB (t) RS (c)         Exercise 14    Exercise Name 14  lateral step up 4\"  -RS (r) AB (t) RS (c)      Cueing 14  Verbal  -RS (r) AB (t) RS (c)      Reps 14  12  -RS (r) AB (t) RS (c)      Additional Comments  in // bars  -RS (r) AB (t) RS (c)         Exercise 16    Exercise Name 16  TKE with TB  -RS (r) AB (t) RS (c)      Cueing 16  Verbal;Tactile;Demo  -RS (r) AB (t) RS (c)      Reps 16  12  -RS (r) AB (t) RS (c)      Additional Comments  GTB  -RS (r) AB (t) RS (c)         Exercise 18    Exercise Name 18  bending over to 4 inch box and grab cones.  -RS (r) AB (t) RS (c)      Cueing 18  Verbal;Demo  -RS (r) AB (t) RS (c)      Sets 18  1  -RS (r) AB (t) RS (c)      Reps 18  3x  " -RS (r) AB (t) RS (c)      Additional Comments  increased LBp, consider from elevated surface  -RS (r) AB (t) RS (c)        User Key  (r) = Recorded By, (t) = Taken By, (c) = Cosigned By    Initials Name Provider Type    RS Sienna Oleary, PT Physical Therapist    AB Cole Bates, PT Student PT Student                       PT OP Goals     Row Name 03/04/21 1000          PT Short Term Goals    STG 1  Pt. Will be independent with initial HEP to improve self-management of condition.  -RS (r) AB (t) RS (c)     STG 1 Progress  Met  -RS (r) AB (t) RS (c)     STG 2  Pt. Will demonstrate proper log roll technique without cueing to reduce lumbar strain and preserve spine.  -RS (r) AB (t) RS (c)     STG 2 Progress  Met  -RS (r) AB (t) RS (c)     STG 3  Pt. will ambulate with swing-through gait pattern and upright posture with rwx to improve mechanics to safely transition to ambulation with SPC.  -RS (r) AB (t) RS (c)     STG 3 Progress  Ongoing;Progressing  -RS (r) AB (t) RS (c)        Long Term Goals    LTG 1  Pt. Will be independent with advanced HEP to improve long-term management of condition and independence.  -RS (r) AB (t) RS (c)     LTG 1 Progress  Met  -RS (r) AB (t) RS (c)     LTG 2  Pt. Will score </= 40% on Modified Oswestry (from 74% on initial evaluation) to indicate improved perception of disability.  -RS (r) AB (t) RS (c)     LTG 2 Progress  Ongoing;Progressing  -RS (r) AB (t) RS (c)     LTG 3  Pt. will demonstrate near normal gait pattern with equal stride length and heel strike with SPC to improve mobility and ability to participate in community activities.  -RS (r) AB (t) RS (c)     LTG 3 Progress  Ongoing  -RS (r) AB (t) RS (c)     LTG 4  Pt. will report intensity of pain </= 4/10 pain down into R LE to improve QOL and ease with transitional movements.  -RS (r) AB (t) RS (c)     LTG 4 Progress  Ongoing  -RS (r) AB (t) RS (c)     LTG 5  Pt. will increase R LE strength to >/= 4+/5 to improve ease  with transtional movements and stability.  -RS (r) AB (t) RS (c)     LTG 5 Progress  Ongoing  -RS (r) AB (t) RS (c)       User Key  (r) = Recorded By, (t) = Taken By, (c) = Cosigned By    Initials Name Provider Type    RS Sienna Oleary, PT Physical Therapist    Cole Salguero, PT Student PT Student          Therapy Education  Education Details: [id number] and provided copy  Given: HEP, Posture/body mechanics, Fall prevention and home safety  Program: Progressed, Reinforced  How Provided: Verbal, Demonstration, Written  Provided to: Patient  Level of Understanding: Verbalized, Demonstrated              Time Calculation:   Start Time: 0945  Stop Time: 1030  Time Calculation (min): 45 min  Total Timed Code Minutes- PT: 40 minute(s)  Therapy Charges for Today     Code Description Service Date Service Provider Modifiers Qty    14339739086  PT THER PROC EA 15 MIN 3/4/2021 Cole Bates, PT Student GP 1    05536509555 HC GAIT TRAINING EA 15 MIN 3/4/2021 Cole Bates, PT Student GP 1    [id number]  PT THERAPEUTIC ACT EA 15 MIN 3/4/2021 Cole Bates PT Student GP 1                    Cole Bates PT Student  3/4/2021

## 2021-03-05 ENCOUNTER — HOSPITAL ENCOUNTER (OUTPATIENT)
Dept: NUCLEAR MEDICINE | Facility: HOSPITAL | Age: 71
Discharge: HOME | End: 2021-03-05

## 2021-03-05 DIAGNOSIS — M25.561 ACUTE PAIN OF RIGHT KNEE: ICD-10-CM

## 2021-03-05 PROCEDURE — 78306 BONE IMAGING WHOLE BODY: CPT

## 2021-03-05 PROCEDURE — 0 TECHNETIUM MEDRONATE KIT: Performed by: NEUROLOGICAL SURGERY

## 2021-03-05 PROCEDURE — A9503 TC99M MEDRONATE: HCPCS | Performed by: NEUROLOGICAL SURGERY

## 2021-03-05 RX ORDER — TC 99M MEDRONATE 20 MG/10ML
22.8 INJECTION, POWDER, LYOPHILIZED, FOR SOLUTION INTRAVENOUS
Status: COMPLETED | OUTPATIENT
Start: 2021-03-05 | End: 2021-03-05

## 2021-03-05 RX ADMIN — Medication 22.8 MILLICURIE: at 08:19

## 2021-03-08 ENCOUNTER — HOSPITAL ENCOUNTER (OUTPATIENT)
Dept: PHYSICAL THERAPY | Facility: HOSPITAL | Age: 71
Setting detail: THERAPIES SERIES
Discharge: HOME | End: 2021-03-08

## 2021-03-08 DIAGNOSIS — Z47.89 ORTHOPEDIC AFTERCARE: ICD-10-CM

## 2021-03-08 DIAGNOSIS — Z98.1 S/P LUMBAR FUSION: Primary | ICD-10-CM

## 2021-03-08 DIAGNOSIS — M53.86 DECREASED ROM OF LUMBAR SPINE: ICD-10-CM

## 2021-03-08 PROCEDURE — 97530 THERAPEUTIC ACTIVITIES: CPT

## 2021-03-08 PROCEDURE — 97116 GAIT TRAINING THERAPY: CPT

## 2021-03-08 PROCEDURE — 97110 THERAPEUTIC EXERCISES: CPT

## 2021-03-08 NOTE — THERAPY TREATMENT NOTE
Outpatient Physical Therapy Ortho Treatment Note  [facility]     Patient Name: Taz Dickey  : 1950  MRN: [id number]  Today's Date: 3/8/2021      Visit Date: 2021    Visit Dx:    ICD-10-CM ICD-9-CM   1. S/P lumbar fusion  Z98.1 V45.4   2. Orthopedic aftercare  Z47.89 V54.9   3. Decreased ROM of lumbar spine  M53.86 724.9       Patient Active Problem List   Diagnosis   • Spondylolisthesis of lumbar region   • Lumbar radiculopathy   • Benign essential hypertension   • Erectile dysfunction of nonorganic origin   • Hypercholesterolemia   • Osteoarthritis   • Tinnitus of both ears   • Encounter for screening colonoscopy   • Medicare annual wellness visit, subsequent   • Nocturia   • Type 2 diabetes mellitus without complication, without long-term current use of insulin (CMS/[facility])   • Healthcare maintenance   • Acute pain of right knee        Past Medical History:   Diagnosis Date   • Arthritis    • Colon polyps    • Depression    • DVT (deep venous thrombosis) (CMS/[facility])     IN LEFT LEG   • Full dentures    • Hyperlipidemia    • Hypertension    • Numbness and tingling     RIGHT FOOT   • PONV (postoperative nausea and vomiting)    • Right leg pain    • Type 2 diabetes mellitus without complication, without long-term current use of insulin (CMS/[facility]) 10/8/2019        Past Surgical History:   Procedure Laterality Date   • CERVICAL DISCECTOMY LAMINECTOMY DECOMPRESSION POSTERIOR FUSION WITH INSTRUMENTATION     • COLONOSCOPY N/A 2018    Procedure: COLONOSCOPY to TI and cecum with polypectomy;  Surgeon: Anil Garces MD;  Location: [facility] ENDOSCOPY;  Service:    • JOINT REPLACEMENT      LEFT HIP   • KNEE ARTHROSCOPY Left    • LUMBAR DISCECTOMY FUSION INSTRUMENTATION N/A 2020    Procedure: Lumbar 4 5 laminectomy with a posterior lateral fusion and instrumentation and interbody fusion;  Surgeon: Jeffrey Garcia MD;  Location: [facility] MAIN OR;  Service: Neurosurgery;  Laterality: N/A;   •  VASECTOMY                         PT Assessment/Plan     Row Name 03/08/21 0956          PT Assessment    Assessment Comments  Mr. Dickey presents with continued reports of transitioning to use of SPC more frequently, though does find himself fatiguing quickly. Reinforced importance of use of rwx in community/unfamiliar environments for safety and reduce risk of falls. Pt. tolerated session well, continued to challenge dtnamic balance. Added step over small cone, pt. with increased difficulty with R foot clearance and with changes in gait speed particularly acceleration. Pt. with short, intermittent seated rest breaks during gait training due to fatigue as much of session spent in standing. Cues when standing and with gait for upright posture, pt. tends to remain forward flexed after working on picking up object from elevated surface.  -        PT Plan    PT Plan Comments  Functional strengthening, progress to independent management  -       User Key  (r) = Recorded By, (t) = Taken By, (c) = Cosigned By    Initials Name Provider Type     Beverly Garcia, PT Physical Therapist            OP Exercises     Row Name 03/08/21 0900             Subjective Comments    Subjective Comments  I am using my cane more and feeling more confident  -         Subjective Pain    Able to rate subjective pain?  yes  -      Pre-Treatment Pain Level  3  -         Total Minutes    85209 - Gait Training Minutes   8  -      47254 - PT Therapeutic Exercise Minutes  17  -MH      40900 - PT Therapeutic Activity Minutes  20  -MH         Exercise 3    Exercise Name 3  seated EOB HS stretch  -MH      Cueing 3  Verbal  -MH      Reps 3  3e  -MH      Time 3  20sec  -MH         Exercise 4    Exercise Name 4  NuStep L5  -MH      Cueing 4  Verbal  -MH      Time 4  5  -MH      Additional Comments  L5 B UE/LE  -MH         Exercise 5    Exercise Name 5  Seated LAQ  -MH      Cueing 5  Verbal  -MH      Sets 5  2  -MH      Reps 5  10  -MH       "Additional Comments  5#  -         Exercise 6    Exercise Name 6  narrow RHETT on airex  -      Cueing 6  Verbal  -      Sets 6  2  -      Reps 6  3  -      Time 6  30sec  -         Exercise 7    Exercise Name 7  step forward with shoulder flexion  -      Cueing 7  Verbal  -      Reps 7  10  -      Additional Comments  for upright posture  -         Exercise 9    Exercise Name 9  STS from chair  -      Cueing 9  Verbal;Tactile;Demo  -      Sets 9  2  -      Reps 9  5  -      Additional Comments  R LE back  -         Exercise 10    Exercise Name 10  Gait training with str. cane  -      Cueing 10  Verbal;Demo  -      Sets 10  cues for arm swing, step length, upright posture  -      Reps 10  difficulty with foot clearance on R  -      Time 10  8 min  -      Additional Comments  weaving through cones, stepping over cones, change in speed  -         Exercise 13    Exercise Name 13  step up 4\"-leading with R  -      Cueing 13  Verbal;Demo  -      Reps 13  12  -      Additional Comments  L UE support  -         Exercise 14    Exercise Name 14  lateral step up 4\"  -      Cueing 14  Verbal  -      Reps 14  12  -      Additional Comments  in // bars  -         Exercise 16    Exercise Name 16  TKE with TB  -      Cueing 16  Verbal;Tactile;Demo  -      Reps 16  12  -      Additional Comments  GTB  -         Exercise 18    Exercise Name 18  bending over to chair and grab cones  -      Cueing 18  Verbal;Demo  -      Sets 18  1  -      Reps 18  10  -      Additional Comments  lower with knees  -        User Key  (r) = Recorded By, (t) = Taken By, (c) = Cosigned By    Initials Name Provider Type    Beverly Santoro, PT Physical Therapist                       PT OP Goals     Row Name 03/08/21 0900          PT Short Term Goals    STG 1  Pt. Will be independent with initial HEP to improve self-management of condition.  -     STG 1 Progress  Met  -     STG " 2  Pt. Will demonstrate proper log roll technique without cueing to reduce lumbar strain and preserve spine.  -     STG 2 Progress  Met  -     STG 3  Pt. will ambulate with swing-through gait pattern and upright posture with rwx to improve mechanics to safely transition to ambulation with SPC.  -     STG 3 Progress  Met  -        Long Term Goals    LTG 1  Pt. Will be independent with advanced HEP to improve long-term management of condition and independence.  -     LTG 1 Progress  Met  -     LTG 2  Pt. Will score </= 40% on Modified Oswestry (from 74% on initial evaluation) to indicate improved perception of disability.  -     LTG 2 Progress  Ongoing;Progressing  -     LTG 3  Pt. will demonstrate near normal gait pattern with equal stride length and heel strike with SPC to improve mobility and ability to participate in community activities.  -     LTG 3 Progress  Ongoing;Progressing  -     LTG 3 Progress Comments  progressing to SPC at home and short community distances  -     LTG 4  Pt. will report intensity of pain </= 4/10 pain down into R LE to improve QOL and ease with transitional movements.  -     LTG 4 Progress  Ongoing  -     LTG 5  Pt. will increase R LE strength to >/= 4+/5 to improve ease with transtional movements and stability.  -     LTG 5 Progress  Ongoing  -       User Key  (r) = Recorded By, (t) = Taken By, (c) = Cosigned By    Initials Name Provider Type    Beverly Santoro, PT Physical Therapist          Therapy Education  Education Details: Educated to continue use of SPC at home but importance of rwx in community and unfamiliar environments to reduce risk of falls  Given: Fall prevention and home safety, Mobility training  Program: Reinforced  How Provided: Verbal, Demonstration  Provided to: Patient  Level of Understanding: Verbalized, Demonstrated              Time Calculation:   Start Time: 0910  Stop Time: 0955  Time Calculation (min): 45 min  Total Timed  Code Minutes- PT: 45 minute(s)  Therapy Charges for Today     Code Description Service Date Service Provider Modifiers Qty    43485970711 HC PT THERAPEUTIC ACT EA 15 MIN 3/8/2021 Beverly Garcia, PT GP 1    [id number] HC GAIT TRAINING EA 15 MIN 3/8/2021 Beverly Garcia, PT GP 1    [id number]  PT THER PROC EA 15 MIN 3/8/2021 Beverly Garcia, PT GP 1                    Beverly Garcia, PT  3/8/2021

## 2021-03-09 ENCOUNTER — OFFICE VISIT (OUTPATIENT)
Dept: NEUROSURGERY | Facility: CLINIC | Age: 71
End: 2021-03-09

## 2021-03-09 DIAGNOSIS — M25.561 ACUTE PAIN OF RIGHT KNEE: Primary | ICD-10-CM

## 2021-03-09 DIAGNOSIS — M54.16 LUMBAR RADICULOPATHY: ICD-10-CM

## 2021-03-09 PROCEDURE — 99441 PR PHYS/QHP TELEPHONE EVALUATION 5-10 MIN: CPT | Performed by: NEUROLOGICAL SURGERY

## 2021-03-11 ENCOUNTER — HOSPITAL ENCOUNTER (OUTPATIENT)
Dept: PHYSICAL THERAPY | Facility: HOSPITAL | Age: 71
Setting detail: THERAPIES SERIES
Discharge: HOME | End: 2021-03-11

## 2021-03-11 DIAGNOSIS — M53.86 DECREASED ROM OF LUMBAR SPINE: ICD-10-CM

## 2021-03-11 DIAGNOSIS — Z98.1 S/P LUMBAR FUSION: Primary | ICD-10-CM

## 2021-03-11 DIAGNOSIS — Z47.89 ORTHOPEDIC AFTERCARE: ICD-10-CM

## 2021-03-11 PROCEDURE — 97530 THERAPEUTIC ACTIVITIES: CPT

## 2021-03-11 PROCEDURE — 97110 THERAPEUTIC EXERCISES: CPT

## 2021-03-11 PROCEDURE — 97116 GAIT TRAINING THERAPY: CPT

## 2021-03-11 NOTE — THERAPY DISCHARGE NOTE
Outpatient Physical Therapy Ortho Treatment Note/Discharge Summary  [facility]     Patient Name: Taz Dickey  : 1950  MRN: [id number]  Today's Date: 3/11/2021      Visit Date: 2021    Visit Dx:    ICD-10-CM ICD-9-CM   1. S/P lumbar fusion  Z98.1 V45.4   2. Orthopedic aftercare  Z47.89 V54.9   3. Decreased ROM of lumbar spine  M53.86 724.9       Patient Active Problem List   Diagnosis   • Spondylolisthesis of lumbar region   • Lumbar radiculopathy   • Benign essential hypertension   • Erectile dysfunction of nonorganic origin   • Hypercholesterolemia   • Osteoarthritis   • Tinnitus of both ears   • Encounter for screening colonoscopy   • Medicare annual wellness visit, subsequent   • Nocturia   • Type 2 diabetes mellitus without complication, without long-term current use of insulin (CMS/[facility])   • Healthcare maintenance   • Acute pain of right knee        Past Medical History:   Diagnosis Date   • Arthritis    • Colon polyps    • Depression    • DVT (deep venous thrombosis) (CMS/[facility])     IN LEFT LEG   • Full dentures    • Hyperlipidemia    • Hypertension    • Numbness and tingling     RIGHT FOOT   • PONV (postoperative nausea and vomiting)    • Right leg pain    • Type 2 diabetes mellitus without complication, without long-term current use of insulin (CMS/HCC) 10/8/2019        Past Surgical History:   Procedure Laterality Date   • CERVICAL DISCECTOMY LAMINECTOMY DECOMPRESSION POSTERIOR FUSION WITH INSTRUMENTATION     • COLONOSCOPY N/A 2018    Procedure: COLONOSCOPY to TI and cecum with polypectomy;  Surgeon: Anil Garces MD;  Location: [facility] ENDOSCOPY;  Service:    • JOINT REPLACEMENT      LEFT HIP   • KNEE ARTHROSCOPY Left    • LUMBAR DISCECTOMY FUSION INSTRUMENTATION N/A 2020    Procedure: Lumbar 4 5 laminectomy with a posterior lateral fusion and instrumentation and interbody fusion;  Surgeon: Jeffrey Garcia MD;  Location: [facility] MAIN OR;  Service: Neurosurgery;   Laterality: N/A;   • VASECTOMY                         PT Assessment/Plan     Row Name 03/11/21 1000          PT Assessment    Assessment Comments  Mr. Dickey has been seen for 18 visits by skilled PT for s/p L4-5 laminectomy with fusion on 11/18/20. He has met 3/3 STG and 1/5 LTG at time of discharge. discussed with patient about importance of progressing to independent mangement of HEP for a period of time to assess the impact of skilled PT. patient in agreement and reports that he will remain compliant with HEP. Provided patient with additional exercises and ways to progress activities for increased challenge in HEP.   (Pended)   -AB        PT Plan    PT Plan Comments  Educated and given updated HEP for independent management.   (Pended)   -AB       User Key  (r) = Recorded By, (t) = Taken By, (c) = Cosigned By    Initials Name Provider Type    Cole Salguero, PT Student PT Student              OP Exercises     Row Name 03/11/21 0800             Subjective Comments    Subjective Comments  I am doing well this morning.   (Pended)   -AB         Subjective Pain    Able to rate subjective pain?  yes  (Pended)   -AB      Pre-Treatment Pain Level  3  (Pended)   -AB         Total Minutes    82072 - Gait Training Minutes   8  (Pended)   -AB      70049 - PT Therapeutic Exercise Minutes  17  (Pended)   -AB      59690 - PT Therapeutic Activity Minutes  20  (Pended)   -AB         Exercise 3    Exercise Name 3  seated EOB HS stretch  (Pended)   -AB      Cueing 3  Verbal  (Pended)   -AB      Reps 3  3e  (Pended)   -AB      Time 3  20sec  (Pended)   -AB         Exercise 4    Exercise Name 4  NuStep L5  (Pended)   -AB      Cueing 4  Verbal  (Pended)   -AB      Time 4  5  (Pended)   -AB      Additional Comments  L5 BUE/LE  (Pended)   -AB         Exercise 5    Exercise Name 5  Seated LAQ  (Pended)   -AB      Cueing 5  Verbal  (Pended)   -AB      Sets 5  2  (Pended)   -AB      Reps 5  10  (Pended)   -AB      Additional Comments  " 5#  (Pended)   -AB         Exercise 6    Exercise Name 6  narrow RHETT on airex  (Pended)   -AB      Cueing 6  Verbal  (Pended)   -AB      Sets 6  2  (Pended)   -AB      Reps 6  3  (Pended)   -AB      Time 6  30sec  (Pended)   -AB      Additional Comments  second set with staggered stance  (Pended)   -AB         Exercise 7    Exercise Name 7  --  (Pended)   -AB      Cueing 7  --  (Pended)   -AB      Reps 7  --  (Pended)   -AB         Exercise 9    Exercise Name 9  STS from chair  (Pended)   -AB      Cueing 9  Verbal;Tactile;Demo  (Pended)   -AB      Sets 9  2  (Pended)   -AB      Reps 9  5  (Pended)   -AB      Additional Comments  focus on standing up erect at end  (Pended)   -AB         Exercise 10    Exercise Name 10  Gait training with str. cane  (Pended)   -AB      Cueing 10  Verbal;Demo  (Pended)   -AB      Sets 10  cues for arm swing, step length, upright posture  (Pended)   -AB      Reps 10  --  (Pended)   -AB      Time 10  8 min  (Pended)   -AB      Additional Comments  practiced with two canes, improvement in posture and speed  (Pended)   -AB         Exercise 13    Exercise Name 13  step up 4\"-leading with R  (Pended)   -AB      Cueing 13  Verbal;Demo  (Pended)   -AB      Reps 13  12  (Pended)   -AB      Additional Comments  BUE support  (Pended)   -AB         Exercise 14    Exercise Name 14  lateral step up 4\"  (Pended)   -AB      Cueing 14  Verbal  (Pended)   -AB      Reps 14  12  (Pended)   -AB      Additional Comments  in // bars  (Pended)   -AB         Exercise 16    Exercise Name 16  TKE with TB  (Pended)   -AB      Cueing 16  Verbal;Tactile;Demo  (Pended)   -AB      Reps 16  12  (Pended)   -AB      Additional Comments  GTB  (Pended)   -AB         Exercise 18    Exercise Name 18  --  (Pended)   -AB      Cueing 18  --  (Pended)   -AB      Sets 18  --  (Pended)   -AB      Reps 18  --  (Pended)   -AB        User Key  (r) = Recorded By, (t) = Taken By, (c) = Cosigned By    Initials Name Provider Type    AB " Cole Bates, PT Student PT Student                         PT OP Goals     Row Name 03/11/21 1000          PT Short Term Goals    STG 1  Pt. Will be independent with initial HEP to improve self-management of condition.  (Pended)   -AB     STG 1 Progress  Met  (Pended)   -AB     STG 2  Pt. Will demonstrate proper log roll technique without cueing to reduce lumbar strain and preserve spine.  (Pended)   -AB     STG 2 Progress  Met  (Pended)   -AB     STG 3  Pt. will ambulate with swing-through gait pattern and upright posture with rwx to improve mechanics to safely transition to ambulation with SPC.  (Pended)   -AB     STG 3 Progress  Met  (Pended)   -AB        Long Term Goals    LTG 1  Pt. Will be independent with advanced HEP to improve long-term management of condition and independence.  (Pended)   -AB     LTG 1 Progress  Met  (Pended)   -AB     LTG 2  Pt. Will score </= 40% on Modified Oswestry (from 74% on initial evaluation) to indicate improved perception of disability.  (Pended)   -AB     LTG 2 Progress  Not Met  (Pended)   -AB     LTG 3  Pt. will demonstrate near normal gait pattern with equal stride length and heel strike with SPC to improve mobility and ability to participate in community activities.  (Pended)   -AB     LTG 3 Progress  Partially Met  (Pended)   -AB     LTG 3 Progress Comments  improvement in gait kinematics with bilateral SPC use  (Pended)   -AB     LTG 4  Pt. will report intensity of pain </= 4/10 pain down into R LE to improve QOL and ease with transitional movements.  (Pended)   -AB     LTG 4 Progress  Partially Met  (Pended)   -AB     LTG 5  Pt. will increase R LE strength to >/= 4+/5 to improve ease with transtional movements and stability.  (Pended)   -AB     LTG 5 Progress  Not Met  (Pended)   -AB       User Key  (r) = Recorded By, (t) = Taken By, (c) = Cosigned By    Initials Name Provider Type    AB Cole Bates, PT Student PT Student          Therapy Education  Education  Details: (P) Updated HEP, bilateral SPC use, safe mobility outdoors  Given: (P) HEP, Symptoms/condition management, Pain management, Posture/body mechanics, Fall prevention and home safety, Mobility training  Program: (P) Reinforced, Progressed  How Provided: (P) Verbal, Demonstration, Written  Provided to: (P) Patient  Level of Understanding: (P) Verbalized              Time Calculation:   Start Time: (P) 0900  Stop Time: (P) 0945  Time Calculation (min): (P) 45 min  Total Timed Code Minutes- PT: (P) 40 minute(s)  Therapy Charges for Today     Code Description Service Date Service Provider Modifiers Qty    39166821764  PT THER PROC EA 15 MIN 3/11/2021 Cole Bates, PT Student GP 1    [id number]  GAIT TRAINING EA 15 MIN 3/11/2021 Coel Bates, PT Student GP 1    [id number]  PT THERAPEUTIC ACT EA 15 MIN 3/11/2021 Cole Bates, PT Student GP 1                OP PT Discharge Summary  Date of Discharge: (P) 03/11/21  Reason for Discharge: (P) Maximum functional potential achieved  Outcomes Achieved: (P) Patient able to partially acheive established goals  Discharge Destination: (P) Home with home program  Discharge Instructions/Additional Comments: (P) Mr. Dickey has been seen for 18 visits by skilled PT for s/p L4-5 laminectomy with fusion on 11/18/20. He has met 3/3 STG and 1/5 LTG at time of discharge. discussed with patient about importance of progressing to independent mangement of HEP for a period of time to assess the impact of skilled PT. patient in agreement and reports that he will remain compliant with HEP. Provided patient with additional exercises and ways to progress activities for increased challenge in HEP.      Cole Bates, PT Student  3/11/2021

## 2021-03-19 ENCOUNTER — BULK ORDERING (OUTPATIENT)
Dept: CASE MANAGEMENT | Facility: OTHER | Age: 71
End: 2021-03-19

## 2021-03-19 DIAGNOSIS — Z23 IMMUNIZATION DUE: ICD-10-CM

## 2021-04-29 DIAGNOSIS — M43.16 SPONDYLOLISTHESIS OF LUMBAR REGION: Primary | ICD-10-CM

## 2021-04-29 RX ORDER — GABAPENTIN 300 MG/1
CAPSULE ORAL
Qty: 210 CAPSULE | Refills: 0 | Status: SHIPPED | OUTPATIENT
Start: 2021-04-29 | End: 2021-06-01

## 2021-05-11 ENCOUNTER — OFFICE VISIT (OUTPATIENT)
Dept: NEUROSURGERY | Facility: CLINIC | Age: 71
End: 2021-05-11

## 2021-05-11 VITALS
HEIGHT: 70 IN | WEIGHT: 210 LBS | SYSTOLIC BLOOD PRESSURE: 140 MMHG | BODY MASS INDEX: 30.06 KG/M2 | TEMPERATURE: 98 F | DIASTOLIC BLOOD PRESSURE: 82 MMHG | HEART RATE: 80 BPM

## 2021-05-11 DIAGNOSIS — M54.16 LUMBAR RADICULOPATHY: Primary | ICD-10-CM

## 2021-05-11 PROCEDURE — 99213 OFFICE O/P EST LOW 20 MIN: CPT | Performed by: NEUROLOGICAL SURGERY

## 2021-05-11 RX ORDER — DICLOFENAC SODIUM 75 MG/1
TABLET, DELAYED RELEASE ORAL
COMMUNITY
Start: 2021-03-18 | End: 2021-06-05 | Stop reason: HOSPADM

## 2021-05-20 ENCOUNTER — TRANSCRIBE ORDERS (OUTPATIENT)
Dept: PREADMISSION TESTING | Facility: HOSPITAL | Age: 71
End: 2021-05-20

## 2021-05-20 DIAGNOSIS — Z01.818 OTHER SPECIFIED PRE-OPERATIVE EXAMINATION: Primary | ICD-10-CM

## 2021-05-21 ENCOUNTER — TRANSCRIBE ORDERS (OUTPATIENT)
Dept: SLEEP MEDICINE | Facility: HOSPITAL | Age: 71
End: 2021-05-21

## 2021-05-21 DIAGNOSIS — Z01.818 OTHER SPECIFIED PRE-OPERATIVE EXAMINATION: Primary | ICD-10-CM

## 2021-05-26 ENCOUNTER — HOSPITAL ENCOUNTER (OUTPATIENT)
Dept: GENERAL RADIOLOGY | Facility: HOSPITAL | Age: 71
Discharge: HOME | End: 2021-05-26

## 2021-05-26 ENCOUNTER — PRE-ADMISSION TESTING (OUTPATIENT)
Dept: PREADMISSION TESTING | Facility: HOSPITAL | Age: 71
End: 2021-05-26

## 2021-05-26 VITALS
DIASTOLIC BLOOD PRESSURE: 78 MMHG | WEIGHT: 220.5 LBS | BODY MASS INDEX: 31.57 KG/M2 | HEART RATE: 64 BPM | HEIGHT: 70 IN | RESPIRATION RATE: 16 BRPM | SYSTOLIC BLOOD PRESSURE: 134 MMHG | TEMPERATURE: 98.3 F | OXYGEN SATURATION: 97 %

## 2021-05-26 LAB
ALBUMIN SERPL-MCNC: 4.1 G/DL (ref 3.5–5.2)
ALBUMIN/GLOB SERPL: 1.6 G/DL
ALP SERPL-CCNC: 82 U/L (ref 39–117)
ALT SERPL W P-5'-P-CCNC: 20 U/L (ref 1–41)
ANION GAP SERPL CALCULATED.3IONS-SCNC: 6.4 MMOL/L (ref 5–15)
AST SERPL-CCNC: 25 U/L (ref 1–40)
BILIRUB SERPL-MCNC: 0.5 MG/DL (ref 0–1.2)
BILIRUB UR QL STRIP: NEGATIVE
BUN SERPL-MCNC: 28 MG/DL (ref 8–23)
BUN/CREAT SERPL: 22.4 (ref 7–25)
CALCIUM SPEC-SCNC: 9.3 MG/DL (ref 8.6–10.5)
CHLORIDE SERPL-SCNC: 102 MMOL/L (ref 98–107)
CLARITY UR: CLEAR
CO2 SERPL-SCNC: 26.6 MMOL/L (ref 22–29)
COLOR UR: YELLOW
CREAT SERPL-MCNC: 1.25 MG/DL (ref 0.76–1.27)
DEPRECATED RDW RBC AUTO: 50.6 FL (ref 37–54)
ERYTHROCYTE [DISTWIDTH] IN BLOOD BY AUTOMATED COUNT: 14.5 % (ref 12.3–15.4)
GFR SERPL CREATININE-BSD FRML MDRD: 57 ML/MIN/1.73
GLOBULIN UR ELPH-MCNC: 2.6 GM/DL
GLUCOSE SERPL-MCNC: 144 MG/DL (ref 65–99)
GLUCOSE UR STRIP-MCNC: NEGATIVE MG/DL
HBA1C MFR BLD: 6.1 % (ref 4.8–5.6)
HCT VFR BLD AUTO: 32.1 % (ref 37.5–51)
HGB BLD-MCNC: 10.4 G/DL (ref 13–17.7)
HGB UR QL STRIP.AUTO: NEGATIVE
INR PPP: 0.98 (ref 0.9–1.1)
KETONES UR QL STRIP: NEGATIVE
LEUKOCYTE ESTERASE UR QL STRIP.AUTO: NEGATIVE
MCH RBC QN AUTO: 30.7 PG (ref 26.6–33)
MCHC RBC AUTO-ENTMCNC: 32.4 G/DL (ref 31.5–35.7)
MCV RBC AUTO: 94.7 FL (ref 79–97)
NITRITE UR QL STRIP: NEGATIVE
PH UR STRIP.AUTO: <=5 [PH] (ref 5–8)
PLATELET # BLD AUTO: 166 10*3/MM3 (ref 140–450)
PMV BLD AUTO: 10.7 FL (ref 6–12)
POTASSIUM SERPL-SCNC: 5.4 MMOL/L (ref 3.5–5.2)
PROT SERPL-MCNC: 6.7 G/DL (ref 6–8.5)
PROT UR QL STRIP: NEGATIVE
PROTHROMBIN TIME: 12.8 SECONDS (ref 11.7–14.2)
QT INTERVAL: 426 MS
RBC # BLD AUTO: 3.39 10*6/MM3 (ref 4.14–5.8)
SODIUM SERPL-SCNC: 135 MMOL/L (ref 136–145)
SP GR UR STRIP: 1.01 (ref 1–1.03)
UROBILINOGEN UR QL STRIP: NORMAL
WBC # BLD AUTO: 5.29 10*3/MM3 (ref 3.4–10.8)

## 2021-05-26 PROCEDURE — 93010 ELECTROCARDIOGRAM REPORT: CPT | Performed by: INTERNAL MEDICINE

## 2021-05-26 PROCEDURE — 71046 X-RAY EXAM CHEST 2 VIEWS: CPT

## 2021-05-26 PROCEDURE — 36415 COLL VENOUS BLD VENIPUNCTURE: CPT

## 2021-05-26 PROCEDURE — 83036 HEMOGLOBIN GLYCOSYLATED A1C: CPT

## 2021-05-26 PROCEDURE — 73502 X-RAY EXAM HIP UNI 2-3 VIEWS: CPT

## 2021-05-26 PROCEDURE — 93005 ELECTROCARDIOGRAM TRACING: CPT

## 2021-05-26 PROCEDURE — 80053 COMPREHEN METABOLIC PANEL: CPT

## 2021-05-26 PROCEDURE — 85610 PROTHROMBIN TIME: CPT

## 2021-05-26 PROCEDURE — 85027 COMPLETE CBC AUTOMATED: CPT

## 2021-05-26 PROCEDURE — 81003 URINALYSIS AUTO W/O SCOPE: CPT

## 2021-05-26 RX ORDER — CHLORHEXIDINE GLUCONATE 500 MG/1
CLOTH TOPICAL
COMMUNITY
End: 2021-06-05 | Stop reason: HOSPADM

## 2021-05-26 ASSESSMENT — HOOS JR
HOOS JR SCORE: 55.985
HOOS JR SCORE: 11

## 2021-05-26 NOTE — DISCHARGE INSTRUCTIONS
CHLORHEXIDINE CLOTH INSTRUCTIONS  The morning of surgery follow these instructions using the Chlorhexidine cloths you've been given.  These steps reduce bacteria on the body.  Do not use the cloths near your eyes, ears mouth, genitalia or on open wounds.  Throw the cloths away after use but do not try to flush them down a toilet.      • Open and remove one cloth at a time from the package.    • Leave the cloth unfolded and begin the bathing.  • Massage the skin with the cloths using gentle pressure to remove bacteria.  Do not scrub harshly.   • Follow the steps below with one 2% CHG cloth per area (6 total cloths).  • One cloth for neck, shoulders and chest.  • One cloth for both arms, hands, fingers and underarms (do underarms last).  • One cloth for the abdomen followed by groin.  • One cloth for right leg and foot including between the toes.  • One cloth for left leg and foot including between the toes.  • The last cloth is to be used for the back of the neck, back and buttocks.    Allow the CHG to air dry 3 minutes on the skin which will give it time to work and decrease the chance of irritation.  The skin may feel sticky until it is dry.  Do not rinse with water or any other liquid or you will lose the beneficial effects of the CHG.  If mild skin irritation occurs, do rinse the skin to remove the CHG.  Report this to the nurse at time of admission.  Do not apply lotions, creams, ointments, deodorants or perfumes after using the clothes. Dress in clean clothes before coming to the hospital.    BACTROBAN NASAL OINTMENT  There are many germs normally in your nose. Bactroban is an ointment that will help reduce these germs. Please follow these instructions for Bactroban use:      #1____The day before surgery in the morning  Date_6/2_______    #2____The day before surgery in the evening              Date__6/2______    _#3___The day of surgery in the morning    Date__6/3______    **Squirt ½ package of Bactroban  Ointment onto a cotton applicator and apply to inside of 1st nostril.  Squirt the remaining Bactroban and apply to the inside of the other nostril.     Take the following medications the morning of surgery: GABAPENTIN    ARRIVAL TIME TO MAIN SURGERY ON 6/3/21 IS 10:00 AM.      If you are on prescription narcotic pain medication to control your pain you may also take that medication the morning of surgery.    General Instructions:  • Do not eat solid food after midnight the night before surgery.  • You may drink clear liquids day of surgery but must stop at least one hour before your hospital arrival time.  • It is beneficial for you to have a clear drink that contains carbohydrates the day of surgery.  We suggest a 12 to 20 ounce bottle of Gatorade or Powerade for non-diabetic patients or a 12 to 20 ounce bottle of G2 or Powerade Zero for diabetic patients. (Pediatric patients, are not advised to drink a 12 to 20 ounce carbohydrate drink)    Clear liquids are liquids you can see through.  Nothing red in color.     Plain water                               Sports drinks  Sodas                                   Gelatin (Jell-O)  Fruit juices without pulp such as white grape juice and apple juice  Popsicles that contain no fruit or yogurt  Tea or coffee (no cream or milk added)  Gatorade / Powerade  G2 / Powerade Zero    • Patients who avoid smoking, chewing tobacco and alcohol for 4 weeks prior to surgery have a reduced risk of post-operative complications.  Quit smoking as many days before surgery as you can.  • Do not smoke, use chewing tobacco or drink alcohol the day of surgery.   • If applicable bring your C-PAP/ BI-PAP machine.  • Bring any papers given to you in the doctor’s office.  • Wear clean comfortable clothes.  • Do not wear contact lenses, false eyelashes or make-up.  Bring a case for your glasses.   • Bring crutches or walker if applicable.  • Remove all piercings.  Leave jewelry and any other  valuables at home.  • Hair extensions with metal clips must be removed prior to surgery.  • The Pre-Admission Testing nurse will instruct you to bring medications if unable to obtain an accurate list in Pre-Admission Testing.          Preventing a Surgical Site Infection:  • For 2 to 3 days before surgery, avoid shaving with a razor because the razor can irritate skin and make it easier to develop an infection.    • Any areas of open skin can increase the risk of a post-operative wound infection by allowing bacteria to enter and travel throughout the body.  Notify your surgeon if you have any skin wounds / rashes even if it is not near the expected surgical site.  The area will need assessed to determine if surgery should be delayed until it is healed.  • The night prior to surgery shower using a fresh bar of anti-bacterial soap (such as Dial) and clean washcloth.  Sleep in a clean bed with clean clothing.  Do not allow pets to sleep with you.  • Shower on the morning of surgery using a fresh bar of anti-bacterial soap (such as Dial) and clean washcloth.  Dry with a clean towel and dress in clean clothing.  • Ask your surgeon if you will be receiving antibiotics prior to surgery.  • Make sure you, your family, and all healthcare providers clean their hands with soap and water or an alcohol based hand  before caring for you or your wound.    Day of surgery:  Your arrival time is approximately two hours before your scheduled surgery time.  Upon arrival, a Pre-op nurse and Anesthesiologist will review your health history, obtain vital signs, and answer questions you may have.  The only belongings needed at this time will be a list of your home medications and if applicable your C-PAP/BI-PAP machine.  A Pre-op nurse will start an IV and you may receive medication in preparation for surgery, including something to help you relax.     Please be aware that surgery does come with discomfort.  We want to make every  effort to control your discomfort so please discuss any uncontrolled symptoms with your nurse.   Your doctor will most likely have prescribed pain medications.      If you are going home after surgery you will receive individualized written care instructions before being discharged.  A responsible adult must drive you to and from the hospital on the day of your surgery and stay with you for 24 hours.  Discharge prescriptions can be filled by the hospital pharmacy during regular pharmacy hours.  If you are having surgery late in the day/evening your prescription may be e-prescribed to your pharmacy.  Please verify your pharmacy hours or chose a 24 hour pharmacy to avoid not having access to your prescription because your pharmacy has closed for the day.    If you are staying overnight following surgery, you will be transported to your hospital room following the recovery period.  [facility] has all private rooms.    If you have any questions please call Pre-Admission Testing at [phone].  Deductibles and co-payments are collected on the day of service. Please be prepared to pay the required co-pay, deductible or deposit on the day of service as defined by your plan.    Patient Education for Self-Quarantine Process    Following your COVID testing, we strongly recommend that you do not leave your home after you have been tested for COVID except to get medical care. This includes not going to work, school or to public areas.  If this is not possible for you to do please limit your activities to only required outings.  Be sure to wear a mask when you are with other people, practice social distancing and wash your hands frequently.      The following items provide additional details to keep you safe.  • Wash your hands with soap and water frequently for at least 20 seconds.   • Avoid touching your eyes, nose and mouth with unwashed hands.  • Do not share anything - utensils, towels, food from the same  bowl.   • Have your own utensils, drinking glass, dishes, towels and bedding.   • Do not have visitors.   • Do use FaceTime to stay in touch with family and friends.  • You should stay in a specific room away from others if possible.   • Stay at least 6 feet away from others in the home if you cannot have a dedicated room to yourself.   • Do not snuggle with your pet. While the CDC says there is no evidence that pets can spread COVID-19 or be infected from humans, it is probably best to avoid “petting, snuggling, being kissed or licked and sharing food (during self-quarantine)”, according to the CDC.   • Sanitize household surfaces daily. Include all high touch areas (door handles, light switches, phones, countertops, etc.)  • Do not share a bathroom with others, if possible.   • Wear a mask around others in your home if you are unable to stay in a separate room or 6 feet apart. If  you are unable to wear a mask, have your family member wear a mask if they must be within 6 feet of you.   Call your surgeon immediately if you experience any of the following symptoms:  • Sore Throat  • Shortness of Breath or difficulty breathing  • Cough  • Chills  • Body soreness or muscle pain  • Headache  • Fever  • New loss of taste or smell  • Do not arrive for your surgery ill.  Your procedure will need to be rescheduled to another time.  You will need to call your physician before the day of surgery to avoid any unnecessary exposure to hospital staff as well as other patients.

## 2021-05-30 DIAGNOSIS — M43.16 SPONDYLOLISTHESIS OF LUMBAR REGION: ICD-10-CM

## 2021-06-01 ENCOUNTER — LAB (OUTPATIENT)
Dept: LAB | Facility: HOSPITAL | Age: 71
End: 2021-06-01

## 2021-06-01 DIAGNOSIS — Z01.818 OTHER SPECIFIED PRE-OPERATIVE EXAMINATION: ICD-10-CM

## 2021-06-01 LAB — SARS-COV-2 ORF1AB RESP QL NAA+PROBE: NOT DETECTED

## 2021-06-01 PROCEDURE — C9803 HOPD COVID-19 SPEC COLLECT: HCPCS

## 2021-06-01 PROCEDURE — U0004 COV-19 TEST NON-CDC HGH THRU: HCPCS

## 2021-06-01 RX ORDER — GABAPENTIN 300 MG/1
CAPSULE ORAL
Qty: 210 CAPSULE | Refills: 2 | Status: SHIPPED | OUTPATIENT
Start: 2021-06-01 | End: 2021-08-03

## 2021-06-03 ENCOUNTER — ANESTHESIA (OUTPATIENT)
Dept: PERIOP | Facility: HOSPITAL | Age: 71
End: 2021-06-03

## 2021-06-03 ENCOUNTER — APPOINTMENT (OUTPATIENT)
Dept: GENERAL RADIOLOGY | Facility: HOSPITAL | Age: 71
End: 2021-06-03

## 2021-06-03 ENCOUNTER — ANESTHESIA EVENT (OUTPATIENT)
Dept: PERIOP | Facility: HOSPITAL | Age: 71
End: 2021-06-03

## 2021-06-03 ENCOUNTER — HOSPITAL ENCOUNTER (OUTPATIENT)
Facility: HOSPITAL | Age: 71
Discharge: HOME | End: 2021-06-05
Attending: ORTHOPAEDIC SURGERY | Admitting: ORTHOPAEDIC SURGERY

## 2021-06-03 DIAGNOSIS — Z96.641 STATUS POST TOTAL HIP REPLACEMENT, RIGHT: ICD-10-CM

## 2021-06-03 DIAGNOSIS — M16.11 PRIMARY OSTEOARTHRITIS OF RIGHT HIP: Primary | ICD-10-CM

## 2021-06-03 PROBLEM — M19.90 DJD (DEGENERATIVE JOINT DISEASE): Status: ACTIVE | Noted: 2021-06-03

## 2021-06-03 LAB
ABO GROUP BLD: NORMAL
BLD GP AB SCN SERPL QL: NEGATIVE
GLUCOSE BLDC GLUCOMTR-MCNC: 136 MG/DL (ref 70–130)
GLUCOSE BLDC GLUCOMTR-MCNC: 160 MG/DL (ref 70–130)
POTASSIUM SERPL-SCNC: 4.6 MMOL/L (ref 3.5–5.2)
RH BLD: NEGATIVE
T&S EXPIRATION DATE: NORMAL

## 2021-06-03 PROCEDURE — 86900 BLOOD TYPING SEROLOGIC ABO: CPT | Performed by: ORTHOPAEDIC SURGERY

## 2021-06-03 PROCEDURE — 25010000002 FENTANYL CITRATE (PF) 50 MCG/ML SOLUTION: Performed by: NURSE ANESTHETIST, CERTIFIED REGISTERED

## 2021-06-03 PROCEDURE — 25010000002 FENTANYL CITRATE (PF) 50 MCG/ML SOLUTION: Performed by: ANESTHESIOLOGY

## 2021-06-03 PROCEDURE — 25010000002 MIDAZOLAM PER 1 MG: Performed by: ANESTHESIOLOGY

## 2021-06-03 PROCEDURE — 25010000003 CEFAZOLIN IN DEXTROSE 2-4 GM/100ML-% SOLUTION: Performed by: NURSE ANESTHETIST, CERTIFIED REGISTERED

## 2021-06-03 PROCEDURE — C1776 JOINT DEVICE (IMPLANTABLE): HCPCS | Performed by: ORTHOPAEDIC SURGERY

## 2021-06-03 PROCEDURE — C1713 ANCHOR/SCREW BN/BN,TIS/BN: HCPCS | Performed by: ORTHOPAEDIC SURGERY

## 2021-06-03 PROCEDURE — 25010000002 ROPIVACAINE PER 1 MG: Performed by: ORTHOPAEDIC SURGERY

## 2021-06-03 PROCEDURE — 25010000002 EPINEPHRINE 30 MG/30ML SOLUTION: Performed by: ORTHOPAEDIC SURGERY

## 2021-06-03 PROCEDURE — 86901 BLOOD TYPING SEROLOGIC RH(D): CPT | Performed by: ORTHOPAEDIC SURGERY

## 2021-06-03 PROCEDURE — 63710000001 GABAPENTIN 300 MG CAPSULE: Performed by: ORTHOPAEDIC SURGERY

## 2021-06-03 PROCEDURE — 63710000001 ATORVASTATIN 20 MG TABLET: Performed by: ORTHOPAEDIC SURGERY

## 2021-06-03 PROCEDURE — 76942 ECHO GUIDE FOR BIOPSY: CPT | Performed by: ORTHOPAEDIC SURGERY

## 2021-06-03 PROCEDURE — 86850 RBC ANTIBODY SCREEN: CPT | Performed by: ORTHOPAEDIC SURGERY

## 2021-06-03 PROCEDURE — A9270 NON-COVERED ITEM OR SERVICE: HCPCS | Performed by: ORTHOPAEDIC SURGERY

## 2021-06-03 PROCEDURE — 25010000002 DEXAMETHASONE PER 1 MG: Performed by: NURSE ANESTHETIST, CERTIFIED REGISTERED

## 2021-06-03 PROCEDURE — 25010000002 MORPHINE PER 10 MG: Performed by: ORTHOPAEDIC SURGERY

## 2021-06-03 PROCEDURE — 25010000002 ONDANSETRON PER 1 MG: Performed by: NURSE ANESTHETIST, CERTIFIED REGISTERED

## 2021-06-03 PROCEDURE — 73501 X-RAY EXAM HIP UNI 1 VIEW: CPT

## 2021-06-03 PROCEDURE — 25010000003 CEFAZOLIN IN DEXTROSE 2-4 GM/100ML-% SOLUTION: Performed by: ORTHOPAEDIC SURGERY

## 2021-06-03 PROCEDURE — 82962 GLUCOSE BLOOD TEST: CPT

## 2021-06-03 PROCEDURE — 84132 ASSAY OF SERUM POTASSIUM: CPT | Performed by: ORTHOPAEDIC SURGERY

## 2021-06-03 PROCEDURE — 25010000002 NEOSTIGMINE 5 MG/10ML SOLUTION: Performed by: NURSE ANESTHETIST, CERTIFIED REGISTERED

## 2021-06-03 PROCEDURE — 25010000003 MEPIVACAINE PER 10 ML: Performed by: ANESTHESIOLOGY

## 2021-06-03 PROCEDURE — 25010000002 KETOROLAC TROMETHAMINE PER 15 MG: Performed by: ORTHOPAEDIC SURGERY

## 2021-06-03 PROCEDURE — C1889 IMPLANT/INSERT DEVICE, NOC: HCPCS | Performed by: ORTHOPAEDIC SURGERY

## 2021-06-03 PROCEDURE — 63710000001 POVIDONE-IODINE 10 % SOLUTION 30 ML BOTTLE: Performed by: ORTHOPAEDIC SURGERY

## 2021-06-03 PROCEDURE — 63710000001 MUPIROCIN 2 % OINTMENT: Performed by: ORTHOPAEDIC SURGERY

## 2021-06-03 PROCEDURE — 63710000001 OXYCODONE-ACETAMINOPHEN 5-325 MG TABLET: Performed by: ORTHOPAEDIC SURGERY

## 2021-06-03 PROCEDURE — 25010000002 ROPIVACAINE PER 1 MG: Performed by: ANESTHESIOLOGY

## 2021-06-03 PROCEDURE — 25010000002 PHENYLEPHRINE PER 1 ML: Performed by: NURSE ANESTHETIST, CERTIFIED REGISTERED

## 2021-06-03 PROCEDURE — 25010000002 PROPOFOL 10 MG/ML EMULSION: Performed by: NURSE ANESTHETIST, CERTIFIED REGISTERED

## 2021-06-03 PROCEDURE — 25010000002 HYDROMORPHONE PER 4 MG: Performed by: NURSE ANESTHETIST, CERTIFIED REGISTERED

## 2021-06-03 DEVICE — LINER G7 2MOBL SZG 46MM: Type: IMPLANTABLE DEVICE | Site: HIP | Status: FUNCTIONAL

## 2021-06-03 DEVICE — ADAPT HIP BIOLOX OPTN TYPE1 TPR MIN 6: Type: IMPLANTABLE DEVICE | Site: HIP | Status: FUNCTIONAL

## 2021-06-03 DEVICE — TOTAL HIP PRIMARY: Type: IMPLANTABLE DEVICE | Site: HIP | Status: FUNCTIONAL

## 2021-06-03 DEVICE — SHLL ACET OSSEOTI G7 4H SZG 58MM: Type: IMPLANTABLE DEVICE | Site: HIP | Status: FUNCTIONAL

## 2021-06-03 DEVICE — SCRW ACET CORT TRILOGY S/TAP 6.5X25: Type: IMPLANTABLE DEVICE | Site: HIP | Status: FUNCTIONAL

## 2021-06-03 DEVICE — SCRW ACET CORT TRILOGY S/TAP 6.5X35: Type: IMPLANTABLE DEVICE | Site: HIP | Status: FUNCTIONAL

## 2021-06-03 DEVICE — STEM FEM/HIP TAPERLOC COMPL DIST/REDUC PPS OFFST/STD SZ16: Type: IMPLANTABLE DEVICE | Site: HIP | Status: FUNCTIONAL

## 2021-06-03 DEVICE — CAP HIP 2 MOBL UPCHRG: Type: IMPLANTABLE DEVICE | Site: HIP | Status: FUNCTIONAL

## 2021-06-03 DEVICE — BLUE CO-BRAID POLYETHYLENE SIZE 5 38" K-60 NEEDLE BIOMET
Type: IMPLANTABLE DEVICE | Site: HIP | Status: FUNCTIONAL
Brand: TELEFLEX

## 2021-06-03 DEVICE — BONE WAX
Type: IMPLANTABLE DEVICE | Site: HIP | Status: FUNCTIONAL
Brand: ETHICON

## 2021-06-03 DEVICE — CP HIP UPCHRG OSSEOTI LTD HL CUPS: Type: IMPLANTABLE DEVICE | Site: HIP | Status: FUNCTIONAL

## 2021-06-03 DEVICE — HD FEM/HIP G7 BIOLOX/DELTA OPTN 28MM: Type: IMPLANTABLE DEVICE | Site: HIP | Status: FUNCTIONAL

## 2021-06-03 DEVICE — BEAR HIP VIVACIT/E 2MOBIL HXPE 28MM: Type: IMPLANTABLE DEVICE | Site: HIP | Status: FUNCTIONAL

## 2021-06-03 RX ORDER — OXYCODONE HYDROCHLORIDE AND ACETAMINOPHEN 5; 325 MG/1; MG/1
2 TABLET ORAL EVERY 4 HOURS PRN
Status: DISCONTINUED | OUTPATIENT
Start: 2021-06-03 | End: 2021-06-05 | Stop reason: HOSPADM

## 2021-06-03 RX ORDER — MAGNESIUM HYDROXIDE 1200 MG/15ML
LIQUID ORAL AS NEEDED
Status: DISCONTINUED | OUTPATIENT
Start: 2021-06-03 | End: 2021-06-03 | Stop reason: HOSPADM

## 2021-06-03 RX ORDER — IBUPROFEN 600 MG/1
600 TABLET ORAL ONCE AS NEEDED
Status: DISCONTINUED | OUTPATIENT
Start: 2021-06-03 | End: 2021-06-03 | Stop reason: HOSPADM

## 2021-06-03 RX ORDER — FAMOTIDINE 10 MG/ML
20 INJECTION, SOLUTION INTRAVENOUS ONCE
Status: COMPLETED | OUTPATIENT
Start: 2021-06-03 | End: 2021-06-03

## 2021-06-03 RX ORDER — PROMETHAZINE HYDROCHLORIDE 12.5 MG/1
12.5 TABLET ORAL EVERY 6 HOURS PRN
Status: DISCONTINUED | OUTPATIENT
Start: 2021-06-03 | End: 2021-06-05 | Stop reason: HOSPADM

## 2021-06-03 RX ORDER — SODIUM CHLORIDE 0.9 % (FLUSH) 0.9 %
3 SYRINGE (ML) INJECTION EVERY 12 HOURS SCHEDULED
Status: DISCONTINUED | OUTPATIENT
Start: 2021-06-03 | End: 2021-06-03 | Stop reason: HOSPADM

## 2021-06-03 RX ORDER — LIDOCAINE HYDROCHLORIDE 10 MG/ML
0.5 INJECTION, SOLUTION EPIDURAL; INFILTRATION; INTRACAUDAL; PERINEURAL ONCE AS NEEDED
Status: DISCONTINUED | OUTPATIENT
Start: 2021-06-03 | End: 2021-06-03 | Stop reason: HOSPADM

## 2021-06-03 RX ORDER — SODIUM CHLORIDE, SODIUM LACTATE, POTASSIUM CHLORIDE, CALCIUM CHLORIDE 600; 310; 30; 20 MG/100ML; MG/100ML; MG/100ML; MG/100ML
100 INJECTION, SOLUTION INTRAVENOUS CONTINUOUS
Status: DISCONTINUED | OUTPATIENT
Start: 2021-06-03 | End: 2021-06-03

## 2021-06-03 RX ORDER — FENTANYL CITRATE 50 UG/ML
50 INJECTION, SOLUTION INTRAMUSCULAR; INTRAVENOUS
Status: DISCONTINUED | OUTPATIENT
Start: 2021-06-03 | End: 2021-06-03 | Stop reason: HOSPADM

## 2021-06-03 RX ORDER — FENTANYL CITRATE 50 UG/ML
INJECTION, SOLUTION INTRAMUSCULAR; INTRAVENOUS
Status: COMPLETED | OUTPATIENT
Start: 2021-06-03 | End: 2021-06-03

## 2021-06-03 RX ORDER — NALOXONE HCL 0.4 MG/ML
0.2 VIAL (ML) INJECTION AS NEEDED
Status: DISCONTINUED | OUTPATIENT
Start: 2021-06-03 | End: 2021-06-03 | Stop reason: HOSPADM

## 2021-06-03 RX ORDER — CELECOXIB 200 MG/1
200 CAPSULE ORAL ONCE
Status: COMPLETED | OUTPATIENT
Start: 2021-06-03 | End: 2021-06-03

## 2021-06-03 RX ORDER — DEXAMETHASONE SODIUM PHOSPHATE 10 MG/ML
INJECTION INTRAMUSCULAR; INTRAVENOUS AS NEEDED
Status: DISCONTINUED | OUTPATIENT
Start: 2021-06-03 | End: 2021-06-03 | Stop reason: SURG

## 2021-06-03 RX ORDER — DIAZEPAM 5 MG/1
5 TABLET ORAL 2 TIMES DAILY PRN
Status: DISCONTINUED | OUTPATIENT
Start: 2021-06-03 | End: 2021-06-05 | Stop reason: HOSPADM

## 2021-06-03 RX ORDER — GLYCOPYRROLATE 0.2 MG/ML
INJECTION INTRAMUSCULAR; INTRAVENOUS AS NEEDED
Status: DISCONTINUED | OUTPATIENT
Start: 2021-06-03 | End: 2021-06-03 | Stop reason: SURG

## 2021-06-03 RX ORDER — PROPOFOL 10 MG/ML
VIAL (ML) INTRAVENOUS AS NEEDED
Status: DISCONTINUED | OUTPATIENT
Start: 2021-06-03 | End: 2021-06-03 | Stop reason: SURG

## 2021-06-03 RX ORDER — FERROUS SULFATE 325(65) MG
325 TABLET ORAL
Status: DISCONTINUED | OUTPATIENT
Start: 2021-06-04 | End: 2021-06-05 | Stop reason: HOSPADM

## 2021-06-03 RX ORDER — ASPIRIN 325 MG
325 TABLET, DELAYED RELEASE (ENTERIC COATED) ORAL 2 TIMES DAILY WITH MEALS
Status: DISCONTINUED | OUTPATIENT
Start: 2021-06-04 | End: 2021-06-05 | Stop reason: HOSPADM

## 2021-06-03 RX ORDER — FENTANYL CITRATE 50 UG/ML
INJECTION, SOLUTION INTRAMUSCULAR; INTRAVENOUS AS NEEDED
Status: DISCONTINUED | OUTPATIENT
Start: 2021-06-03 | End: 2021-06-03 | Stop reason: SURG

## 2021-06-03 RX ORDER — DIPHENHYDRAMINE HYDROCHLORIDE 50 MG/ML
12.5 INJECTION INTRAMUSCULAR; INTRAVENOUS
Status: DISCONTINUED | OUTPATIENT
Start: 2021-06-03 | End: 2021-06-03 | Stop reason: HOSPADM

## 2021-06-03 RX ORDER — CLINDAMYCIN PHOSPHATE 900 MG/50ML
900 INJECTION INTRAVENOUS EVERY 8 HOURS
Status: COMPLETED | OUTPATIENT
Start: 2021-06-03 | End: 2021-06-04

## 2021-06-03 RX ORDER — ONDANSETRON 2 MG/ML
INJECTION INTRAMUSCULAR; INTRAVENOUS AS NEEDED
Status: DISCONTINUED | OUTPATIENT
Start: 2021-06-03 | End: 2021-06-03

## 2021-06-03 RX ORDER — SODIUM CHLORIDE 0.9 % (FLUSH) 0.9 %
3-10 SYRINGE (ML) INJECTION AS NEEDED
Status: DISCONTINUED | OUTPATIENT
Start: 2021-06-03 | End: 2021-06-03 | Stop reason: HOSPADM

## 2021-06-03 RX ORDER — OXYCODONE HYDROCHLORIDE AND ACETAMINOPHEN 5; 325 MG/1; MG/1
1 TABLET ORAL EVERY 4 HOURS PRN
Status: DISCONTINUED | OUTPATIENT
Start: 2021-06-03 | End: 2021-06-05 | Stop reason: HOSPADM

## 2021-06-03 RX ORDER — MORPHINE SULFATE 2 MG/ML
4 INJECTION, SOLUTION INTRAMUSCULAR; INTRAVENOUS
Status: DISCONTINUED | OUTPATIENT
Start: 2021-06-03 | End: 2021-06-05 | Stop reason: HOSPADM

## 2021-06-03 RX ORDER — DIPHENHYDRAMINE HCL 25 MG
25 CAPSULE ORAL
Status: DISCONTINUED | OUTPATIENT
Start: 2021-06-03 | End: 2021-06-03 | Stop reason: HOSPADM

## 2021-06-03 RX ORDER — LIDOCAINE HYDROCHLORIDE 20 MG/ML
INJECTION, SOLUTION INFILTRATION; PERINEURAL AS NEEDED
Status: DISCONTINUED | OUTPATIENT
Start: 2021-06-03 | End: 2021-06-03 | Stop reason: SURG

## 2021-06-03 RX ORDER — HYDROMORPHONE HCL 110MG/55ML
PATIENT CONTROLLED ANALGESIA SYRINGE INTRAVENOUS AS NEEDED
Status: DISCONTINUED | OUTPATIENT
Start: 2021-06-03 | End: 2021-06-03 | Stop reason: SURG

## 2021-06-03 RX ORDER — EPHEDRINE SULFATE 50 MG/ML
5 INJECTION, SOLUTION INTRAVENOUS ONCE AS NEEDED
Status: DISCONTINUED | OUTPATIENT
Start: 2021-06-03 | End: 2021-06-03 | Stop reason: HOSPADM

## 2021-06-03 RX ORDER — HYDROMORPHONE HYDROCHLORIDE 1 MG/ML
0.5 INJECTION, SOLUTION INTRAMUSCULAR; INTRAVENOUS; SUBCUTANEOUS
Status: DISCONTINUED | OUTPATIENT
Start: 2021-06-03 | End: 2021-06-03 | Stop reason: HOSPADM

## 2021-06-03 RX ORDER — SODIUM CHLORIDE 0.9 % (FLUSH) 0.9 %
1-10 SYRINGE (ML) INJECTION AS NEEDED
Status: DISCONTINUED | OUTPATIENT
Start: 2021-06-03 | End: 2021-06-05 | Stop reason: HOSPADM

## 2021-06-03 RX ORDER — PROMETHAZINE HYDROCHLORIDE 25 MG/1
25 SUPPOSITORY RECTAL ONCE AS NEEDED
Status: DISCONTINUED | OUTPATIENT
Start: 2021-06-03 | End: 2021-06-03 | Stop reason: HOSPADM

## 2021-06-03 RX ORDER — CHOLECALCIFEROL (VITAMIN D3) 125 MCG
5 CAPSULE ORAL NIGHTLY PRN
Status: DISCONTINUED | OUTPATIENT
Start: 2021-06-03 | End: 2021-06-05 | Stop reason: HOSPADM

## 2021-06-03 RX ORDER — FLUMAZENIL 0.1 MG/ML
0.2 INJECTION INTRAVENOUS AS NEEDED
Status: DISCONTINUED | OUTPATIENT
Start: 2021-06-03 | End: 2021-06-03 | Stop reason: HOSPADM

## 2021-06-03 RX ORDER — MIDAZOLAM HYDROCHLORIDE 1 MG/ML
INJECTION INTRAMUSCULAR; INTRAVENOUS
Status: COMPLETED | OUTPATIENT
Start: 2021-06-03 | End: 2021-06-03

## 2021-06-03 RX ORDER — ATORVASTATIN CALCIUM 20 MG/1
40 TABLET, FILM COATED ORAL DAILY
Status: DISCONTINUED | OUTPATIENT
Start: 2021-06-03 | End: 2021-06-05 | Stop reason: HOSPADM

## 2021-06-03 RX ORDER — ONDANSETRON 2 MG/ML
INJECTION INTRAMUSCULAR; INTRAVENOUS AS NEEDED
Status: DISCONTINUED | OUTPATIENT
Start: 2021-06-03 | End: 2021-06-03 | Stop reason: SURG

## 2021-06-03 RX ORDER — NALOXONE HCL 0.4 MG/ML
0.4 VIAL (ML) INJECTION
Status: DISCONTINUED | OUTPATIENT
Start: 2021-06-03 | End: 2021-06-05 | Stop reason: HOSPADM

## 2021-06-03 RX ORDER — ONDANSETRON 4 MG/1
4 TABLET, FILM COATED ORAL EVERY 6 HOURS PRN
Status: DISCONTINUED | OUTPATIENT
Start: 2021-06-03 | End: 2021-06-05 | Stop reason: HOSPADM

## 2021-06-03 RX ORDER — ACETAMINOPHEN 325 MG/1
325 TABLET ORAL EVERY 4 HOURS PRN
Status: DISCONTINUED | OUTPATIENT
Start: 2021-06-03 | End: 2021-06-05 | Stop reason: HOSPADM

## 2021-06-03 RX ORDER — SODIUM CHLORIDE 450 MG/100ML
100 INJECTION, SOLUTION INTRAVENOUS CONTINUOUS
Status: DISCONTINUED | OUTPATIENT
Start: 2021-06-03 | End: 2021-06-05 | Stop reason: HOSPADM

## 2021-06-03 RX ORDER — GABAPENTIN 300 MG/1
600 CAPSULE ORAL EVERY 12 HOURS SCHEDULED
Status: DISCONTINUED | OUTPATIENT
Start: 2021-06-03 | End: 2021-06-05 | Stop reason: HOSPADM

## 2021-06-03 RX ORDER — ACETAMINOPHEN 500 MG
1000 TABLET ORAL ONCE
Status: COMPLETED | OUTPATIENT
Start: 2021-06-03 | End: 2021-06-03

## 2021-06-03 RX ORDER — LABETALOL HYDROCHLORIDE 5 MG/ML
5 INJECTION, SOLUTION INTRAVENOUS
Status: DISCONTINUED | OUTPATIENT
Start: 2021-06-03 | End: 2021-06-03 | Stop reason: HOSPADM

## 2021-06-03 RX ORDER — ONDANSETRON 2 MG/ML
4 INJECTION INTRAMUSCULAR; INTRAVENOUS ONCE AS NEEDED
Status: DISCONTINUED | OUTPATIENT
Start: 2021-06-03 | End: 2021-06-03 | Stop reason: HOSPADM

## 2021-06-03 RX ORDER — CEFAZOLIN SODIUM 2 G/100ML
2 INJECTION, SOLUTION INTRAVENOUS ONCE
Status: COMPLETED | OUTPATIENT
Start: 2021-06-03 | End: 2021-06-03

## 2021-06-03 RX ORDER — DOCUSATE SODIUM 100 MG/1
100 CAPSULE, LIQUID FILLED ORAL 2 TIMES DAILY PRN
Status: DISCONTINUED | OUTPATIENT
Start: 2021-06-03 | End: 2021-06-05 | Stop reason: HOSPADM

## 2021-06-03 RX ORDER — SODIUM CHLORIDE 0.9 % (FLUSH) 0.9 %
3 SYRINGE (ML) INJECTION EVERY 12 HOURS SCHEDULED
Status: DISCONTINUED | OUTPATIENT
Start: 2021-06-03 | End: 2021-06-05 | Stop reason: HOSPADM

## 2021-06-03 RX ORDER — ONDANSETRON 2 MG/ML
4 INJECTION INTRAMUSCULAR; INTRAVENOUS EVERY 6 HOURS PRN
Status: DISCONTINUED | OUTPATIENT
Start: 2021-06-03 | End: 2021-06-05 | Stop reason: HOSPADM

## 2021-06-03 RX ORDER — ROCURONIUM BROMIDE 10 MG/ML
INJECTION, SOLUTION INTRAVENOUS AS NEEDED
Status: DISCONTINUED | OUTPATIENT
Start: 2021-06-03 | End: 2021-06-03 | Stop reason: SURG

## 2021-06-03 RX ORDER — MIDAZOLAM HYDROCHLORIDE 1 MG/ML
0.5 INJECTION INTRAMUSCULAR; INTRAVENOUS
Status: DISCONTINUED | OUTPATIENT
Start: 2021-06-03 | End: 2021-06-03 | Stop reason: HOSPADM

## 2021-06-03 RX ORDER — HYDROCODONE BITARTRATE AND ACETAMINOPHEN 7.5; 325 MG/1; MG/1
1 TABLET ORAL ONCE AS NEEDED
Status: DISCONTINUED | OUTPATIENT
Start: 2021-06-03 | End: 2021-06-03 | Stop reason: HOSPADM

## 2021-06-03 RX ORDER — ALBUTEROL SULFATE 2.5 MG/3ML
2.5 SOLUTION RESPIRATORY (INHALATION) ONCE AS NEEDED
Status: DISCONTINUED | OUTPATIENT
Start: 2021-06-03 | End: 2021-06-03 | Stop reason: HOSPADM

## 2021-06-03 RX ORDER — NEOSTIGMINE METHYLSULFATE 0.5 MG/ML
INJECTION, SOLUTION INTRAVENOUS AS NEEDED
Status: DISCONTINUED | OUTPATIENT
Start: 2021-06-03 | End: 2021-06-03 | Stop reason: SURG

## 2021-06-03 RX ORDER — SODIUM CHLORIDE, SODIUM LACTATE, POTASSIUM CHLORIDE, CALCIUM CHLORIDE 600; 310; 30; 20 MG/100ML; MG/100ML; MG/100ML; MG/100ML
9 INJECTION, SOLUTION INTRAVENOUS CONTINUOUS
Status: DISCONTINUED | OUTPATIENT
Start: 2021-06-03 | End: 2021-06-03

## 2021-06-03 RX ORDER — OXYCODONE AND ACETAMINOPHEN 10; 325 MG/1; MG/1
1 TABLET ORAL EVERY 4 HOURS PRN
Status: DISCONTINUED | OUTPATIENT
Start: 2021-06-03 | End: 2021-06-03 | Stop reason: HOSPADM

## 2021-06-03 RX ORDER — EPHEDRINE SULFATE 50 MG/ML
INJECTION, SOLUTION INTRAVENOUS AS NEEDED
Status: DISCONTINUED | OUTPATIENT
Start: 2021-06-03 | End: 2021-06-03 | Stop reason: SURG

## 2021-06-03 RX ORDER — ROPIVACAINE HYDROCHLORIDE 5 MG/ML
INJECTION, SOLUTION EPIDURAL; INFILTRATION; PERINEURAL
Status: COMPLETED | OUTPATIENT
Start: 2021-06-03 | End: 2021-06-03

## 2021-06-03 RX ORDER — CEFAZOLIN SODIUM 2 G/100ML
INJECTION, SOLUTION INTRAVENOUS AS NEEDED
Status: DISCONTINUED | OUTPATIENT
Start: 2021-06-03 | End: 2021-06-03 | Stop reason: SURG

## 2021-06-03 RX ORDER — HYDROXYZINE HYDROCHLORIDE 25 MG/1
25 TABLET, FILM COATED ORAL EVERY 8 HOURS PRN
Status: DISCONTINUED | OUTPATIENT
Start: 2021-06-03 | End: 2021-06-05 | Stop reason: HOSPADM

## 2021-06-03 RX ORDER — PROMETHAZINE HYDROCHLORIDE 25 MG/1
25 TABLET ORAL ONCE AS NEEDED
Status: DISCONTINUED | OUTPATIENT
Start: 2021-06-03 | End: 2021-06-03 | Stop reason: HOSPADM

## 2021-06-03 RX ADMIN — FENTANYL CITRATE 50 MCG: 50 INJECTION INTRAMUSCULAR; INTRAVENOUS at 12:13

## 2021-06-03 RX ADMIN — FENTANYL CITRATE 100 MCG: 50 INJECTION INTRAMUSCULAR; INTRAVENOUS at 13:55

## 2021-06-03 RX ADMIN — PROPOFOL 150 MG: 10 INJECTION, EMULSION INTRAVENOUS at 13:55

## 2021-06-03 RX ADMIN — EPHEDRINE SULFATE 10 MG: 50 INJECTION INTRAVENOUS at 15:18

## 2021-06-03 RX ADMIN — MEPIVACAINE HYDROCHLORIDE 25 ML: 15 INJECTION, SOLUTION EPIDURAL; INFILTRATION at 12:10

## 2021-06-03 RX ADMIN — PHENYLEPHRINE HYDROCHLORIDE 100 MCG: 10 INJECTION INTRAVENOUS at 15:12

## 2021-06-03 RX ADMIN — LIDOCAINE HYDROCHLORIDE 80 MG: 20 INJECTION, SOLUTION INFILTRATION; PERINEURAL at 13:55

## 2021-06-03 RX ADMIN — MIDAZOLAM 2 MG: 1 INJECTION INTRAMUSCULAR; INTRAVENOUS at 12:13

## 2021-06-03 RX ADMIN — ACETAMINOPHEN 1000 MG: 500 TABLET, FILM COATED ORAL at 10:43

## 2021-06-03 RX ADMIN — OXYCODONE HYDROCHLORIDE AND ACETAMINOPHEN 2 TABLET: 5; 325 TABLET ORAL at 22:01

## 2021-06-03 RX ADMIN — CELECOXIB 200 MG: 200 CAPSULE ORAL at 10:43

## 2021-06-03 RX ADMIN — OXYCODONE HYDROCHLORIDE AND ACETAMINOPHEN 2 TABLET: 5; 325 TABLET ORAL at 17:50

## 2021-06-03 RX ADMIN — SODIUM CHLORIDE 100 ML/HR: 4.5 INJECTION, SOLUTION INTRAVENOUS at 17:51

## 2021-06-03 RX ADMIN — PHENYLEPHRINE HYDROCHLORIDE 100 MCG: 10 INJECTION INTRAVENOUS at 15:46

## 2021-06-03 RX ADMIN — CLINDAMYCIN PHOSPHATE 900 MG: 900 INJECTION, SOLUTION INTRAVENOUS at 23:18

## 2021-06-03 RX ADMIN — DEXAMETHASONE SODIUM PHOSPHATE 6 MG: 10 INJECTION INTRAMUSCULAR; INTRAVENOUS at 14:27

## 2021-06-03 RX ADMIN — NEOSTIGMINE METHYLSULFATE 2 MG: 0.5 INJECTION INTRAVENOUS at 15:38

## 2021-06-03 RX ADMIN — ROCURONIUM BROMIDE 10 MG: 50 INJECTION INTRAVENOUS at 14:51

## 2021-06-03 RX ADMIN — ONDANSETRON 4 MG: 2 INJECTION INTRAMUSCULAR; INTRAVENOUS at 15:38

## 2021-06-03 RX ADMIN — ATORVASTATIN CALCIUM 40 MG: 20 TABLET, FILM COATED ORAL at 21:13

## 2021-06-03 RX ADMIN — SODIUM CHLORIDE, POTASSIUM CHLORIDE, SODIUM LACTATE AND CALCIUM CHLORIDE 9 ML/HR: 600; 310; 30; 20 INJECTION, SOLUTION INTRAVENOUS at 10:37

## 2021-06-03 RX ADMIN — FAMOTIDINE 20 MG: 10 INJECTION INTRAVENOUS at 11:13

## 2021-06-03 RX ADMIN — ROPIVACAINE HYDROCHLORIDE 30 ML: 5 INJECTION, SOLUTION EPIDURAL; INFILTRATION; PERINEURAL at 12:10

## 2021-06-03 RX ADMIN — FENTANYL CITRATE 50 MCG: 50 INJECTION, SOLUTION INTRAMUSCULAR; INTRAVENOUS at 16:38

## 2021-06-03 RX ADMIN — CEFAZOLIN SODIUM 2 G: 2 INJECTION, SOLUTION INTRAVENOUS at 13:39

## 2021-06-03 RX ADMIN — GABAPENTIN 600 MG: 300 CAPSULE ORAL at 21:13

## 2021-06-03 RX ADMIN — FENTANYL CITRATE 50 MCG: 50 INJECTION, SOLUTION INTRAMUSCULAR; INTRAVENOUS at 16:20

## 2021-06-03 RX ADMIN — CEFAZOLIN SODIUM 2 G: 2 INJECTION, SOLUTION INTRAVENOUS at 15:33

## 2021-06-03 RX ADMIN — HYDROMORPHONE HYDROCHLORIDE 0.25 MG: 2 INJECTION, SOLUTION INTRAMUSCULAR; INTRAVENOUS; SUBCUTANEOUS at 16:05

## 2021-06-03 RX ADMIN — PHENYLEPHRINE HYDROCHLORIDE 200 MCG: 10 INJECTION INTRAVENOUS at 14:06

## 2021-06-03 RX ADMIN — ROCURONIUM BROMIDE 50 MG: 50 INJECTION INTRAVENOUS at 13:55

## 2021-06-03 RX ADMIN — FENTANYL CITRATE 50 MCG: 50 INJECTION INTRAMUSCULAR; INTRAVENOUS at 12:18

## 2021-06-03 RX ADMIN — PHENYLEPHRINE HYDROCHLORIDE 100 MCG: 10 INJECTION INTRAVENOUS at 13:56

## 2021-06-03 RX ADMIN — GLYCOPYRROLATE 0.4 MG: 0.2 INJECTION INTRAMUSCULAR; INTRAVENOUS at 15:38

## 2021-06-03 RX ADMIN — MUPIROCIN 1 APPLICATION: 20 OINTMENT TOPICAL at 21:13

## 2021-06-03 RX ADMIN — HYDROMORPHONE HYDROCHLORIDE 0.25 MG: 2 INJECTION, SOLUTION INTRAMUSCULAR; INTRAVENOUS; SUBCUTANEOUS at 15:26

## 2021-06-03 NOTE — ANESTHESIA PREPROCEDURE EVALUATION
" Anesthesia Evaluation     Patient summary reviewed and Nursing notes reviewed   history of anesthetic complications: PONV               Airway   Mallampati: II  TM distance: >3 FB  Neck ROM: limited  No difficulty expected  Dental    (+) lower dentures and upper dentures    Pulmonary    (+) a smoker Current,   Cardiovascular     ECG reviewed  Rhythm: regular  Rate: normal    (+) hypertension, DVT, hyperlipidemia,       Neuro/Psych  (+) numbness, psychiatric history Depression,     GI/Hepatic/Renal/Endo    (+) obesity,   diabetes mellitus type 2,     Musculoskeletal     Abdominal    Substance History - negative use     OB/GYN negative ob/gyn ROS         Other   arthritis,                      Anesthesia Plan    ASA 3     general with block   (BMI  Full upper and lower dentures have been removed  Smoker  Goes by \"[employer]\"  Right FIC block PSR for POPC    I have reviewed the patient's history with the patient and the chart, including all pertinent laboratory results and imaging. I have explained the risks of anesthesia including but not limited to dental damage, corneal abrasion, nerve injury, MI, stroke, and death. Questions asked and answered. Anesthetic plan discussed with patient and team as indicated. Patient expressed understanding of the above.  )  intravenous induction     Anesthetic plan, all risks, benefits, and alternatives have been provided, discussed and informed consent has been obtained with: patient.      "

## 2021-06-03 NOTE — H&P
"  Orthopaedic Surgery History and Physical    Patient Name:  Taz Dickey  YOB: 1950  Age: 70 y.o.  Medical Records Number:  [id number]    Date of Admission:  6/3/2021  9:59 AM    Chief Complaint:  Primary osteoarthritis of right hip [M16.11]    Taz Dickey is a 70 y.o. male who presents c/o severe right hip pain.  The pain has been on and off for many years, worsening recently to the point where the pain is becoming disabling. The pain is a constant dull ache with occasional sharp, stabbing pains.  The patient has failed conservative treatment and would like to proceed with right total hip arthroplasty.    /81 (BP Location: Right arm, Patient Position: Lying)   Pulse 68   Temp 98.6 °F (37 °C) (Oral)   Resp 16   Ht 177.8 cm (70\")   Wt 97.7 kg (215 lb 6.4 oz)   SpO2 95%   BMI 30.91 kg/m²     Past Medical History:    Past Medical History:   Diagnosis Date   • Arthritis    • Colon polyps    • Depression    • DVT (deep venous thrombosis) (CMS/HCC)     IN LEFT LEG   • Full dentures    • Hip pain     RIGHT   • Hyperlipidemia    • Hypertension    • Numbness and tingling     RIGHT FOOT   • PONV (postoperative nausea and vomiting)    • Right leg pain    • Type 2 diabetes mellitus without complication, without long-term current use of insulin (CMS/HCC) 10/8/2019       Past Surgical History:   Past Surgical History:   Procedure Laterality Date   • CERVICAL DISCECTOMY LAMINECTOMY DECOMPRESSION POSTERIOR FUSION WITH INSTRUMENTATION     • COLONOSCOPY N/A 2/28/2018    Procedure: COLONOSCOPY to TI and cecum with polypectomy;  Surgeon: Anil Garces MD;  Location: [facility] ENDOSCOPY;  Service:    • JOINT REPLACEMENT      LEFT HIP   • KNEE ARTHROSCOPY Left    • LUMBAR DISCECTOMY FUSION INSTRUMENTATION N/A 11/18/2020    Procedure: Lumbar 4 5 laminectomy with a posterior lateral fusion and instrumentation and interbody fusion;  Surgeon: Jeffrey Garcia MD;  Location: [facility] MAIN OR;  Service: " Neurosurgery;  Laterality: N/A;   • VASECTOMY         Social History:    Social History     Socioeconomic History   • Marital status:      Spouse name: Not on file   • Number of children: Not on file   • Years of education: 12   • Highest education level: Not on file   Tobacco Use   • Smoking status: Current Some Day Smoker     Types: Cigars   • Smokeless tobacco: Never Used   • Tobacco comment: OCCASIONALLY   Vaping Use   • Vaping Use: Never used   Substance and Sexual Activity   • Alcohol use: Yes     Comment: ON WEEKENDS   • Drug use: No       Family History:    Family History   Problem Relation Age of Onset   • Clotting disorder Other    • Colon cancer Paternal Grandmother    • Colon cancer Paternal Grandfather    • Clotting disorder Father    • Malig Hyperthermia Neg Hx        Current Medications:  Scheduled Meds:ceFAZolin, 2 g, Intravenous, Once  sodium chloride, 3 mL, Intravenous, Q12H      Continuous Infusions:lactated ringers, 9 mL/hr, Last Rate: 9 mL/hr (06/03/21 1037)      PRN Meds:.fentanyl  •  lidocaine PF 1%  •  midazolam  •  sodium chloride    Current Facility-Administered Medications:   •  ceFAZolin in dextrose (ANCEF) IVPB solution 2 g, 2 g, Intravenous, Once, Shlomo Campos MD  •  fentaNYL citrate (PF) (SUBLIMAZE) injection 50 mcg, 50 mcg, Intravenous, Q10 Min PRN, Myles Abel MD  •  lactated ringers infusion, 9 mL/hr, Intravenous, Continuous, Myles Abel MD, Last Rate: 9 mL/hr at 06/03/21 1037, 9 mL/hr at 06/03/21 1037  •  lidocaine PF 1% (XYLOCAINE) injection 0.5 mL, 0.5 mL, Injection, Once PRN, Myles Abel MD  •  midazolam (VERSED) injection 0.5 mg, 0.5 mg, Intravenous, Q10 Min PRN, Myles Abel MD  •  sodium chloride 0.9 % flush 3 mL, 3 mL, Intravenous, Q12H, Myles Abel MD  •  sodium chloride 0.9 % flush 3-10 mL, 3-10 mL, Intravenous, PRN, Myles Abel MD    Allergies:  No Known Allergies    Review of Systems:    HEENT: Patient denies any headaches, vision changes, change in hearing, or tinnitus, Patient denies any rhinorrhea,epistaxis, sinus pain, mouth or dental problems, sore throat or hoarseness, or dysphagia  Pulmonary: Patient denies any cough, congestion, SOA, or wheezing  Cardiovascular: Patient denies any chest pain, dyspnea, palpitations, weakness, intolerance of exercise, varicosities, swelling of extremities, known murmur  Gastrointestinal:  Patient denies nausea, vomiting, diarrhea, constipation, loss  of appetite, change in appetite, dysphagia, gas, heartburn, melena, change in bowel habits, use of laxatives or other drugs to alter the function of the gastrointestinal tract.  Genital/Urinary: Patient denies dysuria, change in color of urine, change in frequency of urination, pain with urgency, incontinence, retention, or nocturia.  Musculoskeletal: Patient denies increased warmth; redness; or swelling of joints; limitation of function; deformity; crepitation: pain in a joint or an extremity, the neck, or the back, especially with movement.  Neurological: Patient denies dizziness, tremor, ataxia, difficulty in speaking, change in speech, paresthesia, loss of sensation, seizures, syncope, changes in memory.  Endocrine system: Patient denies tremors, palpitations, intolerance of heat or cold, polyuria, polydipsia, polyphagia, diaphoresis, exophthalmos, or goiter.  Psychological: Patient denies thoughts/plans or harming self or other; depression,  insomnia, night terrors, gerardo, memory loss, disorientation.  Skin: Patient denies any bruising, rashes, discoloration, pruritus, wounds, ulcers, decubiti, changes in the hair or nails  Hematopoietic: Patient denies history of spontaneous or excessive bleeding, epistaxis, hematuria, melena, fatigue, enlarged or tender lymph nodes, pallor, history of anemia.        Physical Exam:  Awake, A&O x3, affect normal, no acute distress  Ambulating with a limp due to hip  pain  Hip ROM is limited due to pain  No instability  Strength is 4/5 in the quad, hamstring, abduction and adduction  Cap refill is normal, Sensation intact    Card:  RR, HD Stable  Pulm:  Regular breathing, no S.O.A  Abd:  Soft, NT, ND    Lab Results (last 24 hours)     Procedure Component Value Units Date/Time    Potassium [[id number]]  (Normal) Collected: 06/03/21 1052    Specimen: Blood Updated: 06/03/21 1112     Potassium 4.6 mmol/L     POC Glucose Once [[id number]]  (Abnormal) Collected: 06/03/21 1015    Specimen: Blood Updated: 06/03/21 1017     Glucose 136 mg/dL           XR Chest PA & Lateral    Result Date: 5/26/2021  Narrative: PA AND LATERAL CHEST  HISTORY: Preop for right hip surgery  COMPARISON: 12/04/2015  FINDINGS: Calcified granuloma in the right lung base. No acute airspace infiltrate. Heart size stable. Degenerative changes thoracic spine. Postsurgical changes cervical spine      Impression: No active disease  This report was finalized on 5/26/2021 8:34 AM by Dr. Oswald Larose M.D.      XR Hip With or Without Pelvis 2 - 3 View Right    Result Date: 5/26/2021  Narrative: RIGHT HIP AND PELVIS, 3 VIEWS  HISTORY: Preop for right hip surgery  COMPARISON: 07/30/2012  FINDINGS: There is severe degenerative change of the right hip with loss of joint space superiorly and flattening of the femoral head. Associated subchondral sclerosis and cystic change. Findings have worsened since the previous study. There is a stable left hip arthroplasty. Postsurgical changes lower lumbar spine. No fracture or dislocation.      Impression: Severe right hip degenerative changes  This report was finalized on 5/26/2021 8:36 AM by Dr. Oswald Larose M.D.          Assessment:  End-stage Primary Right Hip Osteoarthritis    Plan:  Patient's pain is becoming disabling, despite extensive conservative treatment.  Radiographs reveal end-stage degenerative changes.  The risks of surgery, including, but not limited to, heart  attack, stroke, dying, DVT, leg length inequality, nerve injury, vascular injury, stiffness and infection were discussed.  The alternatives and benefits were also discussed.  All questions answered and the patient wishes to proceed with right total hip arthroplasty.    Shlomo Barnhart PA-C  [facility]  [address] [place], [place] [id number] [phone]    6/3/2021    CC: Jade King APRN, Shlomo Campos MD

## 2021-06-03 NOTE — ANESTHESIA POSTPROCEDURE EVALUATION
"Patient: Taz Dickey    Procedure Summary     Date: 06/03/21 Room / Location: [facility] OR [address] [place] [address] MAIN OR    Anesthesia Start: 1346 Anesthesia Stop: 1613    Procedure: TOTAL HIP ARTHROPLASTY POSTERIOR (Right Hip) Diagnosis:     Surgeons: Shlomo Campos MD Provider: Jr Arnold MD    Anesthesia Type: general with block ASA Status: 3          Anesthesia Type: general with block    Vitals  Vitals Value Taken Time   /68 06/03/21 1650   Temp 36.5 °C (97.7 °F) 06/03/21 1609   Pulse 67 06/03/21 1706   Resp 16 06/03/21 1650   SpO2 96 % 06/03/21 1706   Vitals shown include unvalidated device data.        Post Anesthesia Care and Evaluation    Patient location during evaluation: bedside  Patient participation: complete - patient participated  Level of consciousness: awake and alert  Pain management: adequate  Airway patency: patent  Anesthetic complications: No anesthetic complications    Cardiovascular status: acceptable  Respiratory status: acceptable  Hydration status: acceptable    Comments: /68   Pulse 65   Temp 36.5 °C (97.7 °F) (Oral)   Resp 16   Ht 177.8 cm (70\")   Wt 97.7 kg (215 lb 6.4 oz)   SpO2 97%   BMI 30.91 kg/m²       "

## 2021-06-03 NOTE — OP NOTE
Orthopaedic Surgery Operative Note    Patient Name:  Taz Dickey  YOB: 1950  Age: 70 y.o.  Medical Records Number:  [id number]    Date of Procedure:  6/3/2021    Pre-operative Diagnosis:  Primary Osteoarthritis Right Hip    Post-operative Diagnosis:  Primary Osteoarthritis Right Hip    Procedure Performed:  Right Total Hip Arthroplasty                                          Layered Closure    Implants:  Biomet 58 mm G7 Acetabular Shell, 16 mm Standard Offset Taperloc Complete Stem, 46 mm Dual Mobility Metal Liner, -6 28/46 mm Dual Mobility Ceramic Head    Surgeon:  Shlomo Campos M.D.    Assistant: Gabriela Barnhart (who was present during the critical portions of the case, thereby decreasing operative time and patient morbidity)    Anesthetic Type:  General    Estimated Blood Loss:  250cc's    Specimens:   Order Name Source Comment Collection Info Order Time   POTASSIUM   Collected By: Olga Matamoros RN 6/3/2021 10:13 AM     Release to patient   Immediate        TYPE AND SCREEN   Collected By: Olga Matamoros RN 6/3/2021 10:13 AM     Release to patient   Immediate            No Complications      Indications for Procedure:  Taz Dickey is a 70 y.o. male suffering from end stage degenerative changes in the right hip.  The patients pain is becoming disabling, despite extensive conservative care, including NSAIDS, activity modification and therapy.  The risks, benefits and alternatives were discussed and the patient wishes to proceed with right total hip arthroplasty.      Procedure Performed:    After informed consent was obtained, the correct patient identified, the correct operative side marked by the operative surgeon and pre-operative IV antibiotics given, the patient was taken to the operating room and placed supine on the operating table.  After general anesthesia was induced, a surgical time out was performed and 1 gm of IV Tranxemic Acid given, the patient was placed in the left lateral  decubitus position and the right lower extremity was prepped with chloraprep and draped in a sterile fashion.    An incision was made overlying the right hip.  We sharply dissected down to expose the fascia over the gluteus leonie and the Iliotibial band.  We split the fascia in line with the skin incision and placed a Charnley retractor carefully to avoid damage to the Sciatic Nerve.  We then began our posterior approach to the hip by identifying the short external rotators and tagging these with a #5 Tevdek and releasing them from their insertion on the femur.  We then identified the posterior capsule, released the capsule from it's insertion on the femur and tagged the capsule proximally and distally with a #5 Tevdek.  We then dislocated the hip and made our neck cut roughly 2-3 mm proximal to the lesser trochanter. We measured the head to be 55 mm and our neck cut to be 61 mm.      We then gained exposure of the acetabulum, removed the pulvinar and labrum, then we sequentially reamed from a 36 mm reamer up to a 57 mm reamer.  We had excellent interference fit and a nice bleeding, bony bed for our acetabular component.  We copiously irrigated the acetabulum, then impacted our permanent acetabular component into place.  We assured we were completely seated by checking through the apical hole, we placed two 6.5 mm cancellous screws for rotational stability, then placed our permanent liner.    We then turned our attention to the femur where we cleared the trochanteric fossa of soft tissue.  We entered the canal with a box chisel, hand held reamer and lateralizing reamer.  We then sequentially broached from a 4 mm broach up to a 16 mm broach.  We trialed with both a standard neck and high offset neck with the -6 28/46 mm head and had excellent stability, range of motion and leg length equality with the std offset neck.  An intraoperative radiograph revealed excellent implant position and alignment.  We removed our  trials and copiously irrigated the hip.  We then placed our permanent 16 std offset stem and trialed again with a -6 and -3 28/46 mm heads.  The -6 28/46 mm head gave us the best range of motion, stability and leg length equality.  We removed the trials, copiously irrigated the hip, cleaned and dried the trunion, then impacted our permanent head.  We then reduced the hip and began our layered closure after placing our local anesthetic and re-dosing our IV antibiotics.  We closed the short external rotators and posterior capsule through two drill holes in the greater trochanter and a soft tissue stitch in the Gluteus Medius.  We closed the deep fascia with a #1 Vicryl, the deep subcutaneous tissue with a #1 Vicryl, the subcutaneous tissue with 2-0 Vicryl and the skin with 3-0 Monocryl and Dermabond.  We placed a sterile dressing of Xeroform and an Island dressing.  All sponge and needle counts were correct.  The patient was awakened from general anesthesia and taken to the recovery room in stable condition.    The patient will be started on Aspirin 325 mg twice daily for DVT prophylaxis.  IV antibiotics will be discontinued within 24 hours of surgery.  Immediately prior to surgery, there were no acute Thromboembolic nor Cardiovascular risk factors.  An updated Medical Reconciliation form is on the chart.    Shlomo Barnhart PA-C  [facility]  [address] [place], [place] [phone]    6/3/2021

## 2021-06-03 NOTE — ANESTHESIA PROCEDURE NOTES
Peripheral Block    Pre-sedation assessment completed: 6/3/2021 12:10 PM    Patient reassessed immediately prior to procedure    Patient location during procedure: holding area  Start time: 6/3/2021 12:10 PM  Stop time: 6/3/2021 12:20 PM  Reason for block: at surgeon's request and post-op pain management  Performed by  Anesthesiologist: Myles Abel MD  Preanesthetic Checklist  Completed: patient identified, IV checked, site marked, risks and benefits discussed, surgical consent, monitors and equipment checked, pre-op evaluation and timeout performed  Prep:  Pt Position: supine  Sterile barriers:cap, gloves, gown, mask and sterile barriers  Prep: ChloraPrep  Patient monitoring: blood pressure monitoring, continuous pulse oximetry and EKG  Procedure  Sedation:yes    Guidance:ultrasound guided  ULTRASOUND INTERPRETATION. Using ultrasound guidance a 21 G gauge needle was placed in close proximity to the nerve, at which point, under ultrasound guidance anesthetic was injected in the area of the nerve and spread of the anesthesia was seen on ultrasound in close proximity thereto.  There were no abnormalities seen on ultrasound; a digital image was taken; and the patient tolerated the procedure with no complications. Images:still images obtained    Laterality:right  Block Type:fascia iliaca compartment  Injection Technique:single-shot  Needle Type:echogenic  Needle Gauge:21 G      Medications Used: ropivacaine (NAROPIN) 0.5 % injection, 30 mL  mepivacaine (CARBOCAINE) 1.5 % injection, 25 mL      Post Assessment  Injection Assessment: negative aspiration for heme, no paresthesia on injection and incremental injection  Patient Tolerance:comfortable throughout block  Complications:no

## 2021-06-03 NOTE — ANESTHESIA PROCEDURE NOTES
Airway  Urgency: elective    Date/Time: 6/3/2021 1:56 PM  Airway not difficult    General Information and Staff    Patient location during procedure: OR  Anesthesiologist: Jr Arnold MD  CRNA: Ana Luisa Castanon CRNA    Indications and Patient Condition  Indications for airway management: airway protection    Preoxygenated: yes  MILS not maintained throughout  Mask difficulty assessment: 1 - vent by mask    Final Airway Details  Final airway type: endotracheal airway      Successful airway: ETT  Cuffed: yes   Successful intubation technique: direct laryngoscopy  Facilitating devices/methods: intubating stylet  Endotracheal tube insertion site: oral  Blade: Gil  Blade size: 3  ETT size (mm): 8.0  Cormack-Lehane Classification: grade I - full view of glottis  Placement verified by: chest auscultation   Cuff volume (mL): 9  Measured from: lips  Number of attempts at approach: 1  Assessment: lips, teeth, and gum same as pre-op and atraumatic intubation    Additional Comments  PreO2 100% face mask, IV induction, easy mask, DVL x1, cords noted, tube through, cuff up, EBBSH, +etCO2, = chest movement, tube secured in place, atraumatic, no teeth and lips intact as preop.

## 2021-06-04 LAB
ANION GAP SERPL CALCULATED.3IONS-SCNC: 6.3 MMOL/L (ref 5–15)
BUN SERPL-MCNC: 24 MG/DL (ref 8–23)
BUN/CREAT SERPL: 23.5 (ref 7–25)
CALCIUM SPEC-SCNC: 8.4 MG/DL (ref 8.6–10.5)
CHLORIDE SERPL-SCNC: 101 MMOL/L (ref 98–107)
CO2 SERPL-SCNC: 26.7 MMOL/L (ref 22–29)
CREAT SERPL-MCNC: 1.02 MG/DL (ref 0.76–1.27)
DEPRECATED RDW RBC AUTO: 51.8 FL (ref 37–54)
ERYTHROCYTE [DISTWIDTH] IN BLOOD BY AUTOMATED COUNT: 14.5 % (ref 12.3–15.4)
GFR SERPL CREATININE-BSD FRML MDRD: 72 ML/MIN/1.73
GLUCOSE SERPL-MCNC: 154 MG/DL (ref 65–99)
HCT VFR BLD AUTO: 31.7 % (ref 37.5–51)
HGB BLD-MCNC: 10.4 G/DL (ref 13–17.7)
MCH RBC QN AUTO: 32 PG (ref 26.6–33)
MCHC RBC AUTO-ENTMCNC: 32.8 G/DL (ref 31.5–35.7)
MCV RBC AUTO: 97.5 FL (ref 79–97)
PLATELET # BLD AUTO: 154 10*3/MM3 (ref 140–450)
PMV BLD AUTO: 11.4 FL (ref 6–12)
POTASSIUM SERPL-SCNC: 4.6 MMOL/L (ref 3.5–5.2)
RBC # BLD AUTO: 3.25 10*6/MM3 (ref 4.14–5.8)
SODIUM SERPL-SCNC: 134 MMOL/L (ref 136–145)
WBC # BLD AUTO: 10.57 10*3/MM3 (ref 3.4–10.8)

## 2021-06-04 PROCEDURE — 85027 COMPLETE CBC AUTOMATED: CPT | Performed by: ORTHOPAEDIC SURGERY

## 2021-06-04 PROCEDURE — 63710000001 FERROUS SULFATE 325 (65 FE) MG TABLET: Performed by: ORTHOPAEDIC SURGERY

## 2021-06-04 PROCEDURE — A9270 NON-COVERED ITEM OR SERVICE: HCPCS | Performed by: ORTHOPAEDIC SURGERY

## 2021-06-04 PROCEDURE — 63710000001 HYDROCHLOROTHIAZIDE 12.5 MG CAPSULE 100 EACH BOTTLE: Performed by: ORTHOPAEDIC SURGERY

## 2021-06-04 PROCEDURE — 63710000001 OXYCODONE-ACETAMINOPHEN 5-325 MG TABLET: Performed by: ORTHOPAEDIC SURGERY

## 2021-06-04 PROCEDURE — 63710000001 DIAZEPAM 5 MG TABLET: Performed by: ORTHOPAEDIC SURGERY

## 2021-06-04 PROCEDURE — 80048 BASIC METABOLIC PNL TOTAL CA: CPT | Performed by: ORTHOPAEDIC SURGERY

## 2021-06-04 PROCEDURE — 97110 THERAPEUTIC EXERCISES: CPT

## 2021-06-04 PROCEDURE — 63710000001 LISINOPRIL 20 MG TABLET 1,000 EACH BOTTLE: Performed by: ORTHOPAEDIC SURGERY

## 2021-06-04 PROCEDURE — 97161 PT EVAL LOW COMPLEX 20 MIN: CPT

## 2021-06-04 PROCEDURE — 63710000001 ASPIRIN EC 325 MG TABLET DELAYED-RELEASE: Performed by: ORTHOPAEDIC SURGERY

## 2021-06-04 PROCEDURE — 63710000001 MUPIROCIN 2 % OINTMENT: Performed by: ORTHOPAEDIC SURGERY

## 2021-06-04 PROCEDURE — 63710000001 GABAPENTIN 300 MG CAPSULE: Performed by: ORTHOPAEDIC SURGERY

## 2021-06-04 PROCEDURE — 63710000001 ATORVASTATIN 20 MG TABLET: Performed by: ORTHOPAEDIC SURGERY

## 2021-06-04 PROCEDURE — 63710000001 METFORMIN 500 MG TABLET: Performed by: ORTHOPAEDIC SURGERY

## 2021-06-04 RX ORDER — OXYCODONE HYDROCHLORIDE AND ACETAMINOPHEN 5; 325 MG/1; MG/1
2 TABLET ORAL EVERY 4 HOURS PRN
Qty: 60 TABLET | Refills: 0 | Status: SHIPPED | OUTPATIENT
Start: 2021-06-04 | End: 2021-06-05 | Stop reason: HOSPADM

## 2021-06-04 RX ORDER — PSEUDOEPHEDRINE HCL 30 MG
100 TABLET ORAL 2 TIMES DAILY PRN
Qty: 30 CAPSULE | Refills: 1 | Status: SHIPPED | OUTPATIENT
Start: 2021-06-04 | End: 2021-08-03

## 2021-06-04 RX ORDER — OXYCODONE HYDROCHLORIDE AND ACETAMINOPHEN 5; 325 MG/1; MG/1
1-2 TABLET ORAL EVERY 4 HOURS PRN
Qty: 60 TABLET | Refills: 0 | Status: SHIPPED | OUTPATIENT
Start: 2021-06-04 | End: 2021-08-03

## 2021-06-04 RX ORDER — ASPIRIN 325 MG
325 TABLET, DELAYED RELEASE (ENTERIC COATED) ORAL 2 TIMES DAILY WITH MEALS
Qty: 60 TABLET | Refills: 0 | Status: SHIPPED | OUTPATIENT
Start: 2021-06-04 | End: 2021-08-03

## 2021-06-04 RX ADMIN — LISINOPRIL: 20 TABLET ORAL at 08:22

## 2021-06-04 RX ADMIN — OXYCODONE HYDROCHLORIDE AND ACETAMINOPHEN 1 TABLET: 5; 325 TABLET ORAL at 22:24

## 2021-06-04 RX ADMIN — GABAPENTIN 600 MG: 300 CAPSULE ORAL at 20:22

## 2021-06-04 RX ADMIN — OXYCODONE HYDROCHLORIDE AND ACETAMINOPHEN 1 TABLET: 5; 325 TABLET ORAL at 08:22

## 2021-06-04 RX ADMIN — ASPIRIN 325 MG: 325 TABLET, COATED ORAL at 17:32

## 2021-06-04 RX ADMIN — MUPIROCIN 1 APPLICATION: 20 OINTMENT TOPICAL at 08:22

## 2021-06-04 RX ADMIN — DIAZEPAM 5 MG: 5 TABLET ORAL at 14:09

## 2021-06-04 RX ADMIN — GABAPENTIN 600 MG: 300 CAPSULE ORAL at 08:22

## 2021-06-04 RX ADMIN — METFORMIN HYDROCHLORIDE 500 MG: 500 TABLET ORAL at 17:32

## 2021-06-04 RX ADMIN — SODIUM CHLORIDE, PRESERVATIVE FREE 3 ML: 5 INJECTION INTRAVENOUS at 21:00

## 2021-06-04 RX ADMIN — OXYCODONE HYDROCHLORIDE AND ACETAMINOPHEN 1 TABLET: 5; 325 TABLET ORAL at 18:08

## 2021-06-04 RX ADMIN — CLINDAMYCIN PHOSPHATE 900 MG: 900 INJECTION, SOLUTION INTRAVENOUS at 08:23

## 2021-06-04 RX ADMIN — ASPIRIN 325 MG: 325 TABLET, COATED ORAL at 08:22

## 2021-06-04 RX ADMIN — OXYCODONE HYDROCHLORIDE AND ACETAMINOPHEN 2 TABLET: 5; 325 TABLET ORAL at 13:11

## 2021-06-04 RX ADMIN — ATORVASTATIN CALCIUM 40 MG: 20 TABLET, FILM COATED ORAL at 08:22

## 2021-06-04 RX ADMIN — FERROUS SULFATE TAB 325 MG (65 MG ELEMENTAL FE) 325 MG: 325 (65 FE) TAB at 08:22

## 2021-06-04 RX ADMIN — SODIUM CHLORIDE, PRESERVATIVE FREE 3 ML: 5 INJECTION INTRAVENOUS at 08:23

## 2021-06-04 RX ADMIN — MUPIROCIN 1 APPLICATION: 20 OINTMENT TOPICAL at 20:22

## 2021-06-04 NOTE — PLAN OF CARE
Goal Outcome Evaluation:         Pain meds given PRN.  Valium given x1 for muscle spasms of the left thigh.  Potential DC in AM.

## 2021-06-04 NOTE — DISCHARGE PLACEMENT REQUEST
"Lacey Dickey (70 y.o. Male)     Date of Birth Social Security Number Address Home Phone MRN    1950  [address] [address] [phone] [id number]    [employer] Marital Status          [employer]        Admission Date Admission Type Admitting Provider Attending Provider Department, Room/Bed    6/3/21 Elective Shlomo Campos MD Goodin, Robert A, MD [address], P793/1    Discharge Date Discharge Disposition Discharge Destination         Home or Self Care              Attending Provider: Shlomo Campos MD    Allergies: No Known Allergies    Isolation: None   Infection: None   Code Status: Prior    Ht: 177.8 cm (70\")   Wt: 97.7 kg (215 lb 6.4 oz)    Admission Cmt: None   Principal Problem: None                Active Insurance as of 6/3/2021     Primary Coverage     Payor Plan Insurance Group Employer/Plan Group    HUMANA MEDICARE REPLACEMENT HUMANA MEDICARE REPLACEMENT [id number]     Payor Plan Address Payor Plan Phone Number Payor Plan Fax Number Effective Dates    [address] [address] [phone]  1/1/2018 - None Entered    [facility] [id number]       Subscriber Name Subscriber Birth Date Member ID       LACEY DICKEY 1950 [id number]                 Emergency Contacts      (Rel.) Home Phone Work Phone Mobile Phone    Chelsie Dickey (Spouse) [phone] -- [phone]          "

## 2021-06-04 NOTE — PLAN OF CARE
Goal Outcome Evaluation:        Outcome Summary: Pt presents s/p R NATHALY leading to decreased independence and compromised safety w functional mobility.  This am pt was attempting stairs when his R knee buckled.  He was able to regain standing but then became light headed and dizzy.  PT and RN assisted pt to chair safely. Pt was also limited in his ambulation distance this am, only able to ambulate approx 15' due to pain. Due to the above PT is not recommending d/c today.  Also pt was unable to perform short arch quad this am, he was able to do quad set.  PT is recommending d/c home 6/5 following am PT session.  Plan for pt to d/c home w HHPT and assist from family.

## 2021-06-04 NOTE — PROGRESS NOTES
Orthopaedic Surgery  Progress Note  6/4/2021    Patients Name:  Taz Dickey  YOB: 1950  Age:  70 y.o.  Medical Records Number:  [id number]  Date of Admission: 6/3/2021    No complaints except pain    Vitals:  Vitals:    06/03/21 1720 06/03/21 2207 06/04/21 0109 06/04/21 0700   BP: 140/77 121/72 106/64 117/61   BP Location: Right arm Left arm Left arm Left arm   Patient Position: Lying Lying Lying Lying   Pulse: 67 79 67 63   Resp: 16 16 16 16   Temp: 97.2 °F (36.2 °C) 97.6 °F (36.4 °C) 98.2 °F (36.8 °C) 97.6 °F (36.4 °C)   TempSrc: Oral Oral Oral Oral   SpO2: 97% 95% 97% 99%   Weight:       Height:           LLE:  NVI, calf nontender, Sensation intact  No signs of DVT    Incision: clean, no signs of infection    Lab Results (last 24 hours)     Procedure Component Value Units Date/Time    Basic Metabolic Panel [[id number]]  (Abnormal) Collected: 06/04/21 0610    Specimen: Blood Updated: 06/04/21 0730     Glucose 154 mg/dL      BUN 24 mg/dL      Creatinine 1.02 mg/dL      Sodium 134 mmol/L      Potassium 4.6 mmol/L      Chloride 101 mmol/L      CO2 26.7 mmol/L      Calcium 8.4 mg/dL      eGFR Non African Amer 72 mL/min/1.73      BUN/Creatinine Ratio 23.5     Anion Gap 6.3 mmol/L     Narrative:      GFR Normal >60  Chronic Kidney Disease <60  Kidney Failure <15      CBC (No Diff) [[id number]]  (Abnormal) Collected: 06/04/21 0610    Specimen: Blood Updated: 06/04/21 0657     WBC 10.57 10*3/mm3      RBC 3.25 10*6/mm3      Hemoglobin 10.4 g/dL      Hematocrit 31.7 %      MCV 97.5 fL      MCH 32.0 pg      MCHC 32.8 g/dL      RDW 14.5 %      RDW-SD 51.8 fl      MPV 11.4 fL      Platelets 154 10*3/mm3     POC Glucose Once [[id number]]  (Abnormal) Collected: 06/03/21 1614    Specimen: Blood Updated: 06/03/21 1616     Glucose 160 mg/dL           XR Hip 1 View Without Pelvis Right (Surgery Only)    Result Date: 6/3/2021  Narrative: XR HIP 1 VIEW WO PELVIS RIGHT-  INDICATIONS: Postoperative evaluation.   TECHNIQUE: Frontal view of the right hip  COMPARISON: 06/03/2021 at 1512 hours  FINDINGS:   Intact appearing right hip arthroplasty hardware is seen with adjacent surgical soft tissue gas. No acute fracture is identified.       Impression:  Postsurgical changes.    This report was finalized on 6/3/2021 4:42 PM by Dr. Nj Han M.D.      Peripheral Block    Result Date: 6/3/2021  Narrative: Myles Abel MD     6/3/2021 12:31 PM Peripheral Block Pre-sedation assessment completed: 6/3/2021 12:10 PM Patient reassessed immediately prior to procedure Patient location during procedure: holding area Start time: 6/3/2021 12:10 PM Stop time: 6/3/2021 12:20 PM Reason for block: at surgeon's request and post-op pain management Performed by Anesthesiologist: Myles Abel MD Preanesthetic Checklist Completed: patient identified, IV checked, site marked, risks and benefits discussed, surgical consent, monitors and equipment checked, pre-op evaluation and timeout performed Prep: Pt Position: supine Sterile barriers:cap, gloves, gown, mask and sterile barriers Prep: ChloraPrep Patient monitoring: blood pressure monitoring, continuous pulse oximetry and EKG Procedure Sedation:yes Guidance:ultrasound guided ULTRASOUND INTERPRETATION. Using ultrasound guidance a 21 G gauge needle was placed in close proximity to the nerve, at which point, under ultrasound guidance anesthetic was injected in the area of the nerve and spread of the anesthesia was seen on ultrasound in close proximity thereto.  There were no abnormalities seen on ultrasound; a digital image was taken; and the patient tolerated the procedure with no complications. Images:still images obtained Laterality:right Block Type:fascia iliaca compartment Injection Technique:single-shot Needle Type:echogenic Needle Gauge:21 G Medications Used: ropivacaine (NAROPIN) 0.5 % injection, 30 mL mepivacaine (CARBOCAINE) 1.5 % injection, 25 mL Post Assessment  Injection Assessment: negative aspiration for heme, no paresthesia on injection and incremental injection Patient Tolerance:comfortable throughout block Complications:no     XR Chest PA & Lateral    Result Date: 5/26/2021  Narrative: PA AND LATERAL CHEST  HISTORY: Preop for right hip surgery  COMPARISON: 12/04/2015  FINDINGS: Calcified granuloma in the right lung base. No acute airspace infiltrate. Heart size stable. Degenerative changes thoracic spine. Postsurgical changes cervical spine      Impression: No active disease  This report was finalized on 5/26/2021 8:34 AM by Dr. Oswald Larose M.D.      XR Hip With or Without Pelvis 1 View Right    Result Date: 6/3/2021  Narrative: XR HIP W OR WO PELVIS 1 VIEW RIGHT-  INDICATIONS: Intraoperative evaluation.  TECHNIQUE: Frontal view of the right hip  COMPARISON: 05/26/2021  FINDINGS:  Right hip arthroplasty hardware is seen with adjacent surgical soft tissue gas. No acute fracture is identified.       Impression:  Surgical changes.    This report was finalized on 6/3/2021 3:41 PM by Dr. Nj Han M.D.      XR Hip With or Without Pelvis 2 - 3 View Right    Result Date: 5/26/2021  Narrative: RIGHT HIP AND PELVIS, 3 VIEWS  HISTORY: Preop for right hip surgery  COMPARISON: 07/30/2012  FINDINGS: There is severe degenerative change of the right hip with loss of joint space superiorly and flattening of the femoral head. Associated subchondral sclerosis and cystic change. Findings have worsened since the previous study. There is a stable left hip arthroplasty. Postsurgical changes lower lumbar spine. No fracture or dislocation.      Impression: Severe right hip degenerative changes  This report was finalized on 5/26/2021 8:36 AM by Dr. Oswald Larose M.D.          Assessment/Plan:    Procedures: Left NATHALY  Post-operative Day:  1  Weightbearing Status:  WBAT with walker  DVT Prophylaxis:  ASA for DVT prophylaxis    Dispostition:  Home with home health after PT today,  if comfortable and mobilizing safely    Shlomo Barnhart PA-C  [facility] [place] [phone]    6/4/2021

## 2021-06-04 NOTE — NURSING NOTE
"Called into the rehab gym by PT.  Patient was actively dizzy while standing and PT stated this was preceded by his right knee buckling on the second stair.  Patient never fully lost consciousness, but became \"very lightheaded\".  /77, HR 84, SpO2 99% on room air.  Lowered to chair by PTA, PT, CNA, and RN x2.  Placed in recliner and provided with cold washcloth.  Dizziness decreased with reclining.  Left with slight lightheadedness but in stable condition with PT and PTA.  Will continue to monitor.    "

## 2021-06-04 NOTE — DISCHARGE SUMMARY
Orthopaedic Surgery Discharge Summary    Patient Name:  Taz Dickey  YOB: 1950  Age: 70 y.o.  Medical Records Number:  [id number]    Date of Admission:  6/3/2021  Date of Discharge:  6/4/2021    Primary Discharge Diagnosis:  Primary osteoarthritis of right hip [M16.11]  DJD (degenerative joint disease) [M19.90]    Secondary Discharge Diagnosis:    Problems Addressed this Visit        Musculoskeletal and Injuries    Primary osteoarthritis of right hip - Primary    Relevant Medications    oxyCODONE-acetaminophen (PERCOCET) 5-325 MG per tablet    oxyCODONE-acetaminophen (PERCOCET) 5-325 MG per tablet      Diagnoses       Codes Comments    Primary osteoarthritis of right hip    -  Primary ICD-10-CM: M16.11  ICD-9-CM: 715.15           Procedures Performed:  Right Total Hip Arthroplasty      Hospital Course:    Taz Dickey is a 70 y.o.  male who underwent successful right kirk on 6/3/2021.  Taz Dickey was started on Aspirin 325 mg po twice daily  post-operatively for DVT prophylaxis.  On post-op day 1 the patients dressing was changed and their incision was clean, with no signs of infection and their calf was soft, with no signs of DVT.  The patient progressed well with physical therapy and the patients hemoglobin remained stable. On post-operative day 1 the patient was felt ready for discharge.     Vitals:  Vitals:    06/03/21 1720 06/03/21 2207 06/04/21 0109 06/04/21 0700   BP: 140/77 121/72 106/64 117/61   BP Location: Right arm Left arm Left arm Left arm   Patient Position: Lying Lying Lying Lying   Pulse: 67 79 67 63   Resp: 16 16 16 16   Temp: 97.2 °F (36.2 °C) 97.6 °F (36.4 °C) 98.2 °F (36.8 °C) 97.6 °F (36.4 °C)   TempSrc: Oral Oral Oral Oral   SpO2: 97% 95% 97% 99%   Weight:       Height:           Discharge Medications:      Discharge Medications      New Medications      Instructions Start Date   docusate sodium 100 MG capsule   100 mg, Oral, 2 Times Daily PRN       oxyCODONE-acetaminophen 5-325 MG per tablet  Commonly known as: PERCOCET   2 tablets, Oral, Every 4 Hours PRN      oxyCODONE-acetaminophen 5-325 MG per tablet  Commonly known as: PERCOCET   1-2 tablets, Oral, Every 4 Hours PRN         Changes to Medications      Instructions Start Date   aspirin 81 MG EC tablet  What changed: Another medication with the same name was added. Make sure you understand how and when to take each.   81 mg, Oral, Daily, INSTRUCTED PT TO FOLLOW MD INSTRUCTIONS REGARDING HOLDING FOR SURGERY      aspirin  MG tablet  What changed: You were already taking a medication with the same name, and this prescription was added. Make sure you understand how and when to take each.   325 mg, Oral, 2 Times Daily With Meals      tadalafil 5 MG tablet  Commonly known as: CIALIS  What changed: additional instructions   5 mg, Oral, Daily PRN         Continue These Medications      Instructions Start Date   acetaminophen 500 MG tablet  Commonly known as: TYLENOL   500 mg, Oral, Every 6 Hours PRN      atorvastatin 40 MG tablet  Commonly known as: LIPITOR   TAKE ONE TABLET BY MOUTH DAILY      Bactroban Nasal 2 % nasal ointment  Generic drug: mupirocin   Nasal, 2 Times Daily, PRIOR TO SURGERY       gabapentin 300 MG capsule  Commonly known as: NEURONTIN   TAKE TWO CAPSULES BY MOUTH EVERY MORNING, TWO CAPSULES EVERY EVENING, AND THREE CAPSULES AT BEDTIME      hydrOXYzine 25 MG tablet  Commonly known as: ATARAX   25 mg, Oral, Every 8 Hours PRN      lisinopril-hydrochlorothiazide 20-12.5 MG per tablet  Commonly known as: PRINZIDE,ZESTORETIC   1 tablet, Oral, Nightly      metFORMIN 500 MG tablet  Commonly known as: GLUCOPHAGE   TAKE ONE TABLET BY MOUTH DAILY WITH DINNER         Stop These Medications    Chlorhexidine Gluconate Cloth 2 % pads     diclofenac 75 MG EC tablet  Commonly known as: VOLTAREN            Pain Medications:  Percocet 5/325 mg 1-2 po q 4-6 hours prn pain    DVT Prophylaxis:  Enteric Coated  Aspirin 325 mg po twice daily for 2 weeks, then one daily for 4 weeks    Total Hip Replacement Discharge Instructions:    I. ACTIVITIES:  1. Exercises:  ? Complete exercise program as taught by the hospital physical therapist 2 times per day  ? Exercise program will be advanced by the physical therapist  ? During the day be up ambulating every 2 hours (while awake) for short distances  ? Complete the ankle pump exercises at least 10 times per hour (while awake)  ? Elevate legs when in bed and for at least 30 minutes during the day.Use cold packs 20-30 minutes approximately 5 times per day. This should be done before and after completing your exercises and at any time you are experiencing pain/ stiffness in your operative extremity.      2. Activities of Daily Living:  ? No tub baths, hot tubs, or swimming pools for 4 weeks  ? May shower and let water run over the incision on post-operative day #5 if no drainage. Do not scrub or rub the incision. Simply let the water run over the incision and pat dry.    II. Restrictions  ? Continue hip precautions as taught at the hospital  ? Your surgeon will discuss with you when you will be able to drive again.  ? Weight bearing is as tolerated  ? First week stay inside on even terrain. May go up and down stairs one stair at a time utilizing the hand rail once cleared by physical therapy to do so.  ? After one week, you may venture outside (if cleared to do so by physical therapist).    III. Precautions:  ? Everyone that comes near you should wash their hands  ? No elective dental, genital-urinary, or colon procedures or surgical procedures for 12 weeks after surgery unless absolutely necessary.  ?  If dental work or surgical procedure is deemed absolutely necessary, you will need to contact your surgeon as you will need to take antibiotics 1 hour prior to any dental work (including teeth cleanings).  ? Please discuss with your surgeon prophylactic antibiotics as the length of  time this intervention will be necessary for you varies with each patient’s health history and situation.  ? Avoid sick people. If you must be around someone who is ill, they should wear a mask.  ? Avoid visits to the Emergency Room or Urgent Care unless you are having a life threatening event.   ? If ordered stockings are to be placed on in the morning and removed at night. Monitor the stockings to ensure that any swelling is not causing the stockings to become too tight. In this case, remove stockings immediately.    IV. INCISION CARE:  ? Wash your hands prior to dressing changes  ? Change the dressing as needed to keep incision clean and dry. Utilize dry gauze and paper tape. Avoid touching the side of the gauze that goes against the incision with your hands.  ? No creams or ointments to the incision  ? May remove dressing once the incision is free of drainage  ? Do not touch or pick at the incision  ? Check incision every day and notify surgeon immediately if any of the following signs or symptoms are noted:  o Increase in redness  o Increase in swelling around the incision and of the entire extremity  o Increase in pain  o Drainage oozing from the incision  o Pulling apart of the edges of the incision  o Increase in overall body temperature (greater than 100.5 degrees)  ? Your surgeon will instruct you regarding suture or staple removal    V. Medications:   1. Anticoagulants: You will be discharged on an anticoagulant. This is a prophylactic medication that helps prevent blood clots during your post-operative period. The type and length of dosage varies based on your individual needs, procedure performed, and surgeon’s preference.  ? While taking the anticoagulant, you should avoid taking any additional aspirin, ibuprofen (Advil or Motrin), Aleve (Naprosyn) or other non-steroidal anti-inflammatory medications.   ? Notify surgeon immediately if any court bleeding is noted in the urine, stool, emesis, or from the  nose or the incision. Blood in the stool will often appear as black rather than red. Blood in urine may appear as pink. Blood in emesis may appear as brown/black like coffee grounds.  ? You will need to apply pressure for longer periods of time to any cuts or abrasions to stop bleeding  ? Avoid alcohol while taking anticoagulants    2. Stool Softeners: You will be at greater risk of constipation after surgery due to being less mobile and the pain medications.   ? Take stool softeners as instructed by your surgeon while on pain medications. Over the counter Colace 100 mg 1-2 capsules twice daily.   ? If stools become too loose or too frequent, please decreases the dosage or stop the stool softener.  ? If constipation occurs despite use of stool softeners, you are to continue the stool softeners and add a laxative (Milk of Magnesia 1 ounce daily as needed)  ? Drink plenty of fluids, and eat fruits and vegetables during your recovery time    3. Pain Medications utilized after surgery are narcotics and the law requires that the following information be given to all patients that are prescribed narcotics:  ? CLASSIFICATION: Pain medications are called Opioids and are narcotics  ? LEGALITIES: It is illegal to share narcotics with others and to drive within 24 hours of taking narcotics  ? POTENTIAL SIDE EFFECTS: Potential side effects of opioids include: nausea, vomiting, itching, dizziness, drowsiness, dry mouth, constipation, and difficulty urinating.  ? POTENTIAL ADVERSE EFFECTS:   o Opioid tolerance can develop with use of pain medications and this simply means that it requires more and more of the medication to control pain; however, this is seen more in patients that use opioids for longer periods of time.  o Opioid dependence can develop with use of Opioids and this simply means that to stop the medication can cause withdrawal symptoms; however, this is seen with patients that use Opioids for longer periods of  time.  o Opioid addiction can develop with use of Opioids and the incidence of this is very unlikely in patients who take the medications as ordered and stop the medications as instructed.  o Opioid overdose can be dangerous, but is unlikely when the medication is taken as ordered and stopped when ordered. It is important not to mix opioids with alcohol or with and type of sedative such as Benadryl as this can lead to over sedation and respiratory difficulty.  ? DOSAGE:   o Pain medications will need to be taken consistently for the first week to decrease pain and promote adequate pain relief and participation in physical therapy.  o After the initial surgical pain begins to resolve, you may begin to decrease the pain medication. By the end of 6-8 weeks, you should be off of pain medications.  o Refills will not be given by the office during evening hours, on weekends, or after 6-8 weeks post-op.  o To seek refills on pain medications during the initial 6 week post-operative period, you must call the office 48 hours in advance to request the refill. The office will then notify you when to  the prescription. DO NOT wait until you are out of the medication to request a refill.    V. FOLLOW-UP VISITS:  ? You will need to follow up in the office with your surgeon in 3 weeks. Please call this number [phone]  to schedule this appointment.  If you have any concerns or suspected complications prior to your follow up visit, please call your surgeons office. Do not wait until your appointment time if you suspect complications. These will need to be addressed in the office promptly.    Shlomo Barnhart PA-C  [facility]  [address] [place] [address]  [phone]    6/4/2021    CC:Jade King, APRN:Shlomo Campos MD

## 2021-06-04 NOTE — PROGRESS NOTES
Continued Stay Note  [facility]     Patient Name: Taz Dickey  MRN: [id number]  Today's Date: 6/4/2021    Admit Date: 6/3/2021    Discharge Plan     Row Name 06/04/21 1338       Plan    Plan  [facility]    Patient/Family in Agreement with Plan  yes    Plan Comments  Spoke with pt, verified correct information on facesheet and explained the role of CCP. Pt would like to d/c home with [facility], referral sent in Epic to [facility]. Plan will be to d/c home with [facility] and family support. No other needs identified.    Final Discharge Disposition Code  06 - home with home health care    Final Note  Pt to d/c home with [facility]        Discharge Codes    No documentation.       Expected Discharge Date and Time     Expected Discharge Date Expected Discharge Time    Jun 4, 2021             Ana Wheat RN

## 2021-06-04 NOTE — PROGRESS NOTES
Patient will be followed by [facility] upon D/C for joint protocol.  Per patient, D/C will not be today, but likely Sat.  We will continue to follow.  Thank you.  Helena Rothman RN

## 2021-06-04 NOTE — THERAPY EVALUATION
Patient Name: Taz Dickey  : 1950    MRN: [id number]                              Today's Date: 2021       Admit Date: 6/3/2021    Visit Dx:     ICD-10-CM ICD-9-CM   1. Primary osteoarthritis of right hip  M16.11 715.15     Patient Active Problem List   Diagnosis   • Spondylolisthesis of lumbar region   • Lumbar radiculopathy   • Benign essential hypertension   • Erectile dysfunction of nonorganic origin   • Hypercholesterolemia   • Osteoarthritis   • Tinnitus of both ears   • Encounter for screening colonoscopy   • Medicare annual wellness visit, subsequent   • Nocturia   • Type 2 diabetes mellitus without complication, without long-term current use of insulin (CMS/[facility])   • Healthcare maintenance   • Acute pain of right knee   • Primary osteoarthritis of right hip   • DJD (degenerative joint disease)     Past Medical History:   Diagnosis Date   • Arthritis    • Colon polyps    • Depression    • DVT (deep venous thrombosis) (CMS/[facility])     IN LEFT LEG   • Full dentures    • Hip pain     RIGHT   • Hyperlipidemia    • Hypertension    • Numbness and tingling     RIGHT FOOT   • PONV (postoperative nausea and vomiting)    • Right leg pain    • Type 2 diabetes mellitus without complication, without long-term current use of insulin (CMS/[facility]) 10/8/2019     Past Surgical History:   Procedure Laterality Date   • CERVICAL DISCECTOMY LAMINECTOMY DECOMPRESSION POSTERIOR FUSION WITH INSTRUMENTATION     • COLONOSCOPY N/A 2018    Procedure: COLONOSCOPY to TI and cecum with polypectomy;  Surgeon: Anil Garces MD;  Location: [facility] ENDOSCOPY;  Service:    • JOINT REPLACEMENT      LEFT HIP   • KNEE ARTHROSCOPY Left    • LUMBAR DISCECTOMY FUSION INSTRUMENTATION N/A 2020    Procedure: Lumbar 4 5 laminectomy with a posterior lateral fusion and instrumentation and interbody fusion;  Surgeon: Jeffrey Garcia MD;  Location: [facility] MAIN OR;  Service: Neurosurgery;  Laterality: N/A;   • TOTAL HIP ARTHROPLASTY  Right 6/3/2021    Procedure: TOTAL HIP ARTHROPLASTY POSTERIOR;  Surgeon: Shlomo Campos MD;  Location: [facility] MAIN OR;  Service: Orthopedics;  Laterality: Right;   • VASECTOMY       General Information     Row Name 06/04/21 0851          Physical Therapy Time and Intention    Document Type  evaluation  -MD     Mode of Treatment  physical therapy  -MD     Row Name 06/04/21 0851          General Information    Patient Profile Reviewed  yes  -MD     Prior Level of Function  independent: uses cane and RWx PRN  -MD     Existing Precautions/Restrictions  fall  -MD     Barriers to Rehab  previous functional deficit  -MD     Row Name 06/04/21 0851          Home Main Entrance    Number of Stairs, Main Entrance  three  -MD     Stair Railings, Main Entrance  none  -MD       User Key  (r) = Recorded By, (t) = Taken By, (c) = Cosigned By    Initials Name Provider Type    Maida Aguirre, PT Physical Therapist        Mobility     Row Name 06/04/21 0923          Bed Mobility    Bed Mobility  supine-sit  -MD     Supine-Sit [place] (Bed Mobility)  standby assist  -MD     Assistive Device (Bed Mobility)  bed rails;head of bed elevated  -MD     Row Name 06/04/21 0923          Sit-Stand Transfer    Sit-Stand [place] (Transfers)  verbal cues;minimum assist (75% patient effort);2 person assist  -MD     Assistive Device (Sit-Stand Transfers)  walker, front-wheeled  -MD     Row Name 06/04/21 0923          Gait/Stairs (Locomotion)    [place] Level (Gait)  verbal cues;minimum assist (75% patient effort)  -MD     Assistive Device (Gait)  walker, front-wheeled  -MD     Distance in Feet (Gait)  15'  -MD     Deviations/Abnormal Patterns (Gait)  antalgic;gait speed decreased  -MD     Bilateral Gait Deviations  forward flexed posture  -MD     [place] Level (Stairs)  verbal cues;moderate assist (50% patient effort);2 person assist  -MD     Assistive Device (Stairs)  cane, quad  -MD     Handrail Location (Stairs)  right side  (ascending)  -MD     Number of Steps (Stairs)  3  -MD     Ascending Technique (Stairs)  step-to-step  -MD     Descending Technique (Stairs)  step-to-step  -MD     Comment (Gait/Stairs)  While going up steps pt's R knee buckled on 2nd step.  Pt became light headed and dizzy.  PT and RN assisted pt to chair safely.  Vitals were taken.  Once seated pt stated his symptoms resolved  -MD     Row Name 06/04/21 0923          Mobility    Extremity Weight-bearing Status  right lower extremity  -MD     Right Lower Extremity (Weight-bearing Status)  weight-bearing as tolerated (WBAT)  -MD       User Key  (r) = Recorded By, (t) = Taken By, (c) = Cosigned By    Initials Name Provider Type    Maida Aguirre, PT Physical Therapist        Obj/Interventions     Row Name 06/04/21 1226          Range of Motion Comprehensive    General Range of Motion  no range of motion deficits identified  -MD     Comment, General Range of Motion  except R LE  -MD     Row Name 06/04/21 1226          Strength Comprehensive (MMT)    General Manual Muscle Testing (MMT) Assessment  no strength deficits identified  -MD     Comment, General Manual Muscle Testing (MMT) Assessment  except R LE  -MD     Row Name 06/04/21 1226          Motor Skills    Therapeutic Exercise  -- NATHALY x10 reps except SAQ PROM x10 reps  -MD       User Key  (r) = Recorded By, (t) = Taken By, (c) = Cosigned By    Initials Name Provider Type    Maida Aguirre, PT Physical Therapist        Goals/Plan     Row Name 06/04/21 1232          Transfer Goal 1 (PT)    Activity/Assistive Device (Transfer Goal 1, PT)  sit-to-stand/stand-to-sit;cass joshi  -MD     [place] Level/Cues Needed (Transfer Goal 1, PT)  standby assist  -MD     Time Frame (Transfer Goal 1, PT)  1 week  -MD     Row Name 06/04/21 1232          Gait Training Goal 1 (PT)    Activity/Assistive Device (Gait Training Goal 1, PT)  gait (walking locomotion);walker, rolling  -MD     [place] Level (Gait Training Goal 1,  PT)  standby assist  -MD     Distance (Gait Training Goal 1, PT)  100'  -MD     Time Frame (Gait Training Goal 1, PT)  1 week  -MD     Row Name 06/04/21 1232          Stairs Goal 1 (PT)    Activity/Assistive Device (Stairs Goal 1, PT)  stairs, all skills;cane, quad  -MD     [place] Level/Cues Needed (Stairs Goal 1, PT)  contact guard assist  -MD     Number of Stairs (Stairs Goal 1, PT)  4  -MD     Time Frame (Stairs Goal 1, PT)  1 week  -MD       User Key  (r) = Recorded By, (t) = Taken By, (c) = Cosigned By    Initials Name Provider Type    Maida Aguirre, PT Physical Therapist        Clinical Impression     Row Name 06/04/21 1226          Pain    Additional Documentation  Pain Scale: FACES Pre/Post-Treatment (Group)  -MD     Row Name 06/04/21 1226          Pain Scale: Numbers Pre/Post-Treatment    Pain Intervention(s)  Repositioned;Ambulation/increased activity  -MD     Row Name 06/04/21 1226          Pain Scale: FACES Pre/Post-Treatment    Pain: FACES Scale, Pretreatment  8-->hurts whole lot  -MD     Pain Location - Side  Right  -MD     Pain Location  hip  -MD     Row Name 06/04/21 1226          Plan of Care Review    Outcome Summary  Pt presents s/p R NATHALY leading to decreased independence and compromised safety w functional mobility.  This am pt was attempting stairs when his R knee buckled.  He was able to regain standing but then became light headed and dizzy.  PT and RN assisted pt to chair safely. Pt was also limited in his ambulation distance this am, only able to ambulate approx 15' due to pain. Due to the above PT is not recommending d/c today.  Also pt was unable to perform short arch quad this am, he was able to do quad set.  PT is recommending d/c home 6/5 following am PT session.  Plan for pt to d/c home w HHPT and assist from family.  -MD     Row Name 06/04/21 9072          Therapy Assessment/Plan (PT)    Rehab Potential (PT)  good, to achieve stated therapy goals  -MD     Criteria for Skilled  Interventions Met (PT)  yes;meets criteria;skilled treatment is necessary  -MD     Row Name 06/04/21 1226          Positioning and Restraints    Pre-Treatment Position  in bed  -MD     Post Treatment Position  chair  -MD     In Chair  reclined;sitting;call light within reach;exit alarm on  -MD       User Key  (r) = Recorded By, (t) = Taken By, (c) = Cosigned By    Initials Name Provider Type    Maida Aguirre, PT Physical Therapist        Outcome Measures     Row Name 06/04/21 1233          How much help from another person do you currently need...    Turning from your back to your side while in flat bed without using bedrails?  3  -MD     Moving from lying on back to sitting on the side of a flat bed without bedrails?  3  -MD     Moving to and from a bed to a chair (including a wheelchair)?  3  -MD     Standing up from a chair using your arms (e.g., wheelchair, bedside chair)?  3  -MD     Climbing 3-5 steps with a railing?  2  -MD     To walk in hospital room?  2  -MD     AM-[facility] 6 Clicks Score (PT)  16  -MD     Row Name 06/04/21 1233          Functional Assessment    Outcome Measure Options  AM-PAC 6 Clicks Basic Mobility (PT)  -MD       User Key  (r) = Recorded By, (t) = Taken By, (c) = Cosigned By    Initials Name Provider Type    Maida Aguirre, PT Physical Therapist        Physical Therapy Education                 Title: PT OT SLP Therapies (In Progress)     Topic: Physical Therapy (In Progress)     Point: Mobility training (Not Started)     Learner Progress:  Not documented in this visit.          Point: Home exercise program (Not Started)     Learner Progress:  Not documented in this visit.          Point: Body mechanics (Not Started)     Learner Progress:  Not documented in this visit.          Point: Precautions (Done)     Learning Progress Summary           Patient Acceptance, E, VU by MD at 6/4/2021 1233                               User Key     Initials Effective Dates Name Provider Type Magdalena CHACON  04/03/18 -  Maida Grover PT Physical Therapist PT              PT Recommendation and Plan     Outcome Summary: Pt presents s/p R NATHALY leading to decreased independence and compromised safety w functional mobility.  This am pt was attempting stairs when his R knee buckled.  He was able to regain standing but then became light headed and dizzy.  PT and RN assisted pt to chair safely. Pt was also limited in his ambulation distance this am, only able to ambulate approx 15' due to pain. Due to the above PT is not recommending d/c today.  Also pt was unable to perform short arch quad this am, he was able to do quad set.  PT is recommending d/c home 6/5 following am PT session.  Plan for pt to d/c home w HHPT and assist from family.     Time Calculation:   PT Charges     Row Name 06/04/21 0850             Time Calculation    Start Time  0850  -MD      Stop Time  0930  -MD      Time Calculation (min)  40 min  -MD      PT Received On  06/04/21  -MD      PT - Next Appointment  06/05/21  -MD      PT Goal Re-Cert Due Date  06/11/21  -MD        User Key  (r) = Recorded By, (t) = Taken By, (c) = Cosigned By    Initials Name Provider Type    Miada Aguirre PT Physical Therapist        Therapy Charges for Today     Code Description Service Date Service Provider Modifiers Qty    66935957348 HC PT EVAL LOW COMPLEXITY 2 6/4/2021 Maida Grover, PT GP 1    75556359810 HC PT THER PROC EA 15 MIN 6/4/2021 Maida Grover, PT GP 1    88255583898 HC PT THER SUPP EA 15 MIN 6/4/2021 Maida Grover, PT GP 1        Patient was intermittently wearing a face mask during this therapy encounter. Therapist used appropriate personal protective equipment including eye protection, mask, and gloves.  Mask used was standard procedure mask. Appropriate PPE was worn during the entire therapy session. Hand hygiene was completed before and after therapy session. Patient is not in enhanced droplet precautions. Sj Collins was present throughout treatment.      PT  G-Codes  Outcome Measure Options: AM-PAC 6 Clicks Basic Mobility (PT)  AM-PAC 6 Clicks Score (PT): 16    Maida Grover, PT  6/4/2021

## 2021-06-04 NOTE — PLAN OF CARE
Goal Outcome Evaluation:     Progress: improving   Pt is a post op day 1 of a rt total hip posterior approach, dressing is clean dry and intact. Pt continues with the Border dressing. Pt attempted to walk with the ambulation aide but was having some pain to the LLE. Pt was able to take a few steps. Pt voiding via the urinal, voiding function intact. Pt continues with PO pain meds that provide relief. Pt educated on the importance of monitoring blood pressures related to comorbidity of HTN. Pt voiced understanding. Pt is resting at this time, will continue to monitor.

## 2021-06-05 ENCOUNTER — HOME HEALTH ADMISSION (OUTPATIENT)
Dept: HOME HEALTH SERVICES | Facility: HOME HEALTHCARE | Age: 71
End: 2021-06-05

## 2021-06-05 VITALS
WEIGHT: 215.4 LBS | TEMPERATURE: 97.1 F | HEART RATE: 88 BPM | BODY MASS INDEX: 30.84 KG/M2 | DIASTOLIC BLOOD PRESSURE: 64 MMHG | HEIGHT: 70 IN | OXYGEN SATURATION: 93 % | SYSTOLIC BLOOD PRESSURE: 112 MMHG | RESPIRATION RATE: 16 BRPM

## 2021-06-05 PROCEDURE — 97110 THERAPEUTIC EXERCISES: CPT

## 2021-06-05 PROCEDURE — 63710000001 ASPIRIN EC 325 MG TABLET DELAYED-RELEASE: Performed by: ORTHOPAEDIC SURGERY

## 2021-06-05 PROCEDURE — A9270 NON-COVERED ITEM OR SERVICE: HCPCS | Performed by: ORTHOPAEDIC SURGERY

## 2021-06-05 PROCEDURE — 97530 THERAPEUTIC ACTIVITIES: CPT

## 2021-06-05 PROCEDURE — 63710000001 MUPIROCIN 2 % OINTMENT: Performed by: ORTHOPAEDIC SURGERY

## 2021-06-05 PROCEDURE — 63710000001 FERROUS SULFATE 325 (65 FE) MG TABLET: Performed by: ORTHOPAEDIC SURGERY

## 2021-06-05 PROCEDURE — 63710000001 DOCUSATE SODIUM 100 MG CAPSULE: Performed by: ORTHOPAEDIC SURGERY

## 2021-06-05 PROCEDURE — 97116 GAIT TRAINING THERAPY: CPT

## 2021-06-05 PROCEDURE — 63710000001 GABAPENTIN 300 MG CAPSULE: Performed by: ORTHOPAEDIC SURGERY

## 2021-06-05 PROCEDURE — 63710000001 OXYCODONE-ACETAMINOPHEN 5-325 MG TABLET: Performed by: ORTHOPAEDIC SURGERY

## 2021-06-05 RX ADMIN — GABAPENTIN 600 MG: 300 CAPSULE ORAL at 09:44

## 2021-06-05 RX ADMIN — MUPIROCIN 1 APPLICATION: 20 OINTMENT TOPICAL at 09:44

## 2021-06-05 RX ADMIN — OXYCODONE HYDROCHLORIDE AND ACETAMINOPHEN 2 TABLET: 5; 325 TABLET ORAL at 09:47

## 2021-06-05 RX ADMIN — FERROUS SULFATE TAB 325 MG (65 MG ELEMENTAL FE) 325 MG: 325 (65 FE) TAB at 09:48

## 2021-06-05 RX ADMIN — DOCUSATE SODIUM 100 MG: 100 CAPSULE, LIQUID FILLED ORAL at 09:44

## 2021-06-05 RX ADMIN — OXYCODONE HYDROCHLORIDE AND ACETAMINOPHEN 1 TABLET: 5; 325 TABLET ORAL at 14:31

## 2021-06-05 RX ADMIN — SODIUM CHLORIDE, PRESERVATIVE FREE 3 ML: 5 INJECTION INTRAVENOUS at 09:49

## 2021-06-05 RX ADMIN — ASPIRIN 325 MG: 325 TABLET, COATED ORAL at 09:44

## 2021-06-05 RX ADMIN — OXYCODONE HYDROCHLORIDE AND ACETAMINOPHEN 1 TABLET: 5; 325 TABLET ORAL at 05:53

## 2021-06-05 RX ADMIN — OXYCODONE HYDROCHLORIDE AND ACETAMINOPHEN 1 TABLET: 5; 325 TABLET ORAL at 13:39

## 2021-06-05 NOTE — PLAN OF CARE
Goal Outcome Evaluation:  Plan of Care Reviewed With: patient     Outcome Summary: SLow progress towards goals during PT.  Able to ambulate 40' total but required sitting rest break for few minutes due to fatigue and feeling light headed.  Very slow gait pattern with forward flexed posture and limited weight shifting onto R LE.  Performed exercises w/ education for HEP.  PLan to attempt PT, and especially stairs againthis afternoon for potential DC today; pt, spouse, and RN aware and in agreement withplan.

## 2021-06-05 NOTE — THERAPY TREATMENT NOTE
Patient Name: Taz Dickey  : 1950    MRN: [id number]                              Today's Date: 2021       Admit Date: 6/3/2021    Visit Dx:     ICD-10-CM ICD-9-CM   1. Primary osteoarthritis of right hip  M16.11 715.15     Patient Active Problem List   Diagnosis   • Spondylolisthesis of lumbar region   • Lumbar radiculopathy   • Benign essential hypertension   • Erectile dysfunction of nonorganic origin   • Hypercholesterolemia   • Osteoarthritis   • Tinnitus of both ears   • Encounter for screening colonoscopy   • Medicare annual wellness visit, subsequent   • Nocturia   • Type 2 diabetes mellitus without complication, without long-term current use of insulin (CMS/[facility])   • Healthcare maintenance   • Acute pain of right knee   • Primary osteoarthritis of right hip   • DJD (degenerative joint disease)     Past Medical History:   Diagnosis Date   • Arthritis    • Colon polyps    • Depression    • DVT (deep venous thrombosis) (CMS/[facility])     IN LEFT LEG   • Full dentures    • Hip pain     RIGHT   • Hyperlipidemia    • Hypertension    • Numbness and tingling     RIGHT FOOT   • PONV (postoperative nausea and vomiting)    • Right leg pain    • Type 2 diabetes mellitus without complication, without long-term current use of insulin (CMS/[facility]) 10/8/2019     Past Surgical History:   Procedure Laterality Date   • CERVICAL DISCECTOMY LAMINECTOMY DECOMPRESSION POSTERIOR FUSION WITH INSTRUMENTATION     • COLONOSCOPY N/A 2018    Procedure: COLONOSCOPY to TI and cecum with polypectomy;  Surgeon: Anil Garces MD;  Location: [facility] ENDOSCOPY;  Service:    • JOINT REPLACEMENT      LEFT HIP   • KNEE ARTHROSCOPY Left    • LUMBAR DISCECTOMY FUSION INSTRUMENTATION N/A 2020    Procedure: Lumbar 4 5 laminectomy with a posterior lateral fusion and instrumentation and interbody fusion;  Surgeon: Jeffrey Garcia MD;  Location: [facility] MAIN OR;  Service: Neurosurgery;  Laterality: N/A;   • TOTAL HIP ARTHROPLASTY  Right 6/3/2021    Procedure: TOTAL HIP ARTHROPLASTY POSTERIOR;  Surgeon: Shlomo Campos MD;  Location: [facility] MAIN OR;  Service: Orthopedics;  Laterality: Right;   • VASECTOMY       General Information     Row Name 06/05/21 1025          Physical Therapy Time and Intention    Document Type  therapy note (daily note)  -AR     Mode of Treatment  physical therapy  -AR     Row Name 06/05/21 1025          General Information    Existing Precautions/Restrictions  fall;hip, posterior;right  -AR     Row Name 06/05/21 1025          Cognition    Orientation Status (Cognition)  oriented x 3  -AR     Row Name 06/05/21 1025          Safety Issues, Functional Mobility    Impairments Affecting Function (Mobility)  endurance/activity tolerance;pain;range of motion (ROM);strength  -AR       User Key  (r) = Recorded By, (t) = Taken By, (c) = Cosigned By    Initials Name Provider Type    AR Krystina Gonzalez PT Physical Therapist        Mobility     Row Name 06/05/21 1026          Bed Mobility    Bed Mobility  supine-sit  -AR     Supine-Sit [place] (Bed Mobility)  supervision  -AR     Assistive Device (Bed Mobility)  head of bed elevated  -AR     Row Name 06/05/21 1026          Transfers    Comment (Transfers)  cues for UE placement, from bed and chair  -AR     Row Name 06/05/21 1026          Sit-Stand Transfer    Sit-Stand [place] (Transfers)  contact guard  -AR     Assistive Device (Sit-Stand Transfers)  walker, front-wheeled  -AR     Row Name 06/05/21 1026          Gait/Stairs (Locomotion)    [place] Level (Gait)  contact guard  -AR     Assistive Device (Gait)  walker, front-wheeled  -AR     Distance in Feet (Gait)  20' sitting rest break for few minutes due to fatigue and some dizziness then another 20'  -AR     Deviations/Abnormal Patterns (Gait)  festinating/shuffling;gait speed decreased  -AR     Bilateral Gait Deviations  heel strike decreased;forward flexed posture  -AR     Right Sided Gait Deviations   weight shift ability decreased;heel strike decreased  -AR     Comment (Gait/Stairs)  did not attempt steps this morning due to dizziness during gait, pain, and fatigue after ambulating short distance; plan to attempt this afternoon  -AR     Row Name 06/05/21 1026          Mobility    Extremity Weight-bearing Status  right lower extremity  -AR     Right Lower Extremity (Weight-bearing Status)  weight-bearing as tolerated (WBAT)  -AR       User Key  (r) = Recorded By, (t) = Taken By, (c) = Cosigned By    Initials Name Provider Type    Krystina Baumann, PT Physical Therapist        Obj/Interventions     Row Name 06/05/21 1027          Motor Skills    Therapeutic Exercise  -- R NATHALY ex program 10x  -AR     Row Name 06/05/21 1027          Balance    Balance Assessment  sitting static balance;standing dynamic balance  -AR     Static Sitting Balance  WNL  -AR     Dynamic Standing Balance  moderate impairment  -AR       User Key  (r) = Recorded By, (t) = Taken By, (c) = Cosigned By    Initials Name Provider Type    Krystina Baumann, PT Physical Therapist        Goals/Plan    No documentation.       Clinical Impression     Row Name 06/05/21 1027          Pain    Additional Documentation  Pain Scale: Numbers Pre/Post-Treatment (Group)  -AR     Row Name 06/05/21 1027          Pain Scale: Numbers Pre/Post-Treatment    Pretreatment Pain Rating  8/10  -AR     Posttreatment Pain Rating  9/10  -AR     Pain Location - Side  Right  -AR     Pain Location  hip  -AR     Pain Intervention(s)  Medication (See MAR);Cold applied;Repositioned  -AR     Row Name 06/05/21 1027          Plan of Care Review    Plan of Care Reviewed With  patient  -AR     Outcome Summary  SLow progress towards goals during PT.  Able to ambulate 40' total but required sitting rest break for few minutes due to fatigue and feeling light headed.  Very slow gait pattern with forward flexed posture and limited weight shifting onto R LE.  Performed exercises w/  education for HEP.  PLan to attempt PT, and especially stairs againthis afternoon for potential DC today; pt, spouse, and RN aware and in agreement withplan.  -AR     Row Name 06/05/21 1027          Therapy Assessment/Plan (PT)    Rehab Potential (PT)  good, to achieve stated therapy goals  -AR     Criteria for Skilled Interventions Met (PT)  yes  -AR     Row Name 06/05/21 1027          Vital Signs    O2 Delivery Pre Treatment  room air  -AR     Row Name 06/05/21 1027          Positioning and Restraints    Pre-Treatment Position  in bed  -AR     Post Treatment Position  chair  -AR     In Chair  notified nsg;reclined;sitting;call light within reach;encouraged to call for assist;exit alarm on;with family/caregiver  -AR       User Key  (r) = Recorded By, (t) = Taken By, (c) = Cosigned By    Initials Name Provider Type    Krystina Baumann, PT Physical Therapist        Outcome Measures     Row Name 06/05/21 1029          How much help from another person do you currently need...    Turning from your back to your side while in flat bed without using bedrails?  4  -AR     Moving from lying on back to sitting on the side of a flat bed without bedrails?  3  -AR     Moving to and from a bed to a chair (including a wheelchair)?  3  -AR     Standing up from a chair using your arms (e.g., wheelchair, bedside chair)?  3  -AR     Climbing 3-5 steps with a railing?  1  -AR     To walk in hospital room?  3  -AR     AM-PAC 6 Clicks Score (PT)  17  -AR     Row Name 06/05/21 1029          Functional Assessment    Outcome Measure Options  AM-PAC 6 Clicks Basic Mobility (PT)  -AR       User Key  (r) = Recorded By, (t) = Taken By, (c) = Cosigned By    Initials Name Provider Type    Krystina Baumann PT Physical Therapist        Physical Therapy Education                 Title: PT OT SLP Therapies (In Progress)     Topic: Physical Therapy (In Progress)     Point: Mobility training (In Progress)     Learning Progress Summary            Patient Acceptance, E, NR by AR at 6/5/2021 1029                   Point: Home exercise program (In Progress)     Learning Progress Summary           Patient Acceptance, E, NR by AR at 6/5/2021 1029                   Point: Body mechanics (In Progress)     Learning Progress Summary           Patient Acceptance, E, NR by AR at 6/5/2021 1029                   Point: Precautions (In Progress)     Learning Progress Summary           Patient Acceptance, E, NR by AR at 6/5/2021 1029    Acceptance, E, VU by MD at 6/4/2021 1233                               User Key     Initials Effective Dates Name Provider Type Discipline    MD 04/03/18 -  Maida Grover, PT Physical Therapist PT    AR 04/03/18 -  Krystina Gonzalez PT Physical Therapist PT              PT Recommendation and Plan     Plan of Care Reviewed With: patient  Outcome Summary: SLow progress towards goals during PT.  Able to ambulate 40' total but required sitting rest break for few minutes due to fatigue and feeling light headed.  Very slow gait pattern with forward flexed posture and limited weight shifting onto R LE.  Performed exercises w/ education for HEP.  PLan to attempt PT, and especially stairs againthis afternoon for potential DC today; pt, spouse, and RN aware and in agreement withplan.     Time Calculation:   PT Charges     Row Name 06/05/21 1024             Time Calculation    Start Time  0922  -AR      Stop Time  1009  -AR      Time Calculation (min)  47 min  -AR      PT Received On  06/05/21  -AR      PT - Next Appointment  06/05/21  -AR        User Key  (r) = Recorded By, (t) = Taken By, (c) = Cosigned By    Initials Name Provider Type    AR Krystina Gonzalez PT Physical Therapist        Therapy Charges for Today     Code Description Service Date Service Provider Modifiers Qty    77867496425 HC GAIT TRAINING EA 15 MIN 6/5/2021 Krystina Gonzalez, PT GP 1    93586527199 HC PT THER PROC EA 15 MIN 6/5/2021 Krystina Gonzalez PT GP 1    [id number]  PT  THERAPEUTIC ACT EA 15 MIN 6/5/2021 Krystina Gonzalez, PT GP 1          PT G-Codes  Outcome Measure Options: AM-PAC 6 Clicks Basic Mobility (PT)  AM-PAC 6 Clicks Score (PT): 17    Krystina Gonzalez, PT  6/5/2021

## 2021-06-05 NOTE — DISCHARGE INSTRUCTIONS
Posterior Hip Precautions    After surgery on your hip joint, please follow these instructions:    -Do not cross your legs at your knees or ankles.  -Do not bend more than 90 degrees at your hip  -Use a pillow between your legs when lying on your side to keep your hip from moving inward.  -Avoid low chairs.  If needed sit on pillows to make your seat higher.  -Chair seats and toilet seats should be at least 20 inches off the floor so that your hips are higher than your knees when you sit down.  Your nurse or doctor can help you get an elevated toilet seat if you need it.  -When getting up from a chair, slide to the edge of the chair.  Use your walker or crutches to support yourself to get up.

## 2021-06-05 NOTE — PROGRESS NOTES
Patient: Taz Dickey  YOB: 1950     Date of Admission: 6/3/2021  9:59 AM Medical Record Number: [id number]     Attending Physician: Shlomo Campos MD    Procedure(s):  TOTAL HIP ARTHROPLASTY POSTERIOR Post Operative Day Number: 2    Subjective : No new orthopaedic complaints     Pain Relief: some relief with present medication.     Systemic Complaints: No Complaints, improved since yesterday  Vitals:    06/04/21 1900 06/04/21 2300 06/05/21 0300 06/05/21 0700   BP: 103/57 92/52 101/60 112/64   BP Location: Left arm Left arm Left arm Left arm   Patient Position: Lying Lying Lying Lying   Pulse: 74 78 90 88   Resp: 16 16 16 16   Temp: 97.5 °F (36.4 °C) 97.1 °F (36.2 °C) 97.3 °F (36.3 °C) 97.1 °F (36.2 °C)   TempSrc: Oral Oral Oral Oral   SpO2: 96% 93% 93% 93%   Weight:       Height:           Physical Exam: 70 y.o. male    General Appearance:       Alert, cooperative, in no acute distress                  Extremities:    Dressing Clean, Dry and Intact         Incision healthy without signs or symptoms of infections         No clinical sign of DVT        Able to do good movements of digits    Pulses:   Pulses palpable and equal bilaterally           Diagnostic Tests:     Results from last 7 days   Lab Units 06/04/21  0610   WBC 10*3/mm3 10.57   HEMOGLOBIN g/dL 10.4*   HEMATOCRIT % 31.7*   PLATELETS 10*3/mm3 154     Results from last 7 days   Lab Units 06/04/21  0610 06/03/21  1052   SODIUM mmol/L 134*  --    POTASSIUM mmol/L 4.6 4.6   CHLORIDE mmol/L 101  --    CO2 mmol/L 26.7  --    BUN mg/dL 24*  --    CREATININE mg/dL 1.02  --    GLUCOSE mg/dL 154*  --    CALCIUM mg/dL 8.4*  --          No results found for: CRP  No results found for: SEDRATE  No results found for: URICACID  No results found for: CRYSTAL  Microbiology Results (last 10 days)     Procedure Component Value - Date/Time    COVID PRE-OP / PRE-PROCEDURE SCREENING ORDER (NO ISOLATION) - Swab, Nasopharynx [[id number]]  (Normal)  Collected: 06/01/21 1256    Lab Status: Final result Specimen: Swab from Nasopharynx Updated: 06/01/21 2241    Narrative:      The following orders were created for panel order COVID PRE-OP / PRE-PROCEDURE SCREENING ORDER (NO ISOLATION) - Swab, Nasopharynx.  Procedure                               Abnormality         Status                     ---------                               -----------         ------                     COVID-19,APTIMA PANTHER,...[[id number]]  Normal              Final result                 Please view results for these tests on the individual orders.    COVID-19,APTIMA PANTHER,[facility] IN-HOUSE, NP/OP SWAB IN UTM/VTM/SALINE TRANSPORT MEDIA,24 HR TAT - Swab, Nasopharynx [[id number]]  (Normal) Collected: 06/01/21 1256    Lab Status: Final result Specimen: Swab from Nasopharynx Updated: 06/01/21 2241     COVID19 Not Detected    Narrative:      Fact sheet for providers: https://www.fda.gov/media/088532/download     Fact sheet for patients: https://www.fda.gov/media/127898/download    Test performed by RT PCR.        XR Hip 1 View Without Pelvis Right (Surgery Only)    Result Date: 6/3/2021   Postsurgical changes.    This report was finalized on 6/3/2021 4:42 PM by Dr. Nj Han M.D.      XR Hip With or Without Pelvis 1 View Right    Result Date: 6/3/2021   Surgical changes.    This report was finalized on 6/3/2021 3:41 PM by Dr. Nj Han M.D.              Current Medications:  Scheduled Meds:aspirin, 325 mg, Oral, BID With Meals  atorvastatin, 40 mg, Oral, Daily  ferrous sulfate, 325 mg, Oral, Daily With Breakfast  gabapentin, 600 mg, Oral, Q12H  lisinopril-hydroCHLOROthiazide (ZESTORETIC) 20-12.5 mg combo dose, , Oral, Q24H  metFORMIN, 500 mg, Oral, Daily With Dinner  sodium chloride, 3 mL, Intravenous, Q12H      Continuous Infusions:sodium chloride, 100 mL/hr, Last Rate: 100 mL/hr (06/03/21 8907)      PRN Meds:.•  acetaminophen  •  diazePAM  •  docusate sodium  •  hydrOXYzine  •   melatonin  •  Morphine **AND** naloxone  •  ondansetron **OR** ondansetron  •  oxyCODONE-acetaminophen  •  oxyCODONE-acetaminophen  •  promethazine  •  sodium chloride    Assessment:    Procedure(s):  TOTAL HIP ARTHROPLASTY POSTERIOR    Patient Active Problem List   Diagnosis   • Spondylolisthesis of lumbar region   • Lumbar radiculopathy   • Benign essential hypertension   • Erectile dysfunction of nonorganic origin   • Hypercholesterolemia   • Osteoarthritis   • Tinnitus of both ears   • Encounter for screening colonoscopy   • Medicare annual wellness visit, subsequent   • Nocturia   • Type 2 diabetes mellitus without complication, without long-term current use of insulin (CMS/[facility])   • Healthcare maintenance   • Acute pain of right knee   • Primary osteoarthritis of right hip   • DJD (degenerative joint disease)       PLAN:   Continues current post-op course  Anticoagulation: Aspirin started  Dressing Change PRN  Mobilize with PT as tolerated per protocol  D/C held yesterday due to pain and muscle spasms. He is improved today. If does well with PT ok to discharge home today.     Weight Bearing: WBAT  Discharge Plan: OK to plan for discharge in  today to home and home health  from orthopadic perspective.      Leeann Acevedo, APRN    Date: 6/5/2021    Time: 09:56 EDT

## 2021-06-05 NOTE — PLAN OF CARE
Goal Outcome Evaluation:  Plan of Care Reviewed With: patient  Progress: improving  Outcome Summary: Patient is ambulating using walker and stand by or x1 assist. VSS and voiding function is intact. Pain is managed with po meds. Patient educated on bp monitoring and med management. Patient is prepared and ready for d/c home with hh.

## 2021-06-05 NOTE — PROGRESS NOTES
[facility] to Provide Home Care Services. Patient in agreement. Contact information confirmed.

## 2021-06-05 NOTE — PLAN OF CARE
Goal Outcome Evaluation:     Progress: improving   Pt is a post op day 2 of a rt total knee. Dressing is clean dry and intact. Pt continues with the island dressing. Pt was supposed to be discharged, but was having some issues with the left leg while working with PT on steps. Pt using the urinal for voiding. Voiding function intact. Pt continues with PO pain meds that provide relief. Pt educated on the importance of monitoring blood pressures related to comorbidity of HTN, pt voiced understanding. Pt is resting at this time will continue to monitor.

## 2021-06-05 NOTE — THERAPY TREATMENT NOTE
Patient Name: Taz Dickey  : 1950    MRN: [id number]                              Today's Date: 2021       Admit Date: 6/3/2021    Visit Dx:     ICD-10-CM ICD-9-CM   1. Primary osteoarthritis of right hip  M16.11 715.15   2. Status post total hip replacement, right  Z96.641 V43.64     Patient Active Problem List   Diagnosis   • Spondylolisthesis of lumbar region   • Lumbar radiculopathy   • Benign essential hypertension   • Erectile dysfunction of nonorganic origin   • Hypercholesterolemia   • Osteoarthritis   • Tinnitus of both ears   • Encounter for screening colonoscopy   • Medicare annual wellness visit, subsequent   • Nocturia   • Type 2 diabetes mellitus without complication, without long-term current use of insulin (CMS/[facility])   • Healthcare maintenance   • Acute pain of right knee   • Primary osteoarthritis of right hip   • DJD (degenerative joint disease)     Past Medical History:   Diagnosis Date   • Arthritis    • Colon polyps    • Depression    • DVT (deep venous thrombosis) (CMS/[facility])     IN LEFT LEG   • Full dentures    • Hip pain     RIGHT   • Hyperlipidemia    • Hypertension    • Numbness and tingling     RIGHT FOOT   • PONV (postoperative nausea and vomiting)    • Right leg pain    • Type 2 diabetes mellitus without complication, without long-term current use of insulin (CMS/[facility]) 10/8/2019     Past Surgical History:   Procedure Laterality Date   • CERVICAL DISCECTOMY LAMINECTOMY DECOMPRESSION POSTERIOR FUSION WITH INSTRUMENTATION     • COLONOSCOPY N/A 2018    Procedure: COLONOSCOPY to TI and cecum with polypectomy;  Surgeon: Anil Garces MD;  Location: [facility] ENDOSCOPY;  Service:    • JOINT REPLACEMENT      LEFT HIP   • KNEE ARTHROSCOPY Left    • LUMBAR DISCECTOMY FUSION INSTRUMENTATION N/A 2020    Procedure: Lumbar 4 5 laminectomy with a posterior lateral fusion and instrumentation and interbody fusion;  Surgeon: Jeffrey Garcia MD;  Location: [facility] MAIN OR;  Service:  Neurosurgery;  Laterality: N/A;   • TOTAL HIP ARTHROPLASTY Right 6/3/2021    Procedure: TOTAL HIP ARTHROPLASTY POSTERIOR;  Surgeon: Shlomo Campos MD;  Location: [facility] OR;  Service: Orthopedics;  Laterality: Right;   • VASECTOMY       General Information     Row Name 06/05/21 1639 06/05/21 1025       Physical Therapy Time and Intention    Document Type  therapy note (daily note)  -AR  therapy note (daily note)  -AR    Mode of Treatment  physical therapy  -AR  physical therapy  -AR    Row Name 06/05/21 1639 06/05/21 1025       General Information    Patient Profile Reviewed  yes  -AR  --    Existing Precautions/Restrictions  fall;hip, posterior;right  -AR  fall;hip, posterior;right  -AR    Row Name 06/05/21 1639 06/05/21 1025       Cognition    Orientation Status (Cognition)  oriented x 3  -AR  oriented x 3  -AR    Row Name 06/05/21 1025          Safety Issues, Functional Mobility    Impairments Affecting Function (Mobility)  endurance/activity tolerance;pain;range of motion (ROM);strength  -AR       User Key  (r) = Recorded By, (t) = Taken By, (c) = Cosigned By    Initials Name Provider Type    AR Krystina Gonzalez PT Physical Therapist        Mobility     Row Name 06/05/21 1639 06/05/21 1026       Bed Mobility    Bed Mobility  --  supine-sit  -AR    Supine-Sit Divide (Bed Mobility)  not tested  -AR  supervision  -AR    Assistive Device (Bed Mobility)  --  head of bed elevated  -AR    Row Name 06/05/21 1026          Transfers    Comment (Transfers)  cues for UE placement, from bed and chair  -AR     Row Name 06/05/21 1639 06/05/21 1026       Sit-Stand Transfer    Sit-Stand Divide (Transfers)  standby assist  -AR  contact guard  -AR    Assistive Device (Sit-Stand Transfers)  walker, front-wheeled  -AR  walker, front-wheeled  -AR    Row Name 06/05/21 1639 06/05/21 1026       Gait/Stairs (Locomotion)    Divide Level (Gait)  standby assist;contact guard  -AR  contact guard  -AR    Assistive  Device (Gait)  walker, front-wheeled  -AR  walker, front-wheeled  -AR    Distance in Feet (Gait)  150  -AR  20' sitting rest break for few minutes due to fatigue and some dizziness then another 20'  -AR    Deviations/Abnormal Patterns (Gait)  festinating/shuffling;irene decreased  -AR  festinating/shuffling;gait speed decreased  -AR    Bilateral Gait Deviations  --  heel strike decreased;forward flexed posture  -AR    Right Sided Gait Deviations  weight shift ability decreased;heel strike decreased;forward flexed posture  -AR  weight shift ability decreased;heel strike decreased  -AR    [place]de Level (Stairs)  contact guard  -AR  --    Handrail Location (Stairs)  right side (ascending)  -AR  --    Number of Steps (Stairs)  4  -AR  --    Ascending Technique (Stairs)  step-to-step  -AR  --    Descending Technique (Stairs)  step-to-step  -AR  --    Comment (Gait/Stairs)  denies dizziness, no LOB or safety concerns  -AR  did not attempt steps this morning due to dizziness during gait, pain, and fatigue after ambulating short distance; plan to attempt this afternoon  -AR    Row Name 06/05/21 1026          Mobility    Extremity Weight-bearing Status  right lower extremity  -AR     Right Lower Extremity (Weight-bearing Status)  weight-bearing as tolerated (WBAT)  -AR       User Key  (r) = Recorded By, (t) = Taken By, (c) = Cosigned By    Initials Name Provider Type    Krystina Baumann PT Physical Therapist        Obj/Interventions     Row Name 06/05/21 1640 06/05/21 1027       Motor Skills    Therapeutic Exercise  -- R NATHALY ex program 10x w/ ed for HEP  -AR  -- R NATHALY ex program 10x  -AR    Row Name 06/05/21 1640 06/05/21 1027       Balance    Balance Assessment  sitting static balance;standing dynamic balance  -AR  sitting static balance;standing dynamic balance  -AR    Static Sitting Balance  WNL  -AR  WNL  -AR    Dynamic Standing Balance  mild impairment  -AR  moderate impairment  -AR      User Key  (r) =  Recorded By, (t) = Taken By, (c) = Cosigned By    Initials Name Provider Type    AR Krystina Gonzalez, PT Physical Therapist        Goals/Plan    No documentation.       Clinical Impression     Row Name 06/05/21 1640 06/05/21 1027       Pain    Additional Documentation  Pain Scale: Numbers Pre/Post-Treatment (Group)  -AR  Pain Scale: Numbers Pre/Post-Treatment (Group)  -AR    Row Name 06/05/21 1640 06/05/21 1027       Pain Scale: Numbers Pre/Post-Treatment    Pretreatment Pain Rating  3/10  -AR  8/10  -AR    Posttreatment Pain Rating  4/10  -AR  9/10  -AR    Pain Location - Side  Right  -AR  Right  -AR    Pain Location  hip  -AR  hip  -AR    Pain Intervention(s)  Medication (See MAR);Repositioned;Cold applied  -AR  Medication (See MAR);Cold applied;Repositioned  -AR    Row Name 06/05/21 1640 06/05/21 1027       Plan of Care Review    Plan of Care Reviewed With  patient  -AR  patient  -AR    Outcome Summary  --  SLow progress towards goals during PT.  Able to ambulate 40' total but required sitting rest break for few minutes due to fatigue and feeling light headed.  Very slow gait pattern with forward flexed posture and limited weight shifting onto R LE.  Performed exercises w/ education for HEP.  PLan to attempt PT, and especially stairs againthis afternoon for potential DC today; pt, spouse, and RN aware and in agreement withplan.  -AR    Row Name 06/05/21 1640 06/05/21 1027       Therapy Assessment/Plan (PT)    Rehab Potential (PT)  good, to achieve stated therapy goals  -AR  good, to achieve stated therapy goals  -AR    Criteria for Skilled Interventions Met (PT)  yes  -AR  yes  -AR    Row Name 06/05/21 1640 06/05/21 1027       Vital Signs    O2 Delivery Pre Treatment  room air  -AR  room air  -AR    Row Name 06/05/21 1640 06/05/21 1027       Positioning and Restraints    Pre-Treatment Position  sitting in chair/recliner  -AR  in bed  -AR    Post Treatment Position  chair  -AR  chair  -AR    In Chair  notified  nsg;reclined;sitting;call light within reach;encouraged to call for assist;exit alarm on  -AR  notified nsg;reclined;sitting;call light within reach;encouraged to call for assist;exit alarm on;with family/caregiver  -AR      User Key  (r) = Recorded By, (t) = Taken By, (c) = Cosigned By    Initials Name Provider Type    AR Krystina Gonzalez, TOLU Physical Therapist        Outcome Measures     Row Name 06/05/21 1029          How much help from another person do you currently need...    Turning from your back to your side while in flat bed without using bedrails?  4  -AR     Moving from lying on back to sitting on the side of a flat bed without bedrails?  3  -AR     Moving to and from a bed to a chair (including a wheelchair)?  3  -AR     Standing up from a chair using your arms (e.g., wheelchair, bedside chair)?  3  -AR     Climbing 3-5 steps with a railing?  1  -AR     To walk in hospital room?  3  -AR     AM-PAC 6 Clicks Score (PT)  17  -AR     Row Name 06/05/21 1641 06/05/21 1029       Functional Assessment    Outcome Measure Options  AM-PAC 6 Clicks Basic Mobility (PT)  -AR  AM-PAC 6 Clicks Basic Mobility (PT)  -AR      User Key  (r) = Recorded By, (t) = Taken By, (c) = Cosigned By    Initials Name Provider Type    Krystina Baumann, TOLU Physical Therapist        Physical Therapy Education                 Title: PT OT SLP Therapies (Resolved)     Topic: Physical Therapy (Resolved)     Point: Mobility training (Resolved)     Learning Progress Summary           Patient Acceptance, E, NR by AR at 6/5/2021 1029                   Point: Home exercise program (Resolved)     Learning Progress Summary           Patient Acceptance, E, NR by AR at 6/5/2021 1029                   Point: Body mechanics (Resolved)     Learning Progress Summary           Patient Acceptance, E, NR by AR at 6/5/2021 1029                   Point: Precautions (Resolved)     Learning Progress Summary           Patient Acceptance, E, NR by AR at  6/5/2021 1029    Acceptance, E, VU by MD at 6/4/2021 1233                               User Key     Initials Effective Dates Name Provider Type Discipline    MD 04/03/18 -  Maida Grover, PT Physical Therapist PT    AR 04/03/18 -  Krystina Gonzalez PT Physical Therapist PT              PT Recommendation and Plan     Plan of Care Reviewed With: patient  Outcome Summary: SLow progress towards goals during PT.  Able to ambulate 40' total but required sitting rest break for few minutes due to fatigue and feeling light headed.  Very slow gait pattern with forward flexed posture and limited weight shifting onto R LE.  Performed exercises w/ education for HEP.  PLan to attempt PT, and especially stairs againthis afternoon for potential DC today; pt, spouse, and RN aware and in agreement withplan.     Time Calculation:   PT Charges     Row Name 06/05/21 1638 06/05/21 1024          Time Calculation    Start Time  1345  -AR  0922  -AR     Stop Time  1415  -AR  1009  -AR     Time Calculation (min)  30 min  -AR  47 min  -AR     PT Received On  06/05/21  -AR  06/05/21  -AR     PT - Next Appointment  --  06/05/21  -AR       User Key  (r) = Recorded By, (t) = Taken By, (c) = Cosigned By    Initials Name Provider Type    AR Krystina Gonzalez PT Physical Therapist        Therapy Charges for Today     Code Description Service Date Service Provider Modifiers Qty    24831818877 HC GAIT TRAINING EA 15 MIN 6/5/2021 Krystina Gonzalez, PT GP 1    04910608020 HC PT THER PROC EA 15 MIN 6/5/2021 Krystina Gonzalez PT GP 1    52011073042 HC PT THERAPEUTIC ACT EA 15 MIN 6/5/2021 Krystina Gonzalez PT GP 1    98412233165 HC GAIT TRAINING EA 15 MIN 6/5/2021 Krystina Gonzalez, PT GP 1    05530536091 HC PT THER PROC EA 15 MIN 6/5/2021 Krystina Gonzalez, PT GP 1    35735208559 HC PT THER SUPP EA 15 MIN 6/5/2021 Krystina Gonzalez, PT GP 2          PT G-Codes  Outcome Measure Options: AM-PAC 6 Clicks Basic Mobility (PT)  AM-PAC 6 Clicks Score (PT):  17    Krystina Gonzalez, PT  6/5/2021

## 2021-06-06 ENCOUNTER — HOME CARE VISIT (OUTPATIENT)
Dept: HOME HEALTH SERVICES | Facility: HOME HEALTHCARE | Age: 71
End: 2021-06-06

## 2021-06-06 VITALS
HEART RATE: 76 BPM | TEMPERATURE: 97.7 F | OXYGEN SATURATION: 93 % | SYSTOLIC BLOOD PRESSURE: 102 MMHG | WEIGHT: 215 LBS | DIASTOLIC BLOOD PRESSURE: 62 MMHG | HEIGHT: 70 IN | BODY MASS INDEX: 30.78 KG/M2

## 2021-06-06 PROCEDURE — G0151 HHCP-SERV OF PT,EA 15 MIN: HCPCS

## 2021-06-07 NOTE — CASE MANAGEMENT/SOCIAL WORK
Case Management Discharge Note      Final Note: DC Home with [facility]          Selected Continued Care - Discharged on 6/5/2021 Admission date: 6/3/2021 - Discharge disposition: Home or Self Care    Destination    No services have been selected for the patient.              Durable Medical Equipment    No services have been selected for the patient.              Dialysis/Infusion    No services have been selected for the patient.              Home Medical Care Coordination complete    Service Provider Selected Services Address Phone Fax Patient Preferred    [facility]  Home Health Services [address] [address] [phone] --          Therapy    No services have been selected for the patient.              Community Resources    No services have been selected for the patient.                       Final Discharge Disposition Code: 06 - home with home health care

## 2021-06-08 ENCOUNTER — HOME CARE VISIT (OUTPATIENT)
Dept: HOME HEALTH SERVICES | Facility: HOME HEALTHCARE | Age: 71
End: 2021-06-08

## 2021-06-08 VITALS
HEART RATE: 77 BPM | TEMPERATURE: 95.3 F | DIASTOLIC BLOOD PRESSURE: 67 MMHG | RESPIRATION RATE: 18 BRPM | SYSTOLIC BLOOD PRESSURE: 107 MMHG

## 2021-06-08 PROCEDURE — G0151 HHCP-SERV OF PT,EA 15 MIN: HCPCS

## 2021-06-10 ENCOUNTER — HOME CARE VISIT (OUTPATIENT)
Dept: HOME HEALTH SERVICES | Facility: HOME HEALTHCARE | Age: 71
End: 2021-06-10

## 2021-06-10 PROCEDURE — G0151 HHCP-SERV OF PT,EA 15 MIN: HCPCS

## 2021-06-11 VITALS
OXYGEN SATURATION: 98 % | SYSTOLIC BLOOD PRESSURE: 117 MMHG | HEART RATE: 72 BPM | TEMPERATURE: 99 F | DIASTOLIC BLOOD PRESSURE: 67 MMHG

## 2021-06-11 NOTE — HOME HEALTH
I am sore but I can get around. I called my docotor because my blood pressure was low. I also got a tray for my walker.

## 2021-06-15 ENCOUNTER — HOME CARE VISIT (OUTPATIENT)
Dept: HOME HEALTH SERVICES | Facility: HOME HEALTHCARE | Age: 71
End: 2021-06-15

## 2021-06-15 VITALS
TEMPERATURE: 96.9 F | SYSTOLIC BLOOD PRESSURE: 126 MMHG | HEART RATE: 80 BPM | DIASTOLIC BLOOD PRESSURE: 76 MMHG | OXYGEN SATURATION: 100 % | RESPIRATION RATE: 18 BRPM

## 2021-06-15 PROCEDURE — G0151 HHCP-SERV OF PT,EA 15 MIN: HCPCS

## 2021-06-15 NOTE — HOME HEALTH
I am doing well and I took a shower and the wound is dry. I still cannot put a lot of weigh ton there and when can I wal with a cane?

## 2021-06-17 ENCOUNTER — HOME CARE VISIT (OUTPATIENT)
Dept: HOME HEALTH SERVICES | Facility: HOME HEALTHCARE | Age: 71
End: 2021-06-17

## 2021-06-17 VITALS
TEMPERATURE: 97.3 F | DIASTOLIC BLOOD PRESSURE: 64 MMHG | SYSTOLIC BLOOD PRESSURE: 117 MMHG | OXYGEN SATURATION: 98 % | RESPIRATION RATE: 18 BRPM | HEART RATE: 76 BPM

## 2021-06-17 PROCEDURE — G0151 HHCP-SERV OF PT,EA 15 MIN: HCPCS

## 2021-06-17 NOTE — HOME HEALTH
Subjective. I feel better the since I am off the pain pills. Things hacve gotten a lot better. I will see my doctor next fridayu.

## 2021-06-22 ENCOUNTER — HOME CARE VISIT (OUTPATIENT)
Dept: HOME HEALTH SERVICES | Facility: HOME HEALTHCARE | Age: 71
End: 2021-06-22

## 2021-06-22 VITALS
HEART RATE: 77 BPM | RESPIRATION RATE: 18 BRPM | OXYGEN SATURATION: 98 % | DIASTOLIC BLOOD PRESSURE: 77 MMHG | SYSTOLIC BLOOD PRESSURE: 133 MMHG

## 2021-06-22 PROCEDURE — G0151 HHCP-SERV OF PT,EA 15 MIN: HCPCS

## 2021-06-24 ENCOUNTER — HOME CARE VISIT (OUTPATIENT)
Dept: HOME HEALTH SERVICES | Facility: HOME HEALTHCARE | Age: 71
End: 2021-06-24

## 2021-06-24 VITALS
TEMPERATURE: 96.5 F | HEART RATE: 77 BPM | DIASTOLIC BLOOD PRESSURE: 72 MMHG | RESPIRATION RATE: 18 BRPM | OXYGEN SATURATION: 98 % | SYSTOLIC BLOOD PRESSURE: 125 MMHG

## 2021-06-24 PROCEDURE — G0151 HHCP-SERV OF PT,EA 15 MIN: HCPCS

## 2021-06-28 ENCOUNTER — TRANSCRIBE ORDERS (OUTPATIENT)
Dept: PHYSICAL THERAPY | Facility: HOSPITAL | Age: 71
End: 2021-06-28

## 2021-06-28 DIAGNOSIS — Z96.641 S/P HIP REPLACEMENT, RIGHT: Primary | ICD-10-CM

## 2021-06-30 ENCOUNTER — HOSPITAL ENCOUNTER (OUTPATIENT)
Dept: PHYSICAL THERAPY | Facility: HOSPITAL | Age: 71
Setting detail: THERAPIES SERIES
Discharge: HOME | End: 2021-06-30

## 2021-06-30 DIAGNOSIS — Z96.641 AFTERCARE FOLLOWING RIGHT HIP JOINT REPLACEMENT SURGERY: Primary | ICD-10-CM

## 2021-06-30 DIAGNOSIS — Z74.09 IMPAIRED MOBILITY: ICD-10-CM

## 2021-06-30 DIAGNOSIS — M25.551 RIGHT HIP PAIN: ICD-10-CM

## 2021-06-30 DIAGNOSIS — Z47.1 AFTERCARE FOLLOWING RIGHT HIP JOINT REPLACEMENT SURGERY: Primary | ICD-10-CM

## 2021-06-30 PROCEDURE — 97161 PT EVAL LOW COMPLEX 20 MIN: CPT | Performed by: PHYSICAL THERAPIST

## 2021-06-30 PROCEDURE — 97110 THERAPEUTIC EXERCISES: CPT | Performed by: PHYSICAL THERAPIST

## 2021-07-07 ENCOUNTER — HOSPITAL ENCOUNTER (OUTPATIENT)
Dept: PHYSICAL THERAPY | Facility: HOSPITAL | Age: 71
Setting detail: THERAPIES SERIES
Discharge: HOME | End: 2021-07-07

## 2021-07-07 DIAGNOSIS — Z96.641 AFTERCARE FOLLOWING RIGHT HIP JOINT REPLACEMENT SURGERY: Primary | ICD-10-CM

## 2021-07-07 DIAGNOSIS — Z74.09 IMPAIRED MOBILITY: ICD-10-CM

## 2021-07-07 DIAGNOSIS — Z47.1 AFTERCARE FOLLOWING RIGHT HIP JOINT REPLACEMENT SURGERY: Primary | ICD-10-CM

## 2021-07-07 DIAGNOSIS — M25.551 RIGHT HIP PAIN: ICD-10-CM

## 2021-07-07 PROCEDURE — 97110 THERAPEUTIC EXERCISES: CPT | Performed by: PHYSICAL THERAPIST

## 2021-07-07 PROCEDURE — 97140 MANUAL THERAPY 1/> REGIONS: CPT | Performed by: PHYSICAL THERAPIST

## 2021-07-07 NOTE — THERAPY TREATMENT NOTE
Outpatient Physical Therapy Ortho Treatment Note  [facility]     Patient Name: Taz Dickey  : 1950  MRN: [id number]  Today's Date: 2021      Visit Date: 2021    Visit Dx:    ICD-10-CM ICD-9-CM   1. Aftercare following right hip joint replacement surgery  Z47.1 V54.81    Z96.641 V43.64   2. Impaired mobility  Z74.09 799.89   3. Right hip pain  M25.551 719.45       Patient Active Problem List   Diagnosis   • Spondylolisthesis of lumbar region   • Lumbar radiculopathy   • Benign essential hypertension   • Erectile dysfunction of nonorganic origin   • Hypercholesterolemia   • Osteoarthritis   • Tinnitus of both ears   • Encounter for screening colonoscopy   • Medicare annual wellness visit, subsequent   • Nocturia   • Type 2 diabetes mellitus without complication, without long-term current use of insulin (CMS/[facility])   • Healthcare maintenance   • Acute pain of right knee   • Primary osteoarthritis of right hip   • DJD (degenerative joint disease)        Past Medical History:   Diagnosis Date   • Arthritis    • Colon polyps    • Depression    • DVT (deep venous thrombosis) (CMS/[facility])     IN LEFT LEG   • Full dentures    • Hip pain     RIGHT   • Hyperlipidemia    • Hypertension    • Numbness and tingling     RIGHT FOOT   • PONV (postoperative nausea and vomiting)    • Right leg pain    • Type 2 diabetes mellitus without complication, without long-term current use of insulin (CMS/HCC) 10/8/2019        Past Surgical History:   Procedure Laterality Date   • CERVICAL DISCECTOMY LAMINECTOMY DECOMPRESSION POSTERIOR FUSION WITH INSTRUMENTATION     • COLONOSCOPY N/A 2018    Procedure: COLONOSCOPY to TI and cecum with polypectomy;  Surgeon: Anil Garces MD;  Location: [facility] ENDOSCOPY;  Service:    • JOINT REPLACEMENT      LEFT HIP   • KNEE ARTHROSCOPY Left    • LUMBAR DISCECTOMY FUSION INSTRUMENTATION N/A 2020    Procedure: Lumbar 4 5 laminectomy with a posterior lateral fusion and  instrumentation and interbody fusion;  Surgeon: Jeffrey Garcia MD;  Location: [facility] OR;  Service: Neurosurgery;  Laterality: N/A;   • TOTAL HIP ARTHROPLASTY Right 6/3/2021    Procedure: TOTAL HIP ARTHROPLASTY POSTERIOR;  Surgeon: Shlomo Campos MD;  Location: [facility] MAIN OR;  Service: Orthopedics;  Laterality: Right;   • VASECTOMY                         PT Assessment/Plan     Row Name 07/07/21 0714          PT Assessment    Assessment Comments  Mr. Dickey returns for his first follow up visit for his R NATHALY (posterior approach). He is 5 weeks out. We progressed his hip girdle strengthening activities today and worked on gait (forward/retro).  Initiated trial of gentls STM to R posterior hip girdle tissue. He demonstrates tight R hip musculature/decreased hip mobility.  Mr. Dickey continues to be a good candidate for skilled physical therapy.  -GJ        PT Plan    PT Plan Comments  assess response to new exercises, continue to progress hip girdle strengthening, gait normalization, balance.  consider mosnter walk F/B, hip ext. STM to R posterior hip girdle as needed  -       User Key  (r) = Recorded By, (t) = Taken By, (c) = Cosigned By    Initials Name Provider Type     Nj Rodriguez, PT Physical Therapist            OP Exercises     Row Name 07/07/21 0711 07/07/21 0700          Subjective Comments    Subjective Comments  --  I get tired quick.  Not realy having pain,, just uncomfortable, somemtimes I feel like I can't put a lot of weight on it (leg/hip)  -        Subjective Pain    Pre-Treatment Pain Level  --  2  -GJ        Total Minutes    91004 - PT Therapeutic Exercise Minutes  25  -GJ  --     63698 - PT Manual Therapy Minutes  13  -GJ  --        Exercise 6    Exercise Name 6  --  HR  -GJ     Cueing 6  --  Verbal;Demo  -GJ     Reps 6  --  15  -GJ     Additional Comments  --  1 UE support  -GJ        Exercise 7    Exercise Name 7  --  mini squats  -GJ     Cueing 7  --  Verbal;Demo  -GJ     Reps  7  --  15  -GJ        Exercise 8    Exercise Name 8  --  standing HS curl, B  -GJ     Cueing 8  --  Verbal;Demo  -GJ     Reps 8  --  15  -GJ     Additional Comments  --  2#, 1 hand  -GJ        Exercise 9    Exercise Name 9  --  Nustep  -GJ     Cueing 9  --  Verbal;Demo  -GJ     Time 9  --  5 min  -GJ     Additional Comments  --  L5, BUE/BLE  -GJ        Exercise 10    Exercise Name 10  --  forward/retro gait  -GJ     Cueing 10  --  Verbal;Demo  -GJ     Reps 10  --  6 laps  -GJ     Additional Comments  --  cues for increased step length  -GJ        Exercise 11    Exercise Name 11  --  lateral stepping  -GJ     Cueing 11  --  Verbal;Demo  -GJ     Reps 11  --  3 laps  -GJ     Additional Comments  --  2 hands, cues to avoid lateral sway,   -GJ        Exercise 12    Exercise Name 12  --  LAQ  -GJ     Cueing 12  --  Verbal;Demo  -GJ     Time 12  --  15, B  -GJ     Additional Comments  --  3 second down phase  -GJ       User Key  (r) = Recorded By, (t) = Taken By, (c) = Cosigned By    Initials Name Provider Type    Nj Dominguez, PT Physical Therapist                      Manual Rx (last 36 hours)      Manual Treatments     Row Name 07/07/21 0711 07/07/21 0700          Total Minutes    19215 - PT Manual Therapy Minutes  13  -GJ  --        Manual Rx 1    Manual Rx 1 Location  --  STM R posterior hip girdle tissue  -GJ     Manual Rx 1 Type  --  pt L SL withpillow between knees  -GJ       User Key  (r) = Recorded By, (t) = Taken By, (c) = Cosigned By    Initials Name Provider Type    Nj Dominguez, PT Physical Therapist          PT OP Goals     Row Name 07/07/21 0700          PT Short Term Goals    STG Date to Achieve  07/30/21  -GJ     STG 1  pt. to be I with initial HEP to facilitate self management of their condition  -GJ     STG 1 Progress  Ongoing  -GJ     STG 1 Progress Comments  intermittent cues  -GJ     STG 2  pt. to be educated in/verbalize understanding of the importance of posture/ergonomics in  association with their condition to facilitate self management of their condition  -GJ     STG 2 Progress  Ongoing  -GJ     STG 2 Progress Comments  reviewed  -GJ     STG 3  pt to demosntrate R SLS >/= 10 seconds to facilitate greater ease to ambulate without AD  -GJ     STG 3 Progress  Ongoing  -GJ     STG 4  pt to demonstrate R hip flexion PROM >/= 90 degrees to facilitate improved flexibility to allow for ease of donning/doffing shoes  -GJ     STG 4 Progress  Ongoing  -        Long Term Goals    LTG Date to Achieve  09/30/21  -GJ     LTG 1  pt. to be I with advanced HEP to facilitate self management of their condition  -GJ     LTG 1 Progress  Ongoing  -GJ     LTG 2  pt. to report an LEFS >/= 60 to demonstrate decreased level of perceived disability  -GJ     LTG 2 Progress  Ongoing  -GJ     LTG 3  pt. to ambulate without AD with near normal heel to toe gait pattern to facilitate ease/safety of community mobility  -     LTG 3 Progress  Ongoing  -     LTG 4  pt. to ascend/descends stairs with reciprocal pattern (</= 1 rails) to facilitate ease/safety of household/community mobility  -     LTG 4 Progress  Ongoing  -     LTG 5  pt to demosntrate R SLS >/= 30 seconds to facilitate greater ease to ambulate without AD  -     LTG 5 Progress  Ongoing  -GJ     LTG 6  pt to demonstrate R hip abd/ext MMT >/= 4+/5 to facilitate ease/safety with community mobility  -     LTG 6 Progress  Ongoing  -       User Key  (r) = Recorded By, (t) = Taken By, (c) = Cosigned By    Initials Name Provider Type    Nj Dominguez, PT Physical Therapist          Therapy Education  Given: HEP, Symptoms/condition management, Pain management, Posture/body mechanics, Fall prevention and home safety, Mobility training, Edema management  Program: Reinforced, New, Progressed  How Provided: Verbal, Demonstration  Provided to: Patient  Level of Understanding: Teach back education performed, Verbalized, Demonstrated              Time  Calculation:   Start Time: 0709 (appt time 0700)  Stop Time: 0748  Time Calculation (min): 39 min  Timed Charges  93287 - PT Therapeutic Exercise Minutes: 25  44465 - PT Manual Therapy Minutes: 13  Total Minutes  Timed Charges Total Minutes: 38   Total Minutes: 38  Therapy Charges for Today     Code Description Service Date Service Provider Modifiers Qty    36021019363 HC PT THER PROC EA 15 MIN 7/7/2021 Nj Rodriguez, PT GP 2    [id number]  PT MANUAL THERAPY EA 15 MIN 7/7/2021 Nj Rodriguez, PT GP 1                    Nj Rodriguez, PT  7/7/2021

## 2021-07-14 ENCOUNTER — HOSPITAL ENCOUNTER (OUTPATIENT)
Dept: PHYSICAL THERAPY | Facility: HOSPITAL | Age: 71
Setting detail: THERAPIES SERIES
Discharge: HOME | End: 2021-07-14

## 2021-07-14 DIAGNOSIS — M25.551 RIGHT HIP PAIN: ICD-10-CM

## 2021-07-14 DIAGNOSIS — Z47.1 AFTERCARE FOLLOWING RIGHT HIP JOINT REPLACEMENT SURGERY: Primary | ICD-10-CM

## 2021-07-14 DIAGNOSIS — Z74.09 IMPAIRED MOBILITY: ICD-10-CM

## 2021-07-14 DIAGNOSIS — Z96.641 AFTERCARE FOLLOWING RIGHT HIP JOINT REPLACEMENT SURGERY: Primary | ICD-10-CM

## 2021-07-14 PROCEDURE — 97110 THERAPEUTIC EXERCISES: CPT

## 2021-07-14 PROCEDURE — 97140 MANUAL THERAPY 1/> REGIONS: CPT

## 2021-07-14 NOTE — THERAPY TREATMENT NOTE
Outpatient Physical Therapy Ortho Treatment Note  [facility]     Patient Name: Taz Dickey  : 1950  MRN: [id number]  Today's Date: 2021      Visit Date: 2021    Visit Dx:    ICD-10-CM ICD-9-CM   1. Aftercare following right hip joint replacement surgery  Z47.1 V54.81    Z96.641 V43.64   2. Impaired mobility  Z74.09 799.89   3. Right hip pain  M25.551 719.45       Patient Active Problem List   Diagnosis   • Spondylolisthesis of lumbar region   • Lumbar radiculopathy   • Benign essential hypertension   • Erectile dysfunction of nonorganic origin   • Hypercholesterolemia   • Osteoarthritis   • Tinnitus of both ears   • Encounter for screening colonoscopy   • Medicare annual wellness visit, subsequent   • Nocturia   • Type 2 diabetes mellitus without complication, without long-term current use of insulin (CMS/[facility])   • Healthcare maintenance   • Acute pain of right knee   • Primary osteoarthritis of right hip   • DJD (degenerative joint disease)        Past Medical History:   Diagnosis Date   • Arthritis    • Colon polyps    • Depression    • DVT (deep venous thrombosis) (CMS/[facility])     IN LEFT LEG   • Full dentures    • Hip pain     RIGHT   • Hyperlipidemia    • Hypertension    • Numbness and tingling     RIGHT FOOT   • PONV (postoperative nausea and vomiting)    • Right leg pain    • Type 2 diabetes mellitus without complication, without long-term current use of insulin (CMS/HCC) 10/8/2019        Past Surgical History:   Procedure Laterality Date   • CERVICAL DISCECTOMY LAMINECTOMY DECOMPRESSION POSTERIOR FUSION WITH INSTRUMENTATION     • COLONOSCOPY N/A 2018    Procedure: COLONOSCOPY to TI and cecum with polypectomy;  Surgeon: Anil Garces MD;  Location: [facility] ENDOSCOPY;  Service:    • JOINT REPLACEMENT      LEFT HIP   • KNEE ARTHROSCOPY Left    • LUMBAR DISCECTOMY FUSION INSTRUMENTATION N/A 2020    Procedure: Lumbar 4 5 laminectomy with a posterior lateral fusion and  instrumentation and interbody fusion;  Surgeon: Jeffrey Garcia MD;  Location: [facility] MAIN OR;  Service: Neurosurgery;  Laterality: N/A;   • TOTAL HIP ARTHROPLASTY Right 6/3/2021    Procedure: TOTAL HIP ARTHROPLASTY POSTERIOR;  Surgeon: Shlomo Campos MD;  Location: [facility] MAIN OR;  Service: Orthopedics;  Laterality: Right;   • VASECTOMY                         PT Assessment/Plan     Row Name 07/14/21 0800          PT Assessment    Assessment Comments  Pt reports some soreness after last visit but not pain.  He has habitual forward flexed R hip posture, worked on increaseing awareness and strategies to gently stretch and form new habits. Pt is doing well with lateral stepping and may be ready for light resistance.    -JA        PT Plan    PT Plan Comments  assess response to new stretchh for hip flexor and manual quad stretch; cont with ther ex and mobility training, review hip flexor stretch  -JA       User Key  (r) = Recorded By, (t) = Taken By, (c) = Cosigned By    Initials Name Provider Type    Darleen Dockery, PT Physical Therapist            OP Exercises     Row Name 07/14/21 0800             Subjective Comments    Subjective Comments  Not having pain but my leg gets tired after being on it a while.  -MANDI         Subjective Pain    Able to rate subjective pain?  yes  -MANDI      Pre-Treatment Pain Level  0  -JA         Total Minutes    11051 - PT Therapeutic Exercise Minutes  30  -JA      97063 - PT Manual Therapy Minutes  10  -JA         Exercise 6    Exercise Name 6  HR  -JA      Cueing 6  Verbal;Demo  -JA      Reps 6  15  -JA         Exercise 7    Exercise Name 7  mini squats  -JA      Cueing 7  Verbal;Demo  -JA      Reps 7  15  -JA         Exercise 8    Exercise Name 8  standing HS curl, B  -JA      Cueing 8  Verbal;Demo  -JA      Reps 8  15  -JA      Additional Comments  2#, 1 hand  -JA         Exercise 9    Exercise Name 9  Nustep  -JA      Cueing 9  Verbal;Demo  -JA      Time 9  5 min  -JA       Additional Comments  L5, BUE/BLE  -JA         Exercise 10    Exercise Name 10  forward/retro gait  -JA      Cueing 10  Verbal;Demo  -JA      Reps 10  6 laps  -JA      Time 10  3 laps with exaggerated stride F/B  -JA         Exercise 11    Exercise Name 11  lateral stepping  -JA      Cueing 11  Verbal;Demo  -JA      Reps 11  3 laps  -JA      Additional Comments  smaller steps toes forward; 2 hands, cues to avoid lateral sway,   -JA         Exercise 12    Exercise Name 12  LAQ  -JA      Cueing 12  Verbal;Demo  -JA      Time 12  15, B  -JA      Additional Comments  3 second down phase  -JA         Exercise 13    Exercise Name 13  added monster walk  -JA      Cueing 13  Verbal;Demo  -JA      Reps 13  3 laps  -JA      Additional Comments  no resistance yet, pt noted challenge  -JA         Exercise 14    Exercise Name 14  added R leg step back and R arm flex  -JA      Cueing 14  Verbal;Demo  -JA      Reps 14  10 (repeated on L)  -JA         Exercise 15    Exercise Name 15  added supine hip flexor stretch on R, L knee bent with PPT  -JA      Reps 15  3  -JA      Time 15  20sec  -JA        User Key  (r) = Recorded By, (t) = Taken By, (c) = Cosigned By    Initials Name Provider Type    Darleen Dockery, PT Physical Therapist                      Manual Rx (last 36 hours)      Manual Treatments     Row Name 07/14/21 0800             Total Minutes    83802 - PT Manual Therapy Minutes  10  -JA         Manual Rx 1    Manual Rx 1 Location  STM R posterior hip girdle tissue  -JA      Manual Rx 1 Type  pt L SL withpillow between knees  -         Manual Rx 2    Manual Rx 2 Location  added gentle SL hip flexor stretch 3 x 20sec  -JA        User Key  (r) = Recorded By, (t) = Taken By, (c) = Cosigned By    Initials Name Provider Type    Darleen Dockery, PT Physical Therapist              Therapy Education  Education Details: Discussed forward flexed posture due to favoring R hip for so long due to pain, added gentle hip  flexor stretching-guerrero picture of HL with R leg straight, push into PPT to stretch R hip flexor  Given: HEP, Symptoms/condition management, Pain management, Posture/body mechanics, Mobility training  Program: Reinforced, Progressed  How Provided: Verbal, Demonstration, Written  Provided to: Patient, Caregiver  Level of Understanding: Verbalized, Demonstrated              Time Calculation:   Start Time: 0800  Stop Time: 0845  Time Calculation (min): 45 min  Timed Charges  41026 - PT Therapeutic Exercise Minutes: 30  14400 - PT Manual Therapy Minutes: 10  Total Minutes  Timed Charges Total Minutes: 40   Total Minutes: 40  Therapy Charges for Today     Code Description Service Date Service Provider Modifiers Qty    [id number]  PT THER PROC EA 15 MIN 7/14/2021 Darleen Zambrano, PT GP 2    [id number]  PT MANUAL THERAPY EA 15 MIN 7/14/2021 Darleen Zambrano, PT GP 1                    Darleen Zambrano, PT  7/14/2021

## 2021-07-16 ENCOUNTER — HOSPITAL ENCOUNTER (OUTPATIENT)
Dept: PHYSICAL THERAPY | Facility: HOSPITAL | Age: 71
Setting detail: THERAPIES SERIES
Discharge: HOME | End: 2021-07-16

## 2021-07-16 DIAGNOSIS — Z47.1 AFTERCARE FOLLOWING RIGHT HIP JOINT REPLACEMENT SURGERY: Primary | ICD-10-CM

## 2021-07-16 DIAGNOSIS — Z98.1 S/P LUMBAR FUSION: ICD-10-CM

## 2021-07-16 DIAGNOSIS — Z96.641 AFTERCARE FOLLOWING RIGHT HIP JOINT REPLACEMENT SURGERY: Primary | ICD-10-CM

## 2021-07-16 DIAGNOSIS — Z74.09 IMPAIRED MOBILITY: ICD-10-CM

## 2021-07-16 DIAGNOSIS — M25.551 RIGHT HIP PAIN: ICD-10-CM

## 2021-07-16 PROCEDURE — 97110 THERAPEUTIC EXERCISES: CPT | Performed by: PHYSICAL THERAPIST

## 2021-07-16 PROCEDURE — 97140 MANUAL THERAPY 1/> REGIONS: CPT | Performed by: PHYSICAL THERAPIST

## 2021-07-16 NOTE — THERAPY TREATMENT NOTE
Outpatient Physical Therapy Ortho Treatment Note  [facility]     Patient Name: Taz Dickey  : 1950  MRN: [id number]  Today's Date: 2021      Visit Date: 2021    Visit Dx:    ICD-10-CM ICD-9-CM   1. Aftercare following right hip joint replacement surgery  Z47.1 V54.81    Z96.641 V43.64   2. Impaired mobility  Z74.09 799.89   3. Right hip pain  M25.551 719.45   4. S/P lumbar fusion  Z98.1 V45.4       Patient Active Problem List   Diagnosis   • Spondylolisthesis of lumbar region   • Lumbar radiculopathy   • Benign essential hypertension   • Erectile dysfunction of nonorganic origin   • Hypercholesterolemia   • Osteoarthritis   • Tinnitus of both ears   • Encounter for screening colonoscopy   • Medicare annual wellness visit, subsequent   • Nocturia   • Type 2 diabetes mellitus without complication, without long-term current use of insulin (CMS/[facility])   • Healthcare maintenance   • Acute pain of right knee   • Primary osteoarthritis of right hip   • DJD (degenerative joint disease)        Past Medical History:   Diagnosis Date   • Arthritis    • Colon polyps    • Depression    • DVT (deep venous thrombosis) (CMS/[facility])     IN LEFT LEG   • Full dentures    • Hip pain     RIGHT   • Hyperlipidemia    • Hypertension    • Numbness and tingling     RIGHT FOOT   • PONV (postoperative nausea and vomiting)    • Right leg pain    • Type 2 diabetes mellitus without complication, without long-term current use of insulin (CMS/HCC) 10/8/2019        Past Surgical History:   Procedure Laterality Date   • CERVICAL DISCECTOMY LAMINECTOMY DECOMPRESSION POSTERIOR FUSION WITH INSTRUMENTATION     • COLONOSCOPY N/A 2018    Procedure: COLONOSCOPY to TI and cecum with polypectomy;  Surgeon: Anil Garces MD;  Location: [facility] ENDOSCOPY;  Service:    • JOINT REPLACEMENT      LEFT HIP   • KNEE ARTHROSCOPY Left    • LUMBAR DISCECTOMY FUSION INSTRUMENTATION N/A 2020    Procedure: Lumbar 4 5 laminectomy with a  posterior lateral fusion and instrumentation and interbody fusion;  Surgeon: Jeffrey Garcia MD;  Location: [facility] OR;  Service: Neurosurgery;  Laterality: N/A;   • TOTAL HIP ARTHROPLASTY Right 6/3/2021    Procedure: TOTAL HIP ARTHROPLASTY POSTERIOR;  Surgeon: Shlomo Campos MD;  Location: [facility] OR;  Service: Orthopedics;  Laterality: Right;   • VASECTOMY                         PT Assessment/Plan     Row Name 07/16/21 0845          PT Assessment    Assessment Comments  Mr. Dickey is  6 weeks s/p R posterior approach NATHALY (6/3/2021).  He reports soreness in his R posterior hip girdle tissue, so we held on STM today. We continued to progress hip girdle strengthening, adding resistance to lateral stepping and monster walk (included retro monster walk as well).  We worked on narrowing his step width today which improved his gait pattern. Initiated balance work today as well. Mr. Dickey continues to be a good candidate for skilled physical therapy. He has met 2 STG's. His progress will be affected by recent L spine surgery (11/2020).  -GJ        PT Plan    PT Plan Comments  continue to work on hip girdle strengthenig, consider 4 inch step up, gait normalization, balance ? tandem gait/stance  -       User Key  (r) = Recorded By, (t) = Taken By, (c) = Cosigned By    Initials Name Provider Type     Nj Rodriguez, PT Physical Therapist            OP Exercises     Row Name 07/16/21 0746 07/16/21 0700          Subjective Comments    Subjective Comments  --  I feel better every day really  -        Total Minutes    49124 - PT Therapeutic Exercise Minutes  37  -GJ  --     67320 - PT Manual Therapy Minutes  8  -GJ  --        Exercise 6    Exercise Name 6  --  HR  -GJ     Cueing 6  --  Verbal;Demo  -GJ     Reps 6  --  20  -GJ        Exercise 7    Exercise Name 7  --  mini squats  -GJ     Cueing 7  --  Verbal;Demo  -GJ     Sets 7  --  2  -GJ     Reps 7  --  10  -GJ        Exercise 8    Exercise Name 8  --   standing HS curl, B  -GJ     Cueing 8  --  Verbal;Demo  -GJ     Sets 8  --  2  -GJ     Reps 8  --  10  -GJ     Additional Comments  --  2#, 1 hand, B  -GJ        Exercise 9    Exercise Name 9  --  Nustep  -GJ     Cueing 9  --  Verbal;Demo  -GJ     Time 9  --  5 min  -GJ     Additional Comments  --  L5, BUE/BLE  -GJ        Exercise 10    Exercise Name 10  --  forward/retro gait  -GJ     Cueing 10  --  Verbal;Demo  -GJ     Reps 10  --  6 laps  -GJ     Additional Comments  --  cues to narrow RHETT with forward gait  -GJ        Exercise 11    Exercise Name 11  --  lateral stepping  -GJ     Cueing 11  --  Verbal;Demo  -GJ     Reps 11  --  3 laps  -GJ     Additional Comments  --  YTB, cues to avoid lateral sway  -GJ        Exercise 12    Exercise Name 12  --  LAQ  -GJ     Cueing 12  --  Verbal;Demo  -GJ     Time 12  --  15, B  -GJ     Additional Comments  --  3#, 3 seconds up/3 seconds down  -GJ        Exercise 13    Exercise Name 13  --  monster walk,, F/B  -GJ     Cueing 13  --  Verbal;Demo  -GJ     Reps 13  --  3 laps  -GJ     Additional Comments  --  YTB, 1 hand support  -GJ        Exercise 14    Exercise Name 14  --   R leg step back and R arm flex  -GJ     Cueing 14  --  Verbal;Demo  -GJ     Reps 14  --  10 (repeated on L)  -GJ     Time 14  --  5 s  -GJ        Exercise 16    Exercise Name 16  --  SLS finger tips to 1 hand for support  -GJ     Cueing 16  --  Verbal;Demo  -GJ     Reps 16  --  5  -GJ     Time 16  --  5 s  -GJ       User Key  (r) = Recorded By, (t) = Taken By, (c) = Cosigned By    Initials Name Provider Type    GJ Nj Rodriguez, PT Physical Therapist                      Manual Rx (last 36 hours)      Manual Treatments     Row Name 07/16/21 0746 07/16/21 0700          Total Minutes    30053 - PT Manual Therapy Minutes  8  -GJ  --        Manual Rx 2    Manual Rx 2 Location  --  gentle R hip flexor stretch, pt in L SL, pillow between knees  -GJ       User Key  (r) = Recorded By, (t) = Taken By, (c) =  Cosigned By    Initials Name Provider Type    Nj Dominguez, PT Physical Therapist          PT OP Goals     Row Name 07/16/21 0700          PT Short Term Goals    STG Date to Achieve  07/30/21  -GJ     STG 1  pt. to be I with initial HEP to facilitate self management of their condition  -GJ     STG 1 Progress  Met  -GJ     STG 2  pt. to be educated in/verbalize understanding of the importance of posture/ergonomics in association with their condition to facilitate self management of their condition  -GJ     STG 2 Progress  Met  -GJ     STG 3  pt to demosntrate R SLS >/= 10 seconds to facilitate greater ease to ambulate without AD  -GJ     STG 3 Progress  Ongoing  -GJ     STG 4  pt to demonstrate R hip flexion PROM >/= 90 degrees to facilitate improved flexibility to allow for ease of donning/doffing shoes  -GJ     STG 4 Progress  Ongoing  -GJ        Long Term Goals    LTG Date to Achieve  09/30/21  -GJ     LTG 1  pt. to be I with advanced HEP to facilitate self management of their condition  -GJ     LTG 1 Progress  Ongoing  -GJ     LTG 2  pt. to report an LEFS >/= 60 to demonstrate decreased level of perceived disability  -GJ     LTG 2 Progress  Ongoing  -GJ     LTG 3  pt. to ambulate without AD with near normal heel to toe gait pattern to facilitate ease/safety of community mobility  -GJ     LTG 3 Progress  Ongoing  -GJ     LTG 4  pt. to ascend/descends stairs with reciprocal pattern (</= 1 rails) to facilitate ease/safety of household/community mobility  -GJ     LTG 4 Progress  Ongoing  -GJ     LTG 5  pt to demosntrate R SLS >/= 30 seconds to facilitate greater ease to ambulate without AD  -GJ     LTG 5 Progress  Ongoing  -GJ     LTG 6  pt to demonstrate R hip abd/ext MMT >/= 4+/5 to facilitate ease/safety with community mobility  -GJ     LTG 6 Progress  Ongoing  -GJ       User Key  (r) = Recorded By, (t) = Taken By, (c) = Cosigned By    Initials Name Provider Type    Nj Dominguez, PT Physical Therapist           Therapy Education  Education Details: discussed being more aware of his step width, and trying to narrow  Given: HEP, Symptoms/condition management, Pain management, Posture/body mechanics, Mobility training, Fall prevention and home safety  Program: Reinforced, Progressed  How Provided: Verbal, Demonstration  Provided to: Patient  Level of Understanding: Teach back education performed, Verbalized, Demonstrated              Time Calculation:   Start Time: 0745  Stop Time: 0833  Time Calculation (min): 48 min  Timed Charges  56610 - PT Therapeutic Exercise Minutes: 37  83286 - PT Manual Therapy Minutes: 8  Total Minutes  Timed Charges Total Minutes: 45   Total Minutes: 45  Therapy Charges for Today     Code Description Service Date Service Provider Modifiers Qty    03398864160 HC PT THER PROC EA 15 MIN 7/16/2021 Nj Rodriguez, PT GP 2    58112815238 HC PT MANUAL THERAPY EA 15 MIN 7/16/2021 Nj Rodriguez, PT GP 1                    Nj Rodriguez, PT  7/16/2021

## 2021-07-19 ENCOUNTER — HOSPITAL ENCOUNTER (OUTPATIENT)
Dept: PHYSICAL THERAPY | Facility: HOSPITAL | Age: 71
Setting detail: THERAPIES SERIES
Discharge: HOME | End: 2021-07-19

## 2021-07-19 DIAGNOSIS — Z96.641 AFTERCARE FOLLOWING RIGHT HIP JOINT REPLACEMENT SURGERY: Primary | ICD-10-CM

## 2021-07-19 DIAGNOSIS — Z47.1 AFTERCARE FOLLOWING RIGHT HIP JOINT REPLACEMENT SURGERY: Primary | ICD-10-CM

## 2021-07-19 DIAGNOSIS — M25.551 RIGHT HIP PAIN: ICD-10-CM

## 2021-07-19 DIAGNOSIS — Z74.09 IMPAIRED MOBILITY: ICD-10-CM

## 2021-07-19 PROCEDURE — 97110 THERAPEUTIC EXERCISES: CPT

## 2021-07-19 NOTE — THERAPY TREATMENT NOTE
Outpatient Physical Therapy Ortho Treatment Note  [facility]     Patient Name: Taz Dickey  : 1950  MRN: [id number]  Today's Date: 2021      Visit Date: 2021    Visit Dx:    ICD-10-CM ICD-9-CM   1. Aftercare following right hip joint replacement surgery  Z47.1 V54.81    Z96.641 V43.64   2. Impaired mobility  Z74.09 799.89   3. Right hip pain  M25.551 719.45       Patient Active Problem List   Diagnosis   • Spondylolisthesis of lumbar region   • Lumbar radiculopathy   • Benign essential hypertension   • Erectile dysfunction of nonorganic origin   • Hypercholesterolemia   • Osteoarthritis   • Tinnitus of both ears   • Encounter for screening colonoscopy   • Medicare annual wellness visit, subsequent   • Nocturia   • Type 2 diabetes mellitus without complication, without long-term current use of insulin (CMS/[facility])   • Healthcare maintenance   • Acute pain of right knee   • Primary osteoarthritis of right hip   • DJD (degenerative joint disease)        Past Medical History:   Diagnosis Date   • Arthritis    • Colon polyps    • Depression    • DVT (deep venous thrombosis) (CMS/[facility])     IN LEFT LEG   • Full dentures    • Hip pain     RIGHT   • Hyperlipidemia    • Hypertension    • Numbness and tingling     RIGHT FOOT   • PONV (postoperative nausea and vomiting)    • Right leg pain    • Type 2 diabetes mellitus without complication, without long-term current use of insulin (CMS/HCC) 10/8/2019        Past Surgical History:   Procedure Laterality Date   • CERVICAL DISCECTOMY LAMINECTOMY DECOMPRESSION POSTERIOR FUSION WITH INSTRUMENTATION     • COLONOSCOPY N/A 2018    Procedure: COLONOSCOPY to TI and cecum with polypectomy;  Surgeon: Anil Garces MD;  Location: [facility] ENDOSCOPY;  Service:    • JOINT REPLACEMENT      LEFT HIP   • KNEE ARTHROSCOPY Left    • LUMBAR DISCECTOMY FUSION INSTRUMENTATION N/A 2020    Procedure: Lumbar 4 5 laminectomy with a posterior lateral fusion and  "instrumentation and interbody fusion;  Surgeon: Jeffrey Garcia MD;  Location: [facility] MAIN OR;  Service: Neurosurgery;  Laterality: N/A;   • TOTAL HIP ARTHROPLASTY Right 6/3/2021    Procedure: TOTAL HIP ARTHROPLASTY POSTERIOR;  Surgeon: Shlomo Campos MD;  Location: [facility] MAIN OR;  Service: Orthopedics;  Laterality: Right;   • VASECTOMY                         PT Assessment/Plan     Row Name 07/19/21 0900          PT Assessment    Assessment Comments  Mr. Dickey continues to make gradual progress towards post op goals. Continues to demo functional weakness with R LE and decreased time in R single limb stance. Progressed hip girdle strengthening today with addition of standing hip abduction and small step up. Pt cont to ambulate with SPC. Remains good candidate for skilled PT.  -CA        PT Plan    PT Plan Comments  Cont small step, add lateral step up; add becky becky  -CA       User Key  (r) = Recorded By, (t) = Taken By, (c) = Cosigned By    Initials Name Provider Type    Jayna Callahan, PT Physical Therapist            OP Exercises     Row Name 07/19/21 0800             Subjective Comments    Subjective Comments  Still getting better every day  -CA         Subjective Pain    Able to rate subjective pain?  yes  -CA      Pre-Treatment Pain Level  0  -CA         Total Minutes    04846 - PT Therapeutic Exercise Minutes  38  -CA         Exercise 1    Exercise Name 1  R fwd step up  -CA      Cueing 1  Verbal  -CA      Sets 1  1  -CA      Reps 1  10  -CA      Time 1  4\" step  -CA      Additional Comments  L UE support; cues to avoid vaulting off L LE. Pt initially using R UEsupport but improved form after switching to L UE support  -CA         Exercise 3    Exercise Name 3  standing B hip abd  -CA      Cueing 3  Verbal  -CA      Reps 3  15 b  -CA      Time 3  YTB at shin  -CA         Exercise 4    Exercise Name 4  B seated HS str  -CA      Cueing 4  Verbal;Demo  -CA      Reps 4  3  -CA      Time 4  20 sec  -CA   "    Additional Comments  cues for slight hip hinge  -CA         Exercise 6    Exercise Name 6  HR  -CA      Cueing 6  Verbal;Demo  -CA      Reps 6  20  -CA         Exercise 7    Exercise Name 7  mini squats  -CA      Cueing 7  Verbal;Demo  -CA      Sets 7  2  -CA      Reps 7  10  -CA         Exercise 8    Exercise Name 8  standing HS curl, B  -CA      Cueing 8  Verbal;Demo  -CA      Sets 8  2 B  -CA      Reps 8  10  -CA      Additional Comments  3#, 1 UE  -CA         Exercise 9    Exercise Name 9  Nustep  -CA      Cueing 9  Verbal;Demo  -CA      Time 9  5 min  -CA      Additional Comments  L5, BUE/BLE  -CA         Exercise 11    Exercise Name 11  lateral stepping  -CA      Cueing 11  Verbal;Demo  -CA      Reps 11  3 laps  -CA      Time 11  YTB  -CA         Exercise 12    Exercise Name 12  LAQ  -CA      Cueing 12  Verbal;Demo  -CA      Time 12  15, B  -CA      Additional Comments  4#  -CA         Exercise 13    Exercise Name 13  monster walk,, F/B  -CA      Cueing 13  Verbal;Demo  -CA      Reps 13  3 laps  -CA      Additional Comments  YTB, 1 hand support  -CA         Exercise 14    Exercise Name 14   R leg step back and R arm flex  -CA      Cueing 14  Verbal;Demo  -CA      Reps 14  10  -CA      Time 14  5 s  -CA         Exercise 16    Exercise Name 16  SLS finger tips to 1 hand for support  -CA      Cueing 16  Verbal;Demo  -CA      Reps 16  5  -CA      Time 16  5 s  -CA        User Key  (r) = Recorded By, (t) = Taken By, (c) = Cosigned By    Initials Name Provider Type    Jayna Callahan, PT Physical Therapist                       PT OP Goals     Row Name 07/19/21 0900          PT Short Term Goals    STG Date to Achieve  07/30/21  -CA     STG 1  pt. to be I with initial HEP to facilitate self management of their condition  -CA     STG 1 Progress  Met  -CA     STG 2  pt. to be educated in/verbalize understanding of the importance of posture/ergonomics in association with their condition to facilitate self  "management of their condition  -CA     STG 2 Progress  Met  -CA     STG 3  pt to demosntrate R SLS >/= 10 seconds to facilitate greater ease to ambulate without AD  -CA     STG 3 Progress  Ongoing  -CA     STG 4  pt to demonstrate R hip flexion PROM >/= 90 degrees to facilitate improved flexibility to allow for ease of donning/doffing shoes  -CA     STG 4 Progress  Ongoing  -CA        Long Term Goals    LTG Date to Achieve  09/30/21  -CA     LTG 1  pt. to be I with advanced HEP to facilitate self management of their condition  -CA     LTG 1 Progress  Ongoing  -CA     LTG 2  pt. to report an LEFS >/= 60 to demonstrate decreased level of perceived disability  -CA     LTG 2 Progress  Ongoing  -CA     LTG 3  pt. to ambulate without AD with near normal heel to toe gait pattern to facilitate ease/safety of community mobility  -CA     LTG 3 Progress  Ongoing  -CA     LTG 3 Progress Comments  amb with SPC  -CA     LTG 4  pt. to ascend/descends stairs with reciprocal pattern (</= 1 rails) to facilitate ease/safety of household/community mobility  -CA     LTG 4 Progress  Ongoing  -CA     LTG 4 Progress Comments  initiated 4\" step up with R LE today  -CA     LTG 5  pt to demosntrate R SLS >/= 30 seconds to facilitate greater ease to ambulate without AD  -CA     LTG 5 Progress  Ongoing  -CA     LTG 6  pt to demonstrate R hip abd/ext MMT >/= 4+/5 to facilitate ease/safety with community mobility  -CA     LTG 6 Progress  Ongoing  -CA     LTG 6 Progress Comments  progressed hip girdle strengthening today  -CA       User Key  (r) = Recorded By, (t) = Taken By, (c) = Cosigned By    Initials Name Provider Type    Jayna Callahan, PT Physical Therapist                         Time Calculation:   Start Time: 0836  Stop Time: 0914  Time Calculation (min): 38 min  Total Timed Code Minutes- PT: 38 minute(s)  Timed Charges  36663 - PT Therapeutic Exercise Minutes: 38  Total Minutes  Timed Charges Total Minutes: 38   Total Minutes: " 38  Therapy Charges for Today     Code Description Service Date Service Provider Modifiers Qty    [id number]  PT THER PROC EA 15 MIN 7/19/2021 Jayna Ibarra, PT GP 3                    Jayna Ibarra, PT  7/19/2021

## 2021-07-21 ENCOUNTER — HOSPITAL ENCOUNTER (OUTPATIENT)
Dept: PHYSICAL THERAPY | Facility: HOSPITAL | Age: 71
Setting detail: THERAPIES SERIES
Discharge: HOME | End: 2021-07-21

## 2021-07-21 DIAGNOSIS — Z96.641 AFTERCARE FOLLOWING RIGHT HIP JOINT REPLACEMENT SURGERY: Primary | ICD-10-CM

## 2021-07-21 DIAGNOSIS — Z47.89 ORTHOPEDIC AFTERCARE: ICD-10-CM

## 2021-07-21 DIAGNOSIS — Z98.1 S/P LUMBAR FUSION: ICD-10-CM

## 2021-07-21 DIAGNOSIS — M25.551 RIGHT HIP PAIN: ICD-10-CM

## 2021-07-21 DIAGNOSIS — M53.86 DECREASED ROM OF LUMBAR SPINE: ICD-10-CM

## 2021-07-21 DIAGNOSIS — Z47.1 AFTERCARE FOLLOWING RIGHT HIP JOINT REPLACEMENT SURGERY: Primary | ICD-10-CM

## 2021-07-21 DIAGNOSIS — Z74.09 IMPAIRED MOBILITY: ICD-10-CM

## 2021-07-21 PROCEDURE — 97110 THERAPEUTIC EXERCISES: CPT | Performed by: PHYSICAL THERAPIST

## 2021-07-21 NOTE — THERAPY TREATMENT NOTE
Outpatient Physical Therapy Ortho Treatment Note  [facility]     Patient Name: Taz Dickey  : 1950  MRN: [id number]  Today's Date: 2021      Visit Date: 2021    Visit Dx:    ICD-10-CM ICD-9-CM   1. Aftercare following right hip joint replacement surgery  Z47.1 V54.81    Z96.641 V43.64   2. Impaired mobility  Z74.09 799.89   3. Right hip pain  M25.551 719.45   4. S/P lumbar fusion  Z98.1 V45.4   5. Orthopedic aftercare  Z47.89 V54.9   6. Decreased ROM of lumbar spine  M53.86 724.9       Patient Active Problem List   Diagnosis   • Spondylolisthesis of lumbar region   • Lumbar radiculopathy   • Benign essential hypertension   • Erectile dysfunction of nonorganic origin   • Hypercholesterolemia   • Osteoarthritis   • Tinnitus of both ears   • Encounter for screening colonoscopy   • Medicare annual wellness visit, subsequent   • Nocturia   • Type 2 diabetes mellitus without complication, without long-term current use of insulin (CMS/[facility])   • Healthcare maintenance   • Acute pain of right knee   • Primary osteoarthritis of right hip   • DJD (degenerative joint disease)        Past Medical History:   Diagnosis Date   • Arthritis    • Colon polyps    • Depression    • DVT (deep venous thrombosis) (CMS/[facility])     IN LEFT LEG   • Full dentures    • Hip pain     RIGHT   • Hyperlipidemia    • Hypertension    • Numbness and tingling     RIGHT FOOT   • PONV (postoperative nausea and vomiting)    • Right leg pain    • Type 2 diabetes mellitus without complication, without long-term current use of insulin (CMS/HCC) 10/8/2019        Past Surgical History:   Procedure Laterality Date   • CERVICAL DISCECTOMY LAMINECTOMY DECOMPRESSION POSTERIOR FUSION WITH INSTRUMENTATION     • COLONOSCOPY N/A 2018    Procedure: COLONOSCOPY to TI and cecum with polypectomy;  Surgeon: Anil Garces MD;  Location: [facility] ENDOSCOPY;  Service:    • JOINT REPLACEMENT      LEFT HIP   • KNEE ARTHROSCOPY Left    • LUMBAR  "DISCECTOMY FUSION INSTRUMENTATION N/A 11/18/2020    Procedure: Lumbar 4 5 laminectomy with a posterior lateral fusion and instrumentation and interbody fusion;  Surgeon: Jeffrey Garcia MD;  Location: [facility];  Service: Neurosurgery;  Laterality: N/A;   • TOTAL HIP ARTHROPLASTY Right 6/3/2021    Procedure: TOTAL HIP ARTHROPLASTY POSTERIOR;  Surgeon: Shlomo Campos MD;  Location: [facility];  Service: Orthopedics;  Laterality: Right;   • VASECTOMY                         PT Assessment/Plan     Row Name 07/21/21 0711          PT Assessment    Assessment Comments  Mr. Dickey is 7 weeks s/p R posterior approach NATHALY (6/3/2021) tomorrow. He also has hx of recent L spine surgery (11/2020).  He is to see MD today. He demosntrates improving gait pattern, however continues to use cane (has used for several years) with mild to moderate limp. His hip strength is improving as expected.  Mr. Dickey continues to be an excellent candidate for skilled physical therapy.  -GJ        PT Plan    PT Plan Comments  continue to work on hip girdle strength, endurance, gait normalization  -       User Key  (r) = Recorded By, (t) = Taken By, (c) = Cosigned By    Initials Name Provider Type     Nj Rodriguez, PT Physical Therapist            OP Exercises     Row Name 07/21/21 0704 07/21/21 0700          Subjective Comments    Subjective Comments  --  I'm tired today, been helping my grandsons work and catch up (lawn care) the past few days.   -GJ        Total Minutes    97090 - PT Therapeutic Exercise Minutes  41  -GJ  --        Exercise 1    Exercise Name 1  --  R fwd step up  -GJ     Cueing 1  --  Verbal  -GJ     Sets 1  --  1  -GJ     Reps 1  --  15  -GJ     Time 1  --  4\" step  -GJ     Additional Comments  --  L UE support; cues to avoid vaulting off L LE. Pt initially using R UEsupport but improved form after switching to L UE support  -        Exercise 3    Exercise Name 3  --  standing B hip abd  -GJ     Cueing 3  " --  Verbal  -GJ     Sets 3  --  2  -GJ     Reps 3  --  10 B  -GJ     Time 3  --  YTB at shin  -GJ     Additional Comments  --  1-2 hands  -GJ        Exercise 7    Exercise Name 7  --  mini squats  -GJ     Cueing 7  --  Verbal;Demo  -GJ     Sets 7  --  2  -GJ     Reps 7  --  10  -GJ        Exercise 8    Exercise Name 8  --  standing HS curl, B  -GJ     Cueing 8  --  Verbal;Demo  -GJ     Sets 8  --  2 B  -GJ     Reps 8  --  10  -GJ     Additional Comments  --  3#, 1 UE  -GJ        Exercise 9    Exercise Name 9  --  Nustep  -GJ     Time 9  --  5 min  -GJ     Additional Comments  --  L5, BUE/BLE  -GJ        Exercise 11    Exercise Name 11  --  lateral stepping  -GJ     Cueing 11  --  Verbal;Demo  -GJ     Reps 11  --  3 laps  -GJ     Time 11  --  YTB  -GJ        Exercise 13    Exercise Name 13  --  monster walk,, F/B  -GJ     Cueing 13  --  Verbal;Demo  -GJ     Reps 13  --  3 laps  -GJ     Additional Comments  --  YTB, 1 hand support  -GJ        Exercise 14    Exercise Name 14  --   R leg step back and R arm flex  -GJ     Cueing 14  --  Verbal;Demo  -GJ     Reps 14  --  10  -GJ     Time 14  --  5 s  -GJ        Exercise 15    Exercise Name 15  --  lateral step up, 4 inch  -GJ     Cueing 15  --  Verbal;Demo  -GJ     Reps 15  --  10  -GJ        Exercise 17    Exercise Name 17  --  becky becky  -GJ     Cueing 17  --  Verbal;Demo  -GJ     Time 17  --  60 s  -GJ       User Key  (r) = Recorded By, (t) = Taken By, (c) = Cosigned By    Initials Name Provider Type    GJ Nj Rodriguez W, PT Physical Therapist                       PT OP Goals     Row Name 07/21/21 0700          PT Short Term Goals    STG Date to Achieve  07/30/21  -GJ     STG 1  pt. to be I with initial HEP to facilitate self management of their condition  -GJ     STG 1 Progress  Met  -GJ     STG 2  pt. to be educated in/verbalize understanding of the importance of posture/ergonomics in association with their condition to facilitate self management of their condition   -GJ     STG 2 Progress  Met  -GJ     STG 3  pt to demosntrate R SLS >/= 10 seconds to facilitate greater ease to ambulate without AD  -GJ     STG 3 Progress  Ongoing  -GJ     STG 4  pt to demonstrate R hip flexion PROM >/= 90 degrees to facilitate improved flexibility to allow for ease of donning/doffing shoes  -GJ     STG 4 Progress  Ongoing  -GJ        Long Term Goals    LTG Date to Achieve  09/30/21  -GJ     LTG 1  pt. to be I with advanced HEP to facilitate self management of their condition  -GJ     LTG 1 Progress  Ongoing  -GJ     LTG 2  pt. to report an LEFS >/= 60 to demonstrate decreased level of perceived disability  -GJ     LTG 2 Progress  Ongoing  -GJ     LTG 3  pt. to ambulate without AD with near normal heel to toe gait pattern to facilitate ease/safety of community mobility  -GJ     LTG 3 Progress  Ongoing  -GJ     LTG 4  pt. to ascend/descends stairs with reciprocal pattern (</= 1 rails) to facilitate ease/safety of household/community mobility  -GJ     LTG 4 Progress  Ongoing  -GJ     LTG 5  pt to demosntrate R SLS >/= 30 seconds to facilitate greater ease to ambulate without AD  -GJ     LTG 5 Progress  Ongoing  -GJ     LTG 6  pt to demonstrate R hip abd/ext MMT >/= 4+/5 to facilitate ease/safety with community mobility  -     LTG 6 Progress  Ongoing  -       User Key  (r) = Recorded By, (t) = Taken By, (c) = Cosigned By    Initials Name Provider Type    Nj Dominguez, PT Physical Therapist          Therapy Education  Education Details: sterngthening still only once every other day  Given: HEP, Symptoms/condition management, Pain management, Posture/body mechanics, Fall prevention and home safety, Edema management  Program: New, Reinforced, Progressed  How Provided: Verbal, Demonstration  Provided to: Patient  Level of Understanding: Teach back education performed, Verbalized, Demonstrated              Time Calculation:   Start Time: 0704  Stop Time: 0745  Time Calculation (min): 41  min  Timed Charges  64937 - PT Therapeutic Exercise Minutes: 41  Total Minutes  Timed Charges Total Minutes: 41   Total Minutes: 41  Therapy Charges for Today     Code Description Service Date Service Provider Modifiers Qty    [id number]  PT THER PROC EA 15 MIN 7/21/2021 Nj Rodriguez, PT GP 3                    Nj Rodriguez, PT  7/21/2021

## 2021-08-03 ENCOUNTER — OFFICE VISIT (OUTPATIENT)
Dept: INTERNAL MEDICINE | Facility: CLINIC | Age: 71
End: 2021-08-03

## 2021-08-03 VITALS
DIASTOLIC BLOOD PRESSURE: 76 MMHG | HEART RATE: 74 BPM | WEIGHT: 215 LBS | HEIGHT: 68 IN | SYSTOLIC BLOOD PRESSURE: 132 MMHG | TEMPERATURE: 98.2 F | OXYGEN SATURATION: 96 % | BODY MASS INDEX: 32.58 KG/M2

## 2021-08-03 DIAGNOSIS — M15.9 PRIMARY OSTEOARTHRITIS INVOLVING MULTIPLE JOINTS: Chronic | ICD-10-CM

## 2021-08-03 DIAGNOSIS — Z00.00 MEDICARE ANNUAL WELLNESS VISIT, SUBSEQUENT: Primary | ICD-10-CM

## 2021-08-03 DIAGNOSIS — M43.16 SPONDYLOLISTHESIS OF LUMBAR REGION: Chronic | ICD-10-CM

## 2021-08-03 DIAGNOSIS — E11.9 TYPE 2 DIABETES MELLITUS WITHOUT COMPLICATION, WITHOUT LONG-TERM CURRENT USE OF INSULIN (HCC): Chronic | ICD-10-CM

## 2021-08-03 DIAGNOSIS — E78.00 HYPERCHOLESTEROLEMIA: Chronic | ICD-10-CM

## 2021-08-03 DIAGNOSIS — I10 BENIGN ESSENTIAL HYPERTENSION: Chronic | ICD-10-CM

## 2021-08-03 DIAGNOSIS — R35.1 NOCTURIA: ICD-10-CM

## 2021-08-03 PROCEDURE — 99214 OFFICE O/P EST MOD 30 MIN: CPT | Performed by: NURSE PRACTITIONER

## 2021-08-03 PROCEDURE — 1159F MED LIST DOCD IN RCRD: CPT | Performed by: NURSE PRACTITIONER

## 2021-08-03 PROCEDURE — 96160 PT-FOCUSED HLTH RISK ASSMT: CPT | Performed by: NURSE PRACTITIONER

## 2021-08-03 PROCEDURE — G0439 PPPS, SUBSEQ VISIT: HCPCS | Performed by: NURSE PRACTITIONER

## 2021-08-03 PROCEDURE — 1170F FXNL STATUS ASSESSED: CPT | Performed by: NURSE PRACTITIONER

## 2021-08-03 RX ORDER — LISINOPRIL AND HYDROCHLOROTHIAZIDE 20; 12.5 MG/1; MG/1
1 TABLET ORAL NIGHTLY
Qty: 90 TABLET | Refills: 3 | Status: SHIPPED | OUTPATIENT
Start: 2021-08-03 | End: 2022-08-15

## 2021-08-03 RX ORDER — ATORVASTATIN CALCIUM 40 MG/1
40 TABLET, FILM COATED ORAL DAILY
Qty: 90 TABLET | Refills: 3 | Status: SHIPPED | OUTPATIENT
Start: 2021-08-03 | End: 2022-08-15

## 2021-08-03 NOTE — PROGRESS NOTES
The ABCs of the Annual Wellness Visit  Subsequent Medicare Wellness Visit    Chief Complaint   Patient presents with   • Medicare Wellness-subsequent   • Hypertension     Follow-up       Subjective   History of Present Illness:  Taz Dickey is a 71 y.o. male who presents for a Subsequent Medicare Wellness Visit and f/u on chronic conditions.     He has osteoarthritis and hx of lumbar spondylosis, s/p surgeries with Dr. Campos (June 2021 right hip replacement) and lumbar surgery with Dr. Garcia (lumbar discectomy, fusion November 2020).  He is now off of diclofenac and gabapentin he reports that his pain has much improved.    He has hypertension treated with lisinopril-HCTZ 20-12.5 mg daily endorsing good compliance with this medication and good overall control of blood pressures.    He has hyperlipidemia and takes atorvastatin 40 mg daily endorsing good compliance with this medication.    He has type 2 diabetes, borderline, last A1c 6.1 on 5/26/2021, well controlled with Metformin 500 mg daily.    Overall reports that he is doing well and denies development of any other new issues today.    HEALTH RISK ASSESSMENT    Recent Hospitalizations:  Recently treated at the following:  [facility]    Current Medical Providers:  Patient Care Team:  Jade King APRN as PCP - General (Nurse Practitioner)  Jeffrey Garcia MD as Surgeon (Neurosurgery)  Wallace Pleitez MD as Consulting Physician (Pain Medicine)    Smoking Status:  Social History     Tobacco Use   Smoking Status Current Some Day Smoker   • Types: Cigars   Smokeless Tobacco Never Used   Tobacco Comment    OCCASIONALLY       Alcohol Consumption:  Social History     Substance and Sexual Activity   Alcohol Use Yes    Comment: ON WEEKENDS       Depression Screen:   PHQ-2/PHQ-9 Depression Screening 8/3/2021   Little interest or pleasure in doing things 0   Feeling down, depressed, or hopeless 0   Trouble falling or staying asleep, or sleeping too  much -   Feeling tired or having little energy -   Poor appetite or overeating -   Feeling bad about yourself - or that you are a failure or have let yourself or your family down -   Trouble concentrating on things, such as reading the newspaper or watching television -   Moving or speaking so slowly that other people could have noticed. Or the opposite - being so fidgety or restless that you have been moving around a lot more than usual -   Thoughts that you would be better off dead, or of hurting yourself in some way -   Total Score 0   If you checked off any problems, how difficult have these problems made it for you to do your work, take care of things at home, or get along with other people? -       Fall Risk Screen:  AKIN Fall Risk Assessment was completed, and patient is at LOW risk for falls.Assessment completed on:8/3/2021    Health Habits and Functional and Cognitive Screening:  Functional & Cognitive Status 8/3/2021   Do you have difficulty preparing food and eating? No   Do you have difficulty bathing yourself, getting dressed or grooming yourself? No   Do you have difficulty using the toilet? No   Do you have difficulty moving around from place to place? Yes   Do you have trouble with steps or getting out of a bed or a chair? No   Current Diet Well Balanced Diet   Dental Exam Up to date   Eye Exam Up to date   Exercise (times per week) 2 times per week   Current Exercises Include Stationary Bicycling/Spin Class   Current Exercise Activities Include -   Do you need help using the phone?  No   Are you deaf or do you have serious difficulty hearing?  No   Do you need help with transportation? No   Do you need help shopping? No   Do you need help preparing meals?  No   Do you need help with housework?  No   Do you need help with laundry? No   Do you need help taking your medications? No   Do you need help managing money? No   Do you ever drive or ride in a car without wearing a seat belt? No   Have you  felt unusual stress, anger or loneliness in the last month? No   Who do you live with? Spouse   If you need help, do you have trouble finding someone available to you? No   Have you been bothered in the last four weeks by sexual problems? No   Do you have difficulty concentrating, remembering or making decisions? No         Does the patient have evidence of cognitive impairment? No    Asprin use counseling:Taking ASA appropriately as indicated    Age-appropriate Screening Schedule:  Refer to the list below for future screening recommendations based on patient's age, sex and/or medical conditions. Orders for these recommended tests are listed in the plan section. The patient has been provided with a written plan.    Health Maintenance   Topic Date Due   • TDAP/TD VACCINES (1 - Tdap) Never done   • DIABETIC EYE EXAM  Never done   • ZOSTER VACCINE (2 of 2) 11/20/2017   • URINE MICROALBUMIN  07/31/2021   • INFLUENZA VACCINE  10/01/2021   • LIPID PANEL  11/20/2021   • HEMOGLOBIN A1C  11/26/2021   • DIABETIC FOOT EXAM  01/25/2022          The following portions of the patient's history were reviewed and updated as appropriate: allergies, current medications, past family history, past medical history, past social history, past surgical history and problem list.    Outpatient Medications Prior to Visit   Medication Sig Dispense Refill   • acetaminophen (TYLENOL) 500 MG tablet Take 500 mg by mouth Every 6 (Six) Hours As Needed for Mild Pain .     • aspirin 81 MG EC tablet Take 81 mg by mouth Daily. INSTRUCTED PT TO FOLLOW MD INSTRUCTIONS REGARDING HOLDING FOR SURGERY     • tadalafil (CIALIS) 5 MG tablet Take 1 tablet by mouth Daily As Needed for Erectile Dysfunction. (Patient taking differently: Take 5 mg by mouth Daily As Needed for Erectile Dysfunction. HOLD PRIOR TO SURGERY) 30 tablet 2   • aspirin  MG tablet Take 1 tablet by mouth 2 (Two) Times a Day With Meals. (Patient taking differently: Take 1 tablet by mouth  Daily.) 60 tablet 0   • atorvastatin (LIPITOR) 40 MG tablet TAKE ONE TABLET BY MOUTH DAILY 90 tablet 1   • docusate sodium 100 MG capsule Take 1 capsule by mouth 2 (Two) Times a Day As Needed for Constipation. 30 capsule 1   • hydrOXYzine (ATARAX) 25 MG tablet Take 1 tablet by mouth Every 8 (Eight) Hours As Needed for Itching. 30 tablet 2   • lisinopril-hydrochlorothiazide (PRINZIDE,ZESTORETIC) 20-12.5 MG per tablet Take 1 tablet by mouth Every Night. 90 tablet 1   • metFORMIN (GLUCOPHAGE) 500 MG tablet TAKE ONE TABLET BY MOUTH DAILY WITH DINNER 90 tablet 3   • oxyCODONE-acetaminophen (PERCOCET) 5-325 MG per tablet Take 1-2 tablets by mouth Every 4 (Four) Hours As Needed (Pain). 60 tablet 0   • gabapentin (NEURONTIN) 300 MG capsule TAKE TWO CAPSULES BY MOUTH EVERY MORNING, TWO CAPSULES EVERY EVENING, AND THREE CAPSULES AT BEDTIME 210 capsule 2     No facility-administered medications prior to visit.       Patient Active Problem List   Diagnosis   • Spondylolisthesis of lumbar region   • Lumbar radiculopathy   • Benign essential hypertension   • Erectile dysfunction of nonorganic origin   • Hypercholesterolemia   • Osteoarthritis   • Tinnitus of both ears   • Encounter for screening colonoscopy   • Medicare annual wellness visit, subsequent   • Nocturia   • Type 2 diabetes mellitus without complication, without long-term current use of insulin (CMS/[facility])   • Healthcare maintenance   • Acute pain of right knee   • Primary osteoarthritis of right hip   • DJD (degenerative joint disease)       Advanced Care Planning:  ACP discussion was held with the patient during this visit. Patient has an advance directive (not in EMR), copy requested.    Review of Systems   Constitutional: Negative for activity change, chills, fatigue, fever and unexpected weight change.   HENT: Negative for congestion, hearing loss, postnasal drip, sinus pressure, sinus pain, sneezing, sore throat and tinnitus.    Eyes: Negative for photophobia,  "pain and visual disturbance.   Respiratory: Negative for cough, chest tightness, shortness of breath and wheezing.    Cardiovascular: Negative for chest pain, palpitations and leg swelling.   Gastrointestinal: Negative for abdominal distention, abdominal pain, constipation, diarrhea, nausea and vomiting.   Endocrine: Negative for polydipsia, polyphagia and polyuria.   Genitourinary: Negative for dysuria, frequency, hematuria and urgency.   Neurological: Negative for dizziness, weakness, numbness and headaches.   All other systems reviewed and are negative.      Compared to one year ago, the patient feels his physical health is better.  Compared to one year ago, the patient feels his mental health is better.    Reviewed chart for potential of high risk medication in the elderly: yes  Reviewed chart for potential of harmful drug interactions in the elderly:yes    Objective         Vitals:    08/03/21 0937   BP: 132/76   BP Location: Left arm   Patient Position: Sitting   Cuff Size: Adult   Pulse: 74   Temp: 98.2 °F (36.8 °C)   TempSrc: Tympanic   SpO2: 96%   Weight: 97.5 kg (215 lb)   Height: 172.7 cm (68\")       Body mass index is 32.69 kg/m².  Discussed the patient's BMI with him. The BMI is above average; no BMI management plan is appropriate..    Physical Exam  Vitals and nursing note reviewed.   Constitutional:       General: He is not in acute distress.     Appearance: Normal appearance. He is well-developed. He is not ill-appearing, toxic-appearing or diaphoretic.   HENT:      Head: Normocephalic and atraumatic.      Right Ear: Tympanic membrane, ear canal and external ear normal.      Left Ear: Tympanic membrane, ear canal and external ear normal.   Eyes:      Pupils: Pupils are equal, round, and reactive to light.   Neck:      Vascular: No carotid bruit.   Cardiovascular:      Rate and Rhythm: Normal rate and regular rhythm.      Pulses: Normal pulses.      Heart sounds: Normal heart sounds.      Comments: No " peripheral edema  Pulmonary:      Effort: Pulmonary effort is normal. No respiratory distress.      Breath sounds: Normal breath sounds. No stridor. No wheezing, rhonchi or rales.   Chest:      Chest wall: No tenderness.   Abdominal:      General: Bowel sounds are normal. There is no distension.      Palpations: Abdomen is soft. There is no mass.      Tenderness: There is no abdominal tenderness. There is no right CVA tenderness, left CVA tenderness, guarding or rebound.      Hernia: No hernia is present.   Musculoskeletal:         General: Normal range of motion.      Cervical back: Normal range of motion and neck supple. No rigidity or tenderness.   Lymphadenopathy:      Cervical: No cervical adenopathy.   Skin:     General: Skin is warm and dry.      Capillary Refill: Capillary refill takes less than 2 seconds.   Neurological:      General: No focal deficit present.      Mental Status: He is alert and oriented to person, place, and time. Mental status is at baseline.   Psychiatric:         Mood and Affect: Mood normal.         Behavior: Behavior normal.         Thought Content: Thought content normal.         Judgment: Judgment normal.         Lab Results   Component Value Date    HGBA1C 6.10 (H) 05/26/2021      Reviewed: Admission (Discharged) with Shlomo Campos MD (06/03/2021)    Current outpatient and discharge medications have been reconciled for the patient.  Reviewed by: JULIA Santiago      Assessment/Plan   Medicare Risks and Personalized Health Plan  CMS Preventative Services Quick Reference  Advance Directive Discussion  Cardiovascular risk  Chronic Pain   Dementia/Memory   Depression/Dysphoria  Diabetic Lab Screening   Hearing Problem  Inactivity/Sedentary  Obesity/Overweight   Osteoporosis Risk  Prostate Cancer Screening     The above risks/problems have been discussed with the patient.  Pertinent information has been shared with the patient in the After Visit Summary.  Follow up plans and  orders are seen below in the Assessment/Plan Section.    Diagnoses and all orders for this visit:    1. Medicare annual wellness visit, subsequent (Primary)    2. Type 2 diabetes mellitus without complication, without long-term current use of insulin (CMS/[facility])  Assessment & Plan:  Continue metformin, stable. A1C 6.1 5/26/21, recheck at next apt. Micro albumin today.     Orders:  -     metFORMIN (GLUCOPHAGE) 500 MG tablet; Take 1 tablet by mouth Daily With Dinner.  Dispense: 90 tablet; Refill: 3  -     MicroAlbumin, Urine, Random - Urine, Clean Catch    3. Hypercholesterolemia  Assessment & Plan:  Continue atorvastatin. Lipid panel today and will adjust tx if needed.    Orders:  -     atorvastatin (LIPITOR) 40 MG tablet; Take 1 tablet by mouth Daily.  Dispense: 90 tablet; Refill: 3  -     Lipid panel    4. Benign essential hypertension  Assessment & Plan:  Hypertension is stable, continue lisinopril-HCTZ, routine home monitoring discussed, goal <130/80, DASH diet.    Orders:  -     lisinopril-hydrochlorothiazide (PRINZIDE,ZESTORETIC) 20-12.5 MG per tablet; Take 1 tablet by mouth Every Night.  Dispense: 90 tablet; Refill: 3  -     CBC No Differential  -     Comprehensive metabolic panel  -     TSH Rfx On Abnormal To Free T4    5. Spondylolisthesis of lumbar region  Assessment & Plan:  Much improved, off of gabapentin now. S/P surgery with Dr. Garcia. [place].       6. Primary osteoarthritis involving multiple joints  Assessment & Plan:  Improved, off of diclofenac tabs as of June 2021. S/P right hip replacement 6/3/21 with Dr. Campos, completed PT course.       7. Nocturia  Comments:  Stable, no new s/s. Checking PSA.  Orders:  -     PSA DIAGNOSTIC ONLY    Follow Up:  Return in about 6 months (around 2/3/2022).     We will contact patient with lab results and any further recommendations.  Follow-up as needed and I will see him back in 6 months for recheck of chronic conditions.    An After Visit Summary and PPPS were  given to the patient.

## 2021-08-03 NOTE — ASSESSMENT & PLAN NOTE
Hypertension is stable, continue lisinopril-HCTZ, routine home monitoring discussed, goal <130/80, DASH diet.

## 2021-08-03 NOTE — ASSESSMENT & PLAN NOTE
Improved, off of diclofenac tabs as of June 2021. S/P right hip replacement 6/3/21 with Dr. Campos, completed PT course.

## 2021-08-04 LAB
ALBUMIN SERPL-MCNC: 4.4 G/DL (ref 3.5–5.2)
ALBUMIN/GLOB SERPL: 1.8 G/DL
ALP SERPL-CCNC: 91 U/L (ref 39–117)
ALT SERPL-CCNC: 20 U/L (ref 1–41)
AST SERPL-CCNC: 27 U/L (ref 1–40)
BILIRUB SERPL-MCNC: 0.7 MG/DL (ref 0–1.2)
BUN SERPL-MCNC: 12 MG/DL (ref 8–23)
BUN/CREAT SERPL: 12.5 (ref 7–25)
CALCIUM SERPL-MCNC: 9.8 MG/DL (ref 8.6–10.5)
CHLORIDE SERPL-SCNC: 102 MMOL/L (ref 98–107)
CHOLEST SERPL-MCNC: 138 MG/DL (ref 0–200)
CO2 SERPL-SCNC: 27.5 MMOL/L (ref 22–29)
CREAT SERPL-MCNC: 0.96 MG/DL (ref 0.76–1.27)
ERYTHROCYTE [DISTWIDTH] IN BLOOD BY AUTOMATED COUNT: 14.4 % (ref 12.3–15.4)
GLOBULIN SER CALC-MCNC: 2.5 GM/DL
GLUCOSE SERPL-MCNC: 112 MG/DL (ref 65–99)
HCT VFR BLD AUTO: 35.7 % (ref 37.5–51)
HDLC SERPL-MCNC: 52 MG/DL (ref 40–60)
HGB BLD-MCNC: 11.6 G/DL (ref 13–17.7)
LDLC SERPL CALC-MCNC: 71 MG/DL (ref 0–100)
MCH RBC QN AUTO: 31.3 PG (ref 26.6–33)
MCHC RBC AUTO-ENTMCNC: 32.5 G/DL (ref 31.5–35.7)
MCV RBC AUTO: 96.2 FL (ref 79–97)
MICROALBUMIN UR-MCNC: 7.7 UG/ML
PLATELET # BLD AUTO: 175 10*3/MM3 (ref 140–450)
POTASSIUM SERPL-SCNC: 4.5 MMOL/L (ref 3.5–5.2)
PROT SERPL-MCNC: 6.9 G/DL (ref 6–8.5)
PSA SERPL-MCNC: 0.85 NG/ML (ref 0–4)
RBC # BLD AUTO: 3.71 10*6/MM3 (ref 4.14–5.8)
SODIUM SERPL-SCNC: 143 MMOL/L (ref 136–145)
TRIGL SERPL-MCNC: 76 MG/DL (ref 0–150)
TSH SERPL DL<=0.005 MIU/L-ACNC: 2.6 UIU/ML (ref 0.27–4.2)
VLDLC SERPL CALC-MCNC: 15 MG/DL (ref 5–40)
WBC # BLD AUTO: 5.31 10*3/MM3 (ref 3.4–10.8)

## 2021-08-04 NOTE — PROGRESS NOTES
Good afternoon Mr. Dickey, your labs are back.  Blood count is improving, stable hemoglobin.  Normal platelets and white blood cells.  Metabolic panel looks good including kidney function, liver enzymes and electrolytes.  Cholesterol panel looks stable.  Thyroid numbers are stable.  PSA is normal.  Let me know if you have any questions and we will recheck routinely.  Have a great day,JULIA Santiago

## 2021-11-03 ENCOUNTER — OFFICE VISIT (OUTPATIENT)
Dept: INTERNAL MEDICINE | Facility: CLINIC | Age: 71
End: 2021-11-03

## 2021-11-03 VITALS
WEIGHT: 220 LBS | DIASTOLIC BLOOD PRESSURE: 83 MMHG | OXYGEN SATURATION: 95 % | HEART RATE: 79 BPM | SYSTOLIC BLOOD PRESSURE: 143 MMHG | TEMPERATURE: 98.2 F | RESPIRATION RATE: 18 BRPM | HEIGHT: 68 IN | BODY MASS INDEX: 33.34 KG/M2

## 2021-11-03 DIAGNOSIS — L98.9 SKIN LESION OF RIGHT LEG: Primary | ICD-10-CM

## 2021-11-03 DIAGNOSIS — B35.4 TINEA CORPORIS: ICD-10-CM

## 2021-11-03 PROCEDURE — 99213 OFFICE O/P EST LOW 20 MIN: CPT | Performed by: NURSE PRACTITIONER

## 2021-11-03 RX ORDER — GABAPENTIN 300 MG/1
CAPSULE ORAL
COMMUNITY
Start: 2021-09-07 | End: 2022-01-13

## 2021-11-03 RX ORDER — KETOCONAZOLE 20 MG/G
1 CREAM TOPICAL DAILY
Qty: 30 G | Refills: 0 | Status: SHIPPED | OUTPATIENT
Start: 2021-11-03 | End: 2022-10-04

## 2021-11-03 NOTE — PROGRESS NOTES
"Chief Complaint  Leg Pain (Pt presents here today with R leg pain and soreness. x 1 week)    Subjective          Taz Dickey presents to [facility]  Patient presents for evaluation of a \"sore\" on his right leg.  This is a 71-year-old male.  He noticed it about 1 week ago.  States it is mildly tender to the touch, it has improved slightly over the past few days.  He has not seen any drainage from the area.  He has tried nothing topically over-the-counter for this.  Denies development of any other new issues today.      Objective   Vital Signs:   /83 (BP Location: Left arm, Patient Position: Sitting, Cuff Size: Large Adult)   Pulse 79   Temp 98.2 °F (36.8 °C)   Resp 18   Ht 172.7 cm (68\")   Wt 99.8 kg (220 lb)   SpO2 95%   BMI 33.45 kg/m²     Physical Exam  Vitals and nursing note reviewed.   Constitutional:       General: He is not in acute distress.     Appearance: Normal appearance. He is well-developed. He is not ill-appearing, toxic-appearing or diaphoretic.   HENT:      Head: Normocephalic and atraumatic.      Right Ear: External ear normal.      Left Ear: External ear normal.   Eyes:      Pupils: Pupils are equal, round, and reactive to light.   Cardiovascular:      Rate and Rhythm: Normal rate and regular rhythm.      Pulses: Normal pulses.      Heart sounds: Normal heart sounds.   Pulmonary:      Effort: Pulmonary effort is normal.      Breath sounds: Normal breath sounds.   Abdominal:      General: Bowel sounds are normal.      Palpations: Abdomen is soft.   Musculoskeletal:         General: Normal range of motion.      Cervical back: Normal range of motion and neck supple.   Skin:     General: Skin is warm and dry.      Capillary Refill: Capillary refill takes less than 2 seconds.      Findings: Lesion (  Dime sized circular lesion to right lateral lower leg.  Mild surrounding erythema.) present.   Neurological:      General: No focal deficit present.      " Mental Status: He is alert and oriented to person, place, and time. Mental status is at baseline.   Psychiatric:         Mood and Affect: Mood normal.         Behavior: Behavior normal.         Thought Content: Thought content normal.         Judgment: Judgment normal.        Result Review :   The following data was reviewed by: JULIA Santiago on 11/03/2021:  Common labs    Common Labsle 6/3/21 6/4/21 6/4/21 8/3/21 8/3/21 8/3/21 8/3/21 8/3/21     0610 0610 1020 1020 1020 1020 1020   Glucose   154 (A)  112 (A)      BUN   24 (A)  12      Creatinine   1.02  0.96      eGFR Non  Am   72  77      eGFR African Am     94      Sodium   134 (A)  143      Potassium 4.6  4.6  4.5      Chloride   101  102      Calcium   8.4 (A)  9.8      Total Protein     6.9      Albumin     4.40      Total Bilirubin     0.7      Alkaline Phosphatase     91      AST (SGOT)     27      ALT (SGPT)     20      WBC  10.57  5.31       Hemoglobin  10.4 (A)  11.6 (A)       Hematocrit  31.7 (A)  35.7 (A)       Platelets  154  175       Total Cholesterol      138     Triglycerides      76     HDL Cholesterol      52     LDL Cholesterol       71     Microalbumin, Urine       7.7    PSA        0.852   (A) Abnormal value       Comments are available for some flowsheets but are not being displayed.           Current outpatient and discharge medications have been reconciled for the patient.  Reviewed by: JULIA Santiago           Assessment and Plan    Diagnoses and all orders for this visit:    1. Skin lesion of right leg (Primary)  -     Discontinue: mupirocin (BACTROBAN) 2 % ointment; Apply 1 application topically to the appropriate area as directed 3 (Three) Times a Day.  Dispense: 15 g; Refill: 0    2. Tinea corporis  -     ketoconazole (NIZORAL) 2 % cream; Apply 1 application topically to the appropriate area as directed Daily.  Dispense: 30 g; Refill: 0    Treating for potential fungal infection.  Ketoconazole sent to the pharmacy.   Apply 1-2 times daily for 1 to 2 weeks, call back if unimproved.    Follow-up as needed if symptoms persist or worsen and at next scheduled office visit.    Follow Up   Return if symptoms worsen or fail to improve, for Next scheduled follow up.  Patient was given instructions and counseling regarding his condition or for health maintenance advice. Please see specific information pulled into the AVS if appropriate.       Answers for HPI/ROS submitted by the patient on 10/31/2021  What is the primary reason for your visit?: Other  Please describe your symptoms.: sore on right leg and  its a little painful  Have you had these symptoms before?: No  How long have you been having these symptoms?: 1-4 days  Please list any medications you are currently taking for this condition.: n/a  Please describe any probable cause for these symptoms. : sore

## 2021-12-03 ENCOUNTER — DOCUMENTATION (OUTPATIENT)
Dept: PHYSICAL THERAPY | Facility: HOSPITAL | Age: 71
End: 2021-12-03

## 2021-12-03 DIAGNOSIS — Z74.09 IMPAIRED MOBILITY: ICD-10-CM

## 2021-12-03 DIAGNOSIS — M25.551 RIGHT HIP PAIN: ICD-10-CM

## 2021-12-03 DIAGNOSIS — Z47.1 AFTERCARE FOLLOWING RIGHT HIP JOINT REPLACEMENT SURGERY: Primary | ICD-10-CM

## 2021-12-03 DIAGNOSIS — Z96.641 AFTERCARE FOLLOWING RIGHT HIP JOINT REPLACEMENT SURGERY: Primary | ICD-10-CM

## 2021-12-03 NOTE — THERAPY DISCHARGE NOTE
Outpatient Physical Therapy Discharge Summary         Patient Name: Taz Dickey  : 1950  MRN: [id number]    Today's Date: 12/3/2021    Visit Dx:    ICD-10-CM ICD-9-CM   1. Aftercare following right hip joint replacement surgery  Z47.1 V54.81    Z96.641 V43.64   2. Impaired mobility  Z74.09 799.89   3. Right hip pain  M25.551 719.45        PT OP Goals     Row Name 21 0800          PT Short Term Goals    STG Date to Achieve 21  -GJ     STG 1 pt. to be I with initial HEP to facilitate self management of their condition  -GJ     STG 1 Progress Met  -GJ     STG 2 pt. to be educated in/verbalize understanding of the importance of posture/ergonomics in association with their condition to facilitate self management of their condition  -GJ     STG 2 Progress Met  -GJ     STG 3 pt to demosntrate R SLS >/= 10 seconds to facilitate greater ease to ambulate without AD  -GJ     STG 3 Progress Not Met  -GJ     STG 4 pt to demonstrate R hip flexion PROM >/= 90 degrees to facilitate improved flexibility to allow for ease of donning/doffing shoes  -GJ     STG 4 Progress Not Met  -GJ            Long Term Goals    LTG Date to Achieve 21  -GJ     LTG 1 pt. to be I with advanced HEP to facilitate self management of their condition  -GJ     LTG 1 Progress Partially Met  -GJ     LTG 2 pt. to report an LEFS >/= 60 to demonstrate decreased level of perceived disability  -GJ     LTG 2 Progress Not Met  -GJ     LTG 3 pt. to ambulate without AD with near normal heel to toe gait pattern to facilitate ease/safety of community mobility  -GJ     LTG 3 Progress Not Met  -GJ     LTG 4 pt. to ascend/descends stairs with reciprocal pattern (</= 1 rails) to facilitate ease/safety of household/community mobility  -GJ     LTG 4 Progress Not Met  -GJ     LTG 5 pt to demosntrate R SLS >/= 30 seconds to facilitate greater ease to ambulate without AD  -GJ     LTG 5 Progress Not Met  -GJ     LTG 6 pt to demonstrate R hip  abd/ext MMT >/= 4+/5 to facilitate ease/safety with community mobility  -SYDNI     LTG 6 Progress Not Met  -GJ           User Key  (r) = Recorded By, (t) = Taken By, (c) = Cosigned By    Initials Name Provider Type    Nj Dominguez, PT Physical Therapist                OP PT Discharge Summary  Date of Discharge: 12/03/21  Reason for Discharge: other (comment) (did not return)  Outcomes Achieved: Patient able to partially acheive established goals  Discharge Destination: Unknown  Discharge Instructions/Additional Comments: Mr. Dickey attended 6 sessions of physical therapy from 6/30/2021-7/21/2021 following his R NATHALY.He did not return following his 7/21/2021 session. At the time of his last session, he demosntrated improving hip strength, continued to ambulate wth cane. Mr. Dickey is discharged from outpatient physical therapy secondary to not returning to the clinic.      Time Calculation:                    Nj Rodriguez, PT  12/3/2021

## 2022-01-12 DIAGNOSIS — M43.16 SPONDYLOLISTHESIS OF LUMBAR REGION: Primary | ICD-10-CM

## 2022-01-13 RX ORDER — GABAPENTIN 300 MG/1
300 CAPSULE ORAL 3 TIMES DAILY
Qty: 90 CAPSULE | Refills: 2 | Status: SHIPPED | OUTPATIENT
Start: 2022-01-13 | End: 2022-06-20

## 2022-01-13 NOTE — TELEPHONE ENCOUNTER
LVM for Pt to return call to clinic to verify if the Pt is still currently taking the RX and where was the RX previously refilled?

## 2022-01-13 NOTE — TELEPHONE ENCOUNTER
Patient returned call in regards.   He started taking left over medication from when [facility] last filled. Due to back pain that sometimes runs to his legs.  He is requesting to start taking medication again.    Patient has maybe 3/4 days left.

## 2022-02-23 ENCOUNTER — OFFICE VISIT (OUTPATIENT)
Dept: INTERNAL MEDICINE | Facility: CLINIC | Age: 72
End: 2022-02-23

## 2022-02-23 VITALS
DIASTOLIC BLOOD PRESSURE: 82 MMHG | OXYGEN SATURATION: 98 % | RESPIRATION RATE: 18 BRPM | WEIGHT: 230 LBS | SYSTOLIC BLOOD PRESSURE: 137 MMHG | HEIGHT: 68 IN | TEMPERATURE: 98.4 F | HEART RATE: 77 BPM | BODY MASS INDEX: 34.86 KG/M2

## 2022-02-23 DIAGNOSIS — I10 BENIGN ESSENTIAL HYPERTENSION: Chronic | ICD-10-CM

## 2022-02-23 DIAGNOSIS — E78.00 HYPERCHOLESTEROLEMIA: Primary | Chronic | ICD-10-CM

## 2022-02-23 DIAGNOSIS — Z00.00 HEALTHCARE MAINTENANCE: Chronic | ICD-10-CM

## 2022-02-23 DIAGNOSIS — M43.16 SPONDYLOLISTHESIS OF LUMBAR REGION: Chronic | ICD-10-CM

## 2022-02-23 DIAGNOSIS — E11.9 TYPE 2 DIABETES MELLITUS WITHOUT COMPLICATION, WITHOUT LONG-TERM CURRENT USE OF INSULIN: Chronic | ICD-10-CM

## 2022-02-23 PROCEDURE — 99214 OFFICE O/P EST MOD 30 MIN: CPT | Performed by: NURSE PRACTITIONER

## 2022-02-23 NOTE — ASSESSMENT & PLAN NOTE
Continue Metformin.  Repeat A1c today and we will adjust therapy if needed.  Discussed the importance of obtaining a yearly diabetic eye exam and he plans to get this scheduled.

## 2022-02-23 NOTE — ASSESSMENT & PLAN NOTE
Continue with regular dental and vision exams.    Routine flu shots are recommended.    Up-to-date on COVID-19 vaccine.

## 2022-02-23 NOTE — ASSESSMENT & PLAN NOTE
Hypertension is stable, continue lisinopril-HCTZ 20-12.5 mg daily, routine home monitoring for goal of less than 140/80 along with DASH diet.

## 2022-02-23 NOTE — PROGRESS NOTES
"Chief Complaint  Hypertension (Pt presents here today for a 6 month follow up for health maintenance.) and Diabetes    Subjective          Taz Dickey presents to [facility]  Patient presents for 6-month follow-up on chronic conditions.  This is a 71-year-old male.    He has hypertension that has been stable with lisinopril-HCTZ 20-12.5 mg daily.    He has hyperlipidemia endorsing good compliance with atorvastatin 40 mg daily.    He has type 2 diabetes, last A1c on 5/26/2021 was 6.10.  He takes Metformin 500 mg daily.    He has lumbar spondylosis and takes gabapentin 300 mg 3 times daily, reports that this controls discomfort from the condition well.    He has a right lower leg skin lesion that has been present for several months.  It has shrunk in size but is not completely alleviated and I recommended that he follow-up with a dermatologist for evaluation and possible excision.  He plans to make this appointment soon as he already has an existing dermatologist and denies needing a referral.    Otherwise reports that he is doing well and denies development of any other new issues today.      Objective   Vital Signs:   /82 (BP Location: Left arm, Patient Position: Sitting, Cuff Size: Large Adult)   Pulse 77   Temp 98.4 °F (36.9 °C)   Resp 18   Ht 172.7 cm (68\")   Wt 104 kg (230 lb)   SpO2 98%   BMI 34.97 kg/m²     Physical Exam  Vitals and nursing note reviewed.   Constitutional:       General: He is not in acute distress.     Appearance: Normal appearance. He is well-developed. He is not ill-appearing, toxic-appearing or diaphoretic.   HENT:      Head: Normocephalic and atraumatic.      Right Ear: External ear normal.      Left Ear: External ear normal.   Eyes:      Pupils: Pupils are equal, round, and reactive to light.   Neck:      Vascular: No carotid bruit.   Cardiovascular:      Rate and Rhythm: Normal rate and regular rhythm.      Pulses: Normal pulses.      " Heart sounds: Normal heart sounds.      Comments: No peripheral edema  Pulmonary:      Effort: Pulmonary effort is normal. No respiratory distress.      Breath sounds: Normal breath sounds. No stridor. No wheezing, rhonchi or rales.   Chest:      Chest wall: No tenderness.   Abdominal:      General: Bowel sounds are normal. There is no distension.      Palpations: Abdomen is soft. There is no mass.      Tenderness: There is no abdominal tenderness. There is no right CVA tenderness, left CVA tenderness, guarding or rebound.      Hernia: No hernia is present.   Musculoskeletal:         General: Normal range of motion.      Cervical back: Normal range of motion and neck supple. No rigidity or tenderness.   Lymphadenopathy:      Cervical: No cervical adenopathy.   Skin:     General: Skin is warm and dry.      Capillary Refill: Capillary refill takes less than 2 seconds.   Neurological:      General: No focal deficit present.      Mental Status: He is alert and oriented to person, place, and time. Mental status is at baseline.   Psychiatric:         Mood and Affect: Mood normal.         Behavior: Behavior normal.         Thought Content: Thought content normal.         Judgment: Judgment normal.        Result Review :   The following data was reviewed by: JULIA Santiago on 02/23/2022:  Common labs    Common Labsle 6/3/21 6/4/21 6/4/21 8/3/21 8/3/21 8/3/21 8/3/21 8/3/21     0610 0610 1020 1020 1020 1020 1020   Glucose   154 (A)  112 (A)      BUN   24 (A)  12      Creatinine   1.02  0.96      eGFR Non  Am   72  77      eGFR African Am     94      Sodium   134 (A)  143      Potassium 4.6  4.6  4.5      Chloride   101  102      Calcium   8.4 (A)  9.8      Total Protein     6.9      Albumin     4.40      Total Bilirubin     0.7      Alkaline Phosphatase     91      AST (SGOT)     27      ALT (SGPT)     20      WBC  10.57  5.31       Hemoglobin  10.4 (A)  11.6 (A)       Hematocrit  31.7 (A)  35.7 (A)        Platelets  154  175       Total Cholesterol      138     Triglycerides      76     HDL Cholesterol      52     LDL Cholesterol       71     Microalbumin, Urine       7.7    PSA        0.852   (A) Abnormal value       Comments are available for some flowsheets but are not being displayed.           Current outpatient and discharge medications have been reconciled for the patient.  Reviewed by: JULIA Santiago           Assessment and Plan    Diagnoses and all orders for this visit:    1. Hypercholesterolemia (Primary)  Assessment & Plan:  Continue atorvastatin.  Lipid panel today and we will adjust therapy if needed.    Orders:  -     Lipid panel    2. Benign essential hypertension  Assessment & Plan:  Hypertension is stable, continue lisinopril-HCTZ 20-12.5 mg daily, routine home monitoring for goal of less than 140/80 along with DASH diet.    Orders:  -     CBC No Differential  -     Comprehensive metabolic panel  -     TSH Rfx On Abnormal To Free T4    3. Type 2 diabetes mellitus without complication, without long-term current use of insulin (HCC)  Assessment & Plan:  Continue Metformin.  Repeat A1c today and we will adjust therapy if needed.  Discussed the importance of obtaining a yearly diabetic eye exam and he plans to get this scheduled.    Orders:  -     Hemoglobin A1c    4. Healthcare maintenance  Assessment & Plan:  Continue with regular dental and vision exams.    Routine flu shots are recommended.    Up-to-date on COVID-19 vaccine.      5. Spondylolisthesis of lumbar region  Assessment & Plan:  Stable with gabapentin 300 mg 3 times daily, he updated a controlled substance agreement today and Octavio will be reviewed routinely.      We will contact patient with lab results and any further recommendations.  Follow-up as needed and I will see him back in 6 months for a Medicare wellness visit.    Follow Up   Return in about 6 months (around 8/23/2022) for Medicare Wellness.  Patient was given  instructions and counseling regarding his condition or for health maintenance advice. Please see specific information pulled into the AVS if appropriate.

## 2022-02-23 NOTE — ASSESSMENT & PLAN NOTE
Stable with gabapentin 300 mg 3 times daily, he updated a controlled substance agreement today and Octavio will be reviewed routinely.

## 2022-02-24 LAB
ALBUMIN SERPL-MCNC: 4.9 G/DL (ref 3.8–4.8)
ALBUMIN/GLOB SERPL: 1.9 {RATIO} (ref 1.2–2.2)
ALP SERPL-CCNC: 97 IU/L (ref 44–121)
ALT SERPL-CCNC: 18 IU/L (ref 0–44)
AST SERPL-CCNC: 20 IU/L (ref 0–40)
BILIRUB SERPL-MCNC: 0.9 MG/DL (ref 0–1.2)
BUN SERPL-MCNC: 18 MG/DL (ref 8–27)
BUN/CREAT SERPL: 17 (ref 10–24)
CALCIUM SERPL-MCNC: 10 MG/DL (ref 8.6–10.2)
CHLORIDE SERPL-SCNC: 99 MMOL/L (ref 96–106)
CHOLEST SERPL-MCNC: 166 MG/DL (ref 100–199)
CO2 SERPL-SCNC: 22 MMOL/L (ref 20–29)
CREAT SERPL-MCNC: 1.07 MG/DL (ref 0.76–1.27)
ERYTHROCYTE [DISTWIDTH] IN BLOOD BY AUTOMATED COUNT: 13 % (ref 11.6–15.4)
GLOBULIN SER CALC-MCNC: 2.6 G/DL (ref 1.5–4.5)
GLUCOSE SERPL-MCNC: 185 MG/DL (ref 65–99)
HBA1C MFR BLD: 7.8 % (ref 4.8–5.6)
HCT VFR BLD AUTO: 35.3 % (ref 37.5–51)
HDLC SERPL-MCNC: 50 MG/DL
HGB BLD-MCNC: 11 G/DL (ref 13–17.7)
LDLC SERPL CALC-MCNC: 85 MG/DL (ref 0–99)
MCH RBC QN AUTO: 28.7 PG (ref 26.6–33)
MCHC RBC AUTO-ENTMCNC: 31.2 G/DL (ref 31.5–35.7)
MCV RBC AUTO: 92 FL (ref 79–97)
PLATELET # BLD AUTO: 208 X10E3/UL (ref 150–450)
POTASSIUM SERPL-SCNC: 5.2 MMOL/L (ref 3.5–5.2)
PROT SERPL-MCNC: 7.5 G/DL (ref 6–8.5)
RBC # BLD AUTO: 3.83 X10E6/UL (ref 4.14–5.8)
SODIUM SERPL-SCNC: 138 MMOL/L (ref 134–144)
TRIGL SERPL-MCNC: 185 MG/DL (ref 0–149)
TSH SERPL DL<=0.005 MIU/L-ACNC: 2.95 UIU/ML (ref 0.45–4.5)
VLDLC SERPL CALC-MCNC: 31 MG/DL (ref 5–40)
WBC # BLD AUTO: 6.8 X10E3/UL (ref 3.4–10.8)

## 2022-02-25 DIAGNOSIS — E11.9 TYPE 2 DIABETES MELLITUS WITHOUT COMPLICATION, WITHOUT LONG-TERM CURRENT USE OF INSULIN: Chronic | ICD-10-CM

## 2022-02-25 NOTE — PROGRESS NOTES
Tylise Mr. Dickey, labs are back.  Hemoglobin looks stable along with white blood cells and platelets.  Metabolic panel is showing elevated blood sugar of 185, normal electrolytes and liver enzymes.  Cholesterol panel overall looks stable, mild increase in triglycerides.  Normal LDL and total cholesterol.  Thyroid numbers are stable.  A1c has risen above goal, now up to 7.8.  This is up from 6.1 at our last check.  The goal is to get the A1c less than 7.0.  I would like to increase the Metformin from 500 mg once daily to 500 mg twice daily.  I sent the new prescription to your pharmacy.  Please watch your intake of carbs and sugars in the diet which will help to lower the A1c as well as triglycerides.  We will recheck routinely.  Let me know if you have any questions and have a great day,JULIA Santiago

## 2022-03-07 ENCOUNTER — TELEPHONE (OUTPATIENT)
Dept: INTERNAL MEDICINE | Facility: CLINIC | Age: 72
End: 2022-03-07

## 2022-03-07 DIAGNOSIS — E11.9 TYPE 2 DIABETES MELLITUS WITHOUT COMPLICATION, WITHOUT LONG-TERM CURRENT USE OF INSULIN: Chronic | ICD-10-CM

## 2022-03-07 NOTE — TELEPHONE ENCOUNTER
----- Message from Taz Dickey sent at 3/7/2022 12:09 PM EST -----  Regarding: My prescriptions   I had to get my last prescription  metformin  at [facility] . That was a mistake.     Please send all prescription to  [facility] [address] [place] [address] - [facility] AT [facility]. & [facility] [phone] [facility] [phone] [facility] [place] [address]  [phone]  Thank You  Satinder Dickey  July 20 1950

## 2022-06-25 DIAGNOSIS — M51.36 DEGENERATION OF INTERVERTEBRAL DISC OF LUMBAR REGION: Primary | ICD-10-CM

## 2022-06-29 RX ORDER — GABAPENTIN 300 MG/1
CAPSULE ORAL
Qty: 90 CAPSULE | Refills: 2 | Status: SHIPPED | OUTPATIENT
Start: 2022-06-29 | End: 2022-11-14

## 2022-08-01 DIAGNOSIS — E11.9 TYPE 2 DIABETES MELLITUS WITHOUT COMPLICATION, WITHOUT LONG-TERM CURRENT USE OF INSULIN: Chronic | ICD-10-CM

## 2022-08-14 DIAGNOSIS — I10 BENIGN ESSENTIAL HYPERTENSION: Chronic | ICD-10-CM

## 2022-08-14 DIAGNOSIS — E78.00 HYPERCHOLESTEROLEMIA: Chronic | ICD-10-CM

## 2022-08-15 RX ORDER — ATORVASTATIN CALCIUM 40 MG/1
TABLET, FILM COATED ORAL
Qty: 90 TABLET | Refills: 3 | Status: SHIPPED | OUTPATIENT
Start: 2022-08-15

## 2022-08-15 RX ORDER — LISINOPRIL AND HYDROCHLOROTHIAZIDE 20; 12.5 MG/1; MG/1
TABLET ORAL
Qty: 90 TABLET | Refills: 3 | Status: SHIPPED | OUTPATIENT
Start: 2022-08-15

## 2022-08-24 ENCOUNTER — OFFICE VISIT (OUTPATIENT)
Dept: INTERNAL MEDICINE | Facility: CLINIC | Age: 72
End: 2022-08-24

## 2022-08-24 VITALS
WEIGHT: 218.4 LBS | OXYGEN SATURATION: 100 % | SYSTOLIC BLOOD PRESSURE: 122 MMHG | DIASTOLIC BLOOD PRESSURE: 76 MMHG | HEIGHT: 70 IN | BODY MASS INDEX: 31.26 KG/M2 | TEMPERATURE: 97.7 F | HEART RATE: 65 BPM

## 2022-08-24 DIAGNOSIS — E11.9 TYPE 2 DIABETES MELLITUS WITHOUT COMPLICATION, WITHOUT LONG-TERM CURRENT USE OF INSULIN: Chronic | ICD-10-CM

## 2022-08-24 DIAGNOSIS — E55.9 VITAMIN D DEFICIENCY: ICD-10-CM

## 2022-08-24 DIAGNOSIS — E78.00 HYPERCHOLESTEROLEMIA: Chronic | ICD-10-CM

## 2022-08-24 DIAGNOSIS — R53.83 FATIGUE, UNSPECIFIED TYPE: ICD-10-CM

## 2022-08-24 DIAGNOSIS — Z00.00 MEDICARE ANNUAL WELLNESS VISIT, SUBSEQUENT: Primary | ICD-10-CM

## 2022-08-24 DIAGNOSIS — Z01.00 DIABETIC EYE EXAM: ICD-10-CM

## 2022-08-24 DIAGNOSIS — M43.16 SPONDYLOLISTHESIS OF LUMBAR REGION: Chronic | ICD-10-CM

## 2022-08-24 DIAGNOSIS — E11.9 DIABETIC EYE EXAM: ICD-10-CM

## 2022-08-24 DIAGNOSIS — R35.1 NOCTURIA: ICD-10-CM

## 2022-08-24 DIAGNOSIS — I10 BENIGN ESSENTIAL HYPERTENSION: Chronic | ICD-10-CM

## 2022-08-24 DIAGNOSIS — Z00.00 HEALTHCARE MAINTENANCE: Chronic | ICD-10-CM

## 2022-08-24 PROCEDURE — 1160F RVW MEDS BY RX/DR IN RCRD: CPT | Performed by: NURSE PRACTITIONER

## 2022-08-24 PROCEDURE — 1170F FXNL STATUS ASSESSED: CPT | Performed by: NURSE PRACTITIONER

## 2022-08-24 PROCEDURE — G0439 PPPS, SUBSEQ VISIT: HCPCS | Performed by: NURSE PRACTITIONER

## 2022-08-24 PROCEDURE — 99214 OFFICE O/P EST MOD 30 MIN: CPT | Performed by: NURSE PRACTITIONER

## 2022-08-24 PROCEDURE — 96160 PT-FOCUSED HLTH RISK ASSMT: CPT | Performed by: NURSE PRACTITIONER

## 2022-08-24 NOTE — ASSESSMENT & PLAN NOTE
Hypertension is stable, continue lisinopril-HCTZ, routine home monitoring for goal of less than 140/80 and DASH diet.

## 2022-08-24 NOTE — PROGRESS NOTES
The ABCs of the Annual Wellness Visit  Subsequent Medicare Wellness Visit    Chief Complaint   Patient presents with   • Medicare Wellness-subsequent      Subjective    History of Present Illness:  Taz Dickey is a 72 y.o. male who presents for a Subsequent Medicare Wellness Visit, follow-up on chronic conditions and an acute concern.    He has had a 22 pound intentional weight loss since her last visit.  He has been active, doing yard work and working outside all summer this year.    He endorses progressively worsening fatigue.  No shortness of breath or chest discomfort.  He feels that his age may be contributing.  He endorses a distant history of vitamin D and B12 deficiency.  Currently taking no supplements.    He has type 2 diabetes, last visit we increased his metformin 500 mg to twice daily dosing due to A1c above goal at 7.8.  States that he has stopped drinking apple cider which he was doing quite frequently prior to our last visit and believes this will have improved the A1c.    He has hypertension endorsing good compliance and efficacy with lisinopril-HCTZ 20-12.5 mg daily.    He has hyperlipidemia and endorses good compliance with atorvastatin 40 mg daily.    Has lumbar seen neurosurgery and pain management in the past, currently maintained on gabapentin 300 mg 3 times daily.  Reports that this controls the condition well he has no side effects with the medication and takes no concurrent sedating medications.    Otherwise reports that he is doing well and denies development of other new issues today.    The following portions of the patient's history were reviewed and   updated as appropriate: allergies, current medications, past family history, past medical history, past social history, past surgical history and problem list.    Compared to one year ago, the patient feels his physical   health is better.    Compared to one year ago, the patient feels his mental   health is the same.    Recent  Hospitalizations:  He was not admitted to the hospital during the last year.       Current Medical Providers:  Patient Care Team:  Jade King APRN as PCP - General (Nurse Practitioner)  Jeffrey Garcia MD as Surgeon (Neurosurgery)  Wallace Pleitez MD as Consulting Physician (Pain Medicine)    Outpatient Medications Prior to Visit   Medication Sig Dispense Refill   • acetaminophen (TYLENOL) 500 MG tablet Take 500 mg by mouth Every 6 (Six) Hours As Needed for Mild Pain .     • aspirin 81 MG EC tablet Take 81 mg by mouth Daily. INSTRUCTED PT TO FOLLOW MD INSTRUCTIONS REGARDING HOLDING FOR SURGERY     • atorvastatin (LIPITOR) 40 MG tablet TAKE ONE TABLET BY MOUTH DAILY 90 tablet 3   • gabapentin (NEURONTIN) 300 MG capsule TAKE ONE CAPSULE BY MOUTH THREE TIMES A DAY 90 capsule 2   • ketoconazole (NIZORAL) 2 % cream Apply 1 application topically to the appropriate area as directed Daily. 30 g 0   • lisinopril-hydrochlorothiazide (PRINZIDE,ZESTORETIC) 20-12.5 MG per tablet TAKE ONE TABLET BY MOUTH ONCE NIGHTLY 90 tablet 3   • metFORMIN (GLUCOPHAGE) 500 MG tablet Take 1 tablet by mouth 2 (Two) Times a Day With Meals. 180 tablet 3   • mupirocin (BACTROBAN) 2 % ointment Apply 1 application topically to the appropriate area as directed 3 (Three) Times a Day. 22 g 0   • tadalafil (CIALIS) 5 MG tablet Take 1 tablet by mouth Daily As Needed for Erectile Dysfunction. (Patient taking differently: Take 5 mg by mouth Daily As Needed for Erectile Dysfunction. HOLD PRIOR TO SURGERY) 30 tablet 2   • metFORMIN (GLUCOPHAGE) 500 MG tablet        No facility-administered medications prior to visit.       No opioid medication identified on active medication list. I have reviewed chart for other potential  high risk medication/s and harmful drug interactions in the elderly.          Aspirin is on active medication list. Aspirin use is indicated based on review of current medical condition/s. Pros and cons of this therapy have been  "discussed today. Benefits of this medication outweigh potential harm.  Patient has been encouraged to continue taking this medication.  .      Patient Active Problem List   Diagnosis   • Spondylolisthesis of lumbar region   • Lumbar radiculopathy   • Benign essential hypertension   • Erectile dysfunction of nonorganic origin   • Hypercholesterolemia   • Osteoarthritis   • Tinnitus of both ears   • Encounter for screening colonoscopy   • Medicare annual wellness visit, subsequent   • Nocturia   • Type 2 diabetes mellitus without complication, without long-term current use of insulin ([facility])   • Healthcare maintenance   • Acute pain of right knee   • Primary osteoarthritis of right hip   • DJD (degenerative joint disease)     Advance Care Planning  Advance Directive is not on file.  ACP discussion was held with the patient during this visit. Patient has an advance directive (not in EMR), copy requested.    Review of Systems   Constitutional: Positive for fatigue.   All other systems reviewed and are negative.       Objective    Vitals:    08/24/22 0807   BP: 122/76   BP Location: Left arm   Patient Position: Sitting   Cuff Size: Adult   Pulse: 65   Temp: 97.7 °F (36.5 °C)   TempSrc: Temporal   SpO2: 100%   Weight: 99.1 kg (218 lb 6.4 oz)   Height: 177.8 cm (70\")   PainSc: 0-No pain     Estimated body mass index is 31.34 kg/m² as calculated from the following:    Height as of this encounter: 177.8 cm (70\").    Weight as of this encounter: 99.1 kg (218 lb 6.4 oz).    BMI is >= 30 and <35. (Class 1 Obesity). The following options were offered after discussion;: weight loss educational material (shared in after visit summary)      Does the patient have evidence of cognitive impairment? No    Physical Exam  Vitals and nursing note reviewed.   Constitutional:       General: He is not in acute distress.     Appearance: Normal appearance. He is well-developed. He is not ill-appearing, toxic-appearing or diaphoretic.   HENT:     "  Head: Normocephalic and atraumatic.      Right Ear: Tympanic membrane, ear canal and external ear normal.      Left Ear: Tympanic membrane, ear canal and external ear normal.   Eyes:      Pupils: Pupils are equal, round, and reactive to light.   Cardiovascular:      Rate and Rhythm: Normal rate and regular rhythm.      Pulses: Normal pulses.      Heart sounds: Normal heart sounds.      Comments: No peripheral edema  Pulmonary:      Effort: Pulmonary effort is normal. No respiratory distress.      Breath sounds: Normal breath sounds. No stridor. No wheezing, rhonchi or rales.   Chest:      Chest wall: No tenderness.   Abdominal:      General: Bowel sounds are normal. There is no distension.      Palpations: Abdomen is soft. There is no mass.      Tenderness: There is no abdominal tenderness. There is no right CVA tenderness, left CVA tenderness, guarding or rebound.      Hernia: No hernia is present.   Musculoskeletal:         General: Normal range of motion.      Cervical back: Normal range of motion and neck supple.   Skin:     General: Skin is warm and dry.      Capillary Refill: Capillary refill takes less than 2 seconds.   Neurological:      General: No focal deficit present.      Mental Status: He is alert and oriented to person, place, and time. Mental status is at baseline.   Psychiatric:         Mood and Affect: Mood normal.         Behavior: Behavior normal.         Thought Content: Thought content normal.         Judgment: Judgment normal.                 HEALTH RISK ASSESSMENT    Smoking Status:  Social History     Tobacco Use   Smoking Status Current Some Day Smoker   • Packs/day: 0.00   • Years: 0.00   • Pack years: 0.00   • Types: Cigars   Smokeless Tobacco Never Used   Tobacco Comment    OCCASIONALLY     Alcohol Consumption:  Social History     Substance and Sexual Activity   Alcohol Use Yes   • Alcohol/week: 0.0 standard drinks    Comment: ON WEEKENDS     Fall Risk Screen:    STEADI Fall Risk  Assessment was completed, and patient is at HIGH risk for falls. Assessment completed on:8/24/2022    Depression Screening:  PHQ-2/PHQ-9 Depression Screening 8/24/2022   Retired PHQ-9 Total Score -   Retired Total Score -   Little Interest or Pleasure in Doing Things 0-->not at all   Feeling Down, Depressed or Hopeless 0-->not at all   PHQ-9: Brief Depression Severity Measure Score 0       Health Habits and Functional and Cognitive Screening:  Functional & Cognitive Status 8/24/2022   Do you have difficulty preparing food and eating? No   Do you have difficulty bathing yourself, getting dressed or grooming yourself? No   Do you have difficulty using the toilet? No   Do you have difficulty moving around from place to place? No   Do you have trouble with steps or getting out of a bed or a chair? Yes   Current Diet Well Balanced Diet   Dental Exam Not up to date   Eye Exam Not up to date   Exercise (times per week) 4 times per week   Current Exercises Include Walking   Current Exercise Activities Include -   Do you need help using the phone?  No   Are you deaf or do you have serious difficulty hearing?  Yes   Do you need help with transportation? No   Do you need help shopping? No   Do you need help preparing meals?  No   Do you need help with housework?  No   Do you need help with laundry? No   Do you need help taking your medications? No   Do you need help managing money? No   Do you ever drive or ride in a car without wearing a seat belt? No   Have you felt unusual stress, anger or loneliness in the last month? No   Who do you live with? Spouse   If you need help, do you have trouble finding someone available to you? No   Have you been bothered in the last four weeks by sexual problems? No   Do you have difficulty concentrating, remembering or making decisions? No       Age-appropriate Screening Schedule:  Refer to the list below for future screening recommendations based on patient's age, sex and/or medical  conditions. Orders for these recommended tests are listed in the plan section. The patient has been provided with a written plan.    Health Maintenance   Topic Date Due   • TDAP/TD VACCINES (1 - Tdap) Never done   • DIABETIC EYE EXAM  Never done   • ZOSTER VACCINE (2 of 2) 11/20/2017   • DIABETIC FOOT EXAM  01/25/2022   • URINE MICROALBUMIN  08/03/2022   • HEMOGLOBIN A1C  08/23/2022   • INFLUENZA VACCINE  10/01/2022   • LIPID PANEL  02/23/2023            Current outpatient and discharge medications have been reconciled for the patient.  Reviewed by: JULIA Santiago    Assessment & Plan   CMS Preventative Services Quick Reference  Risk Factors Identified During Encounter  Cardiovascular Disease  Dementia/Memory   Depression/Dysphoria  Hearing Problem  Inactivity/Sedentary  Obesity/Overweight   The above risks/problems have been discussed with the patient.  Follow up actions/plans if indicated are seen below in the Assessment/Plan Section.  Pertinent information has been shared with the patient in the After Visit Summary.    Diagnoses and all orders for this visit:    1. Medicare annual wellness visit, subsequent (Primary)    2. Type 2 diabetes mellitus without complication, without long-term current use of insulin ([facility])  Assessment & Plan:  Continue metformin 500 mg twice daily and low glycemic diet.  Repeat A1c, microalbumin today and we will adjust therapy if needed.    Orders:  -     Hemoglobin A1c  -     MicroAlbumin, Urine, Random - Urine, Clean Catch  -     Ambulatory Referral to Ophthalmology    3. Healthcare maintenance  Assessment & Plan:  We discussed the importance of the vascular screening bundle.  He is provided with education on this and plans to look into it to schedule.    He is encouraged to schedule a diabetic eye exam.  He is overdue.  I placed a referral to ophthalmology after discussion and he is agreeable.    Continue with regular exercise.    PSA today.      4.  Hypercholesterolemia  Assessment & Plan:  Continue atorvastatin.  Lipid panel today and we will adjust therapy if needed.    Orders:  -     Lipid panel    5. Benign essential hypertension  Assessment & Plan:  Hypertension is stable, continue lisinopril-HCTZ, routine home monitoring for goal of less than 140/80 and DASH diet.    Orders:  -     CBC No Differential  -     Comprehensive metabolic panel  -     TSH Rfx On Abnormal To Free T4    6. Spondylolisthesis of lumbar region  Assessment & Plan:  Improved, he is staying active and has not seen his neurosurgeon or pain management provider in some time.      7. Nocturia  Comments:  Stable with no new symptoms, PSA today.  Orders:  -     PSA DIAGNOSTIC ONLY    8. Fatigue, unspecified type  Comments:  Checking multiple labs.  Orders:  -     Vitamin D 25 hydroxy  -     Vitamin B12    9. Vitamin D deficiency  Comments:  Historical, potentially contributing to current fatigue and we will recheck levels today.  Orders:  -     Vitamin D 25 hydroxy    10. Diabetic eye exam (HCC)  -     Ambulatory Referral to Ophthalmology      Follow Up:   Return in about 6 months (around 2/24/2023).     We will contact patient with lab results and further recommendations.  Follow-up as needed and I will see him back in 6 months for recheck of chronic conditions.    Patient had AWV visit today.   In addition he has chronic medical conditions with prior abnormal labs and given age and current treatments requires additional E/M to ensure medications are not causing adverse effect and current treatment remains effective.     Lab results will be communicated to patient and updated treatment if necessary.       An After Visit Summary and PPPS were made available to the patient.

## 2022-08-24 NOTE — ASSESSMENT & PLAN NOTE
Improved, he is staying active and has not seen his neurosurgeon or pain management provider in some time.

## 2022-08-24 NOTE — PATIENT INSTRUCTIONS
Medicare Wellness  Personal Prevention Plan of Service     Date of Office Visit:    Encounter Provider:  JULIA Santiago  Place of Service:  [facility]  Patient Name: Taz Dickey  :  1950    As part of the Medicare Wellness portion of your visit today, we are providing you with this personalized preventive plan of services (PPPS). This plan is based upon recommendations of the [place] Preventive Services Task Force (USPSTF) and the Advisory Committee on Immunization Practices (ACIP).    This lists the preventive care services that should be considered, and provides dates of when you are due. Items listed as completed are up-to-date and do not require any further intervention.    Health Maintenance   Topic Date Due    TDAP/TD VACCINES (1 - Tdap) Never done    DIABETIC EYE EXAM  Never done    ZOSTER VACCINE (2 of 2) 2017    AAA SCREEN (ONE-TIME)  Never done    DIABETIC FOOT EXAM  2022    COVID-19 Vaccine (4 - Booster for Pfizer series) 2022    URINE MICROALBUMIN  2022    HEMOGLOBIN A1C  2022    INFLUENZA VACCINE  10/01/2022    LIPID PANEL  2023    ANNUAL WELLNESS VISIT  2023    COLORECTAL CANCER SCREENING  2028    Pneumococcal Vaccine 65+  Completed    HEPATITIS C SCREENING  Addressed       No orders of the defined types were placed in this encounter.      Return in about 6 months (around 2023).

## 2022-08-24 NOTE — ASSESSMENT & PLAN NOTE
Continue metformin 500 mg twice daily and low glycemic diet.  Repeat A1c, microalbumin today and we will adjust therapy if needed.

## 2022-08-24 NOTE — ASSESSMENT & PLAN NOTE
We discussed the importance of the vascular screening bundle.  He is provided with education on this and plans to look into it to schedule.    He is encouraged to schedule a diabetic eye exam.  He is overdue.  I placed a referral to ophthalmology after discussion and he is agreeable.    Continue with regular exercise.    PSA today.

## 2022-08-25 LAB
25(OH)D3+25(OH)D2 SERPL-MCNC: 43.4 NG/ML (ref 30–100)
ALBUMIN SERPL-MCNC: 4.7 G/DL (ref 3.7–4.7)
ALBUMIN/GLOB SERPL: 1.9 {RATIO} (ref 1.2–2.2)
ALP SERPL-CCNC: 78 IU/L (ref 44–121)
ALT SERPL-CCNC: 13 IU/L (ref 0–44)
AST SERPL-CCNC: 21 IU/L (ref 0–40)
BILIRUB SERPL-MCNC: 0.7 MG/DL (ref 0–1.2)
BUN SERPL-MCNC: 27 MG/DL (ref 8–27)
BUN/CREAT SERPL: 22 (ref 10–24)
CALCIUM SERPL-MCNC: 10 MG/DL (ref 8.6–10.2)
CHLORIDE SERPL-SCNC: 97 MMOL/L (ref 96–106)
CHOLEST SERPL-MCNC: 149 MG/DL (ref 100–199)
CO2 SERPL-SCNC: 24 MMOL/L (ref 20–29)
CREAT SERPL-MCNC: 1.25 MG/DL (ref 0.76–1.27)
EGFRCR-CYS SERPLBLD CKD-EPI 2021: 62 ML/MIN/1.73
ERYTHROCYTE [DISTWIDTH] IN BLOOD BY AUTOMATED COUNT: 17.5 % (ref 11.6–15.4)
GLOBULIN SER CALC-MCNC: 2.5 G/DL (ref 1.5–4.5)
GLUCOSE SERPL-MCNC: 143 MG/DL (ref 65–99)
HBA1C MFR BLD: 6.6 % (ref 4.8–5.6)
HCT VFR BLD AUTO: 30 % (ref 37.5–51)
HDLC SERPL-MCNC: 46 MG/DL
HGB BLD-MCNC: 8.7 G/DL (ref 13–17.7)
LDLC SERPL CALC-MCNC: 84 MG/DL (ref 0–99)
MCH RBC QN AUTO: 23.6 PG (ref 26.6–33)
MCHC RBC AUTO-ENTMCNC: 29 G/DL (ref 31.5–35.7)
MCV RBC AUTO: 81 FL (ref 79–97)
MICROALBUMIN UR-MCNC: 15.2 UG/ML
PLATELET # BLD AUTO: 241 X10E3/UL (ref 150–450)
POTASSIUM SERPL-SCNC: 5.5 MMOL/L (ref 3.5–5.2)
PROT SERPL-MCNC: 7.2 G/DL (ref 6–8.5)
PSA SERPL-MCNC: 1 NG/ML (ref 0–4)
RBC # BLD AUTO: 3.69 X10E6/UL (ref 4.14–5.8)
SODIUM SERPL-SCNC: 136 MMOL/L (ref 134–144)
TRIGL SERPL-MCNC: 101 MG/DL (ref 0–149)
TSH SERPL DL<=0.005 MIU/L-ACNC: 2.5 UIU/ML (ref 0.45–4.5)
VIT B12 SERPL-MCNC: 260 PG/ML (ref 232–1245)
VLDLC SERPL CALC-MCNC: 19 MG/DL (ref 5–40)
WBC # BLD AUTO: 5.5 X10E3/UL (ref 3.4–10.8)

## 2022-08-29 NOTE — PROGRESS NOTES
Hi Mr. Dickey, labs are back. Your hemoglobin has dropped over two full points in the last 6 months which is likely causing this fatigue- you are anemic. I would like to see you back this week to follow up on the anemia, check a fecal occult blood sample to make sure you aren't losing any blood through your stool, as well as check some follow up iron levels so that I can make a recommendation on supplementing to get the hemoglobin back up if needed. I will ask the office to call you yo schedule- no need to fast for that apt. We also need to recheck you potassium which was high. Again please don't worry about fasting and drink plenty of water. Other electrolytes are stable as well as liver enzymes and kidney function. Cholesterol panel is well controlled. Thyroid numbers are normal. A1C is improved to 6.6, well controlled which is great. Continue current medication dosing. PSA is normal along with vitamins D and B12. The office will call you soon to schedule. Have a great day,   JULIA Santiago Pt stopped by the office to [facility] Dr. Imani Pizarro" as her nephew that was with her all day Thanksgiving tested positive Thursday night for COVID. I explained that we could schedule a virtual visit for her as that is the current practices that we are using for any pt that has been exposed and/or has \"sick\" sx or has been in the hospital or ER in the past 2 wks. Pt does not have any sx. We discussed that she should quarantine for 14 days and if sx should occur and become unmanageable with OTC medications at home she should be seen in ER or urgent care for tx and testing. Pt verbalized understanding, declines appt for now and is currently quarantining since she got this news. She will callback if she decides she wants to schedule.

## 2022-08-29 NOTE — PROGRESS NOTES
Please call pt to schedule an office visit with me this week to follow up on anemia and high potassium.

## 2022-09-08 ENCOUNTER — OFFICE VISIT (OUTPATIENT)
Dept: INTERNAL MEDICINE | Facility: CLINIC | Age: 72
End: 2022-09-08

## 2022-09-08 VITALS
HEART RATE: 61 BPM | OXYGEN SATURATION: 98 % | HEIGHT: 70 IN | SYSTOLIC BLOOD PRESSURE: 143 MMHG | BODY MASS INDEX: 31.78 KG/M2 | TEMPERATURE: 97.3 F | DIASTOLIC BLOOD PRESSURE: 74 MMHG | RESPIRATION RATE: 18 BRPM | WEIGHT: 222 LBS

## 2022-09-08 DIAGNOSIS — D64.9 ANEMIA, UNSPECIFIED TYPE: Primary | ICD-10-CM

## 2022-09-08 DIAGNOSIS — E87.5 HYPERKALEMIA: ICD-10-CM

## 2022-09-08 PROCEDURE — 99213 OFFICE O/P EST LOW 20 MIN: CPT | Performed by: NURSE PRACTITIONER

## 2022-09-08 NOTE — PROGRESS NOTES
"Chief Complaint  Follow-up (Pt is following up on blood work ) and Anemia    Subjective        Taz Dickey presents to [facility]  Patient presents for a follow-up on anemia and hyperkalemia.  This is a 72-year-old male.  He had labs drawn on 8/24/2022.  Potassium level came back elevated at 5.5.  He does not take any potassium containing vitamins or supplements.  He does take lisinopril-HCTZ 20-12.5 mg daily with good compliance.    Additionally, his hemoglobin dropped from 11.0 on 2/23/2022 to 8.7 on 8/24/2022.  He denies any bleeding excessively, no outright blood in the stool or urine to his knowledge.  Denies any history of iron deficiency anemia or acute blood loss in the stool.    He does report feeling more fatigued over the past few months.  Denies shortness of breath or chest discomfort.    Otherwise doing well, denies development of other new issues today.      Objective   Vital Signs:  /74 (BP Location: Left arm, Patient Position: Sitting, Cuff Size: Large Adult)   Pulse 61   Temp 97.3 °F (36.3 °C)   Resp 18   Ht 177.8 cm (70\")   Wt 101 kg (222 lb)   SpO2 98%   BMI 31.85 kg/m²   Estimated body mass index is 31.85 kg/m² as calculated from the following:    Height as of this encounter: 177.8 cm (70\").    Weight as of this encounter: 101 kg (222 lb).          Physical Exam  Vitals and nursing note reviewed.   Constitutional:       General: He is not in acute distress.     Appearance: Normal appearance. He is well-developed. He is not ill-appearing, toxic-appearing or diaphoretic.   HENT:      Head: Normocephalic and atraumatic.      Right Ear: External ear normal.      Left Ear: External ear normal.   Eyes:      Pupils: Pupils are equal, round, and reactive to light.   Cardiovascular:      Rate and Rhythm: Normal rate and regular rhythm.      Pulses: Normal pulses.      Heart sounds: Normal heart sounds.   Pulmonary:      Effort: Pulmonary effort is normal. No " respiratory distress.      Breath sounds: Normal breath sounds. No stridor. No wheezing, rhonchi or rales.   Chest:      Chest wall: No tenderness.   Abdominal:      General: Bowel sounds are normal. There is no distension.      Palpations: Abdomen is soft. There is no mass.      Tenderness: There is no abdominal tenderness. There is no right CVA tenderness, left CVA tenderness, guarding or rebound.      Hernia: No hernia is present.   Musculoskeletal:         General: Normal range of motion.      Cervical back: Normal range of motion and neck supple.   Skin:     General: Skin is warm and dry.      Capillary Refill: Capillary refill takes less than 2 seconds.   Neurological:      General: No focal deficit present.      Mental Status: He is alert and oriented to person, place, and time. Mental status is at baseline.   Psychiatric:         Mood and Affect: Mood normal.         Behavior: Behavior normal.         Thought Content: Thought content normal.         Judgment: Judgment normal.        Result Review :  The following data was reviewed by: JULIA Santiago on 09/08/2022:  Common labs    Common Labsle 2/23/22 2/23/22 2/23/22 2/23/22 8/24/22 8/24/22 8/24/22 8/24/22 8/24/22 8/24/22    0900 0900 0900 0900 [id number] [id number] 0847   Glucose  185 (A)    143 (A)       BUN  18    27       Creatinine  1.07    1.25       eGFR Non African Am  70           eGFR  Am  81           Sodium  138    136       Potassium  5.2    5.5 (A)       Chloride  99    97       Calcium  10.0    10.0       Total Protein  7.5    7.2       Albumin  4.9 (A)    4.7       Total Bilirubin  0.9    0.7       Alkaline Phosphatase  97    78       AST (SGOT)  20    21       ALT (SGPT)  18    13       WBC 6.8    5.5        Hemoglobin 11.0 (A)    8.7 (A)        Hematocrit 35.3 (A)    30.0 (A)        Platelets 208    241        Total Cholesterol   166    149      Triglycerides   185 (A)    101      HDL Cholesterol   50    46      LDL  Cholesterol    85    84      Hemoglobin A1C    7.8 (A)    6.6 (A)     Microalbumin, Urine         15.2    PSA          1.0   (A) Abnormal value       Comments are available for some flowsheets but are not being displayed.           Current outpatient and discharge medications have been reconciled for the patient.  Reviewed by: JULIA Santiago           Assessment and Plan   Diagnoses and all orders for this visit:    1. Anemia, unspecified type (Primary)  -     Iron and TIBC  -     Ferritin  -     CBC No Differential    2. Hyperkalemia  -     Basic Metabolic Panel      Based on his last CBC this appears to be iron deficiency anemia and we need to rule out blood loss.  He is sent home with an FOBT kit and will bring that back soon as possible.  We will get an iron profile today and repeat CBC.  If needed we will supplement with iron.    Rechecking BMP today, he is not on any potassium containing supplements over-the-counter per patient.    He is educated on iron rich diet.    We will contact patient with results and further recommendations.  Follow-up as needed and at next scheduled office visit.           Follow Up   Return if symptoms worsen or fail to improve, for Next scheduled follow up.  Patient was given instructions and counseling regarding his condition or for health maintenance advice. Please see specific information pulled into the AVS if appropriate.

## 2022-09-09 DIAGNOSIS — D50.0 ANEMIA DUE TO GI BLOOD LOSS: Primary | ICD-10-CM

## 2022-09-09 DIAGNOSIS — E61.1 IRON DEFICIENCY: ICD-10-CM

## 2022-09-09 LAB
BUN SERPL-MCNC: 22 MG/DL (ref 8–23)
BUN/CREAT SERPL: 19.8 (ref 7–25)
CALCIUM SERPL-MCNC: 9.9 MG/DL (ref 8.6–10.5)
CHLORIDE SERPL-SCNC: 100 MMOL/L (ref 98–107)
CO2 SERPL-SCNC: 27.5 MMOL/L (ref 22–29)
CREAT SERPL-MCNC: 1.11 MG/DL (ref 0.76–1.27)
EGFRCR-CYS SERPLBLD CKD-EPI 2021: 71 ML/MIN/1.73
ERYTHROCYTE [DISTWIDTH] IN BLOOD BY AUTOMATED COUNT: 16.4 % (ref 12.3–15.4)
FERRITIN SERPL-MCNC: 10.7 NG/ML (ref 30–400)
GLUCOSE SERPL-MCNC: 137 MG/DL (ref 65–99)
HCT VFR BLD AUTO: 26.7 % (ref 37.5–51)
HGB BLD-MCNC: 7.8 G/DL (ref 13–17.7)
IRON SATN MFR SERPL: 4 % (ref 20–50)
IRON SERPL-MCNC: 23 MCG/DL (ref 59–158)
MCH RBC QN AUTO: 23.4 PG (ref 26.6–33)
MCHC RBC AUTO-ENTMCNC: 29.2 G/DL (ref 31.5–35.7)
MCV RBC AUTO: 79.9 FL (ref 79–97)
PLATELET # BLD AUTO: 217 10*3/MM3 (ref 140–450)
POTASSIUM SERPL-SCNC: 5.4 MMOL/L (ref 3.5–5.2)
RBC # BLD AUTO: 3.34 10*6/MM3 (ref 4.14–5.8)
SODIUM SERPL-SCNC: 136 MMOL/L (ref 136–145)
TIBC SERPL-MCNC: 593 MCG/DL
UIBC SERPL-MCNC: 570 MCG/DL (ref 112–346)
WBC # BLD AUTO: 7.23 10*3/MM3 (ref 3.4–10.8)

## 2022-09-09 RX ORDER — FERROUS SULFATE 325(65) MG
325 TABLET ORAL
Qty: 90 TABLET | Refills: 0 | Status: SHIPPED | OUTPATIENT
Start: 2022-09-09 | End: 2022-11-02 | Stop reason: SDUPTHER

## 2022-09-09 NOTE — PROGRESS NOTES
Please give Mr. Diceky a call.  His fecal occult blood test came back positive which means that he is losing blood somewhere in the GI tract and this is likely the contributing factor to his acutely worsening anemia.  His hemoglobin has dropped almost another full point in the past 2 weeks.  We are now down to 7.8.  I am placing urgent referrals to both gastroenterology as well as hematology.  He may end up needing some blood or iron infusion.  In the meantime, please start taking ferrous sulfate 325 mg daily, I will send this to the pharmacy.  Please schedule with GI and hematology as soon as contacted.

## 2022-09-13 ENCOUNTER — TELEPHONE (OUTPATIENT)
Dept: INTERNAL MEDICINE | Facility: CLINIC | Age: 72
End: 2022-09-13

## 2022-09-13 NOTE — TELEPHONE ENCOUNTER
UNABLE TO WARM TRANSFER    Caller: Taz Dickey    Relationship to patient: Self    Best call back number: [phone]    Patient is needing: PATIENT STATES THAT HE WAS SUPPOSE TO GET AN URGENT REFERRAL TO GASTRO. HE CALLED DR. GABRIEL'S OFFICE AND THEY DO NOT HAVE REFERRAL. PLEASE CALL AND ADVISE.

## 2022-09-20 ENCOUNTER — OFFICE (OUTPATIENT)
Dept: URBAN - METROPOLITAN AREA PATHOLOGY 4 | Facility: PATHOLOGY | Age: 72
End: 2022-09-20

## 2022-09-20 ENCOUNTER — AMBULATORY SURGICAL CENTER (OUTPATIENT)
Dept: URBAN - METROPOLITAN AREA SURGERY 20 | Facility: SURGERY | Age: 72
End: 2022-09-20

## 2022-09-20 DIAGNOSIS — Z86.010 PERSONAL HISTORY OF COLONIC POLYPS: ICD-10-CM

## 2022-09-20 DIAGNOSIS — D12.3 BENIGN NEOPLASM OF TRANSVERSE COLON: ICD-10-CM

## 2022-09-20 DIAGNOSIS — K64.0 FIRST DEGREE HEMORRHOIDS: ICD-10-CM

## 2022-09-20 DIAGNOSIS — D50.9 IRON DEFICIENCY ANEMIA, UNSPECIFIED: ICD-10-CM

## 2022-09-20 DIAGNOSIS — K63.89 OTHER SPECIFIED DISEASES OF INTESTINE: ICD-10-CM

## 2022-09-20 DIAGNOSIS — K21.00 GASTRO-ESOPHAGEAL REFLUX DISEASE WITH ESOPHAGITIS, WITHOUT B: ICD-10-CM

## 2022-09-20 DIAGNOSIS — K31.811 ANGIODYSPLASIA OF STOMACH AND DUODENUM WITH BLEEDING: ICD-10-CM

## 2022-09-20 DIAGNOSIS — K56.609 UNSPECIFIED INTESTINAL OBSTRUCTION, UNSPECIFIED AS TO PARTIA: ICD-10-CM

## 2022-09-20 DIAGNOSIS — K22.89 OTHER SPECIFIED DISEASE OF ESOPHAGUS: ICD-10-CM

## 2022-09-20 DIAGNOSIS — K44.9 DIAPHRAGMATIC HERNIA WITHOUT OBSTRUCTION OR GANGRENE: ICD-10-CM

## 2022-09-20 DIAGNOSIS — C18.0 MALIGNANT NEOPLASM OF CECUM: ICD-10-CM

## 2022-09-20 PROBLEM — K63.5 POLYP OF COLON: Status: ACTIVE | Noted: 2022-09-20

## 2022-09-20 PROBLEM — K92.2 GASTROINTESTINAL HEMORRHAGE, UNSPECIFIED: Status: ACTIVE | Noted: 2022-09-20

## 2022-09-20 PROBLEM — D37.4 NEOPLASM OF UNCERTAIN BEHAVIOR OF COLON: Status: ACTIVE | Noted: 2022-09-20

## 2022-09-20 LAB
GI HISTOLOGY: A. SELECT: (no result)
GI HISTOLOGY: B. SELECT: (no result)
GI HISTOLOGY: C. SELECT: (no result)
GI HISTOLOGY: D. SELECT: (no result)
GI HISTOLOGY: PDF REPORT: (no result)

## 2022-09-20 PROCEDURE — 88342 IMHCHEM/IMCYTCHM 1ST ANTB: CPT | Mod: 26 | Performed by: INTERNAL MEDICINE

## 2022-09-20 PROCEDURE — 45380 COLONOSCOPY AND BIOPSY: CPT | Performed by: INTERNAL MEDICINE

## 2022-09-20 PROCEDURE — 43255 EGD CONTROL BLEEDING ANY: CPT | Mod: 59 | Performed by: INTERNAL MEDICINE

## 2022-09-20 PROCEDURE — 43239 EGD BIOPSY SINGLE/MULTIPLE: CPT | Performed by: INTERNAL MEDICINE

## 2022-09-20 PROCEDURE — 88305 TISSUE EXAM BY PATHOLOGIST: CPT | Performed by: INTERNAL MEDICINE

## 2022-09-20 PROCEDURE — 88341 IMHCHEM/IMCYTCHM EA ADD ANTB: CPT | Mod: 26 | Performed by: INTERNAL MEDICINE

## 2022-09-21 DIAGNOSIS — M43.16 SPONDYLOLISTHESIS OF LUMBAR REGION: ICD-10-CM

## 2022-09-21 DIAGNOSIS — C18.9 MALIGNANT NEOPLASM OF COLON, UNSPECIFIED PART OF COLON: ICD-10-CM

## 2022-09-21 DIAGNOSIS — R93.89 ABNORMAL FINDING ON IMAGING: ICD-10-CM

## 2022-09-21 DIAGNOSIS — D64.9 ANEMIA, UNSPECIFIED TYPE: Primary | ICD-10-CM

## 2022-09-22 ENCOUNTER — LAB (OUTPATIENT)
Dept: OTHER | Facility: HOSPITAL | Age: 72
End: 2022-09-22

## 2022-09-22 ENCOUNTER — CONSULT (OUTPATIENT)
Dept: ONCOLOGY | Facility: CLINIC | Age: 72
End: 2022-09-22

## 2022-09-22 DIAGNOSIS — C18.7 MALIGNANT NEOPLASM OF SIGMOID COLON: Primary | ICD-10-CM

## 2022-09-22 DIAGNOSIS — D50.0 IRON DEFICIENCY ANEMIA DUE TO CHRONIC BLOOD LOSS: ICD-10-CM

## 2022-09-22 DIAGNOSIS — D64.9 ANEMIA, UNSPECIFIED TYPE: ICD-10-CM

## 2022-09-22 PROBLEM — D50.9 IRON DEFICIENCY ANEMIA: Status: ACTIVE | Noted: 2022-09-22

## 2022-09-22 PROBLEM — C18.9 COLON CANCER: Status: ACTIVE | Noted: 2022-09-22

## 2022-09-22 LAB
BASOPHILS # BLD AUTO: 0.04 10*3/MM3 (ref 0–0.2)
BASOPHILS NFR BLD AUTO: 0.6 % (ref 0–1.5)
DEPRECATED RDW RBC AUTO: 64.5 FL (ref 37–54)
EOSINOPHIL # BLD AUTO: 0.4 10*3/MM3 (ref 0–0.4)
EOSINOPHIL NFR BLD AUTO: 6.1 % (ref 0.3–6.2)
ERYTHROCYTE [DISTWIDTH] IN BLOOD BY AUTOMATED COUNT: 21.4 % (ref 12.3–15.4)
HCT VFR BLD AUTO: 28.9 % (ref 37.5–51)
HGB BLD-MCNC: 8.5 G/DL (ref 13–17.7)
IMM GRANULOCYTES # BLD AUTO: 0.02 10*3/MM3 (ref 0–0.05)
IMM GRANULOCYTES NFR BLD AUTO: 0.3 % (ref 0–0.5)
LYMPHOCYTES # BLD AUTO: 1.04 10*3/MM3 (ref 0.7–3.1)
LYMPHOCYTES NFR BLD AUTO: 16 % (ref 19.6–45.3)
MCH RBC QN AUTO: 25 PG (ref 26.6–33)
MCHC RBC AUTO-ENTMCNC: 29.4 G/DL (ref 31.5–35.7)
MCV RBC AUTO: 85 FL (ref 79–97)
MONOCYTES # BLD AUTO: 0.61 10*3/MM3 (ref 0.1–0.9)
MONOCYTES NFR BLD AUTO: 9.4 % (ref 5–12)
NEUTROPHILS NFR BLD AUTO: 4.41 10*3/MM3 (ref 1.7–7)
NEUTROPHILS NFR BLD AUTO: 67.6 % (ref 42.7–76)
NRBC BLD AUTO-RTO: 0 /100 WBC (ref 0–0.2)
PLATELET # BLD AUTO: 209 10*3/MM3 (ref 140–450)
PMV BLD AUTO: 10.8 FL (ref 6–12)
RBC # BLD AUTO: 3.4 10*6/MM3 (ref 4.14–5.8)
WBC NRBC COR # BLD: 6.52 10*3/MM3 (ref 3.4–10.8)

## 2022-09-22 PROCEDURE — 85025 COMPLETE CBC W/AUTO DIFF WBC: CPT | Performed by: INTERNAL MEDICINE

## 2022-09-22 PROCEDURE — 82378 CARCINOEMBRYONIC ANTIGEN: CPT | Performed by: INTERNAL MEDICINE

## 2022-09-22 PROCEDURE — 99205 OFFICE O/P NEW HI 60 MIN: CPT | Performed by: INTERNAL MEDICINE

## 2022-09-22 PROCEDURE — 36415 COLL VENOUS BLD VENIPUNCTURE: CPT

## 2022-09-22 PROCEDURE — 82746 ASSAY OF FOLIC ACID SERUM: CPT | Performed by: INTERNAL MEDICINE

## 2022-09-22 NOTE — PROGRESS NOTES
.     REASON FOR CONSULTATION:     Provide an opinion on any further workup or treatment of iron deficiency anemia                             REQUESTING PHYSICIAN: JULIA Santiago       RECORDS OBTAINED:  Records of the patient's history including those obtained from the referring provider were reviewed and summarized in detail.    HISTORY OF PRESENT ILLNESS:  The patient is a 72 y.o. year old male with hypertension, hyperlipidemia, type 2 diabetes, iron deficiency anemia, history of DVT, who presented today for initial evaluation because of iron deficiency anemia, referred by his primary care provider, JULIA Santiago. Patient is accompanied by his wife who helped with the history. They reported the patient actually was being diagnosed of colon cancer 2 days ago. His GI specialist, Dr. Anil Garces, performed colonoscopic examination and saw a distal colon mass. I do not have the report for the procedure nor do I have the pathology report but nevertheless there was a mass in the distal colon likely in the sigmoid colon. I will obtain a copy of those reports when available.     Patient reports he had no apparent melena or hematochezia before starting oral iron treatment. He did have however significant fatigue. He reports exertional dyspnea and no syncope. Patient had laboratory study recently on 09/08/2022 which reported severe iron deficiency anemia with hemoglobin 7.8, MCV 79.9, MCHC 29.2. Normal platelets 217,000 and WBC 7230 without differentiation. Iron study reported ferritin 10.7 ng/mL, free iron 23, TIBC 593 and iron saturation 4%. Chemistry lab reported creatinine 1.11, potassium 5.4, otherwise normal sodium chloride and calcium, glucose 137. Prior to that the patient had normal liver function on 08/24/2022. He had a normal TSH 2.5 and slightly elevated hemoglobin A1c of 6.8% on that day.     The patient reports he has been on oral iron for almost 2 weeks, once a day with good tolerance and  he now noticed improved energy level and less exertional dyspnea. He does report stool becoming dark-colored after he started oral iron.  He reports no syncope.    Laboratory study today on 09/22/2022 reported improved hemoglobin 8.5 and normalized MCV 85.0, stable MCHC 29.4. Maintains normal platelets and WBC.         Past Medical History:   Diagnosis Date    Arthritis     Colon cancer (HCC) 9/22/2022    Colon polyps     Depression     DVT (deep venous thrombosis) (Formerly [facility])     IN LEFT LEG    Full dentures     Hip pain     RIGHT    Hyperlipidemia     Hypertension     Iron deficiency anemia 9/22/2022    Numbness and tingling     RIGHT FOOT    PONV (postoperative nausea and vomiting)     Right leg pain     Type 2 diabetes mellitus without complication, without long-term current use of insulin (Formerly [facility]) 10/8/2019     Past Surgical History:   Procedure Laterality Date    CERVICAL DISCECTOMY LAMINECTOMY DECOMPRESSION POSTERIOR FUSION WITH INSTRUMENTATION      COLONOSCOPY N/A 2/28/2018    Procedure: COLONOSCOPY to TI and cecum with polypectomy;  Surgeon: Anil Garces MD;  Location: [facility] ENDOSCOPY;  Service:     JOINT REPLACEMENT      LEFT HIP    KNEE ARTHROSCOPY Left     LUMBAR DISCECTOMY FUSION INSTRUMENTATION N/A 11/18/2020    Procedure: Lumbar 4 5 laminectomy with a posterior lateral fusion and instrumentation and interbody fusion;  Surgeon: Jeffrey Garcia MD;  Location: [facility] OR;  Service: Neurosurgery;  Laterality: N/A;    TOTAL HIP ARTHROPLASTY Right 6/3/2021    Procedure: TOTAL HIP ARTHROPLASTY POSTERIOR;  Surgeon: Shlomo Campos MD;  Location: [facility] OR;  Service: Orthopedics;  Laterality: Right;    VASECTOMY         MEDICATIONS    Current Outpatient Medications:     acetaminophen (TYLENOL) 500 MG tablet, Take 500 mg by mouth Every 6 (Six) Hours As Needed for Mild Pain ., Disp: , Rfl:     aspirin 81 MG EC tablet, Take 81 mg by mouth Daily. INSTRUCTED PT TO FOLLOW MD INSTRUCTIONS REGARDING HOLDING  FOR SURGERY, Disp: , Rfl:     atorvastatin (LIPITOR) 40 MG tablet, TAKE ONE TABLET BY MOUTH DAILY, Disp: 90 tablet, Rfl: 3    ferrous sulfate (FerrouSul) 325 (65 FE) MG tablet, Take 1 tablet by mouth Daily With Breakfast., Disp: 90 tablet, Rfl: 0    gabapentin (NEURONTIN) 300 MG capsule, TAKE ONE CAPSULE BY MOUTH THREE TIMES A DAY, Disp: 90 capsule, Rfl: 2    ketoconazole (NIZORAL) 2 % cream, Apply 1 application topically to the appropriate area as directed Daily., Disp: 30 g, Rfl: 0    lisinopril-hydrochlorothiazide (PRINZIDE,ZESTORETIC) 20-12.5 MG per tablet, TAKE ONE TABLET BY MOUTH ONCE NIGHTLY, Disp: 90 tablet, Rfl: 3    metFORMIN (GLUCOPHAGE) 500 MG tablet, Take 1 tablet by mouth 2 (Two) Times a Day With Meals., Disp: 180 tablet, Rfl: 3    mupirocin (BACTROBAN) 2 % ointment, Apply 1 application topically to the appropriate area as directed 3 (Three) Times a Day., Disp: 22 g, Rfl: 0    tadalafil (CIALIS) 5 MG tablet, Take 1 tablet by mouth Daily As Needed for Erectile Dysfunction. (Patient taking differently: Take 5 mg by mouth Daily As Needed for Erectile Dysfunction. HOLD PRIOR TO SURGERY), Disp: 30 tablet, Rfl: 2    ALLERGIES:   No Known Allergies    SOCIAL HISTORY:       Social History     Socioeconomic History    Marital status:      Spouse name: Chelsie    Years of education: 12   Tobacco Use    Smoking status: Current Some Day Smoker     Packs/day: 0.00     Years: 0.00     Pack years: 0.00     Types: Cigars    Smokeless tobacco: Never Used    Tobacco comment: OCCASIONALLY   Vaping Use    Vaping Use: Never used   Substance and Sexual Activity    Alcohol use: Yes     Alcohol/week: 0.0 standard drinks     Comment: ON WEEKENDS    Drug use: No         FAMILY HISTORY:  Family History   Problem Relation Age of Onset    Clotting disorder Other     Colon cancer Paternal Grandmother     Colon cancer Paternal Grandfather     Clotting disorder Father     Malig Hyperthermia Neg Hx        REVIEW OF  SYSTEMS:  Review of Systems   Constitutional: Positive for activity change and fatigue. Negative for appetite change and unexpected weight change.   HENT: Negative for mouth sores, nosebleeds and sore throat.    Eyes: Negative for visual disturbance.   Respiratory: Positive for shortness of breath (Improved exertional dyspnea after starting oral iron). Negative for cough.    Cardiovascular: Negative for chest pain and leg swelling.   Gastrointestinal: Negative for abdominal pain, anal bleeding, blood in stool, constipation, diarrhea and vomiting.   Endocrine: Positive for cold intolerance.   Genitourinary: Negative for dysuria and hematuria.   Musculoskeletal: Negative for arthralgias and joint swelling.   Skin: Positive for pallor.   Allergic/Immunologic: Negative for immunocompromised state.   Neurological: Negative for syncope, weakness and headaches.   Hematological: Negative for adenopathy. Does not bruise/bleed easily.   Psychiatric/Behavioral: Negative for confusion.       Temperature 98.F, /69, O2 saturation 98% room air, pulse 75, respirate 18.  Weight 98.4 kg and height 177.8 cm.    Current Status 9/22/2022   ECOG score 0      PHYSICAL EXAM:      CONSTITUTIONAL:  Vital signs reviewed.  Well-developed, well-nourished male.  No distress, looks comfortable.  EYES:  Conjunctivae and lids unremarkable.   EARS,NOSE,MOUTH,THROAT:  Ears and nose appear unremarkable.  Lips appear unremarkable.  RESPIRATORY:  Normal respiratory effort.  Lungs clear to auscultation bilaterally.  CARDIOVASCULAR: Regular rhythm and rate.  Normal S1, S2.  No murmurs.  No significant lower extremity edema.  GASTROINTESTINAL: Patient has center abdomen wall hernia, otherwise unremarkable.  Nontender.  No hepatomegaly.  No splenomegaly.  Bowel sounds normal.  LYMPHATIC:  No cervical, supraclavicular, axillary lymphadenopathy.  MUSCULOSKELETAL:  Unremarkable gait and station.  Unremarkable digits/nails.  No cyanosis or  clubbing.  SKIN:  Warm.  No rashes.  PSYCHIATRIC:  Normal judgment and insight.  Normal mood and affect.      RECENT LABS:    Results from last 7 days   Lab Units 09/22/22  1516   WBC 10*3/mm3 6.52   HEMOGLOBIN g/dL 8.5*   HEMATOCRIT % 28.9*   PLATELETS 10*3/mm3 209     Lab Results   Component Value Date    GLUCOSE 137 (H) 09/08/2022    BUN 22 09/08/2022    CREATININE 1.11 09/08/2022    EGFRIFNONA 70 02/23/2022    EGFRIFAFRI 81 02/23/2022    BCR 19.8 09/08/2022    K 5.4 (H) 09/08/2022    CO2 27.5 09/08/2022    CALCIUM 9.9 09/08/2022    PROTENTOTREF 7.2 08/24/2022    ALBUMIN 4.7 08/24/2022    LABIL2 1.9 08/24/2022    AST 21 08/24/2022    ALT 13 08/24/2022     Lab Results   Component Value Date    WGARAQMN79 260 08/24/2022     Lab Results   Component Value Date    TIBC 593 09/08/2022    FERRITIN 10.70 (L) 09/08/2022       Assessment & Plan     ASSESSMENT:    1. Colon cancer newly discovered. Had positive stool occult blood test on 09/09/2022. Colonoscopic examination 2 days ago on 09/20/2022 by Dr. Anil Garces reported colon mass in the distal colon possibly sigmoid colon as reported by patient himself. However, I do not have the procedure report which I will get.     I discussed with the patient he needs to a thorough workup for staging purpose. I recommended to obtain CT scan for chest, abdomen and pelvis with both IV contrast and oral contrast for thorough evaluation to see if he is a candidate for surgical resection. I also recommended to obtain laboratory study for CEA level.     I discussed with the patient and his wife if he has no evidence for metastatic disease then he would likely proceed ahead with surgical resection and he already has appointment to see surgeon, Dr. Samaniego, next Monday on 09/26/2022. If he is having surgery then he needs to come back for followup to review the pathology report and decide whether he needs adjuvant chemotherapy or not. However, if he has evidence of metastatic disease then  he certainly will need to start chemotherapy much earlier.     2. Iron deficiency anemia. The patient has iron deficiency due to GI bleeding from his colon cancer. His laboratory study on 09/08/2022 reported ferritin 10.7 and iron saturation 4% with microcytic hypochromic anemia, hemoglobin 7.8, MCV 79.9 and MCHC 29.2. Patient reports good tolerance to oral iron treatment and already has improved energy level. Laboratory study today did report slightly improved hemoglobin at 8.5 but normalized MCV 85.0. I think he is responding to oral iron treatment both physically and laboratory wise.     3.  Low normal vitamin B12 level.  Patient has no laboratory study for folate which I will obtain. Patient has low normal vitamin B12 level at 260 pg/mL on 08/24/2022. I think this patient will benefit from oral vitamin B12 supplement.     PLAN:   1. Obtain laboratory study today for CEA and folate level.   2. We will obtain CT scan for chest, abdomen and pelvis with both IV contrast and oral contrast for staging purpose of newly diagnosed colon cancer.   2. Continue oral iron ferrous sulfate 325 mg daily.   3. Keep appointment with Dr. Samaniego next Monday on 09/26/2022.   4. I will arrange the patient to come back and see me in 2 weeks to discuss CT scan imaging results and further management plan. If the patient is a candidate for surgical resection then he will follow up with Dr. Samaniego. I will bring him back for reevaluation in 5 weeks instead to discuss the pathology results from the surgical resection and further management plan.     I reviewed outside laboratory results and discussed with the patient and his wife about further imaging studies for staging purpose, laboratory studies and potential management options.     I spent more than 75 minutes in the patient’s care today.      Patient likely will benefit from oral vitamin B12 supplement at 1000 mcg daily to help with erythropoiesis.  Patient has borderline normal B12  level 260 pg/mL in August 2022.      GAMALIEL ROGERS M.D., Ph.D.     9/22/2022.    CC:  JULIA Santiago MD Richard Pokorny, MD

## 2022-09-26 ENCOUNTER — HOSPITAL ENCOUNTER (OUTPATIENT)
Dept: CT IMAGING | Facility: HOSPITAL | Age: 72
Discharge: HOME | End: 2022-09-26
Admitting: INTERNAL MEDICINE

## 2022-09-26 ENCOUNTER — OFFICE VISIT (OUTPATIENT)
Dept: SURGERY | Facility: CLINIC | Age: 72
End: 2022-09-26

## 2022-09-26 VITALS — WEIGHT: 222.6 LBS | HEIGHT: 70 IN | BODY MASS INDEX: 31.87 KG/M2

## 2022-09-26 DIAGNOSIS — C18.2 MALIGNANT NEOPLASM OF ASCENDING COLON: Primary | ICD-10-CM

## 2022-09-26 DIAGNOSIS — C18.7 MALIGNANT NEOPLASM OF SIGMOID COLON: ICD-10-CM

## 2022-09-26 PROCEDURE — 71260 CT THORAX DX C+: CPT

## 2022-09-26 PROCEDURE — 0 DIATRIZOATE MEGLUMINE & SODIUM PER 1 ML: Performed by: INTERNAL MEDICINE

## 2022-09-26 PROCEDURE — 99204 OFFICE O/P NEW MOD 45 MIN: CPT | Performed by: SURGERY

## 2022-09-26 PROCEDURE — 74177 CT ABD & PELVIS W/CONTRAST: CPT

## 2022-09-26 PROCEDURE — 0 IOPAMIDOL PER 1 ML: Performed by: INTERNAL MEDICINE

## 2022-09-26 RX ORDER — CEFAZOLIN SODIUM 2 G/100ML
2 INJECTION, SOLUTION INTRAVENOUS ONCE
Status: CANCELLED | OUTPATIENT
Start: 2022-10-11 | End: 2022-09-26

## 2022-09-26 RX ORDER — METRONIDAZOLE 500 MG/100ML
500 INJECTION, SOLUTION INTRAVENOUS ONCE
Status: CANCELLED | OUTPATIENT
Start: 2022-10-11 | End: 2022-09-26

## 2022-09-26 RX ORDER — METRONIDAZOLE 500 MG/1
500 TABLET ORAL 3 TIMES DAILY
Qty: 3 TABLET | Refills: 0 | Status: SHIPPED | OUTPATIENT
Start: 2022-09-26 | End: 2022-09-27

## 2022-09-26 RX ORDER — NEOMYCIN SULFATE 500 MG/1
1000 TABLET ORAL 3 TIMES DAILY
Qty: 6 TABLET | Refills: 0 | Status: SHIPPED | OUTPATIENT
Start: 2022-09-26 | End: 2022-09-27

## 2022-09-26 RX ADMIN — IOPAMIDOL 100 ML: 755 INJECTION, SOLUTION INTRAVENOUS at 18:36

## 2022-09-26 RX ADMIN — DIATRIZOATE MEGLUMINE AND DIATRIZOATE SODIUM 30 ML: 600; 100 SOLUTION ORAL; RECTAL at 18:35

## 2022-09-28 ENCOUNTER — TELEPHONE (OUTPATIENT)
Dept: ONCOLOGY | Facility: CLINIC | Age: 72
End: 2022-09-28

## 2022-09-28 NOTE — TELEPHONE ENCOUNTER
Caller: Chelsie Dickey    Relationship: Emergency Contact    Best call back number: [phone]    Caller requesting test results: YES    What test was performed: CT SCAN    When was the test performed: 9/26/22    Where was the test performed: [facility]

## 2022-09-28 NOTE — TELEPHONE ENCOUNTER
Attempted to call no answer left message.  Dr Rosario reviewed the results of CT Scan and ordered a MRI Abd with and without contrast Liver protocol. Message sent to appointment desk to schedule

## 2022-09-28 NOTE — TELEPHONE ENCOUNTER
Patient scheduled on 10/7/2022 @ 4:15 pm at [facility] for MRI Abd w/wo contrast Liver protocol. Patient's wife v/u

## 2022-09-29 NOTE — PROGRESS NOTES
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  CT scan does not show any definite evidence of metastatic disease.  There is a 9 mm area of decreased attenuation in segment 6 of the liver, in light of mildly elevated CEA level of 10.7 I doubt this is clinically significant.  I have recommended proceeding with laparoscopic right hemicolectomy.  He understands rationale for the procedure, the nature of the procedure, and the risks including but not limited to bleeding, infection, conversion to open procedure, and anastomotic leak requiring reoperation and temporary ostomy.  All questions answered and he is scheduled for October 11.    CC:     Colon cancer    HPI:    72-year-old gentleman who presented with iron deficiency anemia and has colonoscopy confirming cecal carcinoma.    ENDOSCOPY:   • EGD 9/20/2022: Sliding hiatal hernia.  Localized angioectasias.  Normal duodenum. Duodenal biopsies normal.  Esophageal biopsies moderate reflux esophagitis.    • Colonoscopy 9/20/2022: An ulcerated bleeding 4 cm mass of malignant appearance was found in the cecum.  6 mm benign-appearing polyp in the transverse colon. Colon polyp tubular adenoma.  Cecal mass invasive moderately differentiated adenocarcinoma.    RADIOLOGY:   • CT chest, abdomen, and pelvis: Subtle cecal mass.  Subtle 9 mm area of decreased attenuation in segment 6 of the liver.  Small liver lesion could present similarly in the setting of recently diagnosed malignancy.  Cholelithiasis.    LABS:    • CEA 10.7  • WBC 6.5, hemoglobin 8.5, platelets 209    SOCIAL HISTORY:   • Smokes cigars  • Occasional alcohol use    FAMILY HISTORY:    • Colorectal cancer: Paternal grandmother and paternal grandfather    PREVIOUS ABDOMINAL SURGERY    • None    PAST MEDICAL HISTORY:    • Colon cancer of cecum-current diagnosis  • Iron deficiency anemia secondary to above  • Diabetes  mellitus  • DVT  • Hypertension  • Hyperlipidemia    MEDICATIONS:   • Aspirin 81 mg  • Lipitor  • Iron  • Neurontin  • Prinzide  • Metformin    ALLERGIES:   • None    PHYSICAL EXAM:   • Constitutional: Well-developed well-nourished, no acute distress  • Vital signs:   o Weight: 222 pounds  o Height: 70 inches  o BMI: 31.9  • Neck: Supple, no palpable mass, trachea midline  • Respiratory: Normal nonlabored inspiratory effort  • Cardiovascular: Regular rate, no jugular venous distention  • Gastrointestinal: Soft, nontender  • Psychiatric: Alert and oriented ×3, normal affect     MARCIE STRANGE M.D.

## 2022-10-04 ENCOUNTER — APPOINTMENT (OUTPATIENT)
Dept: LAB | Facility: HOSPITAL | Age: 72
End: 2022-10-04

## 2022-10-04 ENCOUNTER — PRE-ADMISSION TESTING (OUTPATIENT)
Dept: PREADMISSION TESTING | Facility: HOSPITAL | Age: 72
End: 2022-10-04

## 2022-10-04 VITALS
SYSTOLIC BLOOD PRESSURE: 161 MMHG | WEIGHT: 221 LBS | BODY MASS INDEX: 41.72 KG/M2 | HEIGHT: 61 IN | TEMPERATURE: 97.7 F | RESPIRATION RATE: 18 BRPM | OXYGEN SATURATION: 100 % | HEART RATE: 52 BPM | DIASTOLIC BLOOD PRESSURE: 78 MMHG

## 2022-10-04 LAB
ANION GAP SERPL CALCULATED.3IONS-SCNC: 8.2 MMOL/L (ref 5–15)
BUN SERPL-MCNC: 12 MG/DL (ref 8–23)
BUN/CREAT SERPL: 12.1 (ref 7–25)
CALCIUM SPEC-SCNC: 9.7 MG/DL (ref 8.6–10.5)
CHLORIDE SERPL-SCNC: 102 MMOL/L (ref 98–107)
CO2 SERPL-SCNC: 27.8 MMOL/L (ref 22–29)
CREAT SERPL-MCNC: 0.99 MG/DL (ref 0.76–1.27)
DEPRECATED RDW RBC AUTO: 69.6 FL (ref 37–54)
EGFRCR SERPLBLD CKD-EPI 2021: 81.4 ML/MIN/1.73
ERYTHROCYTE [DISTWIDTH] IN BLOOD BY AUTOMATED COUNT: 22.4 % (ref 12.3–15.4)
GLUCOSE SERPL-MCNC: 131 MG/DL (ref 65–99)
HCT VFR BLD AUTO: 34.5 % (ref 37.5–51)
HGB BLD-MCNC: 10.7 G/DL (ref 13–17.7)
MCH RBC QN AUTO: 27 PG (ref 26.6–33)
MCHC RBC AUTO-ENTMCNC: 31 G/DL (ref 31.5–35.7)
MCV RBC AUTO: 86.9 FL (ref 79–97)
PLATELET # BLD AUTO: 175 10*3/MM3 (ref 140–450)
PMV BLD AUTO: 10.3 FL (ref 6–12)
POTASSIUM SERPL-SCNC: 5.4 MMOL/L (ref 3.5–5.2)
QT INTERVAL: 440 MS
RBC # BLD AUTO: 3.97 10*6/MM3 (ref 4.14–5.8)
SODIUM SERPL-SCNC: 138 MMOL/L (ref 136–145)
WBC NRBC COR # BLD: 5.37 10*3/MM3 (ref 3.4–10.8)

## 2022-10-04 PROCEDURE — 80048 BASIC METABOLIC PNL TOTAL CA: CPT

## 2022-10-04 PROCEDURE — 85027 COMPLETE CBC AUTOMATED: CPT

## 2022-10-04 PROCEDURE — 93010 ELECTROCARDIOGRAM REPORT: CPT | Performed by: INTERNAL MEDICINE

## 2022-10-04 PROCEDURE — 93005 ELECTROCARDIOGRAM TRACING: CPT

## 2022-10-04 PROCEDURE — 36415 COLL VENOUS BLD VENIPUNCTURE: CPT

## 2022-10-04 RX ORDER — CHLORHEXIDINE GLUCONATE 500 MG/1
CLOTH TOPICAL TAKE AS DIRECTED
COMMUNITY
End: 2022-10-12 | Stop reason: HOSPADM

## 2022-10-04 NOTE — DISCHARGE INSTRUCTIONS
Take the following medications the morning of surgery: NONE    ARRIVE AT 1030 AM ON 10/11/12    If you are on prescription narcotic pain medication to control your pain you may also take that medication the morning of surgery.    General Instructions:    Follow your surgeons instructions regarding when to stop solid foods and when to stop liquids.   Verify with your surgeon if you are to complete a bowel prep and when to do so.  Patients who avoid smoking, chewing tobacco and alcohol for 4 weeks prior to surgery have a reduced risk of post-operative complications.  Quit smoking as many days before surgery as you can.  Do not smoke, use chewing tobacco or drink alcohol the day of surgery.   If applicable bring your C-PAP/ BI-PAP machine.  Bring any papers given to you in the doctor’s office.  Wear clean comfortable clothes.  Do not wear contact lenses, false eyelashes or make-up.  Bring a case for your glasses.   Bring crutches or walker if applicable.  Remove all piercings.  Leave jewelry and any other valuables at home.  Hair extensions with metal clips must be removed prior to surgery.  The Pre-Admission Testing nurse will instruct you to bring medications if unable to obtain an accurate list in Pre-Admission Testing.        If you were given a blood bank ID arm band remember to bring it with you the day of surgery.    Preventing a Surgical Site Infection:  For 2 to 3 days before surgery, avoid shaving with a razor because the razor can irritate skin and make it easier to develop an infection.    Any areas of open skin can increase the risk of a post-operative wound infection by allowing bacteria to enter and travel throughout the body.  Notify your surgeon if you have any skin wounds / rashes even if it is not near the expected surgical site.  The area will need assessed to determine if surgery should be delayed until it is healed.  The night prior to surgery shower using a fresh bar of anti-bacterial soap (such  as Dial) and clean washcloth.  Sleep in a clean bed with clean clothing.  Do not allow pets to sleep with you.  Shower on the morning of surgery using a fresh bar of anti-bacterial soap (such as Dial) and clean washcloth.  Dry with a clean towel and dress in clean clothing.  Ask your surgeon if you will be receiving antibiotics prior to surgery.  Make sure you, your family, and all healthcare providers clean their hands with soap and water or an alcohol based hand  before caring for you or your wound.    Day of surgery:  Your arrival time is approximately two hours before your scheduled surgery time.  Upon arrival, a Pre-op nurse and Anesthesiologist will review your health history, obtain vital signs, and answer questions you may have.  The only belongings needed at this time will be a list of your home medications and if applicable your C-PAP/BI-PAP machine.  A Pre-op nurse will start an IV and you may receive medication in preparation for surgery, including something to help you relax.     Please be aware that surgery does come with discomfort.  We want to make every effort to control your discomfort so please discuss any uncontrolled symptoms with your nurse.   Your doctor will most likely have prescribed pain medications.      If you are going home after surgery you will receive individualized written care instructions before being discharged.  A responsible adult must drive you to and from the hospital on the day of your surgery and stay with you for 24 hours.  Discharge prescriptions can be filled by the hospital pharmacy during regular pharmacy hours.  If you are having surgery late in the day/evening your prescription may be e-prescribed to your pharmacy.  Please verify your pharmacy hours or chose a 24 hour pharmacy to avoid not having access to your prescription because your pharmacy has closed for the day.    If you are staying overnight following surgery, you will be transported to your  hospital room following the recovery period.  [facility] has all private rooms.    If you have any questions please call Pre-Admission Testing at [phone].  Deductibles and co-payments are collected on the day of service. Please be prepared to pay the required co-pay, deductible or deposit on the day of service as defined by your plan.    Call your surgeon immediately if you experience any of the following symptoms:  Sore Throat  Shortness of Breath or difficulty breathing  Cough  Chills  Body soreness or muscle pain  Headache  Fever  New loss of taste or smell  Do not arrive for your surgery ill.  Your procedure will need to be rescheduled to another time.  You will need to call your physician before the day of surgery to avoid any unnecessary exposure to hospital staff as well as other patients.     CHLORHEXIDINE CLOTH INSTRUCTIONS  The morning of surgery follow these instructions using the Chlorhexidine cloths you've been given.  These steps reduce bacteria on the body.  Do not use the cloths near your eyes, ears mouth, genitalia or on open wounds.  Throw the cloths away after use but do not try to flush them down a toilet.      Open and remove one cloth at a time from the package.    Leave the cloth unfolded and begin the bathing.  Massage the skin with the cloths using gentle pressure to remove bacteria.  Do not scrub harshly.   Follow the steps below with one 2% CHG cloth per area (6 total cloths).  One cloth for neck, shoulders and chest.  One cloth for both arms, hands, fingers and underarms (do underarms last).  One cloth for the abdomen followed by groin.  One cloth for right leg and foot including between the toes.  One cloth for left leg and foot including between the toes.  The last cloth is to be used for the back of the neck, back and buttocks.    Allow the CHG to air dry 3 minutes on the skin which will give it time to work and decrease the chance of irritation.  The skin may feel  sticky until it is dry.  Do not rinse with water or any other liquid or you will lose the beneficial effects of the CHG.  If mild skin irritation occurs, do rinse the skin to remove the CHG.  Report this to the nurse at time of admission.  Do not apply lotions, creams, ointments, deodorants or perfumes after using the clothes. Dress in clean clothes before coming to the hospital.

## 2022-10-05 ENCOUNTER — TELEPHONE (OUTPATIENT)
Dept: SURGERY | Facility: CLINIC | Age: 72
End: 2022-10-05

## 2022-10-05 NOTE — TELEPHONE ENCOUNTER
I left a voicemail for the patient to call back to confirm his new arrival time. He is to now arrive at 8:30am on 10/11/22 for surgery with Dr. Samaniego.

## 2022-10-07 ENCOUNTER — HOSPITAL ENCOUNTER (OUTPATIENT)
Dept: MRI IMAGING | Facility: HOSPITAL | Age: 72
Discharge: HOME | End: 2022-10-07
Admitting: INTERNAL MEDICINE

## 2022-10-07 DIAGNOSIS — R93.89 ABNORMAL FINDING ON IMAGING: ICD-10-CM

## 2022-10-07 DIAGNOSIS — C18.9 MALIGNANT NEOPLASM OF COLON, UNSPECIFIED PART OF COLON: ICD-10-CM

## 2022-10-07 LAB — CREAT BLDA-MCNC: 1 MG/DL (ref 0.6–1.3)

## 2022-10-07 PROCEDURE — 82565 ASSAY OF CREATININE: CPT

## 2022-10-07 PROCEDURE — A9577 INJ MULTIHANCE: HCPCS | Performed by: INTERNAL MEDICINE

## 2022-10-07 PROCEDURE — 74183 MRI ABD W/O CNTR FLWD CNTR: CPT

## 2022-10-07 PROCEDURE — 0 GADOBENATE DIMEGLUMINE 529 MG/ML SOLUTION: Performed by: INTERNAL MEDICINE

## 2022-10-07 RX ADMIN — GADOBENATE DIMEGLUMINE 20 ML: 529 INJECTION, SOLUTION INTRAVENOUS at 17:05

## 2022-10-11 ENCOUNTER — ANESTHESIA EVENT (OUTPATIENT)
Dept: PERIOP | Facility: HOSPITAL | Age: 72
End: 2022-10-11

## 2022-10-11 ENCOUNTER — HOSPITAL ENCOUNTER (INPATIENT)
Facility: HOSPITAL | Age: 72
LOS: 1 days | Discharge: HOME | End: 2022-10-12
Attending: SURGERY | Admitting: SURGERY

## 2022-10-11 ENCOUNTER — ANESTHESIA (OUTPATIENT)
Dept: PERIOP | Facility: HOSPITAL | Age: 72
End: 2022-10-11

## 2022-10-11 DIAGNOSIS — C18.2 MALIGNANT NEOPLASM OF ASCENDING COLON: ICD-10-CM

## 2022-10-11 LAB
GLUCOSE BLDC GLUCOMTR-MCNC: 115 MG/DL (ref 70–130)
GLUCOSE BLDC GLUCOMTR-MCNC: 175 MG/DL (ref 70–130)

## 2022-10-11 PROCEDURE — 82962 GLUCOSE BLOOD TEST: CPT

## 2022-10-11 PROCEDURE — C9290 INJ, BUPIVACAINE LIPOSOME: HCPCS | Performed by: SURGERY

## 2022-10-11 PROCEDURE — 25010000002 CEFAZOLIN IN DEXTROSE 2-4 GM/100ML-% SOLUTION: Performed by: SURGERY

## 2022-10-11 PROCEDURE — 44205 LAP COLECTOMY PART W/ILEUM: CPT | Performed by: SURGERY

## 2022-10-11 PROCEDURE — 0 BUPIVACAINE LIPOSOME 1.3 % SUSPENSION 20 ML VIAL: Performed by: SURGERY

## 2022-10-11 PROCEDURE — 25010000002 DEXAMETHASONE SODIUM PHOSPHATE 20 MG/5ML SOLUTION: Performed by: NURSE ANESTHETIST, CERTIFIED REGISTERED

## 2022-10-11 PROCEDURE — 25010000002 KETOROLAC TROMETHAMINE PER 15 MG: Performed by: SURGERY

## 2022-10-11 PROCEDURE — 25010000002 ONDANSETRON PER 1 MG: Performed by: NURSE ANESTHETIST, CERTIFIED REGISTERED

## 2022-10-11 PROCEDURE — 25010000002 HYDROMORPHONE 1 MG/ML SOLUTION: Performed by: NURSE ANESTHETIST, CERTIFIED REGISTERED

## 2022-10-11 PROCEDURE — 25010000002 MIDAZOLAM PER 1 MG: Performed by: ANESTHESIOLOGY

## 2022-10-11 PROCEDURE — 25010000002 HYDROMORPHONE PER 4 MG: Performed by: NURSE ANESTHETIST, CERTIFIED REGISTERED

## 2022-10-11 PROCEDURE — 25010000002 NEOSTIGMINE 5 MG/10ML SOLUTION: Performed by: NURSE ANESTHETIST, CERTIFIED REGISTERED

## 2022-10-11 PROCEDURE — 0DTF4ZZ RESECTION OF RIGHT LARGE INTESTINE, PERCUTANEOUS ENDOSCOPIC APPROACH: ICD-10-PCS | Performed by: SURGERY

## 2022-10-11 PROCEDURE — 25010000002 MAGNESIUM SULFATE PER 500 MG OF MAGNESIUM: Performed by: NURSE ANESTHETIST, CERTIFIED REGISTERED

## 2022-10-11 PROCEDURE — 25010000002 FENTANYL CITRATE (PF) 50 MCG/ML SOLUTION: Performed by: NURSE ANESTHETIST, CERTIFIED REGISTERED

## 2022-10-11 PROCEDURE — 88309 TISSUE EXAM BY PATHOLOGIST: CPT | Performed by: SURGERY

## 2022-10-11 PROCEDURE — 25010000002 FENTANYL CITRATE (PF) 100 MCG/2ML SOLUTION: Performed by: NURSE ANESTHETIST, CERTIFIED REGISTERED

## 2022-10-11 PROCEDURE — 25010000002 PROPOFOL 10 MG/ML EMULSION: Performed by: NURSE ANESTHETIST, CERTIFIED REGISTERED

## 2022-10-11 DEVICE — ENDOPATH ECHELON ENDOSCOPIC LINEAR CUTTER RELOADS, BLUE, 60MM
Type: IMPLANTABLE DEVICE | Site: ABDOMEN | Status: FUNCTIONAL
Brand: ECHELON ENDOPATH

## 2022-10-11 DEVICE — ENDOPATH ECHELON ENDOSCOPIC LINEAR CUTTER RELOADS, WHITE, 60MM
Type: IMPLANTABLE DEVICE | Site: ABDOMEN | Status: FUNCTIONAL
Brand: ECHELON ENDOPATH

## 2022-10-11 DEVICE — ENDOSCOPIC LINEAR CUTTER RELOADS GRAY 2.0MM, 6 ROWS
Type: IMPLANTABLE DEVICE | Site: ABDOMEN | Status: FUNCTIONAL
Brand: ECHELON ENDOPATH

## 2022-10-11 RX ORDER — NALOXONE HCL 0.4 MG/ML
0.1 VIAL (ML) INJECTION
Status: DISCONTINUED | OUTPATIENT
Start: 2022-10-11 | End: 2022-10-12 | Stop reason: HOSPADM

## 2022-10-11 RX ORDER — DIPHENHYDRAMINE HCL 25 MG
25 CAPSULE ORAL
Status: DISCONTINUED | OUTPATIENT
Start: 2022-10-11 | End: 2022-10-11 | Stop reason: HOSPADM

## 2022-10-11 RX ORDER — KETOROLAC TROMETHAMINE 30 MG/ML
15 INJECTION, SOLUTION INTRAMUSCULAR; INTRAVENOUS EVERY 6 HOURS
Status: DISCONTINUED | OUTPATIENT
Start: 2022-10-11 | End: 2022-10-12 | Stop reason: HOSPADM

## 2022-10-11 RX ORDER — KETAMINE HCL IN NACL, ISO-OSM 100MG/10ML
SYRINGE (ML) INJECTION AS NEEDED
Status: DISCONTINUED | OUTPATIENT
Start: 2022-10-11 | End: 2022-10-11 | Stop reason: SURG

## 2022-10-11 RX ORDER — FENTANYL CITRATE 50 UG/ML
INJECTION, SOLUTION INTRAMUSCULAR; INTRAVENOUS AS NEEDED
Status: DISCONTINUED | OUTPATIENT
Start: 2022-10-11 | End: 2022-10-11 | Stop reason: SURG

## 2022-10-11 RX ORDER — SODIUM CHLORIDE, SODIUM LACTATE, POTASSIUM CHLORIDE, CALCIUM CHLORIDE 600; 310; 30; 20 MG/100ML; MG/100ML; MG/100ML; MG/100ML
9 INJECTION, SOLUTION INTRAVENOUS CONTINUOUS
Status: DISCONTINUED | OUTPATIENT
Start: 2022-10-11 | End: 2022-10-11

## 2022-10-11 RX ORDER — MAGNESIUM HYDROXIDE 1200 MG/15ML
LIQUID ORAL AS NEEDED
Status: DISCONTINUED | OUTPATIENT
Start: 2022-10-11 | End: 2022-10-11 | Stop reason: HOSPADM

## 2022-10-11 RX ORDER — HYDROCODONE BITARTRATE AND ACETAMINOPHEN 5; 325 MG/1; MG/1
1 TABLET ORAL EVERY 4 HOURS PRN
Status: DISCONTINUED | OUTPATIENT
Start: 2022-10-11 | End: 2022-10-12 | Stop reason: HOSPADM

## 2022-10-11 RX ORDER — CEFAZOLIN SODIUM 2 G/100ML
2 INJECTION, SOLUTION INTRAVENOUS ONCE
Status: COMPLETED | OUTPATIENT
Start: 2022-10-11 | End: 2022-10-11

## 2022-10-11 RX ORDER — NALOXONE HCL 0.4 MG/ML
0.2 VIAL (ML) INJECTION AS NEEDED
Status: DISCONTINUED | OUTPATIENT
Start: 2022-10-11 | End: 2022-10-11 | Stop reason: HOSPADM

## 2022-10-11 RX ORDER — FAMOTIDINE 10 MG/ML
20 INJECTION, SOLUTION INTRAVENOUS ONCE
Status: COMPLETED | OUTPATIENT
Start: 2022-10-11 | End: 2022-10-11

## 2022-10-11 RX ORDER — ONDANSETRON 2 MG/ML
INJECTION INTRAMUSCULAR; INTRAVENOUS AS NEEDED
Status: DISCONTINUED | OUTPATIENT
Start: 2022-10-11 | End: 2022-10-11 | Stop reason: SURG

## 2022-10-11 RX ORDER — PROPOFOL 10 MG/ML
VIAL (ML) INTRAVENOUS AS NEEDED
Status: DISCONTINUED | OUTPATIENT
Start: 2022-10-11 | End: 2022-10-11 | Stop reason: SURG

## 2022-10-11 RX ORDER — METRONIDAZOLE 500 MG/100ML
500 INJECTION, SOLUTION INTRAVENOUS EVERY 8 HOURS SCHEDULED
Status: COMPLETED | OUTPATIENT
Start: 2022-10-11 | End: 2022-10-12

## 2022-10-11 RX ORDER — HYDRALAZINE HYDROCHLORIDE 20 MG/ML
5 INJECTION INTRAMUSCULAR; INTRAVENOUS
Status: DISCONTINUED | OUTPATIENT
Start: 2022-10-11 | End: 2022-10-11 | Stop reason: HOSPADM

## 2022-10-11 RX ORDER — ROCURONIUM BROMIDE 10 MG/ML
INJECTION, SOLUTION INTRAVENOUS AS NEEDED
Status: DISCONTINUED | OUTPATIENT
Start: 2022-10-11 | End: 2022-10-11 | Stop reason: SURG

## 2022-10-11 RX ORDER — NEOSTIGMINE METHYLSULFATE 0.5 MG/ML
INJECTION, SOLUTION INTRAVENOUS AS NEEDED
Status: DISCONTINUED | OUTPATIENT
Start: 2022-10-11 | End: 2022-10-11 | Stop reason: SURG

## 2022-10-11 RX ORDER — GABAPENTIN 300 MG/1
300 CAPSULE ORAL 3 TIMES DAILY
Status: DISCONTINUED | OUTPATIENT
Start: 2022-10-11 | End: 2022-10-12 | Stop reason: HOSPADM

## 2022-10-11 RX ORDER — OXYCODONE AND ACETAMINOPHEN 7.5; 325 MG/1; MG/1
1 TABLET ORAL EVERY 4 HOURS PRN
Status: DISCONTINUED | OUTPATIENT
Start: 2022-10-11 | End: 2022-10-11

## 2022-10-11 RX ORDER — ONDANSETRON 2 MG/ML
4 INJECTION INTRAMUSCULAR; INTRAVENOUS EVERY 6 HOURS PRN
Status: DISCONTINUED | OUTPATIENT
Start: 2022-10-11 | End: 2022-10-12 | Stop reason: HOSPADM

## 2022-10-11 RX ORDER — METRONIDAZOLE 500 MG/100ML
500 INJECTION, SOLUTION INTRAVENOUS ONCE
Status: COMPLETED | OUTPATIENT
Start: 2022-10-11 | End: 2022-10-11

## 2022-10-11 RX ORDER — HYDROCODONE BITARTRATE AND ACETAMINOPHEN 7.5; 325 MG/1; MG/1
1 TABLET ORAL ONCE AS NEEDED
Status: DISCONTINUED | OUTPATIENT
Start: 2022-10-11 | End: 2022-10-11

## 2022-10-11 RX ORDER — DEXAMETHASONE SODIUM PHOSPHATE 4 MG/ML
INJECTION, SOLUTION INTRA-ARTICULAR; INTRALESIONAL; INTRAMUSCULAR; INTRAVENOUS; SOFT TISSUE AS NEEDED
Status: DISCONTINUED | OUTPATIENT
Start: 2022-10-11 | End: 2022-10-11 | Stop reason: SURG

## 2022-10-11 RX ORDER — PROMETHAZINE HYDROCHLORIDE 25 MG/1
25 TABLET ORAL ONCE AS NEEDED
Status: DISCONTINUED | OUTPATIENT
Start: 2022-10-11 | End: 2022-10-11 | Stop reason: HOSPADM

## 2022-10-11 RX ORDER — FENTANYL CITRATE 50 UG/ML
50 INJECTION, SOLUTION INTRAMUSCULAR; INTRAVENOUS
Status: DISCONTINUED | OUTPATIENT
Start: 2022-10-11 | End: 2022-10-11 | Stop reason: HOSPADM

## 2022-10-11 RX ORDER — SODIUM CHLORIDE 0.9 % (FLUSH) 0.9 %
3 SYRINGE (ML) INJECTION EVERY 12 HOURS SCHEDULED
Status: DISCONTINUED | OUTPATIENT
Start: 2022-10-11 | End: 2022-10-11 | Stop reason: HOSPADM

## 2022-10-11 RX ORDER — MAGNESIUM SULFATE HEPTAHYDRATE 500 MG/ML
INJECTION, SOLUTION INTRAMUSCULAR; INTRAVENOUS AS NEEDED
Status: DISCONTINUED | OUTPATIENT
Start: 2022-10-11 | End: 2022-10-11 | Stop reason: SURG

## 2022-10-11 RX ORDER — SODIUM CHLORIDE 9 MG/ML
INJECTION, SOLUTION INTRAVENOUS AS NEEDED
Status: DISCONTINUED | OUTPATIENT
Start: 2022-10-11 | End: 2022-10-11 | Stop reason: HOSPADM

## 2022-10-11 RX ORDER — FLUMAZENIL 0.1 MG/ML
0.2 INJECTION INTRAVENOUS AS NEEDED
Status: DISCONTINUED | OUTPATIENT
Start: 2022-10-11 | End: 2022-10-11 | Stop reason: HOSPADM

## 2022-10-11 RX ORDER — GLYCOPYRROLATE 0.2 MG/ML
INJECTION INTRAMUSCULAR; INTRAVENOUS AS NEEDED
Status: DISCONTINUED | OUTPATIENT
Start: 2022-10-11 | End: 2022-10-11 | Stop reason: SURG

## 2022-10-11 RX ORDER — ONDANSETRON 2 MG/ML
4 INJECTION INTRAMUSCULAR; INTRAVENOUS ONCE AS NEEDED
Status: DISCONTINUED | OUTPATIENT
Start: 2022-10-11 | End: 2022-10-11 | Stop reason: HOSPADM

## 2022-10-11 RX ORDER — PROMETHAZINE HYDROCHLORIDE 25 MG/1
25 SUPPOSITORY RECTAL ONCE AS NEEDED
Status: DISCONTINUED | OUTPATIENT
Start: 2022-10-11 | End: 2022-10-11 | Stop reason: HOSPADM

## 2022-10-11 RX ORDER — LABETALOL HYDROCHLORIDE 5 MG/ML
5 INJECTION, SOLUTION INTRAVENOUS
Status: DISCONTINUED | OUTPATIENT
Start: 2022-10-11 | End: 2022-10-11 | Stop reason: HOSPADM

## 2022-10-11 RX ORDER — HYDROMORPHONE HYDROCHLORIDE 1 MG/ML
0.5 INJECTION, SOLUTION INTRAMUSCULAR; INTRAVENOUS; SUBCUTANEOUS
Status: DISCONTINUED | OUTPATIENT
Start: 2022-10-11 | End: 2022-10-11 | Stop reason: HOSPADM

## 2022-10-11 RX ORDER — HYDROCODONE BITARTRATE AND ACETAMINOPHEN 5; 325 MG/1; MG/1
2 TABLET ORAL EVERY 4 HOURS PRN
Status: DISCONTINUED | OUTPATIENT
Start: 2022-10-11 | End: 2022-10-12 | Stop reason: HOSPADM

## 2022-10-11 RX ORDER — SODIUM CHLORIDE 0.9 % (FLUSH) 0.9 %
3-10 SYRINGE (ML) INJECTION AS NEEDED
Status: DISCONTINUED | OUTPATIENT
Start: 2022-10-11 | End: 2022-10-11 | Stop reason: HOSPADM

## 2022-10-11 RX ORDER — HYDROMORPHONE HYDROCHLORIDE 1 MG/ML
0.5 INJECTION, SOLUTION INTRAMUSCULAR; INTRAVENOUS; SUBCUTANEOUS
Status: DISCONTINUED | OUTPATIENT
Start: 2022-10-11 | End: 2022-10-12 | Stop reason: HOSPADM

## 2022-10-11 RX ORDER — MIDAZOLAM HYDROCHLORIDE 1 MG/ML
0.5 INJECTION INTRAMUSCULAR; INTRAVENOUS
Status: DISCONTINUED | OUTPATIENT
Start: 2022-10-11 | End: 2022-10-11 | Stop reason: HOSPADM

## 2022-10-11 RX ORDER — DIPHENHYDRAMINE HYDROCHLORIDE 50 MG/ML
12.5 INJECTION INTRAMUSCULAR; INTRAVENOUS
Status: DISCONTINUED | OUTPATIENT
Start: 2022-10-11 | End: 2022-10-11 | Stop reason: HOSPADM

## 2022-10-11 RX ORDER — BUPIVACAINE HYDROCHLORIDE AND EPINEPHRINE 5; 5 MG/ML; UG/ML
INJECTION, SOLUTION EPIDURAL; INTRACAUDAL; PERINEURAL AS NEEDED
Status: DISCONTINUED | OUTPATIENT
Start: 2022-10-11 | End: 2022-10-11 | Stop reason: HOSPADM

## 2022-10-11 RX ORDER — LIDOCAINE HYDROCHLORIDE 20 MG/ML
INJECTION, SOLUTION EPIDURAL; INFILTRATION; INTRACAUDAL; PERINEURAL AS NEEDED
Status: DISCONTINUED | OUTPATIENT
Start: 2022-10-11 | End: 2022-10-11 | Stop reason: SURG

## 2022-10-11 RX ORDER — LIDOCAINE HYDROCHLORIDE 10 MG/ML
0.5 INJECTION, SOLUTION EPIDURAL; INFILTRATION; INTRACAUDAL; PERINEURAL ONCE AS NEEDED
Status: DISCONTINUED | OUTPATIENT
Start: 2022-10-11 | End: 2022-10-11 | Stop reason: HOSPADM

## 2022-10-11 RX ORDER — ENOXAPARIN SODIUM 100 MG/ML
40 INJECTION SUBCUTANEOUS DAILY
Status: DISCONTINUED | OUTPATIENT
Start: 2022-10-12 | End: 2022-10-12 | Stop reason: HOSPADM

## 2022-10-11 RX ORDER — CEFAZOLIN SODIUM 2 G/100ML
2 INJECTION, SOLUTION INTRAVENOUS EVERY 8 HOURS
Status: COMPLETED | OUTPATIENT
Start: 2022-10-11 | End: 2022-10-12

## 2022-10-11 RX ORDER — EPHEDRINE SULFATE 50 MG/ML
5 INJECTION, SOLUTION INTRAVENOUS ONCE AS NEEDED
Status: DISCONTINUED | OUTPATIENT
Start: 2022-10-11 | End: 2022-10-11 | Stop reason: HOSPADM

## 2022-10-11 RX ADMIN — HYDROCODONE BITARTRATE AND ACETAMINOPHEN 2 TABLET: 5; 325 TABLET ORAL at 14:56

## 2022-10-11 RX ADMIN — HYDROMORPHONE HYDROCHLORIDE 0.5 MG: 1 INJECTION, SOLUTION INTRAMUSCULAR; INTRAVENOUS; SUBCUTANEOUS at 14:53

## 2022-10-11 RX ADMIN — GABAPENTIN 300 MG: 300 CAPSULE ORAL at 20:31

## 2022-10-11 RX ADMIN — MAGNESIUM SULFATE HEPTAHYDRATE 0.5 G: 500 INJECTION, SOLUTION INTRAMUSCULAR; INTRAVENOUS at 12:04

## 2022-10-11 RX ADMIN — ROCURONIUM BROMIDE 10 MG: 50 INJECTION INTRAVENOUS at 13:17

## 2022-10-11 RX ADMIN — FENTANYL CITRATE 25 MCG: 50 INJECTION, SOLUTION INTRAMUSCULAR; INTRAVENOUS at 12:20

## 2022-10-11 RX ADMIN — NEOSTIGMINE METHYLSULFATE 3 MG: 0.5 INJECTION INTRAVENOUS at 13:28

## 2022-10-11 RX ADMIN — GLYCOPYRROLATE 0.2 MCG: 1 INJECTION INTRAMUSCULAR; INTRAVENOUS at 11:40

## 2022-10-11 RX ADMIN — HYDROMORPHONE HYDROCHLORIDE 0.5 MG: 1 INJECTION, SOLUTION INTRAMUSCULAR; INTRAVENOUS; SUBCUTANEOUS at 13:33

## 2022-10-11 RX ADMIN — METRONIDAZOLE 500 MG: 500 INJECTION, SOLUTION INTRAVENOUS at 20:32

## 2022-10-11 RX ADMIN — FENTANYL CITRATE 50 MCG: 50 INJECTION INTRAMUSCULAR; INTRAVENOUS at 14:18

## 2022-10-11 RX ADMIN — FAMOTIDINE 20 MG: 10 INJECTION INTRAVENOUS at 09:50

## 2022-10-11 RX ADMIN — ROCURONIUM BROMIDE 10 MG: 50 INJECTION INTRAVENOUS at 12:38

## 2022-10-11 RX ADMIN — HYDROMORPHONE HYDROCHLORIDE 0.5 MG: 1 INJECTION, SOLUTION INTRAMUSCULAR; INTRAVENOUS; SUBCUTANEOUS at 14:11

## 2022-10-11 RX ADMIN — SUGAMMADEX 200 MG: 100 INJECTION, SOLUTION INTRAVENOUS at 13:51

## 2022-10-11 RX ADMIN — LIDOCAINE HYDROCHLORIDE 100 MG: 20 INJECTION, SOLUTION EPIDURAL; INFILTRATION; INTRACAUDAL; PERINEURAL at 11:40

## 2022-10-11 RX ADMIN — FENTANYL CITRATE 25 MCG: 50 INJECTION, SOLUTION INTRAMUSCULAR; INTRAVENOUS at 11:56

## 2022-10-11 RX ADMIN — CEFAZOLIN SODIUM 2 G: 2 INJECTION, SOLUTION INTRAVENOUS at 21:48

## 2022-10-11 RX ADMIN — GLYCOPYRROLATE 0.4 MCG: 1 INJECTION INTRAMUSCULAR; INTRAVENOUS at 13:28

## 2022-10-11 RX ADMIN — FENTANYL CITRATE 25 MCG: 50 INJECTION, SOLUTION INTRAMUSCULAR; INTRAVENOUS at 13:21

## 2022-10-11 RX ADMIN — METRONIDAZOLE 500 MG: 500 INJECTION, SOLUTION INTRAVENOUS at 11:26

## 2022-10-11 RX ADMIN — KETOROLAC TROMETHAMINE 15 MG: 30 INJECTION, SOLUTION INTRAMUSCULAR at 20:31

## 2022-10-11 RX ADMIN — MIDAZOLAM 0.5 MG: 1 INJECTION, SOLUTION INTRAMUSCULAR; INTRAVENOUS at 09:50

## 2022-10-11 RX ADMIN — KETOROLAC TROMETHAMINE 15 MG: 30 INJECTION, SOLUTION INTRAMUSCULAR at 14:37

## 2022-10-11 RX ADMIN — ROCURONIUM BROMIDE 50 MG: 50 INJECTION INTRAVENOUS at 11:40

## 2022-10-11 RX ADMIN — HYDROCODONE BITARTRATE AND ACETAMINOPHEN 1 TABLET: 5; 325 TABLET ORAL at 19:33

## 2022-10-11 RX ADMIN — ONDANSETRON 4 MG: 2 INJECTION INTRAMUSCULAR; INTRAVENOUS at 13:28

## 2022-10-11 RX ADMIN — METFORMIN HYDROCHLORIDE 500 MG: 500 TABLET ORAL at 20:32

## 2022-10-11 RX ADMIN — CEFAZOLIN SODIUM 2 G: 2 INJECTION, SOLUTION INTRAVENOUS at 11:17

## 2022-10-11 RX ADMIN — PROPOFOL 150 MG: 10 INJECTION, EMULSION INTRAVENOUS at 11:40

## 2022-10-11 RX ADMIN — SODIUM CHLORIDE, POTASSIUM CHLORIDE, SODIUM LACTATE AND CALCIUM CHLORIDE 9 ML/HR: 600; 310; 30; 20 INJECTION, SOLUTION INTRAVENOUS at 09:54

## 2022-10-11 RX ADMIN — MAGNESIUM SULFATE HEPTAHYDRATE 1 G: 500 INJECTION, SOLUTION INTRAMUSCULAR; INTRAVENOUS at 12:08

## 2022-10-11 RX ADMIN — GABAPENTIN 300 MG: 300 CAPSULE ORAL at 15:36

## 2022-10-11 RX ADMIN — FENTANYL CITRATE 50 MCG: 50 INJECTION INTRAMUSCULAR; INTRAVENOUS at 13:59

## 2022-10-11 RX ADMIN — Medication 10 MG: at 13:27

## 2022-10-11 RX ADMIN — MAGNESIUM SULFATE HEPTAHYDRATE 0.5 G: 500 INJECTION, SOLUTION INTRAMUSCULAR; INTRAVENOUS at 12:06

## 2022-10-11 RX ADMIN — Medication 30 MG: at 11:40

## 2022-10-11 RX ADMIN — HYDROCODONE BITARTRATE AND ACETAMINOPHEN 1 TABLET: 5; 325 TABLET ORAL at 23:50

## 2022-10-11 RX ADMIN — FENTANYL CITRATE 25 MCG: 50 INJECTION, SOLUTION INTRAMUSCULAR; INTRAVENOUS at 11:57

## 2022-10-11 RX ADMIN — Medication 10 MG: at 12:36

## 2022-10-11 RX ADMIN — DEXAMETHASONE SODIUM PHOSPHATE 8 MG: 4 INJECTION, SOLUTION INTRAMUSCULAR; INTRAVENOUS at 11:44

## 2022-10-11 NOTE — ANESTHESIA PREPROCEDURE EVALUATION
Anesthesia Evaluation     Patient summary reviewed and Nursing notes reviewed   no history of anesthetic complications:  NPO Solid Status: > 8 hours  NPO Liquid Status: > 2 hours           Airway   Mallampati: II  TM distance: >3 FB  Neck ROM: limited  No difficulty expected  Dental    (+) lower dentures and upper dentures    Pulmonary    (+) a smoker Current,   Cardiovascular     ECG reviewed  Rhythm: regular  Rate: normal    (+) hypertension, DVT, hyperlipidemia,       Neuro/Psych  (+) numbness, psychiatric history Depression,    GI/Hepatic/Renal/Endo    (+) obesity,   diabetes mellitus type 2,     Musculoskeletal     Abdominal    Substance History - negative use     OB/GYN negative ob/gyn ROS         Other   arthritis, blood dyscrasia anemia,   history of cancer active                    Anesthesia Plan    ASA 3     general     (BMI 41  Full upper and lower dentures   Smoker    Grade 1 view last intubation)  intravenous induction     Anesthetic plan, risks, benefits, and alternatives have been provided, discussed and informed consent has been obtained with: patient.

## 2022-10-11 NOTE — ANESTHESIA PROCEDURE NOTES
Airway  Urgency: elective    Airway not difficult    General Information and Staff    Patient location during procedure: OR  Anesthesiologist: Castro Larose MD  CRNA/CAA: Danielle Smith CRNA    Indications and Patient Condition  Indications for airway management: airway protection    Preoxygenated: yes  Mask difficulty assessment: 1 - vent by mask    Final Airway Details  Final airway type: endotracheal airway      Successful airway: ETT  Cuffed: yes   Successful intubation technique: direct laryngoscopy  Facilitating devices/methods: intubating stylet  Endotracheal tube insertion site: oral  Blade: Gil  Blade size: 4  ETT size (mm): 7.5  Cormack-Lehane Classification: grade I - full view of glottis  Placement verified by: chest auscultation and capnometry   Measured from: lips  ETT/EBT  to lips (cm): 22  Number of attempts at approach: 1  Assessment: lips, teeth, and gum same as pre-op and atraumatic intubation

## 2022-10-11 NOTE — ANESTHESIA POSTPROCEDURE EVALUATION
"Patient: Taz Dickey    Procedure Summary     Date: 10/11/22 Room / Location: [facility] OR  / [facility] MAIN OR    Anesthesia Start: 1134 Anesthesia Stop: 1356    Procedure: LAPAROSCOPIC RIGHT COLON RESECTION (Abdomen) Diagnosis:       Malignant neoplasm of ascending colon (HCC)      (Malignant neoplasm of ascending colon (HCC) [C18.2])    Surgeons: Ga Samaniego MD Provider: Wallace Dorado MD    Anesthesia Type: general ASA Status: 3          Anesthesia Type: general    Vitals  Vitals Value Taken Time   /71 10/11/22 1431   Temp 36.6 °C (97.9 °F) 10/11/22 1354   Pulse 67 10/11/22 1438   Resp 16 10/11/22 1406   SpO2 96 % 10/11/22 1438   Vitals shown include unvalidated device data.        Post Anesthesia Care and Evaluation    Patient location during evaluation: bedside  Patient participation: complete - patient participated  Level of consciousness: sleepy but conscious  Pain score: 0  Pain management: adequate    Airway patency: patent  Anesthetic complications: No anesthetic complications    Cardiovascular status: acceptable  Respiratory status: acceptable  Hydration status: acceptable    Comments: /78   Pulse 71   Temp 36.6 °C (97.9 °F) (Oral)   Resp 16   Ht 177.8 cm (70\") Comment: pt sttes correct  Wt 100 kg (220 lb 7.4 oz)   SpO2 98%   BMI 31.63 kg/m²         "

## 2022-10-11 NOTE — OP NOTE
PREOPERATIVE DIAGNOSIS:  Carcinoma of ascending colon    POSTOPERATIVE DIAGNOSIS (FINDINGS):  Same.  There were no peritoneal implants and no abnormalities visible on the liver.    PROCEDURE:  Laparoscopic right colectomy    SURGEON:  Ga Samaniego MD    ASSISTANT:  Stormy Felipe, was responsible for performing the following activities: suction, irrigation, suturing, closing, retraction, camera holding, and placing dressing, and their skilled assistance was necessary for the success of this case.    ANESTHESIA:  General    EBL:  Minimal    SPECIMEN(S):  Right colon    DESCRIPTION:  In supine position under general anesthetic prepped and draped usual sterile manner.  Half percent Marcaine with epinephrine mixed with Exparel infiltrated all incision sites.  Small incision made above the umbilicus and Veress needle inserted with upper extraction on the abdominal wall.  Abdomen insufflated to 15 mmHg and a 5 mm Optiview trocar inserted followed by subxiphoid 5, suprapubic 5, right lower quadrant 5, and left midabdomen 12 mm trochars.  The ileocolic pedicle was isolated and divided with the vascular loaded stapler.  The terminal ileum, cecum, ascending colon, and hepatic flexure were mobilized with the harmonic scalpel.  The terminal ileum mesentery was divided with the harmonic scalpel and the terminal ileum was divided with the [place] white loaded stapler.  The proximal transverse colon was skeletonized dividing the mesentery with the harmonic scalpel.  The colon itself was divided with the blue loaded [place] stapler.  The remaining retroperitoneal attachments of the ascending colon were divided with the harmonic scalpel.  The specimen was placed in the right upper quadrant.  The terminal ileum and proximal transverse colon were sutured together with 2-0 silk and retracted to the patient's right.  And enterotomy and colotomy was made and [place] 60 mm white loaded stapler was inserted and fired to fashion a  side-to-side anastomosis.  The mutual enterotomy was closed in 2 layers of running interlocking 3-0 chromic and outer interrupted seromuscular 2-0 silk.  Good hemostasis was noted throughout the peritoneal cavity.  The bowel was appropriately oriented with no torsion of the mesentery and no internal hernia.  The 12 mm trocar incision was extended and a small wound protector was inserted.  The specimen was withdrawn through the wound protector without difficulty.  The fascia of that incision was closed in 2 layers of running 0 PDS.  Skin edges were all closed with 5-0 Vicryl subcuticular followed by Exofin.  Tolerated well, stable to recovery room.    Ga Samaniego M.D.

## 2022-10-11 NOTE — PROGRESS NOTES
Discussed MRI findings with Dr. Lee of radiology and Dr. Rosario of oncology.  Given the somewhat deep and very small subtle lesion, likelihood of successful CT-guided biopsy is low.  Dr. Rosario and I concur that the best approach here would be to proceed with the planned laparoscopic right colectomy and obtain final pathology from that, likely followed by recommendation for chemotherapy and surveillance of the isolated lesion in the liver.  I discussed this all at length with Mr. Dickey and his family and he wishes to proceed as outlined.

## 2022-10-12 ENCOUNTER — READMISSION MANAGEMENT (OUTPATIENT)
Dept: CALL CENTER | Facility: HOSPITAL | Age: 72
End: 2022-10-12

## 2022-10-12 VITALS
HEART RATE: 51 BPM | RESPIRATION RATE: 14 BRPM | WEIGHT: 220.46 LBS | HEIGHT: 70 IN | DIASTOLIC BLOOD PRESSURE: 61 MMHG | OXYGEN SATURATION: 95 % | BODY MASS INDEX: 31.56 KG/M2 | TEMPERATURE: 97.9 F | SYSTOLIC BLOOD PRESSURE: 107 MMHG

## 2022-10-12 LAB
ANION GAP SERPL CALCULATED.3IONS-SCNC: 9.2 MMOL/L (ref 5–15)
BASOPHILS # BLD AUTO: 0.03 10*3/MM3 (ref 0–0.2)
BASOPHILS NFR BLD AUTO: 0.4 % (ref 0–1.5)
BUN SERPL-MCNC: 21 MG/DL (ref 8–23)
BUN/CREAT SERPL: 17.8 (ref 7–25)
CALCIUM SPEC-SCNC: 8.5 MG/DL (ref 8.6–10.5)
CHLORIDE SERPL-SCNC: 101 MMOL/L (ref 98–107)
CO2 SERPL-SCNC: 26.8 MMOL/L (ref 22–29)
CREAT SERPL-MCNC: 1.18 MG/DL (ref 0.76–1.27)
DEPRECATED RDW RBC AUTO: 73.6 FL (ref 37–54)
EGFRCR SERPLBLD CKD-EPI 2021: 66 ML/MIN/1.73
EOSINOPHIL # BLD AUTO: 0.02 10*3/MM3 (ref 0–0.4)
EOSINOPHIL NFR BLD AUTO: 0.3 % (ref 0.3–6.2)
ERYTHROCYTE [DISTWIDTH] IN BLOOD BY AUTOMATED COUNT: 23.1 % (ref 12.3–15.4)
GLUCOSE SERPL-MCNC: 105 MG/DL (ref 65–99)
HCT VFR BLD AUTO: 34.3 % (ref 37.5–51)
HGB BLD-MCNC: 10.5 G/DL (ref 13–17.7)
IMM GRANULOCYTES # BLD AUTO: 0.02 10*3/MM3 (ref 0–0.05)
IMM GRANULOCYTES NFR BLD AUTO: 0.3 % (ref 0–0.5)
LYMPHOCYTES # BLD AUTO: 0.99 10*3/MM3 (ref 0.7–3.1)
LYMPHOCYTES NFR BLD AUTO: 13.3 % (ref 19.6–45.3)
MCH RBC QN AUTO: 27.7 PG (ref 26.6–33)
MCHC RBC AUTO-ENTMCNC: 30.6 G/DL (ref 31.5–35.7)
MCV RBC AUTO: 90.5 FL (ref 79–97)
MONOCYTES # BLD AUTO: 0.71 10*3/MM3 (ref 0.1–0.9)
MONOCYTES NFR BLD AUTO: 9.5 % (ref 5–12)
NEUTROPHILS NFR BLD AUTO: 5.67 10*3/MM3 (ref 1.7–7)
NEUTROPHILS NFR BLD AUTO: 76.2 % (ref 42.7–76)
NRBC BLD AUTO-RTO: 0 /100 WBC (ref 0–0.2)
PLATELET # BLD AUTO: 164 10*3/MM3 (ref 140–450)
PMV BLD AUTO: 11 FL (ref 6–12)
POTASSIUM SERPL-SCNC: 4.5 MMOL/L (ref 3.5–5.2)
RBC # BLD AUTO: 3.79 10*6/MM3 (ref 4.14–5.8)
SODIUM SERPL-SCNC: 137 MMOL/L (ref 136–145)
WBC NRBC COR # BLD: 7.44 10*3/MM3 (ref 3.4–10.8)

## 2022-10-12 PROCEDURE — 25010000002 KETOROLAC TROMETHAMINE PER 15 MG: Performed by: SURGERY

## 2022-10-12 PROCEDURE — 80048 BASIC METABOLIC PNL TOTAL CA: CPT | Performed by: SURGERY

## 2022-10-12 PROCEDURE — 85025 COMPLETE CBC W/AUTO DIFF WBC: CPT | Performed by: SURGERY

## 2022-10-12 PROCEDURE — 25010000002 ENOXAPARIN PER 10 MG: Performed by: SURGERY

## 2022-10-12 PROCEDURE — 25010000002 CEFAZOLIN IN DEXTROSE 2-4 GM/100ML-% SOLUTION: Performed by: SURGERY

## 2022-10-12 RX ORDER — HYDROCODONE BITARTRATE AND ACETAMINOPHEN 5; 325 MG/1; MG/1
TABLET ORAL
Qty: 20 TABLET | Refills: 0 | Status: ON HOLD | OUTPATIENT
Start: 2022-10-12 | End: 2022-10-21 | Stop reason: SDUPTHER

## 2022-10-12 RX ADMIN — HYDROCODONE BITARTRATE AND ACETAMINOPHEN 2 TABLET: 5; 325 TABLET ORAL at 08:48

## 2022-10-12 RX ADMIN — KETOROLAC TROMETHAMINE 15 MG: 30 INJECTION, SOLUTION INTRAMUSCULAR at 08:32

## 2022-10-12 RX ADMIN — LISINOPRIL: 20 TABLET ORAL at 08:34

## 2022-10-12 RX ADMIN — CEFAZOLIN SODIUM 2 G: 2 INJECTION, SOLUTION INTRAVENOUS at 03:01

## 2022-10-12 RX ADMIN — GABAPENTIN 300 MG: 300 CAPSULE ORAL at 08:32

## 2022-10-12 RX ADMIN — METFORMIN HYDROCHLORIDE 500 MG: 500 TABLET ORAL at 08:35

## 2022-10-12 RX ADMIN — ENOXAPARIN SODIUM 40 MG: 100 INJECTION SUBCUTANEOUS at 08:32

## 2022-10-12 RX ADMIN — HYDROCODONE BITARTRATE AND ACETAMINOPHEN 1 TABLET: 5; 325 TABLET ORAL at 03:52

## 2022-10-12 RX ADMIN — METRONIDAZOLE 500 MG: 500 INJECTION, SOLUTION INTRAVENOUS at 05:16

## 2022-10-12 RX ADMIN — KETOROLAC TROMETHAMINE 15 MG: 30 INJECTION, SOLUTION INTRAMUSCULAR at 02:55

## 2022-10-12 NOTE — CASE MANAGEMENT/SOCIAL WORK
Discharge Planning Assessment  [facility]     Patient Name: Taz Dickey  MRN: [id number]  Today's Date: 10/12/2022    Admit Date: 10/11/2022    Plan: Home with family support   Discharge Needs Assessment     Row Name 10/12/22 1235       Living Environment    People in Home spouse    Name(s) of People in Home Chelsie Dickey wife [phone]    Current Living Arrangements home    Primary Care Provided by self    Provides Primary Care For no one    Family Caregiver if Needed spouse    Family Caregiver Names Chelsie Dickey wife [phone]    Quality of Family Relationships supportive    Able to Return to Prior Arrangements yes       Resource/Environmental Concerns    Resource/Environmental Concerns none    Transportation Concerns none       Transition Planning    Patient/Family Anticipates Transition to home with family    Patient/Family Anticipated Services at Transition none    Transportation Anticipated family or friend will provide       Discharge Needs Assessment    Readmission Within the Last 30 Days no previous admission in last 30 days    Equipment Currently Used at Home none    Concerns to be Addressed no discharge needs identified;denies needs/concerns at this time    Anticipated Changes Related to Illness none    Equipment Needed After Discharge none    Provided Post Acute Provider List? N/A    N/A Provider List Comment No needs identified    Provided Post Acute Provider Quality & Resource List? N/A               Discharge Plan     Row Name 10/12/22 1238       Plan    Plan Home with family support    Patient/Family in Agreement with Plan yes    Plan Comments IMM 10/11/2022. Met with patient at bedside. Face sheet verified.  Prior to admission patient was independent with all aspects of his ADL’s.  He does not use any special or adaptive equipment. Patient uses the [facility] in [place], denies any issues affording or remembering to take his medications. Patient will use [facility] at discharge.  Discharge  plans are to return home with family support. Family will transport. Will continue to monitor for new or changing discharge needs.  Majo CORTEZ RN CCP              Continued Care and Services - Discharged on 10/12/2022 Admission date: 10/11/2022 - Discharge disposition: Home or Self Care   Coordination has not been started for this encounter.       Expected Discharge Date and Time     Expected Discharge Date Expected Discharge Time    Oct 12, 2022          Demographic Summary     Row Name 10/12/22 1232       General Information    Admission Type inpatient    Arrived From home    Required Notices Provided Important Message from Medicare    Referral Source admission list    Reason for Consult discharge planning    Preferred Language English       Contact Information    Permission Granted to Share Info With family/designee  Chelsie Dickey wife [phone]               Functional Status     Row Name 10/12/22 1232       Functional Status    Current Activity Tolerance good       Functional Status, IADL    Medications independent    Meal Preparation independent    Housekeeping assistive person    Laundry assistive person    Shopping assistive person       Mental Status    General Appearance WDL WDL       Mental Status Summary    Recent Changes in Mental Status/Cognitive Functioning no changes       Employment/    Employment Status retired               Psychosocial    No documentation.                Abuse/Neglect    No documentation.                Legal    No documentation.                Substance Abuse    No documentation.                Patient Forms    No documentation.                   Majo Whitfield RN

## 2022-10-12 NOTE — CASE MANAGEMENT/SOCIAL WORK
Case Management Discharge Note      Final Note: Discharge to home with family support -DRK RN CCP    Provided Post Acute Provider List?: N/A  N/A Provider List Comment: No needs identified  Provided Post Acute Provider Quality & Resource List?: N/A    Selected Continued Care - Discharged on 10/12/2022 Admission date: 10/11/2022 - Discharge disposition: Home or Self Care    Destination    No services have been selected for the patient.              Durable Medical Equipment    No services have been selected for the patient.              Dialysis/Infusion    No services have been selected for the patient.              Home Medical Care    No services have been selected for the patient.              Therapy    No services have been selected for the patient.              Community Resources    No services have been selected for the patient.              Community & DME    No services have been selected for the patient.                  Transportation Services  Private: Car    Final Discharge Disposition Code: 01 - home or self-care   No

## 2022-10-12 NOTE — OUTREACH NOTE
Prep Survey    Flowsheet Row Responses   [facility] patient discharged from? [place]   Is LACE score < 7 ? No   Emergency Room discharge w/ pulse ox? No   Eligibility [facility]   Date of Admission 10/11/22   Date of Discharge 10/12/22   Discharge Disposition Home or Self Care   Discharge diagnosis Laparoscopic right colectomy    Does the patient have one of the following disease processes/diagnoses(primary or secondary)? Other   Does the patient have Home health ordered? No   Is there a DME ordered? No   Prep survey completed? Yes          ROBIN ARENAS - Registered Nurse

## 2022-10-12 NOTE — PLAN OF CARE
Goal Outcome Evaluation:               Patient is resting in bed.  He has complained of pain this shift.  See MAR for all medication administered.  He is POD#1 of a laparoscopic Right Colectomy. Lap sites are covered with dermabond and have no drainage. Vitals stable and he is on 2L via NC.  Safety precautions in use.  Will continue to monitor.

## 2022-10-12 NOTE — DISCHARGE SUMMARY
DATE OF ADMIT: 10/11/2022    DATE OF DISCHARGE: 10/12/2022    DIAGNOSIS: Cecal cancer    FINAL PATHOLOGY: Pending    PROCEDURES: Laparoscopic right colectomy 10/11/2022    SUMMARY OF HOSPITAL COURSE:   Uneventful postop course. Tolerating diet, incisions in good order and afebrile with stable vital signs at discharge. Prescribed hydrocodone for pain.    DIET: Regular    ACTIVITY: Walking encouraged, no lifting or strenuous activity    MEDICATIONS: Refer to MAR    FOLLOW-UP: To call office and schedule 1 week follow-up appointment    Ga Samaniego M.D.

## 2022-10-13 ENCOUNTER — TRANSITIONAL CARE MANAGEMENT TELEPHONE ENCOUNTER (OUTPATIENT)
Dept: CALL CENTER | Facility: HOSPITAL | Age: 72
End: 2022-10-13

## 2022-10-13 ENCOUNTER — TELEPHONE (OUTPATIENT)
Dept: SURGERY | Facility: CLINIC | Age: 72
End: 2022-10-13

## 2022-10-13 NOTE — TELEPHONE ENCOUNTER
PT was thinking he was suppose to be coming home with an antibiotic. There was only pain medicine at pharmacy.

## 2022-10-13 NOTE — OUTREACH NOTE
Call Center TCM Note    Flowsheet Row Responses   [facility] patient discharged from? [place]   Does the patient have one of the following disease processes/diagnoses(primary or secondary)? Other   TCM attempt successful? Yes   Discharge diagnosis Laparoscopic right colectomy    Meds reviewed with patient/caregiver? Yes   Is the patient having any side effects they believe may be caused by any medication additions or changes? No   Does the patient have all medications ordered at discharge? Yes   Is the patient taking all medications as directed (includes completed medication regime)? Yes   Has home health visited the patient within 72 hours of discharge? N/A   Psychosocial issues? No   Did the patient receive a copy of their discharge instructions? Yes   Nursing interventions Reviewed instructions with patient   What is the patient's perception of their health status since discharge? Improving   Is the patient/caregiver able to teach back signs and symptoms related to disease process for when to call PCP? Yes   Is the patient/caregiver able to teach back signs and symptoms related to disease process for when to call 911? Yes   Is the patient/caregiver able to teach back the hierarchy of who to call/visit for symptoms/problems? PCP, Specialist, Home health nurse, Urgent Care, ED, 911 Yes   TCM call completed? Yes   Wrap up additional comments D/C DX: Laparoscopic right colectomy,  colon cancer ** Overall pt doing well post operatively. Significant pain getting in/out of bed, but now utilizing better as he can lift head of bed to be almost seated to get up. NORCO in place as needed. No fever, chills, No GI issues. FIrst POST OP is 10/20/2022. ONOLOGY 11/15/2022. Pt would like PCP Jade King updated but will keep sched ov in 02/2023. He is beyond grateful for her care & getting needed treatment in place.           Maida Limon MA    10/13/2022, 14:37 EDT

## 2022-10-17 ENCOUNTER — PREP FOR SURGERY (OUTPATIENT)
Dept: OTHER | Facility: HOSPITAL | Age: 72
End: 2022-10-17

## 2022-10-17 DIAGNOSIS — C18.9 MALIGNANT NEOPLASM OF COLON, UNSPECIFIED PART OF COLON: Primary | ICD-10-CM

## 2022-10-17 RX ORDER — CEFAZOLIN SODIUM 2 G/100ML
2 INJECTION, SOLUTION INTRAVENOUS ONCE
Status: CANCELLED | OUTPATIENT
Start: 2022-10-17 | End: 2022-10-17

## 2022-10-21 ENCOUNTER — APPOINTMENT (OUTPATIENT)
Dept: GENERAL RADIOLOGY | Facility: HOSPITAL | Age: 72
End: 2022-10-21

## 2022-10-21 ENCOUNTER — ANESTHESIA (OUTPATIENT)
Dept: PERIOP | Facility: HOSPITAL | Age: 72
End: 2022-10-21

## 2022-10-21 ENCOUNTER — HOSPITAL ENCOUNTER (OUTPATIENT)
Facility: HOSPITAL | Age: 72
Setting detail: HOSPITAL OUTPATIENT SURGERY
Discharge: HOME | End: 2022-10-21
Attending: SURGERY | Admitting: SURGERY

## 2022-10-21 ENCOUNTER — ANESTHESIA EVENT (OUTPATIENT)
Dept: PERIOP | Facility: HOSPITAL | Age: 72
End: 2022-10-21

## 2022-10-21 VITALS
SYSTOLIC BLOOD PRESSURE: 114 MMHG | OXYGEN SATURATION: 97 % | DIASTOLIC BLOOD PRESSURE: 65 MMHG | HEART RATE: 58 BPM | TEMPERATURE: 97.6 F | RESPIRATION RATE: 16 BRPM

## 2022-10-21 DIAGNOSIS — C18.9 MALIGNANT NEOPLASM OF COLON, UNSPECIFIED PART OF COLON: ICD-10-CM

## 2022-10-21 LAB — GLUCOSE BLDC GLUCOMTR-MCNC: 117 MG/DL (ref 70–130)

## 2022-10-21 PROCEDURE — S0260 H&P FOR SURGERY: HCPCS | Performed by: SURGERY

## 2022-10-21 PROCEDURE — 76000 FLUOROSCOPY <1 HR PHYS/QHP: CPT | Performed by: SURGERY

## 2022-10-21 PROCEDURE — 25010000002 HEPARIN (PORCINE) PER 1000 UNITS: Performed by: SURGERY

## 2022-10-21 PROCEDURE — 36561 INSERT TUNNELED CV CATH: CPT | Performed by: SURGERY

## 2022-10-21 PROCEDURE — 82962 GLUCOSE BLOOD TEST: CPT

## 2022-10-21 PROCEDURE — 77001 FLUOROGUIDE FOR VEIN DEVICE: CPT | Performed by: SURGERY

## 2022-10-21 PROCEDURE — 25010000002 CEFAZOLIN IN DEXTROSE 2-4 GM/100ML-% SOLUTION: Performed by: SURGERY

## 2022-10-21 PROCEDURE — 25010000002 PROPOFOL 500 MG/50ML EMULSION: Performed by: ANESTHESIOLOGY

## 2022-10-21 PROCEDURE — 25010000002 PROPOFOL 10 MG/ML EMULSION: Performed by: ANESTHESIOLOGY

## 2022-10-21 PROCEDURE — C1788 PORT, INDWELLING, IMP: HCPCS | Performed by: SURGERY

## 2022-10-21 DEVICE — PRT INTRO VASC/INTERV VORTEX FILL/HL DETACH/POLYURET/CATH 8F: Type: IMPLANTABLE DEVICE | Site: SHOULDER | Status: FUNCTIONAL

## 2022-10-21 RX ORDER — FENTANYL CITRATE 50 UG/ML
50 INJECTION, SOLUTION INTRAMUSCULAR; INTRAVENOUS
Status: DISCONTINUED | OUTPATIENT
Start: 2022-10-21 | End: 2022-10-21 | Stop reason: HOSPADM

## 2022-10-21 RX ORDER — SODIUM CHLORIDE, SODIUM LACTATE, POTASSIUM CHLORIDE, CALCIUM CHLORIDE 600; 310; 30; 20 MG/100ML; MG/100ML; MG/100ML; MG/100ML
9 INJECTION, SOLUTION INTRAVENOUS CONTINUOUS
Status: DISCONTINUED | OUTPATIENT
Start: 2022-10-21 | End: 2022-10-21 | Stop reason: HOSPADM

## 2022-10-21 RX ORDER — PROPOFOL 10 MG/ML
INJECTION, EMULSION INTRAVENOUS AS NEEDED
Status: DISCONTINUED | OUTPATIENT
Start: 2022-10-21 | End: 2022-10-21 | Stop reason: SURG

## 2022-10-21 RX ORDER — DIPHENHYDRAMINE HYDROCHLORIDE 50 MG/ML
12.5 INJECTION INTRAMUSCULAR; INTRAVENOUS
Status: DISCONTINUED | OUTPATIENT
Start: 2022-10-21 | End: 2022-10-21 | Stop reason: HOSPADM

## 2022-10-21 RX ORDER — LABETALOL HYDROCHLORIDE 5 MG/ML
5 INJECTION, SOLUTION INTRAVENOUS
Status: DISCONTINUED | OUTPATIENT
Start: 2022-10-21 | End: 2022-10-21 | Stop reason: HOSPADM

## 2022-10-21 RX ORDER — ONDANSETRON 4 MG/1
4 TABLET, FILM COATED ORAL EVERY 6 HOURS PRN
Qty: 10 TABLET | Refills: 1 | Status: SHIPPED | OUTPATIENT
Start: 2022-10-21 | End: 2022-11-02 | Stop reason: DRUGHIGH

## 2022-10-21 RX ORDER — IBUPROFEN 600 MG/1
600 TABLET ORAL ONCE AS NEEDED
Status: DISCONTINUED | OUTPATIENT
Start: 2022-10-21 | End: 2022-10-21 | Stop reason: HOSPADM

## 2022-10-21 RX ORDER — SODIUM CHLORIDE 0.9 % (FLUSH) 0.9 %
3 SYRINGE (ML) INJECTION EVERY 12 HOURS SCHEDULED
Status: DISCONTINUED | OUTPATIENT
Start: 2022-10-21 | End: 2022-10-21 | Stop reason: HOSPADM

## 2022-10-21 RX ORDER — PROPOFOL 10 MG/ML
VIAL (ML) INTRAVENOUS AS NEEDED
Status: DISCONTINUED | OUTPATIENT
Start: 2022-10-21 | End: 2022-10-21 | Stop reason: SURG

## 2022-10-21 RX ORDER — FAMOTIDINE 10 MG/ML
20 INJECTION, SOLUTION INTRAVENOUS ONCE
Status: COMPLETED | OUTPATIENT
Start: 2022-10-21 | End: 2022-10-21

## 2022-10-21 RX ORDER — DIPHENHYDRAMINE HCL 25 MG
25 CAPSULE ORAL
Status: DISCONTINUED | OUTPATIENT
Start: 2022-10-21 | End: 2022-10-21 | Stop reason: HOSPADM

## 2022-10-21 RX ORDER — FLUMAZENIL 0.1 MG/ML
0.2 INJECTION INTRAVENOUS AS NEEDED
Status: DISCONTINUED | OUTPATIENT
Start: 2022-10-21 | End: 2022-10-21 | Stop reason: HOSPADM

## 2022-10-21 RX ORDER — LIDOCAINE HYDROCHLORIDE 10 MG/ML
0.5 INJECTION, SOLUTION EPIDURAL; INFILTRATION; INTRACAUDAL; PERINEURAL ONCE AS NEEDED
Status: DISCONTINUED | OUTPATIENT
Start: 2022-10-21 | End: 2022-10-21 | Stop reason: HOSPADM

## 2022-10-21 RX ORDER — HYDROCODONE BITARTRATE AND ACETAMINOPHEN 7.5; 325 MG/1; MG/1
1 TABLET ORAL ONCE AS NEEDED
Status: COMPLETED | OUTPATIENT
Start: 2022-10-21 | End: 2022-10-21

## 2022-10-21 RX ORDER — OXYCODONE AND ACETAMINOPHEN 7.5; 325 MG/1; MG/1
1 TABLET ORAL EVERY 4 HOURS PRN
Status: DISCONTINUED | OUTPATIENT
Start: 2022-10-21 | End: 2022-10-21 | Stop reason: HOSPADM

## 2022-10-21 RX ORDER — EPHEDRINE SULFATE 50 MG/ML
5 INJECTION, SOLUTION INTRAVENOUS ONCE AS NEEDED
Status: DISCONTINUED | OUTPATIENT
Start: 2022-10-21 | End: 2022-10-21 | Stop reason: HOSPADM

## 2022-10-21 RX ORDER — PROMETHAZINE HYDROCHLORIDE 25 MG/1
25 SUPPOSITORY RECTAL ONCE AS NEEDED
Status: DISCONTINUED | OUTPATIENT
Start: 2022-10-21 | End: 2022-10-21 | Stop reason: HOSPADM

## 2022-10-21 RX ORDER — HYDROCODONE BITARTRATE AND ACETAMINOPHEN 5; 325 MG/1; MG/1
1-2 TABLET ORAL EVERY 4 HOURS PRN
Qty: 10 TABLET | Refills: 0 | Status: SHIPPED | OUTPATIENT
Start: 2022-10-21 | End: 2023-01-10

## 2022-10-21 RX ORDER — NALOXONE HCL 0.4 MG/ML
0.2 VIAL (ML) INJECTION AS NEEDED
Status: DISCONTINUED | OUTPATIENT
Start: 2022-10-21 | End: 2022-10-21 | Stop reason: HOSPADM

## 2022-10-21 RX ORDER — CEFAZOLIN SODIUM 2 G/100ML
2 INJECTION, SOLUTION INTRAVENOUS ONCE
Status: COMPLETED | OUTPATIENT
Start: 2022-10-21 | End: 2022-10-21

## 2022-10-21 RX ORDER — HYDROMORPHONE HYDROCHLORIDE 1 MG/ML
0.5 INJECTION, SOLUTION INTRAMUSCULAR; INTRAVENOUS; SUBCUTANEOUS
Status: DISCONTINUED | OUTPATIENT
Start: 2022-10-21 | End: 2022-10-21 | Stop reason: HOSPADM

## 2022-10-21 RX ORDER — ACETAMINOPHEN 500 MG
1000 TABLET ORAL ONCE
Status: COMPLETED | OUTPATIENT
Start: 2022-10-21 | End: 2022-10-21

## 2022-10-21 RX ORDER — ONDANSETRON 2 MG/ML
4 INJECTION INTRAMUSCULAR; INTRAVENOUS ONCE AS NEEDED
Status: DISCONTINUED | OUTPATIENT
Start: 2022-10-21 | End: 2022-10-21 | Stop reason: HOSPADM

## 2022-10-21 RX ORDER — HYDRALAZINE HYDROCHLORIDE 20 MG/ML
5 INJECTION INTRAMUSCULAR; INTRAVENOUS
Status: DISCONTINUED | OUTPATIENT
Start: 2022-10-21 | End: 2022-10-21 | Stop reason: HOSPADM

## 2022-10-21 RX ORDER — SODIUM CHLORIDE 0.9 % (FLUSH) 0.9 %
3-10 SYRINGE (ML) INJECTION AS NEEDED
Status: DISCONTINUED | OUTPATIENT
Start: 2022-10-21 | End: 2022-10-21 | Stop reason: HOSPADM

## 2022-10-21 RX ORDER — BUPIVACAINE HYDROCHLORIDE AND EPINEPHRINE 5; 5 MG/ML; UG/ML
INJECTION, SOLUTION EPIDURAL; INTRACAUDAL; PERINEURAL AS NEEDED
Status: DISCONTINUED | OUTPATIENT
Start: 2022-10-21 | End: 2022-10-21 | Stop reason: HOSPADM

## 2022-10-21 RX ORDER — PROMETHAZINE HYDROCHLORIDE 25 MG/1
25 TABLET ORAL ONCE AS NEEDED
Status: DISCONTINUED | OUTPATIENT
Start: 2022-10-21 | End: 2022-10-21 | Stop reason: HOSPADM

## 2022-10-21 RX ADMIN — SODIUM CHLORIDE, POTASSIUM CHLORIDE, SODIUM LACTATE AND CALCIUM CHLORIDE 9 ML/HR: 600; 310; 30; 20 INJECTION, SOLUTION INTRAVENOUS at 11:12

## 2022-10-21 RX ADMIN — HYDROCODONE BITARTRATE AND ACETAMINOPHEN 1 TABLET: 7.5; 325 TABLET ORAL at 13:00

## 2022-10-21 RX ADMIN — PROPOFOL 50 MG: 10 INJECTION, EMULSION INTRAVENOUS at 12:19

## 2022-10-21 RX ADMIN — CEFAZOLIN SODIUM 2 G: 2 INJECTION, SOLUTION INTRAVENOUS at 12:08

## 2022-10-21 RX ADMIN — ACETAMINOPHEN 1000 MG: 500 TABLET ORAL at 11:12

## 2022-10-21 RX ADMIN — PROPOFOL 100 MCG/KG/MIN: 10 INJECTION, EMULSION INTRAVENOUS at 12:17

## 2022-10-21 RX ADMIN — FAMOTIDINE 20 MG: 10 INJECTION INTRAVENOUS at 11:12

## 2022-10-21 NOTE — OP NOTE
PREOPERATIVE DIAGNOSIS:  Stage III colon cancer    POSTOPERATIVE DIAGNOSIS AND FINDINGS:  same    PROCEDURE:  Left subclavian 8 [place] port placement with fluoroscopic guidance    SURGEON:  Ga Samaniego MD    ASSISTANT:  Isamar Seaman MD    ANESTHESIA:  MAC    EBL:  Minimal    SPECIMEN:  none    DESCRIPTION:  In supine position prepped & draped usual sterile manner.  Half percent Marcaine with epinephrine infiltrated locally.  Left subclavian vein accessed with 18-gauge needle, guidewire inserted under fluoroscopic guidance into the superior vena cava.  Subcutaneous pocket created with electrocautery. Vein dilator and sheath introduced, dilator and guidewire removed.  Port inserted to 20 cm depth, position of tip confirmed in superior vena cava with fluoroscopic guidance.  Venous blood easily aspirated, heparinized saline flushed.  Good hemostasis noted, skin closed with 3-0 Vicryl deep dermal and 5-0 Vicryl subcuticular.  Tolerated well, stable to recovery area.    Ga Samaniego M.D.

## 2022-10-21 NOTE — ANESTHESIA PREPROCEDURE EVALUATION
Anesthesia Evaluation     Patient summary reviewed and Nursing notes reviewed   no history of anesthetic complications:  NPO Solid Status: > 8 hours  NPO Liquid Status: > 8 hours           Airway   Mallampati: II  TM distance: >3 FB  Neck ROM: full  No difficulty expected  Dental - normal exam   (+) edentulous    Pulmonary    (-) rhonchi, decreased breath sounds, wheezes, not a smoker  Cardiovascular   Exercise tolerance: good (4-7 METS)    Rhythm: regular  Rate: normal    (+) hypertension, DVT (6 years ago off 81 mg asa x 7 days), hyperlipidemia,   (-) CAD, angina, XIAO, murmur      Neuro/Psych  (+) psychiatric history Anxiety,    (-) CVA  GI/Hepatic/Renal/Endo    (+)   diabetes mellitus type 2 well controlled,   (-) no renal disease    Musculoskeletal     Abdominal     Abdomen: soft.   Substance History      OB/GYN          Other   arthritis,    history of cancer (s/p colon resection 1 week ago ) active                    Anesthesia Plan    ASA 3     MAC   total IV anesthesia  intravenous induction     Anesthetic plan, risks, benefits, and alternatives have been provided, discussed and informed consent has been obtained with: patient.        CODE STATUS:

## 2022-10-21 NOTE — NURSING NOTE
Notified Dr. Samaniego of patient's chest Xray results. Dr. Samaniego aware and no new orders were given. Ok to discharge patient home.

## 2022-10-21 NOTE — H&P
CC: Stage III colon cancer    HPI: 72-year-old gentleman with stage III colon cancer, presents for port placement    PMH, PSH, MEDS AND ALLERGIES reviewed and reconciled in  EPIC    PHYSICAL EXAM:  • Constitutional:  awake, alert, no acute distress  • VS: afebrile, VSS  • Respiratory:  normal inspiratory effort  • Cardiovascular: regular rate  • Gastrointestinal: Soft    ROS:  relevant systems negative other than any presenting complaints    ASSESSMENT/PLAN:    72-year-old gentleman presents for port placement.  He understands rationale for the procedure, the nature of the procedure, and the risk.    Ga Samaniego M.D.

## 2022-10-21 NOTE — ANESTHESIA POSTPROCEDURE EVALUATION
Patient: Taz Dickey    Procedure Summary     Date: 10/21/22 Room / Location:  [facility]  /  [facility]    Anesthesia Start: 1211 Anesthesia Stop: 1247    Procedure: INSERTION VENOUS ACCESS DEVICE (Abdomen) Diagnosis:       Malignant neoplasm of colon, unspecified part of colon (HCC)      (Malignant neoplasm of colon, unspecified part of colon (HCC) [C18.9])    Surgeons: Ga Samaniego MD Provider: Aubrey Schulz MD    Anesthesia Type: MAC ASA Status: 3          Anesthesia Type: MAC    Vitals  Vitals Value Taken Time   /65 10/21/22 1315   Temp 36.4 °C (97.6 °F) 10/21/22 1246   Pulse 58 10/21/22 1323   Resp 16 10/21/22 1305   SpO2 96 % 10/21/22 1323   Vitals shown include unvalidated device data.        Post Anesthesia Care and Evaluation    Patient location during evaluation: PHASE II  Patient participation: complete - patient participated  Level of consciousness: awake  Pain score: 1  Pain management: satisfactory to patient  Anesthetic complications: No anesthetic complications  PONV Status: none  Cardiovascular status: acceptable  Respiratory status: acceptable  Hydration status: acceptable

## 2022-10-21 NOTE — DISCHARGE INSTRUCTIONS
Dr. Ga Samaniego  [address]  [place], [place] [id number] [phone]    Discharge Instructions for Port Placement    Go home, rest and take it easy today; however, you should get up and move about several times today to reduce the risk of developing a clot in your legs.      You may experience some dizziness or memory loss from the anesthesia.  This may last for the next 24 hours.  Someone should plan on staying with you for the first 24 hours for your safety.    Do not make any important legal decisions or sign any legal papers for the next 24 hours.      Eat and drink lightly today.  Start off with liquids, jello, soup, crackers or other bland foods at first. You may advance your diet tomorrow as tolerated as long as you do not experience any nausea or vomiting.     You may remove your outer dressings in 3-4 days or until your first treatment whichever comes first. If you remove the dressing before your first treatment, please leave the little white tapes known as steri-strips alone.  They usually fall off in 1-2 weeks.  Do not worry if they come off sooner. If skin glue(Dermabond) was used, your incision is covered and protected. The invisible glue will dissolve on its own as your incision heals.    You may notice some bleeding/drainage on your outer dressings. A little bloody drainage is normal. If the bleeding/drainage is such that the bandage cannot absorb it, remove the dressing, apply clean gauze and apply firm pressure for a full 15 minutes.  If the bleeding continues, please call me.    You may shower tomorrow allowing water to run over the incisions; however, do not scrub the incision.  No tub baths until your incisions are completely healed.    You have received a prescription for a narcotic pain medicine, as you may have some pain/discomfort following surgery.   You will not be totally pain free, but your pain medicine should make the pain tolerable.  Please take your pain medicine as  prescribed and always take your pills with food to prevent nausea. If you are having severe pain that cannot be controlled by the pain medicine, please contact me.  If the pain is such that narcotic pain medicine is not required, you may take Tylenol or Ibuprofen as directed unless indicated otherwise.      You have also received a prescription for an anti-nausea medicine.  Please take this as prescribed for any nausea or vomiting.  Nausea could be a result of the anesthesia or a result of the narcotic pain medicine.  If you experience severe nausea and vomiting that cannot be controlled by the nausea medicine, please call me.      No driving for 24 hours and for as long as you are taking your prescription pain medicine.      If the port is to be used within 10-14 days of placement, no surgical follow-up is needed.  Otherwise, you should call the office at [phone] to schedule a follow-up in about 1 week.      Remember to contact me for any of the following:    Fever> 101 degrees  Severe pain that cannot be controlled by taking your pain pills  Severe nausea or vomiting that cannot be controlled by taking your nausea medicine  Significant bleeding from your incision  Drainage that has a bad smell or is yellow or green in appearance  Any other questions or concerns

## 2022-10-21 NOTE — ANESTHESIA POSTPROCEDURE EVALUATION
Patient: Taz Dickey    Procedure Summary     Date: 10/21/22 Room / Location:  [facility]  [facility]    Anesthesia Start: 1211 Anesthesia Stop: 1247    Procedure: INSERTION VENOUS ACCESS DEVICE (Abdomen) Diagnosis:       Malignant neoplasm of colon, unspecified part of colon (HCC)      (Malignant neoplasm of colon, unspecified part of colon (HCC) [C18.9])    Surgeons: Ga Samaniego MD Provider: Aubrey Schulz MD    Anesthesia Type: MAC ASA Status: 3          Anesthesia Type: MAC    Vitals  Vitals Value Taken Time   /65 10/21/22 1315   Temp 36.4 °C (97.6 °F) 10/21/22 1246   Pulse 62 10/21/22 1345   Resp 16 10/21/22 1315   SpO2 98 % 10/21/22 1345   Vitals shown include unvalidated device data.        Post Anesthesia Care and Evaluation    Patient location during evaluation: bedside  Patient participation: complete - patient participated  Level of consciousness: awake and alert  Pain management: adequate    Airway patency: patent  Anesthetic complications: No anesthetic complications    Cardiovascular status: acceptable  Respiratory status: acceptable  Hydration status: acceptable    Comments: /65   Pulse 58   Temp 36.4 °C (97.6 °F) (Oral)   Resp 16   SpO2 97%

## 2022-11-02 ENCOUNTER — OFFICE VISIT (OUTPATIENT)
Dept: ONCOLOGY | Facility: CLINIC | Age: 72
End: 2022-11-02

## 2022-11-02 ENCOUNTER — APPOINTMENT (OUTPATIENT)
Dept: LAB | Facility: HOSPITAL | Age: 72
End: 2022-11-02

## 2022-11-02 ENCOUNTER — INFUSION (OUTPATIENT)
Dept: ONCOLOGY | Facility: HOSPITAL | Age: 72
End: 2022-11-02

## 2022-11-02 VITALS
HEIGHT: 70 IN | WEIGHT: 214.5 LBS | SYSTOLIC BLOOD PRESSURE: 134 MMHG | OXYGEN SATURATION: 99 % | BODY MASS INDEX: 30.71 KG/M2 | HEART RATE: 60 BPM | TEMPERATURE: 97.3 F | DIASTOLIC BLOOD PRESSURE: 83 MMHG

## 2022-11-02 DIAGNOSIS — E61.1 IRON DEFICIENCY: ICD-10-CM

## 2022-11-02 DIAGNOSIS — D50.0 IRON DEFICIENCY ANEMIA DUE TO CHRONIC BLOOD LOSS: ICD-10-CM

## 2022-11-02 DIAGNOSIS — Z79.899 ENCOUNTER FOR LONG-TERM (CURRENT) USE OF MEDICATIONS: ICD-10-CM

## 2022-11-02 DIAGNOSIS — C18.9 MALIGNANT NEOPLASM OF COLON, UNSPECIFIED PART OF COLON: ICD-10-CM

## 2022-11-02 DIAGNOSIS — E53.8 VITAMIN B12 DEFICIENCY: ICD-10-CM

## 2022-11-02 DIAGNOSIS — Z45.2 FITTING AND ADJUSTMENT OF VASCULAR CATHETER: ICD-10-CM

## 2022-11-02 DIAGNOSIS — C18.7 MALIGNANT NEOPLASM OF SIGMOID COLON: Primary | ICD-10-CM

## 2022-11-02 DIAGNOSIS — D50.0 ANEMIA DUE TO GI BLOOD LOSS: ICD-10-CM

## 2022-11-02 DIAGNOSIS — C18.2 MALIGNANT NEOPLASM OF ASCENDING COLON: Primary | ICD-10-CM

## 2022-11-02 PROBLEM — K76.9 LIVER LESION: Status: ACTIVE | Noted: 2022-11-02

## 2022-11-02 LAB
ALBUMIN SERPL-MCNC: 4.2 G/DL (ref 3.5–5.2)
ALBUMIN/GLOB SERPL: 1.4 G/DL (ref 1.1–2.4)
ALP SERPL-CCNC: 68 U/L (ref 38–116)
ALT SERPL W P-5'-P-CCNC: 16 U/L (ref 0–41)
ANION GAP SERPL CALCULATED.3IONS-SCNC: 10 MMOL/L (ref 5–15)
AST SERPL-CCNC: 20 U/L (ref 0–40)
BASOPHILS # BLD AUTO: 0.06 10*3/MM3 (ref 0–0.2)
BASOPHILS NFR BLD AUTO: 0.9 % (ref 0–1.5)
BILIRUB SERPL-MCNC: 0.5 MG/DL (ref 0.2–1.2)
BUN SERPL-MCNC: 22 MG/DL (ref 6–20)
BUN/CREAT SERPL: 23.2 (ref 7.3–30)
CALCIUM SPEC-SCNC: 9.6 MG/DL (ref 8.5–10.2)
CHLORIDE SERPL-SCNC: 102 MMOL/L (ref 98–107)
CO2 SERPL-SCNC: 25 MMOL/L (ref 22–29)
CREAT SERPL-MCNC: 0.95 MG/DL (ref 0.7–1.3)
DEPRECATED RDW RBC AUTO: 74.2 FL (ref 37–54)
EGFRCR SERPLBLD CKD-EPI 2021: 85.6 ML/MIN/1.73
EOSINOPHIL # BLD AUTO: 0.31 10*3/MM3 (ref 0–0.4)
EOSINOPHIL NFR BLD AUTO: 4.5 % (ref 0.3–6.2)
ERYTHROCYTE [DISTWIDTH] IN BLOOD BY AUTOMATED COUNT: 22.5 % (ref 12.3–15.4)
GLOBULIN UR ELPH-MCNC: 2.9 GM/DL (ref 1.8–3.5)
GLUCOSE SERPL-MCNC: 109 MG/DL (ref 74–124)
HCT VFR BLD AUTO: 34.6 % (ref 37.5–51)
HGB BLD-MCNC: 10.9 G/DL (ref 13–17.7)
IMM GRANULOCYTES # BLD AUTO: 0.01 10*3/MM3 (ref 0–0.05)
IMM GRANULOCYTES NFR BLD AUTO: 0.1 % (ref 0–0.5)
LYMPHOCYTES # BLD AUTO: 1.32 10*3/MM3 (ref 0.7–3.1)
LYMPHOCYTES NFR BLD AUTO: 19.2 % (ref 19.6–45.3)
MCH RBC QN AUTO: 29.1 PG (ref 26.6–33)
MCHC RBC AUTO-ENTMCNC: 31.5 G/DL (ref 31.5–35.7)
MCV RBC AUTO: 92.5 FL (ref 79–97)
MONOCYTES # BLD AUTO: 0.59 10*3/MM3 (ref 0.1–0.9)
MONOCYTES NFR BLD AUTO: 8.6 % (ref 5–12)
NEUTROPHILS NFR BLD AUTO: 4.58 10*3/MM3 (ref 1.7–7)
NEUTROPHILS NFR BLD AUTO: 66.7 % (ref 42.7–76)
NRBC BLD AUTO-RTO: 0 /100 WBC (ref 0–0.2)
PLATELET # BLD AUTO: 159 10*3/MM3 (ref 140–450)
PMV BLD AUTO: 9.8 FL (ref 6–12)
POTASSIUM SERPL-SCNC: 4.4 MMOL/L (ref 3.5–4.7)
PROT SERPL-MCNC: 7.1 G/DL (ref 6.3–8)
RBC # BLD AUTO: 3.74 10*6/MM3 (ref 4.14–5.8)
SODIUM SERPL-SCNC: 137 MMOL/L (ref 134–145)
WBC NRBC COR # BLD: 6.87 10*3/MM3 (ref 3.4–10.8)

## 2022-11-02 PROCEDURE — 85025 COMPLETE CBC W/AUTO DIFF WBC: CPT

## 2022-11-02 PROCEDURE — 99215 OFFICE O/P EST HI 40 MIN: CPT | Performed by: INTERNAL MEDICINE

## 2022-11-02 PROCEDURE — 80053 COMPREHEN METABOLIC PANEL: CPT

## 2022-11-02 PROCEDURE — 36591 DRAW BLOOD OFF VENOUS DEVICE: CPT

## 2022-11-02 PROCEDURE — 25010000002 HEPARIN LOCK FLUSH PER 10 UNITS: Performed by: INTERNAL MEDICINE

## 2022-11-02 RX ORDER — SODIUM CHLORIDE 0.9 % (FLUSH) 0.9 %
10 SYRINGE (ML) INJECTION AS NEEDED
Status: CANCELLED | OUTPATIENT
Start: 2022-11-02

## 2022-11-02 RX ORDER — HEPARIN SODIUM (PORCINE) LOCK FLUSH IV SOLN 100 UNIT/ML 100 UNIT/ML
500 SOLUTION INTRAVENOUS AS NEEDED
Status: CANCELLED | OUTPATIENT
Start: 2022-11-02

## 2022-11-02 RX ORDER — FERROUS SULFATE 325(65) MG
325 TABLET ORAL 2 TIMES DAILY
Qty: 180 TABLET | Refills: 1 | Status: SHIPPED | OUTPATIENT
Start: 2022-11-02 | End: 2023-03-21

## 2022-11-02 RX ORDER — HEPARIN SODIUM (PORCINE) LOCK FLUSH IV SOLN 100 UNIT/ML 100 UNIT/ML
500 SOLUTION INTRAVENOUS AS NEEDED
Status: DISCONTINUED | OUTPATIENT
Start: 2022-11-02 | End: 2022-11-02 | Stop reason: HOSPADM

## 2022-11-02 RX ORDER — ONDANSETRON HYDROCHLORIDE 8 MG/1
8 TABLET, FILM COATED ORAL 3 TIMES DAILY PRN
Qty: 60 TABLET | Refills: 5 | Status: SHIPPED | OUTPATIENT
Start: 2022-11-02

## 2022-11-02 RX ORDER — ONDANSETRON HYDROCHLORIDE 8 MG/1
8 TABLET, FILM COATED ORAL 3 TIMES DAILY PRN
Qty: 60 TABLET | Refills: 5 | Status: CANCELLED | OUTPATIENT
Start: 2022-11-14

## 2022-11-02 RX ORDER — SODIUM CHLORIDE 0.9 % (FLUSH) 0.9 %
10 SYRINGE (ML) INJECTION AS NEEDED
Status: DISCONTINUED | OUTPATIENT
Start: 2022-11-02 | End: 2022-11-02 | Stop reason: HOSPADM

## 2022-11-02 RX ADMIN — Medication 500 UNITS: at 11:28

## 2022-11-02 RX ADMIN — Medication 10 ML: at 11:28

## 2022-11-02 NOTE — PROGRESS NOTES
"    .     REASON FOR FOLLOWUP:     *Cecum colon cancer, status post right hemicolectomy performed on 10/11/2022, [id number]?.   · CT scan examination on 9/26/2022 reported suspicious liver lesion 9 mm, otherwise no evidence for metastatic disease.    · The patient underwent an abdominal MRI examination on 10/06/2022, which reported suspicious liver lesion 8mm.  · Patient had sigmoidectomy on 10/11/2022.  Portacatheter placement on 10/21/2022.   *Iron deficiency anemia.  · On oral iron treatment.                                 HISTORY OF PRESENT ILLNESS:  The patient is a 72 y.o. 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  Patient is accompanied by his wife and his daughter who helped with history and discussion of treatment plan.    I saw him originally on 9/22/2022 for initial evaluation for his sigmoid colon cancer and iron deficiency anemia.  Since that time patient had multiple imaging studies including CT for chest abdomen pelvis, subsequently with MRI for the abdomen for staging purposes.  He was also seen by Dr. Samaniego who performed right hemicolectomy on 10/11/2022.    Dr. Samaniego called me on the day of surgery, he also suspected this liver lesion is metastatic, however unable to reach that during the surgery.    Patient notes since surgery on 10/11/2022 of the bowel resection he is recovering \"okay\". The patient does report some abdominal pain when adrianna his stomach, specifically when getting out of bed. The patient denies any issues with his appetite, and is having normal urination and bowel movements. The patient denies constipation.  Patient reports no fever sweating or chills.    The patient has type II diabetes, which he manages with metformin. 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    Patient reports he is able to tolerate oral iron twice a day with no specific complaints.  No nausea vomiting or constipation.      Laboratory study today on 11/2/2022 showed improved anemia with Hb 10.9, normalized MCV 92.5.  His normal platelets 159,000 WBC 6870 including ANC 4580.            Past Medical History:   Diagnosis Date   • Abdominal hernia    • Anemia    • Arrhythmia     PT STATED DURING LAST COLONOSCOPY 2 WEEKS AGO   • Arthritis    • Colon cancer (HCC) 09/22/2022   • Colon polyp September 20 2022   • Colon polyps    • Depression    • DVT (deep venous thrombosis) (HCC)     IN LEFT LEG   • Full dentures    • GI (gastrointestinal bleed) September 8 2022   • Hip pain     RIGHT   • Hyperlipidemia    • Hypertension    • Iron deficiency anemia 09/22/2022   • Numbness and tingling     RIGHT FOOT   • PONV (postoperative nausea and vomiting)    • Right leg pain    • Type 2 diabetes mellitus without complication, without long-term current use of insulin (HCC) 10/08/2019     Past Surgical History:   Procedure Laterality Date   • CERVICAL DISCECTOMY LAMINECTOMY DECOMPRESSION POSTERIOR FUSION WITH INSTRUMENTATION     • COLON RESECTION N/A 10/11/2022    Procedure: LAPAROSCOPIC RIGHT COLON RESECTION;  Surgeon: Ga Samaniego MD;  Location: [facility] OR;  Service: General;  Laterality: N/A;   • COLONOSCOPY N/A 2/28/2018    Procedure: COLONOSCOPY to TI and cecum with polypectomy;  Surgeon: Anil Garces MD;  Location: [facility] ENDOSCOPY;  Service:    • JOINT REPLACEMENT      LEFT HIP   • KNEE ARTHROSCOPY Left    • LUMBAR DISCECTOMY FUSION INSTRUMENTATION N/A 11/18/2020    Procedure: Lumbar 4 5 laminectomy with a posterior lateral fusion and instrumentation and interbody fusion;  Surgeon: Jeffrey Garcia MD;  Location: [facility] OR;  Service: Neurosurgery;  Laterality: N/A;   • TOTAL HIP ARTHROPLASTY Right 6/3/2021    Procedure: TOTAL HIP ARTHROPLASTY POSTERIOR;  Surgeon: Beth  Shlomo MISTRY MD;  Location: [facility] OR;  Service: Orthopedics;  Laterality: Right;   • VASECTOMY     • VENOUS ACCESS DEVICE (PORT) INSERTION N/A 10/21/2022    Procedure: INSERTION VENOUS ACCESS DEVICE;  Surgeon: Ga Samaniego MD;  [place] [facility] [place] [facility] [place]  Service: General;  Laterality: N/A;     HEMATOLOGY/MEDICAL ONCOLOGY HISTORY: The patient is a 72 y.o. year old male with hypertension, hyperlipidemia, type 2 diabetes, iron deficiency anemia, history of DVT, who presented on 9/22/2022 for initial evaluation because of iron deficiency anemia, referred by his primary care provider, JULIA Santiago. Patient is accompanied by his wife who helped with the history. They reported the patient actually was being diagnosed of colon cancer 2 days ago. His GI specialist, Dr. Anil Garces, performed colonoscopic examination and saw a distal colon mass. I do not have the report for the procedure nor do I have the pathology report but nevertheless there was a mass in the distal colon likely in the sigmoid colon. I will obtain a copy of those reports when available.      Patient reports he had no apparent melena or hematochezia before starting oral iron treatment. He did have however significant fatigue. He reports exertional dyspnea and no syncope. Patient had laboratory study recently on 09/08/2022 which reported severe iron deficiency anemia with hemoglobin 7.8, MCV 79.9, MCHC 29.2. Normal platelets 217,000 and WBC 7230 without differentiation. Iron study reported ferritin 10.7 ng/mL, free iron 23, TIBC 593 and iron saturation 4%. Chemistry lab reported creatinine 1.11, potassium 5.4, otherwise normal sodium chloride and calcium, glucose 137. Prior to that the patient had normal liver function on 08/24/2022. He had a normal TSH 2.5 and slightly elevated hemoglobin A1c of 6.8% on that day.      The patient reports he has been on oral iron for almost 2 weeks, once a day with good tolerance and he now noticed improved  energy level and less exertional dyspnea. He does report stool becoming dark-colored after he started oral iron.  He reports no syncope.     Laboratory study on 09/22/2022 reported improved hemoglobin 8.5 and normalized MCV 85.0, stable MCHC 29.4. Maintains normal platelets and WBC.     MEDICATIONS:    Current Outpatient Medications:   •  acetaminophen (TYLENOL) 500 MG tablet, Take 500 mg by mouth Every 6 (Six) Hours As Needed for Mild Pain ., Disp: , Rfl:   •  aspirin 81 MG EC tablet, Take 81 mg by mouth Daily. INSTRUCTED PT TO FOLLOW MD INSTRUCTIONS REGARDING HOLDING FOR SURGERY/PT STATED TO HOLD 5 DAYS PRIOR TO SURGERY, Disp: , Rfl:   •  atorvastatin (LIPITOR) 40 MG tablet, TAKE ONE TABLET BY MOUTH DAILY (Patient taking differently: Take 1 tablet by mouth Every Night.), Disp: 90 tablet, Rfl: 3  •  ferrous sulfate (FerrouSul) 325 (65 FE) MG tablet, Take 1 tablet by mouth 2 (Two) Times a Day., Disp: 180 tablet, Rfl: 1  •  gabapentin (NEURONTIN) 300 MG capsule, TAKE ONE CAPSULE BY MOUTH THREE TIMES A DAY (Patient taking differently: Take 1 capsule by mouth 3 (Three) Times a Day.), Disp: 90 capsule, Rfl: 2  •  HYDROcodone-acetaminophen (Norco) 5-325 MG per tablet, Take 1-2 tablets by mouth Every 4 (Four) Hours As Needed for pain, Disp: 10 tablet, Rfl: 0  •  lisinopril-hydrochlorothiazide (PRINZIDE,ZESTORETIC) 20-12.5 MG per tablet, TAKE ONE TABLET BY MOUTH ONCE NIGHTLY (Patient taking differently: Take 1 tablet by mouth Every Night.), Disp: 90 tablet, Rfl: 3  •  metFORMIN (GLUCOPHAGE) 500 MG tablet, Take 1 tablet by mouth 2 (Two) Times a Day With Meals., Disp: 180 tablet, Rfl: 3  •  ondansetron (Zofran) 4 MG tablet, Take 1 tablet by mouth Every 6 (Six) Hours As Needed for Nausea or Vomiting for up to 10 doses., Disp: 10 tablet, Rfl: 1  •  tadalafil (CIALIS) 5 MG tablet, Take 1 tablet by mouth Daily As Needed for Erectile Dysfunction. (Patient taking differently: Take 1 tablet by mouth Daily As Needed for Erectile  "Dysfunction. HOLD PRIOR TO SURGERY), Disp: 30 tablet, Rfl: 2  No current facility-administered medications for this visit.    ALLERGIES:   No Known Allergies    SOCIAL HISTORY:       Social History     Socioeconomic History   • Marital status:      Spouse name: Chelsie   • Years of education: 12   Tobacco Use   • Smoking status: Some Days     Packs/day: 1.00     Years: 20.00     Pack years: 20.00     Types: Cigars, Cigarettes   • Smokeless tobacco: Never   • Tobacco comments:     OCCASIONALLY   Vaping Use   • Vaping Use: Never used   Substance and Sexual Activity   • Alcohol use: Yes     Alcohol/week: 6.0 standard drinks     Types: 6 Cans of beer per week     Comment: 6 drinks weekly   • Drug use: No         FAMILY HISTORY:  Family History   Problem Relation Age of Onset   • Clotting disorder Other    • Colon cancer Paternal Grandmother    • Colon cancer Paternal Grandfather    • Clotting disorder Father    • Malig Hyperthermia Neg Hx          Vitals:    11/02/22 1135   BP: 134/83   Pulse: 60   Temp: 97.3 °F (36.3 °C)   TempSrc: Temporal   SpO2: 99%   Weight: 97.3 kg (214 lb 8 oz)   Height: 177.8 cm (70\")   PainSc:   1   PainLoc: Generalized     Current Status 11/2/2022   ECOG score 0       PHYSICAL EXAM:    GENERAL:  Well-developed, well-nourished in no acute distress.  Orientated to time place and the people.  SKIN:  Warm, dry without rashes, purpura or petechiae.  Left upper chest portacatheter site looks benign  HEENT:  Normocephalic.  Wearing mask.   LYMPHATICS:  No cervical, supraclavicular adenopathy.  CHEST: Normal respiratory effort.  Lungs clear to auscultation. Good airflow.  CARDIAC:  Regular rate and rhythm without murmurs. Normal S1,S2.  ABDOMEN:  Soft, nontender with no organomegaly or masses.  Bowel sounds normal.  EXTREMITIES:  No edema in legs.  NEUROLOGICAL:  Grossly intact.  No focal neurological deficits.  PSYCHIATRIC:  Normal affect and mood.        RECENT LABS:    Results from last 7 days "   Lab Units 11/02/22  1124   WBC 10*3/mm3 6.87   HEMOGLOBIN g/dL 10.9*   HEMATOCRIT % 34.6*   PLATELETS 10*3/mm3 159     Lab Results   Component Value Date    GLUCOSE 109 11/02/2022    BUN 22 (H) 11/02/2022    CREATININE 0.95 11/02/2022    EGFRIFNONA 70 02/23/2022    EGFRIFAFRI 81 02/23/2022    BCR 23.2 11/02/2022    K 4.4 11/02/2022    CO2 25.0 11/02/2022    CALCIUM 9.6 11/02/2022    PROTENTOTREF 7.2 08/24/2022    ALBUMIN 4.20 11/02/2022    LABIL2 1.9 08/24/2022    AST 20 11/02/2022    ALT 16 11/02/2022     Lab Results   Component Value Date    DIGCVDFX19 260 08/24/2022     Lab Results   Component Value Date    FOLATE 15.50 09/22/2022     Lab Results   Component Value Date    TIBC 593 09/08/2022    FERRITIN 10.70 (L) 09/08/2022   Iron saturation 4% on 9/18/2022.    PATHOLOGY:  Case Report   Surgical Pathology Report                         Case: [id number]                                   Authorizing Provider:  Ga Samaniego MD       Collected:           10/11/2022 01:22 PM           Ordering Location[facility]  Received:            10/11/2022 02:21 PM                                  MAIN OR                                                                       Pathologist:           Niki Varghese MD                                                     Specimen:    Large Intestine, Right / Ascending Colon, right colon                                      Final Diagnosis   1. Terminal Ileum, Cecum, Appendix, and Ascending Colon, Right Hemicolectomy:   INVASIVE MODERATELY       DIFFERENTIATED ADENOCARCINOMA.  A. Tumor site:  Cecum.  B. Tumor size:  3.5 cm by gross exam.  C. Tumor extent:  Tumor invades thorough the muscularis propria into pericolonic adipose tissue.  D. Margins:  Uninvolved by invasive carcinoma.  1. Invasive carcinoma comes to within 10 cm of the closest radial mesenteric margin.               E.  No definitive lymphovascular space invasion identified.                F.  Perineural invasion is present.                 G. Lymph nodes:  Four of sixteen total lymph nodes, positive for metastatic carcinoma (4/16).                            1. The largest metastatic focus measures 8 mm with extranodal extension.               H.  Pathologic stage:  pT3, N2a.      Swm/kds    Electronically signed by Niki Varghese MD on 10/13/2022 at 1116   Synoptic Checklist   COLON AND RECTUM: Resection, Including Transanal Disk Excision of Rectal Neoplasms  8th Edition - Protocol posted: 6/22/2022  COLON AND RECTUM: RESECTION - All Specimens  SPECIMEN   Procedure  Right hemicolectomy    TUMOR   Tumor Site  Cecum    Histologic Type  Adenocarcinoma    Histologic Grade  G2, moderately differentiated    Tumor Size  Greatest dimension (Centimeters): 3.5 cm   Tumor Extent  Invades through muscularis propria into the pericolonic or perirectal tissue    Macroscopic Tumor Perforation  Not identified    Lymphovascular Invasion  Not identified    Perineural Invasion  Present    Treatment Effect  No known presurgical therapy    MARGINS   Margin Status for Invasive Carcinoma  All margins negative for invasive carcinoma    Closest Margin(s) to Invasive Carcinoma  Radial (circumferential) or mesenteric    Distance from Invasive Carcinoma to Closest Margin  10 cm   Margin Status for Non-Invasive Tumor  All margins negative for high-grade dysplasia / intramucosal carcinoma and low-grade dysplasia    REGIONAL LYMPH NODES   Regional Lymph Node Status  Tumor present in regional lymph node(s)    Number of Lymph Nodes with Tumor  4    Number of Lymph Nodes Examined  16    Tumor Deposits  Not identified    PATHOLOGIC STAGE CLASSIFICATION (pTNM, AJCC 8th Edition)   Reporting of pT, pN, and (when applicable) pM categories is based on information available to the pathologist at the time the report is issued. As per the AJCC (Chapter 1, 8th Ed.) it is the managing physician's responsibility to establish the final  pathologic stage based upon all pertinent information, including but potentially not limited to this pathology report.   pT Category  pT3    pN Category  pN2a    .      Comment    MSI studies were performed on the prior biopsy [facility] ([id number]).    [facility]        IMAGING STUDY:    1. CT Chest W, CT Abdomen Pelvis W 9/26/2022     INDICATION:   Recent diagnosis of colon cancer on colonoscopy. Tobacco abuse.     TECHNIQUE:   CT of the chest, abdomen and pelvis with IV contrast. Coronal and sagittal reconstructions were obtained.  Radiation dose reduction techniques included automated exposure control or exposure modulation based on body size. Count of known CT and cardiac  nuc med studies performed in previous 12 months: 0.      COMPARISON:   None available.     FINDINGS:  Chest: No suspicious pulmonary nodule. No pleural effusion. There is fibrotic change in the posterior medial right lower lobe. Old healed granulomatous disease. There is left-sided thyromegaly and heterogeneity of both thyroid lobes. The heart is  borderline in size. No threshold adenopathy. No pericardial effusion. Normal caliber aorta with atherosclerotic change. Coronary artery calcifications are present. No suspicious bone lesion. Degenerative change. Status post anterior cervicothoracic  fusion.     Abdomen: Normal caliber aorta with atherosclerotic change. There is streak artifact related to posterior lumbar fusion inferiorly. Negative spleen and adrenal glands. The pancreas is negative. Uncomplicated cholelithiasis. There is a subtle 9 mm area of  decreased attenuation in segment 6 of the liver on image 29, indeterminate. Small liver lesion could present similarly. Kidneys are nonobstructed. There is a right renal cyst. There is no threshold adenopathy.     Pelvis: Severe streak artifact secondary to bilateral hip replacement. There is motion degradation. The bladder is negative. The prostate is borderline  enlarged. No drainable fluid collection. The bowel is nonobstructed. There is diverticulosis. Normal  appendix.     There is prominence of the colonic wall at and distal to the level of the ileocecal valve extending into the base of the cecum. Wall thickness measures up to 1.8 cm with imaging features suspicious for cecal mass. Correlation with recent colonoscopy on  September 20 demonstrates a cecal mass was encountered. There is a tiny indeterminant lymph node lateral to the cecum on image 55 measuring less than 5 mm short axis. Additional similar indeterminate lymph node adjacent to the cecum on image 53.     No inflammatory change of the bowel. The inguinal canals are negative. Degenerative changes. No suspicious bone lesion.  negative.     IMPRESSION:     1. Subtle cecal mass with indeterminant subcentimeter short axis lymph nodes adjacent to the cecum in keeping with recent biopsy-proven primary colon malignancy. There is no bowel obstruction or pericolonic fluid collection.  2. Subtle 9 mm area of decreased attenuation in segment 6 of the liver. Small liver lesion could present similarly in the setting of recently diagnosed malignancy. Suggest further evaluation with dedicated with and without contrast liver protocol MRI.  3. Cholelithiasis.  4. Normal appendix  5. No evidence of metastatic disease in the chest.        2. MRI OF THE LIVER WITH AND WITHOUT CONTRAST 10/07/2022.     HISTORY: Characterized liver lesion. Recently diagnosed cecal mass.     TECHNIQUE: MRI of the abdomen was performed utilizing a dedicated liver  protocol. Axial 2-D FIESTA, in and out of phase FSPGR, axial  fat-saturated T2 imaging was acquired. Axial thin section precontrast  and dynamic enhanced postcontrast T1 weighted LAVA imaging was acquired.     COMPARISON: Images from CT abdomen dated 09/26/2022.     FINDINGS: Corresponding to the finding on CT there is a 8 mm  hypervascular lesion within segment 5 of the liver. The  lesion is best  demonstrated on venous phase postcontrast on image 58 series 802. There  is no corresponding T2 abnormality in this region. The hepatic veins are  patent. The portal vein and its branches are patent. No additional  suspicious liver lesion is identified. The pancreas demonstrates normal  signal intensity. There is 2 sub-5 mm T2 hyperintense foci within the  pancreas favored to represent small side branch ectasia and/or branch  duct type MM. Cholelithiasis is present. No biliary dilatation. The  common bile duct demonstrates normal luminal caliber. The spleen is  unremarkable. Right renal cyst and otherwise the visualized kidneys are  normal.     IMPRESSION:  1. 8 mm hypovascular lesion within segment 5 of the liver without  corresponding T2 abnormality. The imaging findings are suspicious for  small metastases as the enhancement characteristics are unlike that of a  cyst or hemangioma.  2. Cholelithiasis.  3. Sub-5 mm pancreatic cystic lesion. Current ACR recommendations for  incidental pancreatic cysts include followup in 2 years with MRCP and  MRI.         Assessment & Plan     ASSESSMENT:    * Colon cancer newly discovered.   · Had positive stool occult blood test on 09/09/2022. Colonoscopic examination on 09/20/2022 by Dr. Anil Garces reported cecum colon mass.  Biopsy confirmed moderately differentiated adenocarcinoma.  MSI stable.  · Mildly elevated CEA level 10.7 ng/mL on 9/22/2022.  · CT scan for chest abdomen pelvis 9/26/2022 reported cecal mass.  There was also suspicious 9 mm hepatic lesion.  · Abdomen MRI examination with and without contrast on 10/7/2022 confirmed 8 mm lesion hypervascularity in the segment 5 of the liver.  · Patient had right hemicolectomy 10/11/2022.  Unable to biopsy at this time he liver lesion.  Pathology evaluation reported [id number] disease, this will be at least a stage IIIb colon cancer.  · Today 11/2/2022 I discussed with the patient, his wife and his daughter,  recommending further imaging study with PET scan for assessment.  If patient is no hypermetabolic liver lesion, will treat with adjuvant FOLFOX 6 regimen every 2 weeks for 12 cycles.  However, if he has hypermetabolic lesion in the liver, we will treated him as metastatic disease, with FOLFOX plus Avastin.  Since patient has neuropathy already being his legs and feet, if he is indeed metastatic disease, we may switch him to irinotecan instead of oxaliplatin because of the neuropathy.  If indicated patient has metastatic disease, we would also entertain metastectomy after finishing 12 cycles of chemotherapy.  · We should also testing for [employer] NGS genetic study to see if he would be a candidate for other treatment.  · Discussed with the patient and family members about potential side effects including bone marrow suppression, with anemia thrombocytopenia and leukocytopenia increased risk for infection, and also peripheral neuropathy, etc. patient is agreeable to proceed ahead with treatment.         2. Iron deficiency anemia.   · The patient has iron deficiency due to GI bleeding from his colon cancer. His laboratory study on 09/08/2022 reported ferritin 10.7 and iron saturation 4% with microcytic hypochromic anemia, hemoglobin 7.8, MCV 79.9 and MCHC 29.2.   · Patient reports good tolerance to oral iron treatment and already has improved energy level. Laboratory study on 9/22/2022 did report slightly improved hemoglobin at 8.5 but normalized MCV 85.0. I think he is responding to oral iron treatment both physically and laboratory wise.  We advised patient to increase oral iron to twice a day.  · Today on 11/2/2022 patient reports good tolerance to oral iron twice a day.  Labs today showed further improved hemoglobin 10.9.  He continues to maintain normal platelets and WBC.     3.  Low normal vitamin B12 level.    · Patient has low normal vitamin B12 level at 260 pg/mL on 08/24/2022.   · We obtained a folate level on  "9/22/2022 which was normal at 15.5 ng/mL.    · I think this patient will benefit from oral vitamin B12 supplement at 1000 mcg daily.              PLAN:   1. We plan to obtain a PET scan as soon as possible for further staging purposes.   2. Will arrange chemotherapy teaching for FOLFOX6 regimen.  Possible adding bevacizumab or Vectibix.  3. Pending study results for [employer] NGS study.  We already requested this ahead of time.    4. Continue oral iron ferrous sulfate 325 mg twice a day.  I E scribed new prescription to patient.  5. Patient will benefit from oral vitamin B12 at 1000 mcg daily.  6. The patient will follow-up with me in office in 2 weeks to discuss PET scan results and to start first cycle of chemotherapy.  If patient has metastatic disease, we will then look into possibility of adding bevacizumab or Vectibix to the treatment regimen.     I been reviewed images of abdomen MRI examination and the CT scan examination.    I reviewed the pathology results with the patient and family members.    We discussed further staging with PET scan, and also adjuvant chemotherapy versus palliative chemotherapy.    Patient and his family members voiced understanding.  They are agreeable with the recommendations.    I spent more than 60 minutes in the patient’s care today.      GAMALIEL ROGERS M.D., Ph.D.     11/2/2022.      CC:  JULIA Santiago MD Richard Pokorny, MD          Transcribed from ambient dictation for Gamaliel Rogers MD PhD by Danisha Porter.  11/02/22   16:41 EDT    JAYMIE Provider Statement:[id number]::\"Patient or patient representative verbalized consent to the visit recording.\",\"I have personally performed the services described in this document as transcribed by the above individual, and it is both accurate and complete.\"              "

## 2022-11-03 ENCOUNTER — TELEPHONE (OUTPATIENT)
Dept: ONCOLOGY | Facility: CLINIC | Age: 72
End: 2022-11-03

## 2022-11-03 NOTE — TELEPHONE ENCOUNTER
Per Dr Rosario patient called and informed Vitamin B-12 is low. Patient instructed to take Vitamin B-12 1000 mcg daily. Patient v/u

## 2022-11-03 NOTE — TELEPHONE ENCOUNTER
----- Message from Maya Rosario MD PhD sent at 11/2/2022 10:23 PM EDT -----  Regarding: Oral vitamin B12  Bela would you double check to see if he takes oral vitamin B12?  He needs to take 1000 mcg daily.  Either OTC or we can prescribe it for him.    Thank you very much!    Z

## 2022-11-07 ENCOUNTER — TELEMEDICINE (OUTPATIENT)
Dept: ONCOLOGY | Facility: CLINIC | Age: 72
End: 2022-11-07

## 2022-11-07 ENCOUNTER — APPOINTMENT (OUTPATIENT)
Dept: LAB | Facility: HOSPITAL | Age: 72
End: 2022-11-07

## 2022-11-07 VITALS
RESPIRATION RATE: 16 BRPM | TEMPERATURE: 97.1 F | HEART RATE: 57 BPM | DIASTOLIC BLOOD PRESSURE: 95 MMHG | HEIGHT: 70 IN | SYSTOLIC BLOOD PRESSURE: 149 MMHG | OXYGEN SATURATION: 96 % | WEIGHT: 213.6 LBS | BODY MASS INDEX: 30.58 KG/M2

## 2022-11-07 DIAGNOSIS — C18.9 MALIGNANT NEOPLASM OF COLON, UNSPECIFIED PART OF COLON: Primary | ICD-10-CM

## 2022-11-07 LAB
LAB AP CASE REPORT: NORMAL
LAB AP DIAGNOSIS COMMENT: NORMAL
LAB AP SYNOPTIC CHECKLIST: NORMAL
Lab: NORMAL
PATH REPORT.ADDENDUM SPEC: NORMAL
PATH REPORT.FINAL DX SPEC: NORMAL
PATH REPORT.GROSS SPEC: NORMAL

## 2022-11-07 PROCEDURE — 99215 OFFICE O/P EST HI 40 MIN: CPT | Performed by: NURSE PRACTITIONER

## 2022-11-07 NOTE — PROGRESS NOTES
TREATMENT  PREPARATION    Taz Dickey  [id number]  1950    Chief Complaint: Treatment preparation and needs assessment    History of present illness:  Taz Dickey is a 72 y.o. year old male who is here today for treatment preparation and needs assessment.  The patient has been diagnosed with   Encounter Diagnosis   Name Primary?   • Malignant neoplasm of colon, unspecified part of colon (HCC) Yes    and is scheduled to begin treatment with:     Oncology History:    Oncology/Hematology History   Colon cancer (HCC)   9/22/2022 Initial Diagnosis    Colon cancer (HCC)     11/2/2022 Cancer Staged    Staging form: Colon And Rectum, AJCC 8th Edition  - Clinical stage from 11/2/2022: cT3, cN2a - Signed by Maya Rosario MD PhD on 11/2/2022     11/15/2022 -  Chemotherapy    OP COLON mFOLFOX6 OXALIplatin / Leucovorin / Fluorouracil         The current medication list and allergy list were reviewed and reconciled.     Past Medical History, Past Surgical History, Social History, Family History have been reviewed and are without significant changes except as mentioned.    Physical Exam:    Vitals:    11/07/22 0836   BP: 149/95   Pulse: 57   Resp: 16   Temp: 97.1 °F (36.2 °C)   SpO2: 96%     Vitals:    11/07/22 0836   PainSc: 0-No pain        ECOG score: 0             Physical Exam  HENT:      Head: Normocephalic and atraumatic.   Eyes:      Extraocular Movements: Extraocular movements intact.      Conjunctiva/sclera: Conjunctivae normal.   Pulmonary:      Effort: Pulmonary effort is normal. No respiratory distress.   Neurological:      General: No focal deficit present.      Mental Status: He is alert and oriented to person, place, and time.   Psychiatric:         Mood and Affect: Mood normal.         Behavior: Behavior normal.           NEEDS ASSESSMENTS    Genetics  The patient's new diagnosis and family history have been reviewed for genetic counseling needs. A genetic referral is not recommended.      Psychosocial and Barriers to care  The patient has completed a PHQ-9 Depression Screening and the Distress Thermometer (DT) today.  PHQ-9 results show PHQ-2 Total Score: 0 PHQ-9 Total Score: PHQ-9 Total Score: 0     The patient scored their distress today as Distress Level: 0-No distress on a scale of 0-10 with 0 being no distress and 10 being extreme distress. Problems marked by the patient as being an issue for them within the last week include Practical Problems  : No  Housing: No  Transportation: No  Treatment decisions: No  Family Concerns  Ability to have children: No  Physical concerns  Fatigue: No  Pain: No  Sleep: No  Substance abuse: No .      Results were reviewed along with psychosocial resources offered by our cancer center.  Our Supportive Oncology team will be flagged for a score of 4 or above, and a same day call will be made for a score of 9 or 10.  A mental health referral is offered at that time. Patients who score less than 4 have been educated on our support services and can be referred to our  upon request.  The patient will not be referred to our .       Nutrition  The patient has completed the malnutrition screening today. They scored Malnutrition Screening Tool  Have you recently lost weight without trying?  If yes, how much weight have you lost?: 0--> No  Have you been eating poorly because of a decreased appetite?: 0--> No  MST score: 0   with a score of 0-1 meaning not at risk in a score of 2 or greater meaning at risk.  Patients with a score of 3 or higher will be referred to our oncology dietitian for support. Patients beginning at risk treatment regimens or who have dietary concerns will also be referred to our oncology dietitian. NOT APPLICABLE    Functional Assessment  Persons who are age 70 or greater will be screened for qualification of a comprehensive geriatric assessment by our survivorship nurse practitioner.  Older adults with cancer  "face unique challenges. These may include an increased risk of drug reactions, financial burdens, and caregiver stress. The patient scored   . Patients scoring 14 or lower will referred for an older adult functional assessment with the survivorship advanced practice registered nurse to ensure all needed support is provided as patients plan for their treatments. NOT APPLICABLE    Intravenous Access Assessment  The patient and I discussed planned intravenous chemo/biotherapy as well as other IV treatments that are often needed throughout the course of treatment. These may include, but are not limited to blood transfusions, antibiotics, and IV hydration. Discussed that depending on selected treatment and vein assessment, patient may require venous access device (VAD) which could include but not limited to a Mediport or PICC line. Risks and benefits of VADs reviewed. The patient will be treated via Port.    Reproductive/Sexual Activity   People should avoid becoming pregnant and should not get a partner pregnant while undergoing chemo/biotherapy.  People of childbearing age should use effective contraception during active therapy. The best recommendation for all people is to use a barrier method for a minimum of 1 week after the last infusion of chemo/biotherapy to prevent your partner being exposed to byproducts from treatment medications in bodily fluids. Effective contraception should be discussed with your oncology team to make sure it is safe to take based on your diagnosis. Possible options include oral contraceptives, barrier methods. Chemo/biotherapy can change your ability to reproduce children in the future.  There are options for fertility preservation. NOT APPLICABLE    Advanced Care Planning  Advance Care Planning   The patient and I discussed advanced care planning, \"Conversations that Matter\".   This service is offered for development of advance directives with a certified ACP facilitator.  The patient " does not have an up-to-date advanced directive. This document is not on file with our office. The patient is not interested in an appointment with one of our facilitators to create or update their advanced directives.     Smoking cessation  Tobacco Use: High Risk   • Smoking Tobacco Use: Some Days   • Smokeless Tobacco Use: Never   • Passive Exposure: Not on file       Patient and I discussed their tobacco use history. Referral will not be made for smoking cessation.      Palliative Care  When appropriate, the patient and I discussed the availability palliative care services and when appropriate Hospice care. Palliative care is not the same as Hospice care which was explained to the patient.  The patient is not interested in additional information from our  on these services.     Survivorship   When appropriate, we discussed that we will refer the patient to survivorship clinic to discuss next steps following completion of planned treatment.  Reviewed this visit will include assessment of your physical, psychological, functional, and spiritual needs as a survivor and the need at attend this visit when scheduled.    TREATMENT EDUCATION    Today I met with the patient to discuss the chemo/biotherapy regimen recommended for treatment of Malignant neoplasm of colon, unspecified part of colon (HCC)  .  The patient was given explanation of treatment premed side effects including office policy that prohibits patients to drive if sedating medications are administered, MD explanation given regarding benefits, side effects, toxicities and goals of treatment.  The patient received a Chemotherapy/Biotherapy Plan Summary including diagnosis and explanation of specific treatment plan.    SIDE EFFECTS:  Common side effects were discussed with the patient and/or significant other.  Discussion included where applicable hair loss/discoloration, anemia/fatigue, infection/chills/fever, appetite, bleeding risk/precautions,  constipation, diarrhea, mouth sores, taste alteration, loss of appetite, nausea/vomiting, peripheral neuropathy, skin/nail changes, rash, muscle aches/weakness, photosensitivity, weight gain/loss, hearing loss, dizziness, menopausal symptoms, menstrual irregularity, sterility, high blood pressure, heart damage, liver damage, lung damage, kidney damage, DVT/PE risk, fluid retention, pleural/pericardial effusion, somnolence, electrolyte/LFT imbalance, vein exercises and/or the possible need for vascular access/port placement.  The patient was advised that although uncommon, leakage of an infused medication from the vein or venous access device may lead to skin breakdown and/or other tissue damage.  The patient was advised that he/she may have pain, bleeding, and/or bruising from the insertion of a needle in their vein or venous access device (port).  The patient was further advised that, in spite of proper technique, infection with redness and irritation may rarely occur at the site where the needle was inserted.  The patient was advised that if complications occur, additional medical treatment is available.  Finally, where applicable we have reviewed rare but potential immune mediated side effects including shortness of breath, cough, chest pain (pneumonitis), abdominal pain, diarrhea (colitis), thyroiditis (hypothyroid or hyperthyroid), hepatitis and liver dysfunction, nephritis and renal dysfunction.    Discussion also included side effects specific to drugs in the treatment plan, specifically:    Treatment Plans     Name Type Plan Dates Plan Provider         Active    OP COLON [id number] OXALIplatin / Leucovorin / Fluorouracil ONCOLOGY TREATMENT  11/14/2022 - Present Maya Rosario MD PhD                    Questions answered and additional information discussed on topics including:  Anemia, Thrombocytopenia, Neutropenia, Nutrition and appetite changes, Constipation, Diarrhea, Nausea & vomiting, Mouth sores,  Alopecia, Nervous system changes, Skin & nail changes, Organ toxicities and Home care       Assessment and Plan:    Diagnoses and all orders for this visit:    1. Malignant neoplasm of colon, unspecified part of colon (HCC) (Primary)      No orders of the defined types were placed in this encounter.        1. The patient and I have reviewed their diagnosis and scheduled treatment plan. Needs assessment was completed where applicable including genetics, psychosocial needs, barriers to care, VAD evaluation, advanced care planning, survivorship, and palliative care services where indicated. Referrals have been ordered as appropriate based upon evaluation today and patient desires.   2. Chemo/biotherapy teaching was completed today and consent obtained. See separate documentation for further details.  3. Adequate time was given to answer questions.  Patient made aware of their care team members and contact information if they have questions or problems throughout the treatment course.  4. Discussion held and written information provided describing frequency of office visits and ongoing monitoring throughout the treatment plan.     5. Reviewed with patient any prescribed medication sent to pharmacy.  Education provided regarding proper storage, safe handling, and proper disposal of unused medication.  6. Proper handling of body fluids and waste discussed and written information provided.  7. If appropriate, patient had pretreatment labs drawn today.    Learning assessment completed at initial patient encounter. See separate flowsheet. Chemo/biotherapy education comprehension assessed at today's visit.    I spent 40 minutes caring for Taz on this date of service. This time includes time spent by me in the following activities: preparing for the visit, reviewing tests, obtaining and/or reviewing a separately obtained history, counseling and educating the patient/family/caregiver and documenting information in the  medical record.     Alyson Glez, APRN   11/07/22    You have chosen to receive care through a telehealth visit.  Do you consent to use a video/audio connection for your medical care today? Yes

## 2022-11-07 NOTE — PROGRESS NOTES
[facility] Hematology/Oncology Treatment Plan Summary    Name: Taz Dickey  [facility]# [id number]  MD: Dr. Rosario    Diagnosis:     ICD-10-CM ICD-9-CM   1. Malignant neoplasm of colon, unspecified part of colon (HCC)  C18.9 153.9       Goal of treatment: disease control    Treatment Medication(s):   1. 5-FU  2. Oxaliplatin  3. Leucovorin    Frequency: every 2 weeks    Number of cycles: to be determined    Starting on: 11/15/2022    Repeat after 4 cycles: CT Scan    Items for home use: Senokot-S (for constipation), Milk of Magnesia (for constipation), Imodium AD (for diarrhea) and Tylenol (for fever and/or pain) thermometer      Completing Provider: JULIA Waite           Date/time: 11/07/2022    Please note: You will be seen by a provider frequently with your treatment plan. This plan may change depending on many factors, if so, this will be discussed with you by your physician.  Last update 03/2022.

## 2022-11-09 ENCOUNTER — APPOINTMENT (OUTPATIENT)
Dept: PET IMAGING | Facility: HOSPITAL | Age: 72
End: 2022-11-09

## 2022-11-12 DIAGNOSIS — M51.36 DEGENERATION OF INTERVERTEBRAL DISC OF LUMBAR REGION: ICD-10-CM

## 2022-11-14 ENCOUNTER — HOSPITAL ENCOUNTER (OUTPATIENT)
Dept: PET IMAGING | Facility: HOSPITAL | Age: 72
Discharge: HOME | End: 2022-11-14

## 2022-11-14 DIAGNOSIS — C18.2 MALIGNANT NEOPLASM OF ASCENDING COLON: ICD-10-CM

## 2022-11-14 LAB — GLUCOSE BLDC GLUCOMTR-MCNC: 106 MG/DL (ref 70–130)

## 2022-11-14 PROCEDURE — A9552 F18 FDG: HCPCS | Performed by: INTERNAL MEDICINE

## 2022-11-14 PROCEDURE — 78815 PET IMAGE W/CT SKULL-THIGH: CPT

## 2022-11-14 PROCEDURE — 82962 GLUCOSE BLOOD TEST: CPT

## 2022-11-14 PROCEDURE — 0 FLUDEOXYGLUCOSE F18 SOLUTION: Performed by: INTERNAL MEDICINE

## 2022-11-14 RX ORDER — GABAPENTIN 300 MG/1
CAPSULE ORAL
Qty: 90 CAPSULE | Refills: 0 | Status: SHIPPED | OUTPATIENT
Start: 2022-11-14 | End: 2023-01-10

## 2022-11-14 RX ADMIN — FLUDEOXYGLUCOSE F18 1 DOSE: 300 INJECTION INTRAVENOUS at 12:20

## 2022-11-15 ENCOUNTER — APPOINTMENT (OUTPATIENT)
Dept: PET IMAGING | Facility: HOSPITAL | Age: 72
End: 2022-11-15

## 2022-11-15 ENCOUNTER — INFUSION (OUTPATIENT)
Dept: ONCOLOGY | Facility: HOSPITAL | Age: 72
End: 2022-11-15

## 2022-11-15 ENCOUNTER — OFFICE VISIT (OUTPATIENT)
Dept: ONCOLOGY | Facility: CLINIC | Age: 72
End: 2022-11-15

## 2022-11-15 ENCOUNTER — CLINICAL SUPPORT (OUTPATIENT)
Dept: OTHER | Facility: HOSPITAL | Age: 72
End: 2022-11-15

## 2022-11-15 VITALS
WEIGHT: 212.3 LBS | OXYGEN SATURATION: 97 % | HEIGHT: 70 IN | DIASTOLIC BLOOD PRESSURE: 79 MMHG | TEMPERATURE: 98.2 F | SYSTOLIC BLOOD PRESSURE: 127 MMHG | BODY MASS INDEX: 30.39 KG/M2 | RESPIRATION RATE: 18 BRPM | HEART RATE: 66 BPM

## 2022-11-15 VITALS — HEIGHT: 70 IN | BODY MASS INDEX: 30.46 KG/M2

## 2022-11-15 DIAGNOSIS — Z79.899 ENCOUNTER FOR LONG-TERM (CURRENT) USE OF MEDICATIONS: ICD-10-CM

## 2022-11-15 DIAGNOSIS — C18.9 MALIGNANT NEOPLASM OF COLON, UNSPECIFIED PART OF COLON: ICD-10-CM

## 2022-11-15 DIAGNOSIS — D50.0 IRON DEFICIENCY ANEMIA DUE TO CHRONIC BLOOD LOSS: ICD-10-CM

## 2022-11-15 DIAGNOSIS — C18.9 MALIGNANT NEOPLASM OF COLON, UNSPECIFIED PART OF COLON: Primary | ICD-10-CM

## 2022-11-15 DIAGNOSIS — Z79.899 ENCOUNTER FOR LONG-TERM (CURRENT) USE OF MEDICATIONS: Primary | ICD-10-CM

## 2022-11-15 DIAGNOSIS — C78.7 SECONDARY LIVER CANCER: ICD-10-CM

## 2022-11-15 DIAGNOSIS — E53.8 VITAMIN B12 DEFICIENCY: ICD-10-CM

## 2022-11-15 LAB
ALBUMIN SERPL-MCNC: 4.2 G/DL (ref 3.5–5.2)
ALBUMIN/GLOB SERPL: 1.5 G/DL (ref 1.1–2.4)
ALP SERPL-CCNC: 66 U/L (ref 38–116)
ALT SERPL W P-5'-P-CCNC: 24 U/L (ref 0–41)
ANION GAP SERPL CALCULATED.3IONS-SCNC: 11.1 MMOL/L (ref 5–15)
AST SERPL-CCNC: 26 U/L (ref 0–40)
BASOPHILS # BLD AUTO: 0.06 10*3/MM3 (ref 0–0.2)
BASOPHILS NFR BLD AUTO: 1 % (ref 0–1.5)
BILIRUB SERPL-MCNC: 0.7 MG/DL (ref 0.2–1.2)
BUN SERPL-MCNC: 14 MG/DL (ref 6–20)
BUN/CREAT SERPL: 14.3 (ref 7.3–30)
CALCIUM SPEC-SCNC: 9.7 MG/DL (ref 8.5–10.2)
CHLORIDE SERPL-SCNC: 102 MMOL/L (ref 98–107)
CO2 SERPL-SCNC: 25.9 MMOL/L (ref 22–29)
CREAT SERPL-MCNC: 0.98 MG/DL (ref 0.7–1.3)
DEPRECATED RDW RBC AUTO: 72.1 FL (ref 37–54)
EGFRCR SERPLBLD CKD-EPI 2021: 82.4 ML/MIN/1.73
EOSINOPHIL # BLD AUTO: 0.29 10*3/MM3 (ref 0–0.4)
EOSINOPHIL NFR BLD AUTO: 5.1 % (ref 0.3–6.2)
ERYTHROCYTE [DISTWIDTH] IN BLOOD BY AUTOMATED COUNT: 21.3 % (ref 12.3–15.4)
GLOBULIN UR ELPH-MCNC: 2.8 GM/DL (ref 1.8–3.5)
GLUCOSE SERPL-MCNC: 156 MG/DL (ref 74–124)
HCT VFR BLD AUTO: 38.1 % (ref 37.5–51)
HGB BLD-MCNC: 12.1 G/DL (ref 13–17.7)
IMM GRANULOCYTES # BLD AUTO: 0.01 10*3/MM3 (ref 0–0.05)
IMM GRANULOCYTES NFR BLD AUTO: 0.2 % (ref 0–0.5)
LYMPHOCYTES # BLD AUTO: 1.07 10*3/MM3 (ref 0.7–3.1)
LYMPHOCYTES NFR BLD AUTO: 18.7 % (ref 19.6–45.3)
MCH RBC QN AUTO: 29.7 PG (ref 26.6–33)
MCHC RBC AUTO-ENTMCNC: 31.8 G/DL (ref 31.5–35.7)
MCV RBC AUTO: 93.6 FL (ref 79–97)
MONOCYTES # BLD AUTO: 0.54 10*3/MM3 (ref 0.1–0.9)
MONOCYTES NFR BLD AUTO: 9.4 % (ref 5–12)
NEUTROPHILS NFR BLD AUTO: 3.75 10*3/MM3 (ref 1.7–7)
NEUTROPHILS NFR BLD AUTO: 65.6 % (ref 42.7–76)
NRBC BLD AUTO-RTO: 0 /100 WBC (ref 0–0.2)
PLATELET # BLD AUTO: 156 10*3/MM3 (ref 140–450)
PMV BLD AUTO: 9.9 FL (ref 6–12)
POTASSIUM SERPL-SCNC: 4.9 MMOL/L (ref 3.5–4.7)
PROT SERPL-MCNC: 7 G/DL (ref 6.3–8)
RBC # BLD AUTO: 4.07 10*6/MM3 (ref 4.14–5.8)
SODIUM SERPL-SCNC: 139 MMOL/L (ref 134–145)
WBC NRBC COR # BLD: 5.72 10*3/MM3 (ref 3.4–10.8)

## 2022-11-15 PROCEDURE — 80053 COMPREHEN METABOLIC PANEL: CPT

## 2022-11-15 PROCEDURE — 96411 CHEMO IV PUSH ADDL DRUG: CPT

## 2022-11-15 PROCEDURE — 85025 COMPLETE CBC W/AUTO DIFF WBC: CPT

## 2022-11-15 PROCEDURE — G0498 CHEMO EXTEND IV INFUS W/PUMP: HCPCS

## 2022-11-15 PROCEDURE — 25010000002 OXALIPLATIN PER 0.5 MG: Performed by: INTERNAL MEDICINE

## 2022-11-15 PROCEDURE — 96415 CHEMO IV INFUSION ADDL HR: CPT

## 2022-11-15 PROCEDURE — 96368 THER/DIAG CONCURRENT INF: CPT

## 2022-11-15 PROCEDURE — 96375 TX/PRO/DX INJ NEW DRUG ADDON: CPT

## 2022-11-15 PROCEDURE — 96416 CHEMO PROLONG INFUSE W/PUMP: CPT

## 2022-11-15 PROCEDURE — 25010000002 FOSAPREPITANT PER 1 MG: Performed by: INTERNAL MEDICINE

## 2022-11-15 PROCEDURE — 96413 CHEMO IV INFUSION 1 HR: CPT

## 2022-11-15 PROCEDURE — 25010000002 LEUCOVORIN CALCIUM PER 50 MG: Performed by: INTERNAL MEDICINE

## 2022-11-15 PROCEDURE — 25010000002 PALONOSETRON PER 25 MCG: Performed by: INTERNAL MEDICINE

## 2022-11-15 PROCEDURE — 25010000002 DEXAMETHASONE SODIUM PHOSPHATE 100 MG/10ML SOLUTION: Performed by: INTERNAL MEDICINE

## 2022-11-15 PROCEDURE — 99215 OFFICE O/P EST HI 40 MIN: CPT | Performed by: INTERNAL MEDICINE

## 2022-11-15 PROCEDURE — 25010000002 FLUOROURACIL PER 500 MG: Performed by: INTERNAL MEDICINE

## 2022-11-15 PROCEDURE — 96367 TX/PROPH/DG ADDL SEQ IV INF: CPT

## 2022-11-15 RX ORDER — FLUOROURACIL 50 MG/ML
400 INJECTION, SOLUTION INTRAVENOUS ONCE
Status: CANCELLED | OUTPATIENT
Start: 2022-11-15

## 2022-11-15 RX ORDER — DIPHENHYDRAMINE HYDROCHLORIDE 50 MG/ML
50 INJECTION INTRAMUSCULAR; INTRAVENOUS AS NEEDED
Status: CANCELLED | OUTPATIENT
Start: 2022-11-15

## 2022-11-15 RX ORDER — DEXTROSE MONOHYDRATE 50 MG/ML
250 INJECTION, SOLUTION INTRAVENOUS ONCE
Status: CANCELLED | OUTPATIENT
Start: 2022-11-15

## 2022-11-15 RX ORDER — PALONOSETRON 0.05 MG/ML
0.25 INJECTION, SOLUTION INTRAVENOUS ONCE
Status: CANCELLED | OUTPATIENT
Start: 2022-11-15

## 2022-11-15 RX ORDER — PALONOSETRON 0.05 MG/ML
0.25 INJECTION, SOLUTION INTRAVENOUS ONCE
Status: COMPLETED | OUTPATIENT
Start: 2022-11-15 | End: 2022-11-15

## 2022-11-15 RX ORDER — DEXTROSE MONOHYDRATE 50 MG/ML
250 INJECTION, SOLUTION INTRAVENOUS ONCE
Status: COMPLETED | OUTPATIENT
Start: 2022-11-15 | End: 2022-11-15

## 2022-11-15 RX ORDER — FAMOTIDINE 10 MG/ML
20 INJECTION, SOLUTION INTRAVENOUS AS NEEDED
Status: CANCELLED | OUTPATIENT
Start: 2022-11-15

## 2022-11-15 RX ORDER — FLUOROURACIL 50 MG/ML
400 INJECTION, SOLUTION INTRAVENOUS ONCE
Status: COMPLETED | OUTPATIENT
Start: 2022-11-15 | End: 2022-11-15

## 2022-11-15 RX ADMIN — SODIUM CHLORIDE 100 ML: 9 INJECTION, SOLUTION INTRAVENOUS at 09:26

## 2022-11-15 RX ADMIN — PALONOSETRON 0.25 MG: 0.05 INJECTION, SOLUTION INTRAVENOUS at 09:25

## 2022-11-15 RX ADMIN — DEXTROSE MONOHYDRATE 150 ML: 5 INJECTION, SOLUTION INTRAVENOUS at 09:13

## 2022-11-15 RX ADMIN — DEXAMETHASONE SODIUM PHOSPHATE 12 MG: 10 INJECTION, SOLUTION INTRAMUSCULAR; INTRAVENOUS at 09:59

## 2022-11-15 RX ADMIN — FLUOROURACIL 860 MG: 50 INJECTION, SOLUTION INTRAVENOUS at 12:26

## 2022-11-15 RX ADMIN — DEXTROSE MONOHYDRATE 185 MG: 5 INJECTION, SOLUTION INTRAVENOUS at 10:23

## 2022-11-15 RX ADMIN — FLUOROURACIL 5160 MG: 50 INJECTION, SOLUTION INTRAVENOUS at 12:26

## 2022-11-15 RX ADMIN — LEUCOVORIN CALCIUM 860 MG: 350 INJECTION, POWDER, LYOPHILIZED, FOR SUSPENSION INTRAMUSCULAR; INTRAVENOUS at 10:23

## 2022-11-15 NOTE — PROGRESS NOTES
.     REASON FOR FOLLOWUP:     *Cecum colon cancer, status post right hemicolectomy performed on 10/11/2022, [id number].   · CT scan examination on 9/26/2022 reported suspicious liver lesion 9 mm, otherwise no evidence for metastatic disease.    · The patient underwent an abdominal MRI examination on 10/06/2022, which reported suspicious liver lesion 8mm.  · Patient had sigmoidectomy on 10/11/2022.  Portacatheter placement on 10/21/2022.   · PET scan examination on 11/14/2022 reported hypermetabolic liver lesion.  *Iron deficiency anemia.  · On oral iron treatment.                                 HISTORY OF PRESENT ILLNESS:  The patient is a 72 y.o. year old male with hypertension, hyperlipidemia, type 2 diabetes, iron deficiency anemia, history of DVT, and newly diagnosed sigmoid colon cancer status post right hemicolectomy, who presented today for follow-up.  Patient is accompanied by his wife and his daughter who helped with history and discussion of treatment plan.    The patient presents today on 11/15/2022 for follow-up and reevaluation to discuss the results of [facility] NGS study and PET scan results, and to discuss management plan.     The patient is accompanied by his wife. He states he has regular bowel movements and denies any constipation. He states his stools are dark in color. He denies any history of myocardial infractions or stroke.     He had a port placed 4 weeks ago on his chest. His blood pressure today was 127/79 mmHg.    He confirms the use of iron twice daily with good tolerance.    Laboratory study today on 11/15/2022 showed improved hemoglobin 12.1, normal platelets 156,000 and WBC 5720 including ANC 3750 and lymphocytes 1070.  Chemistry study reported glucose 156 otherwise unremarkable CMP.    PET scan examination on 11/14/2022 reported distal small 1.3 cm subtle low attenuation lesion in the medial hepatic segment with SUV 6.3.  There is no hypermetabolic enthesopathy in the abdomen or  pelvis.  No suspicious hypermetabolic activity in the chest or neck.      Past Medical History:   Diagnosis Date   • Abdominal hernia    • Anemia    • Arrhythmia     PT STATED DURING LAST COLONOSCOPY 2 WEEKS AGO   • Arthritis    • Colon cancer (HCC) 09/22/2022   • Colon polyp September 20 2022   • Colon polyps    • Depression    • DVT (deep venous thrombosis) (HCC)     IN LEFT LEG   • Full dentures    • GI (gastrointestinal bleed) September 8 2022   • Hip pain     RIGHT   • Hyperlipidemia    • Hypertension    • Iron deficiency anemia 09/22/2022   • Numbness and tingling     RIGHT FOOT   • PONV (postoperative nausea and vomiting)    • Right leg pain    • Type 2 diabetes mellitus without complication, without long-term current use of insulin (HCC) 10/08/2019     Past Surgical History:   Procedure Laterality Date   • CERVICAL DISCECTOMY LAMINECTOMY DECOMPRESSION POSTERIOR FUSION WITH INSTRUMENTATION     • COLON RESECTION N/A 10/11/2022    Procedure: LAPAROSCOPIC RIGHT COLON RESECTION;  Surgeon: Ga Samaniego MD;  Location: [facility] OR;  Service: General;  Laterality: N/A;   • COLONOSCOPY N/A 2/28/2018    Procedure: COLONOSCOPY to TI and cecum with polypectomy;  Surgeon: Anil Garces MD;  Location: [facility] ENDOSCOPY;  Service:    • JOINT REPLACEMENT      LEFT HIP   • KNEE ARTHROSCOPY Left    • LUMBAR DISCECTOMY FUSION INSTRUMENTATION N/A 11/18/2020    Procedure: Lumbar 4 5 laminectomy with a posterior lateral fusion and instrumentation and interbody fusion;  Surgeon: Jeffrey Garcia MD;  Location: [facility] OR;  Service: Neurosurgery;  Laterality: N/A;   • TOTAL HIP ARTHROPLASTY Right 6/3/2021    Procedure: TOTAL HIP ARTHROPLASTY POSTERIOR;  Surgeon: Shlomo Campos MD;  Location: [facility] MAIN OR;  Service: Orthopedics;  Laterality: Right;   • VASECTOMY     • VENOUS ACCESS DEVICE (PORT) INSERTION N/A 10/21/2022    Procedure: INSERTION VENOUS ACCESS DEVICE;  Surgeon: Ga Samaniego MD;  Location: [facility]  OR OSC;  Service: General;  Laterality: N/A;     HEMATOLOGY/MEDICAL ONCOLOGY HISTORY: The patient is a 72 y.o. year old male with hypertension, hyperlipidemia, type 2 diabetes, iron deficiency anemia, history of DVT, who presented on 9/22/2022 for initial evaluation because of iron deficiency anemia, referred by his primary care provider, JULIA Santiago. Patient is accompanied by his wife who helped with the history. They reported the patient actually was being diagnosed of colon cancer 2 days ago. His GI specialist, Dr. Anil Garces, performed colonoscopic examination and saw a distal colon mass. I do not have the report for the procedure nor do I have the pathology report but nevertheless there was a mass in the distal colon likely in the sigmoid colon. I will obtain a copy of those reports when available.      Patient reports he had no apparent melena or hematochezia before starting oral iron treatment. He did have however significant fatigue. He reports exertional dyspnea and no syncope. Patient had laboratory study recently on 09/08/2022 which reported severe iron deficiency anemia with hemoglobin 7.8, MCV 79.9, MCHC 29.2. Normal platelets 217,000 and WBC 7230 without differentiation. Iron study reported ferritin 10.7 ng/mL, free iron 23, TIBC 593 and iron saturation 4%. Chemistry lab reported creatinine 1.11, potassium 5.4, otherwise normal sodium chloride and calcium, glucose 137. Prior to that the patient had normal liver function on 08/24/2022. He had a normal TSH 2.5 and slightly elevated hemoglobin A1c of 6.8% on that day.      The patient reports he has been on oral iron for almost 2 weeks, once a day with good tolerance and he now noticed improved energy level and less exertional dyspnea. He does report stool becoming dark-colored after he started oral iron.  He reports no syncope.     Laboratory study on 09/22/2022 reported improved hemoglobin 8.5 and normalized MCV 85.0, stable MCHC 29.4.  "Maintains normal platelets and WBC.       I saw him originally on 9/22/2022 for initial evaluation for his sigmoid colon cancer and iron deficiency anemia.  Since that time patient had multiple imaging studies including CT for chest abdomen pelvis, subsequently with MRI for the abdomen for staging purposes.  He was also seen by Dr. Samaniego who performed right hemicolectomy on 10/11/2022.    Dr. Samaniego called me on the day of surgery, he also suspected this liver lesion is metastatic, however unable to reach that during the surgery.    Patient notes since surgery on 10/11/2022 of the bowel resection he is recovering \"okay\". The patient does report some abdominal pain when adrianna his stomach, specifically when getting out of bed. The patient denies any issues with his appetite, and is having normal urination and bowel movements. The patient denies constipation.  Patient reports no fever sweating or chills.    The patient has type II diabetes, which he manages with metformin. 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    Patient reports he is able to tolerate oral iron twice a day with no specific complaints.  No nausea vomiting or constipation.      Laboratory study today on 11/2/2022 showed improved anemia with Hb 10.9, normalized MCV 92.5.  His normal platelets 159,000 WBC 6870 including ANC 4580.          MEDICATIONS:    Current Outpatient Medications:   •  acetaminophen (TYLENOL) 500 MG tablet, Take 500 mg by mouth Every 6 (Six) Hours As Needed for Mild Pain ., Disp: , Rfl:   •  aspirin 81 MG EC tablet, Take 81 mg by mouth Daily. INSTRUCTED PT TO FOLLOW MD INSTRUCTIONS REGARDING HOLDING FOR SURGERY/PT STATED TO HOLD 5 DAYS PRIOR TO SURGERY, Disp: , Rfl:   •  atorvastatin (LIPITOR) 40 MG tablet, TAKE ONE TABLET BY MOUTH DAILY (Patient taking differently: Take 1 tablet by mouth Every Night.), Disp: 90 tablet, Rfl: 3  •  ferrous sulfate (FerrouSul) " 325 (65 FE) MG tablet, Take 1 tablet by mouth 2 (Two) Times a Day., Disp: 180 tablet, Rfl: 1  •  gabapentin (NEURONTIN) 300 MG capsule, TAKE ONE CAPSULE BY MOUTH THREE TIMES A DAY, Disp: 90 capsule, Rfl: 0  •  HYDROcodone-acetaminophen (Norco) 5-325 MG per tablet, Take 1-2 tablets by mouth Every 4 (Four) Hours As Needed for pain, Disp: 10 tablet, Rfl: 0  •  lisinopril-hydrochlorothiazide (PRINZIDE,ZESTORETIC) 20-12.5 MG per tablet, TAKE ONE TABLET BY MOUTH ONCE NIGHTLY (Patient taking differently: Take 1 tablet by mouth Every Night.), Disp: 90 tablet, Rfl: 3  •  metFORMIN (GLUCOPHAGE) 500 MG tablet, Take 1 tablet by mouth 2 (Two) Times a Day With Meals., Disp: 180 tablet, Rfl: 3  •  ondansetron (ZOFRAN) 8 MG tablet, Take 1 tablet by mouth 3 (Three) Times a Day As Needed for Nausea or Vomiting., Disp: 60 tablet, Rfl: 5  •  tadalafil (CIALIS) 5 MG tablet, Take 1 tablet by mouth Daily As Needed for Erectile Dysfunction. (Patient taking differently: Take 1 tablet by mouth Daily As Needed for Erectile Dysfunction. HOLD PRIOR TO SURGERY), Disp: 30 tablet, Rfl: 2  No current facility-administered medications for this visit.    ALLERGIES:   No Known Allergies    SOCIAL HISTORY:       Social History     Socioeconomic History   • Marital status:      Spouse name: Chelsie   • Years of education: 12   Tobacco Use   • Smoking status: Some Days     Packs/day: 1.00     Years: 20.00     Pack years: 20.00     Types: Cigars, Cigarettes   • Smokeless tobacco: Never   • Tobacco comments:     OCCASIONALLY   Vaping Use   • Vaping Use: Never used   Substance and Sexual Activity   • Alcohol use: Yes     Alcohol/week: 6.0 standard drinks     Types: 6 Cans of beer per week     Comment: 6 drinks weekly   • Drug use: No         FAMILY HISTORY:  Family History   Problem Relation Age of Onset   • Clotting disorder Other    • Colon cancer Paternal Grandmother    • Colon cancer Paternal Grandfather    • Clotting disorder Father    • Regis  "Hyperthermia Neg Hx          Vitals:    11/15/22 0810   BP: 127/79   Pulse: 66   Resp: 18   Temp: 98.2 °F (36.8 °C)   TempSrc: Temporal   SpO2: 97%   Weight: 96.3 kg (212 lb 4.8 oz)   Height: 177.8 cm (70\")   PainSc: 0-No pain     Current Status 11/15/2022   ECOG score 0       PHYSICAL EXAM:    GENERAL:  Well-developed, well-nourished male in no acute distress.  Orientated to time place and the people.  SKIN:  Warm, dry without rashes, purpura or petechiae.  Right upper chest portacatheter looks benign.  HEENT:  Normocephalic.  Wearing mask.   LYMPHATICS:  No cervical, supraclavicular adenopathy.  CHEST: Normal respiratory effort.  Lungs clear to auscultation. Good airflow.  CARDIAC:  Regular rate and rhythm without murmurs. Normal S1,S2.  ABDOMEN:  Soft, nontender with no organomegaly or masses.  Bowel sounds normal.  EXTREMITIES:  No clubbing, cyanosis or edema.  NEUROLOGICAL:  Grossly intact.  No focal neurological deficits.  PSYCHIATRIC:  Normal affect and mood.        RECENT LABS:    Results from last 7 days   Lab Units 11/15/22  0729   WBC 10*3/mm3 5.72   HEMOGLOBIN g/dL 12.1*   HEMATOCRIT % 38.1   PLATELETS 10*3/mm3 156     Lab Results   Component Value Date    GLUCOSE 156 (H) 11/15/2022    BUN 14 11/15/2022    CREATININE 0.98 11/15/2022    EGFRIFNONA 70 02/23/2022    EGFRIFAFRI 81 02/23/2022    BCR 14.3 11/15/2022    K 4.9 (H) 11/15/2022    CO2 25.9 11/15/2022    CALCIUM 9.7 11/15/2022    PROTENTOTREF 7.2 08/24/2022    ALBUMIN 4.20 11/15/2022    LABIL2 1.9 08/24/2022    AST 26 11/15/2022    ALT 24 11/15/2022     Lab Results   Component Value Date    JRDVWKOP61 260 08/24/2022     Lab Results   Component Value Date    FOLATE 15.50 09/22/2022     Lab Results   Component Value Date    TIBC 593 09/08/2022    FERRITIN 10.70 (L) 09/08/2022   Iron saturation 4% on 9/18/2022.    Lab Results   Component Value Date    CEA 10.70 09/22/2022       PATHOLOGY:  Tumor sample for [facility] NGS study reported a positive for K-aashish " mutation exon2 p.G13D, negative for HER2/nu by IHC 0 and then no amplification by CNA sequencing..  MSI stable by DNA sequencing, and also MMR proficient by IHC staining.  Tumor mutation burden is low 8 mut/Mb.  Patient was positive for PTEN 1+, but 100% by IHC, PD-A1 was negative, only 1% by IHC.  Patient also positive for APC mutation exon 16p.W4584ao, positive for AMACR 1 exon 2p.R358*.  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        IMAGING STUDY:    NM PET/CT Skull Base to Mid Thigh  Narrative: F-18 FDG PET SKULL BASE TO MID THIGH WITH PET CT FUSION.     HISTORY: 72-year-old male status post right hemicolectomy for cancer on  10/11/2022. Postoperative staging.     TECHNIQUE: Radiation dose reduction techniques were utilized, including  automated exposure control and exposure modulation based on body size.   Blood glucose level at time of injection was 106 mg/dL. 6.5 mCi of F-18  FDG were injected and PET was performed from skull base to mid thigh. CT  was obtained for localization and attenuation correction. Time at  injection 12:20 PM. PET start time 1:40 PM. Compared with abdominal MRI  10/07/2022 and CTs from 09/26/2022.     FINDINGS: There is an approximately 1.3 cm focus of very subtle  low-attenuation in the anterior subcapsular region of the medial hepatic  segment, CT sequence image 212. This focus of low attenuation is  hypermetabolic with a maximal SUV of 6.3. The suspicious focus on the  recent MRI is equivocal on the PET. There is no hypermetabolic  lymphadenopathy within the abdomen or pelvis. There is no suspicious  hypermetabolic activity within the chest or neck. There is left thyroid  lobe goiter with no suspicious hypermetabolic activity.     Impression: The hypermetabolic 1.3 cm subcapsular focus at the medial  hepatic segment is likely a metastasis. The lesion is not definitively  seen on the previous MRI. The suspicious subcentimeter focus on the  recent MRI is equivocal on the PET.  There is no hypermetabolic  lymphadenopathy and there is no suspicious hypermetabolic activity  within the chest or neck.     This report was finalized on 11/16/2022 12:33 PM by Dr. Brook Wood M.D.             Assessment & Plan     ASSESSMENT:    * Colon cancer post right hemicolectomy 10/11/2022.  Stage IV ([id number]).  · Had positive stool occult blood test on 09/09/2022. Colonoscopic examination on 09/20/2022 by Dr. Anil Garces reported cecum colon mass.  Biopsy confirmed moderately differentiated adenocarcinoma.  MSI stable.  · Mildly elevated CEA level 10.7 ng/mL on 9/22/2022.  · CT scan for chest abdomen pelvis 9/26/2022 reported cecal mass.  There was also suspicious 9 mm hepatic lesion.  · Abdomen MRI examination with and without contrast on 10/7/2022 confirmed 8 mm lesion hypervascularity in the segment 5 of the liver.  · Patient had right hemicolectomy 10/11/2022.  Unable to biopsy at this time he liver lesion.  Pathology evaluation reported uG3dV1p disease, this will be at least a stage IIIb colon cancer.  · On 11/2/2022 I discussed with the patient, his wife and his daughter, recommending further imaging study with PET scan for assessment.  If patient is no hypermetabolic liver lesion, will treat with adjuvant FOLFOX 6 regimen every 2 weeks for 12 cycles.  However, if he has hypermetabolic lesion in the liver, we will treated him as metastatic disease, with FOLFOX plus Avastin.  Since patient has neuropathy already being his legs and feet, if he is indeed metastatic disease, we may switch him to irinotecan instead of oxaliplatin because of the neuropathy.  If indicated patient has metastatic disease, we would also entertain metastectomy after finishing 12 cycles of chemotherapy.  · We should also testing for [facility] NGS genetic study to see if he would be a candidate for other treatment.  · Discussed with the patient and family members about potential side effects including bone marrow suppression, with  anemia thrombocytopenia and leukocytopenia increased risk for infection, and also peripheral neuropathy, etc. patient is agreeable to proceed ahead with treatment.  · The patient has PET scan examination on 11/14/2022 which reported a small 1.3 cm focus with elevated SUV 6.03, highly suspicious for metastatic disease. The patient now has stage IV. The patient has had a [facility] NGS study which reported K-aashish mutation, tumor mutation burden < 10, MSI was stable and Anti-PD-L1 negative.  Not a candidate for anti-EGFR monoclonal antibody such as Vectibix, or chekpoint inhibitor immunotherapy.  We discussed the adding anti-VEGF monoclonal antibody, bevacizumab/Avastin starting in 2 weeks so that it would be after 6 weeks of primary surgery for the colon cancer resection.  We discussed this is now stage IV disease, however because only having 1 solitary metastatic lesion in the liver, it is potentially curable so we will be as aggressive as possible for chemotherapy.  If he has good response, in the future he will need metastectomy of the liver lesion.     2. Iron deficiency anemia.   · The patient has iron deficiency due to GI bleeding from his colon cancer. His laboratory study on 09/08/2022 reported ferritin 10.7 and iron saturation 4% with microcytic hypochromic anemia, hemoglobin 7.8, MCV 79.9 and MCHC 29.2.   · Patient reports good tolerance to oral iron treatment and already has improved energy level. Laboratory study on 9/22/2022 did report slightly improved hemoglobin at 8.5 but normalized MCV 85.0. I think he is responding to oral iron treatment both physically and laboratory wise.  We advised patient to increase oral iron to twice a day.  · Today on 11/2/2022 patient reports good tolerance to oral iron twice a day.  Labs today showed further improved hemoglobin 10.9.  He continues to maintain normal platelets and WBC.     3.  Low normal vitamin B12 level.    · Patient had a marginal normal vitamin B12 level at  "260 pg/mL on 08/24/2022.   · We obtained a folate level on 9/22/2022 which was normal at 15.5 ng/mL.    · Will ask patient to take oral vitamin B12 supplement at 1000 mcg daily.        PLAN:   1. Proceed ahead with first cycle of palliative chemotherapy with FOLFOX chemotherapy today.  2. The patient will return in 2 days to discontinue chemotherapy 5-FU infusion.  3. We will obtain insurance approval for bevacizumab to be started in 2 weeks from cycle #2.    4. The patient will use antiemetics, Zofran as needed for nausea, vomiting associated with chemotherapy.   5. The patient will continue oral iron twice a day.  6. Continue oral vitamin B12 at 1000 mcg daily.    7. Continue management of hypertension and diabetes, followed by primary care physician.  8. The patient will return in 2 weeks for follow-up with me, laboratory studies CBC CMP prior to cycle #2 chemotherapy.     I personally reviewed the PET scan images obtained on 11/14/2022, initiated this with the patient and his family member.    I reviewed the results of [facility] NGS study with the patient and its implication in terms of chemotherapy regimen.    We discussed that this is stage IV disease, however still potentially curable because only has 1 solitary metastatic liver lesion.  Patient and his wife voiced understanding and agreeable.      GAMALIEL ROGERS M.D., Ph.D.     11/15/2022.      CC:  JULIA Santiago MD Richard Pokorny, MD [facility] Provider Statement:[id number]::\"Patient or patient representative verbalized consent to the visit recording.\",\"I have personally performed the services described in this document as transcribed by the above individual, and it is both accurate and complete.\"              "

## 2022-11-15 NOTE — NURSING NOTE
CONSENT FOR CHEMO OBTAINED TODAY, SIGNED PER PT AND RN. PLACED IN SCAN BIN.     PT PROVIDED W/ A CHEMO SPILL KIT FOR HOME 5FU.     PT CONNECTED TO [facility] OF 5FU PER RN. BOTH CLAMPS UNCLAMPED AND + BLD RTN NOTED TO PORT PRIOR TO HOOKUP. APPT DESK MESSAGED TO MAKE APPT FOR PT'S UNHOOK THIS THURS.     PT ENCOURAGED TO CALL W/ ANY ISSUES.

## 2022-11-15 NOTE — PROGRESS NOTES
"Outpatient Oncology Nutrition  Assessment/PES    Patient Name:  Taz Dickey  YOB: 1950  MRN: [id number]    Assessment Date:  11/15/2022    Comments:  Met patient in infusion today, begins FOLFOX treatment. The patient reports that his appetite has been fair. Lost some weight after surgery.   Discussed the importance of nutrition and hydration during treatment. Provide the ACS nutritoin booklet.   Also discussed the availability of Enterade if needed as well as the study and he is willing to participate. Consent was signed.   Will follow for tolerance and intake.      General Info     Row Name 11/15/22 1530       Today's Session    Person(s) attending today's session Patient       General Information    Oncology patient? yes  cecum colon cancer       Oncology Specific Assessment    Type of treatment Chemotherapy    Frequency of treatment FOLFOX, R hemicolectomy 10/11/22    Reported symptoms Weight loss                     Estimated/Assessed Needs - Anthropometrics     Row Name 11/15/22 1533          Anthropometrics    Height 177.8 cm (70\")     Weight for Calculation 96.2 kg (212 lb)     Additional Documentation Usual Body Weight (UBW) (Group)        Usual Body Weight (UBW)    Usual Body Weight 99.8 kg (220 lb)     Weight Change (Amount and Duration) 8 lb weight loss since 10/22        Estimated/Assessed Needs    Additional Documentation Protein Requirements (Group);KCAL/KG (Group);Fluid Requirements (Group)        KCAL/KG    KCAL/KG 25 Kcal/Kg (kcal)     25 Kcal/Kg (kcal) 2404.075        Protein Requirements    Weight Used For Protein Calculations 96.2 kg (212 lb)     Est Protein Requirement Amount (gms/kg) 1.2 gm protein     Estimated Protein Requirements (gms/day) 115.4        Fluid Requirements    Fluid Requirements (mL/day) 2400     Estimated Fluid Requirement Method RDA Method     RDA Method (mL) 2400                Labs/Tests/Procedures/Meds     Row Name 11/15/22 1532          " Labs/Procedures/Meds    Lab Results Reviewed reviewed        Diagnostic Tests/Procedures    Diagnostic Test/Procedure Reviewed reviewed        Medications    Pertinent Medications Reviewed reviewed     Pertinent Medications Comments glucophage, zofran, iron sulfate, lipitor                   Problem/Interventions:   Problem 1     Row Name 11/15/22 1533          Nutrition Diagnoses Problem 1    Problem 1 Unintended Weight Loss     Etiology (related to) Medical Diagnosis     Oncology Colon cancer     Signs/Symptoms (evidenced by) Unintended Weight Change     Unintended Weight Change Loss     Number of Pounds Lost 8 lb     Weight loss time period 1 month                        Intervention Goal     Row Name 11/15/22 1535          Intervention Goal    General Maintain nutrition;Disease management/therapy;Meet nutritional needs for age/condition;Provide information regarding MNT for treatment/condition     PO Maintain intake;Meet estimated needs     Weight No significant weight loss                    Education/Evaluation     Row Name 11/15/22 1536          Education    Education Advised regarding habits/behavior;Education topics;Provided education regarding     Education Topics --  nutrition during cancer treatment     Advised Regarding Habits/Behavior Increased nutrient density;Eating pattern;Food choices        Monitor/Evaluation    Monitor Pertinent labs;Supplement intake;PO intake;Weight;Symptoms     Education Follow-up Reinforce PRN                 Electronically signed by:  Lenore Baker RD, LD  11/15/22 15:37 EST

## 2022-11-17 ENCOUNTER — INFUSION (OUTPATIENT)
Dept: ONCOLOGY | Facility: HOSPITAL | Age: 72
End: 2022-11-17

## 2022-11-17 DIAGNOSIS — Z79.899 ENCOUNTER FOR LONG-TERM (CURRENT) USE OF MEDICATIONS: Primary | ICD-10-CM

## 2022-11-17 DIAGNOSIS — C18.9 MALIGNANT NEOPLASM OF COLON, UNSPECIFIED PART OF COLON: ICD-10-CM

## 2022-11-17 DIAGNOSIS — Z45.2 FITTING AND ADJUSTMENT OF VASCULAR CATHETER: ICD-10-CM

## 2022-11-17 PROCEDURE — 25010000002 HEPARIN LOCK FLUSH PER 10 UNITS: Performed by: INTERNAL MEDICINE

## 2022-11-17 RX ORDER — HEPARIN SODIUM (PORCINE) LOCK FLUSH IV SOLN 100 UNIT/ML 100 UNIT/ML
500 SOLUTION INTRAVENOUS AS NEEDED
Status: DISCONTINUED | OUTPATIENT
Start: 2022-11-17 | End: 2022-11-17 | Stop reason: HOSPADM

## 2022-11-17 RX ORDER — HEPARIN SODIUM (PORCINE) LOCK FLUSH IV SOLN 100 UNIT/ML 100 UNIT/ML
500 SOLUTION INTRAVENOUS AS NEEDED
Status: CANCELLED | OUTPATIENT
Start: 2022-11-17

## 2022-11-17 RX ORDER — SODIUM CHLORIDE 0.9 % (FLUSH) 0.9 %
10 SYRINGE (ML) INJECTION AS NEEDED
Status: DISCONTINUED | OUTPATIENT
Start: 2022-11-17 | End: 2022-11-17 | Stop reason: HOSPADM

## 2022-11-17 RX ORDER — SODIUM CHLORIDE 0.9 % (FLUSH) 0.9 %
10 SYRINGE (ML) INJECTION AS NEEDED
Status: CANCELLED | OUTPATIENT
Start: 2022-11-17

## 2022-11-17 RX ADMIN — Medication 10 ML: at 10:36

## 2022-11-17 RX ADMIN — Medication 500 UNITS: at 10:36

## 2022-11-25 PROBLEM — C78.7 SECONDARY LIVER CANCER (HCC): Status: ACTIVE | Noted: 2022-11-02

## 2022-11-29 ENCOUNTER — CLINICAL SUPPORT (OUTPATIENT)
Dept: OTHER | Facility: HOSPITAL | Age: 72
End: 2022-11-29

## 2022-11-29 ENCOUNTER — OFFICE VISIT (OUTPATIENT)
Dept: ONCOLOGY | Facility: CLINIC | Age: 72
End: 2022-11-29

## 2022-11-29 ENCOUNTER — APPOINTMENT (OUTPATIENT)
Dept: OTHER | Facility: HOSPITAL | Age: 72
End: 2022-11-29

## 2022-11-29 ENCOUNTER — INFUSION (OUTPATIENT)
Dept: ONCOLOGY | Facility: HOSPITAL | Age: 72
End: 2022-11-29

## 2022-11-29 VITALS
OXYGEN SATURATION: 98 % | SYSTOLIC BLOOD PRESSURE: 125 MMHG | WEIGHT: 209.4 LBS | HEIGHT: 70 IN | DIASTOLIC BLOOD PRESSURE: 77 MMHG | BODY MASS INDEX: 29.98 KG/M2 | RESPIRATION RATE: 18 BRPM | HEART RATE: 64 BPM | TEMPERATURE: 97.3 F

## 2022-11-29 DIAGNOSIS — C78.7 SECONDARY LIVER CANCER: ICD-10-CM

## 2022-11-29 DIAGNOSIS — Z79.899 ENCOUNTER FOR LONG-TERM (CURRENT) USE OF MEDICATIONS: Primary | ICD-10-CM

## 2022-11-29 DIAGNOSIS — C18.9 MALIGNANT NEOPLASM OF COLON, UNSPECIFIED PART OF COLON: ICD-10-CM

## 2022-11-29 DIAGNOSIS — E11.9 TYPE 2 DIABETES MELLITUS WITHOUT COMPLICATION, WITHOUT LONG-TERM CURRENT USE OF INSULIN: Chronic | ICD-10-CM

## 2022-11-29 DIAGNOSIS — E53.8 VITAMIN B12 DEFICIENCY: ICD-10-CM

## 2022-11-29 DIAGNOSIS — T45.1X5A CHEMOTHERAPY-INDUCED THROMBOCYTOPENIA: ICD-10-CM

## 2022-11-29 DIAGNOSIS — D69.59 CHEMOTHERAPY-INDUCED THROMBOCYTOPENIA: ICD-10-CM

## 2022-11-29 DIAGNOSIS — D50.0 IRON DEFICIENCY ANEMIA DUE TO CHRONIC BLOOD LOSS: ICD-10-CM

## 2022-11-29 DIAGNOSIS — Z79.899 ENCOUNTER FOR LONG-TERM (CURRENT) USE OF MEDICATIONS: ICD-10-CM

## 2022-11-29 LAB
ALBUMIN SERPL-MCNC: 4.1 G/DL (ref 3.5–5.2)
ALBUMIN/GLOB SERPL: 1.5 G/DL (ref 1.1–2.4)
ALP SERPL-CCNC: 70 U/L (ref 38–116)
ALT SERPL W P-5'-P-CCNC: 22 U/L (ref 0–41)
ANION GAP SERPL CALCULATED.3IONS-SCNC: 12.2 MMOL/L (ref 5–15)
AST SERPL-CCNC: 24 U/L (ref 0–40)
BASOPHILS # BLD AUTO: 0.03 10*3/MM3 (ref 0–0.2)
BASOPHILS NFR BLD AUTO: 0.8 % (ref 0–1.5)
BILIRUB SERPL-MCNC: 0.5 MG/DL (ref 0.2–1.2)
BILIRUB UR QL STRIP: NEGATIVE
BUN SERPL-MCNC: 13 MG/DL (ref 6–20)
BUN/CREAT SERPL: 14.8 (ref 7.3–30)
CALCIUM SPEC-SCNC: 9.8 MG/DL (ref 8.5–10.2)
CHLORIDE SERPL-SCNC: 100 MMOL/L (ref 98–107)
CLARITY UR: CLEAR
CO2 SERPL-SCNC: 23.8 MMOL/L (ref 22–29)
COLOR UR: YELLOW
CREAT SERPL-MCNC: 0.88 MG/DL (ref 0.7–1.3)
DEPRECATED RDW RBC AUTO: 71.4 FL (ref 37–54)
EGFRCR SERPLBLD CKD-EPI 2021: 91.9 ML/MIN/1.73
EOSINOPHIL # BLD AUTO: 0.15 10*3/MM3 (ref 0–0.4)
EOSINOPHIL NFR BLD AUTO: 3.8 % (ref 0.3–6.2)
ERYTHROCYTE [DISTWIDTH] IN BLOOD BY AUTOMATED COUNT: 20.2 % (ref 12.3–15.4)
GLOBULIN UR ELPH-MCNC: 2.7 GM/DL (ref 1.8–3.5)
GLUCOSE SERPL-MCNC: 255 MG/DL (ref 74–124)
GLUCOSE UR STRIP-MCNC: ABNORMAL MG/DL
HCT VFR BLD AUTO: 37.3 % (ref 37.5–51)
HGB BLD-MCNC: 11.7 G/DL (ref 13–17.7)
HGB UR QL STRIP.AUTO: NEGATIVE
IMM GRANULOCYTES # BLD AUTO: 0.01 10*3/MM3 (ref 0–0.05)
IMM GRANULOCYTES NFR BLD AUTO: 0.3 % (ref 0–0.5)
KETONES UR QL STRIP: NEGATIVE
LEUKOCYTE ESTERASE UR QL STRIP.AUTO: NEGATIVE
LYMPHOCYTES # BLD AUTO: 0.96 10*3/MM3 (ref 0.7–3.1)
LYMPHOCYTES NFR BLD AUTO: 24.3 % (ref 19.6–45.3)
MCH RBC QN AUTO: 30.2 PG (ref 26.6–33)
MCHC RBC AUTO-ENTMCNC: 31.4 G/DL (ref 31.5–35.7)
MCV RBC AUTO: 96.1 FL (ref 79–97)
MONOCYTES # BLD AUTO: 0.46 10*3/MM3 (ref 0.1–0.9)
MONOCYTES NFR BLD AUTO: 11.6 % (ref 5–12)
NEUTROPHILS NFR BLD AUTO: 2.34 10*3/MM3 (ref 1.7–7)
NEUTROPHILS NFR BLD AUTO: 59.2 % (ref 42.7–76)
NITRITE UR QL STRIP: NEGATIVE
NRBC BLD AUTO-RTO: 0 /100 WBC (ref 0–0.2)
PH UR STRIP.AUTO: 5.5 [PH] (ref 4.5–8)
PLATELET # BLD AUTO: 108 10*3/MM3 (ref 140–450)
PMV BLD AUTO: 9.9 FL (ref 6–12)
POTASSIUM SERPL-SCNC: 4.3 MMOL/L (ref 3.5–4.7)
PROT SERPL-MCNC: 6.8 G/DL (ref 6.3–8)
PROT UR QL STRIP: NEGATIVE
RBC # BLD AUTO: 3.88 10*6/MM3 (ref 4.14–5.8)
SODIUM SERPL-SCNC: 136 MMOL/L (ref 134–145)
SP GR UR STRIP: 1.02 (ref 1–1.03)
UROBILINOGEN UR QL STRIP: ABNORMAL
WBC NRBC COR # BLD: 3.95 10*3/MM3 (ref 3.4–10.8)

## 2022-11-29 PROCEDURE — 25010000002 DEXAMETHASONE SODIUM PHOSPHATE 100 MG/10ML SOLUTION: Performed by: INTERNAL MEDICINE

## 2022-11-29 PROCEDURE — 25010000002 PALONOSETRON PER 25 MCG: Performed by: INTERNAL MEDICINE

## 2022-11-29 PROCEDURE — 85025 COMPLETE CBC W/AUTO DIFF WBC: CPT

## 2022-11-29 PROCEDURE — 25010000002 LEUCOVORIN CALCIUM PER 50 MG: Performed by: INTERNAL MEDICINE

## 2022-11-29 PROCEDURE — G0498 CHEMO EXTEND IV INFUS W/PUMP: HCPCS

## 2022-11-29 PROCEDURE — 96367 TX/PROPH/DG ADDL SEQ IV INF: CPT

## 2022-11-29 PROCEDURE — 96415 CHEMO IV INFUSION ADDL HR: CPT

## 2022-11-29 PROCEDURE — 96375 TX/PRO/DX INJ NEW DRUG ADDON: CPT

## 2022-11-29 PROCEDURE — 96413 CHEMO IV INFUSION 1 HR: CPT

## 2022-11-29 PROCEDURE — 96417 CHEMO IV INFUS EACH ADDL SEQ: CPT

## 2022-11-29 PROCEDURE — 25010000002 FOSAPREPITANT PER 1 MG: Performed by: INTERNAL MEDICINE

## 2022-11-29 PROCEDURE — 80053 COMPREHEN METABOLIC PANEL: CPT

## 2022-11-29 PROCEDURE — 81003 URINALYSIS AUTO W/O SCOPE: CPT

## 2022-11-29 PROCEDURE — 25010000002 OXALIPLATIN PER 0.5 MG: Performed by: INTERNAL MEDICINE

## 2022-11-29 PROCEDURE — 99215 OFFICE O/P EST HI 40 MIN: CPT | Performed by: INTERNAL MEDICINE

## 2022-11-29 PROCEDURE — 25010000002 FLUOROURACIL PER 500 MG: Performed by: INTERNAL MEDICINE

## 2022-11-29 PROCEDURE — 25010000002 BEVACIZUMAB-BVZR 100 MG/4ML SOLUTION 4 ML VIAL: Performed by: INTERNAL MEDICINE

## 2022-11-29 PROCEDURE — 96368 THER/DIAG CONCURRENT INF: CPT

## 2022-11-29 PROCEDURE — 96416 CHEMO PROLONG INFUSE W/PUMP: CPT

## 2022-11-29 PROCEDURE — 25010000002 BEVACIZUMAB-BVZR 400 MG/16ML SOLUTION 16 ML VIAL: Performed by: INTERNAL MEDICINE

## 2022-11-29 PROCEDURE — 96411 CHEMO IV PUSH ADDL DRUG: CPT

## 2022-11-29 RX ORDER — PALONOSETRON 0.05 MG/ML
0.25 INJECTION, SOLUTION INTRAVENOUS ONCE
Status: COMPLETED | OUTPATIENT
Start: 2022-11-29 | End: 2022-11-29

## 2022-11-29 RX ORDER — SODIUM CHLORIDE 9 MG/ML
250 INJECTION, SOLUTION INTRAVENOUS ONCE
Status: COMPLETED | OUTPATIENT
Start: 2022-11-29 | End: 2022-11-29

## 2022-11-29 RX ORDER — FAMOTIDINE 10 MG/ML
20 INJECTION, SOLUTION INTRAVENOUS AS NEEDED
Status: CANCELLED | OUTPATIENT
Start: 2022-11-29

## 2022-11-29 RX ORDER — FLUOROURACIL 50 MG/ML
300 INJECTION, SOLUTION INTRAVENOUS ONCE
Status: COMPLETED | OUTPATIENT
Start: 2022-11-29 | End: 2022-11-29

## 2022-11-29 RX ORDER — SODIUM CHLORIDE 9 MG/ML
250 INJECTION, SOLUTION INTRAVENOUS ONCE
Status: CANCELLED | OUTPATIENT
Start: 2022-11-29

## 2022-11-29 RX ORDER — DIPHENHYDRAMINE HYDROCHLORIDE 50 MG/ML
50 INJECTION INTRAMUSCULAR; INTRAVENOUS AS NEEDED
Status: CANCELLED | OUTPATIENT
Start: 2022-11-29

## 2022-11-29 RX ORDER — DEXTROSE MONOHYDRATE 50 MG/ML
250 INJECTION, SOLUTION INTRAVENOUS ONCE
Status: COMPLETED | OUTPATIENT
Start: 2022-11-29 | End: 2022-11-29

## 2022-11-29 RX ORDER — PALONOSETRON 0.05 MG/ML
0.25 INJECTION, SOLUTION INTRAVENOUS ONCE
Status: CANCELLED | OUTPATIENT
Start: 2022-11-29

## 2022-11-29 RX ORDER — DEXTROSE MONOHYDRATE 50 MG/ML
250 INJECTION, SOLUTION INTRAVENOUS ONCE
Status: CANCELLED | OUTPATIENT
Start: 2022-11-29

## 2022-11-29 RX ORDER — FLUOROURACIL 50 MG/ML
300 INJECTION, SOLUTION INTRAVENOUS ONCE
Status: CANCELLED | OUTPATIENT
Start: 2022-11-29

## 2022-11-29 RX ADMIN — LEUCOVORIN CALCIUM 640 MG: 350 INJECTION, POWDER, LYOPHILIZED, FOR SUSPENSION INTRAMUSCULAR; INTRAVENOUS at 13:56

## 2022-11-29 RX ADMIN — FLUOROURACIL 3850 MG: 50 INJECTION, SOLUTION INTRAVENOUS at 16:04

## 2022-11-29 RX ADMIN — FOSAPREPITANT DIMEGLUMINE 100 ML: 150 INJECTION, POWDER, LYOPHILIZED, FOR SOLUTION INTRAVENOUS at 12:47

## 2022-11-29 RX ADMIN — PALONOSETRON 0.25 MG: 0.05 INJECTION, SOLUTION INTRAVENOUS at 12:29

## 2022-11-29 RX ADMIN — DEXTROSE MONOHYDRATE 250 ML: 50 INJECTION, SOLUTION INTRAVENOUS at 13:56

## 2022-11-29 RX ADMIN — SODIUM CHLORIDE 480 MG: 9 INJECTION, SOLUTION INTRAVENOUS at 13:22

## 2022-11-29 RX ADMIN — DEXAMETHASONE SODIUM PHOSPHATE 12 MG: 10 INJECTION, SOLUTION INTRAMUSCULAR; INTRAVENOUS at 12:29

## 2022-11-29 RX ADMIN — OXALIPLATIN 135 MG: 100 INJECTION, SOLUTION, CONCENTRATE INTRAVENOUS at 13:56

## 2022-11-29 RX ADMIN — FLUOROURACIL 640 MG: 50 INJECTION, SOLUTION INTRAVENOUS at 16:04

## 2022-11-29 RX ADMIN — SODIUM CHLORIDE 250 ML: 9 INJECTION, SOLUTION INTRAVENOUS at 12:28

## 2022-11-29 NOTE — PROGRESS NOTES
Outpatient Oncology Nutrition      Patient Name:  Taz Dickey  YOB: 1950  MRN: [id number]    Assessment Date:  11/29/2022    Comments:  Follow up in infusion for cycle 2 FOLFOX. Weight 209 lb, decreased 3 lb in two weeks. The patient denies any side effects except for a small sore on his tongue that went away after a few days and did not effect his ability to eat. Suggested he try baking soda and salt water rinse.   Encouraged intake. Will continue to follow. The patient is a participant in the Enterade study but does not need it at this time.     Electronically signed by:  Lenore Baker RD, LD  11/29/22 15:51 EST

## 2022-11-30 ENCOUNTER — TELEPHONE (OUTPATIENT)
Dept: ONCOLOGY | Facility: CLINIC | Age: 72
End: 2022-11-30

## 2022-11-30 NOTE — TELEPHONE ENCOUNTER
Patient called reporting tingling in fingers and toes nothing to serious and not to bothersome. Advised patient to monitor and called back if tingling or pain worsens. Patient v/u

## 2022-12-01 ENCOUNTER — INFUSION (OUTPATIENT)
Dept: ONCOLOGY | Facility: HOSPITAL | Age: 72
End: 2022-12-01

## 2022-12-01 DIAGNOSIS — Z45.2 FITTING AND ADJUSTMENT OF VASCULAR CATHETER: ICD-10-CM

## 2022-12-01 DIAGNOSIS — C18.9 MALIGNANT NEOPLASM OF COLON, UNSPECIFIED PART OF COLON: ICD-10-CM

## 2022-12-01 DIAGNOSIS — Z79.899 ENCOUNTER FOR LONG-TERM (CURRENT) USE OF MEDICATIONS: Primary | ICD-10-CM

## 2022-12-01 PROCEDURE — 25010000002 HEPARIN LOCK FLUSH PER 10 UNITS: Performed by: INTERNAL MEDICINE

## 2022-12-01 RX ORDER — HEPARIN SODIUM (PORCINE) LOCK FLUSH IV SOLN 100 UNIT/ML 100 UNIT/ML
500 SOLUTION INTRAVENOUS AS NEEDED
Status: CANCELLED | OUTPATIENT
Start: 2022-12-01

## 2022-12-01 RX ORDER — HEPARIN SODIUM (PORCINE) LOCK FLUSH IV SOLN 100 UNIT/ML 100 UNIT/ML
500 SOLUTION INTRAVENOUS AS NEEDED
Status: DISCONTINUED | OUTPATIENT
Start: 2022-12-01 | End: 2022-12-01 | Stop reason: HOSPADM

## 2022-12-01 RX ORDER — SODIUM CHLORIDE 0.9 % (FLUSH) 0.9 %
10 SYRINGE (ML) INJECTION AS NEEDED
Status: DISCONTINUED | OUTPATIENT
Start: 2022-12-01 | End: 2022-12-01 | Stop reason: HOSPADM

## 2022-12-01 RX ORDER — SODIUM CHLORIDE 0.9 % (FLUSH) 0.9 %
10 SYRINGE (ML) INJECTION AS NEEDED
Status: CANCELLED | OUTPATIENT
Start: 2022-12-01

## 2022-12-01 RX ADMIN — Medication 500 UNITS: at 13:46

## 2022-12-01 RX ADMIN — Medication 10 ML: at 13:46

## 2022-12-04 PROBLEM — D69.59 CHEMOTHERAPY-INDUCED THROMBOCYTOPENIA: Status: ACTIVE | Noted: 2022-12-04

## 2022-12-04 PROBLEM — T45.1X5A CHEMOTHERAPY-INDUCED THROMBOCYTOPENIA: Status: ACTIVE | Noted: 2022-12-04

## 2022-12-13 ENCOUNTER — OFFICE VISIT (OUTPATIENT)
Dept: ONCOLOGY | Facility: CLINIC | Age: 72
End: 2022-12-13

## 2022-12-13 ENCOUNTER — INFUSION (OUTPATIENT)
Dept: ONCOLOGY | Facility: HOSPITAL | Age: 72
End: 2022-12-13

## 2022-12-13 ENCOUNTER — CLINICAL SUPPORT (OUTPATIENT)
Dept: OTHER | Facility: HOSPITAL | Age: 72
End: 2022-12-13

## 2022-12-13 VITALS
HEIGHT: 70 IN | TEMPERATURE: 97.3 F | SYSTOLIC BLOOD PRESSURE: 117 MMHG | OXYGEN SATURATION: 96 % | HEART RATE: 63 BPM | DIASTOLIC BLOOD PRESSURE: 73 MMHG | RESPIRATION RATE: 16 BRPM | WEIGHT: 205.9 LBS | BODY MASS INDEX: 29.48 KG/M2

## 2022-12-13 DIAGNOSIS — C18.9 MALIGNANT NEOPLASM OF COLON, UNSPECIFIED PART OF COLON: ICD-10-CM

## 2022-12-13 DIAGNOSIS — C78.7 SECONDARY LIVER CANCER: ICD-10-CM

## 2022-12-13 DIAGNOSIS — Z79.899 ENCOUNTER FOR LONG-TERM (CURRENT) USE OF MEDICATIONS: ICD-10-CM

## 2022-12-13 DIAGNOSIS — Z79.899 ENCOUNTER FOR LONG-TERM (CURRENT) USE OF MEDICATIONS: Primary | ICD-10-CM

## 2022-12-13 DIAGNOSIS — C18.9 MALIGNANT NEOPLASM OF COLON, UNSPECIFIED PART OF COLON: Primary | ICD-10-CM

## 2022-12-13 DIAGNOSIS — E11.9 TYPE 2 DIABETES MELLITUS WITHOUT COMPLICATION, WITHOUT LONG-TERM CURRENT USE OF INSULIN: Chronic | ICD-10-CM

## 2022-12-13 DIAGNOSIS — D69.59 CHEMOTHERAPY-INDUCED THROMBOCYTOPENIA: ICD-10-CM

## 2022-12-13 DIAGNOSIS — E53.8 VITAMIN B12 DEFICIENCY: ICD-10-CM

## 2022-12-13 DIAGNOSIS — T45.1X5A CHEMOTHERAPY-INDUCED THROMBOCYTOPENIA: ICD-10-CM

## 2022-12-13 LAB
ALBUMIN SERPL-MCNC: 4.2 G/DL (ref 3.5–5.2)
ALBUMIN/GLOB SERPL: 1.4 G/DL (ref 1.1–2.4)
ALP SERPL-CCNC: 83 U/L (ref 38–116)
ALT SERPL W P-5'-P-CCNC: 28 U/L (ref 0–41)
ANION GAP SERPL CALCULATED.3IONS-SCNC: 11 MMOL/L (ref 5–15)
AST SERPL-CCNC: 25 U/L (ref 0–40)
BASOPHILS # BLD AUTO: 0.06 10*3/MM3 (ref 0–0.2)
BASOPHILS NFR BLD AUTO: 1.2 % (ref 0–1.5)
BILIRUB SERPL-MCNC: 0.5 MG/DL (ref 0.2–1.2)
BILIRUB UR QL STRIP: NEGATIVE
BUN SERPL-MCNC: 20 MG/DL (ref 6–20)
BUN/CREAT SERPL: 19.8 (ref 7.3–30)
CALCIUM SPEC-SCNC: 10.2 MG/DL (ref 8.5–10.2)
CHLORIDE SERPL-SCNC: 99 MMOL/L (ref 98–107)
CLARITY UR: CLEAR
CO2 SERPL-SCNC: 27 MMOL/L (ref 22–29)
COLOR UR: YELLOW
CREAT SERPL-MCNC: 1.01 MG/DL (ref 0.7–1.3)
DEPRECATED RDW RBC AUTO: 64.3 FL (ref 37–54)
EGFRCR SERPLBLD CKD-EPI 2021: 79.5 ML/MIN/1.73
EOSINOPHIL # BLD AUTO: 0.18 10*3/MM3 (ref 0–0.4)
EOSINOPHIL NFR BLD AUTO: 3.5 % (ref 0.3–6.2)
ERYTHROCYTE [DISTWIDTH] IN BLOOD BY AUTOMATED COUNT: 18.8 % (ref 12.3–15.4)
GLOBULIN UR ELPH-MCNC: 2.9 GM/DL (ref 1.8–3.5)
GLUCOSE SERPL-MCNC: 128 MG/DL (ref 74–124)
GLUCOSE UR STRIP-MCNC: NEGATIVE MG/DL
HCT VFR BLD AUTO: 39 % (ref 37.5–51)
HGB BLD-MCNC: 12.8 G/DL (ref 13–17.7)
HGB UR QL STRIP.AUTO: NEGATIVE
IMM GRANULOCYTES # BLD AUTO: 0.01 10*3/MM3 (ref 0–0.05)
IMM GRANULOCYTES NFR BLD AUTO: 0.2 % (ref 0–0.5)
KETONES UR QL STRIP: NEGATIVE
LEUKOCYTE ESTERASE UR QL STRIP.AUTO: NEGATIVE
LYMPHOCYTES # BLD AUTO: 1.1 10*3/MM3 (ref 0.7–3.1)
LYMPHOCYTES NFR BLD AUTO: 21.6 % (ref 19.6–45.3)
MCH RBC QN AUTO: 30.9 PG (ref 26.6–33)
MCHC RBC AUTO-ENTMCNC: 32.8 G/DL (ref 31.5–35.7)
MCV RBC AUTO: 94.2 FL (ref 79–97)
MONOCYTES # BLD AUTO: 0.66 10*3/MM3 (ref 0.1–0.9)
MONOCYTES NFR BLD AUTO: 12.9 % (ref 5–12)
NEUTROPHILS NFR BLD AUTO: 3.09 10*3/MM3 (ref 1.7–7)
NEUTROPHILS NFR BLD AUTO: 60.6 % (ref 42.7–76)
NITRITE UR QL STRIP: NEGATIVE
NRBC BLD AUTO-RTO: 0 /100 WBC (ref 0–0.2)
PH UR STRIP.AUTO: 5.5 [PH] (ref 4.5–8)
PLATELET # BLD AUTO: 108 10*3/MM3 (ref 140–450)
PMV BLD AUTO: 9.8 FL (ref 6–12)
POTASSIUM SERPL-SCNC: 4.7 MMOL/L (ref 3.5–4.7)
PROT SERPL-MCNC: 7.1 G/DL (ref 6.3–8)
PROT UR QL STRIP: NEGATIVE
RBC # BLD AUTO: 4.14 10*6/MM3 (ref 4.14–5.8)
SODIUM SERPL-SCNC: 137 MMOL/L (ref 134–145)
SP GR UR STRIP: 1.02 (ref 1–1.03)
UROBILINOGEN UR QL STRIP: NORMAL
WBC NRBC COR # BLD: 5.1 10*3/MM3 (ref 3.4–10.8)

## 2022-12-13 PROCEDURE — 99214 OFFICE O/P EST MOD 30 MIN: CPT | Performed by: INTERNAL MEDICINE

## 2022-12-13 PROCEDURE — 25010000002 FOSAPREPITANT PER 1 MG: Performed by: INTERNAL MEDICINE

## 2022-12-13 PROCEDURE — 25010000002 OXALIPLATIN PER 0.5 MG: Performed by: INTERNAL MEDICINE

## 2022-12-13 PROCEDURE — 96413 CHEMO IV INFUSION 1 HR: CPT

## 2022-12-13 PROCEDURE — 25010000002 FLUOROURACIL PER 500 MG: Performed by: INTERNAL MEDICINE

## 2022-12-13 PROCEDURE — 80053 COMPREHEN METABOLIC PANEL: CPT

## 2022-12-13 PROCEDURE — 25010000002 LEUCOVORIN CALCIUM PER 50 MG: Performed by: INTERNAL MEDICINE

## 2022-12-13 PROCEDURE — 96368 THER/DIAG CONCURRENT INF: CPT

## 2022-12-13 PROCEDURE — 96367 TX/PROPH/DG ADDL SEQ IV INF: CPT

## 2022-12-13 PROCEDURE — G0498 CHEMO EXTEND IV INFUS W/PUMP: HCPCS

## 2022-12-13 PROCEDURE — 96417 CHEMO IV INFUS EACH ADDL SEQ: CPT

## 2022-12-13 PROCEDURE — 25010000002 BEVACIZUMAB-BVZR 400 MG/16ML SOLUTION 16 ML VIAL: Performed by: INTERNAL MEDICINE

## 2022-12-13 PROCEDURE — 25010000002 DEXAMETHASONE SODIUM PHOSPHATE 100 MG/10ML SOLUTION: Performed by: INTERNAL MEDICINE

## 2022-12-13 PROCEDURE — 85025 COMPLETE CBC W/AUTO DIFF WBC: CPT

## 2022-12-13 PROCEDURE — 96411 CHEMO IV PUSH ADDL DRUG: CPT

## 2022-12-13 PROCEDURE — 96415 CHEMO IV INFUSION ADDL HR: CPT

## 2022-12-13 PROCEDURE — 25010000002 BEVACIZUMAB-BVZR 100 MG/4ML SOLUTION 4 ML VIAL: Performed by: INTERNAL MEDICINE

## 2022-12-13 PROCEDURE — 96416 CHEMO PROLONG INFUSE W/PUMP: CPT

## 2022-12-13 PROCEDURE — 25010000002 PALONOSETRON PER 25 MCG: Performed by: INTERNAL MEDICINE

## 2022-12-13 PROCEDURE — 81003 URINALYSIS AUTO W/O SCOPE: CPT

## 2022-12-13 PROCEDURE — 96375 TX/PRO/DX INJ NEW DRUG ADDON: CPT

## 2022-12-13 RX ORDER — SODIUM CHLORIDE 9 MG/ML
250 INJECTION, SOLUTION INTRAVENOUS ONCE
Status: CANCELLED | OUTPATIENT
Start: 2022-12-13

## 2022-12-13 RX ORDER — PALONOSETRON 0.05 MG/ML
0.25 INJECTION, SOLUTION INTRAVENOUS ONCE
Status: COMPLETED | OUTPATIENT
Start: 2022-12-13 | End: 2022-12-13

## 2022-12-13 RX ORDER — FLUOROURACIL 50 MG/ML
300 INJECTION, SOLUTION INTRAVENOUS ONCE
Status: CANCELLED | OUTPATIENT
Start: 2022-12-13

## 2022-12-13 RX ORDER — DIPHENHYDRAMINE HYDROCHLORIDE 50 MG/ML
50 INJECTION INTRAMUSCULAR; INTRAVENOUS AS NEEDED
Status: CANCELLED | OUTPATIENT
Start: 2022-12-13

## 2022-12-13 RX ORDER — FLUOROURACIL 50 MG/ML
300 INJECTION, SOLUTION INTRAVENOUS ONCE
Status: COMPLETED | OUTPATIENT
Start: 2022-12-13 | End: 2022-12-13

## 2022-12-13 RX ORDER — SODIUM CHLORIDE 9 MG/ML
250 INJECTION, SOLUTION INTRAVENOUS ONCE
Status: COMPLETED | OUTPATIENT
Start: 2022-12-13 | End: 2022-12-13

## 2022-12-13 RX ORDER — DEXTROSE MONOHYDRATE 50 MG/ML
250 INJECTION, SOLUTION INTRAVENOUS ONCE
Status: CANCELLED | OUTPATIENT
Start: 2022-12-13

## 2022-12-13 RX ORDER — DEXTROSE MONOHYDRATE 50 MG/ML
250 INJECTION, SOLUTION INTRAVENOUS ONCE
Status: COMPLETED | OUTPATIENT
Start: 2022-12-13 | End: 2022-12-13

## 2022-12-13 RX ORDER — FAMOTIDINE 10 MG/ML
20 INJECTION, SOLUTION INTRAVENOUS AS NEEDED
Status: CANCELLED | OUTPATIENT
Start: 2022-12-13

## 2022-12-13 RX ORDER — PALONOSETRON 0.05 MG/ML
0.25 INJECTION, SOLUTION INTRAVENOUS ONCE
Status: CANCELLED | OUTPATIENT
Start: 2022-12-13

## 2022-12-13 RX ADMIN — OXALIPLATIN 135 MG: 100 INJECTION, SOLUTION, CONCENTRATE INTRAVENOUS at 13:04

## 2022-12-13 RX ADMIN — SODIUM CHLORIDE 250 ML: 9 INJECTION, SOLUTION INTRAVENOUS at 11:30

## 2022-12-13 RX ADMIN — PALONOSETRON 0.25 MG: 0.05 INJECTION, SOLUTION INTRAVENOUS at 11:34

## 2022-12-13 RX ADMIN — FLUOROURACIL 3850 MG: 50 INJECTION, SOLUTION INTRAVENOUS at 15:10

## 2022-12-13 RX ADMIN — DEXTROSE MONOHYDRATE 100 ML: 50 INJECTION, SOLUTION INTRAVENOUS at 13:04

## 2022-12-13 RX ADMIN — SODIUM CHLORIDE 100 ML: 9 INJECTION, SOLUTION INTRAVENOUS at 11:32

## 2022-12-13 RX ADMIN — SODIUM CHLORIDE 480 MG: 9 INJECTION, SOLUTION INTRAVENOUS at 12:26

## 2022-12-13 RX ADMIN — LEUCOVORIN CALCIUM 640 MG: 350 INJECTION, POWDER, LYOPHILIZED, FOR SUSPENSION INTRAMUSCULAR; INTRAVENOUS at 13:04

## 2022-12-13 RX ADMIN — DEXAMETHASONE SODIUM PHOSPHATE 12 MG: 10 INJECTION, SOLUTION INTRAMUSCULAR; INTRAVENOUS at 12:05

## 2022-12-13 RX ADMIN — FLUOROURACIL 640 MG: 50 INJECTION, SOLUTION INTRAVENOUS at 15:06

## 2022-12-13 NOTE — PROGRESS NOTES
Outpatient Oncology Nutrition      Patient Name:  Taz Dickey  YOB: 1950  MRN: [id number]    Assessment Date:  12/13/2022    Comments:  Follow up in infusion today. Weight 205 lb,   Wt Readings from Last 3 Encounters:   12/13/22 93.4 kg (205 lb 14.4 oz)   11/29/22 95 kg (209 lb 6.4 oz)   11/15/22 96.3 kg (212 lb 4.8 oz)     Weight decreased about 7 lb x 1 month since beginning treatment. The patient reports that he has a decreased appetite for a couple of days but it increases and he feels he is eating normally. Some nausea also for a couple of days.   Encouraged intake and supplements. Not using Enterade at this time although he is enrolled in the study. May consider initiating if he continues to lose weight.   Will follow.       Electronically signed by:  Lenore Baker RD, LD  12/13/22 16:07 EST

## 2022-12-13 NOTE — PROGRESS NOTES
.     REASON FOR FOLLOWUP:     *Cecum colon cancer, status post right hemicolectomy performed on 10/11/2022, [id number].   · CT scan examination on 9/26/2022 reported suspicious liver lesion 9 mm, otherwise no evidence for metastatic disease.    · The patient underwent an abdominal MRI examination on 10/06/2022, which reported suspicious liver lesion 8mm.  · Patient had sigmoidectomy on 10/11/2022.  Portacatheter placement on 10/21/2022.   · PET scan examination on 11/14/2022 reported solitary hypermetabolic liver lesion.   · Cycle #1 palliative chemotherapy FOLFOX 6 was started on 11/15/2022.   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  Not a candidate for anti-EGFR monoclonal antibody treatment.   · From cycle #2, bevacizumab/Avastin will be added to palliative chemotherapy.  Due to thrombocytopenia, all chemotherapy agents were 25% reduced from cycle #2.  *Iron deficiency anemia.  · On oral iron treatment.                                 HISTORY OF PRESENT ILLNESS:  The patient is a 72 y.o. 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     He reports loss of appetite for 2 to 3 days. He felt nauseous but denies vomiting. He adds that he would drink a cold beverage and would experience pain in his mouth (as we told him previously as side effects of oxaliplatin). His appetite has improved within the last week.      Laboratory study today on 12/13/2022 reported WBC 5100, including neutrophils 3090, lymphocytes 1100, hemoglobin 12.8 g/dL and platelets 108,000.    Patient reports he has mild oral mucositis after chemotherapy, but resolves quickly.  Patient denies fever sweating chills.      Past Medical History:   Diagnosis Date   • Abdominal hernia    • Anemia    •  Arrhythmia     PT STATED DURING LAST COLONOSCOPY 2 WEEKS AGO   • Arthritis    • Chemotherapy-induced thrombocytopenia 12/4/2022   • Colon cancer (HCC) 09/22/2022   • Colon polyp September 20 2022   • Colon polyps    • Depression    • DVT (deep venous thrombosis) (HCC)     IN LEFT LEG   • Full dentures    • GI (gastrointestinal bleed) September 8 2022   • Hip pain     RIGHT   • Hyperlipidemia    • Hypertension    • Iron deficiency anemia 09/22/2022   • Numbness and tingling     RIGHT FOOT   • PONV (postoperative nausea and vomiting)    • Right leg pain    • Type 2 diabetes mellitus without complication, without long-term current use of insulin (HCC) 10/08/2019     Past Surgical History:   Procedure Laterality Date   • CERVICAL DISCECTOMY LAMINECTOMY DECOMPRESSION POSTERIOR FUSION WITH INSTRUMENTATION     • COLON RESECTION N/A 10/11/2022    Procedure: LAPAROSCOPIC RIGHT COLON RESECTION;  Surgeon: Ga Samaniego MD;  Location: [facility] OR;  Service: General;  Laterality: N/A;   • COLONOSCOPY N/A 2/28/2018    Procedure: COLONOSCOPY to TI and cecum with polypectomy;  Surgeon: Anil Garces MD;  Location: [facility] ENDOSCOPY;  Service:    • JOINT REPLACEMENT      LEFT HIP   • KNEE ARTHROSCOPY Left    • LUMBAR DISCECTOMY FUSION INSTRUMENTATION N/A 11/18/2020    Procedure: Lumbar 4 5 laminectomy with a posterior lateral fusion and instrumentation and interbody fusion;  Surgeon: Jeffrey Garcia MD;  Location: [facility] OR;  Service: Neurosurgery;  Laterality: N/A;   • TOTAL HIP ARTHROPLASTY Right 6/3/2021    Procedure: TOTAL HIP ARTHROPLASTY POSTERIOR;  Surgeon: Shlomo Campos MD;  Location: [facility] OR;  Service: Orthopedics;  Laterality: Right;   • VASECTOMY     • VENOUS ACCESS DEVICE (PORT) INSERTION N/A 10/21/2022    Procedure: INSERTION VENOUS ACCESS DEVICE;  Surgeon: Ga Samaniego MD;  Location: [facility] OR [facility];  Service: General;  Laterality: N/A;     HEMATOLOGY/MEDICAL ONCOLOGY HISTORY: The patient  is a 72 y.o. year old male with hypertension, hyperlipidemia, type 2 diabetes, iron deficiency anemia, history of DVT, who presented on 9/22/2022 for initial evaluation because of iron deficiency anemia, referred by his primary care provider, JULIA Santiago. Patient is accompanied by his wife who helped with the history. They reported the patient actually was being diagnosed of colon cancer 2 days ago. His GI specialist, Dr. Anil Garces, performed colonoscopic examination and saw a distal colon mass. I do not have the report for the procedure nor do I have the pathology report but nevertheless there was a mass in the distal colon likely in the sigmoid colon. I will obtain a copy of those reports when available.      Patient reports he had no apparent melena or hematochezia before starting oral iron treatment. He did have however significant fatigue. He reports exertional dyspnea and no syncope. Patient had laboratory study recently on 09/08/2022 which reported severe iron deficiency anemia with hemoglobin 7.8, MCV 79.9, MCHC 29.2. Normal platelets 217,000 and WBC 7230 without differentiation. Iron study reported ferritin 10.7 ng/mL, free iron 23, TIBC 593 and iron saturation 4%. Chemistry lab reported creatinine 1.11, potassium 5.4, otherwise normal sodium chloride and calcium, glucose 137. Prior to that the patient had normal liver function on 08/24/2022. He had a normal TSH 2.5 and slightly elevated hemoglobin A1c of 6.8% on that day.      The patient reports he has been on oral iron for almost 2 weeks, once a day with good tolerance and he now noticed improved energy level and less exertional dyspnea. He does report stool becoming dark-colored after he started oral iron.  He reports no syncope.     Laboratory study on 09/22/2022 reported improved hemoglobin 8.5 and normalized MCV 85.0, stable MCHC 29.4. Maintains normal platelets and WBC.       I saw him originally on 9/22/2022 for initial evaluation for  "his sigmoid colon cancer and iron deficiency anemia.  Since that time patient had multiple imaging studies including CT for chest abdomen pelvis, subsequently with MRI for the abdomen for staging purposes.  He was also seen by Dr. Samaniego who performed right hemicolectomy on 10/11/2022.    Dr. Samaniego called me on the day of surgery, he also suspected this liver lesion is metastatic, however unable to reach that during the surgery.    Patient notes since surgery on 10/11/2022 of the bowel resection he is recovering \"okay\". The patient does report some abdominal pain when adrianna his stomach, specifically when getting out of bed. The patient denies any issues with his appetite, and is having normal urination and bowel movements. The patient denies constipation.  Patient reports no fever sweating or chills.    The patient has type II diabetes, which he manages with metformin. 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    Patient reports he is able to tolerate oral iron twice a day with no specific complaints.  No nausea vomiting or constipation.      Laboratory study today on 11/2/2022 showed improved anemia with Hb 10.9, normalized MCV 92.5.  His normal platelets 159,000 WBC 6870 including ANC 4580.      PET scan examination on 11/14/2022 reported distal small 1.3 cm subtle low attenuation lesion in the medial hepatic segment with SUV 6.3.  There is no hypermetabolic enthesopathy in the abdomen or pelvis.  No suspicious hypermetabolic activity in the chest or neck.    Laboratory study on 11/15/2022 showed improved hemoglobin 12.1, normal platelets 156,000 and WBC 5720 including ANC 3750 and lymphocytes 1070.  Chemistry study reported glucose 156 otherwise unremarkable CMP.    Patient reports he developed a mild oral mucositis lasted about 4 days after the chemotherapy and now has resolved. He believes he has lost some weight, but states he does have " an appetite and eats everyday.  He denies nausea vomiting.  He denies having diarrhea or constipation. He denies having any fever, chills, or sweating.  Denies peripheral neuropathy.     Laboratory results from on 11/29/2022 are; white cell count is 3950. neutrophils are 2340. Hemoglobin is 11.7 g/dL. Platelets are 108,000. Chemistry lab was unremarkable except glucose 255.    MEDICATIONS:    Current Outpatient Medications:   •  acetaminophen (TYLENOL) 500 MG tablet, Take 500 mg by mouth Every 6 (Six) Hours As Needed for Mild Pain ., Disp: , Rfl:   •  aspirin 81 MG EC tablet, Take 81 mg by mouth Daily. INSTRUCTED PT TO FOLLOW MD INSTRUCTIONS REGARDING HOLDING FOR SURGERY/PT STATED TO HOLD 5 DAYS PRIOR TO SURGERY, Disp: , Rfl:   •  atorvastatin (LIPITOR) 40 MG tablet, TAKE ONE TABLET BY MOUTH DAILY (Patient taking differently: Take 40 mg by mouth Every Night.), Disp: 90 tablet, Rfl: 3  •  ferrous sulfate (FerrouSul) 325 (65 FE) MG tablet, Take 1 tablet by mouth 2 (Two) Times a Day., Disp: 180 tablet, Rfl: 1  •  gabapentin (NEURONTIN) 300 MG capsule, TAKE ONE CAPSULE BY MOUTH THREE TIMES A DAY, Disp: 90 capsule, Rfl: 0  •  HYDROcodone-acetaminophen (Norco) 5-325 MG per tablet, Take 1-2 tablets by mouth Every 4 (Four) Hours As Needed for pain, Disp: 10 tablet, Rfl: 0  •  lisinopril-hydrochlorothiazide (PRINZIDE,ZESTORETIC) 20-12.5 MG per tablet, TAKE ONE TABLET BY MOUTH ONCE NIGHTLY (Patient taking differently: Take 1 tablet by mouth Every Night.), Disp: 90 tablet, Rfl: 3  •  metFORMIN (GLUCOPHAGE) 500 MG tablet, Take 1 tablet by mouth 2 (Two) Times a Day With Meals., Disp: 180 tablet, Rfl: 3  •  ondansetron (ZOFRAN) 8 MG tablet, Take 1 tablet by mouth 3 (Three) Times a Day As Needed for Nausea or Vomiting., Disp: 60 tablet, Rfl: 5  •  tadalafil (CIALIS) 5 MG tablet, Take 1 tablet by mouth Daily As Needed for Erectile Dysfunction. (Patient taking differently: Take 5 mg by mouth Daily As Needed for Erectile Dysfunction.  "HOLD PRIOR TO SURGERY), Disp: 30 tablet, Rfl: 2    ALLERGIES:   No Known Allergies    SOCIAL HISTORY:       Social History     Socioeconomic History   • Marital status:      Spouse name: Chelsie   • Years of education: 12   Tobacco Use   • Smoking status: Some Days     Packs/day: 1.00     Years: 20.00     Pack years: 20.00     Types: Cigars, Cigarettes   • Smokeless tobacco: Never   • Tobacco comments:     OCCASIONALLY   Vaping Use   • Vaping Use: Never used   Substance and Sexual Activity   • Alcohol use: Yes     Alcohol/week: 6.0 standard drinks     Types: 6 Cans of beer per week     Comment: 6 drinks weekly   • Drug use: No         FAMILY HISTORY:  Family History   Problem Relation Age of Onset   • Clotting disorder Other    • Colon cancer Paternal Grandmother    • Colon cancer Paternal Grandfather    • Clotting disorder Father    • Malig Hyperthermia Neg Hx          Vitals:    12/13/22 1044   BP: 117/73   Pulse: 63   Resp: 16   Temp: 97.3 °F (36.3 °C)   TempSrc: Temporal   SpO2: 96%   Weight: 93.4 kg (205 lb 14.4 oz)   Height: 177.8 cm (70\")   PainSc: 0-No pain     Current Status 12/13/2022   ECOG score 0       PHYSICAL EXAM:    GENERAL:  Well-developed, well-nourished male in no acute distress.  Orientated to time place and the people.  SKIN:  Warm, dry without rashes, purpura or petechiae.  HEENT:  Normocephalic.  Wearing mask.   LYMPHATICS:  No cervical, supraclavicular adenopathy.  CHEST: Normal respiratory effort.  Lungs clear to auscultation. Good airflow.  CARDIAC:  Regular rate and rhythm without murmurs. Normal S1,S2.  ABDOMEN:  Soft, nontender with no organomegaly or masses.  Bowel sounds normal.  EXTREMITIES:  No clubbing, cyanosis or edema.  NEUROLOGICAL:  Grossly intact.  No focal neurological deficits.  PSYCHIATRIC:  Normal affect and mood.      RECENT LABS:    Results from last 7 days   Lab Units 12/13/22  0937   WBC 10*3/mm3 5.10   HEMOGLOBIN g/dL 12.8*   HEMATOCRIT % 39.0   PLATELETS " 10*3/mm3 108*     Component      Latest Ref Rng & Units 12/13/2022   Glucose      74 - 124 mg/dL 128 (H)   BUN      6 - 20 mg/dL 20   Creatinine      0.70 - 1.30 mg/dL 1.01   Sodium      134 - 145 mmol/L 137   Potassium      3.5 - 4.7 mmol/L 4.7   Chloride      98 - 107 mmol/L 99   CO2      22.0 - 29.0 mmol/L 27.0   Calcium      8.5 - 10.2 mg/dL 10.2   Total Protein      6.3 - 8.0 g/dL 7.1   Albumin      3.50 - 5.20 g/dL 4.20   ALT (SGPT)      0 - 41 U/L 28   AST (SGOT)      0 - 40 U/L 25   Alkaline Phosphatase      38 - 116 U/L 83   Total Bilirubin      0.2 - 1.2 mg/dL 0.5   Globulin      1.8 - 3.5 gm/dL 2.9   A/G Ratio      1.1 - 2.4 g/dL 1.4   BUN/Creatinine Ratio      7.3 - 30.0 19.8   Anion Gap      5.0 - 15.0 mmol/L 11.0   eGFR      >60.0 mL/min/1.73 79.5      Lab Results   Component Value Date    GLUCOSE 255 (H) 11/29/2022    BUN 13 11/29/2022    CREATININE 0.88 11/29/2022    EGFRIFNONA 70 02/23/2022    EGFRIFAFRI 81 02/23/2022    BCR 14.8 11/29/2022    K 4.3 11/29/2022    CO2 23.8 11/29/2022    CALCIUM 9.8 11/29/2022    PROTENTOTREF 7.2 08/24/2022    ALBUMIN 4.10 11/29/2022    LABIL2 1.9 08/24/2022    AST 24 11/29/2022    ALT 22 11/29/2022     Lab Results   Component Value Date    XWCISTOE46 260 08/24/2022     Lab Results   Component Value Date    FOLATE 15.50 09/22/2022     Lab Results   Component Value Date    TIBC 593 09/08/2022    FERRITIN 10.70 (L) 09/08/2022   Iron saturation 4% on 9/18/2022.    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  MSI stable by DNA sequencing, and also MMR proficient by IHC staining.  Tumor mutation burden is low 8 mut/Mb.  Patient was positive for PTEN 1+, 100% by IHC, PD-L1 was negative, only 1% by IHC.  Patient also positive for APC mutation exon 16p.X7329xt, positive for AMACR1 exon2 p.R358*.  A  positive mutation for SMAD4 exon12 p.E526K.        IMAGING STUDY:      Assessment & Plan     ASSESSMENT:    * Colon cancer post right hemicolectomy 10/11/2022.  Stage IV ([id number]).  · Had positive stool occult blood test on 09/09/2022. Colonoscopic examination on 09/20/2022 by Dr. Anil Garces reported cecum colon mass.  Biopsy confirmed moderately differentiated adenocarcinoma.  MSI stable.  · Mildly elevated CEA level 10.7 ng/mL on 9/22/2022.  · CT scan for chest abdomen pelvis 9/26/2022 reported cecal mass.  There was also suspicious 9 mm hepatic lesion.  · Abdomen MRI examination with and without contrast on 10/7/2022 confirmed 8 mm lesion hypervascularity in the segment 5 of the liver.  · Patient had right hemicolectomy 10/11/2022.  Unable to biopsy at this time he liver lesion.  Pathology evaluation reported [id number] disease, this will be at least a stage IIIb colon cancer.  · On 11/2/2022 I discussed with the patient, his wife and his daughter, recommending further imaging study with PET scan for assessment.  If patient is no hypermetabolic liver lesion, will treat with adjuvant FOLFOX 6 regimen every 2 weeks for 12 cycles.  However, if he has hypermetabolic lesion in the liver, we will treated him as metastatic disease, with FOLFOX plus Avastin.  Since patient has neuropathy already being his legs and feet, if he is indeed metastatic disease, we may switch him to irinotecan instead of oxaliplatin because of the neuropathy.  If indicated patient has metastatic disease, we would also entertain metastectomy after finishing 12 cycles of chemotherapy.  · We should also testing for [facility] NGS genetic study to see if he would be a candidate for other treatment.  · Discussed with the patient and family members about potential side effects including bone marrow suppression, with anemia thrombocytopenia and leukocytopenia increased risk for infection, and also peripheral neuropathy, etc. patient is agreeable to  proceed ahead with treatment.  · The patient has PET scan examination on 11/14/2022 which reported a small 1.3 cm focus with elevated SUV 6.03, highly suspicious for metastatic disease. The patient now has stage IV. The patient had [facility] NGS study which reported K-aashish mutation, tumor mutation burden < 10, MSI was stable and anti-PD-L1 negative.  Not a candidate for anti-EGFR monoclonal antibody such as Vectibix, or chekpoint inhibitor immunotherapy.  We discussed the adding anti-VEGF monoclonal antibody, bevacizumab/Avastin starting in 2 weeks so that it would be after 6 weeks of primary surgery for the colon cancer resection.  We discussed this is now stage IV disease, however because only having 1 solitary metastatic lesion in the liver, it is potentially curable so we will be as aggressive as possible for chemotherapy.  If he has good response, in the future he will need metastectomy of the liver lesion.  · Today on 11/29/2022 patient is presented for cycle #2 of chemotherapy. Patient tolerated therapy well except mild oral mucositis. However, laboratory studies showed significant decrease in platelets at only 108,000, as well as also decreasing WBC and hemoglobin. Discussed with the patient today if we do not carry out any dose reduction, he will likely become more thrombocytopenic next time in 2 weeks and we will not be able to do cycle #3 chemotherapy on time. I discussed with the patient for dose reduction and to avoid potential delay of chemotherapy and he is agreeable. We will have 25% dose reduction for 5-FU leucovorin and oxaliplatin.  · On 11/29/2022 he was started the first dose of Avastin/bevacizumab in conjunction with chemotherapy cycle #2.  · On 12/13/2022, the patient presented for reevaluation prior to chemotherapy cycle #3. He has stable platelets of 108,000 and slightly improved WBC of 5100 and hemoglobin 12.8 g/dL. He will continue cycle 3 with the same 25% dose reduction.     2. Iron  deficiency anemia.   · The patient has iron deficiency due to GI bleeding from his colon cancer. His laboratory study on 09/08/2022 reported ferritin 10.7 and iron saturation 4% with microcytic hypochromic anemia, hemoglobin 7.8, MCV 79.9 and MCHC 29.2.   · Patient reports good tolerance to oral iron treatment and already has improved energy level. Laboratory study on 9/22/2022 did report slightly improved hemoglobin at 8.5 but normalized MCV 85.0. I think he is responding to oral iron treatment both physically and laboratory wise.  We advised patient to increase oral iron to twice a day.  · On 11/2/2022 patient reports good tolerance to oral iron twice a day.  Labs today showed further improved hemoglobin 10.9.  He continues to maintain normal platelets and WBC.  · Improved hemoglobin 12.1 on 11/15/2019.  Cycle #1 chemotherapy will be started.  · On 11/29/2022 hemoglobin 11.7.  · On 12/13/2022, his hemoglobin is 12.8 g/dL.       3.  Low normal vitamin B12 level.    · Patient had a marginal normal vitamin B12 level at 260 pg/mL on 08/24/2022.   · We obtained a folate level on 9/22/2022 which was normal at 15.5 ng/mL.    · We asked patient to take oral vitamin B12 supplement at 1000 mcg daily.        4.  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  · Prior to starting chemotherapy patient had low normal platelets 156,000 on 11/15/2022.  Started on cycle #1 chemotherapy FOLFOX 6.  · On 11/29/2022 patient had platelets of 108,000.  Patient reports no bleeding or bruising.  Discussed with the patient, because of foreseeable more severe thrombocytopenia, we recommended 25% dose reduction starting from cycle #2 chemotherapy.  · On 12/13/2022, the patient has same decreased number of platelets 108,000. He will continue chemotherapy with same dose reduction of 25%.     5. Peripheral neuropathy  * On 12/13/2022, he reports cold sensitivity in his mouth when he drinks cold water. The cold sensitivity usually dissipates after  "3 to 4 days.    *Weight losses.    · On 12/13/2022, the patient reports he has lost weight in the past couple of months. He reports a poor appetite for 3 days after chemotherapy and a poor taste in his mouth. His appetite has since improved. He only had mild nausea, but no vomiting. He was encouraged to use Ensure or Boost to improve nutrition supplement. He already started using protein powder.      PLAN:  1. He will proceed with cycle #3 palliative chemotherapy with FOLFOX plus Avastin. Same 25% dose production of the chemotherapy agents.  Full dose Avastin.  2. He will return in 2 days to discontinue chemotherapy 5-FU infusion.  3. He will use antiemetics, Zofran as needed for nausea and vomiting associated with chemotherapy.   4. Continue oral iron twice daily.  5. Continue oral vitamin B12 at 1000 mcg daily.    6. Continue management of hypertension and diabetes, followed by primary care physician.  7. He will follow up every 2 weeks with APRN for the next two cycles of chemotherapy cycle #4 and cycle #5.   8. CT scan will be scheduled in 5 weeks, after cycle #5 chemotherapy.   9. He will follow up with me in 6 weeks for reevaluation to discuss the results of CT scan examination and further management plan.  10. Encouraged the patient to use Ensure or Boost liquid nutritional supplement.        I discussed with the patient about laboratory results and further management plan.  Patient voiced understanding and agreeable.    This patient is on high risk medication and needs close monitoring.          Maya Rosario MD PhD        CC:  JULIA Santiago MD Richard Pokorny, MD        Transcribed from ambient dictation for Maya Rosario MD PhD by Shanell Grace.  12/13/22   13:18 EST    JAYMIE Provider Statement:[id number]::\"Patient or patient representative verbalized consent to the visit recording.\",\"I have personally performed the services described in this document as transcribed by the above " "individual, and it is both accurate and complete.\"        "

## 2022-12-15 ENCOUNTER — INFUSION (OUTPATIENT)
Dept: ONCOLOGY | Facility: HOSPITAL | Age: 72
End: 2022-12-15

## 2022-12-15 DIAGNOSIS — Z45.2 FITTING AND ADJUSTMENT OF VASCULAR CATHETER: ICD-10-CM

## 2022-12-15 DIAGNOSIS — Z79.899 ENCOUNTER FOR LONG-TERM (CURRENT) USE OF MEDICATIONS: Primary | ICD-10-CM

## 2022-12-15 DIAGNOSIS — C18.9 MALIGNANT NEOPLASM OF COLON, UNSPECIFIED PART OF COLON: ICD-10-CM

## 2022-12-15 PROCEDURE — 25010000002 HEPARIN LOCK FLUSH PER 10 UNITS: Performed by: INTERNAL MEDICINE

## 2022-12-15 RX ORDER — SODIUM CHLORIDE 0.9 % (FLUSH) 0.9 %
10 SYRINGE (ML) INJECTION AS NEEDED
Status: DISCONTINUED | OUTPATIENT
Start: 2022-12-15 | End: 2022-12-15 | Stop reason: HOSPADM

## 2022-12-15 RX ORDER — HEPARIN SODIUM (PORCINE) LOCK FLUSH IV SOLN 100 UNIT/ML 100 UNIT/ML
500 SOLUTION INTRAVENOUS AS NEEDED
Status: CANCELLED | OUTPATIENT
Start: 2022-12-15

## 2022-12-15 RX ORDER — SODIUM CHLORIDE 0.9 % (FLUSH) 0.9 %
10 SYRINGE (ML) INJECTION AS NEEDED
Status: CANCELLED | OUTPATIENT
Start: 2022-12-15

## 2022-12-15 RX ORDER — HEPARIN SODIUM (PORCINE) LOCK FLUSH IV SOLN 100 UNIT/ML 100 UNIT/ML
500 SOLUTION INTRAVENOUS AS NEEDED
Status: DISCONTINUED | OUTPATIENT
Start: 2022-12-15 | End: 2022-12-15 | Stop reason: HOSPADM

## 2022-12-15 RX ADMIN — Medication 10 ML: at 13:03

## 2022-12-15 RX ADMIN — Medication 500 UNITS: at 13:03

## 2022-12-27 ENCOUNTER — CLINICAL SUPPORT (OUTPATIENT)
Dept: OTHER | Facility: HOSPITAL | Age: 72
End: 2022-12-27

## 2022-12-27 ENCOUNTER — INFUSION (OUTPATIENT)
Dept: ONCOLOGY | Facility: HOSPITAL | Age: 72
End: 2022-12-27

## 2022-12-27 VITALS
BODY MASS INDEX: 29.7 KG/M2 | OXYGEN SATURATION: 96 % | RESPIRATION RATE: 16 BRPM | SYSTOLIC BLOOD PRESSURE: 117 MMHG | HEART RATE: 65 BPM | DIASTOLIC BLOOD PRESSURE: 75 MMHG | TEMPERATURE: 97.3 F | WEIGHT: 207 LBS

## 2022-12-27 DIAGNOSIS — Z79.899 ENCOUNTER FOR LONG-TERM (CURRENT) USE OF MEDICATIONS: Primary | ICD-10-CM

## 2022-12-27 DIAGNOSIS — C18.9 MALIGNANT NEOPLASM OF COLON, UNSPECIFIED PART OF COLON: ICD-10-CM

## 2022-12-27 PROBLEM — D64.81 ANEMIA ASSOCIATED WITH CHEMOTHERAPY: Status: ACTIVE | Noted: 2022-12-27

## 2022-12-27 PROBLEM — T45.1X5A ANEMIA ASSOCIATED WITH CHEMOTHERAPY: Status: ACTIVE | Noted: 2022-12-27

## 2022-12-27 LAB
ALBUMIN SERPL-MCNC: 4.2 G/DL (ref 3.5–5.2)
ALBUMIN/GLOB SERPL: 1.4 G/DL (ref 1.1–2.4)
ALP SERPL-CCNC: 84 U/L (ref 38–116)
ALT SERPL W P-5'-P-CCNC: 39 U/L (ref 0–41)
ANION GAP SERPL CALCULATED.3IONS-SCNC: 13 MMOL/L (ref 5–15)
AST SERPL-CCNC: 34 U/L (ref 0–40)
BASOPHILS # BLD AUTO: 0.04 10*3/MM3 (ref 0–0.2)
BASOPHILS NFR BLD AUTO: 1.1 % (ref 0–1.5)
BILIRUB SERPL-MCNC: 0.8 MG/DL (ref 0.2–1.2)
BILIRUB UR QL STRIP: NEGATIVE
BUN SERPL-MCNC: 15 MG/DL (ref 6–20)
BUN/CREAT SERPL: 16.3 (ref 7.3–30)
CALCIUM SPEC-SCNC: 9.8 MG/DL (ref 8.5–10.2)
CHLORIDE SERPL-SCNC: 98 MMOL/L (ref 98–107)
CLARITY UR: CLEAR
CO2 SERPL-SCNC: 26 MMOL/L (ref 22–29)
COLOR UR: YELLOW
CREAT SERPL-MCNC: 0.92 MG/DL (ref 0.7–1.3)
DEPRECATED RDW RBC AUTO: 62.5 FL (ref 37–54)
EGFRCR SERPLBLD CKD-EPI 2021: 88.9 ML/MIN/1.73
EOSINOPHIL # BLD AUTO: 0.11 10*3/MM3 (ref 0–0.4)
EOSINOPHIL NFR BLD AUTO: 3 % (ref 0.3–6.2)
ERYTHROCYTE [DISTWIDTH] IN BLOOD BY AUTOMATED COUNT: 18.2 % (ref 12.3–15.4)
GLOBULIN UR ELPH-MCNC: 3 GM/DL (ref 1.8–3.5)
GLUCOSE SERPL-MCNC: 147 MG/DL (ref 74–124)
GLUCOSE UR STRIP-MCNC: NEGATIVE MG/DL
HCT VFR BLD AUTO: 38.2 % (ref 37.5–51)
HGB BLD-MCNC: 12.6 G/DL (ref 13–17.7)
HGB UR QL STRIP.AUTO: NEGATIVE
IMM GRANULOCYTES # BLD AUTO: 0.01 10*3/MM3 (ref 0–0.05)
IMM GRANULOCYTES NFR BLD AUTO: 0.3 % (ref 0–0.5)
KETONES UR QL STRIP: NEGATIVE
LEUKOCYTE ESTERASE UR QL STRIP.AUTO: NEGATIVE
LYMPHOCYTES # BLD AUTO: 0.84 10*3/MM3 (ref 0.7–3.1)
LYMPHOCYTES NFR BLD AUTO: 22.8 % (ref 19.6–45.3)
MCH RBC QN AUTO: 31.4 PG (ref 26.6–33)
MCHC RBC AUTO-ENTMCNC: 33 G/DL (ref 31.5–35.7)
MCV RBC AUTO: 95.3 FL (ref 79–97)
MONOCYTES # BLD AUTO: 0.51 10*3/MM3 (ref 0.1–0.9)
MONOCYTES NFR BLD AUTO: 13.8 % (ref 5–12)
NEUTROPHILS NFR BLD AUTO: 2.18 10*3/MM3 (ref 1.7–7)
NEUTROPHILS NFR BLD AUTO: 59 % (ref 42.7–76)
NITRITE UR QL STRIP: NEGATIVE
NRBC BLD AUTO-RTO: 0 /100 WBC (ref 0–0.2)
PH UR STRIP.AUTO: 6 [PH] (ref 4.5–8)
PLATELET # BLD AUTO: 103 10*3/MM3 (ref 140–450)
PMV BLD AUTO: 9.2 FL (ref 6–12)
POTASSIUM SERPL-SCNC: 4.5 MMOL/L (ref 3.5–4.7)
PROT SERPL-MCNC: 7.2 G/DL (ref 6.3–8)
PROT UR QL STRIP: ABNORMAL
RBC # BLD AUTO: 4.01 10*6/MM3 (ref 4.14–5.8)
SODIUM SERPL-SCNC: 137 MMOL/L (ref 134–145)
SP GR UR STRIP: 1.02 (ref 1–1.03)
UROBILINOGEN UR QL STRIP: ABNORMAL
WBC NRBC COR # BLD: 3.69 10*3/MM3 (ref 3.4–10.8)

## 2022-12-27 PROCEDURE — 96375 TX/PRO/DX INJ NEW DRUG ADDON: CPT

## 2022-12-27 PROCEDURE — 25010000002 OXALIPLATIN PER 0.5 MG: Performed by: NURSE PRACTITIONER

## 2022-12-27 PROCEDURE — 25010000002 PALONOSETRON PER 25 MCG: Performed by: NURSE PRACTITIONER

## 2022-12-27 PROCEDURE — 96416 CHEMO PROLONG INFUSE W/PUMP: CPT

## 2022-12-27 PROCEDURE — 25010000002 FOSAPREPITANT PER 1 MG: Performed by: NURSE PRACTITIONER

## 2022-12-27 PROCEDURE — 96368 THER/DIAG CONCURRENT INF: CPT

## 2022-12-27 PROCEDURE — 96367 TX/PROPH/DG ADDL SEQ IV INF: CPT

## 2022-12-27 PROCEDURE — 80053 COMPREHEN METABOLIC PANEL: CPT

## 2022-12-27 PROCEDURE — G0498 CHEMO EXTEND IV INFUS W/PUMP: HCPCS

## 2022-12-27 PROCEDURE — 25010000002 BEVACIZUMAB-BVZR 400 MG/16ML SOLUTION 16 ML VIAL: Performed by: NURSE PRACTITIONER

## 2022-12-27 PROCEDURE — 96413 CHEMO IV INFUSION 1 HR: CPT

## 2022-12-27 PROCEDURE — 96415 CHEMO IV INFUSION ADDL HR: CPT

## 2022-12-27 PROCEDURE — 85025 COMPLETE CBC W/AUTO DIFF WBC: CPT

## 2022-12-27 PROCEDURE — 96417 CHEMO IV INFUS EACH ADDL SEQ: CPT

## 2022-12-27 PROCEDURE — 96411 CHEMO IV PUSH ADDL DRUG: CPT

## 2022-12-27 PROCEDURE — 25010000002 FLUOROURACIL PER 500 MG: Performed by: NURSE PRACTITIONER

## 2022-12-27 PROCEDURE — 25010000002 DEXAMETHASONE SODIUM PHOSPHATE 100 MG/10ML SOLUTION: Performed by: NURSE PRACTITIONER

## 2022-12-27 PROCEDURE — 81003 URINALYSIS AUTO W/O SCOPE: CPT

## 2022-12-27 PROCEDURE — 25010000002 LEUCOVORIN CALCIUM PER 50 MG: Performed by: NURSE PRACTITIONER

## 2022-12-27 PROCEDURE — 25010000002 BEVACIZUMAB-BVZR 100 MG/4ML SOLUTION 4 ML VIAL: Performed by: NURSE PRACTITIONER

## 2022-12-27 RX ORDER — DIPHENHYDRAMINE HYDROCHLORIDE 50 MG/ML
50 INJECTION INTRAMUSCULAR; INTRAVENOUS AS NEEDED
Status: CANCELLED | OUTPATIENT
Start: 2022-12-27

## 2022-12-27 RX ORDER — SODIUM CHLORIDE 9 MG/ML
250 INJECTION, SOLUTION INTRAVENOUS ONCE
Status: COMPLETED | OUTPATIENT
Start: 2022-12-27 | End: 2022-12-27

## 2022-12-27 RX ORDER — DEXTROSE MONOHYDRATE 50 MG/ML
250 INJECTION, SOLUTION INTRAVENOUS ONCE
Status: COMPLETED | OUTPATIENT
Start: 2022-12-27 | End: 2022-12-27

## 2022-12-27 RX ORDER — FLUOROURACIL 50 MG/ML
300 INJECTION, SOLUTION INTRAVENOUS ONCE
Status: COMPLETED | OUTPATIENT
Start: 2022-12-27 | End: 2022-12-27

## 2022-12-27 RX ORDER — FAMOTIDINE 10 MG/ML
20 INJECTION, SOLUTION INTRAVENOUS AS NEEDED
Status: CANCELLED | OUTPATIENT
Start: 2022-12-27

## 2022-12-27 RX ORDER — PALONOSETRON 0.05 MG/ML
0.25 INJECTION, SOLUTION INTRAVENOUS ONCE
Status: COMPLETED | OUTPATIENT
Start: 2022-12-27 | End: 2022-12-27

## 2022-12-27 RX ADMIN — FLUOROURACIL 640 MG: 50 INJECTION, SOLUTION INTRAVENOUS at 15:22

## 2022-12-27 RX ADMIN — SODIUM CHLORIDE 480 MG: 9 INJECTION, SOLUTION INTRAVENOUS at 12:22

## 2022-12-27 RX ADMIN — DEXTROSE MONOHYDRATE 250 ML: 50 INJECTION, SOLUTION INTRAVENOUS at 13:15

## 2022-12-27 RX ADMIN — SODIUM CHLORIDE 100 ML: 9 INJECTION, SOLUTION INTRAVENOUS at 11:48

## 2022-12-27 RX ADMIN — PALONOSETRON 0.25 MG: 0.05 INJECTION, SOLUTION INTRAVENOUS at 12:56

## 2022-12-27 RX ADMIN — LEUCOVORIN CALCIUM 640 MG: 350 INJECTION, POWDER, LYOPHILIZED, FOR SUSPENSION INTRAMUSCULAR; INTRAVENOUS at 13:21

## 2022-12-27 RX ADMIN — OXALIPLATIN 135 MG: 100 INJECTION, SOLUTION, CONCENTRATE INTRAVENOUS at 13:22

## 2022-12-27 RX ADMIN — DEXAMETHASONE SODIUM PHOSPHATE 12 MG: 10 INJECTION, SOLUTION INTRAMUSCULAR; INTRAVENOUS at 12:58

## 2022-12-27 RX ADMIN — SODIUM CHLORIDE 250 ML: 9 INJECTION, SOLUTION INTRAVENOUS at 11:48

## 2022-12-27 RX ADMIN — FLUOROURACIL 3850 MG: 50 INJECTION, SOLUTION INTRAVENOUS at 15:22

## 2022-12-27 NOTE — PROGRESS NOTES
Outpatient Oncology Nutrition      Patient Name:  Taz Dickey  YOB: 1950  MRN: [id number]    Assessment Date:  12/27/2022    Comments:  Follow up in infusion today, cycle 4 FOLFOX.   Weight 207 lb, stable overall. Labs reviewed.  The patient reports mild nausea but not taking any zofran. Eating adequately.   Encouraged intake. Will continue to follow.       Electronically signed by:  Lenore Baker RD, LD  12/27/22 12:42 EST

## 2022-12-29 ENCOUNTER — INFUSION (OUTPATIENT)
Dept: ONCOLOGY | Facility: HOSPITAL | Age: 72
End: 2022-12-29

## 2022-12-29 DIAGNOSIS — C18.9 MALIGNANT NEOPLASM OF COLON, UNSPECIFIED PART OF COLON: ICD-10-CM

## 2022-12-29 DIAGNOSIS — Z45.2 FITTING AND ADJUSTMENT OF VASCULAR CATHETER: ICD-10-CM

## 2022-12-29 DIAGNOSIS — Z79.899 ENCOUNTER FOR LONG-TERM (CURRENT) USE OF MEDICATIONS: Primary | ICD-10-CM

## 2022-12-29 PROCEDURE — 25010000002 HEPARIN LOCK FLUSH PER 10 UNITS: Performed by: INTERNAL MEDICINE

## 2022-12-29 RX ORDER — HEPARIN SODIUM (PORCINE) LOCK FLUSH IV SOLN 100 UNIT/ML 100 UNIT/ML
500 SOLUTION INTRAVENOUS AS NEEDED
Status: CANCELLED | OUTPATIENT
Start: 2022-12-29

## 2022-12-29 RX ORDER — HEPARIN SODIUM (PORCINE) LOCK FLUSH IV SOLN 100 UNIT/ML 100 UNIT/ML
500 SOLUTION INTRAVENOUS AS NEEDED
Status: DISCONTINUED | OUTPATIENT
Start: 2022-12-29 | End: 2022-12-29 | Stop reason: HOSPADM

## 2022-12-29 RX ORDER — SODIUM CHLORIDE 0.9 % (FLUSH) 0.9 %
10 SYRINGE (ML) INJECTION AS NEEDED
Status: DISCONTINUED | OUTPATIENT
Start: 2022-12-29 | End: 2022-12-29 | Stop reason: HOSPADM

## 2022-12-29 RX ORDER — SODIUM CHLORIDE 0.9 % (FLUSH) 0.9 %
10 SYRINGE (ML) INJECTION AS NEEDED
Status: CANCELLED | OUTPATIENT
Start: 2022-12-29

## 2022-12-29 RX ADMIN — Medication 500 UNITS: at 13:49

## 2022-12-29 RX ADMIN — Medication 10 ML: at 13:48

## 2023-01-05 ENCOUNTER — TELEPHONE (OUTPATIENT)
Dept: ONCOLOGY | Facility: CLINIC | Age: 73
End: 2023-01-05
Payer: MEDICARE

## 2023-01-05 NOTE — TELEPHONE ENCOUNTER
Per Dr oRsario patient okay to have wisdom teeth removed between treatment cycles. Informed patient if dentist or oral surgeon needs a letter to let Dr Rosario know and letter will be sent.  Patient v/u        Patient called stating he is needing 2 top wisdom teeth \"cut out\" and would like Dr Rosario's opinion about when this could be done. Patient would prefer to do this between chemo treatments but as soon as possible. Explain to patient will speak with Dr Rosario we he returns to office on Monday 1/9/2023. Patient is scheduled for his next cycle of chemotherapy on 1/10/42002

## 2023-01-10 ENCOUNTER — INFUSION (OUTPATIENT)
Dept: ONCOLOGY | Facility: HOSPITAL | Age: 73
End: 2023-01-10
Payer: MEDICARE

## 2023-01-10 ENCOUNTER — CLINICAL SUPPORT (OUTPATIENT)
Dept: OTHER | Facility: HOSPITAL | Age: 73
End: 2023-01-10
Payer: MEDICARE

## 2023-01-10 ENCOUNTER — OFFICE VISIT (OUTPATIENT)
Dept: ONCOLOGY | Facility: CLINIC | Age: 73
End: 2023-01-10
Payer: MEDICARE

## 2023-01-10 ENCOUNTER — TELEPHONE (OUTPATIENT)
Dept: ONCOLOGY | Facility: CLINIC | Age: 73
End: 2023-01-10
Payer: MEDICARE

## 2023-01-10 VITALS
DIASTOLIC BLOOD PRESSURE: 80 MMHG | TEMPERATURE: 97.1 F | RESPIRATION RATE: 16 BRPM | BODY MASS INDEX: 29.49 KG/M2 | WEIGHT: 206 LBS | HEART RATE: 70 BPM | OXYGEN SATURATION: 97 % | HEIGHT: 70 IN | SYSTOLIC BLOOD PRESSURE: 139 MMHG

## 2023-01-10 DIAGNOSIS — C78.7 SECONDARY LIVER CANCER: ICD-10-CM

## 2023-01-10 DIAGNOSIS — C18.9 MALIGNANT NEOPLASM OF COLON, UNSPECIFIED PART OF COLON: ICD-10-CM

## 2023-01-10 DIAGNOSIS — Z79.899 ENCOUNTER FOR LONG-TERM (CURRENT) USE OF MEDICATIONS: Primary | ICD-10-CM

## 2023-01-10 DIAGNOSIS — Z79.899 ENCOUNTER FOR LONG-TERM (CURRENT) USE OF MEDICATIONS: ICD-10-CM

## 2023-01-10 DIAGNOSIS — C18.9 MALIGNANT NEOPLASM OF COLON, UNSPECIFIED PART OF COLON: Primary | ICD-10-CM

## 2023-01-10 LAB
ALBUMIN SERPL-MCNC: 4.2 G/DL (ref 3.5–5.2)
ALBUMIN/GLOB SERPL: 1.4 G/DL (ref 1.1–2.4)
ALP SERPL-CCNC: 75 U/L (ref 38–116)
ALT SERPL W P-5'-P-CCNC: 20 U/L (ref 0–41)
ANION GAP SERPL CALCULATED.3IONS-SCNC: 11.6 MMOL/L (ref 5–15)
AST SERPL-CCNC: 24 U/L (ref 0–40)
BASOPHILS # BLD AUTO: 0.05 10*3/MM3 (ref 0–0.2)
BASOPHILS NFR BLD AUTO: 1.2 % (ref 0–1.5)
BILIRUB SERPL-MCNC: 0.8 MG/DL (ref 0.2–1.2)
BILIRUB UR QL STRIP: NEGATIVE
BUN SERPL-MCNC: 19 MG/DL (ref 6–20)
BUN/CREAT SERPL: 20.2 (ref 7.3–30)
CALCIUM SPEC-SCNC: 10.1 MG/DL (ref 8.5–10.2)
CHLORIDE SERPL-SCNC: 100 MMOL/L (ref 98–107)
CLARITY UR: CLEAR
CO2 SERPL-SCNC: 26.4 MMOL/L (ref 22–29)
COLOR UR: YELLOW
CREAT SERPL-MCNC: 0.94 MG/DL (ref 0.7–1.3)
DEPRECATED RDW RBC AUTO: 63.8 FL (ref 37–54)
EGFRCR SERPLBLD CKD-EPI 2021: 86.7 ML/MIN/1.73
EOSINOPHIL # BLD AUTO: 0.12 10*3/MM3 (ref 0–0.4)
EOSINOPHIL NFR BLD AUTO: 3 % (ref 0.3–6.2)
ERYTHROCYTE [DISTWIDTH] IN BLOOD BY AUTOMATED COUNT: 18.4 % (ref 12.3–15.4)
GLOBULIN UR ELPH-MCNC: 2.9 GM/DL (ref 1.8–3.5)
GLUCOSE SERPL-MCNC: 159 MG/DL (ref 74–124)
GLUCOSE UR STRIP-MCNC: ABNORMAL MG/DL
HCT VFR BLD AUTO: 36.5 % (ref 37.5–51)
HGB BLD-MCNC: 11.7 G/DL (ref 13–17.7)
HGB UR QL STRIP.AUTO: NEGATIVE
IMM GRANULOCYTES # BLD AUTO: 0.01 10*3/MM3 (ref 0–0.05)
IMM GRANULOCYTES NFR BLD AUTO: 0.2 % (ref 0–0.5)
KETONES UR QL STRIP: NEGATIVE
LEUKOCYTE ESTERASE UR QL STRIP.AUTO: NEGATIVE
LYMPHOCYTES # BLD AUTO: 1.08 10*3/MM3 (ref 0.7–3.1)
LYMPHOCYTES NFR BLD AUTO: 26.9 % (ref 19.6–45.3)
MCH RBC QN AUTO: 31.1 PG (ref 26.6–33)
MCHC RBC AUTO-ENTMCNC: 32.1 G/DL (ref 31.5–35.7)
MCV RBC AUTO: 97.1 FL (ref 79–97)
MONOCYTES # BLD AUTO: 0.49 10*3/MM3 (ref 0.1–0.9)
MONOCYTES NFR BLD AUTO: 12.2 % (ref 5–12)
NEUTROPHILS NFR BLD AUTO: 2.27 10*3/MM3 (ref 1.7–7)
NEUTROPHILS NFR BLD AUTO: 56.5 % (ref 42.7–76)
NITRITE UR QL STRIP: NEGATIVE
NRBC BLD AUTO-RTO: 0 /100 WBC (ref 0–0.2)
PH UR STRIP.AUTO: 6 [PH] (ref 4.5–8)
PLATELET # BLD AUTO: 108 10*3/MM3 (ref 140–450)
PMV BLD AUTO: 9.7 FL (ref 6–12)
POTASSIUM SERPL-SCNC: 4.3 MMOL/L (ref 3.5–4.7)
PROT SERPL-MCNC: 7.1 G/DL (ref 6.3–8)
PROT UR QL STRIP: ABNORMAL
RBC # BLD AUTO: 3.76 10*6/MM3 (ref 4.14–5.8)
SODIUM SERPL-SCNC: 138 MMOL/L (ref 134–145)
SP GR UR STRIP: 1.02 (ref 1–1.03)
UROBILINOGEN UR QL STRIP: ABNORMAL
WBC NRBC COR # BLD: 4.02 10*3/MM3 (ref 3.4–10.8)

## 2023-01-10 PROCEDURE — 25010000002 FOSAPREPITANT PER 1 MG: Performed by: NURSE PRACTITIONER

## 2023-01-10 PROCEDURE — 96413 CHEMO IV INFUSION 1 HR: CPT

## 2023-01-10 PROCEDURE — 25010000002 LEUCOVORIN 500 MG RECONSTITUTED SOLUTION 1 EACH VIAL: Performed by: NURSE PRACTITIONER

## 2023-01-10 PROCEDURE — 25010000002 FLUOROURACIL PER 500 MG: Performed by: NURSE PRACTITIONER

## 2023-01-10 PROCEDURE — 25010000002 DEXAMETHASONE SODIUM PHOSPHATE 100 MG/10ML SOLUTION: Performed by: NURSE PRACTITIONER

## 2023-01-10 PROCEDURE — 96416 CHEMO PROLONG INFUSE W/PUMP: CPT

## 2023-01-10 PROCEDURE — 25010000002 PALONOSETRON PER 25 MCG: Performed by: NURSE PRACTITIONER

## 2023-01-10 PROCEDURE — 80053 COMPREHEN METABOLIC PANEL: CPT

## 2023-01-10 PROCEDURE — 99214 OFFICE O/P EST MOD 30 MIN: CPT | Performed by: NURSE PRACTITIONER

## 2023-01-10 PROCEDURE — 25010000002 OXALIPLATIN PER 0.5 MG: Performed by: NURSE PRACTITIONER

## 2023-01-10 PROCEDURE — 25010000002 BEVACIZUMAB-BVZR 100 MG/4ML SOLUTION 4 ML VIAL: Performed by: NURSE PRACTITIONER

## 2023-01-10 PROCEDURE — 85025 COMPLETE CBC W/AUTO DIFF WBC: CPT

## 2023-01-10 PROCEDURE — 96375 TX/PRO/DX INJ NEW DRUG ADDON: CPT

## 2023-01-10 PROCEDURE — 25010000002 BEVACIZUMAB-BVZR 400 MG/16ML SOLUTION 16 ML VIAL: Performed by: NURSE PRACTITIONER

## 2023-01-10 PROCEDURE — 96411 CHEMO IV PUSH ADDL DRUG: CPT

## 2023-01-10 PROCEDURE — G0498 CHEMO EXTEND IV INFUS W/PUMP: HCPCS

## 2023-01-10 PROCEDURE — 96367 TX/PROPH/DG ADDL SEQ IV INF: CPT

## 2023-01-10 PROCEDURE — 96417 CHEMO IV INFUS EACH ADDL SEQ: CPT

## 2023-01-10 PROCEDURE — 81003 URINALYSIS AUTO W/O SCOPE: CPT

## 2023-01-10 PROCEDURE — 96368 THER/DIAG CONCURRENT INF: CPT

## 2023-01-10 PROCEDURE — 96415 CHEMO IV INFUSION ADDL HR: CPT

## 2023-01-10 RX ORDER — AMOXICILLIN 500 MG/1
CAPSULE ORAL
COMMUNITY
Start: 2023-01-05 | End: 2023-02-21

## 2023-01-10 RX ORDER — PALONOSETRON 0.05 MG/ML
0.25 INJECTION, SOLUTION INTRAVENOUS ONCE
Status: COMPLETED | OUTPATIENT
Start: 2023-01-10 | End: 2023-01-10

## 2023-01-10 RX ORDER — DIPHENHYDRAMINE HYDROCHLORIDE 50 MG/ML
50 INJECTION INTRAMUSCULAR; INTRAVENOUS AS NEEDED
Status: CANCELLED | OUTPATIENT
Start: 2023-01-10

## 2023-01-10 RX ORDER — FLUOROURACIL 50 MG/ML
300 INJECTION, SOLUTION INTRAVENOUS ONCE
Status: COMPLETED | OUTPATIENT
Start: 2023-01-10 | End: 2023-01-10

## 2023-01-10 RX ORDER — SODIUM CHLORIDE 9 MG/ML
250 INJECTION, SOLUTION INTRAVENOUS ONCE
Status: COMPLETED | OUTPATIENT
Start: 2023-01-10 | End: 2023-01-10

## 2023-01-10 RX ORDER — FAMOTIDINE 10 MG/ML
20 INJECTION, SOLUTION INTRAVENOUS AS NEEDED
Status: CANCELLED | OUTPATIENT
Start: 2023-01-10

## 2023-01-10 RX ORDER — SODIUM CHLORIDE 9 MG/ML
250 INJECTION, SOLUTION INTRAVENOUS ONCE
Status: CANCELLED | OUTPATIENT
Start: 2023-01-10

## 2023-01-10 RX ORDER — IBUPROFEN 600 MG/1
TABLET ORAL AS NEEDED
COMMUNITY
Start: 2023-01-05

## 2023-01-10 RX ORDER — PALONOSETRON 0.05 MG/ML
0.25 INJECTION, SOLUTION INTRAVENOUS ONCE
Status: CANCELLED | OUTPATIENT
Start: 2023-01-10

## 2023-01-10 RX ORDER — DEXTROSE MONOHYDRATE 50 MG/ML
250 INJECTION, SOLUTION INTRAVENOUS ONCE
Status: CANCELLED | OUTPATIENT
Start: 2023-01-10

## 2023-01-10 RX ORDER — DEXTROSE MONOHYDRATE 50 MG/ML
250 INJECTION, SOLUTION INTRAVENOUS ONCE
Status: COMPLETED | OUTPATIENT
Start: 2023-01-10 | End: 2023-01-10

## 2023-01-10 RX ORDER — FLUOROURACIL 50 MG/ML
300 INJECTION, SOLUTION INTRAVENOUS ONCE
Status: CANCELLED | OUTPATIENT
Start: 2023-01-10

## 2023-01-10 RX ADMIN — DEXAMETHASONE SODIUM PHOSPHATE 12 MG: 10 INJECTION, SOLUTION INTRAMUSCULAR; INTRAVENOUS at 12:15

## 2023-01-10 RX ADMIN — SODIUM CHLORIDE 100 ML: 9 INJECTION, SOLUTION INTRAVENOUS at 11:13

## 2023-01-10 RX ADMIN — FLUOROURACIL 640 MG: 50 INJECTION, SOLUTION INTRAVENOUS at 14:31

## 2023-01-10 RX ADMIN — FLUOROURACIL 3850 MG: 50 INJECTION, SOLUTION INTRAVENOUS at 14:31

## 2023-01-10 RX ADMIN — PALONOSETRON 0.25 MG: 0.05 INJECTION, SOLUTION INTRAVENOUS at 12:16

## 2023-01-10 RX ADMIN — SODIUM CHLORIDE 250 ML: 9 INJECTION, SOLUTION INTRAVENOUS at 11:09

## 2023-01-10 RX ADMIN — LEUCOVORIN CALCIUM 640 MG: 500 INJECTION, POWDER, LYOPHILIZED, FOR SOLUTION INTRAMUSCULAR; INTRAVENOUS at 12:34

## 2023-01-10 RX ADMIN — DEXTROSE MONOHYDRATE 250 ML: 50 INJECTION, SOLUTION INTRAVENOUS at 12:34

## 2023-01-10 RX ADMIN — OXALIPLATIN 135 MG: 100 INJECTION, SOLUTION, CONCENTRATE INTRAVENOUS at 12:34

## 2023-01-10 RX ADMIN — SODIUM CHLORIDE 480 MG: 9 INJECTION, SOLUTION INTRAVENOUS at 11:45

## 2023-01-10 NOTE — PROGRESS NOTES
Outpatient Oncology Nutrition      Patient Name:  Taz Dickey  YOB: 1950  MRN: [id number]    Assessment Date:  1/10/2023    Comments:  Follow up during treatment today. Weight 206 lb.   Wt Readings from Last 3 Encounters:   01/10/23 93.4 kg (206 lb)   12/27/22 93.9 kg (207 lb)   12/13/22 93.4 kg (205 lb 14.4 oz)     The patient had some dental issues over the last two or three weeks and it was painful to eat. Needs to have wisdom teeth extracted. He reports that he continues to have a few days when he doesn't have much of an appetite but is not nauseous and does not take any antinausea meds. He feels he is eating adequately for the most part and tolerating treatment very well. He is using a protein powder. Encouraged intake of calorically dense high protein foods. He is a participant in the enterade study but has not required it.   Will continue to follow.     Electronically signed by:  Lenore Baker RD, GIANNA  01/10/23 17:00 EST

## 2023-01-10 NOTE — TELEPHONE ENCOUNTER
Patient to undergo extraction of 2 wisdom teeth. Per Dr Abraham arora for patient to have wisdom teeth extracted by Dr Charlie Zazueta oral surgeon.  Per Dr Rosario will HOLD the patient's Avastin (Bevacizumab) 2 weeks before and 4 weeks after extraction to reduce the risk of bleeding.  Medical clearence and labs faxed to Dr Zazueta's office [phone].  Patient informed of call and instructions to call if additional instructions are needed. [place] teeth extraction to be scheduled the week of March 13 in between patient's chemotherapy treatments. (Avastin removed from Cycle 9 and 10)

## 2023-01-10 NOTE — TELEPHONE ENCOUNTER
Caller: CHRISTIANO    Relationship: ORAL SURGERY    Best call back number: [phone]      What was the call regarding: FACILITY CALLED TO SEE IF WE CAN ADD A PLATELET COUNT TO PATIENTS LABS TODAY

## 2023-01-12 ENCOUNTER — INFUSION (OUTPATIENT)
Dept: ONCOLOGY | Facility: HOSPITAL | Age: 73
End: 2023-01-12
Payer: MEDICARE

## 2023-01-12 VITALS
WEIGHT: 207 LBS | HEART RATE: 78 BPM | SYSTOLIC BLOOD PRESSURE: 146 MMHG | BODY MASS INDEX: 29.7 KG/M2 | DIASTOLIC BLOOD PRESSURE: 84 MMHG

## 2023-01-12 DIAGNOSIS — Z79.899 ENCOUNTER FOR LONG-TERM (CURRENT) USE OF MEDICATIONS: Primary | ICD-10-CM

## 2023-01-12 DIAGNOSIS — C18.9 MALIGNANT NEOPLASM OF COLON, UNSPECIFIED PART OF COLON: ICD-10-CM

## 2023-01-12 DIAGNOSIS — Z45.2 FITTING AND ADJUSTMENT OF VASCULAR CATHETER: ICD-10-CM

## 2023-01-12 PROCEDURE — 25010000002 HEPARIN LOCK FLUSH PER 10 UNITS: Performed by: INTERNAL MEDICINE

## 2023-01-12 RX ORDER — HEPARIN SODIUM (PORCINE) LOCK FLUSH IV SOLN 100 UNIT/ML 100 UNIT/ML
500 SOLUTION INTRAVENOUS AS NEEDED
Status: DISCONTINUED | OUTPATIENT
Start: 2023-01-12 | End: 2023-01-12 | Stop reason: HOSPADM

## 2023-01-12 RX ORDER — SODIUM CHLORIDE 0.9 % (FLUSH) 0.9 %
10 SYRINGE (ML) INJECTION AS NEEDED
Status: CANCELLED | OUTPATIENT
Start: 2023-01-12

## 2023-01-12 RX ORDER — SODIUM CHLORIDE 0.9 % (FLUSH) 0.9 %
10 SYRINGE (ML) INJECTION AS NEEDED
Status: DISCONTINUED | OUTPATIENT
Start: 2023-01-12 | End: 2023-01-12 | Stop reason: HOSPADM

## 2023-01-12 RX ORDER — HEPARIN SODIUM (PORCINE) LOCK FLUSH IV SOLN 100 UNIT/ML 100 UNIT/ML
500 SOLUTION INTRAVENOUS AS NEEDED
Status: CANCELLED | OUTPATIENT
Start: 2023-01-12

## 2023-01-12 RX ADMIN — Medication 500 UNITS: at 12:59

## 2023-01-12 RX ADMIN — Medication 10 ML: at 12:59

## 2023-01-12 NOTE — NURSING NOTE
Pt presents for 5FU unhook and appt line mentions IVF's. No order for IVF's and did not see in NP note from 1/10/23. Pt states he is feeling well today, denies nausea or vomiting and states he is eating and drinking well. Pt denies the need for IVF's today. Encouraged pt to call with any concerns or if he feels he is becoming dehydrated. Pt v/u. Discharged ambulatory.

## 2023-01-20 ENCOUNTER — HOSPITAL ENCOUNTER (OUTPATIENT)
Dept: CT IMAGING | Facility: HOSPITAL | Age: 73
Discharge: HOME | End: 2023-01-20
Admitting: INTERNAL MEDICINE
Payer: MEDICARE

## 2023-01-20 DIAGNOSIS — C78.7 SECONDARY LIVER CANCER: ICD-10-CM

## 2023-01-20 DIAGNOSIS — C18.9 MALIGNANT NEOPLASM OF COLON, UNSPECIFIED PART OF COLON: ICD-10-CM

## 2023-01-20 PROCEDURE — 74177 CT ABD & PELVIS W/CONTRAST: CPT

## 2023-01-20 PROCEDURE — 25010000002 IOPAMIDOL 61 % SOLUTION: Performed by: INTERNAL MEDICINE

## 2023-01-20 PROCEDURE — 71260 CT THORAX DX C+: CPT

## 2023-01-20 PROCEDURE — 0 DIATRIZOATE MEGLUMINE & SODIUM PER 1 ML: Performed by: INTERNAL MEDICINE

## 2023-01-20 RX ADMIN — IOPAMIDOL 100 ML: 612 INJECTION, SOLUTION INTRAVENOUS at 14:35

## 2023-01-20 RX ADMIN — DIATRIZOATE MEGLUMINE AND DIATRIZOATE SODIUM 30 ML: 660; 100 LIQUID ORAL; RECTAL at 13:30

## 2023-01-24 ENCOUNTER — INFUSION (OUTPATIENT)
Dept: ONCOLOGY | Facility: HOSPITAL | Age: 73
End: 2023-01-24
Payer: MEDICARE

## 2023-01-24 ENCOUNTER — OFFICE VISIT (OUTPATIENT)
Dept: ONCOLOGY | Facility: CLINIC | Age: 73
End: 2023-01-24
Payer: MEDICARE

## 2023-01-24 VITALS
BODY MASS INDEX: 29.45 KG/M2 | TEMPERATURE: 97.1 F | WEIGHT: 205.7 LBS | HEIGHT: 70 IN | DIASTOLIC BLOOD PRESSURE: 86 MMHG | HEART RATE: 76 BPM | RESPIRATION RATE: 18 BRPM | SYSTOLIC BLOOD PRESSURE: 144 MMHG | OXYGEN SATURATION: 96 %

## 2023-01-24 DIAGNOSIS — D69.59 CHEMOTHERAPY-INDUCED THROMBOCYTOPENIA: ICD-10-CM

## 2023-01-24 DIAGNOSIS — C18.9 MALIGNANT NEOPLASM OF COLON, UNSPECIFIED PART OF COLON: ICD-10-CM

## 2023-01-24 DIAGNOSIS — D64.81 ANEMIA ASSOCIATED WITH CHEMOTHERAPY: ICD-10-CM

## 2023-01-24 DIAGNOSIS — Z79.899 ENCOUNTER FOR LONG-TERM (CURRENT) USE OF MEDICATIONS: Primary | ICD-10-CM

## 2023-01-24 DIAGNOSIS — E53.8 VITAMIN B12 DEFICIENCY: ICD-10-CM

## 2023-01-24 DIAGNOSIS — T45.1X5A CHEMOTHERAPY-INDUCED THROMBOCYTOPENIA: ICD-10-CM

## 2023-01-24 DIAGNOSIS — C18.9 MALIGNANT NEOPLASM OF COLON, UNSPECIFIED PART OF COLON: Primary | ICD-10-CM

## 2023-01-24 DIAGNOSIS — Z79.899 ENCOUNTER FOR LONG-TERM (CURRENT) USE OF MEDICATIONS: ICD-10-CM

## 2023-01-24 DIAGNOSIS — T45.1X5A ANEMIA ASSOCIATED WITH CHEMOTHERAPY: ICD-10-CM

## 2023-01-24 DIAGNOSIS — C78.7 SECONDARY LIVER CANCER: ICD-10-CM

## 2023-01-24 DIAGNOSIS — D50.0 IRON DEFICIENCY ANEMIA DUE TO CHRONIC BLOOD LOSS: ICD-10-CM

## 2023-01-24 LAB
ALBUMIN SERPL-MCNC: 4.2 G/DL (ref 3.5–5.2)
ALBUMIN/GLOB SERPL: 1.6 G/DL (ref 1.1–2.4)
ALP SERPL-CCNC: 75 U/L (ref 38–116)
ALT SERPL W P-5'-P-CCNC: 23 U/L (ref 0–41)
ANION GAP SERPL CALCULATED.3IONS-SCNC: 11.6 MMOL/L (ref 5–15)
AST SERPL-CCNC: 27 U/L (ref 0–40)
BASOPHILS # BLD AUTO: 0.03 10*3/MM3 (ref 0–0.2)
BASOPHILS NFR BLD AUTO: 0.8 % (ref 0–1.5)
BILIRUB SERPL-MCNC: 1.1 MG/DL (ref 0.2–1.2)
BILIRUB UR QL STRIP: NEGATIVE
BUN SERPL-MCNC: 27 MG/DL (ref 6–20)
BUN/CREAT SERPL: 23.5 (ref 7.3–30)
CALCIUM SPEC-SCNC: 9.6 MG/DL (ref 8.5–10.2)
CHLORIDE SERPL-SCNC: 101 MMOL/L (ref 98–107)
CLARITY UR: CLEAR
CO2 SERPL-SCNC: 25.4 MMOL/L (ref 22–29)
COLOR UR: YELLOW
CREAT SERPL-MCNC: 1.15 MG/DL (ref 0.7–1.3)
DEPRECATED RDW RBC AUTO: 65.7 FL (ref 37–54)
EGFRCR SERPLBLD CKD-EPI 2021: 68 ML/MIN/1.73
EOSINOPHIL # BLD AUTO: 0.12 10*3/MM3 (ref 0–0.4)
EOSINOPHIL NFR BLD AUTO: 3 % (ref 0.3–6.2)
ERYTHROCYTE [DISTWIDTH] IN BLOOD BY AUTOMATED COUNT: 19.3 % (ref 12.3–15.4)
GLOBULIN UR ELPH-MCNC: 2.6 GM/DL (ref 1.8–3.5)
GLUCOSE SERPL-MCNC: 179 MG/DL (ref 74–124)
GLUCOSE UR STRIP-MCNC: ABNORMAL MG/DL
HCT VFR BLD AUTO: 33.6 % (ref 37.5–51)
HGB BLD-MCNC: 11 G/DL (ref 13–17.7)
HGB UR QL STRIP.AUTO: NEGATIVE
IMM GRANULOCYTES # BLD AUTO: 0.02 10*3/MM3 (ref 0–0.05)
IMM GRANULOCYTES NFR BLD AUTO: 0.5 % (ref 0–0.5)
KETONES UR QL STRIP: NEGATIVE
LEUKOCYTE ESTERASE UR QL STRIP.AUTO: NEGATIVE
LYMPHOCYTES # BLD AUTO: 1.12 10*3/MM3 (ref 0.7–3.1)
LYMPHOCYTES NFR BLD AUTO: 28.4 % (ref 19.6–45.3)
MCH RBC QN AUTO: 32.4 PG (ref 26.6–33)
MCHC RBC AUTO-ENTMCNC: 32.7 G/DL (ref 31.5–35.7)
MCV RBC AUTO: 98.8 FL (ref 79–97)
MONOCYTES # BLD AUTO: 0.49 10*3/MM3 (ref 0.1–0.9)
MONOCYTES NFR BLD AUTO: 12.4 % (ref 5–12)
NEUTROPHILS NFR BLD AUTO: 2.17 10*3/MM3 (ref 1.7–7)
NEUTROPHILS NFR BLD AUTO: 54.9 % (ref 42.7–76)
NITRITE UR QL STRIP: NEGATIVE
NRBC BLD AUTO-RTO: 0 /100 WBC (ref 0–0.2)
PH UR STRIP.AUTO: 5.5 [PH] (ref 4.5–8)
PLATELET # BLD AUTO: 99 10*3/MM3 (ref 140–450)
PMV BLD AUTO: 10.4 FL (ref 6–12)
POTASSIUM SERPL-SCNC: 4.2 MMOL/L (ref 3.5–4.7)
PROT SERPL-MCNC: 6.8 G/DL (ref 6.3–8)
PROT UR QL STRIP: ABNORMAL
RBC # BLD AUTO: 3.4 10*6/MM3 (ref 4.14–5.8)
SODIUM SERPL-SCNC: 138 MMOL/L (ref 134–145)
SP GR UR STRIP: 1.02 (ref 1–1.03)
UROBILINOGEN UR QL STRIP: ABNORMAL
WBC NRBC COR # BLD: 3.95 10*3/MM3 (ref 3.4–10.8)

## 2023-01-24 PROCEDURE — 25010000002 OXALIPLATIN PER 0.5 MG: Performed by: INTERNAL MEDICINE

## 2023-01-24 PROCEDURE — 96417 CHEMO IV INFUS EACH ADDL SEQ: CPT

## 2023-01-24 PROCEDURE — 96375 TX/PRO/DX INJ NEW DRUG ADDON: CPT

## 2023-01-24 PROCEDURE — 25010000002 FOSAPREPITANT PER 1 MG: Performed by: INTERNAL MEDICINE

## 2023-01-24 PROCEDURE — 80053 COMPREHEN METABOLIC PANEL: CPT

## 2023-01-24 PROCEDURE — 25010000002 BEVACIZUMAB-BVZR 100 MG/4ML SOLUTION 4 ML VIAL: Performed by: INTERNAL MEDICINE

## 2023-01-24 PROCEDURE — 85025 COMPLETE CBC W/AUTO DIFF WBC: CPT

## 2023-01-24 PROCEDURE — 25010000002 BEVACIZUMAB-BVZR 400 MG/16ML SOLUTION 16 ML VIAL: Performed by: INTERNAL MEDICINE

## 2023-01-24 PROCEDURE — 25010000002 FLUOROURACIL PER 500 MG: Performed by: INTERNAL MEDICINE

## 2023-01-24 PROCEDURE — 96415 CHEMO IV INFUSION ADDL HR: CPT

## 2023-01-24 PROCEDURE — 99215 OFFICE O/P EST HI 40 MIN: CPT | Performed by: INTERNAL MEDICINE

## 2023-01-24 PROCEDURE — 81003 URINALYSIS AUTO W/O SCOPE: CPT

## 2023-01-24 PROCEDURE — 96416 CHEMO PROLONG INFUSE W/PUMP: CPT

## 2023-01-24 PROCEDURE — 25010000002 LEUCOVORIN 500 MG RECONSTITUTED SOLUTION 1 EACH VIAL: Performed by: INTERNAL MEDICINE

## 2023-01-24 PROCEDURE — G0498 CHEMO EXTEND IV INFUS W/PUMP: HCPCS

## 2023-01-24 PROCEDURE — 96368 THER/DIAG CONCURRENT INF: CPT

## 2023-01-24 PROCEDURE — 96413 CHEMO IV INFUSION 1 HR: CPT

## 2023-01-24 PROCEDURE — 25010000002 DEXAMETHASONE SODIUM PHOSPHATE 100 MG/10ML SOLUTION: Performed by: INTERNAL MEDICINE

## 2023-01-24 PROCEDURE — 25010000002 PALONOSETRON PER 25 MCG: Performed by: INTERNAL MEDICINE

## 2023-01-24 PROCEDURE — 96411 CHEMO IV PUSH ADDL DRUG: CPT

## 2023-01-24 PROCEDURE — 96367 TX/PROPH/DG ADDL SEQ IV INF: CPT

## 2023-01-24 RX ORDER — FLUOROURACIL 50 MG/ML
300 INJECTION, SOLUTION INTRAVENOUS ONCE
Status: CANCELLED | OUTPATIENT
Start: 2023-01-24

## 2023-01-24 RX ORDER — FAMOTIDINE 10 MG/ML
20 INJECTION, SOLUTION INTRAVENOUS AS NEEDED
Status: CANCELLED | OUTPATIENT
Start: 2023-01-24

## 2023-01-24 RX ORDER — ACETAMINOPHEN AND CODEINE PHOSPHATE 300; 30 MG/1; MG/1
TABLET ORAL
COMMUNITY
Start: 2023-01-05

## 2023-01-24 RX ORDER — DEXTROSE MONOHYDRATE 50 MG/ML
250 INJECTION, SOLUTION INTRAVENOUS ONCE
Status: COMPLETED | OUTPATIENT
Start: 2023-01-24 | End: 2023-01-24

## 2023-01-24 RX ORDER — PALONOSETRON 0.05 MG/ML
0.25 INJECTION, SOLUTION INTRAVENOUS ONCE
Status: COMPLETED | OUTPATIENT
Start: 2023-01-24 | End: 2023-01-24

## 2023-01-24 RX ORDER — DEXTROSE MONOHYDRATE 50 MG/ML
250 INJECTION, SOLUTION INTRAVENOUS ONCE
Status: CANCELLED | OUTPATIENT
Start: 2023-01-24

## 2023-01-24 RX ORDER — DIPHENHYDRAMINE HYDROCHLORIDE 50 MG/ML
50 INJECTION INTRAMUSCULAR; INTRAVENOUS AS NEEDED
Status: CANCELLED | OUTPATIENT
Start: 2023-01-24

## 2023-01-24 RX ORDER — PALONOSETRON 0.05 MG/ML
0.25 INJECTION, SOLUTION INTRAVENOUS ONCE
Status: CANCELLED | OUTPATIENT
Start: 2023-01-24

## 2023-01-24 RX ORDER — SODIUM CHLORIDE 9 MG/ML
250 INJECTION, SOLUTION INTRAVENOUS ONCE
Status: COMPLETED | OUTPATIENT
Start: 2023-01-24 | End: 2023-01-24

## 2023-01-24 RX ORDER — SODIUM CHLORIDE 9 MG/ML
250 INJECTION, SOLUTION INTRAVENOUS ONCE
Status: CANCELLED | OUTPATIENT
Start: 2023-01-24

## 2023-01-24 RX ORDER — FLUOROURACIL 50 MG/ML
300 INJECTION, SOLUTION INTRAVENOUS ONCE
Status: COMPLETED | OUTPATIENT
Start: 2023-01-24 | End: 2023-01-24

## 2023-01-24 RX ADMIN — OXALIPLATIN 90 MG: 100 INJECTION, SOLUTION, CONCENTRATE INTRAVENOUS at 12:35

## 2023-01-24 RX ADMIN — DEXTROSE MONOHYDRATE 250 ML: 50 INJECTION, SOLUTION INTRAVENOUS at 12:34

## 2023-01-24 RX ADMIN — SODIUM CHLORIDE 100 ML: 9 INJECTION, SOLUTION INTRAVENOUS at 11:23

## 2023-01-24 RX ADMIN — FLUOROURACIL 3850 MG: 50 INJECTION, SOLUTION INTRAVENOUS at 14:44

## 2023-01-24 RX ADMIN — PALONOSETRON 0.25 MG: 0.05 INJECTION, SOLUTION INTRAVENOUS at 11:05

## 2023-01-24 RX ADMIN — LEUCOVORIN CALCIUM 640 MG: 500 INJECTION, POWDER, LYOPHILIZED, FOR SOLUTION INTRAMUSCULAR; INTRAVENOUS at 12:35

## 2023-01-24 RX ADMIN — FLUOROURACIL 640 MG: 50 INJECTION, SOLUTION INTRAVENOUS at 14:44

## 2023-01-24 RX ADMIN — SODIUM CHLORIDE 250 ML: 9 INJECTION, SOLUTION INTRAVENOUS at 11:05

## 2023-01-24 RX ADMIN — DEXAMETHASONE SODIUM PHOSPHATE 12 MG: 10 INJECTION, SOLUTION INTRAMUSCULAR; INTRAVENOUS at 11:06

## 2023-01-24 RX ADMIN — SODIUM CHLORIDE 480 MG: 9 INJECTION, SOLUTION INTRAVENOUS at 11:55

## 2023-01-24 NOTE — NURSING NOTE
Pt feels as though he could benefit from IV fluids on the day of chemo ball disconnect. Ok to give pt 1L NS per Dr. Rosario. Fluids added to tx plan.

## 2023-01-24 NOTE — PROGRESS NOTES
.     REASON FOR FOLLOWUP:     *Cecum colon cancer, status post right hemicolectomy performed on 10/11/2022, [id number].   · CT scan examination on 9/26/2022 reported suspicious liver lesion 9 mm, otherwise no evidence for metastatic disease.    · The patient underwent an abdominal MRI examination on 10/06/2022, which reported suspicious liver lesion 8mm.  · Patient had sigmoidectomy on 10/11/2022.  Portacatheter placement on 10/21/2022.   · PET scan examination on 11/14/2022 reported solitary hypermetabolic liver lesion.   · Cycle #1 palliative chemotherapy FOLFOX 6 was started on 11/15/2022.   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  Not a candidate for anti-EGFR monoclonal antibody treatment.   · From cycle #2, bevacizumab/Avastin will be added to palliative chemotherapy.  Due to thrombocytopenia, all chemotherapy agents were 25% reduced from cycle #2.  *Iron deficiency anemia.  · On oral iron treatment.                                 HISTORY OF PRESENT ILLNESS:  The patient is a 72 y.o. 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     He states he is feeling pretty good. He denies having any diarrhea, but states he did have some constipation that improved in the last few days. He does have numbness and tingling at times. He reports he feels cold almost all the time.     The CT scan reported a small hyperintense lesion in the anterior liver on the PET scan maybe faintly visualized in the anterior liver, but is very subtle. There is no other evidence of metastatic disease.    Laboratory study today on 01/24/2023 reported thrombocytopenia with platelets of 99,000 mcL, and trending hemoglobin 11.0, g/dL ANC 2170 out of WBC 3950.           Past Medical History:   Diagnosis Date   • Abdominal hernia    • Anemia    • Arrhythmia     PT STATED DURING LAST COLONOSCOPY 2 WEEKS AGO   • Arthritis    • Chemotherapy-induced thrombocytopenia 12/4/2022   • Colon cancer (HCC) 09/22/2022   • Colon polyp September 20 2022   • Colon polyps    • Depression    • DVT (deep venous thrombosis) (HCC)     IN LEFT LEG   • Full dentures    • GI (gastrointestinal bleed) September 8 2022   • Hip pain     RIGHT   • Hyperlipidemia    • Hypertension    • Iron deficiency anemia 09/22/2022   • Numbness and tingling     RIGHT FOOT   • PONV (postoperative nausea and vomiting)    • Right leg pain    • Type 2 diabetes mellitus without complication, without long-term current use of insulin (HCC) 10/08/2019     Past Surgical History:   Procedure Laterality Date   • CERVICAL DISCECTOMY LAMINECTOMY DECOMPRESSION POSTERIOR FUSION WITH INSTRUMENTATION     • COLON RESECTION N/A 10/11/2022    Procedure: LAPAROSCOPIC RIGHT COLON RESECTION;  Surgeon: Ga Samaniego MD;  Location: [facility] OR;  Service: General;  Laterality: N/A;   • COLONOSCOPY N/A 2/28/2018    Procedure: COLONOSCOPY to TI and cecum with polypectomy;  Surgeon: Anil Garces MD;  Location: [facility] ENDOSCOPY;  Service:    • JOINT REPLACEMENT      LEFT HIP   • KNEE ARTHROSCOPY Left    • LUMBAR DISCECTOMY FUSION INSTRUMENTATION N/A 11/18/2020    Procedure: Lumbar 4 5 laminectomy with a posterior lateral fusion and instrumentation and interbody fusion;  Surgeon: Jeffrey Garcia MD;  Location: [facility] OR;  Service: Neurosurgery;  Laterality: N/A;   • TOTAL HIP ARTHROPLASTY Right 6/3/2021    Procedure: TOTAL HIP ARTHROPLASTY POSTERIOR;  Surgeon: Shlomo Campos MD;  Location: [facility] OR;  Service: Orthopedics;  Laterality: Right;   • VASECTOMY     • VENOUS ACCESS DEVICE (PORT) INSERTION N/A 10/21/2022    Procedure: INSERTION VENOUS ACCESS DEVICE;  Surgeon: Ga Samaniego MD;  Location: [facility] OSC;   Service: General;  Laterality: N/A;     HEMATOLOGY/MEDICAL ONCOLOGY HISTORY: The patient is a 72 y.o. year old male with hypertension, hyperlipidemia, type 2 diabetes, iron deficiency anemia, history of DVT, who presented on 9/22/2022 for initial evaluation because of iron deficiency anemia, referred by his primary care provider, JULIA Santiago. Patient is accompanied by his wife who helped with the history. They reported the patient actually was being diagnosed of colon cancer 2 days ago. His GI specialist, Dr. Anil Garces, performed colonoscopic examination and saw a distal colon mass. I do not have the report for the procedure nor do I have the pathology report but nevertheless there was a mass in the distal colon likely in the sigmoid colon. I will obtain a copy of those reports when available.      Patient reports he had no apparent melena or hematochezia before starting oral iron treatment. He did have however significant fatigue. He reports exertional dyspnea and no syncope. Patient had laboratory study recently on 09/08/2022 which reported severe iron deficiency anemia with hemoglobin 7.8, MCV 79.9, MCHC 29.2. Normal platelets 217,000 and WBC 7230 without differentiation. Iron study reported ferritin 10.7 ng/mL, free iron 23, TIBC 593 and iron saturation 4%. Chemistry lab reported creatinine 1.11, potassium 5.4, otherwise normal sodium chloride and calcium, glucose 137. Prior to that the patient had normal liver function on 08/24/2022. He had a normal TSH 2.5 and slightly elevated hemoglobin A1c of 6.8% on that day.      The patient reports he has been on oral iron for almost 2 weeks, once a day with good tolerance and he now noticed improved energy level and less exertional dyspnea. He does report stool becoming dark-colored after he started oral iron.  He reports no syncope.     Laboratory study on 09/22/2022 reported improved hemoglobin 8.5 and normalized MCV 85.0, stable MCHC 29.4. Maintains normal  "platelets and WBC.       I saw him originally on 9/22/2022 for initial evaluation for his sigmoid colon cancer and iron deficiency anemia.  Since that time patient had multiple imaging studies including CT for chest abdomen pelvis, subsequently with MRI for the abdomen for staging purposes.  He was also seen by Dr. Samaniego who performed right hemicolectomy on 10/11/2022.    Dr. Samaniego called me on the day of surgery, he also suspected this liver lesion is metastatic, however unable to reach that during the surgery.    Patient notes since surgery on 10/11/2022 of the bowel resection he is recovering \"okay\". The patient does report some abdominal pain when adrianna his stomach, specifically when getting out of bed. The patient denies any issues with his appetite, and is having normal urination and bowel movements. The patient denies constipation.  Patient reports no fever sweating or chills.    The patient has type II diabetes, which he manages with metformin. 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    Patient reports he is able to tolerate oral iron twice a day with no specific complaints.  No nausea vomiting or constipation.      Laboratory study today on 11/2/2022 showed improved anemia with Hb 10.9, normalized MCV 92.5.  His normal platelets 159,000 WBC 6870 including ANC 4580.      PET scan examination on 11/14/2022 reported distal small 1.3 cm subtle low attenuation lesion in the medial hepatic segment with SUV 6.3.  There is no hypermetabolic enthesopathy in the abdomen or pelvis.  No suspicious hypermetabolic activity in the chest or neck.    Laboratory study on 11/15/2022 showed improved hemoglobin 12.1, normal platelets 156,000 and WBC 5720 including ANC 3750 and lymphocytes 1070.  Chemistry study reported glucose 156 otherwise unremarkable CMP.    Patient reports he developed a mild oral mucositis lasted about 4 days after the " chemotherapy and now has resolved. He believes he has lost some weight, but states he does have an appetite and eats everyday.  He denies nausea vomiting.  He denies having diarrhea or constipation. He denies having any fever, chills, or sweating.  Denies peripheral neuropathy.     Laboratory results from on 11/29/2022 are; white cell count is 3950. neutrophils are 2340. Hemoglobin is 11.7 g/dL. Platelets are 108,000. Chemistry lab was unremarkable except glucose 255.    MEDICATIONS:    Current Outpatient Medications:   •  acetaminophen (TYLENOL) 500 MG tablet, Take 500 mg by mouth Every 6 (Six) Hours As Needed for Mild Pain ., Disp: , Rfl:   •  acetaminophen-codeine (TYLENOL #3) 300-30 MG per tablet, , Disp: , Rfl:   •  amoxicillin (AMOXIL) 500 MG capsule, , Disp: , Rfl:   •  aspirin 81 MG EC tablet, Take 81 mg by mouth Daily. INSTRUCTED PT TO FOLLOW MD INSTRUCTIONS REGARDING HOLDING FOR SURGERY/PT STATED TO HOLD 5 DAYS PRIOR TO SURGERY, Disp: , Rfl:   •  atorvastatin (LIPITOR) 40 MG tablet, TAKE ONE TABLET BY MOUTH DAILY (Patient taking differently: Take 40 mg by mouth Every Night.), Disp: 90 tablet, Rfl: 3  •  ferrous sulfate (FerrouSul) 325 (65 FE) MG tablet, Take 1 tablet by mouth 2 (Two) Times a Day., Disp: 180 tablet, Rfl: 1  •  ibuprofen (ADVIL,MOTRIN) 600 MG tablet, , Disp: , Rfl:   •  lisinopril-hydrochlorothiazide (PRINZIDE,ZESTORETIC) 20-12.5 MG per tablet, TAKE ONE TABLET BY MOUTH ONCE NIGHTLY (Patient taking differently: Take 1 tablet by mouth Every Night.), Disp: 90 tablet, Rfl: 3  •  metFORMIN (GLUCOPHAGE) 500 MG tablet, Take 1 tablet by mouth 2 (Two) Times a Day With Meals., Disp: 180 tablet, Rfl: 3  •  ondansetron (ZOFRAN) 8 MG tablet, Take 1 tablet by mouth 3 (Three) Times a Day As Needed for Nausea or Vomiting., Disp: 60 tablet, Rfl: 5  •  tadalafil (CIALIS) 5 MG tablet, Take 1 tablet by mouth Daily As Needed for Erectile Dysfunction. (Patient taking differently: Take 5 mg by mouth Daily As  "Needed for Erectile Dysfunction. HOLD PRIOR TO SURGERY), Disp: 30 tablet, Rfl: 2    ALLERGIES:   No Known Allergies    SOCIAL HISTORY:       Social History     Socioeconomic History   • Marital status:      Spouse name: Chelsie   • Years of education: 12   Tobacco Use   • Smoking status: Some Days     Packs/day: 1.00     Years: 20.00     Pack years: 20.00     Types: Cigars, Cigarettes   • Smokeless tobacco: Never   • Tobacco comments:     OCCASIONALLY   Vaping Use   • Vaping Use: Never used   Substance and Sexual Activity   • Alcohol use: Yes     Alcohol/week: 6.0 standard drinks     Types: 6 Cans of beer per week     Comment: 6 drinks weekly   • Drug use: No         FAMILY HISTORY:  Family History   Problem Relation Age of Onset   • Clotting disorder Other    • Colon cancer Paternal Grandmother    • Colon cancer Paternal Grandfather    • Clotting disorder Father    • Malig Hyperthermia Neg Hx          Vitals:    01/24/23 1008   BP: 144/86   Pulse: 76   Resp: 18   Temp: 97.1 °F (36.2 °C)   TempSrc: Temporal   SpO2: 96%   Weight: 93.3 kg (205 lb 11.2 oz)   Height: 177.8 cm (70\")   PainSc: 0-No pain     Current Status 1/26/2023   ECOG score 0     Physical Exam  Vitals reviewed.   Constitutional:       General: He is not in acute distress.     Appearance: Normal appearance. He is well-developed.   HENT:      Head: Normocephalic and atraumatic.   Eyes:      Conjunctiva/sclera: Conjunctivae normal.   Cardiovascular:      Rate and Rhythm: Normal rate and regular rhythm.      Heart sounds: Normal heart sounds. No murmur heard.  Pulmonary:      Effort: Pulmonary effort is normal. No respiratory distress.      Breath sounds: Normal breath sounds. No wheezing.   Abdominal:      General: Bowel sounds are normal. There is no distension.      Palpations: Abdomen is soft.      Tenderness: There is no abdominal tenderness.   Musculoskeletal:         General: No swelling.      Right lower leg: No edema.      Left lower leg: No " edema.   Lymphadenopathy:      Cervical: No cervical adenopathy.   Skin:     General: Skin is warm and dry.      Findings: No rash.   Neurological:      Mental Status: He is alert and oriented to person, place, and time.      Cranial Nerves: No cranial nerve deficit.   Psychiatric:         Thought Content: Thought content normal.         Judgment: Judgment normal.       RECENT LABS:    Results from last 7 days   Lab Units 01/24/23  0938   WBC 10*3/mm3 3.95   HEMOGLOBIN g/dL 11.0*   HEMATOCRIT % 33.6*   PLATELETS 10*3/mm3 99*     Lab Results   Component Value Date    GLUCOSE 179 (H) 01/24/2023    BUN 27 (H) 01/24/2023    CREATININE 1.15 01/24/2023    EGFRIFNONA 70 02/23/2022    EGFRIFAFRI 81 02/23/2022    BCR 23.5 01/24/2023    K 4.2 01/24/2023    CO2 25.4 01/24/2023    CALCIUM 9.6 01/24/2023    PROTENTOTREF 7.2 08/24/2022    ALBUMIN 4.2 01/24/2023    LABIL2 1.9 08/24/2022    AST 27 01/24/2023    ALT 23 01/24/2023     Lab Results   Component Value Date    PYEQEHVM78 260 08/24/2022     Lab Results   Component Value Date    FOLATE 15.50 09/22/2022     Lab Results   Component Value Date    TIBC 593 09/08/2022    FERRITIN 10.70 (L) 09/08/2022   Iron saturation 4% on 9/18/2022.    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  MSI stable by DNA sequencing, and also MMR proficient by IHC staining.  Tumor mutation burden is low 8 mut/Mb.  Patient was positive for PTEN 1+, 100% by IHC, PD-L1 was negative, only 1% by IHC.  Patient also positive for APC mutation exon 16p.N1931oj, positive for AMACR1 exon2 p.R358*.  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        IMAGING STUDY:      Assessment & Plan     ASSESSMENT:    * Colon cancer post right hemicolectomy 10/11/2022.  Stage IV ([id number]).  · Had positive stool occult blood test on 09/09/2022.  Colonoscopic examination on 09/20/2022 by Dr. Anil Garces reported cecum colon mass.  Biopsy confirmed moderately differentiated adenocarcinoma.  MSI stable.  · Mildly elevated CEA level 10.7 ng/mL on 9/22/2022.  · CT scan for chest abdomen pelvis 9/26/2022 reported cecal mass.  There was also suspicious 9 mm hepatic lesion.  · Abdomen MRI examination with and without contrast on 10/7/2022 confirmed 8 mm lesion hypervascularity in the segment 5 of the liver.  · Patient had right hemicolectomy 10/11/2022.  Unable to biopsy at this time he liver lesion.  Pathology evaluation reported [id number] disease, this will be at least a stage IIIb colon cancer.  · On 11/2/2022 I discussed with the patient, his wife and his daughter, recommending further imaging study with PET scan for assessment.  If patient is no hypermetabolic liver lesion, will treat with adjuvant FOLFOX 6 regimen every 2 weeks for 12 cycles.  However, if he has hypermetabolic lesion in the liver, we will treated him as metastatic disease, with FOLFOX plus Avastin.  Since patient has neuropathy already being his legs and feet, if he is indeed metastatic disease, we may switch him to irinotecan instead of oxaliplatin because of the neuropathy.  If indicated patient has metastatic disease, we would also entertain metastectomy after finishing 12 cycles of chemotherapy.  · We should also testing for [facility] NGS genetic study to see if he would be a candidate for other treatment.  · Discussed with the patient and family members about potential side effects including bone marrow suppression, with anemia thrombocytopenia and leukocytopenia increased risk for infection, and also peripheral neuropathy, etc. patient is agreeable to proceed ahead with treatment.  · The patient has PET scan examination on 11/14/2022 which reported a small 1.3 cm focus with elevated SUV 6.03, highly suspicious for metastatic disease. The patient now has stage IV. The patient had [facility] NGS  study which reported K-aashish mutation, tumor mutation burden < 10, MSI was stable and anti-PD-L1 negative.  Not a candidate for anti-EGFR monoclonal antibody such as Vectibix, or chekpoint inhibitor immunotherapy.  We discussed the adding anti-VEGF monoclonal antibody, bevacizumab/Avastin starting in 2 weeks so that it would be after 6 weeks of primary surgery for the colon cancer resection.  We discussed this is now stage IV disease, however because only having 1 solitary metastatic lesion in the liver, it is potentially curable so we will be as aggressive as possible for chemotherapy.  If he has good response, in the future he will need metastectomy of the liver lesion.  · Today on 11/29/2022 patient is presented for cycle #2 of chemotherapy. Patient tolerated therapy well except mild oral mucositis. However, laboratory studies showed significant decrease in platelets at only 108,000, as well as also decreasing WBC and hemoglobin. Discussed with the patient today if we do not carry out any dose reduction, he will likely become more thrombocytopenic next time in 2 weeks and we will not be able to do cycle #3 chemotherapy on time. I discussed with the patient for dose reduction and to avoid potential delay of chemotherapy and he is agreeable. We will have 25% dose reduction for 5-FU leucovorin and oxaliplatin.  · On 11/29/2022 he was started the first dose of Avastin/bevacizumab in conjunction with chemotherapy cycle #2.  · On 12/13/2022, the patient presented for reevaluation prior to chemotherapy cycle #3. He has stable platelets of 108,000 and slightly improved WBC of 5100 and hemoglobin 12.8 g/dL. He will continue cycle 3 with the same 25% dose reduction.  · 1/10/2023 due for cycle 5 FOLFOX bevacizumab, overall is tolerating fairly well.  Hemoglobin 11.7, platelet count stable at 108,000, WBC 4.02, ANC 2.27.  · Patient has had five cycles of chemotherapy and a CT scan examination obtained on 01/20/2023. The CT  scan reported favorable response as the lesion is less obvious.   · Today I discussed with the patient and his daughter on 1/24/2023, and recommended to continue chemotherapy for a total of 12 cycles. Then obtain PET scan examination. Due to worsening thrombocytopenia, and also peripheral neuropathy, I will decrease oxaliplatin by another 25% so would it be 50% of the original dose. I will keep the same dose of 5-FU which is 75% of the original dose.      2. Iron deficiency anemia.   · The patient has iron deficiency due to GI bleeding from his colon cancer. His laboratory study on 09/08/2022 reported ferritin 10.7 and iron saturation 4% with microcytic hypochromic anemia, hemoglobin 7.8, MCV 79.9 and MCHC 29.2.   · Patient reports good tolerance to oral iron treatment and already has improved energy level. Laboratory study on 9/22/2022 did report slightly improved hemoglobin at 8.5 but normalized MCV 85.0. I think he is responding to oral iron treatment both physically and laboratory wise.  We advised patient to increase oral iron to twice a day.  · On 11/2/2022 patient reports good tolerance to oral iron twice a day.  Labs today showed further improved hemoglobin 10.9.  He continues to maintain normal platelets and WBC.  · Improved hemoglobin 12.1 on 11/15/2022.  Cycle #1 chemotherapy will be started.  · On 11/29/2022 hemoglobin 11.7.  · On 12/13/2022, his hemoglobin is 12.8 g/dL.  · 1/10/2023 hemoglobin 11.7.  · The patient has trending down hemoglobin 11.0 on 1/24/2023, however, he is asymptomatic.       3.  Low normal vitamin B12 level.    · Patient had a marginal normal vitamin B12 level at 260 pg/mL on 08/24/2022.   · Folate level on 9/22/2022 was normal at 15.5 ng/mL.    · We asked patient to take oral vitamin B12 supplement at 1000 mcg daily.        4.  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  · Prior to starting chemotherapy patient had low normal platelets 156,000 on 11/15/2022.  Started on cycle #1  chemotherapy FOLFOX 6.  · On 11/29/2022 patient had platelets of 108,000.  Patient reports no bleeding or bruising.  Discussed with the patient, because of foreseeable more severe thrombocytopenia, we recommended 25% dose reduction starting from cycle #2 chemotherapy.  · On 12/13/2022, the patient has same decreased number of platelets 108,000. He will continue chemotherapy with same dose reduction of 25%.   · 1/10/2023 platelet count stable at 108,000.  · On 01/24/2023, patient has worsening thrombocytopenia with platelets of 99,000 mcL. Discussed with the patient, we will cut oxaliplatin by another 25% to be at 50% of original dose. We will leave the 5-FU dose same as the previous at 75% of original dose. Avastin will be the same dose.    5. Peripheral neuropathy  * On 12/13/2022, he reports cold sensitivity in his mouth when he drinks cold water. The cold sensitivity usually dissipates after 3 to 4 days.  · Patient reports he is more significant cold sensitivities lasting for more than a week. He does have moderate tingling, numbness involving the fingers, but not much as the toes.    *Weight losses.    · On 12/13/2022, the patient reports he has lost weight in the past couple of months. He reports a poor appetite for 3 days after chemotherapy and a poor taste in his mouth. His appetite has since improved. He only had mild nausea, but no vomiting. He was encouraged to use Ensure or Boost to improve nutrition supplement. He already started using protein powder.    *[place] extraction: Tooth patient states that he needs to have his wisdom teeth cut out, currently not scheduled.  Reviewed with Dr. Rosario.  Patient will need to have this done on his off week of treatment.  We will likely hold bevacizumab 2 weeks prior to this procedure in 4 weeks following.  Patient states that this will likely not happen until March, but is going to contact his oral surgeon, Dr. Charlie Zazueta to try to go ahead and get an  "appointment.    * Cycle #6, FOLFOX 6+ Avastin. Dose adjustment with another 25% dose reduction for oxaliplatin. 5-FU would be at the same previous 75% of dose. Full dose Avastin.    PLAN:  1. Proceed with cycle #6, FOLFOX 6+ Avastin.  However we will have dose reduction today with another 25% dose reduction for oxaliplatin at at 50% of the original does. 5-FU would be at the same previous 75% of dose. Full dose Avastin.   2. Patient will return in 2 days for continued chemotherapy.    3. Patient will see APRN in 2 weeks with laboratory studies and cycle #7 FOLFOX 6+ Avastin.    4. Check iron studies in 2 weeks for reassessment of iron deficiency.   5. Patient will return in 4 weeks for follow-up with me prior to with laboratory study prior to cycle #8 chemotherapy.  6. Continue antiemetics if needed.  7. Continue oral iron twice daily.  8. Continue oral vitamin B12 1000 mcg daily.  9. Continue to follow with PCP for management of hypertension and diabetes.  10. Call/ return sooner should he develop any new concerns or problems.      I discussed with the patient about laboratory results and further management plan.  Patient voiced understanding and agreeable.    I also reviewed images of CT scan obtained on 1/20/2023 with the patient.     Patient is on a high risk medication requiring close monitoring for toxicity.         Maya Rosario MD PhD        CC:  JULIA Santiago MD Richard Pokorny, MD    Oral surgeon:  [facility]   Phone [phone]    Transcribed from ambient dictation for Maya Rosario MD PhD by Danisha Porter.  01/24/23   12:41 EST    JAYMIE Provider Statement:[id number]::\"Patient or patient representative verbalized consent to the visit recording.\",\"I have personally performed the services described in this document as transcribed by the above individual, and it is both accurate and complete.\"      "

## 2023-01-26 ENCOUNTER — APPOINTMENT (OUTPATIENT)
Dept: ONCOLOGY | Facility: HOSPITAL | Age: 73
End: 2023-01-26
Payer: MEDICARE

## 2023-01-26 ENCOUNTER — INFUSION (OUTPATIENT)
Dept: ONCOLOGY | Facility: HOSPITAL | Age: 73
End: 2023-01-26
Payer: MEDICARE

## 2023-01-26 VITALS
SYSTOLIC BLOOD PRESSURE: 166 MMHG | BODY MASS INDEX: 30.28 KG/M2 | DIASTOLIC BLOOD PRESSURE: 84 MMHG | OXYGEN SATURATION: 98 % | TEMPERATURE: 97.2 F | WEIGHT: 211 LBS | RESPIRATION RATE: 16 BRPM | HEART RATE: 74 BPM

## 2023-01-26 DIAGNOSIS — C18.9 MALIGNANT NEOPLASM OF COLON, UNSPECIFIED PART OF COLON: ICD-10-CM

## 2023-01-26 DIAGNOSIS — Z79.899 ENCOUNTER FOR LONG-TERM (CURRENT) USE OF MEDICATIONS: Primary | ICD-10-CM

## 2023-01-26 PROCEDURE — 96360 HYDRATION IV INFUSION INIT: CPT

## 2023-01-26 RX ORDER — SODIUM CHLORIDE 9 MG/ML
1000 INJECTION, SOLUTION INTRAVENOUS CONTINUOUS
Status: DISCONTINUED | OUTPATIENT
Start: 2023-01-26 | End: 2023-01-26 | Stop reason: HOSPADM

## 2023-01-26 RX ADMIN — SODIUM CHLORIDE 1000 ML: 9 INJECTION, SOLUTION INTRAVENOUS at 12:26

## 2023-02-06 NOTE — PROGRESS NOTES
.     REASON FOR FOLLOWUP:     *Cecum colon cancer, status post right hemicolectomy performed on 10/11/2022, [id number].   · CT scan examination on 9/26/2022 reported suspicious liver lesion 9 mm, otherwise no evidence for metastatic disease.    · The patient underwent an abdominal MRI examination on 10/06/2022, which reported suspicious liver lesion 8mm.  · Patient had sigmoidectomy on 10/11/2022.  Portacatheter placement on 10/21/2022.   · PET scan examination on 11/14/2022 reported solitary hypermetabolic liver lesion.   · Cycle #1 palliative chemotherapy FOLFOX 6 was started on 11/15/2022.   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  Not a candidate for anti-EGFR monoclonal antibody treatment.   · From cycle #2, bevacizumab/Avastin will be added to palliative chemotherapy.  Due to thrombocytopenia, all chemotherapy agents were 25% reduced from cycle #2.  *Iron deficiency anemia.  · On oral iron treatment.                                 HISTORY OF PRESENT ILLNESS:  The patient is a 72 y.o. 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  He returns today 2/7/2023 for lab review and evaluation prior to cycle 7 FOLFOX Avastin.  Overall, he continues to tolerate treatment fairly well.  After his disconnect last time he received fluids that Thursday, and feels like they helped.  He would like to repeat that again this time.    Patient is currently scheduled to have his wisdom teeth extracted on March 13.      Past Medical History:   Diagnosis Date   • Abdominal hernia    • Anemia    • Arrhythmia     PT STATED DURING LAST COLONOSCOPY 2 WEEKS AGO   • Arthritis    • Chemotherapy-induced thrombocytopenia 12/4/2022   • Colon cancer (HCC) 09/22/2022   • Colon polyp September 20 2022   • Colon polyps    • Depression    • DVT (deep venous thrombosis) (HCC)     IN LEFT LEG   •  Full dentures    • GI (gastrointestinal bleed) September 8 2022   • Hip pain     RIGHT   • Hyperlipidemia    • Hypertension    • Iron deficiency anemia 09/22/2022   • Numbness and tingling     RIGHT FOOT   • PONV (postoperative nausea and vomiting)    • Right leg pain    • Type 2 diabetes mellitus without complication, without long-term current use of insulin ([facility]) 10/08/2019     Past Surgical History:   Procedure Laterality Date   • CERVICAL DISCECTOMY LAMINECTOMY DECOMPRESSION POSTERIOR FUSION WITH INSTRUMENTATION     • COLON RESECTION N/A 10/11/2022    Procedure: LAPAROSCOPIC RIGHT COLON RESECTION;  Surgeon: Ga Samaniego MD;  Location: [facility] OR;  Service: General;  Laterality: N/A;   • COLONOSCOPY N/A 2/28/2018    Procedure: COLONOSCOPY to TI and cecum with polypectomy;  Surgeon: Anil Garces MD;  Location: [facility] ENDOSCOPY;  Service:    • JOINT REPLACEMENT      LEFT HIP   • KNEE ARTHROSCOPY Left    • LUMBAR DISCECTOMY FUSION INSTRUMENTATION N/A 11/18/2020    Procedure: Lumbar 4 5 laminectomy with a posterior lateral fusion and instrumentation and interbody fusion;  Surgeon: Jeffrey Garcia MD;  Location: [facility] OR;  Service: Neurosurgery;  Laterality: N/A;   • TOTAL HIP ARTHROPLASTY Right 6/3/2021    Procedure: TOTAL HIP ARTHROPLASTY POSTERIOR;  Surgeon: Shlomo Campos MD;  Location: [facility] OR;  Service: Orthopedics;  Laterality: Right;   • VASECTOMY     • VENOUS ACCESS DEVICE (PORT) INSERTION N/A 10/21/2022    Procedure: INSERTION VENOUS ACCESS DEVICE;  Surgeon: Ga Samaniego MD;  Location: [facility] OSC;  Service: General;  Laterality: N/A;     HEMATOLOGY/MEDICAL ONCOLOGY HISTORY: The patient is a 72 y.o. year old male with hypertension, hyperlipidemia, type 2 diabetes, iron deficiency anemia, history of DVT, who presented on 9/22/2022 for initial evaluation because of iron deficiency anemia, referred by his primary care provider, JULIA Santiago. Patient is accompanied  by his wife who helped with the history. They reported the patient actually was being diagnosed of colon cancer 2 days ago. His GI specialist, Dr. Anil Garces, performed colonoscopic examination and saw a distal colon mass. I do not have the report for the procedure nor do I have the pathology report but nevertheless there was a mass in the distal colon likely in the sigmoid colon. I will obtain a copy of those reports when available.      Patient reports he had no apparent melena or hematochezia before starting oral iron treatment. He did have however significant fatigue. He reports exertional dyspnea and no syncope. Patient had laboratory study recently on 09/08/2022 which reported severe iron deficiency anemia with hemoglobin 7.8, MCV 79.9, MCHC 29.2. Normal platelets 217,000 and WBC 7230 without differentiation. Iron study reported ferritin 10.7 ng/mL, free iron 23, TIBC 593 and iron saturation 4%. Chemistry lab reported creatinine 1.11, potassium 5.4, otherwise normal sodium chloride and calcium, glucose 137. Prior to that the patient had normal liver function on 08/24/2022. He had a normal TSH 2.5 and slightly elevated hemoglobin A1c of 6.8% on that day.      The patient reports he has been on oral iron for almost 2 weeks, once a day with good tolerance and he now noticed improved energy level and less exertional dyspnea. He does report stool becoming dark-colored after he started oral iron.  He reports no syncope.     Laboratory study on 09/22/2022 reported improved hemoglobin 8.5 and normalized MCV 85.0, stable MCHC 29.4. Maintains normal platelets and WBC.       I saw him originally on 9/22/2022 for initial evaluation for his sigmoid colon cancer and iron deficiency anemia.  Since that time patient had multiple imaging studies including CT for chest abdomen pelvis, subsequently with MRI for the abdomen for staging purposes.  He was also seen by Dr. Samaniego who performed right hemicolectomy on  "10/11/2022.    Dr. Samaniego called me on the day of surgery, he also suspected this liver lesion is metastatic, however unable to reach that during the surgery.    Patient notes since surgery on 10/11/2022 of the bowel resection he is recovering \"okay\". The patient does report some abdominal pain when adrianna his stomach, specifically when getting out of bed. The patient denies any issues with his appetite, and is having normal urination and bowel movements. The patient denies constipation.  Patient reports no fever sweating or chills.    The patient has type II diabetes, which he manages with metformin. 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    Patient reports he is able to tolerate oral iron twice a day with no specific complaints.  No nausea vomiting or constipation.      Laboratory study today on 11/2/2022 showed improved anemia with Hb 10.9, normalized MCV 92.5.  His normal platelets 159,000 WBC 6870 including ANC 4580.      PET scan examination on 11/14/2022 reported distal small 1.3 cm subtle low attenuation lesion in the medial hepatic segment with SUV 6.3.  There is no hypermetabolic enthesopathy in the abdomen or pelvis.  No suspicious hypermetabolic activity in the chest or neck.    Laboratory study on 11/15/2022 showed improved hemoglobin 12.1, normal platelets 156,000 and WBC 5720 including ANC 3750 and lymphocytes 1070.  Chemistry study reported glucose 156 otherwise unremarkable CMP.    Patient reports he developed a mild oral mucositis lasted about 4 days after the chemotherapy and now has resolved. He believes he has lost some weight, but states he does have an appetite and eats everyday.  He denies nausea vomiting.  He denies having diarrhea or constipation. He denies having any fever, chills, or sweating.  Denies peripheral neuropathy.     Laboratory results from on 11/29/2022 are; white cell count is 3950. neutrophils are 2340. " Hemoglobin is 11.7 g/dL. Platelets are 108,000. Chemistry lab was unremarkable except glucose 255.    MEDICATIONS:    Current Outpatient Medications:   •  acetaminophen (TYLENOL) 500 MG tablet, Take 500 mg by mouth Every 6 (Six) Hours As Needed for Mild Pain ., Disp: , Rfl:   •  acetaminophen-codeine (TYLENOL #3) 300-30 MG per tablet, , Disp: , Rfl:   •  amoxicillin (AMOXIL) 500 MG capsule, , Disp: , Rfl:   •  aspirin 81 MG EC tablet, Take 81 mg by mouth Daily. INSTRUCTED PT TO FOLLOW MD INSTRUCTIONS REGARDING HOLDING FOR SURGERY/PT STATED TO HOLD 5 DAYS PRIOR TO SURGERY, Disp: , Rfl:   •  atorvastatin (LIPITOR) 40 MG tablet, TAKE ONE TABLET BY MOUTH DAILY (Patient taking differently: Take 40 mg by mouth Every Night.), Disp: 90 tablet, Rfl: 3  •  ferrous sulfate (FerrouSul) 325 (65 FE) MG tablet, Take 1 tablet by mouth 2 (Two) Times a Day., Disp: 180 tablet, Rfl: 1  •  ibuprofen (ADVIL,MOTRIN) 600 MG tablet, , Disp: , Rfl:   •  lisinopril-hydrochlorothiazide (PRINZIDE,ZESTORETIC) 20-12.5 MG per tablet, TAKE ONE TABLET BY MOUTH ONCE NIGHTLY (Patient taking differently: Take 1 tablet by mouth Every Night.), Disp: 90 tablet, Rfl: 3  •  metFORMIN (GLUCOPHAGE) 500 MG tablet, Take 1 tablet by mouth 2 (Two) Times a Day With Meals., Disp: 180 tablet, Rfl: 3  •  ondansetron (ZOFRAN) 8 MG tablet, Take 1 tablet by mouth 3 (Three) Times a Day As Needed for Nausea or Vomiting., Disp: 60 tablet, Rfl: 5  •  tadalafil (CIALIS) 5 MG tablet, Take 1 tablet by mouth Daily As Needed for Erectile Dysfunction. (Patient taking differently: Take 5 mg by mouth Daily As Needed for Erectile Dysfunction. HOLD PRIOR TO SURGERY), Disp: 30 tablet, Rfl: 2  No current facility-administered medications for this visit.    Facility-Administered Medications Ordered in Other Visits:   •  dextrose (D5W) 5 % infusion 250 mL, 250 mL, Intravenous, Once, Niya Olmos APRN  •  fluorouracil (ADRUCIL) 3,850 mg in sodium chloride 0.9 % 240 mL chemo  "infusion - FOR HOME USE, 1,800 mg/m2 (Treatment Plan Recorded), Intravenous, Once, Niya Olmos APRN  •  fluorouracil (ADRUCIL) chemo injection 640 mg, 300 mg/m2 (Treatment Plan Recorded), Intravenous, Once, Niya Olmos APRN  •  leucovorin 640 mg in dextrose (D5W) 5 % 282 mL IVPB, 300 mg/m2 (Treatment Plan Recorded), Intravenous, Once, Niya Olmos APRN  •  OXALIplatin (ELOXATIN) 90 mg in dextrose (D5W) 5 % 268 mL chemo IVPB, 42.5 mg/m2 (Treatment Plan Recorded), Intravenous, Once, Niya Olmos, APRN    ALLERGIES:   No Known Allergies    SOCIAL HISTORY:       Social History     Socioeconomic History   • Marital status:      Spouse name: Chelsie   • Years of education: 12   Tobacco Use   • Smoking status: Some Days     Packs/day: 1.00     Years: 20.00     Pack years: 20.00     Types: Cigars, Cigarettes   • Smokeless tobacco: Never   • Tobacco comments:     OCCASIONALLY   Vaping Use   • Vaping Use: Never used   Substance and Sexual Activity   • Alcohol use: Yes     Alcohol/week: 6.0 standard drinks     Types: 6 Cans of beer per week     Comment: 6 drinks weekly   • Drug use: No         FAMILY HISTORY:  Family History   Problem Relation Age of Onset   • Clotting disorder Other    • Colon cancer Paternal Grandmother    • Colon cancer Paternal Grandfather    • Clotting disorder Father    • Malig Hyperthermia Neg Hx          Vitals:    02/07/23 0846   BP: 136/81   Pulse: 73   Resp: 18   Temp: 97.7 °F (36.5 °C)   TempSrc: Temporal   SpO2: 97%   Weight: 94.5 kg (208 lb 6.4 oz)   Height: 177.8 cm (70\")   PainSc: 0-No pain     Current Status 2/7/2023   ECOG score 0     Physical Exam  Vitals and nursing note reviewed.   Constitutional:       Appearance: Normal appearance. He is well-developed.   HENT:      Head: Normocephalic and atraumatic.      Nose: Nose normal.   Eyes:      Pupils: Pupils are equal, round, and reactive to light.   Cardiovascular:      Rate and Rhythm: Normal rate and " regular rhythm.      Heart sounds: Normal heart sounds.   Pulmonary:      Effort: Pulmonary effort is normal. No respiratory distress.      Breath sounds: Normal breath sounds. No wheezing, rhonchi or rales.   Abdominal:      General: Bowel sounds are normal. There is no distension.      Palpations: Abdomen is soft.      Tenderness: There is no abdominal tenderness.   Musculoskeletal:         General: Normal range of motion.      Cervical back: Normal range of motion and neck supple.   Skin:     General: Skin is warm and dry.   Neurological:      Mental Status: He is alert and oriented to person, place, and time.   Psychiatric:         Behavior: Behavior normal.       RECENT LABS:    Results from last 7 days   Lab Units 02/07/23  0835   WBC 10*3/mm3 4.24   HEMOGLOBIN g/dL 10.6*   HEMATOCRIT % 32.6*   PLATELETS 10*3/mm3 100*     Lab Results   Component Value Date    GLUCOSE 174 (H) 02/07/2023    BUN 17 02/07/2023    CREATININE 0.99 02/07/2023    EGFRIFNONA 70 02/23/2022    EGFRIFAFRI 81 02/23/2022    BCR 17.2 02/07/2023    K 4.4 02/07/2023    CO2 25.2 02/07/2023    CALCIUM 9.8 02/07/2023    PROTENTOTREF 7.2 08/24/2022    ALBUMIN 4.1 02/07/2023    LABIL2 1.9 08/24/2022    AST 23 02/07/2023    ALT 20 02/07/2023     Lab Results   Component Value Date    DOAJSFGK05 260 08/24/2022     Lab Results   Component Value Date    FOLATE 15.50 09/22/2022     Lab Results   Component Value Date    IRON 108 02/07/2023    TIBC 386 02/07/2023    FERRITIN 229.80 02/07/2023   Iron saturation 4% on 9/18/2022.    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  MSI stable by DNA sequencing, and also MMR proficient by IHC staining.  Tumor mutation burden is low 8 mut/Mb.  Patient was positive for PTEN 1+, 100% by IHC, PD-L1 was negative, only 1% by IHC.  Patient also  positive for APC mutation exon 16p.V7281fm, positive for AMACR1 exon2 p.R358*.  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        IMAGING STUDY:      Assessment & Plan     ASSESSMENT:    * Colon cancer post right hemicolectomy 10/11/2022.  Stage IV ([id number]).  · Had positive stool occult blood test on 09/09/2022. Colonoscopic examination on 09/20/2022 by Dr. Anil Garces reported cecum colon mass.  Biopsy confirmed moderately differentiated adenocarcinoma.  MSI stable.  · Mildly elevated CEA level 10.7 ng/mL on 9/22/2022.  · CT scan for chest abdomen pelvis 9/26/2022 reported cecal mass.  There was also suspicious 9 mm hepatic lesion.  · Abdomen MRI examination with and without contrast on 10/7/2022 confirmed 8 mm lesion hypervascularity in the segment 5 of the liver.  · Patient had right hemicolectomy 10/11/2022.  Unable to biopsy at this time he liver lesion.  Pathology evaluation reported [id number] disease, this will be at least a stage IIIb colon cancer.  · On 11/2/2022 I discussed with the patient, his wife and his daughter, recommending further imaging study with PET scan for assessment.  If patient is no hypermetabolic liver lesion, will treat with adjuvant FOLFOX 6 regimen every 2 weeks for 12 cycles.  However, if he has hypermetabolic lesion in the liver, we will treated him as metastatic disease, with FOLFOX plus Avastin.  Since patient has neuropathy already being his legs and feet, if he is indeed metastatic disease, we may switch him to irinotecan instead of oxaliplatin because of the neuropathy.  If indicated patient has metastatic disease, we would also entertain metastectomy after finishing 12 cycles of chemotherapy.  · We should also testing for [facility] NGS genetic study to see if he would be a candidate for other treatment.  · Discussed with the patient and family members about potential side effects including bone marrow suppression, with anemia thrombocytopenia and leukocytopenia increased risk  for infection, and also peripheral neuropathy, etc. patient is agreeable to proceed ahead with treatment.  · The patient has PET scan examination on 11/14/2022 which reported a small 1.3 cm focus with elevated SUV 6.03, highly suspicious for metastatic disease. The patient now has stage IV. The patient had [facility] NGS study which reported K-aashish mutation, tumor mutation burden < 10, MSI was stable and anti-PD-L1 negative.  Not a candidate for anti-EGFR monoclonal antibody such as Vectibix, or chekpoint inhibitor immunotherapy.  We discussed the adding anti-VEGF monoclonal antibody, bevacizumab/Avastin starting in 2 weeks so that it would be after 6 weeks of primary surgery for the colon cancer resection.  We discussed this is now stage IV disease, however because only having 1 solitary metastatic lesion in the liver, it is potentially curable so we will be as aggressive as possible for chemotherapy.  If he has good response, in the future he will need metastectomy of the liver lesion.  · Today on 11/29/2022 patient is presented for cycle #2 of chemotherapy. Patient tolerated therapy well except mild oral mucositis. However, laboratory studies showed significant decrease in platelets at only 108,000, as well as also decreasing WBC and hemoglobin. Discussed with the patient today if we do not carry out any dose reduction, he will likely become more thrombocytopenic next time in 2 weeks and we will not be able to do cycle #3 chemotherapy on time. I discussed with the patient for dose reduction and to avoid potential delay of chemotherapy and he is agreeable. We will have 25% dose reduction for 5-FU leucovorin and oxaliplatin.  · On 11/29/2022 he was started the first dose of Avastin/bevacizumab in conjunction with chemotherapy cycle #2.  · On 12/13/2022, the patient presented for reevaluation prior to chemotherapy cycle #3. He has stable platelets of 108,000 and slightly improved WBC of 5100 and hemoglobin 12.8 g/dL. He  will continue cycle 3 with the same 25% dose reduction.  · 1/10/2023 due for cycle 5 FOLFOX bevacizumab, overall is tolerating fairly well.  Hemoglobin 11.7, platelet count stable at 108,000, WBC 4.02, ANC 2.27.  · Patient has had five cycles of chemotherapy and a CT scan examination obtained on 01/20/2023. The CT scan reported favorable response as the lesion is less obvious.   · 1/24/2023 recommended to continue chemotherapy for a total of 12 cycles. Then obtain PET scan examination. Due to worsening thrombocytopenia, and also peripheral neuropathy, I will decrease oxaliplatin by another 25% so would it be 50% of the original dose. I will keep the same dose of 5-FU which is 75% of the original dose.   · 2/7/2023 due for cycle 7.  Platelet count is holding steady at 100,000.  We will proceed with treatment today with same doses as given on cycle 6.     2. Iron deficiency anemia.   · The patient has iron deficiency due to GI bleeding from his colon cancer. His laboratory study on 09/08/2022 reported ferritin 10.7 and iron saturation 4% with microcytic hypochromic anemia, hemoglobin 7.8, MCV 79.9 and MCHC 29.2.   · Patient reports good tolerance to oral iron treatment and already has improved energy level. Laboratory study on 9/22/2022 did report slightly improved hemoglobin at 8.5 but normalized MCV 85.0. I think he is responding to oral iron treatment both physically and laboratory wise.  We advised patient to increase oral iron to twice a day.  · On 11/2/2022 patient reports good tolerance to oral iron twice a day.  Labs today showed further improved hemoglobin 10.9.  He continues to maintain normal platelets and WBC.  · Improved hemoglobin 12.1 on 11/15/2022.  Cycle #1 chemotherapy will be started.  · On 11/29/2022 hemoglobin 11.7.  · On 12/13/2022, his hemoglobin is 12.8 g/dL.  · 1/10/2023 hemoglobin 11.7.  · The patient has trending down hemoglobin 11.0 on 1/24/2023, however, he is asymptomatic.  · Hemoglobin  2/7/2023, 10.6.       3.  Low normal vitamin B12 level.    · Patient had a marginal normal vitamin B12 level at 260 pg/mL on 08/24/2022.   · Folate level on 9/22/2022 was normal at 15.5 ng/mL.    · We asked patient to take oral vitamin B12 supplement at 1000 mcg daily.        4.  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  · Prior to starting chemotherapy patient had low normal platelets 156,000 on 11/15/2022.  Started on cycle #1 chemotherapy FOLFOX 6.  · On 11/29/2022 patient had platelets of 108,000.  Patient reports no bleeding or bruising.  Discussed with the patient, because of foreseeable more severe thrombocytopenia, we recommended 25% dose reduction starting from cycle #2 chemotherapy.  · On 12/13/2022, the patient has same decreased number of platelets 108,000. He will continue chemotherapy with same dose reduction of 25%.   · 1/10/2023 platelet count stable at 108,000.  · On 01/24/2023, patient has worsening thrombocytopenia with platelets of 99,000 mcL. Discussed with the patient, we will cut oxaliplatin by another 25% to be at 50% of original dose. We will leave the 5-FU dose same as the previous at 75% of original dose. Avastin will be the same dose.  · 2/7/2023 platelet count 100,000.    5. Peripheral neuropathy  * On 12/13/2022, he reports cold sensitivity in his mouth when he drinks cold water. The cold sensitivity usually dissipates after 3 to 4 days.  · Patient reports he is more significant cold sensitivities lasting for more than a week. He does have moderate tingling, numbness involving the fingers, but not much as the toes.  · 2/7/2023 he feels like the neuropathy/cold sensitivity is lingering a little bit longer with each cycle.    *Weight losses.    · On 12/13/2022, the patient reports he has lost weight in the past couple of months. He reports a poor appetite for 3 days after chemotherapy and a poor taste in his mouth. His appetite has since improved. He only had mild nausea, but no  vomiting. He was encouraged to use Ensure or Boost to improve nutrition supplement. He already started using protein powder.    *[place] extraction: Tooth patient states that he needs to have his wisdom teeth cut out, currently not scheduled.  Reviewed with Dr. Rosario.  Patient will need to have this done on his off week of treatment.  We will likely hold bevacizumab 2 weeks prior to this procedure in 4 weeks following.  Patient states that this will likely not happen until March, but is going to contact his oral surgeon, Dr. Charlie Zazueta to try to go ahead and get an appointment.  · Cruger tooth extraction currently scheduled for March 13    * Cycle #6, FOLFOX 6+ Avastin. Dose adjustment with another 25% dose reduction for oxaliplatin. 5-FU would be at the same previous 75% of dose. Full dose Avastin.    PLAN:  1. Proceed with cycle 7 FOLFOX plus Avastin today.  We will continue with a total of 50% dose reduction of oxaliplatin, 5-FU will be at the same as previous 75% dose, full dose Avastin.  2. Iron studies pending today.  3. Continue oral iron twice daily.  4. Continue antiemetics as needed.  5. Continue oral B12 1000 mcg daily.  6. Return in 2 weeks for follow-up with Dr. Rosario with repeat labs for reevaluation due for continued FOLFOX 6+ Avastin.  7. Patient is scheduled for wisdom tooth extraction March 13.  We will need to hold Avastin the week prior to this as well as for 4 weeks postextraction.    8. Call/ return sooner should he develop any new concerns or problems.  9. Continue to follow with PCP for management of hypertension and diabetes.  10. Call/ return sooner should he develop any new concerns or problems.      Patient is on a high risk medication requiring close monitoring for toxicity.         JULIA Nathan        CC:  JULIA Santiago MD Richard Pokorny, MD    Oral surgeon:  Dr. Charlie Zazueta -- [address]--   Phone [phone]

## 2023-02-07 ENCOUNTER — OFFICE VISIT (OUTPATIENT)
Dept: ONCOLOGY | Facility: CLINIC | Age: 73
End: 2023-02-07
Payer: MEDICARE

## 2023-02-07 ENCOUNTER — INFUSION (OUTPATIENT)
Dept: ONCOLOGY | Facility: HOSPITAL | Age: 73
End: 2023-02-07
Payer: MEDICARE

## 2023-02-07 VITALS
TEMPERATURE: 97.7 F | DIASTOLIC BLOOD PRESSURE: 81 MMHG | HEIGHT: 70 IN | BODY MASS INDEX: 29.84 KG/M2 | WEIGHT: 208.4 LBS | RESPIRATION RATE: 18 BRPM | HEART RATE: 73 BPM | OXYGEN SATURATION: 97 % | SYSTOLIC BLOOD PRESSURE: 136 MMHG

## 2023-02-07 DIAGNOSIS — D50.0 IRON DEFICIENCY ANEMIA DUE TO CHRONIC BLOOD LOSS: ICD-10-CM

## 2023-02-07 DIAGNOSIS — T45.1X5A CHEMOTHERAPY-INDUCED THROMBOCYTOPENIA: ICD-10-CM

## 2023-02-07 DIAGNOSIS — C18.9 MALIGNANT NEOPLASM OF COLON, UNSPECIFIED PART OF COLON: ICD-10-CM

## 2023-02-07 DIAGNOSIS — Z79.899 ENCOUNTER FOR LONG-TERM (CURRENT) USE OF MEDICATIONS: Primary | ICD-10-CM

## 2023-02-07 DIAGNOSIS — C18.9 MALIGNANT NEOPLASM OF COLON, UNSPECIFIED PART OF COLON: Primary | ICD-10-CM

## 2023-02-07 DIAGNOSIS — Z79.899 ENCOUNTER FOR LONG-TERM (CURRENT) USE OF MEDICATIONS: ICD-10-CM

## 2023-02-07 DIAGNOSIS — D69.59 CHEMOTHERAPY-INDUCED THROMBOCYTOPENIA: ICD-10-CM

## 2023-02-07 DIAGNOSIS — D64.81 ANEMIA ASSOCIATED WITH CHEMOTHERAPY: ICD-10-CM

## 2023-02-07 DIAGNOSIS — T45.1X5A ANEMIA ASSOCIATED WITH CHEMOTHERAPY: ICD-10-CM

## 2023-02-07 LAB
ALBUMIN SERPL-MCNC: 4.1 G/DL (ref 3.5–5.2)
ALBUMIN/GLOB SERPL: 1.6 G/DL (ref 1.1–2.4)
ALP SERPL-CCNC: 79 U/L (ref 38–116)
ALT SERPL W P-5'-P-CCNC: 20 U/L (ref 0–41)
ANION GAP SERPL CALCULATED.3IONS-SCNC: 10.8 MMOL/L (ref 5–15)
AST SERPL-CCNC: 23 U/L (ref 0–40)
BASOPHILS # BLD AUTO: 0.04 10*3/MM3 (ref 0–0.2)
BASOPHILS NFR BLD AUTO: 0.9 % (ref 0–1.5)
BILIRUB SERPL-MCNC: 1 MG/DL (ref 0.2–1.2)
BILIRUB UR QL STRIP: NEGATIVE
BUN SERPL-MCNC: 17 MG/DL (ref 6–20)
BUN/CREAT SERPL: 17.2 (ref 7.3–30)
CALCIUM SPEC-SCNC: 9.8 MG/DL (ref 8.5–10.2)
CHLORIDE SERPL-SCNC: 103 MMOL/L (ref 98–107)
CLARITY UR: CLEAR
CO2 SERPL-SCNC: 25.2 MMOL/L (ref 22–29)
COLOR UR: YELLOW
CREAT SERPL-MCNC: 0.99 MG/DL (ref 0.7–1.3)
DEPRECATED RDW RBC AUTO: 78.1 FL (ref 37–54)
EGFRCR SERPLBLD CKD-EPI 2021: 81.4 ML/MIN/1.73
EOSINOPHIL # BLD AUTO: 0.07 10*3/MM3 (ref 0–0.4)
EOSINOPHIL NFR BLD AUTO: 1.7 % (ref 0.3–6.2)
ERYTHROCYTE [DISTWIDTH] IN BLOOD BY AUTOMATED COUNT: 20.7 % (ref 12.3–15.4)
FERRITIN SERPL-MCNC: 229.8 NG/ML (ref 30–400)
GLOBULIN UR ELPH-MCNC: 2.6 GM/DL (ref 1.8–3.5)
GLUCOSE SERPL-MCNC: 174 MG/DL (ref 74–124)
GLUCOSE UR STRIP-MCNC: ABNORMAL MG/DL
HCT VFR BLD AUTO: 32.6 % (ref 37.5–51)
HGB BLD-MCNC: 10.6 G/DL (ref 13–17.7)
HGB UR QL STRIP.AUTO: NEGATIVE
IMM GRANULOCYTES # BLD AUTO: 0.01 10*3/MM3 (ref 0–0.05)
IMM GRANULOCYTES NFR BLD AUTO: 0.2 % (ref 0–0.5)
IRON 24H UR-MRATE: 108 MCG/DL (ref 59–158)
IRON SATN MFR SERPL: 28 % (ref 14–48)
KETONES UR QL STRIP: NEGATIVE
LEUKOCYTE ESTERASE UR QL STRIP.AUTO: NEGATIVE
LYMPHOCYTES # BLD AUTO: 1.04 10*3/MM3 (ref 0.7–3.1)
LYMPHOCYTES NFR BLD AUTO: 24.5 % (ref 19.6–45.3)
MCH RBC QN AUTO: 33.9 PG (ref 26.6–33)
MCHC RBC AUTO-ENTMCNC: 32.5 G/DL (ref 31.5–35.7)
MCV RBC AUTO: 104.2 FL (ref 79–97)
MONOCYTES # BLD AUTO: 0.54 10*3/MM3 (ref 0.1–0.9)
MONOCYTES NFR BLD AUTO: 12.7 % (ref 5–12)
NEUTROPHILS NFR BLD AUTO: 2.54 10*3/MM3 (ref 1.7–7)
NEUTROPHILS NFR BLD AUTO: 60 % (ref 42.7–76)
NITRITE UR QL STRIP: NEGATIVE
NRBC BLD AUTO-RTO: 0 /100 WBC (ref 0–0.2)
PH UR STRIP.AUTO: 5.5 [PH] (ref 4.5–8)
PLATELET # BLD AUTO: 100 10*3/MM3 (ref 140–450)
PMV BLD AUTO: 10.4 FL (ref 6–12)
POTASSIUM SERPL-SCNC: 4.4 MMOL/L (ref 3.5–4.7)
PROT SERPL-MCNC: 6.7 G/DL (ref 6.3–8)
PROT UR QL STRIP: ABNORMAL
RBC # BLD AUTO: 3.13 10*6/MM3 (ref 4.14–5.8)
SODIUM SERPL-SCNC: 139 MMOL/L (ref 134–145)
SP GR UR STRIP: 1.02 (ref 1–1.03)
TIBC SERPL-MCNC: 386 MCG/DL (ref 249–505)
TRANSFERRIN SERPL-MCNC: 276 MG/DL (ref 200–360)
UROBILINOGEN UR QL STRIP: ABNORMAL
WBC NRBC COR # BLD: 4.24 10*3/MM3 (ref 3.4–10.8)

## 2023-02-07 PROCEDURE — 25010000002 OXALIPLATIN PER 0.5 MG: Performed by: NURSE PRACTITIONER

## 2023-02-07 PROCEDURE — 99214 OFFICE O/P EST MOD 30 MIN: CPT | Performed by: NURSE PRACTITIONER

## 2023-02-07 PROCEDURE — 25010000002 LEUCOVORIN CALCIUM PER 50 MG: Performed by: NURSE PRACTITIONER

## 2023-02-07 PROCEDURE — 84466 ASSAY OF TRANSFERRIN: CPT

## 2023-02-07 PROCEDURE — 96415 CHEMO IV INFUSION ADDL HR: CPT

## 2023-02-07 PROCEDURE — 96417 CHEMO IV INFUS EACH ADDL SEQ: CPT

## 2023-02-07 PROCEDURE — 96368 THER/DIAG CONCURRENT INF: CPT

## 2023-02-07 PROCEDURE — 96367 TX/PROPH/DG ADDL SEQ IV INF: CPT

## 2023-02-07 PROCEDURE — 96416 CHEMO PROLONG INFUSE W/PUMP: CPT

## 2023-02-07 PROCEDURE — 25010000002 FLUOROURACIL PER 500 MG: Performed by: NURSE PRACTITIONER

## 2023-02-07 PROCEDURE — 96411 CHEMO IV PUSH ADDL DRUG: CPT

## 2023-02-07 PROCEDURE — 96375 TX/PRO/DX INJ NEW DRUG ADDON: CPT

## 2023-02-07 PROCEDURE — 85025 COMPLETE CBC W/AUTO DIFF WBC: CPT

## 2023-02-07 PROCEDURE — 81003 URINALYSIS AUTO W/O SCOPE: CPT

## 2023-02-07 PROCEDURE — 25010000002 BEVACIZUMAB-BVZR 100 MG/4ML SOLUTION 4 ML VIAL: Performed by: NURSE PRACTITIONER

## 2023-02-07 PROCEDURE — 25010000002 DEXAMETHASONE SODIUM PHOSPHATE 100 MG/10ML SOLUTION: Performed by: NURSE PRACTITIONER

## 2023-02-07 PROCEDURE — 25010000002 PALONOSETRON PER 25 MCG: Performed by: NURSE PRACTITIONER

## 2023-02-07 PROCEDURE — G0498 CHEMO EXTEND IV INFUS W/PUMP: HCPCS

## 2023-02-07 PROCEDURE — 80053 COMPREHEN METABOLIC PANEL: CPT

## 2023-02-07 PROCEDURE — 83540 ASSAY OF IRON: CPT

## 2023-02-07 PROCEDURE — 25010000002 BEVACIZUMAB-BVZR 400 MG/16ML SOLUTION 16 ML VIAL: Performed by: NURSE PRACTITIONER

## 2023-02-07 PROCEDURE — 25010000002 FOSAPREPITANT PER 1 MG: Performed by: NURSE PRACTITIONER

## 2023-02-07 PROCEDURE — 96413 CHEMO IV INFUSION 1 HR: CPT

## 2023-02-07 PROCEDURE — 82728 ASSAY OF FERRITIN: CPT

## 2023-02-07 RX ORDER — DIPHENHYDRAMINE HYDROCHLORIDE 50 MG/ML
50 INJECTION INTRAMUSCULAR; INTRAVENOUS AS NEEDED
Status: CANCELLED | OUTPATIENT
Start: 2023-02-07

## 2023-02-07 RX ORDER — SODIUM CHLORIDE 9 MG/ML
250 INJECTION, SOLUTION INTRAVENOUS ONCE
Status: CANCELLED | OUTPATIENT
Start: 2023-02-07

## 2023-02-07 RX ORDER — DEXTROSE MONOHYDRATE 50 MG/ML
250 INJECTION, SOLUTION INTRAVENOUS ONCE
Status: COMPLETED | OUTPATIENT
Start: 2023-02-07 | End: 2023-02-07

## 2023-02-07 RX ORDER — SODIUM CHLORIDE 9 MG/ML
250 INJECTION, SOLUTION INTRAVENOUS ONCE
Status: COMPLETED | OUTPATIENT
Start: 2023-02-07 | End: 2023-02-07

## 2023-02-07 RX ORDER — DEXTROSE MONOHYDRATE 50 MG/ML
250 INJECTION, SOLUTION INTRAVENOUS ONCE
Status: CANCELLED | OUTPATIENT
Start: 2023-02-07

## 2023-02-07 RX ORDER — FLUOROURACIL 50 MG/ML
300 INJECTION, SOLUTION INTRAVENOUS ONCE
Status: COMPLETED | OUTPATIENT
Start: 2023-02-07 | End: 2023-02-07

## 2023-02-07 RX ORDER — FLUOROURACIL 50 MG/ML
300 INJECTION, SOLUTION INTRAVENOUS ONCE
Status: CANCELLED | OUTPATIENT
Start: 2023-02-07

## 2023-02-07 RX ORDER — PALONOSETRON 0.05 MG/ML
0.25 INJECTION, SOLUTION INTRAVENOUS ONCE
Status: CANCELLED | OUTPATIENT
Start: 2023-02-07

## 2023-02-07 RX ORDER — FAMOTIDINE 10 MG/ML
20 INJECTION, SOLUTION INTRAVENOUS AS NEEDED
Status: CANCELLED | OUTPATIENT
Start: 2023-02-07

## 2023-02-07 RX ORDER — PALONOSETRON 0.05 MG/ML
0.25 INJECTION, SOLUTION INTRAVENOUS ONCE
Status: COMPLETED | OUTPATIENT
Start: 2023-02-07 | End: 2023-02-07

## 2023-02-07 RX ADMIN — DEXAMETHASONE SODIUM PHOSPHATE 12 MG: 10 INJECTION, SOLUTION INTRAMUSCULAR; INTRAVENOUS at 09:29

## 2023-02-07 RX ADMIN — DEXTROSE MONOHYDRATE 100 ML: 50 INJECTION, SOLUTION INTRAVENOUS at 10:52

## 2023-02-07 RX ADMIN — SODIUM CHLORIDE 100 ML: 9 INJECTION, SOLUTION INTRAVENOUS at 09:46

## 2023-02-07 RX ADMIN — FLUOROURACIL 640 MG: 50 INJECTION, SOLUTION INTRAVENOUS at 12:57

## 2023-02-07 RX ADMIN — FLUOROURACIL 3850 MG: 50 INJECTION, SOLUTION INTRAVENOUS at 12:57

## 2023-02-07 RX ADMIN — DEXTROSE MONOHYDRATE 640 MG: 50 INJECTION, SOLUTION INTRAVENOUS at 10:52

## 2023-02-07 RX ADMIN — SODIUM CHLORIDE 480 MG: 9 INJECTION, SOLUTION INTRAVENOUS at 10:18

## 2023-02-07 RX ADMIN — SODIUM CHLORIDE 250 ML: 9 INJECTION, SOLUTION INTRAVENOUS at 09:28

## 2023-02-07 RX ADMIN — PALONOSETRON 0.25 MG: 0.05 INJECTION, SOLUTION INTRAVENOUS at 09:28

## 2023-02-07 RX ADMIN — OXALIPLATIN 90 MG: 100 INJECTION, SOLUTION, CONCENTRATE INTRAVENOUS at 10:52

## 2023-02-09 ENCOUNTER — INFUSION (OUTPATIENT)
Dept: ONCOLOGY | Facility: HOSPITAL | Age: 73
End: 2023-02-09
Payer: MEDICARE

## 2023-02-09 VITALS
RESPIRATION RATE: 16 BRPM | BODY MASS INDEX: 30.05 KG/M2 | DIASTOLIC BLOOD PRESSURE: 79 MMHG | SYSTOLIC BLOOD PRESSURE: 152 MMHG | HEART RATE: 69 BPM | TEMPERATURE: 98 F | WEIGHT: 209.4 LBS | OXYGEN SATURATION: 98 %

## 2023-02-09 DIAGNOSIS — C18.9 MALIGNANT NEOPLASM OF COLON, UNSPECIFIED PART OF COLON: ICD-10-CM

## 2023-02-09 DIAGNOSIS — Z79.899 ENCOUNTER FOR LONG-TERM (CURRENT) USE OF MEDICATIONS: Primary | ICD-10-CM

## 2023-02-09 DIAGNOSIS — Z45.2 FITTING AND ADJUSTMENT OF VASCULAR CATHETER: ICD-10-CM

## 2023-02-09 PROCEDURE — 25010000002 HEPARIN LOCK FLUSH PER 10 UNITS: Performed by: INTERNAL MEDICINE

## 2023-02-09 PROCEDURE — 96360 HYDRATION IV INFUSION INIT: CPT

## 2023-02-09 RX ORDER — HEPARIN SODIUM (PORCINE) LOCK FLUSH IV SOLN 100 UNIT/ML 100 UNIT/ML
500 SOLUTION INTRAVENOUS AS NEEDED
Status: CANCELLED | OUTPATIENT
Start: 2023-02-09

## 2023-02-09 RX ORDER — HEPARIN SODIUM (PORCINE) LOCK FLUSH IV SOLN 100 UNIT/ML 100 UNIT/ML
500 SOLUTION INTRAVENOUS AS NEEDED
Status: DISCONTINUED | OUTPATIENT
Start: 2023-02-09 | End: 2023-02-09 | Stop reason: HOSPADM

## 2023-02-09 RX ORDER — SODIUM CHLORIDE 0.9 % (FLUSH) 0.9 %
10 SYRINGE (ML) INJECTION AS NEEDED
Status: DISCONTINUED | OUTPATIENT
Start: 2023-02-09 | End: 2023-02-09 | Stop reason: HOSPADM

## 2023-02-09 RX ORDER — SODIUM CHLORIDE 9 MG/ML
1000 INJECTION, SOLUTION INTRAVENOUS CONTINUOUS
Status: DISCONTINUED | OUTPATIENT
Start: 2023-02-09 | End: 2023-02-09 | Stop reason: HOSPADM

## 2023-02-09 RX ORDER — SODIUM CHLORIDE 0.9 % (FLUSH) 0.9 %
10 SYRINGE (ML) INJECTION AS NEEDED
Status: CANCELLED | OUTPATIENT
Start: 2023-02-09

## 2023-02-09 RX ADMIN — Medication 10 ML: at 12:29

## 2023-02-09 RX ADMIN — SODIUM CHLORIDE 1000 ML: 9 INJECTION, SOLUTION INTRAVENOUS at 11:03

## 2023-02-09 RX ADMIN — Medication 500 UNITS: at 12:29

## 2023-02-21 ENCOUNTER — OFFICE VISIT (OUTPATIENT)
Dept: ONCOLOGY | Facility: CLINIC | Age: 73
End: 2023-02-21
Payer: MEDICARE

## 2023-02-21 ENCOUNTER — INFUSION (OUTPATIENT)
Dept: ONCOLOGY | Facility: HOSPITAL | Age: 73
End: 2023-02-21
Payer: MEDICARE

## 2023-02-21 VITALS
SYSTOLIC BLOOD PRESSURE: 134 MMHG | DIASTOLIC BLOOD PRESSURE: 82 MMHG | TEMPERATURE: 98.2 F | HEART RATE: 83 BPM | WEIGHT: 208.1 LBS | RESPIRATION RATE: 16 BRPM | BODY MASS INDEX: 29.79 KG/M2 | OXYGEN SATURATION: 96 % | HEIGHT: 70 IN

## 2023-02-21 VITALS — DIASTOLIC BLOOD PRESSURE: 73 MMHG | HEART RATE: 70 BPM | SYSTOLIC BLOOD PRESSURE: 110 MMHG

## 2023-02-21 DIAGNOSIS — C18.9 MALIGNANT NEOPLASM OF COLON, UNSPECIFIED PART OF COLON: Primary | ICD-10-CM

## 2023-02-21 DIAGNOSIS — Z79.899 ENCOUNTER FOR LONG-TERM (CURRENT) USE OF MEDICATIONS: ICD-10-CM

## 2023-02-21 DIAGNOSIS — T45.1X5A ANEMIA ASSOCIATED WITH CHEMOTHERAPY: ICD-10-CM

## 2023-02-21 DIAGNOSIS — E53.8 VITAMIN B12 DEFICIENCY: ICD-10-CM

## 2023-02-21 DIAGNOSIS — T45.1X5A CHEMOTHERAPY-INDUCED THROMBOCYTOPENIA: ICD-10-CM

## 2023-02-21 DIAGNOSIS — C18.9 MALIGNANT NEOPLASM OF COLON, UNSPECIFIED PART OF COLON: ICD-10-CM

## 2023-02-21 DIAGNOSIS — D69.59 CHEMOTHERAPY-INDUCED THROMBOCYTOPENIA: ICD-10-CM

## 2023-02-21 DIAGNOSIS — D64.81 ANEMIA ASSOCIATED WITH CHEMOTHERAPY: ICD-10-CM

## 2023-02-21 DIAGNOSIS — Z79.899 ENCOUNTER FOR LONG-TERM (CURRENT) USE OF MEDICATIONS: Primary | ICD-10-CM

## 2023-02-21 DIAGNOSIS — C78.7 SECONDARY LIVER CANCER: ICD-10-CM

## 2023-02-21 LAB
ALBUMIN SERPL-MCNC: 4.1 G/DL (ref 3.5–5.2)
ALBUMIN/GLOB SERPL: 1.5 G/DL (ref 1.1–2.4)
ALP SERPL-CCNC: 76 U/L (ref 38–116)
ALT SERPL W P-5'-P-CCNC: 17 U/L (ref 0–41)
ANION GAP SERPL CALCULATED.3IONS-SCNC: 12.9 MMOL/L (ref 5–15)
AST SERPL-CCNC: 22 U/L (ref 0–40)
BASOPHILS # BLD AUTO: 0.02 10*3/MM3 (ref 0–0.2)
BASOPHILS NFR BLD AUTO: 0.5 % (ref 0–1.5)
BILIRUB SERPL-MCNC: 1 MG/DL (ref 0.2–1.2)
BILIRUB UR QL STRIP: NEGATIVE
BUN SERPL-MCNC: 16 MG/DL (ref 6–20)
BUN/CREAT SERPL: 15.5 (ref 7.3–30)
CALCIUM SPEC-SCNC: 10 MG/DL (ref 8.5–10.2)
CHLORIDE SERPL-SCNC: 100 MMOL/L (ref 98–107)
CLARITY UR: CLEAR
CO2 SERPL-SCNC: 24.1 MMOL/L (ref 22–29)
COLOR UR: YELLOW
CREAT SERPL-MCNC: 1.03 MG/DL (ref 0.7–1.3)
DEPRECATED RDW RBC AUTO: 82.4 FL (ref 37–54)
EGFRCR SERPLBLD CKD-EPI 2021: 77.7 ML/MIN/1.73
EOSINOPHIL # BLD AUTO: 0.12 10*3/MM3 (ref 0–0.4)
EOSINOPHIL NFR BLD AUTO: 2.9 % (ref 0.3–6.2)
ERYTHROCYTE [DISTWIDTH] IN BLOOD BY AUTOMATED COUNT: 20.5 % (ref 12.3–15.4)
GLOBULIN UR ELPH-MCNC: 2.7 GM/DL (ref 1.8–3.5)
GLUCOSE SERPL-MCNC: 268 MG/DL (ref 74–124)
GLUCOSE UR STRIP-MCNC: ABNORMAL MG/DL
HCT VFR BLD AUTO: 33.4 % (ref 37.5–51)
HGB BLD-MCNC: 11 G/DL (ref 13–17.7)
HGB UR QL STRIP.AUTO: NEGATIVE
IMM GRANULOCYTES # BLD AUTO: 0.01 10*3/MM3 (ref 0–0.05)
IMM GRANULOCYTES NFR BLD AUTO: 0.2 % (ref 0–0.5)
KETONES UR QL STRIP: ABNORMAL
LEUKOCYTE ESTERASE UR QL STRIP.AUTO: NEGATIVE
LYMPHOCYTES # BLD AUTO: 0.95 10*3/MM3 (ref 0.7–3.1)
LYMPHOCYTES NFR BLD AUTO: 23.1 % (ref 19.6–45.3)
MCH RBC QN AUTO: 35.9 PG (ref 26.6–33)
MCHC RBC AUTO-ENTMCNC: 32.9 G/DL (ref 31.5–35.7)
MCV RBC AUTO: 109.2 FL (ref 79–97)
MONOCYTES # BLD AUTO: 0.58 10*3/MM3 (ref 0.1–0.9)
MONOCYTES NFR BLD AUTO: 14.1 % (ref 5–12)
NEUTROPHILS NFR BLD AUTO: 2.43 10*3/MM3 (ref 1.7–7)
NEUTROPHILS NFR BLD AUTO: 59.2 % (ref 42.7–76)
NITRITE UR QL STRIP: NEGATIVE
NRBC BLD AUTO-RTO: 0 /100 WBC (ref 0–0.2)
PH UR STRIP.AUTO: 6 [PH] (ref 4.5–8)
PLATELET # BLD AUTO: 102 10*3/MM3 (ref 140–450)
PMV BLD AUTO: 11 FL (ref 6–12)
POTASSIUM SERPL-SCNC: 4.6 MMOL/L (ref 3.5–4.7)
PROT SERPL-MCNC: 6.8 G/DL (ref 6.3–8)
PROT UR QL STRIP: ABNORMAL
RBC # BLD AUTO: 3.06 10*6/MM3 (ref 4.14–5.8)
SODIUM SERPL-SCNC: 137 MMOL/L (ref 134–145)
SP GR UR STRIP: 1.02 (ref 1–1.03)
UROBILINOGEN UR QL STRIP: ABNORMAL
WBC NRBC COR # BLD: 4.11 10*3/MM3 (ref 3.4–10.8)

## 2023-02-21 PROCEDURE — 25010000002 FOSAPREPITANT PER 1 MG: Performed by: INTERNAL MEDICINE

## 2023-02-21 PROCEDURE — 25010000002 DEXAMETHASONE SODIUM PHOSPHATE 100 MG/10ML SOLUTION: Performed by: INTERNAL MEDICINE

## 2023-02-21 PROCEDURE — 81003 URINALYSIS AUTO W/O SCOPE: CPT

## 2023-02-21 PROCEDURE — 96417 CHEMO IV INFUS EACH ADDL SEQ: CPT

## 2023-02-21 PROCEDURE — 96416 CHEMO PROLONG INFUSE W/PUMP: CPT

## 2023-02-21 PROCEDURE — 96413 CHEMO IV INFUSION 1 HR: CPT

## 2023-02-21 PROCEDURE — 25010000002 LEUCOVORIN 500 MG RECONSTITUTED SOLUTION 1 EACH VIAL: Performed by: INTERNAL MEDICINE

## 2023-02-21 PROCEDURE — 96415 CHEMO IV INFUSION ADDL HR: CPT

## 2023-02-21 PROCEDURE — 96367 TX/PROPH/DG ADDL SEQ IV INF: CPT

## 2023-02-21 PROCEDURE — 25010000002 OXALIPLATIN PER 0.5 MG: Performed by: INTERNAL MEDICINE

## 2023-02-21 PROCEDURE — 99214 OFFICE O/P EST MOD 30 MIN: CPT | Performed by: INTERNAL MEDICINE

## 2023-02-21 PROCEDURE — G0498 CHEMO EXTEND IV INFUS W/PUMP: HCPCS

## 2023-02-21 PROCEDURE — 96368 THER/DIAG CONCURRENT INF: CPT

## 2023-02-21 PROCEDURE — 25010000002 FLUOROURACIL PER 500 MG: Performed by: INTERNAL MEDICINE

## 2023-02-21 PROCEDURE — 25010000002 PALONOSETRON PER 25 MCG: Performed by: INTERNAL MEDICINE

## 2023-02-21 PROCEDURE — 25010000002 BEVACIZUMAB-BVZR 100 MG/4ML SOLUTION 4 ML VIAL: Performed by: INTERNAL MEDICINE

## 2023-02-21 PROCEDURE — 85025 COMPLETE CBC W/AUTO DIFF WBC: CPT

## 2023-02-21 PROCEDURE — 80053 COMPREHEN METABOLIC PANEL: CPT

## 2023-02-21 PROCEDURE — 25010000002 BEVACIZUMAB-BVZR 400 MG/16ML SOLUTION 16 ML VIAL: Performed by: INTERNAL MEDICINE

## 2023-02-21 PROCEDURE — 96411 CHEMO IV PUSH ADDL DRUG: CPT

## 2023-02-21 PROCEDURE — 96375 TX/PRO/DX INJ NEW DRUG ADDON: CPT

## 2023-02-21 RX ORDER — DEXTROSE MONOHYDRATE 50 MG/ML
250 INJECTION, SOLUTION INTRAVENOUS ONCE
Status: COMPLETED | OUTPATIENT
Start: 2023-02-21 | End: 2023-02-21

## 2023-02-21 RX ORDER — SODIUM CHLORIDE 9 MG/ML
1000 INJECTION, SOLUTION INTRAVENOUS CONTINUOUS
Status: CANCELLED
Start: 2023-02-23

## 2023-02-21 RX ORDER — DIPHENHYDRAMINE HYDROCHLORIDE 50 MG/ML
50 INJECTION INTRAMUSCULAR; INTRAVENOUS AS NEEDED
Status: CANCELLED | OUTPATIENT
Start: 2023-02-21

## 2023-02-21 RX ORDER — PALONOSETRON 0.05 MG/ML
0.25 INJECTION, SOLUTION INTRAVENOUS ONCE
Status: CANCELLED | OUTPATIENT
Start: 2023-02-21

## 2023-02-21 RX ORDER — SODIUM CHLORIDE 9 MG/ML
250 INJECTION, SOLUTION INTRAVENOUS ONCE
Status: CANCELLED | OUTPATIENT
Start: 2023-02-21

## 2023-02-21 RX ORDER — FLUOROURACIL 50 MG/ML
300 INJECTION, SOLUTION INTRAVENOUS ONCE
Status: COMPLETED | OUTPATIENT
Start: 2023-02-21 | End: 2023-02-21

## 2023-02-21 RX ORDER — FLUOROURACIL 50 MG/ML
300 INJECTION, SOLUTION INTRAVENOUS ONCE
Status: CANCELLED | OUTPATIENT
Start: 2023-02-21

## 2023-02-21 RX ORDER — PALONOSETRON 0.05 MG/ML
0.25 INJECTION, SOLUTION INTRAVENOUS ONCE
Status: COMPLETED | OUTPATIENT
Start: 2023-02-21 | End: 2023-02-21

## 2023-02-21 RX ORDER — FAMOTIDINE 10 MG/ML
20 INJECTION, SOLUTION INTRAVENOUS AS NEEDED
Status: CANCELLED | OUTPATIENT
Start: 2023-02-21

## 2023-02-21 RX ORDER — SODIUM CHLORIDE 9 MG/ML
250 INJECTION, SOLUTION INTRAVENOUS ONCE
Status: COMPLETED | OUTPATIENT
Start: 2023-02-21 | End: 2023-02-21

## 2023-02-21 RX ORDER — DEXTROSE MONOHYDRATE 50 MG/ML
250 INJECTION, SOLUTION INTRAVENOUS ONCE
Status: CANCELLED | OUTPATIENT
Start: 2023-02-21

## 2023-02-21 RX ADMIN — FLUOROURACIL 3850 MG: 50 INJECTION, SOLUTION INTRAVENOUS at 13:53

## 2023-02-21 RX ADMIN — DEXAMETHASONE SODIUM PHOSPHATE 12 MG: 10 INJECTION, SOLUTION INTRAMUSCULAR; INTRAVENOUS at 10:40

## 2023-02-21 RX ADMIN — SODIUM CHLORIDE 250 ML: 9 INJECTION, SOLUTION INTRAVENOUS at 10:04

## 2023-02-21 RX ADMIN — DEXTROSE MONOHYDRATE 100 ML: 50 INJECTION, SOLUTION INTRAVENOUS at 11:43

## 2023-02-21 RX ADMIN — SODIUM CHLORIDE 100 ML: 9 INJECTION, SOLUTION INTRAVENOUS at 10:08

## 2023-02-21 RX ADMIN — FLUOROURACIL 640 MG: 50 INJECTION, SOLUTION INTRAVENOUS at 13:53

## 2023-02-21 RX ADMIN — OXALIPLATIN 90 MG: 100 INJECTION, SOLUTION, CONCENTRATE INTRAVENOUS at 11:46

## 2023-02-21 RX ADMIN — SODIUM CHLORIDE 480 MG: 9 INJECTION, SOLUTION INTRAVENOUS at 10:56

## 2023-02-21 RX ADMIN — PALONOSETRON HYDROCHLORIDE 0.25 MG: 0.25 INJECTION INTRAVENOUS at 10:04

## 2023-02-21 RX ADMIN — LEUCOVORIN CALCIUM 640 MG: 500 INJECTION, POWDER, LYOPHILIZED, FOR SOLUTION INTRAMUSCULAR; INTRAVENOUS at 11:48

## 2023-02-21 NOTE — PROGRESS NOTES
.     REASON FOR FOLLOWUP:     *Cecum colon cancer, status post right hemicolectomy performed on 10/11/2022, [id number].   · CT scan examination on 9/26/2022 reported suspicious liver lesion 9 mm, otherwise no evidence for metastatic disease.    · The patient underwent an abdominal MRI examination on 10/06/2022, which reported suspicious liver lesion 8mm.  · Patient had sigmoidectomy on 10/11/2022.  Portacatheter placement on 10/21/2022.   · PET scan examination on 11/14/2022 reported solitary hypermetabolic liver lesion.   · Cycle #1 palliative chemotherapy FOLFOX 6 was started on 11/15/2022.   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  Not a candidate for anti-EGFR monoclonal antibody treatment.   · From cycle #2, bevacizumab/Avastin will be added to palliative chemotherapy.  Due to thrombocytopenia, all chemotherapy agents were 25% reduced from cycle #2.  *Iron deficiency anemia.  · On oral iron treatment.                                 HISTORY OF PRESENT ILLNESS:  The patient is a 72 y.o. 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  He returns today on 02/21/2023 the patient presented for evaluation prior to cycle #8 palliative chemotherapy with FOLFOX plus Avastin.     The patient reports mild cold sensitivity, especially when he is trying to open the door of his car or the door of his house from outside. Otherwise he has no numbness or tingling. He has been tolerating chemotherapy well with no nausea, vomiting. He does not take any nausea medication at all. He reports excellent performance status ECOG 0.    Laboratory study today on 02/21/2023 reported stable mild anemia with hemoglobin 11.0, low normal WBC 4100 and neutrophils 2430, and stable mild thrombocytopenia with platelets 102,000. He denies easy bleeding or bruising.       Past Medical History:    Diagnosis Date   • Abdominal hernia    • Anemia    • Arrhythmia     PT STATED DURING LAST COLONOSCOPY 2 WEEKS AGO   • Arthritis    • Chemotherapy-induced thrombocytopenia 12/4/2022   • Colon cancer (HCC) 09/22/2022   • Colon polyp September 20 2022   • Colon polyps    • Depression    • DVT (deep venous thrombosis) (HCC)     IN LEFT LEG   • Full dentures    • GI (gastrointestinal bleed) September 8 2022   • Hip pain     RIGHT   • Hyperlipidemia    • Hypertension    • Iron deficiency anemia 09/22/2022   • Numbness and tingling     RIGHT FOOT   • PONV (postoperative nausea and vomiting)    • Right leg pain    • Type 2 diabetes mellitus without complication, without long-term current use of insulin (HCC) 10/08/2019     Past Surgical History:   Procedure Laterality Date   • CERVICAL DISCECTOMY LAMINECTOMY DECOMPRESSION POSTERIOR FUSION WITH INSTRUMENTATION     • COLON RESECTION N/A 10/11/2022    Procedure: LAPAROSCOPIC RIGHT COLON RESECTION;  Surgeon: Ga Samaniego MD;  Location: [facility] OR;  Service: General;  Laterality: N/A;   • COLONOSCOPY N/A 2/28/2018    Procedure: COLONOSCOPY to TI and cecum with polypectomy;  Surgeon: Anil Garces MD;  Location: [facility] ENDOSCOPY;  Service:    • JOINT REPLACEMENT      LEFT HIP   • KNEE ARTHROSCOPY Left    • LUMBAR DISCECTOMY FUSION INSTRUMENTATION N/A 11/18/2020    Procedure: Lumbar 4 5 laminectomy with a posterior lateral fusion and instrumentation and interbody fusion;  Surgeon: Jeffrey Garcia MD;  Location: [facility] OR;  Service: Neurosurgery;  Laterality: N/A;   • TOTAL HIP ARTHROPLASTY Right 6/3/2021    Procedure: TOTAL HIP ARTHROPLASTY POSTERIOR;  Surgeon: Shlomo Campos MD;  Location: [facility] OR;  Service: Orthopedics;  Laterality: Right;   • VASECTOMY     • VENOUS ACCESS DEVICE (PORT) INSERTION N/A 10/21/2022    Procedure: INSERTION VENOUS ACCESS DEVICE;  Surgeon: Ga Samaniego MD;  Location: [facility] OR O[facility];  Service: General;  Laterality:  N/A;     HEMATOLOGY/MEDICAL ONCOLOGY HISTORY: The patient is a 72 y.o. year old male with hypertension, hyperlipidemia, type 2 diabetes, iron deficiency anemia, history of DVT, who presented on 9/22/2022 for initial evaluation because of iron deficiency anemia, referred by his primary care provider, JULIA Santiago. Patient is accompanied by his wife who helped with the history. They reported the patient actually was being diagnosed of colon cancer 2 days ago. His GI specialist, Dr. Anil Garces, performed colonoscopic examination and saw a distal colon mass. I do not have the report for the procedure nor do I have the pathology report but nevertheless there was a mass in the distal colon likely in the sigmoid colon. I will obtain a copy of those reports when available.      Patient reports he had no apparent melena or hematochezia before starting oral iron treatment. He did have however significant fatigue. He reports exertional dyspnea and no syncope. Patient had laboratory study recently on 09/08/2022 which reported severe iron deficiency anemia with hemoglobin 7.8, MCV 79.9, MCHC 29.2. Normal platelets 217,000 and WBC 7230 without differentiation. Iron study reported ferritin 10.7 ng/mL, free iron 23, TIBC 593 and iron saturation 4%. Chemistry lab reported creatinine 1.11, potassium 5.4, otherwise normal sodium chloride and calcium, glucose 137. Prior to that the patient had normal liver function on 08/24/2022. He had a normal TSH 2.5 and slightly elevated hemoglobin A1c of 6.8% on that day.      The patient reports he has been on oral iron for almost 2 weeks, once a day with good tolerance and he now noticed improved energy level and less exertional dyspnea. He does report stool becoming dark-colored after he started oral iron.  He reports no syncope.     Laboratory study on 09/22/2022 reported improved hemoglobin 8.5 and normalized MCV 85.0, stable MCHC 29.4. Maintains normal platelets and WBC.       I saw  "him originally on 9/22/2022 for initial evaluation for his sigmoid colon cancer and iron deficiency anemia.  Since that time patient had multiple imaging studies including CT for chest abdomen pelvis, subsequently with MRI for the abdomen for staging purposes.  He was also seen by Dr. Samaniego who performed right hemicolectomy on 10/11/2022.    Dr. Samaniego called me on the day of surgery, he also suspected this liver lesion is metastatic, however unable to reach that during the surgery.    Patient notes since surgery on 10/11/2022 of the bowel resection he is recovering \"okay\". The patient does report some abdominal pain when adrianna his stomach, specifically when getting out of bed. The patient denies any issues with his appetite, and is having normal urination and bowel movements. The patient denies constipation.  Patient reports no fever sweating or chills.    The patient has type II diabetes, which he manages with metformin. 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    Patient reports he is able to tolerate oral iron twice a day with no specific complaints.  No nausea vomiting or constipation.      Laboratory study today on 11/2/2022 showed improved anemia with Hb 10.9, normalized MCV 92.5.  His normal platelets 159,000 WBC 6870 including ANC 4580.      PET scan examination on 11/14/2022 reported distal small 1.3 cm subtle low attenuation lesion in the medial hepatic segment with SUV 6.3.  There is no hypermetabolic enthesopathy in the abdomen or pelvis.  No suspicious hypermetabolic activity in the chest or neck.    Laboratory study on 11/15/2022 showed improved hemoglobin 12.1, normal platelets 156,000 and WBC 5720 including ANC 3750 and lymphocytes 1070.  Chemistry study reported glucose 156 otherwise unremarkable CMP.    Patient reports he developed a mild oral mucositis lasted about 4 days after the chemotherapy and now has resolved. He " believes he has lost some weight, but states he does have an appetite and eats everyday.  He denies nausea vomiting.  He denies having diarrhea or constipation. He denies having any fever, chills, or sweating.  Denies peripheral neuropathy.     Laboratory results from on 11/29/2022 are; white cell count is 3950. neutrophils are 2340. Hemoglobin is 11.7 g/dL. Platelets are 108,000. Chemistry lab was unremarkable except glucose 255.    MEDICATIONS:    Current Outpatient Medications:   •  acetaminophen (TYLENOL) 500 MG tablet, Take 500 mg by mouth Every 6 (Six) Hours As Needed for Mild Pain ., Disp: , Rfl:   •  acetaminophen-codeine (TYLENOL #3) 300-30 MG per tablet, , Disp: , Rfl:   •  amoxicillin (AMOXIL) 500 MG capsule, , Disp: , Rfl:   •  aspirin 81 MG EC tablet, Take 81 mg by mouth Daily. INSTRUCTED PT TO FOLLOW MD INSTRUCTIONS REGARDING HOLDING FOR SURGERY/PT STATED TO HOLD 5 DAYS PRIOR TO SURGERY, Disp: , Rfl:   •  atorvastatin (LIPITOR) 40 MG tablet, TAKE ONE TABLET BY MOUTH DAILY (Patient taking differently: Take 40 mg by mouth Every Night.), Disp: 90 tablet, Rfl: 3  •  ferrous sulfate (FerrouSul) 325 (65 FE) MG tablet, Take 1 tablet by mouth 2 (Two) Times a Day., Disp: 180 tablet, Rfl: 1  •  ibuprofen (ADVIL,MOTRIN) 600 MG tablet, , Disp: , Rfl:   •  lisinopril-hydrochlorothiazide (PRINZIDE,ZESTORETIC) 20-12.5 MG per tablet, TAKE ONE TABLET BY MOUTH ONCE NIGHTLY (Patient taking differently: Take 1 tablet by mouth Every Night.), Disp: 90 tablet, Rfl: 3  •  metFORMIN (GLUCOPHAGE) 500 MG tablet, Take 1 tablet by mouth 2 (Two) Times a Day With Meals., Disp: 180 tablet, Rfl: 3  •  ondansetron (ZOFRAN) 8 MG tablet, Take 1 tablet by mouth 3 (Three) Times a Day As Needed for Nausea or Vomiting., Disp: 60 tablet, Rfl: 5  •  tadalafil (CIALIS) 5 MG tablet, Take 1 tablet by mouth Daily As Needed for Erectile Dysfunction. (Patient taking differently: Take 5 mg by mouth Daily As Needed for Erectile Dysfunction. HOLD PRIOR  "TO SURGERY), Disp: 30 tablet, Rfl: 2    ALLERGIES:   No Known Allergies    SOCIAL HISTORY:       Social History     Socioeconomic History   • Marital status:      Spouse name: Chelsie   • Years of education: 12   Tobacco Use   • Smoking status: Some Days     Packs/day: 1.00     Years: 20.00     Pack years: 20.00     Types: Cigars, Cigarettes   • Smokeless tobacco: Never   • Tobacco comments:     OCCASIONALLY   Vaping Use   • Vaping Use: Never used   Substance and Sexual Activity   • Alcohol use: Yes     Alcohol/week: 6.0 standard drinks     Types: 6 Cans of beer per week     Comment: 6 drinks weekly   • Drug use: No         FAMILY HISTORY:  Family History   Problem Relation Age of Onset   • Clotting disorder Other    • Colon cancer Paternal Grandmother    • Colon cancer Paternal Grandfather    • Clotting disorder Father    • Malig Hyperthermia Neg Hx          Vitals:    02/21/23 0929   BP: 134/82   Pulse: 83   Resp: 16   Temp: 98.2 °F (36.8 °C)   TempSrc: Temporal   SpO2: 96%   Weight: 94.4 kg (208 lb 1.6 oz)   Height: 177.8 cm (70\")   PainSc: 0-No pain     Current Status 2/21/2023   ECOG score 0     Physical Exam  Vitals and nursing note reviewed.   Constitutional:       Appearance: Normal appearance. He is well-developed.   HENT:      Head: Normocephalic and atraumatic.      Comments: Patient wears mask due to the pandemic coronavirus infection.     Eyes:      Conjunctiva/sclera: Conjunctivae normal.   Cardiovascular:      Rate and Rhythm: Normal rate and regular rhythm.      Heart sounds: Normal heart sounds. No murmur heard.  Pulmonary:      Effort: Pulmonary effort is normal. No respiratory distress.      Breath sounds: Normal breath sounds. No wheezing.   Abdominal:      General: Bowel sounds are normal. There is no distension.      Palpations: Abdomen is soft.      Tenderness: There is no abdominal tenderness. There is no guarding.   Musculoskeletal:         General: No swelling.   Lymphadenopathy:      " Cervical: No cervical adenopathy.   Skin:     General: Skin is warm and dry.      Findings: No bruising.   Neurological:      Mental Status: He is alert and oriented to person, place, and time. Mental status is at baseline.   Psychiatric:         Behavior: Behavior normal.         Thought Content: Thought content normal.       RECENT LABS:    Results from last 7 days   Lab Units 02/21/23  0850   WBC 10*3/mm3 4.11   HEMOGLOBIN g/dL 11.0*   HEMATOCRIT % 33.4*   PLATELETS 10*3/mm3 102*     Lab Results   Component Value Date    GLUCOSE 174 (H) 02/07/2023    BUN 17 02/07/2023    CREATININE 0.99 02/07/2023    EGFRIFNONA 70 02/23/2022    EGFRIFAFRI 81 02/23/2022    BCR 17.2 02/07/2023    K 4.4 02/07/2023    CO2 25.2 02/07/2023    CALCIUM 9.8 02/07/2023    PROTENTOTREF 7.2 08/24/2022    ALBUMIN 4.1 02/07/2023    LABIL2 1.9 08/24/2022    AST 23 02/07/2023    ALT 20 02/07/2023     Lab Results   Component Value Date    SHCJSLGW45 260 08/24/2022     Lab Results   Component Value Date    FOLATE 15.50 09/22/2022     Lab Results   Component Value Date    IRON 108 02/07/2023    TIBC 386 02/07/2023    FERRITIN 229.80 02/07/2023   Iron saturation 4% on 9/18/2022.    Lab Results   Component Value Date    CEA 10.70 09/22/2022     Lab Results   Component Value Date    IRON 108 02/07/2023    TIBC 386 02/07/2023    FERRITIN 229.80 02/07/2023   Iron saturation 28% on 2/7/2023      PATHOLOGY:  Tumor sample for [facility] NGS study reported positive for K-aashish mutation exon2 p.G13D, negative for HER2/nu by IHC 0 and no amplification by FISH.  MSI stable by DNA sequencing, and also MMR proficient by IHC staining.  Tumor mutation burden is low 8 mut/Mb.  Patient was positive for PTEN 1+, 100% by IHC, PD-L1 was negative, only 1% by IHC.  Patient also positive for APC mutation exon 16p.A8545it, positive for AMACR1 exon2 p.R358*.  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        IMAGING STUDY:      Assessment & Plan     ASSESSMENT:    * Colon  cancer post right hemicolectomy 10/11/2022.  Stage IV ([id number]).  · Had positive stool occult blood test on 09/09/2022. Colonoscopic examination on 09/20/2022 by Dr. Anil Garces reported cecum colon mass.  Biopsy confirmed moderately differentiated adenocarcinoma.  MSI stable.  · Mildly elevated CEA level 10.7 ng/mL on 9/22/2022.  · CT scan for chest abdomen pelvis 9/26/2022 reported cecal mass.  There was also suspicious 9 mm hepatic lesion.  · Abdomen MRI examination with and without contrast on 10/7/2022 confirmed 8 mm lesion hypervascularity in the segment 5 of the liver.  · Patient had right hemicolectomy 10/11/2022.  Unable to biopsy at this time he liver lesion.  Pathology evaluation reported [id number] disease, this will be at least a stage IIIb colon cancer.  · On 11/2/2022 I discussed with the patient, his wife and his daughter, recommending further imaging study with PET scan for assessment.  If patient is no hypermetabolic liver lesion, will treat with adjuvant FOLFOX 6 regimen every 2 weeks for 12 cycles.  However, if he has hypermetabolic lesion in the liver, we will treated him as metastatic disease, with FOLFOX plus Avastin.  Since patient has neuropathy already being his legs and feet, if he is indeed metastatic disease, we may switch him to irinotecan instead of oxaliplatin because of the neuropathy.  If indicated patient has metastatic disease, we would also entertain metastectomy after finishing 12 cycles of chemotherapy.  · We should also testing for [facility] NGS genetic study to see if he would be a candidate for other treatment.  · Discussed with the patient and family members about potential side effects including bone marrow suppression, with anemia thrombocytopenia and leukocytopenia increased risk for infection, and also peripheral neuropathy, etc. patient is agreeable to proceed ahead with treatment.  · The patient has PET scan examination on 11/14/2022 which reported a small 1.3 cm focus  with elevated SUV 6.03, highly suspicious for metastatic disease. The patient now has stage IV. The patient had [facility] NGS study which reported K-aashish mutation, tumor mutation burden < 10, MSI was stable and anti-PD-L1 negative.  Not a candidate for anti-EGFR monoclonal antibody such as Vectibix, or chekpoint inhibitor immunotherapy.  We discussed the adding anti-VEGF monoclonal antibody, bevacizumab/Avastin starting in 2 weeks so that it would be after 6 weeks of primary surgery for the colon cancer resection.  We discussed this is now stage IV disease, however because only having 1 solitary metastatic lesion in the liver, it is potentially curable so we will be as aggressive as possible for chemotherapy.  If he has good response, in the future he will need metastectomy of the liver lesion.  · Today on 11/29/2022 patient is presented for cycle #2 of chemotherapy. Patient tolerated therapy well except mild oral mucositis. However, laboratory studies showed significant decrease in platelets at only 108,000, as well as also decreasing WBC and hemoglobin. Discussed with the patient today if we do not carry out any dose reduction, he will likely become more thrombocytopenic next time in 2 weeks and we will not be able to do cycle #3 chemotherapy on time. I discussed with the patient for dose reduction and to avoid potential delay of chemotherapy and he is agreeable. We will have 25% dose reduction for 5-FU leucovorin and oxaliplatin.  · On 11/29/2022 he was started the first dose of Avastin/bevacizumab in conjunction with chemotherapy cycle #2.  · On 12/13/2022, the patient presented for reevaluation prior to chemotherapy cycle #3. He has stable platelets of 108,000 and slightly improved WBC of 5100 and hemoglobin 12.8 g/dL. He will continue cycle 3 with the same 25% dose reduction.  · 1/10/2023 due for cycle 5 FOLFOX bevacizumab, overall is tolerating fairly well.  Hemoglobin 11.7, platelet count stable at 108,000, WBC  4.02, ANC 2.27.  · Patient has had five cycles of chemotherapy and a CT scan examination obtained on 01/20/2023. The CT scan reported favorable response as the lesion is less obvious.   · 1/24/2023 recommended to continue chemotherapy for a total of 12 cycles. Then obtain PET scan examination. Due to worsening thrombocytopenia, and also peripheral neuropathy, for cycle #6, I will decrease oxaliplatin by another 25% so would it be 50% of the original dose. I will keep the same dose of 5-FU which is 75% of the original dose.  Full dose Avastin.   · 2/7/2023 due for cycle 7.  Platelet count is holding steady at 100,000.  We will proceed with treatment today with same doses as given on cycle 6.   · On 02/21/2023 we will proceed with cycle #8 chemotherapy. We will repeat every 2 weeks.     2. Iron deficiency anemia.   · The patient has iron deficiency due to GI bleeding from his colon cancer. His laboratory study on 09/08/2022 reported ferritin 10.7 and iron saturation 4% with microcytic hypochromic anemia, hemoglobin 7.8, MCV 79.9 and MCHC 29.2.   · Patient reports good tolerance to oral iron treatment and already has improved energy level. Laboratory study on 9/22/2022 did report slightly improved hemoglobin at 8.5 but normalized MCV 85.0. I think he is responding to oral iron treatment both physically and laboratory wise.  We advised patient to increase oral iron to twice a day.  · On 11/2/2022 patient reports good tolerance to oral iron twice a day.  Labs today showed further improved hemoglobin 10.9.  He continues to maintain normal platelets and WBC.  · Improved hemoglobin 12.1 on 11/15/2022.  Cycle #1 chemotherapy will be started.  · On 11/29/2022 hemoglobin 11.7.  · On 12/13/2022, his hemoglobin is 12.8 g/dL.  · 1/10/2023 hemoglobin 11.7.  · The patient has trending down hemoglobin 11.0 on 1/24/2023, however, he is asymptomatic.  · Hemoglobin 2/7/2023, 10.6.  Iron study reported ferritin 229 and iron saturation  28% with free iron 108 TIBC 386.  No evidence for iron deficiency.  So his anemia is secondary to chemotherapy.     · The patient has stable mild anemia with hemoglobin 11.0 today on 02/21/2023. We will continue to monitor.      3.  Low normal vitamin B12 level.    · Patient had a marginal normal vitamin B12 level at 260 pg/mL on 08/24/2022.   · Folate level on 9/22/2022 was normal at 15.5 ng/mL.    · We asked patient to take oral vitamin B12 supplement at 1000 mcg daily.      4.  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  · Prior to starting chemotherapy patient had low normal platelets 156,000 on 11/15/2022.  Started on cycle #1 chemotherapy FOLFOX 6.  · On 11/29/2022 patient had platelets of 108,000.  Patient reports no bleeding or bruising.  Discussed with the patient, because of foreseeable more severe thrombocytopenia, we recommended 25% dose reduction starting from cycle #2 chemotherapy.  · On 12/13/2022, the patient has same decreased number of platelets 108,000. He will continue chemotherapy with same dose reduction of 25%.   · 1/10/2023 platelet count stable at 108,000.  · On 01/24/2023, patient has worsening thrombocytopenia with platelets of 99,000 mcL. Discussed with the patient, we will cut oxaliplatin by another 25% to be at 50% of original dose. We will leave the 5-FU dose same as the previous at 75% of original dose. Avastin will be the same dose.  · 2/7/2023 platelet count 100,000.    · On 02/21/2023 the patient has stable thrombocytopenia with platelets 102,000. The patient reports no bleeding or bruising. Continue to monitor.     5. Peripheral neuropathy  * On 12/13/2022, he reports cold sensitivity in his mouth when he drinks cold water. The cold sensitivity usually dissipates after 3 to 4 days.  · Patient reports he is more significant cold sensitivities lasting for more than a week. He does have moderate tingling, numbness involving the fingers, but not much as the toes.  · 2/7/2023 he feels  like the neuropathy/cold sensitivity is lingering a little bit longer with each cycle.    · On 02/21/2023 the patient reports stable mild peripheral neuropathy/cold sensitivity.      *Weight losses.    · On 12/13/2022, the patient reports he has lost weight in the past couple of months. He reports a poor appetite for 3 days after chemotherapy and a poor taste in his mouth. His appetite has since improved. He only had mild nausea, but no vomiting. He was encouraged to use Ensure or Boost to improve nutrition supplement. He already started using protein powder.    *[place] extraction: Patient states that he needs to have his wisdom teeth pull out.  Reviewed with Dr. Rosario.  Patient will need to have this done on his off week of treatment.  We will likely hold bevacizumab 2 weeks prior to this procedure and 4 weeks following.  Patient states that this will likely not happen until March, but is going to contact his oral surgeon, Dr. Charlie Zazueta to try to go ahead and get an appointment.  · [place] tooth extraction currently scheduled for March 13      PLAN:  1. Proceed with cycle #8 FOLFOX today with Avastin. Continue dose reduction 50% of Oxaliplatin, 5FU at 75% reduction and full dose Avastin.   2. Discontinue oral iron twice a day.  3. Continue antiemetics as needed.   4. Continue oral B12 at 1000 mcg daily.  5. The patient will see APRN in 2 weeks, with laboratory study prior to cycle #9 FOLFOX-6.  We will hold Avastin because of tooth extraction on 3/13/2023.  6. Patient is scheduled for wisdom tooth extraction March 13.  We will need to hold Avastin the week prior to this as well as for 4 weeks post extraction.   7. Call/ return sooner should he develop any new concerns or problems.    Continue to follow with PCP for management of hypertension and diabetes.    I discussed with the patient about laboratory results and further management plan.  Patient voiced understanding and agreeable.    Patient is on a high risk  medication requiring close monitoring for toxicity.         Maya Rosario MD PhD        CC:  JULIA Santiago MD Richard Pokorny, MD   Oral surgeon: Dr. Charlie Zazueta -- [address]-- Phone [phone]

## 2023-02-21 NOTE — NURSING NOTE
Patient denied need for IVF on day of chemo disconnect. He understands to call this office if he feels that he needs IVF.

## 2023-02-23 ENCOUNTER — INFUSION (OUTPATIENT)
Dept: ONCOLOGY | Facility: HOSPITAL | Age: 73
End: 2023-02-23
Payer: MEDICARE

## 2023-02-23 DIAGNOSIS — C18.9 MALIGNANT NEOPLASM OF COLON, UNSPECIFIED PART OF COLON: ICD-10-CM

## 2023-02-23 DIAGNOSIS — Z79.899 ENCOUNTER FOR LONG-TERM (CURRENT) USE OF MEDICATIONS: Primary | ICD-10-CM

## 2023-02-23 DIAGNOSIS — Z45.2 FITTING AND ADJUSTMENT OF VASCULAR CATHETER: ICD-10-CM

## 2023-02-23 PROCEDURE — 25010000002 HEPARIN LOCK FLUSH PER 10 UNITS: Performed by: INTERNAL MEDICINE

## 2023-02-23 RX ORDER — SODIUM CHLORIDE 0.9 % (FLUSH) 0.9 %
10 SYRINGE (ML) INJECTION AS NEEDED
Status: DISCONTINUED | OUTPATIENT
Start: 2023-02-23 | End: 2023-02-23 | Stop reason: HOSPADM

## 2023-02-23 RX ORDER — SODIUM CHLORIDE 0.9 % (FLUSH) 0.9 %
10 SYRINGE (ML) INJECTION AS NEEDED
Status: CANCELLED | OUTPATIENT
Start: 2023-02-23

## 2023-02-23 RX ORDER — HEPARIN SODIUM (PORCINE) LOCK FLUSH IV SOLN 100 UNIT/ML 100 UNIT/ML
500 SOLUTION INTRAVENOUS AS NEEDED
Status: DISCONTINUED | OUTPATIENT
Start: 2023-02-23 | End: 2023-02-23 | Stop reason: HOSPADM

## 2023-02-23 RX ORDER — HEPARIN SODIUM (PORCINE) LOCK FLUSH IV SOLN 100 UNIT/ML 100 UNIT/ML
500 SOLUTION INTRAVENOUS AS NEEDED
Status: CANCELLED | OUTPATIENT
Start: 2023-02-23

## 2023-02-23 RX ADMIN — Medication 10 ML: at 11:26

## 2023-02-23 RX ADMIN — Medication 500 UNITS: at 11:26

## 2023-02-27 ENCOUNTER — TELEPHONE (OUTPATIENT)
Dept: ONCOLOGY | Facility: CLINIC | Age: 73
End: 2023-02-27
Payer: MEDICARE

## 2023-02-27 NOTE — TELEPHONE ENCOUNTER
Patient called and informed to stop taking Oral Iron supplement. Ferritin and Iron results were good. Patient v/u

## 2023-02-27 NOTE — TELEPHONE ENCOUNTER
----- Message from Maya Rosario MD PhD sent at 2/25/2023 12:44 PM EST -----  Regarding: Discontinue oral iron  Bela,     would you please call patient on Monday and ask him to discontinue oral iron?      Thank you very much!    Z

## 2023-03-07 ENCOUNTER — INFUSION (OUTPATIENT)
Dept: ONCOLOGY | Facility: HOSPITAL | Age: 73
End: 2023-03-07
Payer: MEDICARE

## 2023-03-07 ENCOUNTER — OFFICE VISIT (OUTPATIENT)
Dept: ONCOLOGY | Facility: CLINIC | Age: 73
End: 2023-03-07
Payer: MEDICARE

## 2023-03-07 VITALS
HEART RATE: 71 BPM | RESPIRATION RATE: 16 BRPM | HEIGHT: 70 IN | WEIGHT: 211.9 LBS | TEMPERATURE: 97.6 F | OXYGEN SATURATION: 96 % | DIASTOLIC BLOOD PRESSURE: 86 MMHG | SYSTOLIC BLOOD PRESSURE: 134 MMHG | BODY MASS INDEX: 30.34 KG/M2

## 2023-03-07 DIAGNOSIS — T45.1X5A ANEMIA ASSOCIATED WITH CHEMOTHERAPY: ICD-10-CM

## 2023-03-07 DIAGNOSIS — D69.59 CHEMOTHERAPY-INDUCED THROMBOCYTOPENIA: ICD-10-CM

## 2023-03-07 DIAGNOSIS — C18.9 MALIGNANT NEOPLASM OF COLON, UNSPECIFIED PART OF COLON: ICD-10-CM

## 2023-03-07 DIAGNOSIS — C18.9 MALIGNANT NEOPLASM OF COLON, UNSPECIFIED PART OF COLON: Primary | ICD-10-CM

## 2023-03-07 DIAGNOSIS — Z79.899 ENCOUNTER FOR LONG-TERM (CURRENT) USE OF MEDICATIONS: Primary | ICD-10-CM

## 2023-03-07 DIAGNOSIS — Z79.899 ENCOUNTER FOR LONG-TERM (CURRENT) USE OF MEDICATIONS: ICD-10-CM

## 2023-03-07 DIAGNOSIS — T45.1X5A CHEMOTHERAPY-INDUCED THROMBOCYTOPENIA: ICD-10-CM

## 2023-03-07 DIAGNOSIS — D64.81 ANEMIA ASSOCIATED WITH CHEMOTHERAPY: ICD-10-CM

## 2023-03-07 DIAGNOSIS — E53.8 VITAMIN B12 DEFICIENCY: ICD-10-CM

## 2023-03-07 LAB
ALBUMIN SERPL-MCNC: 4.2 G/DL (ref 3.5–5.2)
ALBUMIN/GLOB SERPL: 1.5 G/DL (ref 1.1–2.4)
ALP SERPL-CCNC: 77 U/L (ref 38–116)
ALT SERPL W P-5'-P-CCNC: 18 U/L (ref 0–41)
ANION GAP SERPL CALCULATED.3IONS-SCNC: 13.1 MMOL/L (ref 5–15)
AST SERPL-CCNC: 21 U/L (ref 0–40)
BASOPHILS # BLD AUTO: 0.04 10*3/MM3 (ref 0–0.2)
BASOPHILS NFR BLD AUTO: 1.2 % (ref 0–1.5)
BILIRUB SERPL-MCNC: 1.1 MG/DL (ref 0.2–1.2)
BILIRUB UR QL STRIP: NEGATIVE
BUN SERPL-MCNC: 13 MG/DL (ref 6–20)
BUN/CREAT SERPL: 13.1 (ref 7.3–30)
CALCIUM SPEC-SCNC: 9.7 MG/DL (ref 8.5–10.2)
CHLORIDE SERPL-SCNC: 101 MMOL/L (ref 98–107)
CLARITY UR: CLEAR
CO2 SERPL-SCNC: 23.9 MMOL/L (ref 22–29)
COLOR UR: YELLOW
CREAT SERPL-MCNC: 0.99 MG/DL (ref 0.7–1.3)
DEPRECATED RDW RBC AUTO: 77.3 FL (ref 37–54)
EGFRCR SERPLBLD CKD-EPI 2021: 81.4 ML/MIN/1.73
EOSINOPHIL # BLD AUTO: 0.08 10*3/MM3 (ref 0–0.4)
EOSINOPHIL NFR BLD AUTO: 2.4 % (ref 0.3–6.2)
ERYTHROCYTE [DISTWIDTH] IN BLOOD BY AUTOMATED COUNT: 18.8 % (ref 12.3–15.4)
GLOBULIN UR ELPH-MCNC: 2.8 GM/DL (ref 1.8–3.5)
GLUCOSE SERPL-MCNC: 218 MG/DL (ref 74–124)
GLUCOSE UR STRIP-MCNC: ABNORMAL MG/DL
HCT VFR BLD AUTO: 33.6 % (ref 37.5–51)
HGB BLD-MCNC: 11 G/DL (ref 13–17.7)
HGB UR QL STRIP.AUTO: NEGATIVE
IMM GRANULOCYTES # BLD AUTO: 0 10*3/MM3 (ref 0–0.05)
IMM GRANULOCYTES NFR BLD AUTO: 0 % (ref 0–0.5)
KETONES UR QL STRIP: NEGATIVE
LEUKOCYTE ESTERASE UR QL STRIP.AUTO: NEGATIVE
LYMPHOCYTES # BLD AUTO: 0.9 10*3/MM3 (ref 0.7–3.1)
LYMPHOCYTES NFR BLD AUTO: 27.3 % (ref 19.6–45.3)
MCH RBC QN AUTO: 36.7 PG (ref 26.6–33)
MCHC RBC AUTO-ENTMCNC: 32.7 G/DL (ref 31.5–35.7)
MCV RBC AUTO: 112 FL (ref 79–97)
MONOCYTES # BLD AUTO: 0.47 10*3/MM3 (ref 0.1–0.9)
MONOCYTES NFR BLD AUTO: 14.2 % (ref 5–12)
NEUTROPHILS NFR BLD AUTO: 1.81 10*3/MM3 (ref 1.7–7)
NEUTROPHILS NFR BLD AUTO: 54.9 % (ref 42.7–76)
NITRITE UR QL STRIP: NEGATIVE
NRBC BLD AUTO-RTO: 0 /100 WBC (ref 0–0.2)
PH UR STRIP.AUTO: 5.5 [PH] (ref 4.5–8)
PLATELET # BLD AUTO: 103 10*3/MM3 (ref 140–450)
PMV BLD AUTO: 11.6 FL (ref 6–12)
POTASSIUM SERPL-SCNC: 4.5 MMOL/L (ref 3.5–4.7)
PROT SERPL-MCNC: 7 G/DL (ref 6.3–8)
PROT UR QL STRIP: ABNORMAL
RBC # BLD AUTO: 3 10*6/MM3 (ref 4.14–5.8)
SODIUM SERPL-SCNC: 138 MMOL/L (ref 134–145)
SP GR UR STRIP: 1.02 (ref 1–1.03)
UROBILINOGEN UR QL STRIP: ABNORMAL
WBC NRBC COR # BLD: 3.3 10*3/MM3 (ref 3.4–10.8)

## 2023-03-07 PROCEDURE — 96367 TX/PROPH/DG ADDL SEQ IV INF: CPT

## 2023-03-07 PROCEDURE — 25010000002 FLUOROURACIL PER 500 MG: Performed by: NURSE PRACTITIONER

## 2023-03-07 PROCEDURE — 3075F SYST BP GE 130 - 139MM HG: CPT | Performed by: NURSE PRACTITIONER

## 2023-03-07 PROCEDURE — G0498 CHEMO EXTEND IV INFUS W/PUMP: HCPCS

## 2023-03-07 PROCEDURE — 96415 CHEMO IV INFUSION ADDL HR: CPT

## 2023-03-07 PROCEDURE — 96411 CHEMO IV PUSH ADDL DRUG: CPT

## 2023-03-07 PROCEDURE — 25010000002 DEXAMETHASONE SODIUM PHOSPHATE 100 MG/10ML SOLUTION: Performed by: NURSE PRACTITIONER

## 2023-03-07 PROCEDURE — 1159F MED LIST DOCD IN RCRD: CPT | Performed by: NURSE PRACTITIONER

## 2023-03-07 PROCEDURE — 96375 TX/PRO/DX INJ NEW DRUG ADDON: CPT

## 2023-03-07 PROCEDURE — 1126F AMNT PAIN NOTED NONE PRSNT: CPT | Performed by: NURSE PRACTITIONER

## 2023-03-07 PROCEDURE — 25010000002 LEUCOVORIN CALCIUM PER 50 MG: Performed by: NURSE PRACTITIONER

## 2023-03-07 PROCEDURE — 25010000002 FOSAPREPITANT PER 1 MG: Performed by: NURSE PRACTITIONER

## 2023-03-07 PROCEDURE — 96368 THER/DIAG CONCURRENT INF: CPT

## 2023-03-07 PROCEDURE — 85025 COMPLETE CBC W/AUTO DIFF WBC: CPT

## 2023-03-07 PROCEDURE — 96413 CHEMO IV INFUSION 1 HR: CPT

## 2023-03-07 PROCEDURE — 25010000002 PALONOSETRON PER 25 MCG: Performed by: NURSE PRACTITIONER

## 2023-03-07 PROCEDURE — 81003 URINALYSIS AUTO W/O SCOPE: CPT

## 2023-03-07 PROCEDURE — 96416 CHEMO PROLONG INFUSE W/PUMP: CPT

## 2023-03-07 PROCEDURE — 3079F DIAST BP 80-89 MM HG: CPT | Performed by: NURSE PRACTITIONER

## 2023-03-07 PROCEDURE — 99215 OFFICE O/P EST HI 40 MIN: CPT | Performed by: NURSE PRACTITIONER

## 2023-03-07 PROCEDURE — 25010000002 OXALIPLATIN PER 0.5 MG: Performed by: NURSE PRACTITIONER

## 2023-03-07 PROCEDURE — 80053 COMPREHEN METABOLIC PANEL: CPT

## 2023-03-07 RX ORDER — FLUOROURACIL 50 MG/ML
300 INJECTION, SOLUTION INTRAVENOUS ONCE
Status: COMPLETED | OUTPATIENT
Start: 2023-03-07 | End: 2023-03-07

## 2023-03-07 RX ORDER — FAMOTIDINE 10 MG/ML
20 INJECTION, SOLUTION INTRAVENOUS AS NEEDED
Status: CANCELLED | OUTPATIENT
Start: 2023-03-07

## 2023-03-07 RX ORDER — SODIUM CHLORIDE 9 MG/ML
250 INJECTION, SOLUTION INTRAVENOUS ONCE
Status: CANCELLED | OUTPATIENT
Start: 2023-03-07

## 2023-03-07 RX ORDER — SODIUM CHLORIDE 9 MG/ML
250 INJECTION, SOLUTION INTRAVENOUS ONCE
Status: DISCONTINUED | OUTPATIENT
Start: 2023-03-07 | End: 2023-03-07 | Stop reason: HOSPADM

## 2023-03-07 RX ORDER — DIPHENHYDRAMINE HYDROCHLORIDE 50 MG/ML
50 INJECTION INTRAMUSCULAR; INTRAVENOUS AS NEEDED
Status: CANCELLED | OUTPATIENT
Start: 2023-03-07

## 2023-03-07 RX ORDER — DEXTROSE MONOHYDRATE 50 MG/ML
250 INJECTION, SOLUTION INTRAVENOUS ONCE
Status: COMPLETED | OUTPATIENT
Start: 2023-03-07 | End: 2023-03-07

## 2023-03-07 RX ORDER — PALONOSETRON 0.05 MG/ML
0.25 INJECTION, SOLUTION INTRAVENOUS ONCE
Status: COMPLETED | OUTPATIENT
Start: 2023-03-07 | End: 2023-03-07

## 2023-03-07 RX ORDER — FLUOROURACIL 50 MG/ML
300 INJECTION, SOLUTION INTRAVENOUS ONCE
Status: CANCELLED | OUTPATIENT
Start: 2023-03-07

## 2023-03-07 RX ORDER — PALONOSETRON 0.05 MG/ML
0.25 INJECTION, SOLUTION INTRAVENOUS ONCE
Status: CANCELLED | OUTPATIENT
Start: 2023-03-07

## 2023-03-07 RX ORDER — DEXTROSE MONOHYDRATE 50 MG/ML
250 INJECTION, SOLUTION INTRAVENOUS ONCE
Status: CANCELLED | OUTPATIENT
Start: 2023-03-07

## 2023-03-07 RX ADMIN — FLUOROURACIL 640 MG: 50 INJECTION, SOLUTION INTRAVENOUS at 12:18

## 2023-03-07 RX ADMIN — SODIUM CHLORIDE 100 ML: 9 INJECTION, SOLUTION INTRAVENOUS at 09:40

## 2023-03-07 RX ADMIN — FLUOROURACIL 3850 MG: 50 INJECTION, SOLUTION INTRAVENOUS at 12:18

## 2023-03-07 RX ADMIN — LEUCOVORIN CALCIUM 640 MG: 350 INJECTION, POWDER, LYOPHILIZED, FOR SOLUTION INTRAMUSCULAR; INTRAVENOUS at 10:15

## 2023-03-07 RX ADMIN — DEXAMETHASONE SODIUM PHOSPHATE 12 MG: 100 INJECTION INTRAMUSCULAR; INTRAVENOUS at 09:22

## 2023-03-07 RX ADMIN — DEXTROSE MONOHYDRATE 250 ML: 50 INJECTION, SOLUTION INTRAVENOUS at 09:21

## 2023-03-07 RX ADMIN — DEXTROSE MONOHYDRATE 90 MG: 5 INJECTION, SOLUTION INTRAVENOUS at 10:15

## 2023-03-07 RX ADMIN — PALONOSETRON HYDROCHLORIDE 0.25 MG: 0.25 INJECTION INTRAVENOUS at 09:21

## 2023-03-07 NOTE — PATIENT INSTRUCTIONS
Saline Nasal Spray.  Benadryl at bedtime for sinus drainage. You may take half a dose if needed.   You may place Bacitracin or Vaseline in your nose to help heal sores.   Continue to sleep with a humidifier.    [facility] Provider line if needed: [phone] or [phone]

## 2023-03-07 NOTE — PROGRESS NOTES
.     REASON FOR FOLLOWUP:     *Cecum colon cancer, status post right hemicolectomy performed on 10/11/2022, [id number].   · CT scan examination on 9/26/2022 reported suspicious liver lesion 9 mm, otherwise no evidence for metastatic disease.    · The patient underwent an abdominal MRI examination on 10/06/2022, which reported suspicious liver lesion 8mm.  · Patient had sigmoidectomy on 10/11/2022.  Portacatheter placement on 10/21/2022.   · PET scan examination on 11/14/2022 reported solitary hypermetabolic liver lesion.   · Cycle #1 palliative chemotherapy FOLFOX 6 was started on 11/15/2022.   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  Not a candidate for anti-EGFR monoclonal antibody treatment.   · From cycle #2, bevacizumab/Avastin will be added to palliative chemotherapy.  Due to thrombocytopenia, all chemotherapy agents were 25% reduced from cycle #2.  ·  1/24/203 further dose reduction of Oxaliplatin to 50% of original dose.   *Iron deficiency anemia.  · Oral iron treatment held on 2/21/2023.  Continue to monitor labs.                               HISTORY OF PRESENT ILLNESS:  The patient is a 72 y.o. 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  He returns today on 3/7/2023 for evaluation prior to cycle #9 palliative chemotherapy with FOLFOX.  Avastin is currently being held for wisdom teeth extraction scheduled on 3/13/2023.     Patient reports he is tolerating treatment well.  He does report some mild cold sensitivity for a couple days following his infusion.  He has been trying to wear gloves when handling cold items.  His neuropathy is otherwise unchanged in his hands and feet.  He still has some mild constipation but is at least having a bowel movement daily.  He denies any need for Senokot S.  He does report he will take a stool softener if needed.  He  "remains off his ferrous sulfate.  He denies any nausea/vomiting/diarrhea.  His weight remains stable at 211 pounds and he reports he is eating well.     He does report he has had some maxillary sinus discomfort but is not sure if this is related to the upper wisdom teeth he is waiting to have removed.  He does report every morning he blows his nose and notes scant bleeding.  He is able to control this bleeding but does report his nose is \"drippy\" the remainder of the day.  He does not currently take any routine antihistamines.  He has been sleeping with a humidifier at nighttime.      Laboratory study today on 3/7/2023 reported stable mild anemia with hemoglobin 11.0, low normal WBC 3300 and neutrophils 1810, and stable mild thrombocytopenia with platelets 103,000. He denies easy bleeding or bruising.       Past Medical History:   Diagnosis Date   • Abdominal hernia    • Anemia    • Arrhythmia     PT STATED DURING LAST COLONOSCOPY 2 WEEKS AGO   • Arthritis    • Chemotherapy-induced thrombocytopenia 12/4/2022   • Colon cancer (HCC) 09/22/2022   • Colon polyp September 20 2022   • Colon polyps    • Depression    • DVT (deep venous thrombosis) (HCC)     IN LEFT LEG   • Full dentures    • GI (gastrointestinal bleed) September 8 2022   • Hip pain     RIGHT   • Hyperlipidemia    • Hypertension    • Iron deficiency anemia 09/22/2022   • Numbness and tingling     RIGHT FOOT   • PONV (postoperative nausea and vomiting)    • Right leg pain    • Type 2 diabetes mellitus without complication, without long-term current use of insulin (HCC) 10/08/2019     Past Surgical History:   Procedure Laterality Date   • CERVICAL DISCECTOMY LAMINECTOMY DECOMPRESSION POSTERIOR FUSION WITH INSTRUMENTATION     • COLON RESECTION N/A 10/11/2022    Procedure: LAPAROSCOPIC RIGHT COLON RESECTION;  Surgeon: Ga Samaniego MD;  Location: [facility]  Service: General;  Laterality: N/A;   • COLONOSCOPY N/A 2/28/2018    Procedure: COLONOSCOPY to " TI and cecum with polypectomy;  Surgeon: Anil Garces MD;  Location: [facility] ENDOSCOPY;  Service:    • JOINT REPLACEMENT      LEFT HIP   • KNEE ARTHROSCOPY Left    • LUMBAR DISCECTOMY FUSION INSTRUMENTATION N/A 11/18/2020    Procedure: Lumbar 4 5 laminectomy with a posterior lateral fusion and instrumentation and interbody fusion;  Surgeon: Jeffrey Garcia MD;  Location: [facility] OR;  Service: Neurosurgery;  Laterality: N/A;   • TOTAL HIP ARTHROPLASTY Right 6/3/2021    Procedure: TOTAL HIP ARTHROPLASTY POSTERIOR;  Surgeon: Shlomo Campos MD;  Location: [facility] OR;  Service: Orthopedics;  Laterality: Right;   • VASECTOMY     • VENOUS ACCESS DEVICE (PORT) INSERTION N/A 10/21/2022    Procedure: INSERTION VENOUS ACCESS DEVICE;  Surgeon: Ga Samaniego MD;  Location: [facility] OR [facility];  Service: General;  Laterality: N/A;     HEMATOLOGY/MEDICAL ONCOLOGY HISTORY: The patient is a 72 y.o. year old male with hypertension, hyperlipidemia, type 2 diabetes, iron deficiency anemia, history of DVT, who presented on 9/22/2022 for initial evaluation because of iron deficiency anemia, referred by his primary care provider, JULIA Santiago. Patient is accompanied by his wife who helped with the history. They reported the patient actually was being diagnosed of colon cancer 2 days ago. His GI specialist, Dr. Anil Garces, performed colonoscopic examination and saw a distal colon mass. I do not have the report for the procedure nor do I have the pathology report but nevertheless there was a mass in the distal colon likely in the sigmoid colon. I will obtain a copy of those reports when available.      Patient reports he had no apparent melena or hematochezia before starting oral iron treatment. He did have however significant fatigue. He reports exertional dyspnea and no syncope. Patient had laboratory study recently on 09/08/2022 which reported severe iron deficiency anemia with hemoglobin 7.8, MCV 79.9, MCHC 29.2.  "Normal platelets 217,000 and WBC 7230 without differentiation. Iron study reported ferritin 10.7 ng/mL, free iron 23, TIBC 593 and iron saturation 4%. Chemistry lab reported creatinine 1.11, potassium 5.4, otherwise normal sodium chloride and calcium, glucose 137. Prior to that the patient had normal liver function on 08/24/2022. He had a normal TSH 2.5 and slightly elevated hemoglobin A1c of 6.8% on that day.      The patient reports he has been on oral iron for almost 2 weeks, once a day with good tolerance and he now noticed improved energy level and less exertional dyspnea. He does report stool becoming dark-colored after he started oral iron.  He reports no syncope.     Laboratory study on 09/22/2022 reported improved hemoglobin 8.5 and normalized MCV 85.0, stable MCHC 29.4. Maintains normal platelets and WBC.       I saw him originally on 9/22/2022 for initial evaluation for his sigmoid colon cancer and iron deficiency anemia.  Since that time patient had multiple imaging studies including CT for chest abdomen pelvis, subsequently with MRI for the abdomen for staging purposes.  He was also seen by Dr. Samaniego who performed right hemicolectomy on 10/11/2022.    Dr. Samaniego called me on the day of surgery, he also suspected this liver lesion is metastatic, however unable to reach that during the surgery.    Patient notes since surgery on 10/11/2022 of the bowel resection he is recovering \"okay\". The patient does report some abdominal pain when adrianna his stomach, specifically when getting out of bed. The patient denies any issues with his appetite, and is having normal urination and bowel movements. The patient denies constipation.  Patient reports no fever sweating or chills.    The patient has type II diabetes, which he manages with metformin. 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    Patient reports he is able to tolerate oral iron twice " a day with no specific complaints.  No nausea vomiting or constipation.      Laboratory study today on 11/2/2022 showed improved anemia with Hb 10.9, normalized MCV 92.5.  His normal platelets 159,000 WBC 6870 including ANC 4580.      PET scan examination on 11/14/2022 reported distal small 1.3 cm subtle low attenuation lesion in the medial hepatic segment with SUV 6.3.  There is no hypermetabolic enthesopathy in the abdomen or pelvis.  No suspicious hypermetabolic activity in the chest or neck.    Laboratory study on 11/15/2022 showed improved hemoglobin 12.1, normal platelets 156,000 and WBC 5720 including ANC 3750 and lymphocytes 1070.  Chemistry study reported glucose 156 otherwise unremarkable CMP.    Patient reports he developed a mild oral mucositis lasted about 4 days after the chemotherapy and now has resolved. He believes he has lost some weight, but states he does have an appetite and eats everyday.  He denies nausea vomiting.  He denies having diarrhea or constipation. He denies having any fever, chills, or sweating.  Denies peripheral neuropathy.     Laboratory results from on 11/29/2022 are; white cell count is 3950. neutrophils are 2340. Hemoglobin is 11.7 g/dL. Platelets are 108,000. Chemistry lab was unremarkable except glucose 255.    MEDICATIONS:    Current Outpatient Medications:   •  acetaminophen (TYLENOL) 500 MG tablet, Take 1 tablet by mouth Every 6 (Six) Hours As Needed for Mild Pain., Disp: , Rfl:   •  acetaminophen-codeine (TYLENOL #3) 300-30 MG per tablet, , Disp: , Rfl:   •  aspirin 81 MG EC tablet, Take 1 tablet by mouth Daily. INSTRUCTED PT TO FOLLOW MD INSTRUCTIONS REGARDING HOLDING FOR SURGERY/PT STATED TO HOLD 5 DAYS PRIOR TO SURGERY, Disp: , Rfl:   •  atorvastatin (LIPITOR) 40 MG tablet, TAKE ONE TABLET BY MOUTH DAILY (Patient taking differently: Take 1 tablet by mouth Every Night.), Disp: 90 tablet, Rfl: 3  •  ibuprofen (ADVIL,MOTRIN) 600 MG tablet, , Disp: , Rfl:   •   "lisinopril-hydrochlorothiazide (PRINZIDE,ZESTORETIC) 20-12.5 MG per tablet, TAKE ONE TABLET BY MOUTH ONCE NIGHTLY (Patient taking differently: Take 1 tablet by mouth Every Night.), Disp: 90 tablet, Rfl: 3  •  metFORMIN (GLUCOPHAGE) 500 MG tablet, Take 1 tablet by mouth 2 (Two) Times a Day With Meals., Disp: 180 tablet, Rfl: 3  •  ondansetron (ZOFRAN) 8 MG tablet, Take 1 tablet by mouth 3 (Three) Times a Day As Needed for Nausea or Vomiting., Disp: 60 tablet, Rfl: 5  •  tadalafil (CIALIS) 5 MG tablet, Take 1 tablet by mouth Daily As Needed for Erectile Dysfunction. (Patient taking differently: Take 1 tablet by mouth Daily As Needed for Erectile Dysfunction. HOLD PRIOR TO SURGERY), Disp: 30 tablet, Rfl: 2  •  ferrous sulfate (FerrouSul) 325 (65 FE) MG tablet, Take 1 tablet by mouth 2 (Two) Times a Day., Disp: 180 tablet, Rfl: 1  No current facility-administered medications for this visit.    ALLERGIES:   No Known Allergies    SOCIAL HISTORY:       Social History     Socioeconomic History   • Marital status:      Spouse name: Chelsie   • Years of education: 12   Tobacco Use   • Smoking status: Some Days     Packs/day: 1.00     Years: 20.00     Pack years: 20.00     Types: Cigars, Cigarettes   • Smokeless tobacco: Never   • Tobacco comments:     OCCASIONALLY   Vaping Use   • Vaping Use: Never used   Substance and Sexual Activity   • Alcohol use: Yes     Alcohol/week: 6.0 standard drinks     Types: 6 Cans of beer per week     Comment: 6 drinks weekly   • Drug use: No         FAMILY HISTORY:  Family History   Problem Relation Age of Onset   • Clotting disorder Other    • Colon cancer Paternal Grandmother    • Colon cancer Paternal Grandfather    • Clotting disorder Father    • Malig Hyperthermia Neg Hx          Vitals:    03/07/23 0832   BP: 134/86   Pulse: 71   Resp: 16   Temp: 97.6 °F (36.4 °C)   TempSrc: Oral   SpO2: 96%   Weight: 96.1 kg (211 lb 14.4 oz)   Height: 177.8 cm (70\")   PainSc: 0-No pain     Current " Status 3/7/2023   ECOG score 0     Physical Exam  Vitals and nursing note reviewed.   Constitutional:       Appearance: Normal appearance. He is well-developed.   HENT:      Head: Normocephalic and atraumatic.      Comments: Patient wears mask due to the pandemic coronavirus infection.        Nose: Rhinorrhea present. Rhinorrhea is clear.      Right Turbinates: Pale.      Left Turbinates: Pale.      Right Sinus: Maxillary sinus tenderness present.      Left Sinus: Maxillary sinus tenderness present.      Comments: Small sore noted in bilateral nares that have scabbed over on exam.  Mild maxillary tenderness.  Noted upper wisdom teeth are impacted.  Clear sinus drainage with pale turbinates.  Eyes:      Conjunctiva/sclera: Conjunctivae normal.   Cardiovascular:      Rate and Rhythm: Normal rate and regular rhythm.      Heart sounds: Normal heart sounds. No murmur heard.  Pulmonary:      Effort: Pulmonary effort is normal. No respiratory distress.      Breath sounds: Normal breath sounds. No wheezing.   Abdominal:      General: Bowel sounds are normal. There is no distension.      Palpations: Abdomen is soft.      Tenderness: There is no abdominal tenderness. There is no guarding.   Musculoskeletal:         General: No swelling.   Lymphadenopathy:      Cervical: No cervical adenopathy.   Skin:     General: Skin is warm and dry.      Findings: No bruising.   Neurological:      Mental Status: He is alert and oriented to person, place, and time. Mental status is at baseline.   Psychiatric:         Behavior: Behavior normal.         Thought Content: Thought content normal.       RECENT LABS:    Results from last 7 days   Lab Units 03/07/23  0809   WBC 10*3/mm3 3.30*   HEMOGLOBIN g/dL 11.0*   HEMATOCRIT % 33.6*   PLATELETS 10*3/mm3 103*     Lab Results   Component Value Date    GLUCOSE 218 (H) 03/07/2023    BUN 13 03/07/2023    CREATININE 0.99 03/07/2023    EGFRIFNONA 70 02/23/2022    EGFRIFAFRI 81 02/23/2022    BCR 13.1  03/07/2023    K 4.5 03/07/2023    CO2 23.9 03/07/2023    CALCIUM 9.7 03/07/2023    PROTENTOTREF 7.2 08/24/2022    ALBUMIN 4.2 03/07/2023    LABIL2 1.9 08/24/2022    AST 21 03/07/2023    ALT 18 03/07/2023     Lab Results   Component Value Date    YTKQLACS31 260 08/24/2022     Lab Results   Component Value Date    FOLATE 15.50 09/22/2022     Lab Results   Component Value Date    IRON 108 02/07/2023    TIBC 386 02/07/2023    FERRITIN 229.80 02/07/2023   Iron saturation 4% on 9/18/2022.    Lab Results   Component Value Date    CEA 10.70 09/22/2022     Lab Results   Component Value Date    IRON 108 02/07/2023    TIBC 386 02/07/2023    FERRITIN 229.80 02/07/2023   Iron saturation 28% on 2/7/2023      PATHOLOGY:  Tumor sample for [facility] NGS study reported positive for K-aashish mutation exon2 p.G13D, negative for HER2/nu by IHC 0 and no amplification by FISH.  MSI stable by DNA sequencing, and also MMR proficient by IHC staining.  Tumor mutation burden is low 8 mut/Mb.  Patient was positive for PTEN 1+, 100% by IHC, PD-L1 was negative, only 1% by IHC.  Patient also positive for APC mutation exon 16p.[id number], positive for AMACR1 exon2 p.R358*.  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        IMAGING STUDY:      Assessment & Plan     ASSESSMENT:    * Colon cancer post right hemicolectomy 10/11/2022.  Stage IV ([id number]).  · Had positive stool occult blood test on 09/09/2022. Colonoscopic examination on 09/20/2022 by Dr. Anil Garces reported cecum colon mass.  Biopsy confirmed moderately differentiated adenocarcinoma.  MSI stable.  · Mildly elevated CEA level 10.7 ng/mL on 9/22/2022.  · CT scan for chest abdomen pelvis 9/26/2022 reported cecal mass.  There was also suspicious 9 mm hepatic lesion.  · Abdomen MRI examination with and without contrast on 10/7/2022 confirmed 8 mm lesion hypervascularity in the segment 5 of the liver.  · Patient had right hemicolectomy 10/11/2022.  Unable to biopsy at this time he liver lesion.   Pathology evaluation reported [id number] disease, this will be at least a stage IIIb colon cancer.  · On 11/2/2022 I discussed with the patient, his wife and his daughter, recommending further imaging study with PET scan for assessment.  If patient is no hypermetabolic liver lesion, will treat with adjuvant FOLFOX 6 regimen every 2 weeks for 12 cycles.  However, if he has hypermetabolic lesion in the liver, we will treated him as metastatic disease, with FOLFOX plus Avastin.  Since patient has neuropathy already being his legs and feet, if he is indeed metastatic disease, we may switch him to irinotecan instead of oxaliplatin because of the neuropathy.  If indicated patient has metastatic disease, we would also entertain metastectomy after finishing 12 cycles of chemotherapy.  · We should also testing for [facility] NGS genetic study to see if he would be a candidate for other treatment.  · Discussed with the patient and family members about potential side effects including bone marrow suppression, with anemia thrombocytopenia and leukocytopenia increased risk for infection, and also peripheral neuropathy, etc. patient is agreeable to proceed ahead with treatment.  · The patient has PET scan examination on 11/14/2022 which reported a small 1.3 cm focus with elevated SUV 6.03, highly suspicious for metastatic disease. The patient now has stage IV. The patient had [facility] NGS study which reported K-aashish mutation, tumor mutation burden < 10, MSI was stable and anti-PD-L1 negative.  Not a candidate for anti-EGFR monoclonal antibody such as Vectibix, or chekpoint inhibitor immunotherapy.  We discussed the adding anti-VEGF monoclonal antibody, bevacizumab/Avastin starting in 2 weeks so that it would be after 6 weeks of primary surgery for the colon cancer resection.  We discussed this is now stage IV disease, however because only having 1 solitary metastatic lesion in the liver, it is potentially curable so we will be as aggressive  as possible for chemotherapy.  If he has good response, in the future he will need metastectomy of the liver lesion.  · Today on 11/29/2022 patient is presented for cycle #2 of chemotherapy. Patient tolerated therapy well except mild oral mucositis. However, laboratory studies showed significant decrease in platelets at only 108,000, as well as also decreasing WBC and hemoglobin. Discussed with the patient today if we do not carry out any dose reduction, he will likely become more thrombocytopenic next time in 2 weeks and we will not be able to do cycle #3 chemotherapy on time. I discussed with the patient for dose reduction and to avoid potential delay of chemotherapy and he is agreeable. We will have 25% dose reduction for 5-FU leucovorin and oxaliplatin.  · On 11/29/2022 he was started the first dose of Avastin/bevacizumab in conjunction with chemotherapy cycle #2.  · On 12/13/2022, the patient presented for reevaluation prior to chemotherapy cycle #3. He has stable platelets of 108,000 and slightly improved WBC of 5100 and hemoglobin 12.8 g/dL. He will continue cycle 3 with the same 25% dose reduction.  · 1/10/2023 due for cycle 5 FOLFOX bevacizumab, overall is tolerating fairly well.  Hemoglobin 11.7, platelet count stable at 108,000, WBC 4.02, ANC 2.27.  · Patient has had five cycles of chemotherapy and a CT scan examination obtained on 01/20/2023. The CT scan reported favorable response as the lesion is less obvious.   · 1/24/2023 recommended to continue chemotherapy for a total of 12 cycles. Then obtain PET scan examination. Due to worsening thrombocytopenia, and also peripheral neuropathy, for cycle #6, I will decrease oxaliplatin by another 25% so would it be 50% of the original dose. I will keep the same dose of 5-FU which is 75% of the original dose.  Full dose Avastin.   · 2/7/2023 due for cycle 7.  Platelet count is holding steady at 100,000.  We will proceed with treatment today with same doses as  given on cycle 6.   · 2/21/2023 due for cycle #8 chemotherapy. We will repeat every 2 weeks.  · 3/7/2023 proceed with cycle #9 chemotherapy and hold Avastin for dental procedure on 3/13/2023.     2. Iron deficiency anemia.   · The patient has iron deficiency due to GI bleeding from his colon cancer. His laboratory study on 09/08/2022 reported ferritin 10.7 and iron saturation 4% with microcytic hypochromic anemia, hemoglobin 7.8, MCV 79.9 and MCHC 29.2.   · Patient reports good tolerance to oral iron treatment and already has improved energy level. Laboratory study on 9/22/2022 did report slightly improved hemoglobin at 8.5 but normalized MCV 85.0. I think he is responding to oral iron treatment both physically and laboratory wise.  We advised patient to increase oral iron to twice a day.  · On 11/2/2022 patient reports good tolerance to oral iron twice a day.  Labs today showed further improved hemoglobin 10.9.  He continues to maintain normal platelets and WBC.  · Improved hemoglobin 12.1 on 11/15/2022.  Cycle #1 chemotherapy will be started.  · On 11/29/2022 hemoglobin 11.7.  · On 12/13/2022, his hemoglobin is 12.8 g/dL.  · 1/10/2023 hemoglobin 11.7.  · The patient has trending down hemoglobin 11.0 on 1/24/2023, however, he is asymptomatic.  · Hemoglobin 2/7/2023, 10.6.  Iron study reported ferritin 229 and iron saturation 28% with free iron 108 TIBC 386.  No evidence for iron deficiency.  So his anemia is secondary to chemotherapy.     · The patient has stable mild anemia with hemoglobin 11.0 today on 3/7/2023. We will continue to monitor.  Continue to hold ferrous sulfate.     3.  Low normal vitamin B12 level.    · Patient had a marginal normal vitamin B12 level at 260 pg/mL on 08/24/2022.   · Folate level on 9/22/2022 was normal at 15.5 ng/mL.    · Continue oral vitamin B12 supplement at 1000 mcg daily.      4.  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  · Prior to starting chemotherapy patient had low  normal platelets 156,000 on 11/15/2022.  Started on cycle #1 chemotherapy FOLFOX 6.  · On 11/29/2022 patient had platelets of 108,000.  Patient reports no bleeding or bruising.  Discussed with the patient, because of foreseeable more severe thrombocytopenia, we recommended 25% dose reduction starting from cycle #2 chemotherapy.  · On 12/13/2022, the patient has same decreased number of platelets 108,000. He will continue chemotherapy with same dose reduction of 25%.   · 1/10/2023 platelet count stable at 108,000.  · On 01/24/2023, patient has worsening thrombocytopenia with platelets of 99,000 mcL. Discussed with the patient, we will cut oxaliplatin by another 25% to be at 50% of original dose. We will leave the 5-FU dose same as the previous at 75% of original dose. Avastin will be the same dose.  · 2/7/2023 platelet count 100,000.    · On 02/21/2023 the patient has stable thrombocytopenia with platelets 102,000. The patient reports no bleeding or bruising. Continue to monitor.   · On 3/7/2023 the patient has stable thrombocytopenia with platelets 103,000. The patient reports no bleeding or bruising. Continue to monitor.     5. Peripheral neuropathy  * On 12/13/2022, he reports cold sensitivity in his mouth when he drinks cold water. The cold sensitivity usually dissipates after 3 to 4 days.  · Patient reports he is more significant cold sensitivities lasting for more than a week. He does have moderate tingling, numbness involving the fingers, but not much as the toes.  · 2/7/2023 he feels like the neuropathy/cold sensitivity is lingering a little bit longer with each cycle.    · On 3/7/2023 the patient reports stable mild peripheral neuropathy/cold sensitivity.      *Weight losses.    · On 12/13/2022, the patient reports he has lost weight in the past couple of months. He reports a poor appetite for 3 days after chemotherapy and a poor taste in his mouth. His appetite has since improved. He only had mild nausea,  but no vomiting. He was encouraged to use Ensure or Boost to improve nutrition supplement. He already started using protein powder.  · 3/7/2023, patient's weight continues to improve and he has gained a couple pounds.  His weight is stable at 211 pounds today.  He reports his appetite continues to improve.  He denies any nausea or vomiting.    *[place] extraction: Patient states that he needs to have his wisdom teeth pull out.  Reviewed with Dr. Rosario.  Patient will need to have this done on his off week of treatment.  We will likely hold bevacizumab 2 weeks prior to this procedure and 4 weeks following.  Patient states that this will likely not happen until March, but is going to contact his oral surgeon, Dr. Charlie Zazueta to try to go ahead and get an appointment.  · [place] tooth extraction currently scheduled for March 13    Sinusitis  · 3/7/2023, start Benadryl at bedtime for sinus drainage.  Take half a dose of Benadryl if needed for tolerance.  Use saline nasal spray to help rinse the sinuses.  Patient may place bacitracin or Vaseline in bilateral nares to help heal sores.  He is to continue to sleep with a humidifier to help prevent sinus drying.  Maxillary sinus pain is most likely secondary to upper wisdom tooth impaction.    PLAN:  1. Proceed with cycle #9 FOLFOX today.  Hold Avastin secondary to anticipated wisdom teeth extraction on 3/13/2023. Continue dose reduction 50% of Oxaliplatin, 5FU at 75% reduction.  2. Continue antiemetics as needed.  No refills needed on 3/7/2023.  3. Continue oral B12 at 1000 mcg daily.  4.  Start Benadryl at bedtime for sinus drainage, saline nasal spray, and bacitracin or Vaseline to bilateral nares to help heal sores.  Continue to monitor.  5. The patient will see APRN in 2 weeks, with laboratory study prior to cycle #9 FOLFOX-6.  We will hold Avastin because of tooth extraction on 3/13/2023.  6. Patient is scheduled for wisdom tooth extraction March 13.  Avastin to be held 2  weeks prior to his extraction in 4 weeks postextraction.   7. Call/ return sooner should he develop any new concerns or problems.    8. Continue to follow with PCP for management of hypertension and diabetes.  Patient did report he had had multiple appointments canceled by his primary care provider and he may need a new provider in the future.  [facility] provider hotline number provided to him today.  He does report he does not need any refills on his medications at this time.    I discussed with the patient about laboratory results and further management plan.  Patient voiced understanding and agreeable.    Patient is on a high risk medication requiring close monitoring for toxicity.         JULIA Nathan        CC:  JULIA Santiago MD Richard Pokorny, MD   Oral surgeon: Dr. Charlie Zazueta -- [address]-- Phone [phone]      I spent 45 minutes caring for Taz on this date of service. This time includes time spent by me in the following activities: preparing for the visit, reviewing tests, obtaining and/or reviewing a separately obtained history, performing a medically appropriate examination and/or evaluation, counseling and educating the patient/family/caregiver, ordering medications, tests, or procedures, referring and communicating with other health care professionals, documenting information in the medical record, independently interpreting results and communicating that information with the patient/family/caregiver and care coordination

## 2023-03-07 NOTE — PROGRESS NOTES
.     REASON FOR FOLLOWUP:     *Cecum colon cancer, status post right hemicolectomy performed on 10/11/2022, [id number].   · CT scan examination on 9/26/2022 reported suspicious liver lesion 9 mm, otherwise no evidence for metastatic disease.    · The patient underwent an abdominal MRI examination on 10/06/2022, which reported suspicious liver lesion 8mm.  · Patient had sigmoidectomy on 10/11/2022.  Portacatheter placement on 10/21/2022.   · PET scan examination on 11/14/2022 reported solitary hypermetabolic liver lesion.   · Cycle #1 palliative chemotherapy FOLFOX 6 was started on 11/15/2022.   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  Not a candidate for anti-EGFR monoclonal antibody treatment.   · From cycle #2, bevacizumab/Avastin will be added to palliative chemotherapy.  Due to thrombocytopenia, all chemotherapy agents were 25% reduced from cycle #2.  *Iron deficiency anemia.  · On oral iron treatment.                                 HISTORY OF PRESENT ILLNESS:  The patient is a 72 y.o. 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  He returns today on 02/21/2023 the patient presented for evaluation prior to cycle #8 palliative chemotherapy with FOLFOX plus Avastin.     The patient reports mild cold sensitivity, especially when he is trying to open the door of his car or the door of his house from outside. Otherwise he has no numbness or tingling. He has been tolerating chemotherapy well with no nausea, vomiting. He does not take any nausea medication at all. He reports excellent performance status ECOG 0.    Laboratory study today on 02/21/2023 reported stable mild anemia with hemoglobin 11.0, low normal WBC 4100 and neutrophils 2430, and stable mild thrombocytopenia with platelets 102,000. He denies easy bleeding or bruising.       Past Medical History:    Diagnosis Date   • Abdominal hernia    • Anemia    • Arrhythmia     PT STATED DURING LAST COLONOSCOPY 2 WEEKS AGO   • Arthritis    • Chemotherapy-induced thrombocytopenia 12/4/2022   • Colon cancer (HCC) 09/22/2022   • Colon polyp September 20 2022   • Colon polyps    • Depression    • DVT (deep venous thrombosis) (HCC)     IN LEFT LEG   • Full dentures    • GI (gastrointestinal bleed) September 8 2022   • Hip pain     RIGHT   • Hyperlipidemia    • Hypertension    • Iron deficiency anemia 09/22/2022   • Numbness and tingling     RIGHT FOOT   • PONV (postoperative nausea and vomiting)    • Right leg pain    • Type 2 diabetes mellitus without complication, without long-term current use of insulin (HCC) 10/08/2019     Past Surgical History:   Procedure Laterality Date   • CERVICAL DISCECTOMY LAMINECTOMY DECOMPRESSION POSTERIOR FUSION WITH INSTRUMENTATION     • COLON RESECTION N/A 10/11/2022    Procedure: LAPAROSCOPIC RIGHT COLON RESECTION;  Surgeon: Ga Samaniego MD;  Location: [facility] OR;  Service: General;  Laterality: N/A;   • COLONOSCOPY N/A 2/28/2018    Procedure: COLONOSCOPY to TI and cecum with polypectomy;  Surgeon: Anil Garces MD;  Location: [facility] ENDOSCOPY;  Service:    • JOINT REPLACEMENT      LEFT HIP   • KNEE ARTHROSCOPY Left    • LUMBAR DISCECTOMY FUSION INSTRUMENTATION N/A 11/18/2020    Procedure: Lumbar 4 5 laminectomy with a posterior lateral fusion and instrumentation and interbody fusion;  Surgeon: Jeffrey Garcia MD;  Location: [facility] OR;  Service: Neurosurgery;  Laterality: N/A;   • TOTAL HIP ARTHROPLASTY Right 6/3/2021    Procedure: TOTAL HIP ARTHROPLASTY POSTERIOR;  Surgeon: Shlomo Campos MD;  Location: [facility] OR;  Service: Orthopedics;  Laterality: Right;   • VASECTOMY     • VENOUS ACCESS DEVICE (PORT) INSERTION N/A 10/21/2022    Procedure: INSERTION VENOUS ACCESS DEVICE;  Surgeon: Ga Samaniego MD;  Location: [facility] OR [facility];  Service: General;  Laterality:  N/A;     HEMATOLOGY/MEDICAL ONCOLOGY HISTORY: The patient is a 72 y.o. year old male with hypertension, hyperlipidemia, type 2 diabetes, iron deficiency anemia, history of DVT, who presented on 9/22/2022 for initial evaluation because of iron deficiency anemia, referred by his primary care provider, JULIA Santiago. Patient is accompanied by his wife who helped with the history. They reported the patient actually was being diagnosed of colon cancer 2 days ago. His GI specialist, Dr. Anil Garces, performed colonoscopic examination and saw a distal colon mass. I do not have the report for the procedure nor do I have the pathology report but nevertheless there was a mass in the distal colon likely in the sigmoid colon. I will obtain a copy of those reports when available.      Patient reports he had no apparent melena or hematochezia before starting oral iron treatment. He did have however significant fatigue. He reports exertional dyspnea and no syncope. Patient had laboratory study recently on 09/08/2022 which reported severe iron deficiency anemia with hemoglobin 7.8, MCV 79.9, MCHC 29.2. Normal platelets 217,000 and WBC 7230 without differentiation. Iron study reported ferritin 10.7 ng/mL, free iron 23, TIBC 593 and iron saturation 4%. Chemistry lab reported creatinine 1.11, potassium 5.4, otherwise normal sodium chloride and calcium, glucose 137. Prior to that the patient had normal liver function on 08/24/2022. He had a normal TSH 2.5 and slightly elevated hemoglobin A1c of 6.8% on that day.      The patient reports he has been on oral iron for almost 2 weeks, once a day with good tolerance and he now noticed improved energy level and less exertional dyspnea. He does report stool becoming dark-colored after he started oral iron.  He reports no syncope.     Laboratory study on 09/22/2022 reported improved hemoglobin 8.5 and normalized MCV 85.0, stable MCHC 29.4. Maintains normal platelets and WBC.       I saw  "him originally on 9/22/2022 for initial evaluation for his sigmoid colon cancer and iron deficiency anemia.  Since that time patient had multiple imaging studies including CT for chest abdomen pelvis, subsequently with MRI for the abdomen for staging purposes.  He was also seen by Dr. Samaniego who performed right hemicolectomy on 10/11/2022.    Dr. Samaniego called me on the day of surgery, he also suspected this liver lesion is metastatic, however unable to reach that during the surgery.    Patient notes since surgery on 10/11/2022 of the bowel resection he is recovering \"okay\". The patient does report some abdominal pain when adrianna his stomach, specifically when getting out of bed. The patient denies any issues with his appetite, and is having normal urination and bowel movements. The patient denies constipation.  Patient reports no fever sweating or chills.    The patient has type II diabetes, which he manages with metformin. 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    Patient reports he is able to tolerate oral iron twice a day with no specific complaints.  No nausea vomiting or constipation.      Laboratory study today on 11/2/2022 showed improved anemia with Hb 10.9, normalized MCV 92.5.  His normal platelets 159,000 WBC 6870 including ANC 4580.      PET scan examination on 11/14/2022 reported distal small 1.3 cm subtle low attenuation lesion in the medial hepatic segment with SUV 6.3.  There is no hypermetabolic enthesopathy in the abdomen or pelvis.  No suspicious hypermetabolic activity in the chest or neck.    Laboratory study on 11/15/2022 showed improved hemoglobin 12.1, normal platelets 156,000 and WBC 5720 including ANC 3750 and lymphocytes 1070.  Chemistry study reported glucose 156 otherwise unremarkable CMP.    Patient reports he developed a mild oral mucositis lasted about 4 days after the chemotherapy and now has resolved. He " believes he has lost some weight, but states he does have an appetite and eats everyday.  He denies nausea vomiting.  He denies having diarrhea or constipation. He denies having any fever, chills, or sweating.  Denies peripheral neuropathy.     Laboratory results from on 11/29/2022 are; white cell count is 3950. neutrophils are 2340. Hemoglobin is 11.7 g/dL. Platelets are 108,000. Chemistry lab was unremarkable except glucose 255.    MEDICATIONS:    Current Outpatient Medications:   •  acetaminophen (TYLENOL) 500 MG tablet, Take 1 tablet by mouth Every 6 (Six) Hours As Needed for Mild Pain., Disp: , Rfl:   •  acetaminophen-codeine (TYLENOL #3) 300-30 MG per tablet, , Disp: , Rfl:   •  aspirin 81 MG EC tablet, Take 1 tablet by mouth Daily. INSTRUCTED PT TO FOLLOW MD INSTRUCTIONS REGARDING HOLDING FOR SURGERY/PT STATED TO HOLD 5 DAYS PRIOR TO SURGERY, Disp: , Rfl:   •  atorvastatin (LIPITOR) 40 MG tablet, TAKE ONE TABLET BY MOUTH DAILY (Patient taking differently: Take 1 tablet by mouth Every Night.), Disp: 90 tablet, Rfl: 3  •  ibuprofen (ADVIL,MOTRIN) 600 MG tablet, , Disp: , Rfl:   •  lisinopril-hydrochlorothiazide (PRINZIDE,ZESTORETIC) 20-12.5 MG per tablet, TAKE ONE TABLET BY MOUTH ONCE NIGHTLY (Patient taking differently: Take 1 tablet by mouth Every Night.), Disp: 90 tablet, Rfl: 3  •  metFORMIN (GLUCOPHAGE) 500 MG tablet, Take 1 tablet by mouth 2 (Two) Times a Day With Meals., Disp: 180 tablet, Rfl: 3  •  ondansetron (ZOFRAN) 8 MG tablet, Take 1 tablet by mouth 3 (Three) Times a Day As Needed for Nausea or Vomiting., Disp: 60 tablet, Rfl: 5  •  tadalafil (CIALIS) 5 MG tablet, Take 1 tablet by mouth Daily As Needed for Erectile Dysfunction. (Patient taking differently: Take 1 tablet by mouth Daily As Needed for Erectile Dysfunction. HOLD PRIOR TO SURGERY), Disp: 30 tablet, Rfl: 2  •  ferrous sulfate (FerrouSul) 325 (65 FE) MG tablet, Take 1 tablet by mouth 2 (Two) Times a Day., Disp: 180 tablet, Rfl:  "1    ALLERGIES:   No Known Allergies    SOCIAL HISTORY:       Social History     Socioeconomic History   • Marital status:      Spouse name: Chelsie   • Years of education: 12   Tobacco Use   • Smoking status: Some Days     Packs/day: 1.00     Years: 20.00     Pack years: 20.00     Types: Cigars, Cigarettes   • Smokeless tobacco: Never   • Tobacco comments:     OCCASIONALLY   Vaping Use   • Vaping Use: Never used   Substance and Sexual Activity   • Alcohol use: Yes     Alcohol/week: 6.0 standard drinks     Types: 6 Cans of beer per week     Comment: 6 drinks weekly   • Drug use: No         FAMILY HISTORY:  Family History   Problem Relation Age of Onset   • Clotting disorder Other    • Colon cancer Paternal Grandmother    • Colon cancer Paternal Grandfather    • Clotting disorder Father    • Malig Hyperthermia Neg Hx          Vitals:    03/07/23 0832   BP: 134/86   Pulse: 71   Resp: 16   Temp: 97.6 °F (36.4 °C)   TempSrc: Oral   SpO2: 96%   Weight: 96.1 kg (211 lb 14.4 oz)   Height: 177.8 cm (70\")   PainSc: 0-No pain     Current Status 3/7/2023   ECOG score 0     Physical Exam  Vitals and nursing note reviewed.   Constitutional:       Appearance: Normal appearance. He is well-developed.   HENT:      Head: Normocephalic and atraumatic.      Comments: Patient wears mask due to the pandemic coronavirus infection.     Eyes:      Conjunctiva/sclera: Conjunctivae normal.   Cardiovascular:      Rate and Rhythm: Normal rate and regular rhythm.      Heart sounds: Normal heart sounds. No murmur heard.  Pulmonary:      Effort: Pulmonary effort is normal. No respiratory distress.      Breath sounds: Normal breath sounds. No wheezing.   Abdominal:      General: Bowel sounds are normal. There is no distension.      Palpations: Abdomen is soft.      Tenderness: There is no abdominal tenderness. There is no guarding.   Musculoskeletal:         General: No swelling.   Lymphadenopathy:      Cervical: No cervical adenopathy. "   Skin:     General: Skin is warm and dry.      Findings: No bruising.   Neurological:      Mental Status: He is alert and oriented to person, place, and time. Mental status is at baseline.   Psychiatric:         Behavior: Behavior normal.         Thought Content: Thought content normal.       RECENT LABS:    Results from last 7 days   Lab Units 03/07/23  0809   WBC 10*3/mm3 3.30*   HEMOGLOBIN g/dL 11.0*   HEMATOCRIT % 33.6*   PLATELETS 10*3/mm3 103*     Lab Results   Component Value Date    GLUCOSE 268 (H) 02/21/2023    BUN 16 02/21/2023    CREATININE 1.03 02/21/2023    EGFRIFNONA 70 02/23/2022    EGFRIFAFRI 81 02/23/2022    BCR 15.5 02/21/2023    K 4.6 02/21/2023    CO2 24.1 02/21/2023    CALCIUM 10.0 02/21/2023    PROTENTOTREF 7.2 08/24/2022    ALBUMIN 4.1 02/21/2023    LABIL2 1.9 08/24/2022    AST 22 02/21/2023    ALT 17 02/21/2023     Lab Results   Component Value Date    NATYWDXU99 260 08/24/2022     Lab Results   Component Value Date    FOLATE 15.50 09/22/2022     Lab Results   Component Value Date    IRON 108 02/07/2023    TIBC 386 02/07/2023    FERRITIN 229.80 02/07/2023   Iron saturation 4% on 9/18/2022.    Lab Results   Component Value Date    CEA 10.70 09/22/2022     Lab Results   Component Value Date    IRON 108 02/07/2023    TIBC 386 02/07/2023    FERRITIN 229.80 02/07/2023   Iron saturation 28% on 2/7/2023      PATHOLOGY:  Tumor sample for [facility] NGS study reported positive for K-aashish mutation exon2 p.G13D, negative for HER2/nu by IHC 0 and no amplification by FISH.  MSI stable by DNA sequencing, and also MMR proficient by IHC staining.  Tumor mutation burden is low 8 mut/Mb.  Patient was positive for PTEN 1+, 100% by IHC, PD-L1 was negative, only 1% by IHC.  Patient also positive for APC mutation exon 16p.Y5097dn, positive for AMACR1 exon2 p.R358*.  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        IMAGING STUDY:      Assessment & Plan     ASSESSMENT:    * Colon cancer post right hemicolectomy  10/11/2022.  Stage IV ([id number]).  · Had positive stool occult blood test on 09/09/2022. Colonoscopic examination on 09/20/2022 by Dr. Anil Garces reported cecum colon mass.  Biopsy confirmed moderately differentiated adenocarcinoma.  MSI stable.  · Mildly elevated CEA level 10.7 ng/mL on 9/22/2022.  · CT scan for chest abdomen pelvis 9/26/2022 reported cecal mass.  There was also suspicious 9 mm hepatic lesion.  · Abdomen MRI examination with and without contrast on 10/7/2022 confirmed 8 mm lesion hypervascularity in the segment 5 of the liver.  · Patient had right hemicolectomy 10/11/2022.  Unable to biopsy at this time he liver lesion.  Pathology evaluation reported [id number] disease, this will be at least a stage IIIb colon cancer.  · On 11/2/2022 I discussed with the patient, his wife and his daughter, recommending further imaging study with PET scan for assessment.  If patient is no hypermetabolic liver lesion, will treat with adjuvant FOLFOX 6 regimen every 2 weeks for 12 cycles.  However, if he has hypermetabolic lesion in the liver, we will treated him as metastatic disease, with FOLFOX plus Avastin.  Since patient has neuropathy already being his legs and feet, if he is indeed metastatic disease, we may switch him to irinotecan instead of oxaliplatin because of the neuropathy.  If indicated patient has metastatic disease, we would also entertain metastectomy after finishing 12 cycles of chemotherapy.  · We should also testing for [facility] NGS genetic study to see if he would be a candidate for other treatment.  · Discussed with the patient and family members about potential side effects including bone marrow suppression, with anemia thrombocytopenia and leukocytopenia increased risk for infection, and also peripheral neuropathy, etc. patient is agreeable to proceed ahead with treatment.  · The patient has PET scan examination on 11/14/2022 which reported a small 1.3 cm focus with elevated SUV 6.03, highly  suspicious for metastatic disease. The patient now has stage IV. The patient had [facility] NGS study which reported K-aashish mutation, tumor mutation burden < 10, MSI was stable and anti-PD-L1 negative.  Not a candidate for anti-EGFR monoclonal antibody such as Vectibix, or chekpoint inhibitor immunotherapy.  We discussed the adding anti-VEGF monoclonal antibody, bevacizumab/Avastin starting in 2 weeks so that it would be after 6 weeks of primary surgery for the colon cancer resection.  We discussed this is now stage IV disease, however because only having 1 solitary metastatic lesion in the liver, it is potentially curable so we will be as aggressive as possible for chemotherapy.  If he has good response, in the future he will need metastectomy of the liver lesion.  · Today on 11/29/2022 patient is presented for cycle #2 of chemotherapy. Patient tolerated therapy well except mild oral mucositis. However, laboratory studies showed significant decrease in platelets at only 108,000, as well as also decreasing WBC and hemoglobin. Discussed with the patient today if we do not carry out any dose reduction, he will likely become more thrombocytopenic next time in 2 weeks and we will not be able to do cycle #3 chemotherapy on time. I discussed with the patient for dose reduction and to avoid potential delay of chemotherapy and he is agreeable. We will have 25% dose reduction for 5-FU leucovorin and oxaliplatin.  · On 11/29/2022 he was started the first dose of Avastin/bevacizumab in conjunction with chemotherapy cycle #2.  · On 12/13/2022, the patient presented for reevaluation prior to chemotherapy cycle #3. He has stable platelets of 108,000 and slightly improved WBC of 5100 and hemoglobin 12.8 g/dL. He will continue cycle 3 with the same 25% dose reduction.  · 1/10/2023 due for cycle 5 FOLFOX bevacizumab, overall is tolerating fairly well.  Hemoglobin 11.7, platelet count stable at 108,000, WBC 4.02, ANC 2.27.  · Patient has  had five cycles of chemotherapy and a CT scan examination obtained on 01/20/2023. The CT scan reported favorable response as the lesion is less obvious.   · 1/24/2023 recommended to continue chemotherapy for a total of 12 cycles. Then obtain PET scan examination. Due to worsening thrombocytopenia, and also peripheral neuropathy, for cycle #6, I will decrease oxaliplatin by another 25% so would it be 50% of the original dose. I will keep the same dose of 5-FU which is 75% of the original dose.  Full dose Avastin.   · 2/7/2023 due for cycle 7.  Platelet count is holding steady at 100,000.  We will proceed with treatment today with same doses as given on cycle 6.   · On 02/21/2023 we will proceed with cycle #8 chemotherapy. We will repeat every 2 weeks.     2. Iron deficiency anemia.   · The patient has iron deficiency due to GI bleeding from his colon cancer. His laboratory study on 09/08/2022 reported ferritin 10.7 and iron saturation 4% with microcytic hypochromic anemia, hemoglobin 7.8, MCV 79.9 and MCHC 29.2.   · Patient reports good tolerance to oral iron treatment and already has improved energy level. Laboratory study on 9/22/2022 did report slightly improved hemoglobin at 8.5 but normalized MCV 85.0. I think he is responding to oral iron treatment both physically and laboratory wise.  We advised patient to increase oral iron to twice a day.  · On 11/2/2022 patient reports good tolerance to oral iron twice a day.  Labs today showed further improved hemoglobin 10.9.  He continues to maintain normal platelets and WBC.  · Improved hemoglobin 12.1 on 11/15/2022.  Cycle #1 chemotherapy will be started.  · On 11/29/2022 hemoglobin 11.7.  · On 12/13/2022, his hemoglobin is 12.8 g/dL.  · 1/10/2023 hemoglobin 11.7.  · The patient has trending down hemoglobin 11.0 on 1/24/2023, however, he is asymptomatic.  · Hemoglobin 2/7/2023, 10.6.  Iron study reported ferritin 229 and iron saturation 28% with free iron 108 TIBC 386.   No evidence for iron deficiency.  So his anemia is secondary to chemotherapy.     · The patient has stable mild anemia with hemoglobin 11.0 today on 02/21/2023. We will continue to monitor.      3.  Low normal vitamin B12 level.    · Patient had a marginal normal vitamin B12 level at 260 pg/mL on 08/24/2022.   · Folate level on 9/22/2022 was normal at 15.5 ng/mL.    · We asked patient to take oral vitamin B12 supplement at 1000 mcg daily.      4.  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  · Prior to starting chemotherapy patient had low normal platelets 156,000 on 11/15/2022.  Started on cycle #1 chemotherapy FOLFOX 6.  · On 11/29/2022 patient had platelets of 108,000.  Patient reports no bleeding or bruising.  Discussed with the patient, because of foreseeable more severe thrombocytopenia, we recommended 25% dose reduction starting from cycle #2 chemotherapy.  · On 12/13/2022, the patient has same decreased number of platelets 108,000. He will continue chemotherapy with same dose reduction of 25%.   · 1/10/2023 platelet count stable at 108,000.  · On 01/24/2023, patient has worsening thrombocytopenia with platelets of 99,000 mcL. Discussed with the patient, we will cut oxaliplatin by another 25% to be at 50% of original dose. We will leave the 5-FU dose same as the previous at 75% of original dose. Avastin will be the same dose.  · 2/7/2023 platelet count 100,000.    · On 02/21/2023 the patient has stable thrombocytopenia with platelets 102,000. The patient reports no bleeding or bruising. Continue to monitor.     5. Peripheral neuropathy  * On 12/13/2022, he reports cold sensitivity in his mouth when he drinks cold water. The cold sensitivity usually dissipates after 3 to 4 days.  · Patient reports he is more significant cold sensitivities lasting for more than a week. He does have moderate tingling, numbness involving the fingers, but not much as the toes.  · 2/7/2023 he feels like the neuropathy/cold  sensitivity is lingering a little bit longer with each cycle.    · On 02/21/2023 the patient reports stable mild peripheral neuropathy/cold sensitivity.      *Weight losses.    · On 12/13/2022, the patient reports he has lost weight in the past couple of months. He reports a poor appetite for 3 days after chemotherapy and a poor taste in his mouth. His appetite has since improved. He only had mild nausea, but no vomiting. He was encouraged to use Ensure or Boost to improve nutrition supplement. He already started using protein powder.    *[place] extraction: Patient states that he needs to have his wisdom teeth pull out.  Reviewed with Dr. Rosario.  Patient will need to have this done on his off week of treatment.  We will likely hold bevacizumab 2 weeks prior to this procedure and 4 weeks following.  Patient states that this will likely not happen until March, but is going to contact his oral surgeon, Dr. Charlie Zazueta to try to go ahead and get an appointment.  · [place] tooth extraction currently scheduled for March 13      PLAN:  1. Proceed with cycle #8 FOLFOX today with Avastin. Continue dose reduction 50% of Oxaliplatin, 5FU at 75% reduction and full dose Avastin.   2. Discontinue oral iron twice a day.  3. Continue antiemetics as needed.   4. Continue oral B12 at 1000 mcg daily.  5. The patient will see APRN in 2 weeks, with laboratory study prior to cycle #9 FOLFOX-6.  We will hold Avastin because of tooth extraction on 3/13/2023.  6. Patient is scheduled for wisdom tooth extraction March 13.  We will need to hold Avastin the week prior to this as well as for 4 weeks post extraction.   7. Call/ return sooner should he develop any new concerns or problems.    Continue to follow with PCP for management of hypertension and diabetes.    I discussed with the patient about laboratory results and further management plan.  Patient voiced understanding and agreeable.    Patient is on a high risk medication requiring close  monitoring for toxicity.         JULIA Nathan        CC:  JULIA Santiago MD Richard Pokorny, MD   Oral surgeon: Dr. Charlie Zazueta -- off [address]-- Phone [phone]

## 2023-03-09 ENCOUNTER — INFUSION (OUTPATIENT)
Dept: ONCOLOGY | Facility: HOSPITAL | Age: 73
End: 2023-03-09
Payer: MEDICARE

## 2023-03-09 VITALS
RESPIRATION RATE: 16 BRPM | WEIGHT: 212.2 LBS | DIASTOLIC BLOOD PRESSURE: 74 MMHG | BODY MASS INDEX: 30.45 KG/M2 | OXYGEN SATURATION: 94 % | HEART RATE: 76 BPM | SYSTOLIC BLOOD PRESSURE: 142 MMHG | TEMPERATURE: 98.4 F

## 2023-03-09 DIAGNOSIS — Z45.2 FITTING AND ADJUSTMENT OF VASCULAR CATHETER: ICD-10-CM

## 2023-03-09 DIAGNOSIS — C18.9 MALIGNANT NEOPLASM OF COLON, UNSPECIFIED PART OF COLON: ICD-10-CM

## 2023-03-09 DIAGNOSIS — Z79.899 ENCOUNTER FOR LONG-TERM (CURRENT) USE OF MEDICATIONS: Primary | ICD-10-CM

## 2023-03-09 PROCEDURE — 25010000002 HEPARIN LOCK FLUSH PER 10 UNITS: Performed by: INTERNAL MEDICINE

## 2023-03-09 PROCEDURE — 96360 HYDRATION IV INFUSION INIT: CPT

## 2023-03-09 RX ORDER — SODIUM CHLORIDE 0.9 % (FLUSH) 0.9 %
10 SYRINGE (ML) INJECTION AS NEEDED
Status: DISCONTINUED | OUTPATIENT
Start: 2023-03-09 | End: 2023-03-09 | Stop reason: HOSPADM

## 2023-03-09 RX ORDER — SODIUM CHLORIDE 0.9 % (FLUSH) 0.9 %
10 SYRINGE (ML) INJECTION AS NEEDED
Status: CANCELLED | OUTPATIENT
Start: 2023-03-09

## 2023-03-09 RX ORDER — SODIUM CHLORIDE 9 MG/ML
1000 INJECTION, SOLUTION INTRAVENOUS CONTINUOUS
Status: DISCONTINUED | OUTPATIENT
Start: 2023-03-09 | End: 2023-03-09 | Stop reason: HOSPADM

## 2023-03-09 RX ORDER — HEPARIN SODIUM (PORCINE) LOCK FLUSH IV SOLN 100 UNIT/ML 100 UNIT/ML
500 SOLUTION INTRAVENOUS AS NEEDED
Status: DISCONTINUED | OUTPATIENT
Start: 2023-03-09 | End: 2023-03-09 | Stop reason: HOSPADM

## 2023-03-09 RX ORDER — HEPARIN SODIUM (PORCINE) LOCK FLUSH IV SOLN 100 UNIT/ML 100 UNIT/ML
500 SOLUTION INTRAVENOUS AS NEEDED
Status: CANCELLED | OUTPATIENT
Start: 2023-03-09

## 2023-03-09 RX ADMIN — Medication 10 ML: at 12:08

## 2023-03-09 RX ADMIN — Medication 500 UNITS: at 12:08

## 2023-03-09 RX ADMIN — SODIUM CHLORIDE 1000 ML: 9 INJECTION, SOLUTION INTRAVENOUS at 10:42

## 2023-03-21 ENCOUNTER — INFUSION (OUTPATIENT)
Dept: ONCOLOGY | Facility: HOSPITAL | Age: 73
End: 2023-03-21
Payer: MEDICARE

## 2023-03-21 ENCOUNTER — OFFICE VISIT (OUTPATIENT)
Dept: ONCOLOGY | Facility: CLINIC | Age: 73
End: 2023-03-21
Payer: MEDICARE

## 2023-03-21 VITALS
BODY MASS INDEX: 31.07 KG/M2 | OXYGEN SATURATION: 97 % | WEIGHT: 217 LBS | TEMPERATURE: 97.8 F | HEIGHT: 70 IN | RESPIRATION RATE: 16 BRPM | HEART RATE: 56 BPM | SYSTOLIC BLOOD PRESSURE: 166 MMHG | DIASTOLIC BLOOD PRESSURE: 83 MMHG

## 2023-03-21 DIAGNOSIS — C18.9 MALIGNANT NEOPLASM OF COLON, UNSPECIFIED PART OF COLON: ICD-10-CM

## 2023-03-21 DIAGNOSIS — T45.1X5A ANEMIA ASSOCIATED WITH CHEMOTHERAPY: ICD-10-CM

## 2023-03-21 DIAGNOSIS — T45.1X5A CHEMOTHERAPY-INDUCED NEUTROPENIA: ICD-10-CM

## 2023-03-21 DIAGNOSIS — D69.59 CHEMOTHERAPY-INDUCED THROMBOCYTOPENIA: ICD-10-CM

## 2023-03-21 DIAGNOSIS — C18.9 MALIGNANT NEOPLASM OF COLON, UNSPECIFIED PART OF COLON: Primary | ICD-10-CM

## 2023-03-21 DIAGNOSIS — D61.810 PANCYTOPENIA DUE TO ANTINEOPLASTIC CHEMOTHERAPY: ICD-10-CM

## 2023-03-21 DIAGNOSIS — D70.1 CHEMOTHERAPY-INDUCED NEUTROPENIA: ICD-10-CM

## 2023-03-21 DIAGNOSIS — Z79.899 ENCOUNTER FOR LONG-TERM (CURRENT) USE OF MEDICATIONS: ICD-10-CM

## 2023-03-21 DIAGNOSIS — T45.1X5A PANCYTOPENIA DUE TO ANTINEOPLASTIC CHEMOTHERAPY: ICD-10-CM

## 2023-03-21 DIAGNOSIS — D64.81 ANEMIA ASSOCIATED WITH CHEMOTHERAPY: ICD-10-CM

## 2023-03-21 DIAGNOSIS — Z79.899 ENCOUNTER FOR LONG-TERM (CURRENT) USE OF MEDICATIONS: Primary | ICD-10-CM

## 2023-03-21 DIAGNOSIS — T45.1X5A CHEMOTHERAPY-INDUCED THROMBOCYTOPENIA: ICD-10-CM

## 2023-03-21 LAB
ALBUMIN SERPL-MCNC: 4 G/DL (ref 3.5–5.2)
ALBUMIN/GLOB SERPL: 1.5 G/DL (ref 1.1–2.4)
ALP SERPL-CCNC: 67 U/L (ref 38–116)
ALT SERPL W P-5'-P-CCNC: 15 U/L (ref 0–41)
ANION GAP SERPL CALCULATED.3IONS-SCNC: 9.8 MMOL/L (ref 5–15)
AST SERPL-CCNC: 25 U/L (ref 0–40)
BASOPHILS # BLD AUTO: 0.03 10*3/MM3 (ref 0–0.2)
BASOPHILS NFR BLD AUTO: 1 % (ref 0–1.5)
BILIRUB SERPL-MCNC: 0.6 MG/DL (ref 0.2–1.2)
BUN SERPL-MCNC: 17 MG/DL (ref 6–20)
BUN/CREAT SERPL: 16.3 (ref 7.3–30)
CALCIUM SPEC-SCNC: 9.2 MG/DL (ref 8.5–10.2)
CHLORIDE SERPL-SCNC: 107 MMOL/L (ref 98–107)
CO2 SERPL-SCNC: 24.2 MMOL/L (ref 22–29)
CREAT SERPL-MCNC: 1.04 MG/DL (ref 0.7–1.3)
DEPRECATED RDW RBC AUTO: 65.2 FL (ref 37–54)
EGFRCR SERPLBLD CKD-EPI 2021: 76.8 ML/MIN/1.73
EOSINOPHIL # BLD AUTO: 0.08 10*3/MM3 (ref 0–0.4)
EOSINOPHIL NFR BLD AUTO: 2.6 % (ref 0.3–6.2)
ERYTHROCYTE [DISTWIDTH] IN BLOOD BY AUTOMATED COUNT: 15.8 % (ref 12.3–15.4)
GLOBULIN UR ELPH-MCNC: 2.7 GM/DL (ref 1.8–3.5)
GLUCOSE SERPL-MCNC: 149 MG/DL (ref 74–124)
HCT VFR BLD AUTO: 31 % (ref 37.5–51)
HGB BLD-MCNC: 10 G/DL (ref 13–17.7)
IMM GRANULOCYTES # BLD AUTO: 0.01 10*3/MM3 (ref 0–0.05)
IMM GRANULOCYTES NFR BLD AUTO: 0.3 % (ref 0–0.5)
LYMPHOCYTES # BLD AUTO: 0.99 10*3/MM3 (ref 0.7–3.1)
LYMPHOCYTES NFR BLD AUTO: 31.6 % (ref 19.6–45.3)
MCH RBC QN AUTO: 36.8 PG (ref 26.6–33)
MCHC RBC AUTO-ENTMCNC: 32.3 G/DL (ref 31.5–35.7)
MCV RBC AUTO: 114 FL (ref 79–97)
MONOCYTES # BLD AUTO: 0.41 10*3/MM3 (ref 0.1–0.9)
MONOCYTES NFR BLD AUTO: 13.1 % (ref 5–12)
NEUTROPHILS NFR BLD AUTO: 1.61 10*3/MM3 (ref 1.7–7)
NEUTROPHILS NFR BLD AUTO: 51.4 % (ref 42.7–76)
NRBC BLD AUTO-RTO: 0 /100 WBC (ref 0–0.2)
PLATELET # BLD AUTO: 100 10*3/MM3 (ref 140–450)
PMV BLD AUTO: 10.9 FL (ref 6–12)
POTASSIUM SERPL-SCNC: 5 MMOL/L (ref 3.5–4.7)
PROT SERPL-MCNC: 6.7 G/DL (ref 6.3–8)
RBC # BLD AUTO: 2.72 10*6/MM3 (ref 4.14–5.8)
SODIUM SERPL-SCNC: 141 MMOL/L (ref 134–145)
WBC NRBC COR # BLD: 3.13 10*3/MM3 (ref 3.4–10.8)

## 2023-03-21 PROCEDURE — 25010000002 FOSAPREPITANT PER 1 MG: Performed by: INTERNAL MEDICINE

## 2023-03-21 PROCEDURE — 25010000002 LEUCOVORIN CALCIUM PER 50 MG: Performed by: INTERNAL MEDICINE

## 2023-03-21 PROCEDURE — 85025 COMPLETE CBC W/AUTO DIFF WBC: CPT

## 2023-03-21 PROCEDURE — 3079F DIAST BP 80-89 MM HG: CPT | Performed by: INTERNAL MEDICINE

## 2023-03-21 PROCEDURE — 99215 OFFICE O/P EST HI 40 MIN: CPT | Performed by: INTERNAL MEDICINE

## 2023-03-21 PROCEDURE — 3077F SYST BP >= 140 MM HG: CPT | Performed by: INTERNAL MEDICINE

## 2023-03-21 PROCEDURE — 96413 CHEMO IV INFUSION 1 HR: CPT

## 2023-03-21 PROCEDURE — 1126F AMNT PAIN NOTED NONE PRSNT: CPT | Performed by: INTERNAL MEDICINE

## 2023-03-21 PROCEDURE — 25010000002 FLUOROURACIL PER 500 MG: Performed by: INTERNAL MEDICINE

## 2023-03-21 PROCEDURE — 25010000002 DEXAMETHASONE SODIUM PHOSPHATE 100 MG/10ML SOLUTION: Performed by: INTERNAL MEDICINE

## 2023-03-21 PROCEDURE — 96411 CHEMO IV PUSH ADDL DRUG: CPT

## 2023-03-21 PROCEDURE — 80053 COMPREHEN METABOLIC PANEL: CPT

## 2023-03-21 PROCEDURE — 96415 CHEMO IV INFUSION ADDL HR: CPT

## 2023-03-21 PROCEDURE — 25010000002 OXALIPLATIN PER 0.5 MG: Performed by: INTERNAL MEDICINE

## 2023-03-21 PROCEDURE — 25010000002 PALONOSETRON PER 25 MCG: Performed by: INTERNAL MEDICINE

## 2023-03-21 PROCEDURE — 96368 THER/DIAG CONCURRENT INF: CPT

## 2023-03-21 PROCEDURE — G0498 CHEMO EXTEND IV INFUS W/PUMP: HCPCS

## 2023-03-21 PROCEDURE — 96367 TX/PROPH/DG ADDL SEQ IV INF: CPT

## 2023-03-21 PROCEDURE — 96375 TX/PRO/DX INJ NEW DRUG ADDON: CPT

## 2023-03-21 PROCEDURE — 96416 CHEMO PROLONG INFUSE W/PUMP: CPT

## 2023-03-21 RX ORDER — DEXTROSE MONOHYDRATE 50 MG/ML
250 INJECTION, SOLUTION INTRAVENOUS ONCE
Status: CANCELLED | OUTPATIENT
Start: 2023-03-21

## 2023-03-21 RX ORDER — SODIUM CHLORIDE 9 MG/ML
1000 INJECTION, SOLUTION INTRAVENOUS CONTINUOUS
Status: CANCELLED
Start: 2023-03-23

## 2023-03-21 RX ORDER — SODIUM CHLORIDE 9 MG/ML
250 INJECTION, SOLUTION INTRAVENOUS ONCE
Status: CANCELLED | OUTPATIENT
Start: 2023-03-21

## 2023-03-21 RX ORDER — PALONOSETRON 0.05 MG/ML
0.25 INJECTION, SOLUTION INTRAVENOUS ONCE
Status: COMPLETED | OUTPATIENT
Start: 2023-03-21 | End: 2023-03-21

## 2023-03-21 RX ORDER — FLUOROURACIL 50 MG/ML
300 INJECTION, SOLUTION INTRAVENOUS ONCE
Status: COMPLETED | OUTPATIENT
Start: 2023-03-21 | End: 2023-03-21

## 2023-03-21 RX ORDER — FAMOTIDINE 10 MG/ML
20 INJECTION, SOLUTION INTRAVENOUS AS NEEDED
Status: CANCELLED | OUTPATIENT
Start: 2023-03-21

## 2023-03-21 RX ORDER — LEVOFLOXACIN 500 MG/1
500 TABLET, FILM COATED ORAL DAILY
Qty: 7 TABLET | Refills: 0 | Status: SHIPPED | OUTPATIENT
Start: 2023-03-21 | End: 2023-04-18

## 2023-03-21 RX ORDER — DEXTROSE MONOHYDRATE 50 MG/ML
250 INJECTION, SOLUTION INTRAVENOUS ONCE
Status: COMPLETED | OUTPATIENT
Start: 2023-03-21 | End: 2023-03-21

## 2023-03-21 RX ORDER — PALONOSETRON 0.05 MG/ML
0.25 INJECTION, SOLUTION INTRAVENOUS ONCE
Status: CANCELLED | OUTPATIENT
Start: 2023-03-21

## 2023-03-21 RX ORDER — DIPHENHYDRAMINE HYDROCHLORIDE 50 MG/ML
50 INJECTION INTRAMUSCULAR; INTRAVENOUS AS NEEDED
Status: CANCELLED | OUTPATIENT
Start: 2023-03-21

## 2023-03-21 RX ORDER — FLUOROURACIL 50 MG/ML
300 INJECTION, SOLUTION INTRAVENOUS ONCE
Status: CANCELLED | OUTPATIENT
Start: 2023-03-21

## 2023-03-21 RX ADMIN — DEXAMETHASONE SODIUM PHOSPHATE 12 MG: 10 INJECTION, SOLUTION INTRAMUSCULAR; INTRAVENOUS at 09:44

## 2023-03-21 RX ADMIN — SODIUM CHLORIDE 100 ML: 9 INJECTION, SOLUTION INTRAVENOUS at 10:01

## 2023-03-21 RX ADMIN — DEXTROSE MONOHYDRATE 250 ML: 50 INJECTION, SOLUTION INTRAVENOUS at 09:42

## 2023-03-21 RX ADMIN — PALONOSETRON 0.25 MG: 0.05 INJECTION, SOLUTION INTRAVENOUS at 09:42

## 2023-03-21 RX ADMIN — OXALIPLATIN 90 MG: 100 INJECTION, SOLUTION, CONCENTRATE INTRAVENOUS at 10:33

## 2023-03-21 RX ADMIN — FLUOROURACIL 640 MG: 50 INJECTION, SOLUTION INTRAVENOUS at 12:35

## 2023-03-21 RX ADMIN — LEUCOVORIN CALCIUM 640 MG: 350 INJECTION, POWDER, LYOPHILIZED, FOR SOLUTION INTRAMUSCULAR; INTRAVENOUS at 10:32

## 2023-03-21 RX ADMIN — FLUOROURACIL 3850 MG: 50 INJECTION, SOLUTION INTRAVENOUS at 12:35

## 2023-03-21 NOTE — PROGRESS NOTES
.     REASON FOR FOLLOWUP:     *Cecum colon cancer, status post right hemicolectomy performed on 10/11/2022, [id number].   · CT scan examination on 9/26/2022 reported suspicious liver lesion 9 mm, otherwise no evidence for metastatic disease.    · The patient underwent an abdominal MRI examination on 10/06/2022, which reported suspicious liver lesion 8mm.  · Patient had sigmoidectomy on 10/11/2022.  Portacatheter placement on 10/21/2022.   · PET scan examination on 11/14/2022 reported solitary hypermetabolic liver lesion.   · Cycle #1 palliative chemotherapy FOLFOX 6 was started on 11/15/2022.   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  Not a candidate for anti-EGFR monoclonal antibody treatment.   · From cycle #2, bevacizumab/Avastin will be added to palliative chemotherapy.  Due to thrombocytopenia, all chemotherapy agents were 25% reduced from cycle #2.  ·  1/24/203 further dose reduction of Oxaliplatin to 50% of original dose.   *Iron deficiency anemia.  · Oral iron treatment held on 2/21/2023.  Continue to monitor labs.                               HISTORY OF PRESENT ILLNESS:  The patient is a 72 y.o. 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      Patient presented today on 3/21/2023 for evaluation prior to cycle #10 palliative chemotherapy with FOLFOX6.  Avastin is on hold due to extraction of wisdom teeth on 3/13/2023.    Patient reports he is doing well denies fever sweating chills.  No excessive bleeding.  He is tolerating chemotherapy with no nausea vomiting.  Has mild peripheral neuropathy.  Otherwise no specific complaints.     Patient reports he is tolerating treatment well.  He does report some mild cold sensitivity for a couple days following his infusion.  He has been trying to wear gloves when handling cold items.  His neuropathy is otherwise  unchanged in his hands and feet.      He still has some mild constipation but has been taking Senokot-S with bowel movement on a daily basis typically.       Laboratory studies today on 3/21/2023 reported pancytopenia, with platelets 100,000, hemoglobin 10.0 .0, in the WBC 3130 including mild neutropenia with ANC 1610.         Past Medical History:   Diagnosis Date   • Abdominal hernia    • Anemia    • Arrhythmia     PT STATED DURING LAST COLONOSCOPY 2 WEEKS AGO   • Arthritis    • Chemotherapy-induced thrombocytopenia 12/4/2022   • Colon cancer (HCC) 09/22/2022   • Colon polyp September 20 2022   • Colon polyps    • Depression    • DVT (deep venous thrombosis) (HCC)     IN LEFT LEG   • Full dentures    • GI (gastrointestinal bleed) September 8 2022   • Hip pain     RIGHT   • Hyperlipidemia    • Hypertension    • Iron deficiency anemia 09/22/2022   • Numbness and tingling     RIGHT FOOT   • PONV (postoperative nausea and vomiting)    • Right leg pain    • Type 2 diabetes mellitus without complication, without long-term current use of insulin (HCC) 10/08/2019     Past Surgical History:   Procedure Laterality Date   • CERVICAL DISCECTOMY LAMINECTOMY DECOMPRESSION POSTERIOR FUSION WITH INSTRUMENTATION     • COLON RESECTION N/A 10/11/2022    Procedure: LAPAROSCOPIC RIGHT COLON RESECTION;  Surgeon: Ga Samaniego MD;  Location: [facility] OR;  Service: General;  Laterality: N/A;   • COLONOSCOPY N/A 2/28/2018    Procedure: COLONOSCOPY to TI and cecum with polypectomy;  Surgeon: Anil Garces MD;  Location: [facility] ENDOSCOPY;  Service:    • JOINT REPLACEMENT      LEFT HIP   • KNEE ARTHROSCOPY Left    • LUMBAR DISCECTOMY FUSION INSTRUMENTATION N/A 11/18/2020    Procedure: Lumbar 4 5 laminectomy with a posterior lateral fusion and instrumentation and interbody fusion;  Surgeon: Jeffrey Garcia MD;  Location: [facility] OR;  Service: Neurosurgery;  Laterality: N/A;   • TOTAL HIP ARTHROPLASTY Right 6/3/2021     Procedure: TOTAL HIP ARTHROPLASTY POSTERIOR;  Surgeon: Shlomo Campos MD;  Location: [facility] OR;  Service: Orthopedics;  Laterality: Right;   • VASECTOMY     • VENOUS ACCESS DEVICE (PORT) INSERTION N/A 10/21/2022    Procedure: INSERTION VENOUS ACCESS DEVICE;  Surgeon: Ga Samaniego MD;  Location[facility] OR [facility];  Service: General;  Laterality: N/A;     HEMATOLOGY/MEDICAL ONCOLOGY HISTORY: The patient is a 72 y.o. year old male with hypertension, hyperlipidemia, type 2 diabetes, iron deficiency anemia, history of DVT, who presented on 9/22/2022 for initial evaluation because of iron deficiency anemia, referred by his primary care provider, JULIA Santiago. Patient is accompanied by his wife who helped with the history. They reported the patient actually was being diagnosed of colon cancer 2 days ago. His GI specialist, Dr. Anil Garces, performed colonoscopic examination and saw a distal colon mass. I do not have the report for the procedure nor do I have the pathology report but nevertheless there was a mass in the distal colon likely in the sigmoid colon. I will obtain a copy of those reports when available.      Patient reports he had no apparent melena or hematochezia before starting oral iron treatment. He did have however significant fatigue. He reports exertional dyspnea and no syncope. Patient had laboratory study recently on 09/08/2022 which reported severe iron deficiency anemia with hemoglobin 7.8, MCV 79.9, MCHC 29.2. Normal platelets 217,000 and WBC 7230 without differentiation. Iron study reported ferritin 10.7 ng/mL, free iron 23, TIBC 593 and iron saturation 4%. Chemistry lab reported creatinine 1.11, potassium 5.4, otherwise normal sodium chloride and calcium, glucose 137. Prior to that the patient had normal liver function on 08/24/2022. He had a normal TSH 2.5 and slightly elevated hemoglobin A1c of 6.8% on that day.      The patient reports he has been on oral iron for almost 2  "weeks, once a day with good tolerance and he now noticed improved energy level and less exertional dyspnea. He does report stool becoming dark-colored after he started oral iron.  He reports no syncope.     Laboratory study on 09/22/2022 reported improved hemoglobin 8.5 and normalized MCV 85.0, stable MCHC 29.4. Maintains normal platelets and WBC.       I saw him originally on 9/22/2022 for initial evaluation for his sigmoid colon cancer and iron deficiency anemia.  Since that time patient had multiple imaging studies including CT for chest abdomen pelvis, subsequently with MRI for the abdomen for staging purposes.  He was also seen by Dr. Samaniego who performed right hemicolectomy on 10/11/2022.    Dr. Samaniego called me on the day of surgery, he also suspected this liver lesion is metastatic, however unable to reach that during the surgery.    Patient notes since surgery on 10/11/2022 of the bowel resection he is recovering \"okay\". The patient does report some abdominal pain when adrianna his stomach, specifically when getting out of bed. The patient denies any issues with his appetite, and is having normal urination and bowel movements. The patient denies constipation.  Patient reports no fever sweating or chills.    The patient has type II diabetes, which he manages with metformin. 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    Patient reports he is able to tolerate oral iron twice a day with no specific complaints.  No nausea vomiting or constipation.      Laboratory study today on 11/2/2022 showed improved anemia with Hb 10.9, normalized MCV 92.5.  His normal platelets 159,000 WBC 6870 including ANC 4580.      PET scan examination on 11/14/2022 reported distal small 1.3 cm subtle low attenuation lesion in the medial hepatic segment with SUV 6.3.  There is no hypermetabolic enthesopathy in the abdomen or pelvis.  No suspicious hypermetabolic " activity in the chest or neck.    Laboratory study on 11/15/2022 showed improved hemoglobin 12.1, normal platelets 156,000 and WBC 5720 including ANC 3750 and lymphocytes 1070.  Chemistry study reported glucose 156 otherwise unremarkable CMP.    Patient reports he developed a mild oral mucositis lasted about 4 days after the chemotherapy and now has resolved. He believes he has lost some weight, but states he does have an appetite and eats everyday.  He denies nausea vomiting.  He denies having diarrhea or constipation. He denies having any fever, chills, or sweating.  Denies peripheral neuropathy.     Laboratory results from on 11/29/2022 are; white cell count is 3950. neutrophils are 2340. Hemoglobin is 11.7 g/dL. Platelets are 108,000. Chemistry lab was unremarkable except glucose 255.    MEDICATIONS:    Current Outpatient Medications:   •  acetaminophen (TYLENOL) 500 MG tablet, Take 1 tablet by mouth Every 6 (Six) Hours As Needed for Mild Pain., Disp: , Rfl:   •  acetaminophen-codeine (TYLENOL #3) 300-30 MG per tablet, , Disp: , Rfl:   •  aspirin 81 MG EC tablet, Take 1 tablet by mouth Daily. INSTRUCTED PT TO FOLLOW MD INSTRUCTIONS REGARDING HOLDING FOR SURGERY/PT STATED TO HOLD 5 DAYS PRIOR TO SURGERY, Disp: , Rfl:   •  atorvastatin (LIPITOR) 40 MG tablet, TAKE ONE TABLET BY MOUTH DAILY (Patient taking differently: Take 1 tablet by mouth Every Night.), Disp: 90 tablet, Rfl: 3  •  ferrous sulfate (FerrouSul) 325 (65 FE) MG tablet, Take 1 tablet by mouth 2 (Two) Times a Day., Disp: 180 tablet, Rfl: 1  •  ibuprofen (ADVIL,MOTRIN) 600 MG tablet, , Disp: , Rfl:   •  lisinopril-hydrochlorothiazide (PRINZIDE,ZESTORETIC) 20-12.5 MG per tablet, TAKE ONE TABLET BY MOUTH ONCE NIGHTLY (Patient taking differently: Take 1 tablet by mouth Every Night.), Disp: 90 tablet, Rfl: 3  •  metFORMIN (GLUCOPHAGE) 500 MG tablet, Take 1 tablet by mouth 2 (Two) Times a Day With Meals., Disp: 180 tablet, Rfl: 3  •  ondansetron (ZOFRAN) 8  "MG tablet, Take 1 tablet by mouth 3 (Three) Times a Day As Needed for Nausea or Vomiting., Disp: 60 tablet, Rfl: 5  •  tadalafil (CIALIS) 5 MG tablet, Take 1 tablet by mouth Daily As Needed for Erectile Dysfunction. (Patient taking differently: Take 1 tablet by mouth Daily As Needed for Erectile Dysfunction. HOLD PRIOR TO SURGERY), Disp: 30 tablet, Rfl: 2    ALLERGIES:   No Known Allergies    SOCIAL HISTORY:       Social History     Socioeconomic History   • Marital status:      Spouse name: Chelsie   • Years of education: 12   Tobacco Use   • Smoking status: Some Days     Packs/day: 1.00     Years: 20.00     Pack years: 20.00     Types: Cigars, Cigarettes   • Smokeless tobacco: Never   • Tobacco comments:     OCCASIONALLY   Vaping Use   • Vaping Use: Never used   Substance and Sexual Activity   • Alcohol use: Yes     Alcohol/week: 6.0 standard drinks     Types: 6 Cans of beer per week     Comment: 6 drinks weekly   • Drug use: No         FAMILY HISTORY:  Family History   Problem Relation Age of Onset   • Clotting disorder Other    • Colon cancer Paternal Grandmother    • Colon cancer Paternal Grandfather    • Clotting disorder Father    • Malig Hyperthermia Neg Hx          Vitals:    03/21/23 0829   BP: 166/83   Pulse: 56   Resp: 16   Temp: 97.8 °F (36.6 °C)   TempSrc: Temporal   SpO2: 97%   Weight: 98.4 kg (217 lb)   Height: 177.8 cm (70\")   PainSc: 0-No pain     Current Status 3/21/2023   ECOG score 0     Physical Exam  Vitals and nursing note reviewed.   Constitutional:       Appearance: Normal appearance. He is well-developed.   HENT:      Head: Normocephalic and atraumatic.      Comments: Patient wears mask due to the pandemic coronavirus infection.        Nose:      Right Turbinates: Pale.      Left Turbinates: Pale.      Right Sinus: Maxillary sinus tenderness present.      Left Sinus: Maxillary sinus tenderness present.      Comments: Small sore noted in bilateral nares that have scabbed over on exam.  " Mild maxillary tenderness.  Noted upper wisdom teeth are impacted.  Clear sinus drainage with pale turbinates.  Eyes:      General: No scleral icterus.     Conjunctiva/sclera: Conjunctivae normal.   Cardiovascular:      Rate and Rhythm: Normal rate and regular rhythm.      Heart sounds: Normal heart sounds. No murmur heard.  Pulmonary:      Effort: Pulmonary effort is normal. No respiratory distress.      Breath sounds: Normal breath sounds. No wheezing.   Abdominal:      General: Bowel sounds are normal. There is no distension.      Palpations: Abdomen is soft.      Tenderness: There is no abdominal tenderness. There is no guarding.   Musculoskeletal:         General: No swelling.   Lymphadenopathy:      Cervical: No cervical adenopathy.   Skin:     General: Skin is warm and dry.      Findings: No bruising.      Comments: Portacatheter site has no signs of infection   Neurological:      Mental Status: He is alert and oriented to person, place, and time. Mental status is at baseline.   Psychiatric:         Mood and Affect: Mood normal.         Behavior: Behavior normal.         Thought Content: Thought content normal.       RECENT LABS:    Results from last 7 days   Lab Units 03/21/23  0814   WBC 10*3/mm3 3.13*   HEMOGLOBIN g/dL 10.0*   HEMATOCRIT % 31.0*   PLATELETS 10*3/mm3 100*     Lab Results   Component Value Date    GLUCOSE 149 (H) 03/21/2023    BUN 17 03/21/2023    CREATININE 1.04 03/21/2023    EGFRIFNONA 70 02/23/2022    EGFRIFAFRI 81 02/23/2022    BCR 16.3 03/21/2023    K 5.0 (H) 03/21/2023    CO2 24.2 03/21/2023    CALCIUM 9.2 03/21/2023    PROTENTOTREF 7.2 08/24/2022    ALBUMIN 4.0 03/21/2023    LABIL2 1.9 08/24/2022    AST 25 03/21/2023    ALT 15 03/21/2023     Lab Results   Component Value Date    BFKAHHOH19 260 08/24/2022     Lab Results   Component Value Date    FOLATE 15.50 09/22/2022       Lab Results   Component Value Date    CEA 10.70 09/22/2022     Lab Results   Component Value Date    IRON 108  02/07/2023    TIBC 386 02/07/2023    FERRITIN 229.80 02/07/2023   Iron saturation 28% on 2/7/2023      IMAGING STUDY:      Assessment & Plan     ASSESSMENT:    * Colon cancer post right hemicolectomy 10/11/2022.  Stage IV ([id number]).  · Had positive stool occult blood test on 09/09/2022. Colonoscopic examination on 09/20/2022 by Dr. Anil Garces reported cecum colon mass.  Biopsy confirmed moderately differentiated adenocarcinoma.  MSI stable.  · Mildly elevated CEA level 10.7 ng/mL on 9/22/2022.  · CT scan for chest abdomen pelvis 9/26/2022 reported cecal mass.  There was also suspicious 9 mm hepatic lesion.  · Abdomen MRI examination with and without contrast on 10/7/2022 confirmed 8 mm lesion hypervascularity in the segment 5 of the liver.  · Patient had right hemicolectomy 10/11/2022.  Unable to biopsy at this time he liver lesion.  Pathology evaluation reported [id number] disease, this will be at least a stage IIIb colon cancer.  · On 11/2/2022 I discussed with the patient, his wife and his daughter, recommending further imaging study with PET scan for assessment.  If patient is no hypermetabolic liver lesion, will treat with adjuvant FOLFOX 6 regimen every 2 weeks for 12 cycles.  However, if he has hypermetabolic lesion in the liver, we will treated him as metastatic disease, with FOLFOX plus Avastin.  Since patient has neuropathy already being his legs and feet, if he is indeed metastatic disease, we may switch him to irinotecan instead of oxaliplatin because of the neuropathy.  If indicated patient has metastatic disease, we would also entertain metastectomy after finishing 12 cycles of chemotherapy.    · We should also testing for [employer] NGS genetic study to see if he would be a candidate for other treatment.  PATHOLOGY:  · Tumor sample for [facility] NGS study reported positive for K-aashish mutation exon2 p.G13D, negative for HER2/nu by IHC 0 and no amplification by FISH.  MSI stable by DNA sequencing, and also MMR  proficient by IHC staining.  Tumor mutation burden is low 8 mut/Mb.  Patient was positive for PTEN 1+, 100% by IHC, PD-L1 was negative, only 1% by IHC.  Patient also positive for APC mutation exon 16p.[id number], positive for AMACR1 exon2 p.R358*.  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    · Discussed with the patient and family members about potential side effects including bone marrow suppression, with anemia thrombocytopenia and leukocytopenia increased risk for infection, and also peripheral neuropathy, etc. patient is agreeable to proceed ahead with treatment.  · The patient has PET scan examination on 11/14/2022 which reported a small 1.3 cm focus with elevated SUV 6.03, highly suspicious for metastatic disease. The patient now has stage IV. The patient had [facility] NGS study which reported K-aashish mutation, tumor mutation burden < 10, MSI was stable and anti-PD-L1 negative.  Not a candidate for anti-EGFR monoclonal antibody such as Vectibix, or chekpoint inhibitor immunotherapy.  We discussed the adding anti-VEGF monoclonal antibody, bevacizumab/Avastin starting in 2 weeks so that it would be after 6 weeks of primary surgery for the colon cancer resection.  We discussed this is now stage IV disease, however because only having 1 solitary metastatic lesion in the liver, it is potentially curable so we will be as aggressive as possible for chemotherapy.  If he has good response, in the future he will need metastectomy of the liver lesion.  · Today on 11/29/2022 patient is presented for cycle #2 of chemotherapy. Patient tolerated therapy well except mild oral mucositis. However, laboratory studies showed significant decrease in platelets at only 108,000, as well as also decreasing WBC and hemoglobin. Discussed with the patient today if we do not carry out any dose reduction, he will likely become more thrombocytopenic next time in 2 weeks and we will not be able to do cycle #3 chemotherapy on time. I discussed  with the patient for dose reduction and to avoid potential delay of chemotherapy and he is agreeable. We will have 25% dose reduction for 5-FU leucovorin and oxaliplatin.  · On 11/29/2022 he was started the first dose of Avastin/bevacizumab in conjunction with chemotherapy cycle #2.  · On 12/13/2022, the patient presented for reevaluation prior to chemotherapy cycle #3. He has stable platelets of 108,000 and slightly improved WBC of 5100 and hemoglobin 12.8 g/dL. He will continue cycle 3 with the same 25% dose reduction.  · 1/10/2023 due for cycle 5 FOLFOX bevacizumab, overall is tolerating fairly well.  Hemoglobin 11.7, platelet count stable at 108,000, WBC 4.02, ANC 2.27.  · Patient has had five cycles of chemotherapy and a CT scan examination obtained on 01/20/2023. The CT scan reported favorable response as the lesion is less obvious.   · 1/24/2023 recommended to continue chemotherapy for a total of 12 cycles. Then obtain PET scan examination. Due to worsening thrombocytopenia, and also peripheral neuropathy, for cycle #6, I will decrease oxaliplatin by another 25% so would it be 50% of the original dose. I will keep the same dose of 5-FU which is 75% of the original dose.  Full dose Avastin.   · 2/7/2023 due for cycle 7.  Platelet count is holding steady at 100,000.  We will proceed with treatment today with same doses as given on cycle 6.   · 2/21/2023 due for cycle #8 chemotherapy. We will repeat every 2 weeks.  · 3/7/2023 proceed with cycle #9 chemotherapy and hold Avastin for dental procedure on 3/13/2023.  · On 3/21/2023 we will proceed ahead with cycle #10 chemotherapy FOLFOX6 regimen.  Continue to hold Avastin.     2. Iron deficiency anemia.   · The patient has iron deficiency due to GI bleeding from his colon cancer. His laboratory study on 09/08/2022 reported ferritin 10.7 and iron saturation 4% with microcytic hypochromic anemia, hemoglobin 7.8, MCV 79.9 and MCHC 29.2.   · Patient reports good  tolerance to oral iron treatment and already has improved energy level. Laboratory study on 9/22/2022 did report slightly improved hemoglobin at 8.5 but normalized MCV 85.0. I think he is responding to oral iron treatment both physically and laboratory wise.  We advised patient to increase oral iron to twice a day.  · On 11/2/2022 patient reports good tolerance to oral iron twice a day.  Labs today showed further improved hemoglobin 10.9.  He continues to maintain normal platelets and WBC.  · Improved hemoglobin 12.1 on 11/15/2022.  Cycle #1 chemotherapy will be started.  · On 11/29/2022 hemoglobin 11.7.  · On 12/13/2022, his hemoglobin is 12.8 g/dL.  · 1/10/2023 hemoglobin 11.7.  · The patient has trending down hemoglobin 11.0 on 1/24/2023, however, he is asymptomatic.  · Hemoglobin 2/7/2023, 10.6.  Iron study reported ferritin 229 and iron saturation 28% with free iron 108 TIBC 386.  No evidence for iron deficiency.  So his anemia is secondary to chemotherapy.     · The patient has stable mild anemia with hemoglobin 11.0 on 3/7/2023. We will continue to monitor.  Continue to hold ferrous sulfate.  · On 3/21/2023, stable anemia with a hemoglobin 10.0 .0.  Continue to monitor.     3.    Pancytopenia secondary to chemotherapy.    This patient had original iron deficiency, however now has ongoing anemia due to chemotherapy.  He also his thrombocytopenia and neutropenia from chemotherapy.  · Normal neutrophil prior to starting chemotherapy.    · This patient developed first mild neutropenia with ANC 1610 on 3/21/2023.  Patient reports no fever sweating chills.  I recommended starting patient on Levaquin daily for 7 days, for prophylaxis of neutropenic fever.  We will continue to monitor for now.  Expecting this will getting worse with ongoing chemotherapy.  Question the patient to call us if he develops fever sweating chills.     4.  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  · Prior to starting chemotherapy  patient had low normal platelets 156,000 on 11/15/2022.  Started on cycle #1 chemotherapy FOLFOX 6.  · On 11/29/2022 patient had platelets of 108,000.  Patient reports no bleeding or bruising.  Discussed with the patient, because of foreseeable more severe thrombocytopenia, we recommended 25% dose reduction starting from cycle #2 chemotherapy.  · On 12/13/2022, the patient has same decreased number of platelets 108,000. He will continue chemotherapy with same dose reduction of 25%.   · 1/10/2023 platelet count stable at 108,000.  · On 01/24/2023, patient has worsening thrombocytopenia with platelets of 99,000 mcL. Discussed with the patient, we will cut oxaliplatin by another 25% to be at 50% of original dose. We will leave the 5-FU dose same as the previous at 75% of original dose. Avastin will be the same dose.  · 2/7/2023 platelet count 100,000.    · On 02/21/2023 the patient has stable thrombocytopenia with platelets 102,000. The patient reports no bleeding or bruising. Continue to monitor.   · On 3/7/2023 the patient has stable thrombocytopenia with platelets 103,000. The patient reports no bleeding or bruising. Continue to monitor.   · On 3/21/2023 persistent stable thrombocytopenia with platelets 100,000.     5. Peripheral neuropathy  * On 12/13/2022, he reports cold sensitivity in his mouth when he drinks cold water. The cold sensitivity usually dissipates after 3 to 4 days.  · Patient reports he is more significant cold sensitivities lasting for more than a week. He does have moderate tingling, numbness involving the fingers, but not much as the toes.  · 2/7/2023 he feels like the neuropathy/cold sensitivity is lingering a little bit longer with each cycle.    · On 3/21/2023 the patient reports stable mild peripheral neuropathy/cold sensitivity.      6.  Weight losses.    · On 12/13/2022, the patient reports he has lost weight in the past couple of months. He reports a poor appetite for 3 days after  chemotherapy and a poor taste in his mouth. His appetite has since improved. He only had mild nausea, but no vomiting. He was encouraged to use Ensure or Boost to improve nutrition supplement. He already started using protein powder.  · 3/7/2023, patient's weight continues to improve and he has gained a couple pounds.  His weight is stable at 211 pounds today.  He reports his appetite continues to improve.  He denies any nausea or vomiting.   · On 3/21/2023 weight 217 pounds.     7.  [place] extraction: Patient states that he needs to have his wisdom teeth pull out.  Reviewed with Dr. Rosario.  Patient will need to have this done on his off week of treatment.  We will likely hold bevacizumab 2 weeks prior to this procedure and 4 weeks following.  Patient states that this will likely not happen until March, but is going to contact his oral surgeon, Dr. Charlie Zazueta to try to go ahead and get an appointment.  · [place] tooth extraction done on 3/13/2023.      8.  Low normal vitamin B12 level.    · Patient had a marginal normal vitamin B12 level at 260 pg/mL on 08/24/2022.   · Folate level on 9/22/2022 was normal at 15.5 ng/mL.    · Continue oral vitamin B12 supplement at 1000 mcg daily.       9.  Sinusitis  · 3/7/2023, start Benadryl at bedtime for sinus drainage.  Take half a dose of Benadryl if needed for tolerance.  Use saline nasal spray to help rinse the sinuses.  Patient may place bacitracin or Vaseline in bilateral nares to help heal sores.  He is to continue to sleep with a humidifier to help prevent sinus drying.  Maxillary sinus pain is most likely secondary to upper wisdom tooth impaction.     PLAN:  1. Proceed with cycle #10 FOLFOX today.  Hold Avastin secondary to wisdom teeth extraction on 3/13/2023. Continue dose reduction 50% of Oxaliplatin, 5FU at 75% reduction.   2. Patient return in 2 days to discontinue 5-FU.   3. Start Levaquin 500 mg daily for 7 days,, this started in 4 days for prophylaxis of  neutropenic fever.  4. Check CBC in 1 week, nurse to review.   5. Continue antiemetics as needed.   6. Continue oral B12 at 1000 mcg daily.  7. Continue Benadryl at bedtime for sinus drainage, saline nasal spray, and bacitracin or Vaseline to bilateral nares to help heal sores.  Continue to monitor.  8. The patient will see APRN in 2 weeks, with laboratory study prior to cycle #11 FOLFOX-6.  We will hold Avastin because of tooth extraction on 3/13/2023.  9. Patient will see me in 4 weeks, prior to his cycle #12 chemotherapy.  We will resume Avastin at that time.    10. Call/ return sooner should he develop any new concerns or problems.    11. Continue to follow with PCP for management of hypertension and diabetes.  Patient did report he had had multiple appointments canceled by his primary care provider and he may need a new provider in the future.  [facility] provider hotline number provided to him today.  He does report he does not need any refills on his medications at this time.    I discussed with the patient about laboratory results and further management plan.  Patient voiced understanding and agreeable.      Patient is on a high risk medication requiring close monitoring for toxicity.    More than 42 min were used for patient care, over half of that time were for counseling.           Maya Rosario MD PhD        CC:  JULIA Santiago MD Richard Pokorny, MD   Oral surgeon: Dr. Charlie Zazueta -- [address]-- Phone [phone]

## 2023-03-23 ENCOUNTER — INFUSION (OUTPATIENT)
Dept: ONCOLOGY | Facility: HOSPITAL | Age: 73
End: 2023-03-23
Payer: MEDICARE

## 2023-03-23 ENCOUNTER — OFFICE VISIT (OUTPATIENT)
Dept: INTERNAL MEDICINE | Facility: CLINIC | Age: 73
End: 2023-03-23
Payer: MEDICARE

## 2023-03-23 ENCOUNTER — TELEPHONE (OUTPATIENT)
Dept: ONCOLOGY | Facility: CLINIC | Age: 73
End: 2023-03-23
Payer: MEDICARE

## 2023-03-23 VITALS
WEIGHT: 217 LBS | BODY MASS INDEX: 32.14 KG/M2 | HEART RATE: 60 BPM | HEIGHT: 69 IN | OXYGEN SATURATION: 97 % | SYSTOLIC BLOOD PRESSURE: 132 MMHG | DIASTOLIC BLOOD PRESSURE: 90 MMHG | TEMPERATURE: 97.7 F

## 2023-03-23 DIAGNOSIS — I10 BENIGN ESSENTIAL HYPERTENSION: Primary | Chronic | ICD-10-CM

## 2023-03-23 DIAGNOSIS — E78.00 HYPERCHOLESTEROLEMIA: Chronic | ICD-10-CM

## 2023-03-23 DIAGNOSIS — Z79.899 ENCOUNTER FOR LONG-TERM (CURRENT) USE OF MEDICATIONS: Primary | ICD-10-CM

## 2023-03-23 DIAGNOSIS — Z45.2 FITTING AND ADJUSTMENT OF VASCULAR CATHETER: ICD-10-CM

## 2023-03-23 DIAGNOSIS — C18.9 MALIGNANT NEOPLASM OF COLON, UNSPECIFIED PART OF COLON: ICD-10-CM

## 2023-03-23 DIAGNOSIS — Z00.00 ENCOUNTER FOR MEDICAL EXAMINATION TO ESTABLISH CARE: ICD-10-CM

## 2023-03-23 PROCEDURE — 25010000002 HEPARIN LOCK FLUSH PER 10 UNITS: Performed by: INTERNAL MEDICINE

## 2023-03-23 RX ORDER — SODIUM CHLORIDE 0.9 % (FLUSH) 0.9 %
10 SYRINGE (ML) INJECTION AS NEEDED
OUTPATIENT
Start: 2023-03-23

## 2023-03-23 RX ORDER — SODIUM CHLORIDE 0.9 % (FLUSH) 0.9 %
10 SYRINGE (ML) INJECTION AS NEEDED
Status: DISCONTINUED | OUTPATIENT
Start: 2023-03-23 | End: 2023-03-23 | Stop reason: HOSPADM

## 2023-03-23 RX ORDER — HEPARIN SODIUM (PORCINE) LOCK FLUSH IV SOLN 100 UNIT/ML 100 UNIT/ML
500 SOLUTION INTRAVENOUS AS NEEDED
OUTPATIENT
Start: 2023-03-23

## 2023-03-23 RX ORDER — HEPARIN SODIUM (PORCINE) LOCK FLUSH IV SOLN 100 UNIT/ML 100 UNIT/ML
500 SOLUTION INTRAVENOUS AS NEEDED
Status: DISCONTINUED | OUTPATIENT
Start: 2023-03-23 | End: 2023-03-23 | Stop reason: HOSPADM

## 2023-03-23 RX ADMIN — Medication 500 UNITS: at 11:35

## 2023-03-23 RX ADMIN — Medication 10 ML: at 11:35

## 2023-03-23 NOTE — TELEPHONE ENCOUNTER
Patient called stating he was unsure when he was to start Levaquin Dr Rosario prescribed on 3/21/2023. Per Dr Rosario patient to start Levaquin on Saturday 325/2023 fir 7 days    Patient v/u

## 2023-03-23 NOTE — PATIENT INSTRUCTIONS
Discuss with Dr. Rosario office on whether lipid panel and hemoglobin A1c could be drawn. If not, will draw labs in May after chemotherapy. Refills of medication to be sent to [facility] when needed. Follow-up in 3 months.

## 2023-03-23 NOTE — PROGRESS NOTES
"Chief Complaint  Establish Care, Diabetes, and Hypertension    Subjective        Taz Dickey presents to [facility]  History of Present Illness  72-year-old male presenting with diabetes, hypertension and to establish care.  Previous patient of JULIA Santiago.  Works as a  and .  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      Sees Dr. Rosario for colon cancer treatment.  Had a port placed in October.  Tolerating treatment well.    Hemoglobin A1c in August was 6.6.  States he has been eating too many donuts recently.  Takes metformin 500 mg twice a day.    Takes lisinopril-hydrochlorothiazide for hypertension.  Tolerating well.      Diabetes    Hypertension        Objective   Vital Signs:  /90   Pulse 60   Temp 97.7 °F (36.5 °C)   Ht 175.3 cm (69\") Comment: current height  Wt 98.4 kg (217 lb)   SpO2 97%   BMI 32.05 kg/m²   Estimated body mass index is 32.05 kg/m² as calculated from the following:    Height as of this encounter: 175.3 cm (69\").    Weight as of this encounter: 98.4 kg (217 lb).       BMI is >= 30 and <35. (Class 1 Obesity). The following options were offered after discussion;: exercise counseling/recommendations and nutrition counseling/recommendations      Physical Exam  Vitals reviewed.   Constitutional:       Appearance: Normal appearance.   Musculoskeletal:         General: Normal range of motion.      Cervical back: Normal range of motion.   Skin:     General: Skin is warm and dry.      Capillary Refill: Capillary refill takes less than 2 seconds.   Neurological:      General: No focal deficit present.      Mental Status: He is alert and oriented to person, place, and time.   Psychiatric:         Mood and Affect: Mood normal.         Behavior: Behavior normal.         Thought Content: Thought content normal.         Judgment: Judgment normal.        Result Review :    Common labs    Common Labs 3/21/23 3/21/23 3/28/23 " 4/4/23 4/4/23    0814 0814  0952 0952   Glucose  149 (A)   207 (A)   BUN  17   27 (A)   Creatinine  1.04   1.18   Sodium  141   138   Potassium  5.0 (A)   4.6   Chloride  107   102   Calcium  9.2   9.7   Albumin  4.0   4.2   Total Bilirubin  0.6   0.8   Alkaline Phosphatase  67   64   AST (SGOT)  25   27   ALT (SGPT)  15   15   WBC 3.13 (A)  3.05 (A) 3.27 (A)    Hemoglobin 10.0 (A)  11.3 (A) 10.2 (A)    Hematocrit 31.0 (A)  34.5 (A) 31.0 (A)    Platelets 100 (A)  145 96 (A)    (A) Abnormal value                Current Outpatient Medications on File Prior to Visit   Medication Sig Dispense Refill   • aspirin 81 MG EC tablet Take 1 tablet by mouth Daily. INSTRUCTED PT TO FOLLOW MD INSTRUCTIONS REGARDING HOLDING FOR SURGERY/PT STATED TO HOLD 5 DAYS PRIOR TO SURGERY     • atorvastatin (LIPITOR) 40 MG tablet TAKE ONE TABLET BY MOUTH DAILY (Patient taking differently: Take 1 tablet by mouth Every Night.) 90 tablet 3   • levoFLOXacin (Levaquin) 500 MG tablet Take 1 tablet by mouth Daily. 7 tablet 0   • lisinopril-hydrochlorothiazide (PRINZIDE,ZESTORETIC) 20-12.5 MG per tablet TAKE ONE TABLET BY MOUTH ONCE NIGHTLY (Patient taking differently: Take 1 tablet by mouth Every Night.) 90 tablet 3   • metFORMIN (GLUCOPHAGE) 500 MG tablet Take 1 tablet by mouth 2 (Two) Times a Day With Meals. 180 tablet 3   • acetaminophen (TYLENOL) 500 MG tablet Take 1 tablet by mouth Every 6 (Six) Hours As Needed for Mild Pain.     • acetaminophen-codeine (TYLENOL #3) 300-30 MG per tablet      • ibuprofen (ADVIL,MOTRIN) 600 MG tablet Take  by mouth As Needed. Was for dental     • ondansetron (ZOFRAN) 8 MG tablet Take 1 tablet by mouth 3 (Three) Times a Day As Needed for Nausea or Vomiting. (Patient taking differently: Take 1 tablet by mouth 3 (Three) Times a Day As Needed for Nausea or Vomiting. PRN) 60 tablet 5   • tadalafil (CIALIS) 5 MG tablet Take 1 tablet by mouth Daily As Needed for Erectile Dysfunction. 30 tablet 2     No current  facility-administered medications on file prior to visit.              Assessment and Plan   Diagnoses and all orders for this visit:    1. Benign essential hypertension (Primary)  Assessment & Plan:  Continue lisinopril-hydrochlorothiazide 20-12.5 mg daily.  Limit salt intake.      2. Hypercholesterolemia  Assessment & Plan:  Continue atorvastatin 40 mg daily.        3. Encounter for medical examination to establish care    Patient Instructions   Discuss with Dr. Rosario office on whether lipid panel and hemoglobin A1c could be drawn. If not, will draw labs in May after chemotherapy. [facility]. Follow-up in 3 months.            Follow Up   Return in about 3 months (around 6/23/2023) for Recheck.  Patient was given instructions and counseling regarding his condition or for health maintenance advice. Please see specific information pulled into the AVS if appropriate.

## 2023-03-28 ENCOUNTER — INFUSION (OUTPATIENT)
Dept: ONCOLOGY | Facility: HOSPITAL | Age: 73
End: 2023-03-28
Payer: MEDICARE

## 2023-03-28 ENCOUNTER — LAB (OUTPATIENT)
Dept: LAB | Facility: HOSPITAL | Age: 73
End: 2023-03-28
Payer: MEDICARE

## 2023-03-28 ENCOUNTER — APPOINTMENT (OUTPATIENT)
Dept: ONCOLOGY | Facility: HOSPITAL | Age: 73
End: 2023-03-28
Payer: MEDICARE

## 2023-03-28 DIAGNOSIS — C18.9 MALIGNANT NEOPLASM OF COLON, UNSPECIFIED PART OF COLON: ICD-10-CM

## 2023-03-28 LAB
BASOPHILS # BLD AUTO: 0.03 10*3/MM3 (ref 0–0.2)
BASOPHILS NFR BLD AUTO: 1 % (ref 0–1.5)
DEPRECATED RDW RBC AUTO: 59.7 FL (ref 37–54)
EOSINOPHIL # BLD AUTO: 0.05 10*3/MM3 (ref 0–0.4)
EOSINOPHIL NFR BLD AUTO: 1.6 % (ref 0.3–6.2)
ERYTHROCYTE [DISTWIDTH] IN BLOOD BY AUTOMATED COUNT: 14.8 % (ref 12.3–15.4)
HCT VFR BLD AUTO: 34.5 % (ref 37.5–51)
HGB BLD-MCNC: 11.3 G/DL (ref 13–17.7)
IMM GRANULOCYTES # BLD AUTO: 0.02 10*3/MM3 (ref 0–0.05)
IMM GRANULOCYTES NFR BLD AUTO: 0.7 % (ref 0–0.5)
LYMPHOCYTES # BLD AUTO: 1.02 10*3/MM3 (ref 0.7–3.1)
LYMPHOCYTES NFR BLD AUTO: 33.4 % (ref 19.6–45.3)
MCH RBC QN AUTO: 36.1 PG (ref 26.6–33)
MCHC RBC AUTO-ENTMCNC: 32.8 G/DL (ref 31.5–35.7)
MCV RBC AUTO: 110.2 FL (ref 79–97)
MONOCYTES # BLD AUTO: 0.26 10*3/MM3 (ref 0.1–0.9)
MONOCYTES NFR BLD AUTO: 8.5 % (ref 5–12)
NEUTROPHILS NFR BLD AUTO: 1.67 10*3/MM3 (ref 1.7–7)
NEUTROPHILS NFR BLD AUTO: 54.8 % (ref 42.7–76)
NRBC BLD AUTO-RTO: 1 /100 WBC (ref 0–0.2)
PLATELET # BLD AUTO: 145 10*3/MM3 (ref 140–450)
PMV BLD AUTO: 10.9 FL (ref 6–12)
RBC # BLD AUTO: 3.13 10*6/MM3 (ref 4.14–5.8)
WBC NRBC COR # BLD: 3.05 10*3/MM3 (ref 3.4–10.8)

## 2023-03-28 PROCEDURE — 36415 COLL VENOUS BLD VENIPUNCTURE: CPT

## 2023-03-28 PROCEDURE — G0463 HOSPITAL OUTPT CLINIC VISIT: HCPCS

## 2023-03-28 PROCEDURE — 85025 COMPLETE CBC W/AUTO DIFF WBC: CPT

## 2023-03-28 NOTE — NURSING NOTE
Patient is here for lab review with RN.  CBC reviewed, counts are stable for this patient at this time. ANC has improved slightly. Patient states he has been more fatigued and has no appetite but states it might be better today. Informed Niya DUMONT, per NP pt should finish his abx. Pt rossy. Copy of labs given to patient and follow up appointment reviewed. Patient is instructed to call the office with any concerns or new symptoms prior to next visit. Patient verbalized understanding and discharged in stable condition.    Lab Results   Component Value Date    NEUTROABS 1.67 (L) 03/28/2023     Lab Results   Component Value Date    WBC 3.05 (L) 03/28/2023    HGB 11.3 (L) 03/28/2023    HCT 34.5 (L) 03/28/2023    .2 (H) 03/28/2023     03/28/2023

## 2023-04-04 ENCOUNTER — INFUSION (OUTPATIENT)
Dept: ONCOLOGY | Facility: HOSPITAL | Age: 73
End: 2023-04-04
Payer: MEDICARE

## 2023-04-04 ENCOUNTER — OFFICE VISIT (OUTPATIENT)
Dept: ONCOLOGY | Facility: CLINIC | Age: 73
End: 2023-04-04
Payer: MEDICARE

## 2023-04-04 VITALS
BODY MASS INDEX: 30.76 KG/M2 | TEMPERATURE: 97.8 F | HEIGHT: 69 IN | DIASTOLIC BLOOD PRESSURE: 83 MMHG | SYSTOLIC BLOOD PRESSURE: 144 MMHG | RESPIRATION RATE: 16 BRPM | HEART RATE: 60 BPM | WEIGHT: 207.7 LBS | OXYGEN SATURATION: 97 %

## 2023-04-04 DIAGNOSIS — C78.7 SECONDARY LIVER CANCER: ICD-10-CM

## 2023-04-04 DIAGNOSIS — Z79.899 ENCOUNTER FOR LONG-TERM (CURRENT) USE OF MEDICATIONS: Primary | ICD-10-CM

## 2023-04-04 DIAGNOSIS — T45.1X5A CHEMOTHERAPY-INDUCED THROMBOCYTOPENIA: ICD-10-CM

## 2023-04-04 DIAGNOSIS — C18.9 MALIGNANT NEOPLASM OF COLON, UNSPECIFIED PART OF COLON: ICD-10-CM

## 2023-04-04 DIAGNOSIS — T45.1X5A ANEMIA ASSOCIATED WITH CHEMOTHERAPY: ICD-10-CM

## 2023-04-04 DIAGNOSIS — D69.59 CHEMOTHERAPY-INDUCED THROMBOCYTOPENIA: ICD-10-CM

## 2023-04-04 DIAGNOSIS — C18.2 MALIGNANT NEOPLASM OF ASCENDING COLON: Primary | ICD-10-CM

## 2023-04-04 DIAGNOSIS — T45.1X5A CHEMOTHERAPY-INDUCED NEUTROPENIA: ICD-10-CM

## 2023-04-04 DIAGNOSIS — D64.81 ANEMIA ASSOCIATED WITH CHEMOTHERAPY: ICD-10-CM

## 2023-04-04 DIAGNOSIS — Z79.899 ENCOUNTER FOR LONG-TERM (CURRENT) USE OF MEDICATIONS: ICD-10-CM

## 2023-04-04 DIAGNOSIS — D70.1 CHEMOTHERAPY-INDUCED NEUTROPENIA: ICD-10-CM

## 2023-04-04 DIAGNOSIS — E53.8 VITAMIN B12 DEFICIENCY: ICD-10-CM

## 2023-04-04 PROBLEM — D61.810 PANCYTOPENIA DUE TO ANTINEOPLASTIC CHEMOTHERAPY: Status: ACTIVE | Noted: 2023-04-04

## 2023-04-04 LAB
ALBUMIN SERPL-MCNC: 4.2 G/DL (ref 3.5–5.2)
ALBUMIN/GLOB SERPL: 1.6 G/DL (ref 1.1–2.4)
ALP SERPL-CCNC: 64 U/L (ref 38–116)
ALT SERPL W P-5'-P-CCNC: 15 U/L (ref 0–41)
ANION GAP SERPL CALCULATED.3IONS-SCNC: 10.4 MMOL/L (ref 5–15)
AST SERPL-CCNC: 27 U/L (ref 0–40)
BASOPHILS # BLD AUTO: 0.04 10*3/MM3 (ref 0–0.2)
BASOPHILS NFR BLD AUTO: 1.2 % (ref 0–1.5)
BILIRUB SERPL-MCNC: 0.8 MG/DL (ref 0.2–1.2)
BILIRUB UR QL STRIP: NEGATIVE
BUN SERPL-MCNC: 27 MG/DL (ref 6–20)
BUN/CREAT SERPL: 22.9 (ref 7.3–30)
CALCIUM SPEC-SCNC: 9.7 MG/DL (ref 8.5–10.2)
CHLORIDE SERPL-SCNC: 102 MMOL/L (ref 98–107)
CLARITY UR: CLEAR
CO2 SERPL-SCNC: 25.6 MMOL/L (ref 22–29)
COLOR UR: YELLOW
CREAT SERPL-MCNC: 1.18 MG/DL (ref 0.7–1.3)
DEPRECATED RDW RBC AUTO: 59.9 FL (ref 37–54)
EGFRCR SERPLBLD CKD-EPI 2021: 66 ML/MIN/1.73
EOSINOPHIL # BLD AUTO: 0.09 10*3/MM3 (ref 0–0.4)
EOSINOPHIL NFR BLD AUTO: 2.8 % (ref 0.3–6.2)
ERYTHROCYTE [DISTWIDTH] IN BLOOD BY AUTOMATED COUNT: 14.6 % (ref 12.3–15.4)
GLOBULIN UR ELPH-MCNC: 2.7 GM/DL (ref 1.8–3.5)
GLUCOSE SERPL-MCNC: 207 MG/DL (ref 74–124)
GLUCOSE UR STRIP-MCNC: ABNORMAL MG/DL
HCT VFR BLD AUTO: 31 % (ref 37.5–51)
HGB BLD-MCNC: 10.2 G/DL (ref 13–17.7)
HGB UR QL STRIP.AUTO: NEGATIVE
IMM GRANULOCYTES # BLD AUTO: 0.01 10*3/MM3 (ref 0–0.05)
IMM GRANULOCYTES NFR BLD AUTO: 0.3 % (ref 0–0.5)
KETONES UR QL STRIP: NEGATIVE
LEUKOCYTE ESTERASE UR QL STRIP.AUTO: NEGATIVE
LYMPHOCYTES # BLD AUTO: 0.81 10*3/MM3 (ref 0.7–3.1)
LYMPHOCYTES NFR BLD AUTO: 24.8 % (ref 19.6–45.3)
MCH RBC QN AUTO: 36.8 PG (ref 26.6–33)
MCHC RBC AUTO-ENTMCNC: 32.9 G/DL (ref 31.5–35.7)
MCV RBC AUTO: 111.9 FL (ref 79–97)
MONOCYTES # BLD AUTO: 0.45 10*3/MM3 (ref 0.1–0.9)
MONOCYTES NFR BLD AUTO: 13.8 % (ref 5–12)
NEUTROPHILS NFR BLD AUTO: 1.87 10*3/MM3 (ref 1.7–7)
NEUTROPHILS NFR BLD AUTO: 57.1 % (ref 42.7–76)
NITRITE UR QL STRIP: NEGATIVE
NRBC BLD AUTO-RTO: 0 /100 WBC (ref 0–0.2)
PH UR STRIP.AUTO: 5.5 [PH] (ref 4.5–8)
PLATELET # BLD AUTO: 96 10*3/MM3 (ref 140–450)
PMV BLD AUTO: 11.1 FL (ref 6–12)
POTASSIUM SERPL-SCNC: 4.6 MMOL/L (ref 3.5–4.7)
PROT SERPL-MCNC: 6.9 G/DL (ref 6.3–8)
PROT UR QL STRIP: ABNORMAL
RBC # BLD AUTO: 2.77 10*6/MM3 (ref 4.14–5.8)
SODIUM SERPL-SCNC: 138 MMOL/L (ref 134–145)
SP GR UR STRIP: 1.02 (ref 1–1.03)
UROBILINOGEN UR QL STRIP: ABNORMAL
WBC NRBC COR # BLD: 3.27 10*3/MM3 (ref 3.4–10.8)

## 2023-04-04 PROCEDURE — 3077F SYST BP >= 140 MM HG: CPT | Performed by: INTERNAL MEDICINE

## 2023-04-04 PROCEDURE — 96367 TX/PROPH/DG ADDL SEQ IV INF: CPT

## 2023-04-04 PROCEDURE — 25010000002 LEUCOVORIN CALCIUM PER 50 MG: Performed by: INTERNAL MEDICINE

## 2023-04-04 PROCEDURE — 82746 ASSAY OF FOLIC ACID SERUM: CPT | Performed by: INTERNAL MEDICINE

## 2023-04-04 PROCEDURE — 25010000002 FLUOROURACIL PER 500 MG: Performed by: INTERNAL MEDICINE

## 2023-04-04 PROCEDURE — 25010000002 PALONOSETRON PER 25 MCG: Performed by: INTERNAL MEDICINE

## 2023-04-04 PROCEDURE — 36415 COLL VENOUS BLD VENIPUNCTURE: CPT | Performed by: INTERNAL MEDICINE

## 2023-04-04 PROCEDURE — 1126F AMNT PAIN NOTED NONE PRSNT: CPT | Performed by: INTERNAL MEDICINE

## 2023-04-04 PROCEDURE — 81003 URINALYSIS AUTO W/O SCOPE: CPT

## 2023-04-04 PROCEDURE — 85025 COMPLETE CBC W/AUTO DIFF WBC: CPT

## 2023-04-04 PROCEDURE — 96413 CHEMO IV INFUSION 1 HR: CPT

## 2023-04-04 PROCEDURE — 25010000002 FOSAPREPITANT PER 1 MG: Performed by: INTERNAL MEDICINE

## 2023-04-04 PROCEDURE — 96375 TX/PRO/DX INJ NEW DRUG ADDON: CPT

## 2023-04-04 PROCEDURE — 96416 CHEMO PROLONG INFUSE W/PUMP: CPT

## 2023-04-04 PROCEDURE — 96368 THER/DIAG CONCURRENT INF: CPT

## 2023-04-04 PROCEDURE — G0498 CHEMO EXTEND IV INFUS W/PUMP: HCPCS

## 2023-04-04 PROCEDURE — 82607 VITAMIN B-12: CPT | Performed by: INTERNAL MEDICINE

## 2023-04-04 PROCEDURE — 96415 CHEMO IV INFUSION ADDL HR: CPT

## 2023-04-04 PROCEDURE — 3079F DIAST BP 80-89 MM HG: CPT | Performed by: INTERNAL MEDICINE

## 2023-04-04 PROCEDURE — 80053 COMPREHEN METABOLIC PANEL: CPT

## 2023-04-04 PROCEDURE — 99215 OFFICE O/P EST HI 40 MIN: CPT | Performed by: INTERNAL MEDICINE

## 2023-04-04 PROCEDURE — 96411 CHEMO IV PUSH ADDL DRUG: CPT

## 2023-04-04 PROCEDURE — 25010000002 DEXAMETHASONE SODIUM PHOSPHATE 100 MG/10ML SOLUTION: Performed by: INTERNAL MEDICINE

## 2023-04-04 PROCEDURE — 25010000002 OXALIPLATIN PER 0.5 MG: Performed by: INTERNAL MEDICINE

## 2023-04-04 RX ORDER — SODIUM CHLORIDE 9 MG/ML
250 INJECTION, SOLUTION INTRAVENOUS ONCE
Status: CANCELLED | OUTPATIENT
Start: 2023-04-04

## 2023-04-04 RX ORDER — PALONOSETRON 0.05 MG/ML
0.25 INJECTION, SOLUTION INTRAVENOUS ONCE
Status: COMPLETED | OUTPATIENT
Start: 2023-04-04 | End: 2023-04-04

## 2023-04-04 RX ORDER — DEXTROSE MONOHYDRATE 50 MG/ML
250 INJECTION, SOLUTION INTRAVENOUS ONCE
Status: CANCELLED | OUTPATIENT
Start: 2023-04-04

## 2023-04-04 RX ORDER — FAMOTIDINE 10 MG/ML
20 INJECTION, SOLUTION INTRAVENOUS AS NEEDED
Status: CANCELLED | OUTPATIENT
Start: 2023-04-04

## 2023-04-04 RX ORDER — FLUOROURACIL 50 MG/ML
300 INJECTION, SOLUTION INTRAVENOUS ONCE
Status: CANCELLED | OUTPATIENT
Start: 2023-04-04

## 2023-04-04 RX ORDER — SODIUM CHLORIDE 9 MG/ML
1000 INJECTION, SOLUTION INTRAVENOUS CONTINUOUS
Status: CANCELLED
Start: 2023-04-06

## 2023-04-04 RX ORDER — DEXTROSE MONOHYDRATE 50 MG/ML
250 INJECTION, SOLUTION INTRAVENOUS ONCE
Status: COMPLETED | OUTPATIENT
Start: 2023-04-04 | End: 2023-04-04

## 2023-04-04 RX ORDER — FLUOROURACIL 50 MG/ML
300 INJECTION, SOLUTION INTRAVENOUS ONCE
Status: COMPLETED | OUTPATIENT
Start: 2023-04-04 | End: 2023-04-04

## 2023-04-04 RX ORDER — PALONOSETRON 0.05 MG/ML
0.25 INJECTION, SOLUTION INTRAVENOUS ONCE
Status: CANCELLED | OUTPATIENT
Start: 2023-04-04

## 2023-04-04 RX ORDER — DIPHENHYDRAMINE HYDROCHLORIDE 50 MG/ML
50 INJECTION INTRAMUSCULAR; INTRAVENOUS AS NEEDED
Status: CANCELLED | OUTPATIENT
Start: 2023-04-04

## 2023-04-04 RX ADMIN — SODIUM CHLORIDE 100 ML: 9 INJECTION, SOLUTION INTRAVENOUS at 11:25

## 2023-04-04 RX ADMIN — LEUCOVORIN CALCIUM 640 MG: 350 INJECTION, POWDER, LYOPHILIZED, FOR SOLUTION INTRAMUSCULAR; INTRAVENOUS at 11:54

## 2023-04-04 RX ADMIN — PALONOSETRON 0.25 MG: 0.05 INJECTION, SOLUTION INTRAVENOUS at 11:06

## 2023-04-04 RX ADMIN — DEXTROSE MONOHYDRATE 250 ML: 50 INJECTION, SOLUTION INTRAVENOUS at 11:05

## 2023-04-04 RX ADMIN — FLUOROURACIL 3850 MG: 50 INJECTION, SOLUTION INTRAVENOUS at 14:01

## 2023-04-04 RX ADMIN — DEXAMETHASONE SODIUM PHOSPHATE 12 MG: 10 INJECTION, SOLUTION INTRAMUSCULAR; INTRAVENOUS at 11:07

## 2023-04-04 RX ADMIN — FLUOROURACIL 640 MG: 50 INJECTION, SOLUTION INTRAVENOUS at 14:01

## 2023-04-04 RX ADMIN — DEXTROSE MONOHYDRATE 90 MG: 5 INJECTION, SOLUTION INTRAVENOUS at 11:55

## 2023-04-04 NOTE — PROGRESS NOTES
.     REASON FOR FOLLOWUP:     *Cecum colon cancer, status post right hemicolectomy performed on 10/11/2022, [id number].   · CT scan examination on 9/26/2022 reported suspicious liver lesion 9 mm, otherwise no evidence for metastatic disease.    · The patient underwent an abdominal MRI examination on 10/06/2022, which reported suspicious liver lesion 8mm.  · Patient had sigmoidectomy on 10/11/2022.  Portacatheter placement on 10/21/2022.   · PET scan examination on 11/14/2022 reported solitary hypermetabolic liver lesion.   · Cycle #1 palliative chemotherapy FOLFOX 6 was started on 11/15/2022.   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  Not a candidate for anti-EGFR monoclonal antibody treatment.   · From cycle #2, bevacizumab/Avastin will be added to palliative chemotherapy.  Due to thrombocytopenia, all chemotherapy agents were 25% reduced from cycle #2.  ·  1/24/203 further dose reduction of Oxaliplatin to 50% of original dose.   *Iron deficiency anemia.  · Oral iron treatment held on 2/21/2023.  Continue to monitor labs.                               HISTORY OF PRESENT ILLNESS:  The patient is a 72 y.o. 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  Patient reports no fever sweating chills.    On 3/21/2023, we continue to hold his Avastin due to a tooth extraction on 3/13/2023.  He developed mild neutropenia with ANC 1610 so we gave the patient Levaquin daily for 7 days for prophylaxis.  Fortunately did not have infection.    Patient reports reasonable tolerance.  No specific complaints.    Today on 4/4/2023 patient has improved neutrophils 1870, and a WBC 3270.  Continues to have anemia stable Hb 10.2, and platelets 96,000.  He reports no spontaneous bleeding or  bruising.          Past Medical History:   Diagnosis Date   • Abdominal hernia    • Anemia    • Arrhythmia     PT STATED DURING LAST COLONOSCOPY 2 WEEKS AGO   • Arthritis    • Chemotherapy-induced thrombocytopenia 12/4/2022   • Colon cancer 09/22/2022   • Colon polyp September 20 2022   • Colon polyps    • Depression    • DVT (deep venous thrombosis)     IN LEFT LEG   • Full dentures    • GI (gastrointestinal bleed) September 8 2022   • Hip pain     RIGHT   • Hyperlipidemia    • Hypertension    • Iron deficiency anemia 09/22/2022   • Numbness and tingling     RIGHT FOOT   • PONV (postoperative nausea and vomiting)    • Right leg pain    • Type 2 diabetes mellitus without complication, without long-term current use of insulin 10/08/2019     Past Surgical History:   Procedure Laterality Date   • CERVICAL DISCECTOMY LAMINECTOMY DECOMPRESSION POSTERIOR FUSION WITH INSTRUMENTATION     • COLON RESECTION N/A 10/11/2022    Procedure: LAPAROSCOPIC RIGHT COLON RESECTION;  Surgeon: Ga Samaniego MD;  Location: [facility] OR;  Service: General;  Laterality: N/A;   • COLONOSCOPY N/A 02/28/2018    Procedure: COLONOSCOPY to TI and cecum with polypectomy;  Surgeon: Anil Garces MD;  Location: [facility] ENDOSCOPY;  Service:    • COLONOSCOPY  2/28/2018 and sept. 2022    found cancer 2022   • JOINT REPLACEMENT      LEFT HIP   • KNEE ARTHROSCOPY Left    • LUMBAR DISCECTOMY FUSION INSTRUMENTATION N/A 11/18/2020    Procedure: Lumbar 4 5 laminectomy with a posterior lateral fusion and instrumentation and interbody fusion;  Surgeon: Jeffrey Garcia MD;  Location: [facility] OR;  Service: Neurosurgery;  Laterality: N/A;   • SPINE SURGERY  December 2021    L4 and L5   • TOTAL HIP ARTHROPLASTY Right 06/03/2021    Procedure: TOTAL HIP ARTHROPLASTY POSTERIOR;  Surgeon: Shlomo Campos MD;  Location: [facility] OR;  Service: Orthopedics;  Laterality: Right;   • VASECTOMY     • VENOUS ACCESS DEVICE (PORT) INSERTION N/A 10/21/2022     Procedure: INSERTION VENOUS ACCESS DEVICE;  Surgeon: Ga Samaniego MD;  Location: [facility] OR [facility];  Service: General;  Laterality: N/A;     HEMATOLOGY/MEDICAL ONCOLOGY HISTORY: The patient is a 72 y.o. year old male with hypertension, hyperlipidemia, type 2 diabetes, iron deficiency anemia, history of DVT, who presented on 9/22/2022 for initial evaluation because of iron deficiency anemia, referred by his primary care provider, JULIA Santiago. Patient is accompanied by his wife who helped with the history. They reported the patient actually was being diagnosed of colon cancer 2 days ago. His GI specialist, Dr. Anil Garces, performed colonoscopic examination and saw a distal colon mass. I do not have the report for the procedure nor do I have the pathology report but nevertheless there was a mass in the distal colon likely in the sigmoid colon. I will obtain a copy of those reports when available.      Patient reports he had no apparent melena or hematochezia before starting oral iron treatment. He did have however significant fatigue. He reports exertional dyspnea and no syncope. Patient had laboratory study recently on 09/08/2022 which reported severe iron deficiency anemia with hemoglobin 7.8, MCV 79.9, MCHC 29.2. Normal platelets 217,000 and WBC 7230 without differentiation. Iron study reported ferritin 10.7 ng/mL, free iron 23, TIBC 593 and iron saturation 4%. Chemistry lab reported creatinine 1.11, potassium 5.4, otherwise normal sodium chloride and calcium, glucose 137. Prior to that the patient had normal liver function on 08/24/2022. He had a normal TSH 2.5 and slightly elevated hemoglobin A1c of 6.8% on that day.      The patient reports he has been on oral iron for almost 2 weeks, once a day with good tolerance and he now noticed improved energy level and less exertional dyspnea. He does report stool becoming dark-colored after he started oral iron.  He reports no syncope.     Laboratory study on  "09/22/2022 reported improved hemoglobin 8.5 and normalized MCV 85.0, stable MCHC 29.4. Maintains normal platelets and WBC.       I saw him originally on 9/22/2022 for initial evaluation for his sigmoid colon cancer and iron deficiency anemia.  Since that time patient had multiple imaging studies including CT for chest abdomen pelvis, subsequently with MRI for the abdomen for staging purposes.  He was also seen by Dr. Samaniego who performed right hemicolectomy on 10/11/2022.    Dr. Samaniego called me on the day of surgery, he also suspected this liver lesion is metastatic, however unable to reach that during the surgery.    Patient notes since surgery on 10/11/2022 of the bowel resection he is recovering \"okay\". The patient does report some abdominal pain when adrianna his stomach, specifically when getting out of bed. The patient denies any issues with his appetite, and is having normal urination and bowel movements. The patient denies constipation.  Patient reports no fever sweating or chills.    The patient has type II diabetes, which he manages with metformin. 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    Patient reports he is able to tolerate oral iron twice a day with no specific complaints.  No nausea vomiting or constipation.      Laboratory study today on 11/2/2022 showed improved anemia with Hb 10.9, normalized MCV 92.5.  His normal platelets 159,000 WBC 6870 including ANC 4580.      PET scan examination on 11/14/2022 reported distal small 1.3 cm subtle low attenuation lesion in the medial hepatic segment with SUV 6.3.  There is no hypermetabolic enthesopathy in the abdomen or pelvis.  No suspicious hypermetabolic activity in the chest or neck.    Laboratory study on 11/15/2022 showed improved hemoglobin 12.1, normal platelets 156,000 and WBC 5720 including ANC 3750 and lymphocytes 1070.  Chemistry study reported glucose 156 otherwise " unremarkable CMP.    Patient reports he developed a mild oral mucositis lasted about 4 days after the chemotherapy and now has resolved. He believes he has lost some weight, but states he does have an appetite and eats everyday.  He denies nausea vomiting.  He denies having diarrhea or constipation. He denies having any fever, chills, or sweating.  Denies peripheral neuropathy.     Laboratory results from on 11/29/2022 are; white cell count is 3950. neutrophils are 2340. Hemoglobin is 11.7 g/dL. Platelets are 108,000. Chemistry lab was unremarkable except glucose 255.    Laboratory studies on 3/21/2023 reported pancytopenia, with platelets 100,000, hemoglobin 10.0 .0, in the WBC 3130 including mild neutropenia with ANC 1610.             MEDICATIONS:    Current Outpatient Medications:   •  acetaminophen (TYLENOL) 500 MG tablet, Take 1 tablet by mouth Every 6 (Six) Hours As Needed for Mild Pain., Disp: , Rfl:   •  acetaminophen-codeine (TYLENOL #3) 300-30 MG per tablet, , Disp: , Rfl:   •  aspirin 81 MG EC tablet, Take 1 tablet by mouth Daily. INSTRUCTED PT TO FOLLOW MD INSTRUCTIONS REGARDING HOLDING FOR SURGERY/PT STATED TO HOLD 5 DAYS PRIOR TO SURGERY, Disp: , Rfl:   •  atorvastatin (LIPITOR) 40 MG tablet, TAKE ONE TABLET BY MOUTH DAILY (Patient taking differently: Take 1 tablet by mouth Every Night.), Disp: 90 tablet, Rfl: 3  •  ibuprofen (ADVIL,MOTRIN) 600 MG tablet, Take  by mouth As Needed. Was for dental, Disp: , Rfl:   •  levoFLOXacin (Levaquin) 500 MG tablet, Take 1 tablet by mouth Daily., Disp: 7 tablet, Rfl: 0  •  lisinopril-hydrochlorothiazide (PRINZIDE,ZESTORETIC) 20-12.5 MG per tablet, TAKE ONE TABLET BY MOUTH ONCE NIGHTLY (Patient taking differently: Take 1 tablet by mouth Every Night.), Disp: 90 tablet, Rfl: 3  •  metFORMIN (GLUCOPHAGE) 500 MG tablet, Take 1 tablet by mouth 2 (Two) Times a Day With Meals., Disp: 180 tablet, Rfl: 3  •  ondansetron (ZOFRAN) 8 MG tablet, Take 1 tablet by mouth 3  "(Three) Times a Day As Needed for Nausea or Vomiting. (Patient taking differently: Take 1 tablet by mouth 3 (Three) Times a Day As Needed for Nausea or Vomiting. PRN), Disp: 60 tablet, Rfl: 5  •  tadalafil (CIALIS) 5 MG tablet, Take 1 tablet by mouth Daily As Needed for Erectile Dysfunction., Disp: 30 tablet, Rfl: 2  No current facility-administered medications for this visit.    ALLERGIES:   No Known Allergies    SOCIAL HISTORY:       Social History     Socioeconomic History   • Marital status:      Spouse name: Chelsie   • Years of education: 12   Tobacco Use   • Smoking status: Some Days     Types: Cigars   • Smokeless tobacco: Never   • Tobacco comments:     OCCASIONALLY   Vaping Use   • Vaping Use: Never used   Substance and Sexual Activity   • Alcohol use: Yes     Alcohol/week: 7.0 standard drinks     Types: 1 Glasses of wine, 6 Cans of beer per week     Comment: sometimes in warm weather, wine sometimes in the winter   • Drug use: No   • Sexual activity: Yes     Partners: Female     Birth control/protection: Surgical         FAMILY HISTORY:  Family History   Problem Relation Age of Onset   • Clotting disorder Other    • Colon cancer Paternal Grandmother    • Colon cancer Paternal Grandfather    • Clotting disorder Father    • Malig Hyperthermia Neg Hx          Vitals:    04/04/23 1007   BP: 144/83   Pulse: 60   Resp: 16   Temp: 97.8 °F (36.6 °C)   TempSrc: Temporal   SpO2: 97%   Weight: 94.2 kg (207 lb 11.2 oz)   Height: 175.3 cm (69.02\")   PainSc: 0-No pain     Current Status 4/4/2023   ECOG score 0     Physical Exam  Vitals and nursing note reviewed.   Constitutional:       Appearance: Normal appearance. He is well-developed.   HENT:      Head: Normocephalic and atraumatic.      Comments: Patient wears mask due to the pandemic coronavirus infection.        Nose:      Right Turbinates: Pale.      Left Turbinates: Pale.      Right Sinus: Maxillary sinus tenderness present.      Left Sinus: Maxillary " sinus tenderness present.      Comments: Small sore noted in bilateral nares that have scabbed over on exam.  Mild maxillary tenderness.  Noted upper wisdom teeth are impacted.  Clear sinus drainage with pale turbinates.  Eyes:      General: No scleral icterus.     Conjunctiva/sclera: Conjunctivae normal.   Cardiovascular:      Rate and Rhythm: Normal rate and regular rhythm.      Heart sounds: Normal heart sounds. No murmur heard.  Pulmonary:      Effort: Pulmonary effort is normal. No respiratory distress.      Breath sounds: Normal breath sounds. No wheezing.   Abdominal:      General: Bowel sounds are normal. There is no distension.      Palpations: Abdomen is soft.      Tenderness: There is no abdominal tenderness. There is no guarding.   Musculoskeletal:         General: No swelling.   Lymphadenopathy:      Cervical: No cervical adenopathy.   Skin:     General: Skin is warm and dry.      Findings: No bruising.      Comments: Portacatheter site has no signs of infection   Neurological:      Mental Status: He is alert and oriented to person, place, and time. Mental status is at baseline.   Psychiatric:         Mood and Affect: Mood normal.         Behavior: Behavior normal.         Thought Content: Thought content normal.       RECENT LABS:    Results from last 7 days   Lab Units 04/04/23  0952   WBC 10*3/mm3 3.27*   HEMOGLOBIN g/dL 10.2*   HEMATOCRIT % 31.0*   PLATELETS 10*3/mm3 96*     Lab Results   Component Value Date    GLUCOSE 207 (H) 04/04/2023    BUN 27 (H) 04/04/2023    CREATININE 1.18 04/04/2023    EGFRIFNONA 70 02/23/2022    EGFRIFAFRI 81 02/23/2022    BCR 22.9 04/04/2023    K 4.6 04/04/2023    CO2 25.6 04/04/2023    CALCIUM 9.7 04/04/2023    PROTENTOTREF 7.2 08/24/2022    ALBUMIN 4.2 04/04/2023    LABIL2 1.9 08/24/2022    AST 27 04/04/2023    ALT 15 04/04/2023     Lab Results   Component Value Date    WHVKVCTE70 260 08/24/2022     Lab Results   Component Value Date    FOLATE 15.50 09/22/2022        Lab Results   Component Value Date    CEA 10.70 09/22/2022     Lab Results   Component Value Date    IRON 108 02/07/2023    TIBC 386 02/07/2023    FERRITIN 229.80 02/07/2023   Iron saturation 28% on 2/7/2023      IMAGING STUDY:      Assessment & Plan     ASSESSMENT:    * Colon cancer post right hemicolectomy 10/11/2022.  Stage IV ([id number]).  · Had positive stool occult blood test on 09/09/2022. Colonoscopic examination on 09/20/2022 by Dr. Anil Garces reported cecum colon mass.  Biopsy confirmed moderately differentiated adenocarcinoma.  MSI stable.  · Mildly elevated CEA level 10.7 ng/mL on 9/22/2022.  · CT scan for chest abdomen pelvis 9/26/2022 reported cecal mass.  There was also suspicious 9 mm hepatic lesion.  · Abdomen MRI examination with and without contrast on 10/7/2022 confirmed 8 mm lesion hypervascularity in the segment 5 of the liver.  · Patient had right hemicolectomy 10/11/2022.  Unable to biopsy at this time he liver lesion.  Pathology evaluation reported [id number] disease, this will be at least a stage IIIb colon cancer.  · On 11/2/2022 I discussed with the patient, his wife and his daughter, recommending further imaging study with PET scan for assessment.  If patient is no hypermetabolic liver lesion, will treat with adjuvant FOLFOX 6 regimen every 2 weeks for 12 cycles.  However, if he has hypermetabolic lesion in the liver, we will treated him as metastatic disease, with FOLFOX plus Avastin.  Since patient has neuropathy already being his legs and feet, if he is indeed metastatic disease, we may switch him to irinotecan instead of oxaliplatin because of the neuropathy.  If indicated patient has metastatic disease, we would also entertain metastectomy after finishing 12 cycles of chemotherapy.    · We should also testing for [employer] NGS genetic study to see if he would be a candidate for other treatment.  PATHOLOGY:  · Tumor sample for [facility] NGS study reported positive for K-aashish mutation  exon2 p.G13D, negative for HER2/nu by IHC 0 and no amplification by FISH.  MSI stable by DNA sequencing, and also MMR proficient by IHC staining.  Tumor mutation burden is low 8 mut/Mb.  Patient was positive for PTEN 1+, 100% by IHC, PD-L1 was negative, only 1% by IHC.  Patient also positive for APC mutation exon 16p.[id number], positive for AMACR1 exon2 p.R358*.  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    · Discussed with the patient and family members about potential side effects including bone marrow suppression, with anemia thrombocytopenia and leukocytopenia increased risk for infection, and also peripheral neuropathy, etc. patient is agreeable to proceed ahead with treatment.  · The patient has PET scan examination on 11/14/2022 which reported a small 1.3 cm focus with elevated SUV 6.03, highly suspicious for metastatic disease. The patient now has stage IV. The patient had [facility] NGS study which reported K-aashish mutation, tumor mutation burden < 10, MSI was stable and anti-PD-L1 negative.  Not a candidate for anti-EGFR monoclonal antibody such as Vectibix, or chekpoint inhibitor immunotherapy.  We discussed the adding anti-VEGF monoclonal antibody, bevacizumab/Avastin starting in 2 weeks so that it would be after 6 weeks of primary surgery for the colon cancer resection.  We discussed this is now stage IV disease, however because only having 1 solitary metastatic lesion in the liver, it is potentially curable so we will be as aggressive as possible for chemotherapy.  If he has good response, in the future he will need metastectomy of the liver lesion.  · Today on 11/29/2022 patient is presented for cycle #2 of chemotherapy. Patient tolerated therapy well except mild oral mucositis. However, laboratory studies showed significant decrease in platelets at only 108,000, as well as also decreasing WBC and hemoglobin. Discussed with the patient today if we do not carry out any dose reduction, he will likely become  more thrombocytopenic next time in 2 weeks and we will not be able to do cycle #3 chemotherapy on time. I discussed with the patient for dose reduction and to avoid potential delay of chemotherapy and he is agreeable. We will have 25% dose reduction for 5-FU leucovorin and oxaliplatin.  · On 11/29/2022 he was started the first dose of Avastin/bevacizumab in conjunction with chemotherapy cycle #2.  · On 12/13/2022, the patient presented for reevaluation prior to chemotherapy cycle #3. He has stable platelets of 108,000 and slightly improved WBC of 5100 and hemoglobin 12.8 g/dL. He will continue cycle 3 with the same 25% dose reduction.  · 1/10/2023 due for cycle 5 FOLFOX bevacizumab, overall is tolerating fairly well.  Hemoglobin 11.7, platelet count stable at 108,000, WBC 4.02, ANC 2.27.  · Patient has had five cycles of chemotherapy and a CT scan examination obtained on 01/20/2023. The CT scan reported favorable response as the lesion is less obvious.   · 1/24/2023 recommended to continue chemotherapy for a total of 12 cycles. Then obtain PET scan examination. Due to worsening thrombocytopenia, and also peripheral neuropathy, for cycle #6, I will decrease oxaliplatin by another 25% so would it be 50% of the original dose. I will keep the same dose of 5-FU which is 75% of the original dose.  Full dose Avastin.   · 2/7/2023 due for cycle 7.  Platelet count is holding steady at 100,000.  We will proceed with treatment today with same doses as given on cycle 6.   · 2/21/2023 due for cycle #8 chemotherapy. We will repeat every 2 weeks.  · 3/7/2023 proceed with cycle #9 chemotherapy and hold Avastin for dental procedure on 3/13/2023.  · On 3/21/2023 we will proceed ahead with cycle #10 chemotherapy FOLFOX6 regimen.  Continue to hold Avastin.  · On 4/4/2023 patient presented for evaluation prior to cycle #11 FOLFOX 6 regimen.  Continue to hold Avastin.  This will be resumed from next cycle.      2. Iron deficiency anemia.    · The patient has iron deficiency due to GI bleeding from his colon cancer. His laboratory study on 09/08/2022 reported ferritin 10.7 and iron saturation 4% with microcytic hypochromic anemia, hemoglobin 7.8, MCV 79.9 and MCHC 29.2.   · Patient reports good tolerance to oral iron treatment and already has improved energy level. Laboratory study on 9/22/2022 did report slightly improved hemoglobin at 8.5 but normalized MCV 85.0. I think he is responding to oral iron treatment both physically and laboratory wise.  We advised patient to increase oral iron to twice a day.  · On 11/2/2022 patient reports good tolerance to oral iron twice a day.  Labs today showed further improved hemoglobin 10.9.  He continues to maintain normal platelets and WBC.  · Improved hemoglobin 12.1 on 11/15/2022.  Cycle #1 chemotherapy will be started.  · On 11/29/2022 hemoglobin 11.7.  · On 12/13/2022, his hemoglobin is 12.8 g/dL.  · 1/10/2023 hemoglobin 11.7.  · The patient has trending down hemoglobin 11.0 on 1/24/2023, however, he is asymptomatic.  · Hemoglobin 2/7/2023, 10.6.  Iron study reported ferritin 229 and iron saturation 28% with free iron 108 TIBC 386.  No evidence for iron deficiency.  So his anemia is secondary to chemotherapy.     · The patient has stable mild anemia with hemoglobin 11.0 on 3/7/2023. We will continue to monitor.  Continue to hold ferrous sulfate.  · On 3/21/2023, stable anemia with hemoglobin 10.0 .0.  Continue to monitor.  · On 4/4/2023 patient has stable anemia Hb 10.2, .9.     3.    Pancytopenia secondary to chemotherapy.    This patient had original iron deficiency, however now has ongoing anemia due to chemotherapy.  He also his thrombocytopenia and neutropenia from chemotherapy.  · Normal neutrophil prior to starting chemotherapy.    · This patient developed first mild neutropenia with ANC 1610 on 3/21/2023.  Patient reports no fever sweating chills.  I recommended starting patient on  Levaquin daily for 7 days, for prophylaxis of neutropenic fever.  We will continue to monitor for now.  Expecting this will getting worse with ongoing chemotherapy.  Question the patient to call us if he develops fever sweating chills.   · 4/4/2023, patient has slightly improved neutrophils 1870, and WBC 3270.  Continue to monitor.    4.  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  · Prior to starting chemotherapy patient had low normal platelets 156,000 on 11/15/2022.  Started on cycle #1 chemotherapy FOLFOX 6.  · On 11/29/2022 patient had platelets of 108,000.  Patient reports no bleeding or bruising.  Discussed with the patient, because of foreseeable more severe thrombocytopenia, we recommended 25% dose reduction starting from cycle #2 chemotherapy.  · On 12/13/2022, the patient has same decreased number of platelets 108,000. He will continue chemotherapy with same dose reduction of 25%.   · 1/10/2023 platelet count stable at 108,000.  · On 01/24/2023, patient has worsening thrombocytopenia with platelets of 99,000 mcL. Discussed with the patient, we will cut oxaliplatin by another 25% to be at 50% of original dose. We will leave the 5-FU dose same as the previous at 75% of original dose. Avastin will be the same dose.  · 2/7/2023 platelet count 100,000.    · On 02/21/2023 the patient has stable thrombocytopenia with platelets 102,000. The patient reports no bleeding or bruising. Continue to monitor.   · On 3/7/2023 the patient has stable thrombocytopenia with platelets 103,000. The patient reports no bleeding or bruising. Continue to monitor.   · On 3/21/2023 persistent stable thrombocytopenia with platelets 100,000.   · Today on 4/4/2023 platelets 96,000.  We will go ahead and to proceed with chemotherapy.     5. Peripheral neuropathy  * On 12/13/2022, he reports cold sensitivity in his mouth when he drinks cold water. The cold sensitivity usually dissipates after 3 to 4 days.  · Patient reports he is more  significant cold sensitivities lasting for more than a week. He does have moderate tingling, numbness involving the fingers, but not much as the toes.  · 2/7/2023 he feels like the neuropathy/cold sensitivity is lingering a little bit longer with each cycle.    · On 4/4/2023 the patient reports stable mild peripheral neuropathy/cold sensitivity.      6.  Weight losses.    · On 12/13/2022, the patient reports he has lost weight in the past couple of months. He reports a poor appetite for 3 days after chemotherapy and a poor taste in his mouth. His appetite has since improved. He only had mild nausea, but no vomiting. He was encouraged to use Ensure or Boost to improve nutrition supplement. He already started using protein powder.  · 3/7/2023, patient's weight continues to improve and he has gained a couple pounds.  His weight is stable at 211 pounds today.  He reports his appetite continues to improve.  He denies any nausea or vomiting.   · On 3/21/2023 weight 217 pounds.     7.  [place] extraction: Patient states that he needs to have his wisdom teeth pull out.  Reviewed with Dr. Rosario.  Patient will need to have this done on his off week of treatment.  We will likely hold bevacizumab 2 weeks prior to this procedure and 4 weeks following.  Patient states that this will likely not happen until March, but is going to contact his oral surgeon, Dr. Charlie Zazueta to try to go ahead and get an appointment.  · [place] tooth extraction done on 3/13/2023.    · Continue to hold Avastin today 4/4/2023.  We will resume in 2 weeks for cycle #12 chemotherapy.    8.  Low normal vitamin B12 level.    · Patient had a marginal normal vitamin B12 level at 260 pg/mL on 08/24/2022.   · Folate level on 9/22/2022 was normal at 15.5 ng/mL.    · Continue oral vitamin B12 supplement at 1000 mcg daily.   · We will repeat laboratory studies today on 4/4/2023.         PLAN:  1. Proceed with cycle #11 FOLFOX today.  Hold Avastin secondary to wisdom  teeth extraction on 3/13/2023. Continue dose reduction 50% of Oxaliplatin, 5FU at 75% reduction.   2. Patient return in 2 days to discontinue 5-FU.   3. Continue antiemetics as needed.   4. We will check a vitamin B12 level and folate level.   5. Continue oral B12 at 1000 mcg daily.  6. Continue Benadryl at bedtime for sinus drainage, saline nasal spray, and bacitracin or Vaseline to bilateral nares to help heal sores.  Continue to monitor.  7. The patient will see me in 2 weeks, with laboratory studies prior to cycle #12 FOLFOX-6.  We will resume Avastin at that time..  8. We will plan to obtain PET scan examination after cycle #12 for assessment.  Patient needs to have surgical evaluation afterwards for evaluation of possible metastectomy for resection of the metastatic liver lesion.   9. Call/ return sooner should he develop any new concerns or problems.   10. Continue to follow with PCP for management of hypertension and diabetes.      I discussed with the patient about laboratory results and further management plan.  Patient voiced understanding and agreeable.    Patient is on a high risk medication requiring close monitoring for toxicity.    More than 40 min were used for patient care, over half of that time were for counseling.           Maya Rosario MD PhD        CC:  JULIA Santiago MD Richard Pokorny, MD   Oral surgeon: Dr. hCarlie Zazueta -- [address]-- Phone [phone]

## 2023-04-04 NOTE — PROGRESS NOTES
Pt had tooth extraction on 3/13. D/w Dr. Rosario. Per MD will hold bevacizumab today.   Dr. Rosario ok with treatment with low counts.    Lab Results   Component Value Date    WBC 3.27 (L) 04/04/2023    HGB 10.2 (L) 04/04/2023    HCT 31.0 (L) 04/04/2023    .9 (H) 04/04/2023    PLT 96 (L) 04/04/2023

## 2023-04-05 LAB
FOLATE SERPL-MCNC: >20 NG/ML (ref 4.78–24.2)
VIT B12 BLD-MCNC: 658 PG/ML (ref 211–946)

## 2023-04-06 ENCOUNTER — INFUSION (OUTPATIENT)
Dept: ONCOLOGY | Facility: HOSPITAL | Age: 73
End: 2023-04-06
Payer: MEDICARE

## 2023-04-06 VITALS
SYSTOLIC BLOOD PRESSURE: 144 MMHG | RESPIRATION RATE: 18 BRPM | DIASTOLIC BLOOD PRESSURE: 67 MMHG | HEART RATE: 71 BPM | WEIGHT: 211 LBS | OXYGEN SATURATION: 97 % | BODY MASS INDEX: 31.14 KG/M2 | TEMPERATURE: 98 F

## 2023-04-06 DIAGNOSIS — Z79.899 ENCOUNTER FOR LONG-TERM (CURRENT) USE OF MEDICATIONS: Primary | ICD-10-CM

## 2023-04-06 DIAGNOSIS — C18.9 MALIGNANT NEOPLASM OF COLON, UNSPECIFIED PART OF COLON: ICD-10-CM

## 2023-04-06 PROCEDURE — 96360 HYDRATION IV INFUSION INIT: CPT

## 2023-04-06 RX ORDER — SODIUM CHLORIDE 9 MG/ML
1000 INJECTION, SOLUTION INTRAVENOUS CONTINUOUS
Status: DISCONTINUED | OUTPATIENT
Start: 2023-04-06 | End: 2023-04-06 | Stop reason: HOSPADM

## 2023-04-06 RX ADMIN — SODIUM CHLORIDE 1000 ML: 9 INJECTION, SOLUTION INTRAVENOUS at 12:02

## 2023-04-18 ENCOUNTER — OFFICE VISIT (OUTPATIENT)
Dept: ONCOLOGY | Facility: CLINIC | Age: 73
End: 2023-04-18
Payer: MEDICARE

## 2023-04-18 ENCOUNTER — INFUSION (OUTPATIENT)
Dept: ONCOLOGY | Facility: HOSPITAL | Age: 73
End: 2023-04-18
Payer: MEDICARE

## 2023-04-18 VITALS — HEART RATE: 55 BPM | SYSTOLIC BLOOD PRESSURE: 149 MMHG | DIASTOLIC BLOOD PRESSURE: 84 MMHG

## 2023-04-18 VITALS
SYSTOLIC BLOOD PRESSURE: 133 MMHG | BODY MASS INDEX: 31.03 KG/M2 | WEIGHT: 209.5 LBS | OXYGEN SATURATION: 98 % | DIASTOLIC BLOOD PRESSURE: 87 MMHG | RESPIRATION RATE: 16 BRPM | TEMPERATURE: 98.4 F | HEART RATE: 63 BPM | HEIGHT: 69 IN

## 2023-04-18 DIAGNOSIS — E53.8 VITAMIN B12 DEFICIENCY: ICD-10-CM

## 2023-04-18 DIAGNOSIS — C18.9 MALIGNANT NEOPLASM OF COLON, UNSPECIFIED PART OF COLON: ICD-10-CM

## 2023-04-18 DIAGNOSIS — Z79.899 ENCOUNTER FOR LONG-TERM (CURRENT) USE OF MEDICATIONS: Primary | ICD-10-CM

## 2023-04-18 DIAGNOSIS — C18.9 MALIGNANT NEOPLASM OF COLON, UNSPECIFIED PART OF COLON: Primary | ICD-10-CM

## 2023-04-18 DIAGNOSIS — T45.1X5A ANEMIA ASSOCIATED WITH CHEMOTHERAPY: ICD-10-CM

## 2023-04-18 DIAGNOSIS — D64.81 ANEMIA ASSOCIATED WITH CHEMOTHERAPY: ICD-10-CM

## 2023-04-18 DIAGNOSIS — T45.1X5A PANCYTOPENIA DUE TO ANTINEOPLASTIC CHEMOTHERAPY: ICD-10-CM

## 2023-04-18 DIAGNOSIS — Z79.899 ENCOUNTER FOR LONG-TERM (CURRENT) USE OF MEDICATIONS: ICD-10-CM

## 2023-04-18 DIAGNOSIS — C78.7 SECONDARY LIVER CANCER: ICD-10-CM

## 2023-04-18 DIAGNOSIS — D61.810 PANCYTOPENIA DUE TO ANTINEOPLASTIC CHEMOTHERAPY: ICD-10-CM

## 2023-04-18 DIAGNOSIS — D69.59 CHEMOTHERAPY-INDUCED THROMBOCYTOPENIA: ICD-10-CM

## 2023-04-18 DIAGNOSIS — T45.1X5A CHEMOTHERAPY-INDUCED THROMBOCYTOPENIA: ICD-10-CM

## 2023-04-18 LAB
ALBUMIN SERPL-MCNC: 4 G/DL (ref 3.5–5.2)
ALBUMIN/GLOB SERPL: 1.4 G/DL (ref 1.1–2.4)
ALP SERPL-CCNC: 58 U/L (ref 38–116)
ALT SERPL W P-5'-P-CCNC: 20 U/L (ref 0–41)
ANION GAP SERPL CALCULATED.3IONS-SCNC: 10.6 MMOL/L (ref 5–15)
AST SERPL-CCNC: 27 U/L (ref 0–40)
BASOPHILS # BLD AUTO: 0.03 10*3/MM3 (ref 0–0.2)
BASOPHILS NFR BLD AUTO: 0.9 % (ref 0–1.5)
BILIRUB SERPL-MCNC: 0.6 MG/DL (ref 0.2–1.2)
BILIRUB UR QL STRIP: NEGATIVE
BUN SERPL-MCNC: 16 MG/DL (ref 6–20)
BUN/CREAT SERPL: 19 (ref 7.3–30)
CALCIUM SPEC-SCNC: 9.1 MG/DL (ref 8.5–10.2)
CHLORIDE SERPL-SCNC: 104 MMOL/L (ref 98–107)
CLARITY UR: CLEAR
CO2 SERPL-SCNC: 25.4 MMOL/L (ref 22–29)
COLOR UR: YELLOW
CREAT SERPL-MCNC: 0.84 MG/DL (ref 0.7–1.3)
DEPRECATED RDW RBC AUTO: 62.6 FL (ref 37–54)
EGFRCR SERPLBLD CKD-EPI 2021: 93.2 ML/MIN/1.73
EOSINOPHIL # BLD AUTO: 0.1 10*3/MM3 (ref 0–0.4)
EOSINOPHIL NFR BLD AUTO: 2.9 % (ref 0.3–6.2)
ERYTHROCYTE [DISTWIDTH] IN BLOOD BY AUTOMATED COUNT: 15 % (ref 12.3–15.4)
GLOBULIN UR ELPH-MCNC: 2.9 GM/DL (ref 1.8–3.5)
GLUCOSE SERPL-MCNC: 192 MG/DL (ref 74–124)
GLUCOSE UR STRIP-MCNC: ABNORMAL MG/DL
HCT VFR BLD AUTO: 32.4 % (ref 37.5–51)
HGB BLD-MCNC: 10 G/DL (ref 13–17.7)
HGB UR QL STRIP.AUTO: NEGATIVE
IMM GRANULOCYTES # BLD AUTO: 0 10*3/MM3 (ref 0–0.05)
IMM GRANULOCYTES NFR BLD AUTO: 0 % (ref 0–0.5)
KETONES UR QL STRIP: NEGATIVE
LEUKOCYTE ESTERASE UR QL STRIP.AUTO: NEGATIVE
LYMPHOCYTES # BLD AUTO: 0.82 10*3/MM3 (ref 0.7–3.1)
LYMPHOCYTES NFR BLD AUTO: 23.6 % (ref 19.6–45.3)
MCH RBC QN AUTO: 35 PG (ref 26.6–33)
MCHC RBC AUTO-ENTMCNC: 30.9 G/DL (ref 31.5–35.7)
MCV RBC AUTO: 113.3 FL (ref 79–97)
MONOCYTES # BLD AUTO: 0.5 10*3/MM3 (ref 0.1–0.9)
MONOCYTES NFR BLD AUTO: 14.4 % (ref 5–12)
NEUTROPHILS NFR BLD AUTO: 2.03 10*3/MM3 (ref 1.7–7)
NEUTROPHILS NFR BLD AUTO: 58.2 % (ref 42.7–76)
NITRITE UR QL STRIP: NEGATIVE
NRBC BLD AUTO-RTO: 0 /100 WBC (ref 0–0.2)
PH UR STRIP.AUTO: 6 [PH] (ref 4.5–8)
PLATELET # BLD AUTO: 91 10*3/MM3 (ref 140–450)
PMV BLD AUTO: 10.4 FL (ref 6–12)
POTASSIUM SERPL-SCNC: 4.4 MMOL/L (ref 3.5–4.7)
PROT SERPL-MCNC: 6.9 G/DL (ref 6.3–8)
PROT UR QL STRIP: ABNORMAL
RBC # BLD AUTO: 2.86 10*6/MM3 (ref 4.14–5.8)
SODIUM SERPL-SCNC: 140 MMOL/L (ref 134–145)
SP GR UR STRIP: 1.02 (ref 1–1.03)
UROBILINOGEN UR QL STRIP: ABNORMAL
WBC NRBC COR # BLD: 3.48 10*3/MM3 (ref 3.4–10.8)

## 2023-04-18 PROCEDURE — 25010000002 BEVACIZUMAB-BVZR 100 MG/4ML SOLUTION 4 ML VIAL: Performed by: INTERNAL MEDICINE

## 2023-04-18 PROCEDURE — 25010000002 FOSAPREPITANT PER 1 MG: Performed by: INTERNAL MEDICINE

## 2023-04-18 PROCEDURE — 96375 TX/PRO/DX INJ NEW DRUG ADDON: CPT

## 2023-04-18 PROCEDURE — 96413 CHEMO IV INFUSION 1 HR: CPT

## 2023-04-18 PROCEDURE — 80053 COMPREHEN METABOLIC PANEL: CPT

## 2023-04-18 PROCEDURE — 25010000002 PALONOSETRON PER 25 MCG: Performed by: INTERNAL MEDICINE

## 2023-04-18 PROCEDURE — 96367 TX/PROPH/DG ADDL SEQ IV INF: CPT

## 2023-04-18 PROCEDURE — 96417 CHEMO IV INFUS EACH ADDL SEQ: CPT

## 2023-04-18 PROCEDURE — 25010000002 DEXAMETHASONE SODIUM PHOSPHATE 100 MG/10ML SOLUTION: Performed by: INTERNAL MEDICINE

## 2023-04-18 PROCEDURE — 25010000002 BEVACIZUMAB-BVZR 400 MG/16ML SOLUTION 16 ML VIAL: Performed by: INTERNAL MEDICINE

## 2023-04-18 PROCEDURE — 96415 CHEMO IV INFUSION ADDL HR: CPT

## 2023-04-18 PROCEDURE — 25010000002 FLUOROURACIL PER 500 MG: Performed by: INTERNAL MEDICINE

## 2023-04-18 PROCEDURE — 25010000002 OXALIPLATIN PER 0.5 MG: Performed by: INTERNAL MEDICINE

## 2023-04-18 PROCEDURE — 96416 CHEMO PROLONG INFUSE W/PUMP: CPT

## 2023-04-18 PROCEDURE — 85025 COMPLETE CBC W/AUTO DIFF WBC: CPT

## 2023-04-18 PROCEDURE — 96368 THER/DIAG CONCURRENT INF: CPT

## 2023-04-18 PROCEDURE — 96411 CHEMO IV PUSH ADDL DRUG: CPT

## 2023-04-18 PROCEDURE — 81003 URINALYSIS AUTO W/O SCOPE: CPT

## 2023-04-18 PROCEDURE — 25010000002 LEUCOVORIN CALCIUM PER 50 MG: Performed by: INTERNAL MEDICINE

## 2023-04-18 RX ORDER — DIPHENHYDRAMINE HYDROCHLORIDE 50 MG/ML
50 INJECTION INTRAMUSCULAR; INTRAVENOUS AS NEEDED
Status: CANCELLED | OUTPATIENT
Start: 2023-04-18

## 2023-04-18 RX ORDER — DEXTROSE MONOHYDRATE 50 MG/ML
250 INJECTION, SOLUTION INTRAVENOUS ONCE
Status: CANCELLED | OUTPATIENT
Start: 2023-04-18

## 2023-04-18 RX ORDER — PALONOSETRON 0.05 MG/ML
0.25 INJECTION, SOLUTION INTRAVENOUS ONCE
Status: COMPLETED | OUTPATIENT
Start: 2023-04-18 | End: 2023-04-18

## 2023-04-18 RX ORDER — FAMOTIDINE 10 MG/ML
20 INJECTION, SOLUTION INTRAVENOUS AS NEEDED
Status: CANCELLED | OUTPATIENT
Start: 2023-04-18

## 2023-04-18 RX ORDER — FLUOROURACIL 50 MG/ML
300 INJECTION, SOLUTION INTRAVENOUS ONCE
Status: CANCELLED | OUTPATIENT
Start: 2023-04-18

## 2023-04-18 RX ORDER — FLUOROURACIL 50 MG/ML
300 INJECTION, SOLUTION INTRAVENOUS ONCE
Status: COMPLETED | OUTPATIENT
Start: 2023-04-18 | End: 2023-04-18

## 2023-04-18 RX ORDER — DEXTROSE MONOHYDRATE 50 MG/ML
250 INJECTION, SOLUTION INTRAVENOUS ONCE
Status: COMPLETED | OUTPATIENT
Start: 2023-04-18 | End: 2023-04-18

## 2023-04-18 RX ORDER — SODIUM CHLORIDE 9 MG/ML
250 INJECTION, SOLUTION INTRAVENOUS ONCE
Status: CANCELLED | OUTPATIENT
Start: 2023-04-18

## 2023-04-18 RX ORDER — PALONOSETRON 0.05 MG/ML
0.25 INJECTION, SOLUTION INTRAVENOUS ONCE
Status: CANCELLED | OUTPATIENT
Start: 2023-04-18

## 2023-04-18 RX ORDER — SODIUM CHLORIDE 9 MG/ML
1000 INJECTION, SOLUTION INTRAVENOUS CONTINUOUS
Status: CANCELLED
Start: 2023-04-20

## 2023-04-18 RX ORDER — SODIUM CHLORIDE 9 MG/ML
250 INJECTION, SOLUTION INTRAVENOUS ONCE
Status: COMPLETED | OUTPATIENT
Start: 2023-04-18 | End: 2023-04-18

## 2023-04-18 RX ADMIN — FLUOROURACIL 640 MG: 50 INJECTION, SOLUTION INTRAVENOUS at 15:32

## 2023-04-18 RX ADMIN — DEXTROSE MONOHYDRATE 250 ML: 50 INJECTION, SOLUTION INTRAVENOUS at 13:24

## 2023-04-18 RX ADMIN — DEXTROSE MONOHYDRATE 90 MG: 5 INJECTION, SOLUTION INTRAVENOUS at 13:24

## 2023-04-18 RX ADMIN — DEXAMETHASONE SODIUM PHOSPHATE 12 MG: 10 INJECTION, SOLUTION INTRAMUSCULAR; INTRAVENOUS at 11:38

## 2023-04-18 RX ADMIN — PALONOSETRON 0.25 MG: 0.05 INJECTION, SOLUTION INTRAVENOUS at 11:37

## 2023-04-18 RX ADMIN — SODIUM CHLORIDE 250 ML: 9 INJECTION, SOLUTION INTRAVENOUS at 11:36

## 2023-04-18 RX ADMIN — FLUOROURACIL 3850 MG: 50 INJECTION, SOLUTION INTRAVENOUS at 15:32

## 2023-04-18 RX ADMIN — SODIUM CHLORIDE 480 MG: 9 INJECTION, SOLUTION INTRAVENOUS at 12:28

## 2023-04-18 RX ADMIN — FOSAPREPITANT DIMEGLUMINE 100 ML: 150 INJECTION, POWDER, LYOPHILIZED, FOR SOLUTION INTRAVENOUS at 11:56

## 2023-04-18 RX ADMIN — LEUCOVORIN CALCIUM 640 MG: 350 INJECTION, POWDER, LYOPHILIZED, FOR SUSPENSION INTRAMUSCULAR; INTRAVENOUS at 13:24

## 2023-04-18 NOTE — NURSING NOTE
PER DR. ROGERS OK TO TREAT TODAY W/ PLTS 91K.     ALSO PER DR. ROGERS WE ARE ADDING AVASTIN BACK IN TODAY.     PT CONNECTED TO CIVI OF 5FU PER RN. + BLD RTN NOTED TO PORT PRIOR TO HOOKUP. BOTH CLAMPS UNCLAMPED. APPT DESK MESSAGED ABOUT UNHOOK. PT SAID HE DOES NOT WANT IVF'S W/ UNHOOK THIS TIME.

## 2023-04-18 NOTE — PROGRESS NOTES
.     REASON FOR FOLLOWUP:     *Cecum colon cancer, status post right hemicolectomy performed on 10/11/2022, [id number].   · CT scan examination on 9/26/2022 reported suspicious liver lesion 9 mm, otherwise no evidence for metastatic disease.    · The patient underwent an abdominal MRI examination on 10/06/2022, which reported suspicious liver lesion 8mm.  · Patient had sigmoidectomy on 10/11/2022.  Portacatheter placement on 10/21/2022.   · PET scan examination on 11/14/2022 reported solitary hypermetabolic liver lesion.   · Cycle #1 palliative chemotherapy FOLFOX 6 was started on 11/15/2022.   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  Not a candidate for anti-EGFR monoclonal antibody treatment.   · From cycle #2, bevacizumab/Avastin will be added to palliative chemotherapy.  Due to thrombocytopenia, all chemotherapy agents were 25% reduced from cycle #2.  ·  1/24/203 further dose reduction of Oxaliplatin to 50% of original dose.   *Iron deficiency anemia.  · Oral iron treatment held on 2/21/2023.  Continue to monitor labs.                               HISTORY OF PRESENT ILLNESS:  The patient is a 72 y.o. 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     Laboratory study today on 4/18/2023 reports WBC of 3480 including neutrophils 2300, platelets 91,000, and stable anemia Hb 10.0 with .3.    The patient reports he is doing well. He has mild peripheral neuropathy for a few days and then resolves. He occasionally experiences numbness and tingling in his feet. He denies any fever, sweating, or chills. He is eating well.  Denies oral mucositis.  Denies nausea or vomiting.  No diarrhea or constipation.    He denies easy bleeding or  bruising.      Past Medical History:   Diagnosis Date   • Abdominal hernia    • Anemia    • Arrhythmia     PT STATED DURING LAST COLONOSCOPY 2 WEEKS AGO   • Arthritis    • Chemotherapy-induced thrombocytopenia 12/4/2022   • Colon cancer 09/22/2022   • Colon polyp September 20 2022   • Colon polyps    • Depression    • DVT (deep venous thrombosis)     IN LEFT LEG   • Full dentures    • GI (gastrointestinal bleed) September 8 2022   • Hip pain     RIGHT   • Hyperlipidemia    • Hypertension    • Iron deficiency anemia 09/22/2022   • Numbness and tingling     RIGHT FOOT   • PONV (postoperative nausea and vomiting)    • Right leg pain    • Type 2 diabetes mellitus without complication, without long-term current use of insulin 10/08/2019     Past Surgical History:   Procedure Laterality Date   • CERVICAL DISCECTOMY LAMINECTOMY DECOMPRESSION POSTERIOR FUSION WITH INSTRUMENTATION     • COLON RESECTION N/A 10/11/2022    Procedure: LAPAROSCOPIC RIGHT COLON RESECTION;  Surgeon: Ga Samaniego MD;  Location: [facility] OR;  Service: General;  Laterality: N/A;   • COLONOSCOPY N/A 02/28/2018    Procedure: COLONOSCOPY to TI and cecum with polypectomy;  Surgeon: Anil Garces MD;  Location: [facility] ENDOSCOPY;  Service:    • COLONOSCOPY  2/28/2018 and sept. 2022    found cancer 2022   • JOINT REPLACEMENT      LEFT HIP   • KNEE ARTHROSCOPY Left    • LUMBAR DISCECTOMY FUSION INSTRUMENTATION N/A 11/18/2020    Procedure: Lumbar 4 5 laminectomy with a posterior lateral fusion and instrumentation and interbody fusion;  Surgeon: Jeffrey Garcia MD;  Location: [facility] OR;  Service: Neurosurgery;  Laterality: N/A;   • SPINE SURGERY  December 2021    L4 and L5   • TOTAL HIP ARTHROPLASTY Right 06/03/2021    Procedure: TOTAL HIP ARTHROPLASTY POSTERIOR;  Surgeon: Shlomo Campos MD;  Location: [facility] OR;  Service: Orthopedics;  Laterality: Right;   • VASECTOMY     • VENOUS ACCESS DEVICE (PORT) INSERTION N/A 10/21/2022     Procedure: INSERTION VENOUS ACCESS DEVICE;  Surgeon: Ga Samaniego MD;  [facility] [facility] [place] [facility];  Service: General;  Laterality: N/A;     HEMATOLOGY/MEDICAL ONCOLOGY HISTORY: The patient is a 72 y.o. year old male with hypertension, hyperlipidemia, type 2 diabetes, iron deficiency anemia, history of DVT, who presented on 9/22/2022 for initial evaluation because of iron deficiency anemia, referred by his primary care provider, JULIA Santiago. Patient is accompanied by his wife who helped with the history. They reported the patient actually was being diagnosed of colon cancer 2 days ago. His GI specialist, Dr. Anil Garces, performed colonoscopic examination and saw a distal colon mass. I do not have the report for the procedure nor do I have the pathology report but nevertheless there was a mass in the distal colon likely in the sigmoid colon. I will obtain a copy of those reports when available.      Patient reports he had no apparent melena or hematochezia before starting oral iron treatment. He did have however significant fatigue. He reports exertional dyspnea and no syncope. Patient had laboratory study recently on 09/08/2022 which reported severe iron deficiency anemia with hemoglobin 7.8, MCV 79.9, MCHC 29.2. Normal platelets 217,000 and WBC 7230 without differentiation. Iron study reported ferritin 10.7 ng/mL, free iron 23, TIBC 593 and iron saturation 4%. Chemistry lab reported creatinine 1.11, potassium 5.4, otherwise normal sodium chloride and calcium, glucose 137. Prior to that the patient had normal liver function on 08/24/2022. He had a normal TSH 2.5 and slightly elevated hemoglobin A1c of 6.8% on that day.      The patient reports he has been on oral iron for almost 2 weeks, once a day with good tolerance and he now noticed improved energy level and less exertional dyspnea. He does report stool becoming dark-colored after he started oral iron.  He reports no syncope.     Laboratory study on  "09/22/2022 reported improved hemoglobin 8.5 and normalized MCV 85.0, stable MCHC 29.4. Maintains normal platelets and WBC.     I saw him originally on 9/22/2022 for initial evaluation for his sigmoid colon cancer and iron deficiency anemia.  Since that time patient had multiple imaging studies including CT for chest abdomen pelvis, subsequently with MRI for the abdomen for staging purposes.  He was also seen by Dr. Samaniego who performed right hemicolectomy on 10/11/2022.    Dr. Samaniego called me on the day of surgery, he also suspected this liver lesion is metastatic, however unable to reach that during the surgery.    Patient notes since surgery on 10/11/2022 of the bowel resection he is recovering \"okay\". The patient does report some abdominal pain when adrianna his stomach, specifically when getting out of bed. The patient denies any issues with his appetite, and is having normal urination and bowel movements. The patient denies constipation.  Patient reports no fever sweating or chills.    The patient has type II diabetes, which he manages with metformin. 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    Patient reports he is able to tolerate oral iron twice a day with no specific complaints.  No nausea vomiting or constipation.      Laboratory study today on 11/2/2022 showed improved anemia with Hb 10.9, normalized MCV 92.5.  His normal platelets 159,000 WBC 6870 including ANC 4580.      PET scan examination on 11/14/2022 reported distal small 1.3 cm subtle low attenuation lesion in the medial hepatic segment with SUV 6.3.  There is no hypermetabolic enthesopathy in the abdomen or pelvis.  No suspicious hypermetabolic activity in the chest or neck.    Laboratory study on 11/15/2022 showed improved hemoglobin 12.1, normal platelets 156,000 and WBC 5720 including ANC 3750 and lymphocytes 1070.  Chemistry study reported glucose 156 otherwise " unremarkable CMP.    Patient reports he developed a mild oral mucositis lasted about 4 days after the chemotherapy and now has resolved. He believes he has lost some weight, but states he does have an appetite and eats everyday.  He denies nausea vomiting.  He denies having diarrhea or constipation. He denies having any fever, chills, or sweating.  Denies peripheral neuropathy.     Laboratory results from on 11/29/2022 are; white cell count is 3950. neutrophils are 2340. Hemoglobin is 11.7 g/dL. Platelets are 108,000. Chemistry lab was unremarkable except glucose 255.    Laboratory studies on 3/21/2023 reported pancytopenia, with platelets 100,000, hemoglobin 10.0 .0, in the WBC 3130 including mild neutropenia with ANC 1610.     On 3/21/2023, we continue to hold his Avastin due to a tooth extraction on 3/13/2023.  He developed mild neutropenia with ANC 1610 so we gave the patient Levaquin daily for 7 days for prophylaxis.  Fortunately did not have infection.    Patient reports reasonable tolerance.  No specific complaints.    On 4/4/2023 patient has improved neutrophils 1870, and WBC 3270.  Continues to have anemia stable Hb 10.2, and platelets 96,000.  He reports no spontaneous bleeding or bruising.      MEDICATIONS:    Current Outpatient Medications:   •  acetaminophen (TYLENOL) 500 MG tablet, Take 1 tablet by mouth Every 6 (Six) Hours As Needed for Mild Pain., Disp: , Rfl:   •  aspirin 81 MG EC tablet, Take 1 tablet by mouth Daily. INSTRUCTED PT TO FOLLOW MD INSTRUCTIONS REGARDING HOLDING FOR SURGERY/PT STATED TO HOLD 5 DAYS PRIOR TO SURGERY, Disp: , Rfl:   •  atorvastatin (LIPITOR) 40 MG tablet, TAKE ONE TABLET BY MOUTH DAILY (Patient taking differently: Take 1 tablet by mouth Every Night.), Disp: 90 tablet, Rfl: 3  •  ibuprofen (ADVIL,MOTRIN) 600 MG tablet, Take  by mouth As Needed. Was for dental, Disp: , Rfl:   •  lisinopril-hydrochlorothiazide (PRINZIDE,ZESTORETIC) 20-12.5 MG per tablet, TAKE ONE  "TABLET BY MOUTH ONCE NIGHTLY (Patient taking differently: Take 1 tablet by mouth Every Night.), Disp: 90 tablet, Rfl: 3  •  metFORMIN (GLUCOPHAGE) 500 MG tablet, Take 1 tablet by mouth 2 (Two) Times a Day With Meals., Disp: 180 tablet, Rfl: 3  •  ondansetron (ZOFRAN) 8 MG tablet, Take 1 tablet by mouth 3 (Three) Times a Day As Needed for Nausea or Vomiting. (Patient taking differently: Take 1 tablet by mouth 3 (Three) Times a Day As Needed for Nausea or Vomiting. PRN), Disp: 60 tablet, Rfl: 5  •  tadalafil (CIALIS) 5 MG tablet, Take 1 tablet by mouth Daily As Needed for Erectile Dysfunction., Disp: 30 tablet, Rfl: 2    ALLERGIES:   No Known Allergies    SOCIAL HISTORY:       Social History     Socioeconomic History   • Marital status:      Spouse name: Chelsie   • Years of education: 12   Tobacco Use   • Smoking status: Some Days     Types: Cigars   • Smokeless tobacco: Never   • Tobacco comments:     OCCASIONALLY   Vaping Use   • Vaping Use: Never used   Substance and Sexual Activity   • Alcohol use: Yes     Alcohol/week: 7.0 standard drinks     Types: 1 Glasses of wine, 6 Cans of beer per week     Comment: sometimes in warm weather, wine sometimes in the winter   • Drug use: No   • Sexual activity: Yes     Partners: Female     Birth control/protection: Surgical         FAMILY HISTORY:  Family History   Problem Relation Age of Onset   • Clotting disorder Other    • Colon cancer Paternal Grandmother    • Colon cancer Paternal Grandfather    • Clotting disorder Father    • Malig Hyperthermia Neg Hx          Vitals:    04/18/23 1037   BP: 133/87   Pulse: 63   Resp: 16   Temp: 98.4 °F (36.9 °C)   TempSrc: Temporal   SpO2: 98%   Weight: 95 kg (209 lb 8 oz)   Height: 175.3 cm (69.02\")   PainSc: 0-No pain         4/18/2023    10:38 AM   Current Status   ECOG score 0     Physical Exam  Vitals and nursing note reviewed.   GENERAL:  Well-developed, well-nourished male in no acute distress.  Orientated to time place and " people.  SKIN:  Warm, dry without rashes, purpura or petechiae.  HEENT:  Normocephalic.    LYMPHATICS:  No cervical, supraclavicular adenopathy.  CHEST: Normal respiratory effort.  Lungs clear to auscultation. Good airflow.  CARDIAC:  Regular rate and rhythm without murmurs. Normal S1,S2.  ABDOMEN:  Soft, nontender with no organomegaly or masses.  Bowel sounds normal.  EXTREMITIES:  No clubbing, cyanosis or edema.  NEUROLOGICAL:  Grossly intact.  No focal neurological deficits.  PSYCHIATRIC:  Normal affect and mood.        RECENT LABS:    Results from last 7 days   Lab Units 04/18/23  1013   WBC 10*3/mm3 3.48   HEMOGLOBIN g/dL 10.0*   HEMATOCRIT % 32.4*   PLATELETS 10*3/mm3 91*     Lab Results   Component Value Date    GLUCOSE 192 (H) 04/18/2023    BUN 16 04/18/2023    CREATININE 0.84 04/18/2023    EGFRRESULT 71.0 09/08/2022    EGFR 93.2 04/18/2023    BCR 19.0 04/18/2023    K 4.4 04/18/2023    CO2 25.4 04/18/2023    CALCIUM 9.1 04/18/2023    PROTENTOTREF 7.2 08/24/2022    ALBUMIN 4.0 04/18/2023    BILITOT 0.6 04/18/2023    AST 27 04/18/2023    ALT 20 04/18/2023       Lab Results   Component Value Date    OSHTGCYI16 658 04/04/2023     Lab Results   Component Value Date    FOLATE >20.00 04/04/2023       Lab Results   Component Value Date    CEA 10.70 09/22/2022     Lab Results   Component Value Date    IRON 108 02/07/2023    TIBC 386 02/07/2023    FERRITIN 229.80 02/07/2023   Iron saturation 28% on 2/7/2023      IMAGING STUDY:      Assessment & Plan     ASSESSMENT:    1.  Colon cancer post right hemicolectomy 10/11/2022.  Stage IV ([id number]).  · Had positive stool occult blood test on 09/09/2022. Colonoscopic examination on 09/20/2022 by Dr. Anil Garces reported cecum colon mass.  Biopsy confirmed moderately differentiated adenocarcinoma.  MSI stable.  · Mildly elevated CEA level 10.7 ng/mL on 9/22/2022.  · CT scan for chest abdomen pelvis 9/26/2022 reported cecal mass.  There was also suspicious 9 mm hepatic  lesion.  · Abdomen MRI examination with and without contrast on 10/7/2022 confirmed 8 mm lesion hypervascularity in the segment 5 of the liver.  · Patient had right hemicolectomy 10/11/2022.  Unable to biopsy at this time he liver lesion.  Pathology evaluation reported [id number] disease, this will be at least a stage IIIb colon cancer.  · On 11/2/2022 I discussed with the patient, his wife and his daughter, recommending further imaging study with PET scan for assessment.  If patient is no hypermetabolic liver lesion, will treat with adjuvant FOLFOX 6 regimen every 2 weeks for 12 cycles.  However, if he has hypermetabolic lesion in the liver, we will treated him as metastatic disease, with FOLFOX plus Avastin.  Since patient has neuropathy already being his legs and feet, if he is indeed metastatic disease, we may switch him to irinotecan instead of oxaliplatin because of the neuropathy.  If indicated patient has metastatic disease, we would also entertain metastectomy after finishing 12 cycles of chemotherapy.    · PATHOLOGY: Tumor sample for [facility] NGS study reported positive for K-aashish mutation exon2 p.G13D, negative for HER2/dorie by IHC 0 and no amplification by FISH.  MSI stable by DNA sequencing, and also MMR proficient by IHC staining.  Tumor mutation burden is low 8 mut/Mb.  Patient was positive for PTEN 1+, 100% by IHC, PD-L1 was negative, only 1% by IHC.  Patient also positive for APC mutation exon 16p.[id number], positive for AMACR1 exon2 p.R358*.  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    · Discussed with the patient and family members about potential side effects including bone marrow suppression, with anemia thrombocytopenia and leukocytopenia increased risk for infection, and also peripheral neuropathy, etc. patient is agreeable to proceed ahead with treatment.  · The patient has PET scan examination on 11/14/2022 which reported a small 1.3 cm focus with elevated SUV 6.03, highly suspicious for  metastatic disease. The patient now has stage IV. The patient had [facility] NGS study which reported K-aashish mutation, tumor mutation burden < 10, MSI was stable and anti-PD-L1 negative.  Not a candidate for anti-EGFR monoclonal antibody such as Vectibix, or chekpoint inhibitor immunotherapy.  We discussed the adding anti-VEGF monoclonal antibody, bevacizumab/Avastin starting in 2 weeks so that it would be after 6 weeks of primary surgery for the colon cancer resection.  We discussed this is now stage IV disease, however because only having 1 solitary metastatic lesion in the liver, it is potentially curable so we will be as aggressive as possible for chemotherapy.  If he has good response, in the future he will need metastectomy of the liver lesion.  · On 11/29/2022 patient is presented for cycle #2 of chemotherapy. Patient tolerated therapy well except mild oral mucositis. However, laboratory studies showed significant decrease in platelets at only 108,000, as well as also decreasing WBC and hemoglobin. Discussed with the patient today if we do not carry out any dose reduction, he will likely become more thrombocytopenic next time in 2 weeks and we will not be able to do cycle #3 chemotherapy on time. I discussed with the patient for dose reduction and to avoid potential delay of chemotherapy and he is agreeable. We will have 25% dose reduction for 5-FU leucovorin and oxaliplatin.  · On 11/29/2022 he was started the first dose of Avastin/bevacizumab in conjunction with chemotherapy cycle #2.  · On 12/13/2022, the patient presented for reevaluation prior to chemotherapy cycle #3. He has stable platelets of 108,000 and slightly improved WBC of 5100 and hemoglobin 12.8 g/dL. He will continue cycle 3 with the same 25% dose reduction.  · 1/10/2023 due for cycle 5 FOLFOX bevacizumab, overall is tolerating fairly well.  Hemoglobin 11.7, platelet count stable at 108,000, WBC 4.02, ANC 2.27.  · Patient has had five cycles of  chemotherapy and a CT scan examination obtained on 01/20/2023. The CT scan reported favorable response as the lesion is less obvious.   · 1/24/2023 recommended to continue chemotherapy for a total of 12 cycles. Then obtain PET scan examination. Due to worsening thrombocytopenia, and also peripheral neuropathy, for cycle #6, I will decrease oxaliplatin by another 25% so would it be 50% of the original dose. I will keep the same dose of 5-FU which is 75% of the original dose.  Full dose Avastin.   · 2/7/2023 due for cycle 7.  Platelet count is holding steady at 100,000.  We will proceed with treatment today with same doses as given on cycle 6.   · 2/21/2023 due for cycle #8 chemotherapy. We will repeat every 2 weeks.  · 3/7/2023 proceed with cycle #9 chemotherapy and hold Avastin for dental procedure on 3/13/2023.  · On 3/21/2023 we will proceed ahead with cycle #10 chemotherapy FOLFOX6 regimen.  Continue to hold Avastin.  · On 4/4/2023 patient presented for evaluation prior to cycle #11 FOLFOX 6 regimen.  Continue to hold Avastin.  This will be resumed from next cycle.   · The patient presented for reevaluation prior to his last cycle #12 FOLFOX plus Avastin. The patient reports tolerating well. We will proceed after with cycle12 today and resume Avastin.  I recommended having a PET scan examination for assessment of response, especially the small metastatic liver lesion which was only found on the previous PET scan.     2. Iron deficiency anemia.  Subsequently anemia also caused by chemotherapy.  · The patient has iron deficiency due to GI bleeding from his colon cancer. His laboratory study on 09/08/2022 reported ferritin 10.7 and iron saturation 4% with microcytic hypochromic anemia, hemoglobin 7.8, MCV 79.9 and MCHC 29.2.   · Patient reports good tolerance to oral iron treatment and already has improved energy level. Laboratory study on 9/22/2022 did report slightly improved hemoglobin at 8.5 but normalized MCV  85.0. I think he is responding to oral iron treatment both physically and laboratory wise.  We advised patient to increase oral iron to twice a day.  · On 11/2/2022 patient reports good tolerance to oral iron twice a day.  Labs today showed further improved hemoglobin 10.9.  He continues to maintain normal platelets and WBC.  · Improved hemoglobin 12.1 on 11/15/2022.  Cycle #1 chemotherapy will be started.  · On 11/29/2022 hemoglobin 11.7.  · On 12/13/2022, his hemoglobin is 12.8 g/dL.  · 1/10/2023 hemoglobin 11.7.  · The patient has trending down hemoglobin 11.0 on 1/24/2023, however, he is asymptomatic.  · Hemoglobin 2/7/2023, 10.6.  Iron study reported ferritin 229 and iron saturation 28% with free iron 108 TIBC 386.  No evidence for iron deficiency.  So his anemia is now secondary to chemotherapy.     · The patient has stable mild anemia with hemoglobin 11.0 on 3/7/2023. We will continue to monitor.  Continue to hold ferrous sulfate.  · On 3/21/2023, stable anemia with hemoglobin 10.0 .0.  Continue to monitor.  · On 4/4/2023 patient has stable anemia Hb 10.2, .9.  · The patient has stable anemia, hemoglobin 10.8 today on 04/18/2023. The patient reports no syncope.  ·      3.    Pancytopenia secondary to chemotherapy.    This patient had original iron deficiency, however now has ongoing anemia due to chemotherapy.  He also has thrombocytopenia and neutropenia from chemotherapy.  · Normal neutrophil prior to starting chemotherapy.    · This patient developed first mild neutropenia with ANC 1610 on 3/21/2023.  Patient reports no fever sweating chills.  I recommended starting patient on Levaquin daily for 7 days, for prophylaxis of neutropenic fever.  We will continue to monitor for now.  Expecting this will getting worse with ongoing chemotherapy.  Question the patient to call us if he develops fever sweating chills.   · 4/4/2023, patient has slightly improved neutrophils 1870, and WBC 3270.  Continue  to monitor.  · On 04/18/2023, the patient has neutrophils 2030, WBC 3480. Continue with chemotherapy, monitor CBC.  ·     4.  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  · Prior to starting chemotherapy patient had low normal platelets 156,000 on 11/15/2022.  Started on cycle #1 chemotherapy FOLFOX 6.  · On 11/29/2022 patient had platelets of 108,000.  Patient reports no bleeding or bruising.  Discussed with the patient, because of foreseeable more severe thrombocytopenia, we recommended 25% dose reduction starting from cycle #2 chemotherapy.  · On 12/13/2022, the patient has same decreased number of platelets 108,000. He will continue chemotherapy with same dose reduction of 25%.   · 1/10/2023 platelet count stable at 108,000.  · On 01/24/2023, patient has worsening thrombocytopenia with platelets of 99,000 mcL. Discussed with the patient, we will cut oxaliplatin by another 25% to be at 50% of original dose. We will leave the 5-FU dose same as the previous at 75% of original dose. Avastin will be the same dose.  · 2/7/2023 platelet count 100,000.    · On 02/21/2023 the patient has stable thrombocytopenia with platelets 102,000. The patient reports no bleeding or bruising. Continue to monitor.   · On 3/7/2023 the patient has stable thrombocytopenia with platelets 103,000. The patient reports no bleeding or bruising. Continue to monitor.   · On 3/21/2023 persistent stable thrombocytopenia with platelets 100,000.   · On 4/4/2023 platelets 96,000.  We will go ahead and to proceed with chemotherapy.   · Ttoday on 04/18/2023, platelets 91,000. We will proceed ahead with the chemotherapy today. This is cycle 12 of chemotherapy. We will reassess his treatment after PET scan examination.    5. Peripheral neuropathy  · On 12/13/2022, he reports cold sensitivity in his mouth when he drinks cold water. The cold sensitivity usually dissipates after 3 to 4 days.  · Patient reports he is more significant cold sensitivities  lasting for more than a week. He does have moderate tingling, numbness involving the fingers, but not much as the toes.  · 2/7/2023 he feels like the neuropathy/cold sensitivity is lingering a little bit longer with each cycle.    · On 4/4/2023 the patient reports stable mild peripheral neuropathy/cold sensitivity.    · On 04/18/2020, patient reports some mild numbness and tingling involving both feet and hands, but this is only last for a few days and then resolves. We will continue dose reduced oxaliplatin 50%.    6.  Weight losses.    · On 12/13/2022, the patient reports he has lost weight in the past couple of months. He reports a poor appetite for 3 days after chemotherapy and a poor taste in his mouth. His appetite has since improved. He only had mild nausea, but no vomiting. He was encouraged to use Ensure or Boost to improve nutrition supplement. He already started using protein powder.  · 3/7/2023, patient's weight continues to improve and he has gained a couple pounds.  His weight is stable at 211 pounds today.  He reports his appetite continues to improve.  He denies any nausea or vomiting.   · On 3/21/2023 weight 217 pounds.   · On 4/4/2023 patient is 207 pounds.  · On 04/18/2023, patient weighs with 209 pounds.  ·     7.  Burke teeth extraction: Patient states that he needs to have his wisdom teeth pull out.  Reviewed with Dr. Rosario.  Patient will need to have this done on his off week of treatment.  We will likely hold bevacizumab 2 weeks prior to this procedure and 4 weeks following.  Patient states that this will likely not happen until March, but is going to contact his oral surgeon, Dr. Charlie Zazueta to try to go ahead and get an appointment.  · Burke tooth extraction done on 3/13/2023.    · Continue to hold Avastin today 4/4/2023.  We will resume in 2 weeks for cycle #12 chemotherapy.    · On 04/18/2023, the patient reports his gum has completely healed.  We will resume Avastin.        8.  Low  "normal vitamin B12 level.    · Patient had a marginal normal vitamin B12 level at 260 pg/mL on 08/24/2022.   · Folate level on 9/22/2022 was normal at 15.5 ng/mL.    · Continue oral vitamin B12 supplement at 1000 mcg daily.   · Vitamin B12 was 658 pg/mL on 04/04/2023. This has improved. His macrocytosis is due to chemotherapy.  ·         PLAN:  1. Proceed with cycle 12 FOLFOX today. We will resume Avastin today. Continued dose reduction 50% oxaliplatin and 5-FU at 75% of calculated dose.  2. Patient return in 2 days to discontinue 5-FU.  3. Continue antiemetics as needed.   4. Continue oral vitamin B12 1000 mcg daily.  5. Arrange the patient to have PET scan examination in 1 week for reassessment.  6. Continue Benadryl at bedtime for sinus drainage, saline nasal spray, and bacitracin or Vaseline to bilateral nares to help heal sores.  Continue to monitor.  7. I will arrange the patient to come back to see me to discuss the PET scan report and further management plan. He will see me in 2 weeks.  8. Call/ return sooner should he develop any new concerns or problems.   9. Continue to follow with PCP for management of hypertension and diabetes.    I discussed with the patient about laboratory results and further management plan.  Patient voiced understanding and agreeable.    Patient is on a high risk medication requiring close monitoring for toxicity.           Maya Rosario MD PhD        CC:  JULIA Santiago MD Richard Pokorny, MD   Oral surgeon: Dr. Charlie Zazueta -- off [facility]-- Phone [phone]    Transcribed from ambient dictation for Maya Rosario MD PhD by Mago Vogt.  04/18/23   11:45 EDT    JAYMIE Provider Statement:[id number]::\"Patient or patient representative verbalized consent to the visit recording.\",\"I have personally performed the services described in this document as transcribed by the above individual, and it is both accurate and complete.\"      "

## 2023-04-20 ENCOUNTER — INFUSION (OUTPATIENT)
Dept: ONCOLOGY | Facility: HOSPITAL | Age: 73
End: 2023-04-20
Payer: MEDICARE

## 2023-04-20 DIAGNOSIS — Z45.2 FITTING AND ADJUSTMENT OF VASCULAR CATHETER: ICD-10-CM

## 2023-04-20 DIAGNOSIS — Z79.899 ENCOUNTER FOR LONG-TERM (CURRENT) USE OF MEDICATIONS: Primary | ICD-10-CM

## 2023-04-20 DIAGNOSIS — C18.9 MALIGNANT NEOPLASM OF COLON, UNSPECIFIED PART OF COLON: ICD-10-CM

## 2023-04-20 PROCEDURE — 25010000002 HEPARIN LOCK FLUSH PER 10 UNITS: Performed by: INTERNAL MEDICINE

## 2023-04-20 RX ORDER — SODIUM CHLORIDE 0.9 % (FLUSH) 0.9 %
10 SYRINGE (ML) INJECTION AS NEEDED
OUTPATIENT
Start: 2023-04-20

## 2023-04-20 RX ORDER — HEPARIN SODIUM (PORCINE) LOCK FLUSH IV SOLN 100 UNIT/ML 100 UNIT/ML
500 SOLUTION INTRAVENOUS AS NEEDED
OUTPATIENT
Start: 2023-04-20

## 2023-04-20 RX ORDER — HEPARIN SODIUM (PORCINE) LOCK FLUSH IV SOLN 100 UNIT/ML 100 UNIT/ML
500 SOLUTION INTRAVENOUS AS NEEDED
Status: DISCONTINUED | OUTPATIENT
Start: 2023-04-20 | End: 2023-04-20 | Stop reason: HOSPADM

## 2023-04-20 RX ORDER — SODIUM CHLORIDE 0.9 % (FLUSH) 0.9 %
10 SYRINGE (ML) INJECTION AS NEEDED
Status: DISCONTINUED | OUTPATIENT
Start: 2023-04-20 | End: 2023-04-20 | Stop reason: HOSPADM

## 2023-04-20 RX ADMIN — Medication 500 UNITS: at 13:11

## 2023-04-20 RX ADMIN — Medication 10 ML: at 13:11

## 2023-04-25 ENCOUNTER — HOSPITAL ENCOUNTER (OUTPATIENT)
Dept: PET IMAGING | Facility: HOSPITAL | Age: 73
Discharge: HOME | End: 2023-04-25
Payer: MEDICARE

## 2023-04-25 DIAGNOSIS — C18.2 MALIGNANT NEOPLASM OF ASCENDING COLON: ICD-10-CM

## 2023-04-25 LAB — GLUCOSE BLDC GLUCOMTR-MCNC: 120 MG/DL (ref 70–130)

## 2023-04-25 PROCEDURE — 78815 PET IMAGE W/CT SKULL-THIGH: CPT

## 2023-04-25 PROCEDURE — 82962 GLUCOSE BLOOD TEST: CPT

## 2023-04-25 PROCEDURE — A9552 F18 FDG: HCPCS | Performed by: INTERNAL MEDICINE

## 2023-04-25 PROCEDURE — 0 FLUDEOXYGLUCOSE F18 SOLUTION: Performed by: INTERNAL MEDICINE

## 2023-04-25 RX ADMIN — FLUDEOXYGLUCOSE F18 1 DOSE: 300 INJECTION INTRAVENOUS at 09:38

## 2023-05-01 ENCOUNTER — OFFICE VISIT (OUTPATIENT)
Dept: ONCOLOGY | Facility: CLINIC | Age: 73
End: 2023-05-01
Payer: MEDICARE

## 2023-05-01 ENCOUNTER — LAB (OUTPATIENT)
Dept: LAB | Facility: HOSPITAL | Age: 73
End: 2023-05-01
Payer: MEDICARE

## 2023-05-01 VITALS
DIASTOLIC BLOOD PRESSURE: 91 MMHG | OXYGEN SATURATION: 98 % | WEIGHT: 211.1 LBS | HEIGHT: 69 IN | BODY MASS INDEX: 31.27 KG/M2 | SYSTOLIC BLOOD PRESSURE: 155 MMHG | HEART RATE: 82 BPM | TEMPERATURE: 98.4 F

## 2023-05-01 DIAGNOSIS — C18.2 MALIGNANT NEOPLASM OF ASCENDING COLON: ICD-10-CM

## 2023-05-01 DIAGNOSIS — C78.7 SECONDARY LIVER CANCER: ICD-10-CM

## 2023-05-01 DIAGNOSIS — C18.9 MALIGNANT NEOPLASM OF COLON, UNSPECIFIED PART OF COLON: Primary | ICD-10-CM

## 2023-05-01 LAB
ALBUMIN SERPL-MCNC: 4.2 G/DL (ref 3.5–5.2)
ALBUMIN/GLOB SERPL: 1.4 G/DL (ref 1.1–2.4)
ALP SERPL-CCNC: 65 U/L (ref 38–116)
ALT SERPL W P-5'-P-CCNC: 19 U/L (ref 0–41)
ANION GAP SERPL CALCULATED.3IONS-SCNC: 11.4 MMOL/L (ref 5–15)
AST SERPL-CCNC: 26 U/L (ref 0–40)
BASOPHILS # BLD AUTO: 0.04 10*3/MM3 (ref 0–0.2)
BASOPHILS NFR BLD AUTO: 0.9 % (ref 0–1.5)
BILIRUB SERPL-MCNC: 0.9 MG/DL (ref 0.2–1.2)
BUN SERPL-MCNC: 13 MG/DL (ref 6–20)
BUN/CREAT SERPL: 13 (ref 7.3–30)
CALCIUM SPEC-SCNC: 9.6 MG/DL (ref 8.5–10.2)
CHLORIDE SERPL-SCNC: 102 MMOL/L (ref 98–107)
CO2 SERPL-SCNC: 25.6 MMOL/L (ref 22–29)
CREAT SERPL-MCNC: 1 MG/DL (ref 0.7–1.3)
DEPRECATED RDW RBC AUTO: 63.6 FL (ref 37–54)
EGFRCR SERPLBLD CKD-EPI 2021: 80.5 ML/MIN/1.73
EOSINOPHIL # BLD AUTO: 0.06 10*3/MM3 (ref 0–0.4)
EOSINOPHIL NFR BLD AUTO: 1.3 % (ref 0.3–6.2)
ERYTHROCYTE [DISTWIDTH] IN BLOOD BY AUTOMATED COUNT: 15.1 % (ref 12.3–15.4)
GLOBULIN UR ELPH-MCNC: 3.1 GM/DL (ref 1.8–3.5)
GLUCOSE SERPL-MCNC: 189 MG/DL (ref 74–124)
HCT VFR BLD AUTO: 33.2 % (ref 37.5–51)
HGB BLD-MCNC: 10.8 G/DL (ref 13–17.7)
IMM GRANULOCYTES # BLD AUTO: 0.02 10*3/MM3 (ref 0–0.05)
IMM GRANULOCYTES NFR BLD AUTO: 0.4 % (ref 0–0.5)
LYMPHOCYTES # BLD AUTO: 0.81 10*3/MM3 (ref 0.7–3.1)
LYMPHOCYTES NFR BLD AUTO: 17.6 % (ref 19.6–45.3)
MCH RBC QN AUTO: 36.9 PG (ref 26.6–33)
MCHC RBC AUTO-ENTMCNC: 32.5 G/DL (ref 31.5–35.7)
MCV RBC AUTO: 113.3 FL (ref 79–97)
MONOCYTES # BLD AUTO: 0.46 10*3/MM3 (ref 0.1–0.9)
MONOCYTES NFR BLD AUTO: 10 % (ref 5–12)
NEUTROPHILS NFR BLD AUTO: 3.2 10*3/MM3 (ref 1.7–7)
NEUTROPHILS NFR BLD AUTO: 69.8 % (ref 42.7–76)
NRBC BLD AUTO-RTO: 0 /100 WBC (ref 0–0.2)
PLATELET # BLD AUTO: 86 10*3/MM3 (ref 140–450)
PMV BLD AUTO: 11.6 FL (ref 6–12)
POTASSIUM SERPL-SCNC: 4.3 MMOL/L (ref 3.5–4.7)
PROT SERPL-MCNC: 7.3 G/DL (ref 6.3–8)
RBC # BLD AUTO: 2.93 10*6/MM3 (ref 4.14–5.8)
SODIUM SERPL-SCNC: 139 MMOL/L (ref 134–145)
WBC NRBC COR # BLD: 4.59 10*3/MM3 (ref 3.4–10.8)

## 2023-05-01 PROCEDURE — 85025 COMPLETE CBC W/AUTO DIFF WBC: CPT

## 2023-05-01 PROCEDURE — 80053 COMPREHEN METABOLIC PANEL: CPT

## 2023-05-01 PROCEDURE — 36415 COLL VENOUS BLD VENIPUNCTURE: CPT

## 2023-05-01 NOTE — PROGRESS NOTES
.     REASON FOR FOLLOWUP:     *Cecum colon cancer, status post right hemicolectomy performed on 10/11/2022, [id number].   · CT scan examination on 9/26/2022 reported suspicious liver lesion 9 mm, otherwise no evidence for metastatic disease.    · The patient underwent an abdominal MRI examination on 10/06/2022, which reported suspicious liver lesion 8mm.  · Patient had sigmoidectomy on 10/11/2022.  Portacatheter placement on 10/21/2022.   · PET scan examination on 11/14/2022 reported solitary hypermetabolic liver lesion.   · Cycle #1 palliative chemotherapy FOLFOX 6 was started on 11/15/2022.   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  Not a candidate for anti-EGFR monoclonal antibody treatment.   · From cycle #2, bevacizumab/Avastin will be added to palliative chemotherapy.  Due to thrombocytopenia, all chemotherapy agents were 25% reduced from cycle #2.  ·  1/24/203 further dose reduction of Oxaliplatin to 50% of original dose.   *Iron deficiency anemia.  · Oral iron treatment held on 2/21/2023.  Continue to monitor labs.                               HISTORY OF PRESENT ILLNESS:  The patient is a 72 y.o. 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 I saw him recently on 4/18/2023 when he was getting cycle #12 chemotherapy FOLFOX plus Avastin.     The PET scan reported resolution of the hypermetabolic subcapsular and medial hepatic segment lesion. However, there were developments of a few mildly hypermetabolic lymph nodes, one of them to the right subcarinal area with a maximum SUV 3.5, previously photopenic. There is a 9 x 6 mm right supraclavicular node with a maximum SUV 2.9, previous 3 mm and photopenic. There is also a 10 mm x 7 mm  right paratracheal node stable in size, but is slightly hypermetabolic.    Patient reports having tingling in his feet. He also reports having soreness in his shoulder. He denies any problems raising his arm.  Patient otherwise has been doing well.  Performance status ECOG 1.  Denies fever sweating chills.  Appetite is reasonable.  No nausea or vomiting.     Lab study today on 5/1/2023 reported moderate thrombocytopenia platelets 86,000, hemoglobin 10.8, .3, and WBC 4590 including neutrophils 3200.        Past Medical History:   Diagnosis Date   • Abdominal hernia    • Anemia    • Arrhythmia     PT STATED DURING LAST COLONOSCOPY 2 WEEKS AGO   • Arthritis    • Chemotherapy-induced thrombocytopenia 12/4/2022   • Colon cancer 09/22/2022   • Colon polyp September 20 2022   • Colon polyps    • Depression    • DVT (deep venous thrombosis)     IN LEFT LEG   • Full dentures    • GI (gastrointestinal bleed) September 8 2022   • Hip pain     RIGHT   • Hyperlipidemia    • Hypertension    • Iron deficiency anemia 09/22/2022   • Numbness and tingling     RIGHT FOOT   • PONV (postoperative nausea and vomiting)    • Right leg pain    • Type 2 diabetes mellitus without complication, without long-term current use of insulin 10/08/2019     Past Surgical History:   Procedure Laterality Date   • CERVICAL DISCECTOMY LAMINECTOMY DECOMPRESSION POSTERIOR FUSION WITH INSTRUMENTATION     • COLON RESECTION N/A 10/11/2022    Procedure: LAPAROSCOPIC RIGHT COLON RESECTION;  Surgeon: Ga Samaniego MD;  Location: [facility] MAIN OR;  Service: General;  Laterality: N/A;   • COLONOSCOPY N/A 02/28/2018    Procedure: COLONOSCOPY to TI and cecum with polypectomy;  Surgeon: Anil Garces MD;  Location: [facility] ENDOSCOPY;  Service:    • COLONOSCOPY  2/28/2018 and sept. 2022    found cancer 2022   • JOINT REPLACEMENT      LEFT HIP   • KNEE ARTHROSCOPY Left    • LUMBAR DISCECTOMY FUSION INSTRUMENTATION N/A 11/18/2020    Procedure: Lumbar 4 5  laminectomy with a posterior lateral fusion and instrumentation and interbody fusion;  Surgeon: Jeffrey Garcia MD;  Location: [facility] OR;  Service: Neurosurgery;  Laterality: N/A;   • SPINE SURGERY  December 2021    L4 and L5   • TOTAL HIP ARTHROPLASTY Right 06/03/2021    Procedure: TOTAL HIP ARTHROPLASTY POSTERIOR;  Surgeon: Shlomo Campos MD;  Location: [facility] OR;  Service: Orthopedics;  Laterality: Right;   • VASECTOMY     • VENOUS ACCESS DEVICE (PORT) INSERTION N/A 10/21/2022    Procedure: INSERTION VENOUS ACCESS DEVICE;  Surgeon: Ga Samaniego MD;  Location: [facility];  Service: General;  Laterality: N/A;     HEMATOLOGY/MEDICAL ONCOLOGY HISTORY: The patient is a 72 y.o. year old male with hypertension, hyperlipidemia, type 2 diabetes, iron deficiency anemia, history of DVT, who presented on 9/22/2022 for initial evaluation because of iron deficiency anemia, referred by his primary care provider, JULIA Santiago. Patient is accompanied by his wife who helped with the history. They reported the patient actually was being diagnosed of colon cancer 2 days ago. His GI specialist, Dr. Anil Garces, performed colonoscopic examination and saw a distal colon mass. I do not have the report for the procedure nor do I have the pathology report but nevertheless there was a mass in the distal colon likely in the sigmoid colon. I will obtain a copy of those reports when available.      Patient reports he had no apparent melena or hematochezia before starting oral iron treatment. He did have however significant fatigue. He reports exertional dyspnea and no syncope. Patient had laboratory study recently on 09/08/2022 which reported severe iron deficiency anemia with hemoglobin 7.8, MCV 79.9, MCHC 29.2. Normal platelets 217,000 and WBC 7230 without differentiation. Iron study reported ferritin 10.7 ng/mL, free iron 23, TIBC 593 and iron saturation 4%. Chemistry lab reported creatinine 1.11, potassium  "5.4, otherwise normal sodium chloride and calcium, glucose 137. Prior to that the patient had normal liver function on 08/24/2022. He had a normal TSH 2.5 and slightly elevated hemoglobin A1c of 6.8% on that day.      The patient reports he has been on oral iron for almost 2 weeks, once a day with good tolerance and he now noticed improved energy level and less exertional dyspnea. He does report stool becoming dark-colored after he started oral iron.  He reports no syncope.     Laboratory study on 09/22/2022 reported improved hemoglobin 8.5 and normalized MCV 85.0, stable MCHC 29.4. Maintains normal platelets and WBC.     I saw him originally on 9/22/2022 for initial evaluation for his sigmoid colon cancer and iron deficiency anemia.  Since that time patient had multiple imaging studies including CT for chest abdomen pelvis, subsequently with MRI for the abdomen for staging purposes.  He was also seen by Dr. Samaniego who performed right hemicolectomy on 10/11/2022.    Dr. Samaniego called me on the day of surgery, he also suspected this liver lesion is metastatic, however unable to reach that during the surgery.    Patient notes since surgery on 10/11/2022 of the bowel resection he is recovering \"okay\". The patient does report some abdominal pain when adrianna his stomach, specifically when getting out of bed. The patient denies any issues with his appetite, and is having normal urination and bowel movements. The patient denies constipation.  Patient reports no fever sweating or chills.    The patient has type II diabetes, which he manages with metformin. 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    Patient reports he is able to tolerate oral iron twice a day with no specific complaints.  No nausea vomiting or constipation.      Laboratory study today on 11/2/2022 showed improved anemia with Hb 10.9, normalized MCV 92.5.  His normal platelets 159,000 " WBC 6870 including ANC 4580.      PET scan examination on 11/14/2022 reported distal small 1.3 cm subtle low attenuation lesion in the medial hepatic segment with SUV 6.3.  There is no hypermetabolic enthesopathy in the abdomen or pelvis.  No suspicious hypermetabolic activity in the chest or neck.    Laboratory study on 11/15/2022 showed improved hemoglobin 12.1, normal platelets 156,000 and WBC 5720 including ANC 3750 and lymphocytes 1070.  Chemistry study reported glucose 156 otherwise unremarkable CMP.    Patient reports he developed a mild oral mucositis lasted about 4 days after the chemotherapy and now has resolved. He believes he has lost some weight, but states he does have an appetite and eats everyday.  He denies nausea vomiting.  He denies having diarrhea or constipation. He denies having any fever, chills, or sweating.  Denies peripheral neuropathy.     Laboratory results from on 11/29/2022 are; white cell count is 3950. neutrophils are 2340. Hemoglobin is 11.7 g/dL. Platelets are 108,000. Chemistry lab was unremarkable except glucose 255.    Laboratory studies on 3/21/2023 reported pancytopenia, with platelets 100,000, hemoglobin 10.0 .0, in the WBC 3130 including mild neutropenia with ANC 1610.     On 3/21/2023, we continue to hold his Avastin due to a tooth extraction on 3/13/2023.  He developed mild neutropenia with ANC 1610 so we gave the patient Levaquin daily for 7 days for prophylaxis.  Fortunately did not have infection.    Patient reports reasonable tolerance.  No specific complaints.    On 4/4/2023 patient has improved neutrophils 1870, and WBC 3270.  Continues to have anemia stable Hb 10.2, and platelets 96,000.  He reports no spontaneous bleeding or bruising.      MEDICATIONS:    Current Outpatient Medications:   •  acetaminophen (TYLENOL) 500 MG tablet, Take 1 tablet by mouth Every 6 (Six) Hours As Needed for Mild Pain., Disp: , Rfl:   •  aspirin 81 MG EC tablet, Take 1 tablet by  mouth Daily. INSTRUCTED PT TO FOLLOW MD INSTRUCTIONS REGARDING HOLDING FOR SURGERY/PT STATED TO HOLD 5 DAYS PRIOR TO SURGERY, Disp: , Rfl:   •  atorvastatin (LIPITOR) 40 MG tablet, TAKE ONE TABLET BY MOUTH DAILY (Patient taking differently: Take 1 tablet by mouth Every Night.), Disp: 90 tablet, Rfl: 3  •  ibuprofen (ADVIL,MOTRIN) 600 MG tablet, Take  by mouth As Needed. Was for dental, Disp: , Rfl:   •  lisinopril-hydrochlorothiazide (PRINZIDE,ZESTORETIC) 20-12.5 MG per tablet, TAKE ONE TABLET BY MOUTH ONCE NIGHTLY (Patient taking differently: Take 1 tablet by mouth Every Night.), Disp: 90 tablet, Rfl: 3  •  metFORMIN (GLUCOPHAGE) 500 MG tablet, Take 1 tablet by mouth 2 (Two) Times a Day With Meals., Disp: 180 tablet, Rfl: 3  •  ondansetron (ZOFRAN) 8 MG tablet, Take 1 tablet by mouth 3 (Three) Times a Day As Needed for Nausea or Vomiting. (Patient taking differently: Take 1 tablet by mouth 3 (Three) Times a Day As Needed for Nausea or Vomiting. PRN), Disp: 60 tablet, Rfl: 5  •  tadalafil (CIALIS) 5 MG tablet, Take 1 tablet by mouth Daily As Needed for Erectile Dysfunction., Disp: 30 tablet, Rfl: 2    ALLERGIES:   No Known Allergies    SOCIAL HISTORY:       Social History     Socioeconomic History   • Marital status:      Spouse name: Chelsie   • Years of education: 12   Tobacco Use   • Smoking status: Some Days     Types: Cigars   • Smokeless tobacco: Never   • Tobacco comments:     OCCASIONALLY   Vaping Use   • Vaping Use: Never used   Substance and Sexual Activity   • Alcohol use: Yes     Alcohol/week: 7.0 standard drinks     Types: 1 Glasses of wine, 6 Cans of beer per week     Comment: sometimes in warm weather, wine sometimes in the winter   • Drug use: No   • Sexual activity: Yes     Partners: Female     Birth control/protection: Surgical         FAMILY HISTORY:  Family History   Problem Relation Age of Onset   • Clotting disorder Other    • Colon cancer Paternal Grandmother    • Colon cancer Paternal  "Grandfather    • Clotting disorder Father    • Malig Hyperthermia Neg Hx          Vitals:    05/01/23 1455   BP: 155/91   Pulse: 82   Temp: 98.4 °F (36.9 °C)   TempSrc: Temporal   SpO2: 98%   Weight: 95.8 kg (211 lb 1.6 oz)   Height: 175.3 cm (69.02\")   PainSc:   3   PainLoc: Shoulder  Comment: pain in the left shoulder near his port         5/1/2023     2:54 PM   Current Status   ECOG score 0     Physical Exam  Vitals and nursing note reviewed.   GENERAL:  Well-developed, well-nourished male in no acute distress.  Orientated to time place and the people.  SKIN:  Warm, dry without rashes, purpura or petechiae.  HEENT:  Normocephalic.  Wearing mask.   LYMPHATICS:  No cervical, supraclavicular adenopathy.  CHEST: Normal respiratory effort.  Lungs clear to auscultation. Good airflow.  CARDIAC:  Regular rate and rhythm without murmurs. Normal S1,S2.  ABDOMEN:  Soft, nontender with no organomegaly or masses.  Bowel sounds normal.  EXTREMITIES:  No clubbing, cyanosis or edema.  NEUROLOGICAL:  Grossly intact.  No focal neurological deficits.  PSYCHIATRIC:  Normal affect and mood.      RECENT LABS:    Results from last 7 days   Lab Units 05/01/23  1342   WBC 10*3/mm3 4.59   HEMOGLOBIN g/dL 10.8*   HEMATOCRIT % 33.2*   PLATELETS 10*3/mm3 86*     Lab Results   Component Value Date    GLUCOSE 189 (H) 05/01/2023    BUN 13 05/01/2023    CREATININE 1.00 05/01/2023    EGFRRESULT 71.0 09/08/2022    EGFR 80.5 05/01/2023    BCR 13.0 05/01/2023    K 4.3 05/01/2023    CO2 25.6 05/01/2023    CALCIUM 9.6 05/01/2023    PROTENTOTREF 7.2 08/24/2022    ALBUMIN 4.2 05/01/2023    BILITOT 0.9 05/01/2023    AST 26 05/01/2023    ALT 19 05/01/2023       Lab Results   Component Value Date    JSASAOZU47 658 04/04/2023     Lab Results   Component Value Date    FOLATE >20.00 04/04/2023     Lab Results   Component Value Date    CEA 10.70 09/22/2022     Lab Results   Component Value Date    IRON 108 02/07/2023    TIBC 386 02/07/2023    FERRITIN 229.80 " 02/07/2023   Iron saturation 28% on 2/7/2023      IMAGING STUDY:  NM PET/CT Skull Base to Mid Thigh  Narrative: F-18 FDG PET SKULL BASE TO MID THIGH WITH PET CT FUSION.     HISTORY: 72-year-old male with metastatic colon cancer. Restaging.     TECHNIQUE: Radiation dose reduction techniques were utilized, including  automated exposure control and exposure modulation based on body size.   Blood glucose level at time of injection was 120 mg/dL. 6.8 mCi of F-18  FDG were injected and PET was performed from skull base to mid thigh. CT  was obtained for localization and attenuation correction. Time at  injection 9:38 AM. PET start time 11:03 AM. Compared with CTs 01/20/2023  and PET/CT 11/14/2022.     FINDINGS: There has been resolution of the hypermetabolic subcapsular  medial hepatic segment lesion. There is no suspicious hypermetabolic  activity within the liver and there is no hypermetabolic  lymphadenopathy. There is no suspicious hypermetabolic activity within  the abdomen or pelvis. A 1.0 x 0.7 cm right paratracheal node is stable  in size, but is slightly hypermetabolic. A right subcarinal node appears  slightly boothe and has a maximal SUV of 3.5 and was previously  photopenic. There is a 0.9 x 0.6 cm right supraclavicular node with a  maximal SUV of 2.9, previously 3 mm in short axis and photopenic. There  is no suspicious hypermetabolic activity at the neck.     Impression: 1. Resolution of the hypermetabolic subcapsular medial hepatic segment  lesion. There is no evidence for liver metastases and there is no  hypermetabolic lymphadenopathy within the abdomen or pelvis.  2. An approximately 1 cm right paratracheal node is stable in size, but  has become mildly hypermetabolic and a subcarinal node appears slightly  boothe and has become hypermetabolic. A subcentimeter right  supraclavicular node is slightly larger and has become hypermetabolic.  The etiology is indeterminate at this point. Conservative  surveillance  is recommended with a chest CT in 3 months.     This report was finalized on 4/26/2023 4:15 PM by Dr. Brook Wood M.D.           Assessment & Plan     ASSESSMENT:    1.  Colon cancer post right hemicolectomy 10/11/2022.  Stage IV ([id number]).  · Had positive stool occult blood test on 09/09/2022. Colonoscopic examination on 09/20/2022 by Dr. Anil Garces reported cecum colon mass.  Biopsy confirmed moderately differentiated adenocarcinoma.  MSI stable.  · Mildly elevated CEA level 10.7 ng/mL on 9/22/2022.  · CT scan for chest abdomen pelvis 9/26/2022 reported cecal mass.  There was also suspicious 9 mm hepatic lesion.  · Abdomen MRI examination with and without contrast on 10/7/2022 confirmed 8 mm lesion hypervascularity in the segment 5 of the liver.  · Patient had right hemicolectomy 10/11/2022.  Unable to biopsy at this time he liver lesion.  Pathology evaluation reported [id number] disease, this will be at least a stage IIIb colon cancer.  · On 11/2/2022 I discussed with the patient, his wife and his daughter, recommending further imaging study with PET scan for assessment.  If patient is no hypermetabolic liver lesion, will treat with adjuvant FOLFOX 6 regimen every 2 weeks for 12 cycles.  However, if he has hypermetabolic lesion in the liver, we will treated him as metastatic disease, with FOLFOX plus Avastin.  Since patient has neuropathy already being his legs and feet, if he is indeed metastatic disease, we may switch him to irinotecan instead of oxaliplatin because of the neuropathy.  If indicated patient has metastatic disease, we would also entertain metastectomy after finishing 12 cycles of chemotherapy.    · PATHOLOGY: Tumor sample for [facility] NGS study reported positive for K-aashish mutation exon2 p.G13D, negative for HER2/dorie by IHC 0 and no amplification by FISH.  MSI stable by DNA sequencing, and also MMR proficient by IHC staining.  Tumor mutation burden is low 8 mut/Mb.  Patient was  positive for PTEN 1+, 100% by IHC, PD-L1 was negative, only 1% by IHC.  Patient also positive for APC mutation exon 16p.T1538pf, positive for AMACR1 exon2 p.R358*.  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    · Discussed with the patient and family members about potential side effects including bone marrow suppression, with anemia thrombocytopenia and leukocytopenia increased risk for infection, and also peripheral neuropathy, etc. patient is agreeable to proceed ahead with treatment.  · The patient has PET scan examination on 11/14/2022 which reported a small 1.3 cm focus with elevated SUV 6.03, highly suspicious for metastatic disease. The patient now has stage IV. The patient had [facility] NGS study which reported K-aashish mutation, tumor mutation burden < 10, MSI was stable and anti-PD-L1 negative.  Not a candidate for anti-EGFR monoclonal antibody such as Vectibix, or chekpoint inhibitor immunotherapy.  We discussed the adding anti-VEGF monoclonal antibody, bevacizumab/Avastin starting in 2 weeks so that it would be after 6 weeks of primary surgery for the colon cancer resection.  We discussed this is now stage IV disease, however because only having 1 solitary metastatic lesion in the liver, it is potentially curable so we will be as aggressive as possible for chemotherapy.  If he has good response, in the future he will need metastectomy of the liver lesion.  · On 11/29/2022 patient is presented for cycle #2 of chemotherapy. Patient tolerated therapy well except mild oral mucositis. However, laboratory studies showed significant decrease in platelets at only 108,000, as well as also decreasing WBC and hemoglobin. Discussed with the patient today if we do not carry out any dose reduction, he will likely become more thrombocytopenic next time in 2 weeks and we will not be able to do cycle #3 chemotherapy on time. I discussed with the patient for dose reduction and to avoid potential delay of chemotherapy and he  is agreeable. We will have 25% dose reduction for 5-FU leucovorin and oxaliplatin.  · On 11/29/2022 he was started the first dose of Avastin/bevacizumab in conjunction with chemotherapy cycle #2.  · On 12/13/2022, the patient presented for reevaluation prior to chemotherapy cycle #3. He has stable platelets of 108,000 and slightly improved WBC of 5100 and hemoglobin 12.8 g/dL. He will continue cycle 3 with the same 25% dose reduction.  · 1/10/2023 due for cycle 5 FOLFOX bevacizumab, overall is tolerating fairly well.  Hemoglobin 11.7, platelet count stable at 108,000, WBC 4.02, ANC 2.27.  · Patient has had five cycles of chemotherapy and a CT scan examination obtained on 01/20/2023. The CT scan reported favorable response as the lesion is less obvious.   · 1/24/2023 recommended to continue chemotherapy for a total of 12 cycles. Then obtain PET scan examination. Due to worsening thrombocytopenia, and also peripheral neuropathy, for cycle #6, I will decrease oxaliplatin by another 25% so would it be 50% of the original dose. I will keep the same dose of 5-FU which is 75% of the original dose.  Full dose Avastin.   · 2/7/2023 due for cycle 7.  Platelet count is holding steady at 100,000.  We will proceed with treatment today with same doses as given on cycle 6.   · 2/21/2023 due for cycle #8 chemotherapy. We will repeat every 2 weeks.  · 3/7/2023 proceed with cycle #9 chemotherapy and hold Avastin for dental procedure on 3/13/2023.  · On 3/21/2023 we will proceed ahead with cycle #10 chemotherapy FOLFOX6 regimen.  Continue to hold Avastin.  · On 4/4/2023 patient presented for evaluation prior to cycle #11 FOLFOX 6 regimen.  Continue to hold Avastin.  This will be resumed from next cycle.   · The patient presented 4/18/2023 for reevaluation prior to his last cycle #12 FOLFOX plus Avastin. The patient reports tolerating well. We will proceed with cycle12 today and resume Avastin.  I recommended having a PET scan  examination for assessment of response, especially the small metastatic liver lesion which was only found on the previous PET scan.  · At today in the end of this patient had a PET scan examination.   · Patient had a PET skin examination on 04/25/2023, which reported complete resolution of the solitary hypermetabolic lesion in the liver. He had a mildly active small lymph nodes, peritracheal, subcarinal with low activity. Etiology not clear.   · Today, 05/01/2023, I reviewed the PET scan images with the patient and his wife, and two daughters. I recommended to refer the patient to [facility], surgical oncology, Dr. Griffin Tilley, for evaluation to see if he would be a candidate for surgical resection of the liver segment where he had metastatic disease, which now has resolution of hypermetabolic activity after chemotherapy.     2. Iron deficiency anemia.  Subsequently anemia also caused by chemotherapy.  · The patient has iron deficiency due to GI bleeding from his colon cancer. His laboratory study on 09/08/2022 reported ferritin 10.7 and iron saturation 4% with microcytic hypochromic anemia, hemoglobin 7.8, MCV 79.9 and MCHC 29.2.   · Patient reports good tolerance to oral iron treatment and already has improved energy level. Laboratory study on 9/22/2022 did report slightly improved hemoglobin at 8.5 but normalized MCV 85.0. I think he is responding to oral iron treatment both physically and laboratory wise.  We advised patient to increase oral iron to twice a day.  · On 11/2/2022 patient reports good tolerance to oral iron twice a day.  Labs today showed further improved hemoglobin 10.9.  He continues to maintain normal platelets and WBC.  · Improved hemoglobin 12.1 on 11/15/2022.  Cycle #1 chemotherapy will be started.  · On 11/29/2022 hemoglobin 11.7.  · On 12/13/2022, his hemoglobin is 12.8 g/dL.  · 1/10/2023 hemoglobin 11.7.  · The patient has trending down hemoglobin 11.0 on 1/24/2023,  however, he is asymptomatic.  · Hemoglobin 2/7/2023, 10.6.  Iron study reported ferritin 229 and iron saturation 28% with free iron 108 TIBC 386.  No evidence for iron deficiency.  So his anemia is now secondary to chemotherapy.     · The patient has stable mild anemia with hemoglobin 11.0 on 3/7/2023. We will continue to monitor.  Continue to hold ferrous sulfate.  · On 3/21/2023, stable anemia with hemoglobin 10.0 .0.  Continue to monitor.  · On 4/4/2023 patient has stable anemia Hb 10.2, .9.  · The patient has stable anemia, hemoglobin 10.8 today on 04/18/2023. The patient reports no syncope.  · On 05/01/2023, patient has a slightly improved hemoglobin 10.8. We will repeat iron studies in 3 months for reassessment.     3.    Pancytopenia secondary to chemotherapy.    This patient had original iron deficiency, however now has ongoing anemia due to chemotherapy.  He also has thrombocytopenia and neutropenia from chemotherapy.  · Normal neutrophil prior to starting chemotherapy.    · This patient developed first mild neutropenia with ANC 1610 on 3/21/2023.  Patient reports no fever sweating chills.  I recommended starting patient on Levaquin daily for 7 days, for prophylaxis of neutropenic fever.  We will continue to monitor for now.  Expecting this will getting worse with ongoing chemotherapy.  Question the patient to call us if he develops fever sweating chills.   · 4/4/2023, patient has slightly improved neutrophils 1870, and WBC 3270.  Continue to monitor.  · On 04/18/2023, the patient has neutrophils 2030, WBC 3480. Continue with chemotherapy, monitor CBC.  · On 05/01/2023 patient has improved WBC 4,590, including neutrophils 3,200.    4.  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  · Prior to starting chemotherapy patient had low normal platelets 156,000 on 11/15/2022.  Started on cycle #1 chemotherapy FOLFOX 6.  · On 11/29/2022 patient had platelets of 108,000.  Patient reports no bleeding or  bruising.  Discussed with the patient, because of foreseeable more severe thrombocytopenia, we recommended 25% dose reduction starting from cycle #2 chemotherapy.  · On 12/13/2022, the patient has same decreased number of platelets 108,000. He will continue chemotherapy with same dose reduction of 25%.   · 1/10/2023 platelet count stable at 108,000.  · On 01/24/2023, patient has worsening thrombocytopenia with platelets of 99,000 mcL. Discussed with the patient, we will cut oxaliplatin by another 25% to be at 50% of original dose. We will leave the 5-FU dose same as the previous at 75% of original dose. Avastin will be the same dose.  · 2/7/2023 platelet count 100,000.    · On 02/21/2023 the patient has stable thrombocytopenia with platelets 102,000. The patient reports no bleeding or bruising. Continue to monitor.   · On 3/7/2023 the patient has stable thrombocytopenia with platelets 103,000. The patient reports no bleeding or bruising. Continue to monitor.   · On 3/21/2023 persistent stable thrombocytopenia with platelets 100,000.   · On 4/4/2023 platelets 96,000.  We will go ahead and to proceed with chemotherapy.   · Today on 04/18/2023, platelets 91,000. We will proceed ahead with the chemotherapy today. This is cycle 12 of chemotherapy. We will reassess his treatment after PET scan examination.  · On 05/01/2023, patient has a persistent thrombocytopenia with platelets of 86,000. Patient is asymptomatic.    5. Peripheral neuropathy  · On 12/13/2022, he reports cold sensitivity in his mouth when he drinks cold water. The cold sensitivity usually dissipates after 3 to 4 days.  · Patient reports he is more significant cold sensitivities lasting for more than a week. He does have moderate tingling, numbness involving the fingers, but not much as the toes.  · 2/7/2023 he feels like the neuropathy/cold sensitivity is lingering a little bit longer with each cycle.    · On 4/4/2023 the patient reports stable mild  peripheral neuropathy/cold sensitivity.    · On 04/18/2023, patient reports some mild numbness and tingling involving both feet and hands, but this is only last for a few days and then resolves. We will continue dose reduced oxaliplatin 50%.  · On 05/01/2023, patient continues to have peripheral neuropathy intermittent and mild overall. Continue to monitor.    6.  Weight losses.    · On 12/13/2022, the patient reports he has lost weight in the past couple of months. He reports a poor appetite for 3 days after chemotherapy and a poor taste in his mouth. His appetite has since improved. He only had mild nausea, but no vomiting. He was encouraged to use Ensure or Boost to improve nutrition supplement. He already started using protein powder.  · 3/7/2023, patient's weight continues to improve and he has gained a couple pounds.  His weight is stable at 211 pounds today.  He reports his appetite continues to improve.  He denies any nausea or vomiting.   · On 3/21/2023 weight 217 pounds.   · On 4/4/2023 patient is 207 pounds.  · On 04/18/2023, patient weighs with 209 pounds.  ·     7.  [place] teeth extraction: Patient states that he needs to have his wisdom teeth pull out.  Reviewed with Dr. Rosario.  Patient will need to have this done on his off week of treatment.  We will likely hold bevacizumab 2 weeks prior to this procedure and 4 weeks following.  Patient states that this will likely not happen until March, but is going to contact his oral surgeon, Dr. Charlie Zazueta to try to go ahead and get an appointment.  · [place] tooth extraction done on 3/13/2023.    · Continue to hold Avastin today 4/4/2023.  We will resume in 2 weeks for cycle #12 chemotherapy.    · On 04/18/2023, the patient reports his gum has completely healed.  We will resume Avastin.        8.  Low normal vitamin B12 level.    · Patient had a marginal normal vitamin B12 level at 260 pg/mL on 08/24/2022.   · Folate level on 9/22/2022 was normal at 15.5 ng/mL.     · Continue oral vitamin B12 supplement at 1000 mcg daily.   · Vitamin B12 was 658 pg/mL on 04/04/2023. This has improved. His macrocytosis is due to chemotherapy.  ·         PLAN:  1. We will refer patient to Dr. Griffin Tilley at the [facility] for evaluation of potential metastasectomy for the liver segment where he has metastatic disease.  2. Port flush every 5 to 6 weeks.  3. Continue oral vitamin B12 1000 mcg daily..  4. We will arrange the patient to have another PET scan examination in 3 months, for follow-up of moderately hypermetabolic lesion in the chest.  5. We will arrange the patient to have laboratory studies for CBC, CMP, CEA, ferritin, iron profile in 3 months.  6. Patient will come back to see me in 3 months, 1 week after the PET scan and the lab results, liver studies for evaluation.  7. Call/ return sooner should he develop any new concerns or problems.   8. Continue to follow with PCP for management of hypertension and diabetes.    Today, I reviewed the PET scan images from 04/25/2023 and compared to data from 11/14/2022. I shared those images with the patient, his family members including his wife and his two daughters. We discussed the further management plan and they voiced understanding and agreeable.      I spent 40 minutes caring for Taz on this date of service. This time includes time spent by me in the following activities: preparing for the visit, reviewing tests, obtaining and/or reviewing a separately obtained history, performing a medically appropriate examination and/or evaluation, counseling and educating the patient/family/caregiver, ordering medications, tests, or procedures, referring and communicating with other health care professionals, documenting information in the medical record, independently interpreting results and communicating that information with the patient/family/caregiver and care coordination          Maya  "MD Abraham PhD        CC:  JULIA Santiago MD Richard Pokorny, MD   Oral surgeon: Dr. Charlie Zazueta Phone [phone]  Griffin Tilley M.D.       Transcribed from ambient dictation for Maya Rosario MD PhD by Ai Mendoza.  05/01/23   17:45 EDT    JAYMIE Provider Statement:[id number]::\"Patient or patient representative verbalized consent to the visit recording.\",\"I have personally performed the services described in this document as transcribed by the above individual, and it is both accurate and complete.\"      "

## 2023-05-31 ENCOUNTER — INFUSION (OUTPATIENT)
Dept: ONCOLOGY | Facility: HOSPITAL | Age: 73
End: 2023-05-31

## 2023-05-31 DIAGNOSIS — Z45.2 FITTING AND ADJUSTMENT OF VASCULAR CATHETER: Primary | ICD-10-CM

## 2023-05-31 PROCEDURE — 25010000002 HEPARIN LOCK FLUSH PER 10 UNITS: Performed by: INTERNAL MEDICINE

## 2023-05-31 PROCEDURE — 96523 IRRIG DRUG DELIVERY DEVICE: CPT

## 2023-05-31 RX ORDER — SODIUM CHLORIDE 0.9 % (FLUSH) 0.9 %
10 SYRINGE (ML) INJECTION AS NEEDED
Status: DISCONTINUED | OUTPATIENT
Start: 2023-05-31 | End: 2023-05-31 | Stop reason: HOSPADM

## 2023-05-31 RX ORDER — HEPARIN SODIUM (PORCINE) LOCK FLUSH IV SOLN 100 UNIT/ML 100 UNIT/ML
500 SOLUTION INTRAVENOUS AS NEEDED
Status: DISCONTINUED | OUTPATIENT
Start: 2023-05-31 | End: 2023-05-31 | Stop reason: HOSPADM

## 2023-05-31 RX ORDER — SODIUM CHLORIDE 0.9 % (FLUSH) 0.9 %
10 SYRINGE (ML) INJECTION AS NEEDED
OUTPATIENT
Start: 2023-05-31

## 2023-05-31 RX ORDER — HEPARIN SODIUM (PORCINE) LOCK FLUSH IV SOLN 100 UNIT/ML 100 UNIT/ML
500 SOLUTION INTRAVENOUS AS NEEDED
OUTPATIENT
Start: 2023-05-31

## 2023-05-31 RX ADMIN — Medication 10 ML: at 13:55

## 2023-05-31 RX ADMIN — Medication 500 UNITS: at 13:55

## 2023-06-28 ENCOUNTER — TELEPHONE (OUTPATIENT)
Dept: INTERNAL MEDICINE | Facility: CLINIC | Age: 73
End: 2023-06-28

## 2023-06-28 NOTE — TELEPHONE ENCOUNTER
MISTY BARAJAS TO READ       ----- Message from JULIA Cage sent at 6/28/2023  1:54 PM EDT -----  Hemoglobin A1c has improved to 6.3.  10 months prior was at 6.6.  Continue metformin as prescribed and manage with diet and lifestyle.    Cholesterol levels within normal limits.  Continue atorvastatin as prescribed.

## 2023-07-31 ENCOUNTER — HOSPITAL ENCOUNTER (OUTPATIENT)
Dept: PET IMAGING | Facility: HOSPITAL | Age: 73
Discharge: HOME | End: 2023-07-31
Payer: MEDICARE

## 2023-07-31 ENCOUNTER — INFUSION (OUTPATIENT)
Dept: ONCOLOGY | Facility: HOSPITAL | Age: 73
End: 2023-07-31
Payer: MEDICARE

## 2023-07-31 DIAGNOSIS — C18.9 MALIGNANT NEOPLASM OF COLON, UNSPECIFIED PART OF COLON: ICD-10-CM

## 2023-07-31 DIAGNOSIS — C78.7 SECONDARY LIVER CANCER: ICD-10-CM

## 2023-07-31 DIAGNOSIS — C18.2 MALIGNANT NEOPLASM OF ASCENDING COLON: ICD-10-CM

## 2023-07-31 LAB
ALBUMIN SERPL-MCNC: 4.1 G/DL (ref 3.5–5.2)
ALBUMIN/GLOB SERPL: 1.6 G/DL
ALP SERPL-CCNC: 57 U/L (ref 39–117)
ALT SERPL W P-5'-P-CCNC: 25 U/L (ref 1–41)
ANION GAP SERPL CALCULATED.3IONS-SCNC: 14.1 MMOL/L (ref 5–15)
AST SERPL-CCNC: 33 U/L (ref 1–40)
BASOPHILS # BLD AUTO: 0.04 10*3/MM3 (ref 0–0.2)
BASOPHILS NFR BLD AUTO: 0.9 % (ref 0–1.5)
BILIRUB SERPL-MCNC: 1.2 MG/DL (ref 0–1.2)
BUN SERPL-MCNC: 21 MG/DL (ref 8–23)
BUN/CREAT SERPL: 22.6 (ref 7–25)
CALCIUM SPEC-SCNC: 9.3 MG/DL (ref 8.6–10.5)
CEA SERPL-MCNC: 2.44 NG/ML
CHLORIDE SERPL-SCNC: 103 MMOL/L (ref 98–107)
CO2 SERPL-SCNC: 21.9 MMOL/L (ref 22–29)
CREAT SERPL-MCNC: 0.93 MG/DL (ref 0.7–1.3)
DEPRECATED RDW RBC AUTO: 55.3 FL (ref 37–54)
EGFRCR SERPLBLD CKD-EPI 2021: 87.2 ML/MIN/1.73
EOSINOPHIL # BLD AUTO: 0.14 10*3/MM3 (ref 0–0.4)
EOSINOPHIL NFR BLD AUTO: 3 % (ref 0.3–6.2)
ERYTHROCYTE [DISTWIDTH] IN BLOOD BY AUTOMATED COUNT: 13.8 % (ref 12.3–15.4)
FERRITIN SERPL-MCNC: 140 NG/ML (ref 30–400)
GLOBULIN UR ELPH-MCNC: 2.5 GM/DL
GLUCOSE BLDC GLUCOMTR-MCNC: 114 MG/DL (ref 70–130)
GLUCOSE SERPL-MCNC: 99 MG/DL (ref 65–99)
HCT VFR BLD AUTO: 37.3 % (ref 37.5–51)
HGB BLD-MCNC: 11.9 G/DL (ref 13–17.7)
IMM GRANULOCYTES # BLD AUTO: 0.01 10*3/MM3 (ref 0–0.05)
IMM GRANULOCYTES NFR BLD AUTO: 0.2 % (ref 0–0.5)
IRON 24H UR-MRATE: 124 MCG/DL (ref 59–158)
IRON SATN MFR SERPL: 35 % (ref 20–50)
LYMPHOCYTES # BLD AUTO: 1.34 10*3/MM3 (ref 0.7–3.1)
LYMPHOCYTES NFR BLD AUTO: 29.1 % (ref 19.6–45.3)
MCH RBC QN AUTO: 34.5 PG (ref 26.6–33)
MCHC RBC AUTO-ENTMCNC: 31.9 G/DL (ref 31.5–35.7)
MCV RBC AUTO: 108.1 FL (ref 79–97)
MONOCYTES # BLD AUTO: 0.42 10*3/MM3 (ref 0.1–0.9)
MONOCYTES NFR BLD AUTO: 9.1 % (ref 5–12)
NEUTROPHILS NFR BLD AUTO: 2.65 10*3/MM3 (ref 1.7–7)
NEUTROPHILS NFR BLD AUTO: 57.7 % (ref 42.7–76)
NRBC BLD AUTO-RTO: 0 /100 WBC (ref 0–0.2)
PLATELET # BLD AUTO: 132 10*3/MM3 (ref 140–450)
PMV BLD AUTO: 10.6 FL (ref 6–12)
POTASSIUM SERPL-SCNC: 5 MMOL/L (ref 3.5–5.2)
PROT SERPL-MCNC: 6.6 G/DL (ref 6–8.5)
RBC # BLD AUTO: 3.45 10*6/MM3 (ref 4.14–5.8)
SODIUM SERPL-SCNC: 139 MMOL/L (ref 136–145)
TIBC SERPL-MCNC: 358 MCG/DL (ref 298–536)
TRANSFERRIN SERPL-MCNC: 256 MG/DL (ref 200–360)
WBC NRBC COR # BLD: 4.6 10*3/MM3 (ref 3.4–10.8)

## 2023-07-31 PROCEDURE — 82378 CARCINOEMBRYONIC ANTIGEN: CPT | Performed by: INTERNAL MEDICINE

## 2023-07-31 PROCEDURE — 85025 COMPLETE CBC W/AUTO DIFF WBC: CPT

## 2023-07-31 PROCEDURE — 82948 REAGENT STRIP/BLOOD GLUCOSE: CPT

## 2023-07-31 PROCEDURE — 0 FLUDEOXYGLUCOSE F18 SOLUTION: Performed by: INTERNAL MEDICINE

## 2023-07-31 PROCEDURE — 82728 ASSAY OF FERRITIN: CPT

## 2023-07-31 PROCEDURE — 84466 ASSAY OF TRANSFERRIN: CPT

## 2023-07-31 PROCEDURE — 80053 COMPREHEN METABOLIC PANEL: CPT

## 2023-07-31 PROCEDURE — 36591 DRAW BLOOD OFF VENOUS DEVICE: CPT

## 2023-07-31 PROCEDURE — 78815 PET IMAGE W/CT SKULL-THIGH: CPT

## 2023-07-31 PROCEDURE — 83540 ASSAY OF IRON: CPT

## 2023-07-31 PROCEDURE — A9552 F18 FDG: HCPCS | Performed by: INTERNAL MEDICINE

## 2023-07-31 RX ADMIN — FLUDEOXYGLUCOSE F18 1 DOSE: 300 INJECTION INTRAVENOUS at 13:05

## 2023-08-08 ENCOUNTER — OFFICE VISIT (OUTPATIENT)
Dept: ONCOLOGY | Facility: CLINIC | Age: 73
End: 2023-08-08
Payer: MEDICARE

## 2023-08-08 VITALS
OXYGEN SATURATION: 96 % | SYSTOLIC BLOOD PRESSURE: 130 MMHG | HEIGHT: 69 IN | WEIGHT: 213.5 LBS | RESPIRATION RATE: 18 BRPM | TEMPERATURE: 98.6 F | BODY MASS INDEX: 31.62 KG/M2 | HEART RATE: 69 BPM | DIASTOLIC BLOOD PRESSURE: 73 MMHG

## 2023-08-08 DIAGNOSIS — D50.0 IRON DEFICIENCY ANEMIA DUE TO CHRONIC BLOOD LOSS: ICD-10-CM

## 2023-08-08 DIAGNOSIS — C78.7 SECONDARY LIVER CANCER: ICD-10-CM

## 2023-08-08 DIAGNOSIS — E53.8 VITAMIN B12 DEFICIENCY: ICD-10-CM

## 2023-08-08 DIAGNOSIS — C18.2 MALIGNANT NEOPLASM OF ASCENDING COLON: ICD-10-CM

## 2023-08-08 DIAGNOSIS — C18.9 MALIGNANT NEOPLASM OF COLON, UNSPECIFIED PART OF COLON: Primary | ICD-10-CM

## 2023-08-08 DIAGNOSIS — D69.59 CHEMOTHERAPY-INDUCED THROMBOCYTOPENIA: ICD-10-CM

## 2023-08-08 DIAGNOSIS — T45.1X5A CHEMOTHERAPY-INDUCED THROMBOCYTOPENIA: ICD-10-CM

## 2023-08-08 DIAGNOSIS — T45.1X5A ANEMIA ASSOCIATED WITH CHEMOTHERAPY: ICD-10-CM

## 2023-08-08 DIAGNOSIS — D64.81 ANEMIA ASSOCIATED WITH CHEMOTHERAPY: ICD-10-CM

## 2023-08-08 NOTE — PROGRESS NOTES
.     REASON FOR FOLLOWUP:     *Cecum colon cancer, status post right hemicolectomy performed on 10/11/2022, [id number].   CT scan examination on 9/26/2022 reported suspicious liver lesion 9 mm, otherwise no evidence for metastatic disease.    The patient underwent an abdominal MRI examination on 10/06/2022, which reported suspicious liver lesion 8mm.  Patient had sigmoidectomy on 10/11/2022.  Portacatheter placement on 10/21/2022.   PET scan examination on 11/14/2022 reported solitary hypermetabolic liver lesion.   Cycle #1 palliative chemotherapy FOLFOX 6 was started on 11/15/2022.   [facility] NGS study was positive for K-aashish mutation exon2 p.G13D.  Not a candidate for anti-EGFR monoclonal antibody treatment.   From cycle #2, bevacizumab/Avastin will be added to palliative chemotherapy.  Due to thrombocytopenia, all chemotherapy agents were 25% reduced from cycle #2.   1/24/203 further dose reduction of Oxaliplatin to 50% of original dose.  Cycle #12 chemotherapy was started on 4/18/2023.  *Iron deficiency anemia.  Oral iron treatment held on 2/21/2023.  Continue to monitor labs.                               HISTORY OF PRESENT ILLNESS:  The patient is a 73 y.o. 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     He was last seen on 5/1/2023 , he was referred to Dr. Griffin Tilley at the [facility] for evaluation of metastatic solitary liver lesion. Patient was seen by Dr. Tilley on 5/15/2023 for evaluation and after discussion, patient decided not to pursue surgical intervention instead of getting images studies periodically.     This patient had a PET scan examination on 7/31/2023 and is to review the results. The study the PET study reported no significant changes of a small 9 mm x 7 mm superior right  paratracheal node and this noted is photopenic with a maximum SUV one point of five. Previously slightly hypermetabolic right subcarinal lymph node appears smaller and also now photopenic. There has been resolution of the hypermetabolic sub centimeter right supraclavicular lymph nodes. There was no hypermetabolic lymphadenopathy in the neck, supraclavicular, or chest. No suspicious metabolic hypermetabolic activity within the abdomen and specifically the liver, and also no hypermetabolic lymphadenopathy in the abdomen and the pelvis.    Laboratory study 7/31/2023 reported mild anemia with a hemoglobin 11.9, MCV 2.865, and correction hemoglobin 11.09, .01, MCHC 31.9, and platelets 132,000, WBC 4600 including neutrophils 2650. Normal CEA 2.044 and normal iron studies with a ferritin 140 ng/mL and iron saturation 35% with a free iron 124, TIBC 358. Chemistry lab reported unremarkable CMP.    Patient presented today for 3-month evaluation, he reports doing well, denies nausea, vomiting, no abdominal pain, no melena, hematochezia and no weight loss.         Past Medical History:   Diagnosis Date    Abdominal hernia     Anemia     Arrhythmia     PT STATED DURING LAST COLONOSCOPY 2 WEEKS AGO    Arthritis     Chemotherapy-induced thrombocytopenia 12/4/2022    Colon cancer 09/22/2022    Colon polyp September 20 2022    Colon polyps     Depression     DVT (deep venous thrombosis)     IN LEFT LEG    Full dentures     GI (gastrointestinal bleed) September 8 2022    Hip pain     RIGHT    Hyperlipidemia     Hypertension     Iron deficiency anemia 09/22/2022    Numbness and tingling     RIGHT FOOT    PONV (postoperative nausea and vomiting)     Right leg pain     Type 2 diabetes mellitus without complication, without long-term current use of insulin 10/08/2019     Past Surgical History:   Procedure Laterality Date    CERVICAL DISCECTOMY LAMINECTOMY DECOMPRESSION POSTERIOR FUSION WITH INSTRUMENTATION      COLON RESECTION  N/A 10/11/2022    Procedure: LAPAROSCOPIC RIGHT COLON RESECTION;  Surgeon: Ga Samaniego MD;  Location: [facility] MAIN OR;  Service: General;  Laterality: N/A;    COLONOSCOPY N/A 02/28/2018    Procedure: COLONOSCOPY to TI and cecum with polypectomy;  Surgeon: Anil Garces MD;  Location: [facility] ENDOSCOPY;  Service:     COLONOSCOPY  2/28/2018 and sept. 2022    found cancer 2022    JOINT REPLACEMENT      LEFT HIP    KNEE ARTHROSCOPY Left     LUMBAR DISCECTOMY FUSION INSTRUMENTATION N/A 11/18/2020    Procedure: Lumbar 4 5 laminectomy with a posterior lateral fusion and instrumentation and interbody fusion;  Surgeon: Jeffrey Garcia MD;  Location: [facility] MAIN OR;  Service: Neurosurgery;  Laterality: N/A;    SPINE SURGERY  December 2021    L4 and L5    TOTAL HIP ARTHROPLASTY Right 06/03/2021    Procedure: TOTAL HIP ARTHROPLASTY POSTERIOR;  Surgeon: Shlomo Campos MD;  Location: [facility] MAIN OR;  Service: Orthopedics;  Laterality: Right;    VASECTOMY      VENOUS ACCESS DEVICE (PORT) INSERTION N/A 10/21/2022    Procedure: INSERTION VENOUS ACCESS DEVICE;  Surgeon: Ga Samaniego MD;  Location: [facility] OR OSC;  Service: General;  Laterality: N/A;     HEMATOLOGY/MEDICAL ONCOLOGY HISTORY: The patient is a 72 y.o. year old male with hypertension, hyperlipidemia, type 2 diabetes, iron deficiency anemia, history of DVT, who presented on 9/22/2022 for initial evaluation because of iron deficiency anemia, referred by his primary care provider, JULIA Santiago. Patient is accompanied by his wife who helped with the history. They reported the patient actually was being diagnosed of colon cancer 2 days ago. His GI specialist, Dr. Anil Garces, performed colonoscopic examination and saw a distal colon mass. I do not have the report for the procedure nor do I have the pathology report but nevertheless there was a mass in the distal colon likely in the sigmoid colon. I will obtain a copy of those reports when available.     "  Patient reports he had no apparent melena or hematochezia before starting oral iron treatment. He did have however significant fatigue. He reports exertional dyspnea and no syncope. Patient had laboratory study recently on 09/08/2022 which reported severe iron deficiency anemia with hemoglobin 7.8, MCV 79.9, MCHC 29.2. Normal platelets 217,000 and WBC 7230 without differentiation. Iron study reported ferritin 10.7 ng/mL, free iron 23, TIBC 593 and iron saturation 4%. Chemistry lab reported creatinine 1.11, potassium 5.4, otherwise normal sodium chloride and calcium, glucose 137. Prior to that the patient had normal liver function on 08/24/2022. He had a normal TSH 2.5 and slightly elevated hemoglobin A1c of 6.8% on that day.      The patient reports he has been on oral iron for almost 2 weeks, once a day with good tolerance and he now noticed improved energy level and less exertional dyspnea. He does report stool becoming dark-colored after he started oral iron.  He reports no syncope.     Laboratory study on 09/22/2022 reported improved hemoglobin 8.5 and normalized MCV 85.0, stable MCHC 29.4. Maintains normal platelets and WBC.     I saw him originally on 9/22/2022 for initial evaluation for his sigmoid colon cancer and iron deficiency anemia.  Since that time patient had multiple imaging studies including CT for chest abdomen pelvis, subsequently with MRI for the abdomen for staging purposes.  He was also seen by Dr. Samaniego who performed right hemicolectomy on 10/11/2022.    Dr. Samaniego called me on the day of surgery, he also suspected this liver lesion is metastatic, however unable to reach that during the surgery.    Patient notes since surgery on 10/11/2022 of the bowel resection he is recovering \"okay\". The patient does report some abdominal pain when adrianna his stomach, specifically when getting out of bed. The patient denies any issues with his appetite, and is having normal urination and bowel " movements. The patient denies constipation.  Patient reports no fever sweating or chills.    The patient has type II diabetes, which he manages with metformin. 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    Patient reports he is able to tolerate oral iron twice a day with no specific complaints.  No nausea vomiting or constipation.      Laboratory study today on 11/2/2022 showed improved anemia with Hb 10.9, normalized MCV 92.5.  His normal platelets 159,000 WBC 6870 including ANC 4580.      PET scan examination on 11/14/2022 reported distal small 1.3 cm subtle low attenuation lesion in the medial hepatic segment with SUV 6.3.  There is no hypermetabolic enthesopathy in the abdomen or pelvis.  No suspicious hypermetabolic activity in the chest or neck.    Laboratory study on 11/15/2022 showed improved hemoglobin 12.1, normal platelets 156,000 and WBC 5720 including ANC 3750 and lymphocytes 1070.  Chemistry study reported glucose 156 otherwise unremarkable CMP.    Patient reports he developed a mild oral mucositis lasted about 4 days after the chemotherapy and now has resolved. He believes he has lost some weight, but states he does have an appetite and eats everyday.  He denies nausea vomiting.  He denies having diarrhea or constipation. He denies having any fever, chills, or sweating.  Denies peripheral neuropathy.     Laboratory results from on 11/29/2022 are; white cell count is 3950. neutrophils are 2340. Hemoglobin is 11.7 g/dL. Platelets are 108,000. Chemistry lab was unremarkable except glucose 255.    Laboratory studies on 3/21/2023 reported pancytopenia, with platelets 100,000, hemoglobin 10.0 .0, in the WBC 3130 including mild neutropenia with ANC 1610.     On 3/21/2023, we continue to hold his Avastin due to a tooth extraction on 3/13/2023.  He developed mild neutropenia with ANC 1610 so we gave the patient Levaquin daily for 7 days for  prophylaxis.  Fortunately did not have infection.    Patient reports reasonable tolerance.  No specific complaints.    On 4/4/2023 patient has improved neutrophils 1870, and WBC 3270.  Continues to have anemia stable Hb 10.2, and platelets 96,000.  He reports no spontaneous bleeding or bruising.    The PET scan obtained 4/25/2023 reported resolution of the hypermetabolic subcapsular and medial hepatic segment lesion. However, there were developments of a few mildly hypermetabolic lymph nodes, one of them to the right subcarinal area with a maximum SUV 3.5, previously photopenic. There is a 9 x 6 mm right supraclavicular node with a maximum SUV 2.9, previous 3 mm and photopenic. There is also a 10 mm x 7 mm right paratracheal node stable in size, but is slightly hypermetabolic.    Lab study today on 5/1/2023 reported moderate thrombocytopenia platelets 86,000, hemoglobin 10.8, .3, and WBC 4590 including neutrophils 3200.        MEDICATIONS:    Current Outpatient Medications:     acetaminophen (TYLENOL) 500 MG tablet, Take 1 tablet by mouth Every 6 (Six) Hours As Needed for Mild Pain., Disp: , Rfl:     aspirin 81 MG EC tablet, Take 1 tablet by mouth Daily. INSTRUCTED PT TO FOLLOW MD INSTRUCTIONS REGARDING HOLDING FOR SURGERY/PT STATED TO HOLD 5 DAYS PRIOR TO SURGERY, Disp: , Rfl:     atorvastatin (LIPITOR) 40 MG tablet, TAKE ONE TABLET BY MOUTH DAILY (Patient taking differently: Take 1 tablet by mouth Every Night.), Disp: 90 tablet, Rfl: 3    ibuprofen (ADVIL,MOTRIN) 600 MG tablet, Take  by mouth As Needed. Was for dental, Disp: , Rfl:     lisinopril-hydrochlorothiazide (PRINZIDE,ZESTORETIC) 20-12.5 MG per tablet, TAKE ONE TABLET BY MOUTH ONCE NIGHTLY (Patient taking differently: Take 1 tablet by mouth Every Night.), Disp: 90 tablet, Rfl: 3    metFORMIN (GLUCOPHAGE) 500 MG tablet, Take 1 tablet by mouth 2 (Two) Times a Day With Meals., Disp: 180 tablet, Rfl: 3    tadalafil (CIALIS) 5 MG tablet, Take 1 tablet by  "mouth Daily As Needed for Erectile Dysfunction., Disp: 30 tablet, Rfl: 2    ALLERGIES:   No Known Allergies    SOCIAL HISTORY:       Social History     Socioeconomic History    Marital status:      Spouse name: Chelsie    Years of education: 12   Tobacco Use    Smoking status: Some Days     Types: Cigars    Smokeless tobacco: Never    Tobacco comments:     OCCASIONALLY   Vaping Use    Vaping Use: Never used   Substance and Sexual Activity    Alcohol use: Yes     Alcohol/week: 7.0 standard drinks     Types: 1 Glasses of wine, 6 Cans of beer per week     Comment: sometimes in warm weather, wine sometimes in the winter    Drug use: No    Sexual activity: Yes     Partners: Female     Birth control/protection: Surgical         FAMILY HISTORY:  Family History   Problem Relation Age of Onset    Clotting disorder Other     Colon cancer Paternal Grandmother     Colon cancer Paternal Grandfather     Clotting disorder Father     Malig Hyperthermia Neg Hx          Vitals:    08/08/23 1329   BP: 130/73   Pulse: 69   Resp: 18   Temp: 98.6 øF (37 øC)   TempSrc: Temporal   SpO2: 96%   Weight: 96.8 kg (213 lb 8 oz)   Height: 175.3 cm (69.02\")   PainSc: 0-No pain         8/8/2023     1:35 PM   Current Status   ECOG score 0     Physical Exam  GENERAL:  Well-developed, well-nourished in no acute distress.  Orientated to time place and the people.  SKIN:  Warm, dry without rashes, purpura or petechiae.  HEENT:  Normocephalic.   LYMPHATICS:  No cervical, supraclavicular adenopathy.  CHEST: Normal respiratory effort.  Lungs clear to auscultation. Good airflow.  CARDIAC:  Regular rate and rhythm without murmurs. Normal S1,S2.  ABDOMEN:  Soft, nontender with no organomegaly or masses.  Bowel sounds normal.  EXTREMITIES:  No lower extremity edema.      RECENT LABS:  Lab Results   Component Value Date    WBC 4.60 07/31/2023    HGB 11.9 (L) 07/31/2023    HCT 37.3 (L) 07/31/2023    .1 (H) 07/31/2023     (L) 07/31/2023     Lab " Results   Component Value Date    NEUTROABS 2.65 07/31/2023     Lab Results   Component Value Date    GLUCOSE 99 07/31/2023    BUN 21 07/31/2023    CREATININE 0.93 07/31/2023    EGFRRESULT 71.0 09/08/2022    EGFR 87.2 07/31/2023    BCR 22.6 07/31/2023    K 5.0 07/31/2023    CO2 21.9 (L) 07/31/2023    CALCIUM 9.3 07/31/2023    PROTENTOTREF 7.2 08/24/2022    ALBUMIN 4.1 07/31/2023    BILITOT 1.2 07/31/2023    AST 33 07/31/2023    ALT 25 07/31/2023       Lab Results   Component Value Date    TXYNMRUZ97 658 04/04/2023     Lab Results   Component Value Date    FOLATE >20.00 04/04/2023     Lab Results   Component Value Date    CEA 2.44 07/31/2023     Lab Results   Component Value Date    IRON 124 07/31/2023    TIBC 358 07/31/2023    FERRITIN 140.00 07/31/2023   Iron saturation 35% on 7/31/2023      IMAGING STUDY:  NM PET/CT Skull Base to Mid Thigh  F-18 FDG PET SKULL BASE TO MID THIGH WITH PET CT FUSION.     HISTORY: 73-year-old male with metastatic colon cancer. Restaging.     TECHNIQUE: Radiation dose reduction techniques were utilized, including  automated exposure control and exposure modulation based on body size.   Blood glucose level at time of injection was 114 mg/dL. 6.6 mCi of F-18  FDG were injected and PET was performed from skull base to mid thigh. CT  was obtained for localization and attenuation correction. Time at  injection 1:05 PM. PET start time 2:31 PM. Compared with PET/CT  04/25/2023.     FINDINGS:   1. There is no significant change in the size of the 0.9 x 0.7 cm  superior right paratracheal node, but the node is now photopenic,  maximal SUV of 1.5. Previously noted slightly hypermetabolic right  subcarinal node appears smaller and is now photopenic. There has been  resolution of the hypermetabolic subcentimeter right supraclavicular  node.  2. There is no hypermetabolic lymphadenopathy at the neck,  supraclavicular regions, or chest.  3. There is no suspicious hypermetabolic activity within the  abdomen and  specifically the liver. There is no hypermetabolic lymphadenopathy  within the abdomen or pelvis. There is nonspecific bowel activity.     This report was finalized on 8/2/2023 3:28 PM by Dr. Brook Wood M.D.           Assessment & Plan     ASSESSMENT:    1.  Colon cancer post right hemicolectomy 10/11/2022.  Stage IV ([id number]).  Had positive stool occult blood test on 09/09/2022. Colonoscopic examination on 09/20/2022 by Dr. Anil Garces reported cecum colon mass.  Biopsy confirmed moderately differentiated adenocarcinoma.  MSI stable.  Mildly elevated CEA level 10.7 ng/mL on 9/22/2022.  CT scan for chest abdomen pelvis 9/26/2022 reported cecal mass.  There was also suspicious 9 mm hepatic lesion.  Abdomen MRI examination with and without contrast on 10/7/2022 confirmed 8 mm lesion hypervascularity in the segment 5 of the liver.  Patient had right hemicolectomy 10/11/2022.  Unable to biopsy at this time he liver lesion.  Pathology evaluation reported [id number] disease, this will be at least a stage IIIb colon cancer.  On 11/2/2022 I discussed with the patient, his wife and his daughter, recommending further imaging study with PET scan for assessment.  If patient is no hypermetabolic liver lesion, will treat with adjuvant FOLFOX 6 regimen every 2 weeks for 12 cycles.  However, if he has hypermetabolic lesion in the liver, we will treat him as metastatic disease, with FOLFOX plus Avastin.  Since patient has neuropathy already being his legs and feet, if he is indeed metastatic disease, we may switch him to irinotecan instead of oxaliplatin because of the neuropathy.  If indicated patient has metastatic disease, we would also entertain metastectomy after finishing 12 cycles of chemotherapy.    PATHOLOGY: Tumor sample for [facility] NGS study reported positive for K-aashish mutation exon2 p.G13D, negative for HER2/dorie by IHC 0 and no amplification by FISH.  MSI stable by DNA sequencing, and also MMR proficient by  IHC staining.  Tumor mutation burden is low 8 mut/Mb.  Patient was positive for PTEN 1+, 100% by IHC, PD-L1 was negative, only 1% by IHC.  Patient also positive for APC mutation exon 16p.[id number], positive for AMACR1 exon2 p.R358*.  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    Discussed with the patient and family members about potential side effects including bone marrow suppression, with anemia thrombocytopenia and leukocytopenia increased risk for infection, and also peripheral neuropathy, etc. patient is agreeable to proceed ahead with treatment.  The patient has PET scan examination on 11/14/2022 which reported a small 1.3 cm focus with elevated SUV 6.03, highly suspicious for metastatic disease. The patient now has stage IV. The patient had [facility] NGS study which reported K-aashish mutation, tumor mutation burden < 10, MSI was stable and anti-PD-L1 negative.  Not a candidate for anti-EGFR monoclonal antibody such as Vectibix, or chekpoint inhibitor immunotherapy.  We discussed the adding anti-VEGF monoclonal antibody, bevacizumab/Avastin starting in 2 weeks so that it would be after 6 weeks of primary surgery for the colon cancer resection.  We discussed this is now stage IV disease, however because only having 1 solitary metastatic lesion in the liver, it is potentially curable so we will be as aggressive as possible for chemotherapy.  If he has good response, in the future he will need metastectomy of the liver lesion.  On 11/29/2022 patient is presented for cycle #2 of chemotherapy. Patient tolerated therapy well except mild oral mucositis. However, laboratory studies showed significant decrease in platelets at only 108,000, as well as also decreasing WBC and hemoglobin. Discussed with the patient today if we do not carry out any dose reduction, he will likely become more thrombocytopenic next time in 2 weeks and we will not be able to do cycle #3 chemotherapy on time. I discussed with the patient for dose  reduction and to avoid potential delay of chemotherapy and he is agreeable. We will have 25% dose reduction for 5-FU leucovorin and oxaliplatin.  On 11/29/2022 he was started the first dose of Avastin/bevacizumab in conjunction with chemotherapy cycle #2.  On 12/13/2022, the patient presented for reevaluation prior to chemotherapy cycle #3. He has stable platelets of 108,000 and slightly improved WBC of 5100 and hemoglobin 12.8 g/dL. He will continue cycle 3 with the same 25% dose reduction.  1/10/2023 due for cycle 5 FOLFOX bevacizumab, overall is tolerating fairly well.  Hemoglobin 11.7, platelet count stable at 108,000, WBC 4.02, ANC 2.27.  Patient has had five cycles of chemotherapy and a CT scan examination obtained on 01/20/2023. The CT scan reported favorable response as the lesion is less obvious.   1/24/2023 recommended to continue chemotherapy for a total of 12 cycles. Then obtain PET scan examination. Due to worsening thrombocytopenia, and also peripheral neuropathy, for cycle #6, I will decrease oxaliplatin by another 25% so would it be 50% of the original dose. I will keep the same dose of 5-FU which is 75% of the original dose.  Full dose Avastin.   2/7/2023 due for cycle 7.  Platelet count is holding steady at 100,000.  We will proceed with treatment today with same doses as given on cycle 6.   2/21/2023 due for cycle #8 chemotherapy. We will repeat every 2 weeks.  3/7/2023 proceed with cycle #9 chemotherapy and hold Avastin for dental procedure on 3/13/2023.  On 3/21/2023 we will proceed ahead with cycle #10 chemotherapy FOLFOX6 regimen.  Continue to hold Avastin.  On 4/4/2023 patient presented for evaluation prior to cycle #11 FOLFOX 6 regimen.  Continue to hold Avastin.  This will be resumed from next cycle.   The patient presented 4/18/2023 for reevaluation prior to his last cycle #12 FOLFOX plus Avastin. The patient reports tolerating well. We will proceed with cycle12 today and resume Avastin.   "I recommended having a PET scan examination for assessment of response, especially the small metastatic liver lesion which was only found on the previous PET scan.  Patient had a PET scan examination on 04/25/2023, which reported complete resolution of the solitary hypermetabolic lesion in the liver. He had a mildly active small lymph node, peritracheal, subcarinal with low activity. Etiology not clear.   On 05/01/2023, I reviewed the PET scan images with the patient and his wife, and two daughters. I recommended to refer the patient to [facility], surgical oncology, Dr. Griffin Tilley, for evaluation to see if he would be a candidate for surgical resection of the liver segment where he had metastatic disease, which now has resolution of hypermetabolic activity after chemotherapy.  Patient was seen by Dr. Tilley on 5/15/2023. After lengthy discussion, patient opted to not having surgical intervention currently. He would rather to have serial images studies.   Patient had a PET scan on 7/29/2023, which reported no evidence for recurrent disease.   I discussed with the patient and his wife today on 8/8/2023, recommended CT scan for abdomen and pelvis with i.v. contrast in 3 months, together with lab study \"Guardant Reveal\" for ctDNA together with routine labs, CBC, CMP, CEA level, for assessment of colon cancer.     2. Iron deficiency anemia.  Subsequently anemia also caused by chemotherapy.  The patient has iron deficiency due to GI bleeding from his colon cancer. His laboratory study on 09/08/2022 reported ferritin 10.7 and iron saturation 4% with microcytic hypochromic anemia, hemoglobin 7.8, MCV 79.9 and MCHC 29.2.   Patient reports good tolerance to oral iron treatment and already has improved energy level. Laboratory study on 9/22/2022 did report slightly improved hemoglobin at 8.5 but normalized MCV 85.0. I think he is responding to oral iron treatment both physically and laboratory costa.  We " advised patient to increase oral iron to twice a day.  On 11/2/2022 patient reports good tolerance to oral iron twice a day.  Labs today showed further improved hemoglobin 10.9.  He continues to maintain normal platelets and WBC.  Improved hemoglobin 12.1 on 11/15/2022.  Cycle #1 chemotherapy will be started.  On 11/29/2022 hemoglobin 11.7.  On 12/13/2022, his hemoglobin is 12.8 g/dL.  1/10/2023 hemoglobin 11.7.  The patient has trending down hemoglobin 11.0 on 1/24/2023, however, he is asymptomatic.  Hemoglobin 2/7/2023, 10.6.  Iron study reported ferritin 229 and iron saturation 28% with free iron 108 TIBC 386.  No evidence for iron deficiency.  So his anemia is now secondary to chemotherapy.     The patient has stable mild anemia with hemoglobin 11.0 on 3/7/2023. We will continue to monitor.  Continue to hold ferrous sulfate.  On 3/21/2023, stable anemia with hemoglobin 10.0 .0.  Continue to monitor.  On 4/4/2023 patient has stable anemia Hb 10.2, .9.  The patient has stable anemia, hemoglobin 10.8 today on 04/18/2023. The patient reports no syncope.  On 05/01/2023, patient has a slightly improved hemoglobin 10.8. We will repeat iron studies in 3 months for reassessment.  Laboratory studies 7/31/2023 reported ferritin 140, free iron 124 and iron saturation 35%. He also had improved hemoglobin 11.9, .1. Discussed with him today 8/8/2023 and recommended monitoring.     3.    Pancytopenia secondary to chemotherapy.    This patient had original iron deficiency, however now has ongoing anemia due to chemotherapy.  He also has thrombocytopenia and neutropenia from chemotherapy.  Normal neutrophil prior to starting chemotherapy.    This patient developed first mild neutropenia with ANC 1610 on 3/21/2023.  Patient reports no fever sweating chills.  I recommended starting patient on Levaquin daily for 7 days, for prophylaxis of neutropenic fever.  We will continue to monitor for now.  Expecting this  will getting worse with ongoing chemotherapy.  Question the patient to call us if he develops fever sweating chills.   4/4/2023, patient has slightly improved neutrophils 1870, and WBC 3270.  Continue to monitor.  On 04/18/2023, the patient has neutrophils 2030, WBC 3480. Continue with chemotherapy, monitor CBC.  On 05/01/2023 patient has improved WBC 4,590, including neutrophils 3,200.  7/31/2023 patient has normal WBC 4600, including ANC 2650 lymphocytes 1340.    4.  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  Prior to starting chemotherapy patient had low normal platelets 156,000 on 11/15/2022.  Started on cycle #1 chemotherapy FOLFOX 6.  On 11/29/2022 patient had platelets of 108,000.  Patient reports no bleeding or bruising.  Discussed with the patient, because of foreseeable more severe thrombocytopenia, we recommended 25% dose reduction starting from cycle #2 chemotherapy.  On 12/13/2022, the patient has same decreased number of platelets 108,000. He will continue chemotherapy with same dose reduction of 25%.   1/10/2023 platelet count stable at 108,000.  On 01/24/2023, patient has worsening thrombocytopenia with platelets of 99,000 mcL. Discussed with the patient, we will cut oxaliplatin by another 25% to be at 50% of original dose. We will leave the 5-FU dose same as the previous at 75% of original dose. Avastin will be the same dose.  2/7/2023 platelet count 100,000.    On 02/21/2023 the patient has stable thrombocytopenia with platelets 102,000. The patient reports no bleeding or bruising. Continue to monitor.   On 3/7/2023 the patient has stable thrombocytopenia with platelets 103,000. The patient reports no bleeding or bruising. Continue to monitor.   On 3/21/2023 persistent stable thrombocytopenia with platelets 100,000.   On 4/4/2023 platelets 96,000.  We will go ahead and to proceed with chemotherapy.   Today on 04/18/2023, platelets 91,000. We will proceed ahead with the chemotherapy today. This is  cycle 12 of chemotherapy. We will reassess his treatment after PET scan examination.  On 05/01/2023, patient has a persistent thrombocytopenia with platelets of 86,000. Patient is asymptomatic.  On 7/31/2023 improved platelets 132,000.    5. Peripheral neuropathy  On 12/13/2022, he reports cold sensitivity in his mouth when he drinks cold water. The cold sensitivity usually dissipates after 3 to 4 days.  Patient reports he is more significant cold sensitivities lasting for more than a week. He does have moderate tingling, numbness involving the fingers, but not much as the toes.  2/7/2023 he feels like the neuropathy/cold sensitivity is lingering a little bit longer with each cycle.    On 4/4/2023 the patient reports stable mild peripheral neuropathy/cold sensitivity.    On 04/18/2023, patient reports some mild numbness and tingling involving both feet and hands, but this is only last for a few days and then resolves. We will continue dose reduced oxaliplatin 50%.  On 05/01/2023, patient continues to have peripheral neuropathy intermittent and mild overall. Continue to monitor.  Stable peripheral neuropathy today.    6.  Weight losses.    On 12/13/2022, the patient reports he has lost weight in the past couple of months. He reports a poor appetite for 3 days after chemotherapy and a poor taste in his mouth. His appetite has since improved. He only had mild nausea, but no vomiting. He was encouraged to use Ensure or Boost to improve nutrition supplement. He already started using protein powder.  3/7/2023, patient's weight continues to improve and he has gained a couple pounds.  His weight is stable at 211 pounds today.  He reports his appetite continues to improve.  He denies any nausea or vomiting.   On 04/18/2023, patient weighs with 209 pounds.  8/18/2023 patient weighs about 213 pounds    7.  [place] teeth extraction: Patient states that he needs to have his wisdom teeth pull out.  Reviewed with Dr. Rosario.   Patient will need to have this done on his off week of treatment.  We will likely hold bevacizumab 2 weeks prior to this procedure and 4 weeks following.  Patient states that this will likely not happen until March, but is going to contact his oral surgeon, Dr. Charlie Zazueta to try to go ahead and get an appointment.  [place] tooth extraction done on 3/13/2023.    Continue to hold Avastin 4/4/2023.  We will resume in 2 weeks for cycle #12 chemotherapy.    On 04/18/2023, the patient reports his gum has completely healed.  We will resume Avastin.    No specific complaints today.      8.  Low normal vitamin B12 level.    Patient had a marginal normal vitamin B12 level at 260 pg/mL on 08/24/2022.   Folate level on 9/22/2022 was normal at 15.5 ng/mL.    Continue oral vitamin B12 supplement at 1000 mcg daily.   Vitamin B12 was 658 pg/mL on 04/04/2023. This has improved. His macrocytosis is due to chemotherapy.  Patient continues taking oral vitamin B12.        PLAN:  Will arrange CT scan for abdominal, pelvis with both i.v. and oral contrast in 3 months for reassessment.  Port flush every 5 to 6 weeks.  Continue oral vitamin B12 at 1000 mcg daily..  Will arrange laboratory studies including Guardant Reveal together with routine CBC, CMP, CEA, ferritin, iron profile in 3 months.    Patient will see me 2 weeks after the above studies, especially considering the results of Guardant Reveal take about 2 weeks to come back.  Call/ return sooner should he develop any new concerns or problems.   Continue to follow with PCP for management of hypertension and diabetes.    Today, I reviewed the PET scan images from 7/29/2023 and compared to those images from 11/14/2022. I shared those images with the patient, his wife.     I discussed with the patient about laboratory results and further management plan.  Patient voiced understanding and agreeable.         Maya Rosario MD PhD        CC:  JULIA Santiago MD     Ga Samaniego MD   Oral surgeon: Dr. Charlie Zazueta Phone [phone]  Griffin Tilley M.D.     Transcribed from ambient dictation for Maya Rosario MD PhD by Lucita Hi.  08/08/23   15:06 EDT    Patient or patient representative verbalized consent to the visit recording.  I have personally performed the services described in this document as transcribed by the above individual, and it is both accurate and complete.    Maay Rosario MD PhD

## 2023-09-12 DIAGNOSIS — I10 BENIGN ESSENTIAL HYPERTENSION: Chronic | ICD-10-CM

## 2023-09-12 DIAGNOSIS — E11.9 TYPE 2 DIABETES MELLITUS WITHOUT COMPLICATION, WITHOUT LONG-TERM CURRENT USE OF INSULIN: Chronic | ICD-10-CM

## 2023-09-12 DIAGNOSIS — E78.00 HYPERCHOLESTEROLEMIA: Chronic | ICD-10-CM

## 2023-09-12 RX ORDER — ATORVASTATIN CALCIUM 40 MG/1
40 TABLET, FILM COATED ORAL DAILY
Qty: 90 TABLET | Refills: 1 | Status: SHIPPED | OUTPATIENT
Start: 2023-09-12

## 2023-09-12 RX ORDER — LISINOPRIL AND HYDROCHLOROTHIAZIDE 20; 12.5 MG/1; MG/1
1 TABLET ORAL NIGHTLY
Qty: 90 TABLET | Refills: 1 | Status: SHIPPED | OUTPATIENT
Start: 2023-09-12

## 2023-09-12 NOTE — TELEPHONE ENCOUNTER
"----- Message from Taz Dickey sent at 2023  9:24 AM EDT -----  Regarding: prescription  Contact: [phone]  I have been out of two meds for almost two weeks.  [facility] said they would call your office for refill. after a few days I change to a mail order [facility] that [employer] recommend because [facility] put me off or forgot. that was a week ago.  the two meds are Lisinopril, and Atorvastatin. I also will be out soon my two a day Metformin   thank you  Satinder Dickey     "

## 2023-09-12 NOTE — TELEPHONE ENCOUNTER
Rx Refill Note  Requested Prescriptions     Pending Prescriptions Disp Refills    lisinopril-hydrochlorothiazide (PRINZIDE,ZESTORETIC) 20-12.5 MG per tablet 90 tablet 3     Sig: Take 1 tablet by mouth Every Night.    atorvastatin (LIPITOR) 40 MG tablet 90 tablet 3     Sig: Take 1 tablet by mouth Daily.    metFORMIN (GLUCOPHAGE) 500 MG tablet 180 tablet 3     Sig: Take 1 tablet by mouth 2 (Two) Times a Day With Meals.      Last office visit with prescribing clinician: 6/26/2023   Last telemedicine visit with prescribing clinician: Visit date not found   Next office visit with prescribing clinician: 12/28/2023

## 2023-09-19 ENCOUNTER — INFUSION (OUTPATIENT)
Dept: ONCOLOGY | Facility: HOSPITAL | Age: 73
End: 2023-09-19
Payer: MEDICARE

## 2023-09-19 DIAGNOSIS — Z45.2 FITTING AND ADJUSTMENT OF VASCULAR CATHETER: Primary | ICD-10-CM

## 2023-09-19 PROCEDURE — 25010000002 HEPARIN LOCK FLUSH PER 10 UNITS: Performed by: INTERNAL MEDICINE

## 2023-09-19 PROCEDURE — 96523 IRRIG DRUG DELIVERY DEVICE: CPT

## 2023-09-19 RX ORDER — HEPARIN SODIUM (PORCINE) LOCK FLUSH IV SOLN 100 UNIT/ML 100 UNIT/ML
500 SOLUTION INTRAVENOUS AS NEEDED
Status: DISCONTINUED | OUTPATIENT
Start: 2023-09-19 | End: 2023-09-19 | Stop reason: HOSPADM

## 2023-09-19 RX ORDER — SODIUM CHLORIDE 0.9 % (FLUSH) 0.9 %
10 SYRINGE (ML) INJECTION AS NEEDED
Status: DISCONTINUED | OUTPATIENT
Start: 2023-09-19 | End: 2023-09-19 | Stop reason: HOSPADM

## 2023-09-19 RX ORDER — HEPARIN SODIUM (PORCINE) LOCK FLUSH IV SOLN 100 UNIT/ML 100 UNIT/ML
500 SOLUTION INTRAVENOUS AS NEEDED
OUTPATIENT
Start: 2023-09-19

## 2023-09-19 RX ORDER — SODIUM CHLORIDE 0.9 % (FLUSH) 0.9 %
10 SYRINGE (ML) INJECTION AS NEEDED
OUTPATIENT
Start: 2023-09-19

## 2023-09-19 RX ADMIN — Medication 10 ML: at 10:33

## 2023-09-19 RX ADMIN — Medication 500 UNITS: at 10:33

## 2023-10-12 ENCOUNTER — INFUSION (OUTPATIENT)
Dept: ONCOLOGY | Facility: HOSPITAL | Age: 73
End: 2023-10-12
Payer: MEDICARE

## 2023-10-12 ENCOUNTER — HOSPITAL ENCOUNTER (OUTPATIENT)
Dept: CT IMAGING | Facility: HOSPITAL | Age: 73
Discharge: HOME | End: 2023-10-12
Admitting: INTERNAL MEDICINE
Payer: MEDICARE

## 2023-10-12 DIAGNOSIS — C78.7 SECONDARY LIVER CANCER: ICD-10-CM

## 2023-10-12 DIAGNOSIS — C18.2 MALIGNANT NEOPLASM OF ASCENDING COLON: ICD-10-CM

## 2023-10-12 DIAGNOSIS — C18.9 MALIGNANT NEOPLASM OF COLON, UNSPECIFIED PART OF COLON: ICD-10-CM

## 2023-10-12 DIAGNOSIS — Z45.2 FITTING AND ADJUSTMENT OF VASCULAR CATHETER: Primary | ICD-10-CM

## 2023-10-12 LAB
ALBUMIN SERPL-MCNC: 3.8 G/DL (ref 3.5–5.2)
ALBUMIN/GLOB SERPL: 1.5 G/DL
ALP SERPL-CCNC: 53 U/L (ref 39–117)
ALT SERPL W P-5'-P-CCNC: 17 U/L (ref 1–41)
ANION GAP SERPL CALCULATED.3IONS-SCNC: 10.7 MMOL/L (ref 5–15)
AST SERPL-CCNC: 30 U/L (ref 1–40)
BASOPHILS # BLD AUTO: 0.06 10*3/MM3 (ref 0–0.2)
BASOPHILS NFR BLD AUTO: 1.3 % (ref 0–1.5)
BILIRUB SERPL-MCNC: 0.9 MG/DL (ref 0–1.2)
BUN SERPL-MCNC: 20 MG/DL (ref 8–23)
BUN/CREAT SERPL: 21.1 (ref 7–25)
CALCIUM SPEC-SCNC: 9.1 MG/DL (ref 8.6–10.5)
CEA SERPL-MCNC: 2.4 NG/ML
CHLORIDE SERPL-SCNC: 102 MMOL/L (ref 98–107)
CO2 SERPL-SCNC: 22.3 MMOL/L (ref 22–29)
CREAT SERPL-MCNC: 0.95 MG/DL (ref 0.7–1.3)
DEPRECATED RDW RBC AUTO: 55.2 FL (ref 37–54)
EGFRCR SERPLBLD CKD-EPI 2021: 85 ML/MIN/1.73
EOSINOPHIL # BLD AUTO: 0.29 10*3/MM3 (ref 0–0.4)
EOSINOPHIL NFR BLD AUTO: 6.1 % (ref 0.3–6.2)
ERYTHROCYTE [DISTWIDTH] IN BLOOD BY AUTOMATED COUNT: 13.9 % (ref 12.3–15.4)
FERRITIN SERPL-MCNC: 69.1 NG/ML (ref 30–400)
GLOBULIN UR ELPH-MCNC: 2.5 GM/DL
GLUCOSE SERPL-MCNC: 128 MG/DL (ref 65–99)
HCT VFR BLD AUTO: 38 % (ref 37.5–51)
HGB BLD-MCNC: 11.9 G/DL (ref 13–17.7)
IMM GRANULOCYTES # BLD AUTO: 0.01 10*3/MM3 (ref 0–0.05)
IMM GRANULOCYTES NFR BLD AUTO: 0.2 % (ref 0–0.5)
IRON 24H UR-MRATE: 108 MCG/DL (ref 59–158)
IRON SATN MFR SERPL: 30 % (ref 20–50)
LYMPHOCYTES # BLD AUTO: 1.04 10*3/MM3 (ref 0.7–3.1)
LYMPHOCYTES NFR BLD AUTO: 22 % (ref 19.6–45.3)
MCH RBC QN AUTO: 33.8 PG (ref 26.6–33)
MCHC RBC AUTO-ENTMCNC: 31.3 G/DL (ref 31.5–35.7)
MCV RBC AUTO: 108 FL (ref 79–97)
MONOCYTES # BLD AUTO: 0.39 10*3/MM3 (ref 0.1–0.9)
MONOCYTES NFR BLD AUTO: 8.3 % (ref 5–12)
NEUTROPHILS NFR BLD AUTO: 2.93 10*3/MM3 (ref 1.7–7)
NEUTROPHILS NFR BLD AUTO: 62.1 % (ref 42.7–76)
NRBC BLD AUTO-RTO: 0 /100 WBC (ref 0–0.2)
PLATELET # BLD AUTO: 132 10*3/MM3 (ref 140–450)
PMV BLD AUTO: 10.9 FL (ref 6–12)
POTASSIUM SERPL-SCNC: 4.7 MMOL/L (ref 3.5–5.2)
PROT SERPL-MCNC: 6.3 G/DL (ref 6–8.5)
RBC # BLD AUTO: 3.52 10*6/MM3 (ref 4.14–5.8)
SODIUM SERPL-SCNC: 135 MMOL/L (ref 136–145)
TIBC SERPL-MCNC: 361 MCG/DL (ref 298–536)
TRANSFERRIN SERPL-MCNC: 258 MG/DL (ref 200–360)
WBC NRBC COR # BLD: 4.72 10*3/MM3 (ref 3.4–10.8)

## 2023-10-12 PROCEDURE — 74177 CT ABD & PELVIS W/CONTRAST: CPT

## 2023-10-12 PROCEDURE — 82728 ASSAY OF FERRITIN: CPT

## 2023-10-12 PROCEDURE — 82378 CARCINOEMBRYONIC ANTIGEN: CPT | Performed by: INTERNAL MEDICINE

## 2023-10-12 PROCEDURE — 85025 COMPLETE CBC W/AUTO DIFF WBC: CPT

## 2023-10-12 PROCEDURE — 83540 ASSAY OF IRON: CPT

## 2023-10-12 PROCEDURE — 84466 ASSAY OF TRANSFERRIN: CPT

## 2023-10-12 PROCEDURE — 0 DIATRIZOATE MEGLUMINE & SODIUM PER 1 ML: Performed by: INTERNAL MEDICINE

## 2023-10-12 PROCEDURE — 25010000002 HEPARIN LOCK FLUSH PER 10 UNITS: Performed by: INTERNAL MEDICINE

## 2023-10-12 PROCEDURE — 36591 DRAW BLOOD OFF VENOUS DEVICE: CPT

## 2023-10-12 PROCEDURE — 80053 COMPREHEN METABOLIC PANEL: CPT

## 2023-10-12 PROCEDURE — 25510000001 IOPAMIDOL 61 % SOLUTION: Performed by: INTERNAL MEDICINE

## 2023-10-12 RX ORDER — HEPARIN SODIUM (PORCINE) LOCK FLUSH IV SOLN 100 UNIT/ML 100 UNIT/ML
500 SOLUTION INTRAVENOUS AS NEEDED
Status: DISCONTINUED | OUTPATIENT
Start: 2023-10-12 | End: 2023-10-13 | Stop reason: HOSPADM

## 2023-10-12 RX ORDER — SODIUM CHLORIDE 0.9 % (FLUSH) 0.9 %
10 SYRINGE (ML) INJECTION AS NEEDED
OUTPATIENT
Start: 2023-10-12

## 2023-10-12 RX ORDER — HEPARIN SODIUM (PORCINE) LOCK FLUSH IV SOLN 100 UNIT/ML 100 UNIT/ML
500 SOLUTION INTRAVENOUS AS NEEDED
OUTPATIENT
Start: 2023-10-12

## 2023-10-12 RX ORDER — SODIUM CHLORIDE 0.9 % (FLUSH) 0.9 %
10 SYRINGE (ML) INJECTION AS NEEDED
Status: DISCONTINUED | OUTPATIENT
Start: 2023-10-12 | End: 2023-10-13 | Stop reason: HOSPADM

## 2023-10-12 RX ADMIN — Medication 10 ML: at 10:56

## 2023-10-12 RX ADMIN — IOPAMIDOL 85 ML: 612 INJECTION, SOLUTION INTRAVENOUS at 10:50

## 2023-10-12 RX ADMIN — Medication 500 UNITS: at 10:57

## 2023-10-12 RX ADMIN — DIATRIZOATE MEGLUMINE AND DIATRIZOATE SODIUM 30 ML: 600; 100 SOLUTION ORAL; RECTAL at 10:50

## 2023-10-25 LAB — REF LAB TEST METHOD: NORMAL

## 2023-10-30 ENCOUNTER — TELEPHONE (OUTPATIENT)
Dept: ONCOLOGY | Facility: CLINIC | Age: 73
End: 2023-10-30

## 2023-10-30 NOTE — TELEPHONE ENCOUNTER
Caller: NAOMI    Relationship: RN WITH [facility]     Best call back number:  [phone] IF HAS ANY QUESTIONS     What test/procedure requested: GUARDANT REVEAL     When is it needed: CALLING TO INFORM OF DENIAL TERMINATION FOR GENETIC TESTING        Additional information or concerns: REF # [id number]

## 2023-10-31 ENCOUNTER — OFFICE VISIT (OUTPATIENT)
Dept: ONCOLOGY | Facility: CLINIC | Age: 73
End: 2023-10-31
Payer: MEDICARE

## 2023-10-31 VITALS
SYSTOLIC BLOOD PRESSURE: 122 MMHG | BODY MASS INDEX: 31.59 KG/M2 | HEIGHT: 69 IN | RESPIRATION RATE: 18 BRPM | OXYGEN SATURATION: 95 % | WEIGHT: 213.3 LBS | DIASTOLIC BLOOD PRESSURE: 80 MMHG | TEMPERATURE: 98.7 F | HEART RATE: 73 BPM

## 2023-10-31 DIAGNOSIS — C78.7 SECONDARY LIVER CANCER: ICD-10-CM

## 2023-10-31 DIAGNOSIS — D50.9 IRON DEFICIENCY ANEMIA, UNSPECIFIED IRON DEFICIENCY ANEMIA TYPE: Primary | ICD-10-CM

## 2023-10-31 DIAGNOSIS — T45.1X5A CHEMOTHERAPY-INDUCED THROMBOCYTOPENIA: ICD-10-CM

## 2023-10-31 DIAGNOSIS — D64.81 ANEMIA ASSOCIATED WITH CHEMOTHERAPY: ICD-10-CM

## 2023-10-31 DIAGNOSIS — D69.59 CHEMOTHERAPY-INDUCED THROMBOCYTOPENIA: ICD-10-CM

## 2023-10-31 DIAGNOSIS — T45.1X5A ANEMIA ASSOCIATED WITH CHEMOTHERAPY: ICD-10-CM

## 2023-10-31 DIAGNOSIS — E53.8 VITAMIN B12 DEFICIENCY: ICD-10-CM

## 2023-10-31 DIAGNOSIS — C18.2 MALIGNANT NEOPLASM OF ASCENDING COLON: ICD-10-CM

## 2023-10-31 RX ORDER — FERROUS SULFATE 325(65) MG
325 TABLET ORAL
Qty: 90 TABLET | Refills: 3 | Status: SHIPPED | OUTPATIENT
Start: 2023-10-31

## 2023-10-31 NOTE — PROGRESS NOTES
.     REASON FOR FOLLOWUP:     *Cecum colon cancer, status post right hemicolectomy performed on 10/11/2022, [id number].   CT scan examination on 9/26/2022 reported suspicious liver lesion 9 mm, otherwise no evidence for metastatic disease.    The patient underwent an abdominal MRI examination on 10/06/2022, which reported suspicious liver lesion 8mm.  Patient had sigmoidectomy on 10/11/2022.  Portacatheter placement on 10/21/2022.   PET scan examination on 11/14/2022 reported solitary hypermetabolic liver lesion.   Cycle #1 palliative chemotherapy FOLFOX 6 was started on 11/15/2022.   [facility] NGS study was positive for K-aashish mutation exon2 p.G13D.  Not a candidate for anti-EGFR monoclonal antibody treatment.   From cycle #2, bevacizumab/Avastin will be added to palliative chemotherapy.  Due to thrombocytopenia, all chemotherapy agents were 25% reduced from cycle #2.   1/24/203 further dose reduction of Oxaliplatin to 50% of original dose.  Cycle #12 chemotherapy was started on 4/18/2023.  *Iron deficiency anemia.  Oral iron treatment held on 2/21/2023.  Continue to monitor labs.                               HISTORY OF PRESENT ILLNESS:  The patient is a 73 y.o. 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  There were postsurgical changes with the right hemicolectomy.  There is a right renal cyst, technically indeterminate. Otherwise, the liver, spleen, adrenal glands, left kidney, pancreas, stomach, small bowels, urinary bladder, and abnormal vasculature were normal. There was no enlarged lymphadenopathy in the abdomen and  pelvis and no bone lesions.    The Guardant Reveal test was obtained on 10/12/2023 and reported back on 10/25/2023 as negative for ctDNA.    Other laboratory study on 10/12/2023 reported persistent but stable anemia with hemoglobin of 11.9, an MCV of 108.0, an MCHC of 31.3, WBC of 4720, neutrophils of 2900, lymphocytes of 1000, and platelets of 132,000. Iron studies showed ferritin of 69.1, free iron of 108, TIBC of 361, and iron saturation of 30%. The chemistry lab reported glucose 120, sodium 135, and otherwise unremarkable CMP.    The patient reports today on 10/31/2023 that he has been doing well physically, and able to do house chores without too much problem.  Performance status ECOG 0-1.  He has regular bowel movements. There was no melena or hematochezia. No nausea, vomiting, or abdominal pain.          Past Medical History:   Diagnosis Date    Abdominal hernia     Anemia     Arrhythmia     PT STATED DURING LAST COLONOSCOPY 2 WEEKS AGO    Arthritis     Chemotherapy-induced thrombocytopenia 12/4/2022    Colon cancer 09/22/2022    Colon polyp September 20 2022    Colon polyps     Depression     DVT (deep venous thrombosis)     IN LEFT LEG    Full dentures     GI (gastrointestinal bleed) September 8 2022    Hip pain     RIGHT    Hyperlipidemia     Hypertension     Iron deficiency anemia 09/22/2022    Numbness and tingling     RIGHT FOOT    PONV (postoperative nausea and vomiting)     Right leg pain     Type 2 diabetes mellitus without complication, without long-term current use of insulin 10/08/2019     Past Surgical History:   Procedure Laterality Date    CERVICAL DISCECTOMY LAMINECTOMY DECOMPRESSION POSTERIOR FUSION WITH INSTRUMENTATION      COLON RESECTION N/A 10/11/2022    Procedure: LAPAROSCOPIC RIGHT COLON RESECTION;  Surgeon: Ga Samaniego MD;  Location: [facility];  Service: General;  Laterality: N/A;    COLONOSCOPY N/A 02/28/2018    Procedure: COLONOSCOPY to TI and cecum with polypectomy;   Surgeon: Anil Garces MD;  [place] [facility];  Service:     COLONOSCOPY  2/28/2018 and sept. 2022    found cancer 2022    JOINT REPLACEMENT      LEFT HIP    KNEE ARTHROSCOPY Left     LUMBAR DISCECTOMY FUSION INSTRUMENTATION N/A 11/18/2020    Procedure: Lumbar 4 5 laminectomy with a posterior lateral fusion and instrumentation and interbody fusion;  Surgeon: Jeffrey Garcia MD;  [place] [facility];  Service: Neurosurgery;  Laterality: N/A;    SPINE SURGERY  December 2021    L4 and L5    TOTAL HIP ARTHROPLASTY Right 06/03/2021    Procedure: TOTAL HIP ARTHROPLASTY POSTERIOR;  Surgeon: Shlomo Campos MD;  Location[place] [facility];  Service: Orthopedics;  Laterality: Right;    VASECTOMY      VENOUS ACCESS DEVICE (PORT) INSERTION N/A 10/21/2022    Procedure: INSERTION VENOUS ACCESS DEVICE;  Surgeon: Ga Samaniego MD;  [place] [facility];  Service: General;  Laterality: N/A;     HEMATOLOGY/MEDICAL ONCOLOGY HISTORY: The patient is a 72 y.o. year old male with hypertension, hyperlipidemia, type 2 diabetes, iron deficiency anemia, history of DVT, who presented on 9/22/2022 for initial evaluation because of iron deficiency anemia, referred by his primary care provider, JULIA Santiago. Patient is accompanied by his wife who helped with the history. They reported the patient actually was being diagnosed of colon cancer 2 days ago. His GI specialist, Dr. Anil Garces, performed colonoscopic examination and saw a distal colon mass. I do not have the report for the procedure nor do I have the pathology report but nevertheless there was a mass in the distal colon likely in the sigmoid colon. I will obtain a copy of those reports when available.      Patient reports he had no apparent melena or hematochezia before starting oral iron treatment. He did have however significant fatigue. He reports exertional dyspnea and no syncope. Patient had laboratory study recently on 09/08/2022 which reported  "severe iron deficiency anemia with hemoglobin 7.8, MCV 79.9, MCHC 29.2. Normal platelets 217,000 and WBC 7230 without differentiation. Iron study reported ferritin 10.7 ng/mL, free iron 23, TIBC 593 and iron saturation 4%. Chemistry lab reported creatinine 1.11, potassium 5.4, otherwise normal sodium chloride and calcium, glucose 137. Prior to that the patient had normal liver function on 08/24/2022. He had a normal TSH 2.5 and slightly elevated hemoglobin A1c of 6.8% on that day.      The patient reports he has been on oral iron for almost 2 weeks, once a day with good tolerance and he now noticed improved energy level and less exertional dyspnea. He does report stool becoming dark-colored after he started oral iron.  He reports no syncope.     Laboratory study on 09/22/2022 reported improved hemoglobin 8.5 and normalized MCV 85.0, stable MCHC 29.4. Maintains normal platelets and WBC.     I saw him originally on 9/22/2022 for initial evaluation for his sigmoid colon cancer and iron deficiency anemia.  Since that time patient had multiple imaging studies including CT for chest abdomen pelvis, subsequently with MRI for the abdomen for staging purposes.  He was also seen by Dr. Samaniego who performed right hemicolectomy on 10/11/2022.    Dr. Samaniego called me on the day of surgery, he also suspected this liver lesion is metastatic, however unable to reach that during the surgery.    Patient notes since surgery on 10/11/2022 of the bowel resection he is recovering \"okay\". The patient does report some abdominal pain when adrianna his stomach, specifically when getting out of bed. The patient denies any issues with his appetite, and is having normal urination and bowel movements. The patient denies constipation.  Patient reports no fever sweating or chills.    The patient has type II diabetes, which he manages with metformin. 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    Patient reports he is able to tolerate oral iron twice a day with no specific complaints.  No nausea vomiting or constipation.      Laboratory study today on 11/2/2022 showed improved anemia with Hb 10.9, normalized MCV 92.5.  His normal platelets 159,000 WBC 6870 including ANC 4580.      PET scan examination on 11/14/2022 reported distal small 1.3 cm subtle low attenuation lesion in the medial hepatic segment with SUV 6.3.  There is no hypermetabolic enthesopathy in the abdomen or pelvis.  No suspicious hypermetabolic activity in the chest or neck.    Laboratory study on 11/15/2022 showed improved hemoglobin 12.1, normal platelets 156,000 and WBC 5720 including ANC 3750 and lymphocytes 1070.  Chemistry study reported glucose 156 otherwise unremarkable CMP.    Patient reports he developed a mild oral mucositis lasted about 4 days after the chemotherapy and now has resolved. He believes he has lost some weight, but states he does have an appetite and eats everyday.  He denies nausea vomiting.  He denies having diarrhea or constipation. He denies having any fever, chills, or sweating.  Denies peripheral neuropathy.     Laboratory results from on 11/29/2022 are; white cell count is 3950. neutrophils are 2340. Hemoglobin is 11.7 g/dL. Platelets are 108,000. Chemistry lab was unremarkable except glucose 255.    Laboratory studies on 3/21/2023 reported pancytopenia, with platelets 100,000, hemoglobin 10.0 .0, in the WBC 3130 including mild neutropenia with ANC 1610.     On 3/21/2023, we continue to hold his Avastin due to a tooth extraction on 3/13/2023.  He developed mild neutropenia with ANC 1610 so we gave the patient Levaquin daily for 7 days for prophylaxis.  Fortunately did not have infection.    Patient reports reasonable tolerance.  No specific complaints.    On 4/4/2023 patient has improved neutrophils 1870, and WBC 3270.  Continues to have anemia stable Hb 10.2, and platelets 96,000.   He reports no spontaneous bleeding or bruising.    The PET scan obtained 4/25/2023 reported resolution of the hypermetabolic subcapsular and medial hepatic segment lesion. However, there were developments of a few mildly hypermetabolic lymph nodes, one of them to the right subcarinal area with a maximum SUV 3.5, previously photopenic. There is a 9 x 6 mm right supraclavicular node with a maximum SUV 2.9, previous 3 mm and photopenic. There is also a 10 mm x 7 mm right paratracheal node stable in size, but is slightly hypermetabolic.    Lab study today on 5/1/2023 reported moderate thrombocytopenia platelets 86,000, hemoglobin 10.8, .3, and WBC 4590 including neutrophils 3200.    He was last seen on 5/1/2023 , he was referred to Dr. Griffin Tilley at the [facility] for evaluation of metastatic solitary liver lesion. Patient was seen by Dr. Tilley on 5/15/2023 for evaluation and after discussion, patient decided not to pursue surgical intervention instead of getting images studies periodically.     This patient had a PET scan examination on 7/31/2023 and is to review the results. The study the PET study reported no significant changes of a small 9 mm x 7 mm superior right paratracheal node and this noted is photopenic with a maximum SUV one point of five. Previously slightly hypermetabolic right subcarinal lymph node appears smaller and also now photopenic. There has been resolution of the hypermetabolic sub centimeter right supraclavicular lymph nodes. There was no hypermetabolic lymphadenopathy in the neck, supraclavicular, or chest. No suspicious metabolic hypermetabolic activity within the abdomen and specifically the liver, and also no hypermetabolic lymphadenopathy in the abdomen and the pelvis.    Laboratory study 7/31/2023 reported mild anemia with a hemoglobin 11.9, MCV 2.865, and correction hemoglobin 11.09, .01, MCHC 31.9, and platelets 132,000, WBC 4600 including neutrophils  2650. Normal CEA 2.044 and normal iron studies with a ferritin 140 ng/mL and iron saturation 35% with a free iron 124, TIBC 358. Chemistry lab reported unremarkable CMP.      MEDICATIONS:    Current Outpatient Medications:     acetaminophen (TYLENOL) 500 MG tablet, Take 1 tablet by mouth Every 6 (Six) Hours As Needed for Mild Pain., Disp: , Rfl:     aspirin 81 MG EC tablet, Take 1 tablet by mouth Daily. INSTRUCTED PT TO FOLLOW MD INSTRUCTIONS REGARDING HOLDING FOR SURGERY/PT STATED TO HOLD 5 DAYS PRIOR TO SURGERY, Disp: , Rfl:     atorvastatin (LIPITOR) 40 MG tablet, Take 1 tablet by mouth Daily., Disp: 90 tablet, Rfl: 1    ibuprofen (ADVIL,MOTRIN) 600 MG tablet, Take  by mouth As Needed. Was for dental, Disp: , Rfl:     lisinopril-hydrochlorothiazide (PRINZIDE,ZESTORETIC) 20-12.5 MG per tablet, Take 1 tablet by mouth Every Night., Disp: 90 tablet, Rfl: 1    metFORMIN (GLUCOPHAGE) 500 MG tablet, Take 1 tablet by mouth 2 (Two) Times a Day With Meals., Disp: 180 tablet, Rfl: 1    tadalafil (CIALIS) 5 MG tablet, Take 1 tablet by mouth Daily As Needed for Erectile Dysfunction., Disp: 30 tablet, Rfl: 2    ALLERGIES:   No Known Allergies    SOCIAL HISTORY:       Social History     Socioeconomic History    Marital status:      Spouse name: Chelsie    Years of education: 12   Tobacco Use    Smoking status: Some Days     Types: Cigars    Smokeless tobacco: Never    Tobacco comments:     OCCASIONALLY   Vaping Use    Vaping Use: Never used   Substance and Sexual Activity    Alcohol use: Yes     Alcohol/week: 7.0 standard drinks of alcohol     Types: 1 Glasses of wine, 6 Cans of beer per week     Comment: sometimes in warm weather, wine sometimes in the winter    Drug use: No    Sexual activity: Yes     Partners: Female     Birth control/protection: Surgical         FAMILY HISTORY:  Family History   Problem Relation Age of Onset    Clotting disorder Other     Colon cancer Paternal Grandmother     Colon cancer Paternal  "Grandfather     Clotting disorder Father     Malig Hyperthermia Neg Hx          Vitals:    10/31/23 1023   BP: 122/80   Pulse: 73   Resp: 18   Temp: 98.7 °F (37.1 °C)   TempSrc: Temporal   SpO2: 95%   Weight: 96.8 kg (213 lb 4.8 oz)   Height: 175.3 cm (69.02\")   PainSc:   3   PainLoc: Arm  Comment: PT STATED LEFT ARM PAIN FROM FALL         10/31/2023    10:29 AM   Current Status   ECOG score 0     Physical Exam   GENERAL:  Well-developed, well-nourished male, in no acute distress.  Orientated to time place and the people.  SKIN:  Warm, dry without rashes, purpura or petechiae.  HEENT:  Normocephalic.   LYMPHATICS:  No cervical, supraclavicular adenopathy.  CHEST: Normal respiratory effort.  Lungs clear to auscultation. Good airflow.  CARDIAC:  Regular rate and rhythm without murmurs. Normal S1,S2.  ABDOMEN:  Soft, nontender with no organomegaly or masses.  Bowel sounds normal.  EXTREMITIES:  No lower extremity edema.        RECENT LABS:  Lab Results   Component Value Date    WBC 4.72 10/12/2023    HGB 11.9 (L) 10/12/2023    HCT 38.0 10/12/2023    .0 (H) 10/12/2023     (L) 10/12/2023     Lab Results   Component Value Date    NEUTROABS 2.93 10/12/2023     Lab Results   Component Value Date    GLUCOSE 128 (H) 10/12/2023    BUN 20 10/12/2023    CREATININE 0.95 10/12/2023    EGFRRESULT 71.0 09/08/2022    EGFR 85.0 10/12/2023    BCR 21.1 10/12/2023    K 4.7 10/12/2023    CO2 22.3 10/12/2023    CALCIUM 9.1 10/12/2023    PROTENTOTREF 7.2 08/24/2022    ALBUMIN 3.8 10/12/2023    BILITOT 0.9 10/12/2023    AST 30 10/12/2023    ALT 17 10/12/2023       Lab Results   Component Value Date    GYGLLGMT88 658 04/04/2023     Lab Results   Component Value Date    FOLATE >20.00 04/04/2023     Lab Results   Component Value Date    CEA 2.40 10/12/2023     Lab Results   Component Value Date    IRON 108 10/12/2023    TIBC 361 10/12/2023    FERRITIN 69.10 10/12/2023   Iron saturation 30% on 10/12/2023       IMAGING STUDY:  CT " Abdomen Pelvis With Contrast  Narrative: EXAMINATION: CT OF THE ABDOMEN AND PELVIS WITH CONTRAST     TECHNIQUE: Computed tomography of the abdomen and pelvis after the  uneventful administration of nonionic intravenous contrast per protocol.  Radiation dose reduction techniques were utilized, including automated  exposure control and exposure modulation based on body size.     HISTORY: Ascending colon cancer     COMPARISON: PET/CT of 7/31/2023     FINDINGS: Limited evaluation of the inferior thorax demonstrates old  granulomatous disease and atelectasis, without consolidation, pleural  effusion, or pneumothorax. The heart is normal in size and  configuration, without pericardial effusion. Coronary calcifications are  seen. A punctate left lower lobe nodule on series 2, image 5 is stable,  too small to accurately measure.     There is cholelithiasis. The low-attenuation right renal lesion is  technically indeterminate, likely a cyst. Postsurgical changes are seen  of right hemicolectomy, without mass seen in the resection bed.     The liver, spleen, adrenal glands, left kidney, pancreas, stomach, small  bowel, urinary bladder, and abdominal vasculature are normal. No  intraperitoneal fluid collection or free gas are seen. No enlarged lymph  nodes are demonstrated.     Bone windows demonstrate degenerative changes and bilateral hip  arthroplasties, without suspicious osseous lesion seen. There is lumbar  spinal fixation instrumentation.     Impression: Incidental findings as above, without evidence of residual  or recurrent disease.     This report was finalized on 10/13/2023 4:36 PM by Dr. Joss Day M.D  on Workstation: [id number]           Assessment & Plan     ASSESSMENT:    1.  Colon cancer post right hemicolectomy 10/11/2022.  Stage IV ([id number]).  Had positive stool occult blood test on 09/09/2022. Colonoscopic examination on 09/20/2022 by Dr. Anil Garces reported cecum colon mass.  Biopsy confirmed  moderately differentiated adenocarcinoma.  MSI stable.  Mildly elevated CEA level 10.7 ng/mL on 9/22/2022.  CT scan for chest abdomen pelvis 9/26/2022 reported cecal mass.  There was also suspicious 9 mm hepatic lesion.  Abdomen MRI examination with and without contrast on 10/7/2022 confirmed 8 mm lesion hypervascularity in the segment 5 of the liver.  Patient had right hemicolectomy 10/11/2022.  Unable to biopsy at this time he liver lesion.  Pathology evaluation reported [id number] disease, this will be at least a stage IIIb colon cancer.  On 11/2/2022 I discussed with the patient, his wife and his daughter, recommending further imaging study with PET scan for assessment.  If patient is no hypermetabolic liver lesion, will treat with adjuvant FOLFOX 6 regimen every 2 weeks for 12 cycles.  However, if he has hypermetabolic lesion in the liver, we will treat him as metastatic disease, with FOLFOX plus Avastin.  Since patient has neuropathy already being his legs and feet, if he is indeed metastatic disease, we may switch him to irinotecan instead of oxaliplatin because of the neuropathy.  If indicated patient has metastatic disease, we would also entertain metastectomy after finishing 12 cycles of chemotherapy.    PATHOLOGY: Tumor sample for [facility] NGS study reported positive for K-aashish mutation exon2 p.G13D, negative for HER2/dorie by IHC 0 and no amplification by FISH.  MSI stable by DNA sequencing, and also MMR proficient by IHC staining.  Tumor mutation burden is low 8 mut/Mb.  Patient was positive for PTEN 1+, 100% by IHC, PD-L1 was negative, only 1% by IHC.  Patient also positive for APC mutation exon 16p.[id number], positive for AMACR1 exon2 p.R358*.  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    Discussed with the patient and family members about potential side effects including bone marrow suppression, with anemia thrombocytopenia and leukocytopenia increased risk for infection, and also peripheral neuropathy,  etc. patient is agreeable to proceed ahead with treatment.  The patient has PET scan examination on 11/14/2022 which reported a small 1.3 cm focus with elevated SUV 6.03, highly suspicious for metastatic disease. The patient now has stage IV. The patient had [facility] NGS study which reported K-aashish mutation, tumor mutation burden < 10, MSI was stable and anti-PD-L1 negative.  Not a candidate for anti-EGFR monoclonal antibody such as Vectibix, or chekpoint inhibitor immunotherapy.  We discussed the adding anti-VEGF monoclonal antibody, bevacizumab/Avastin starting in 2 weeks so that it would be after 6 weeks of primary surgery for the colon cancer resection.  We discussed this is now stage IV disease, however because only having 1 solitary metastatic lesion in the liver, it is potentially curable so we will be as aggressive as possible for chemotherapy.  If he has good response, in the future he will need metastectomy of the liver lesion.  On 11/29/2022 patient is presented for cycle #2 of chemotherapy. Patient tolerated therapy well except mild oral mucositis. However, laboratory studies showed significant decrease in platelets at only 108,000, as well as also decreasing WBC and hemoglobin. Discussed with the patient today if we do not carry out any dose reduction, he will likely become more thrombocytopenic next time in 2 weeks and we will not be able to do cycle #3 chemotherapy on time. I discussed with the patient for dose reduction and to avoid potential delay of chemotherapy and he is agreeable. We will have 25% dose reduction for 5-FU leucovorin and oxaliplatin.  On 11/29/2022 he was started the first dose of Avastin/bevacizumab in conjunction with chemotherapy cycle #2.  On 12/13/2022, the patient presented for reevaluation prior to chemotherapy cycle #3. He has stable platelets of 108,000 and slightly improved WBC of 5100 and hemoglobin 12.8 g/dL. He will continue cycle 3 with the same 25% dose  reduction.  1/10/2023 due for cycle 5 FOLFOX bevacizumab, overall is tolerating fairly well.  Hemoglobin 11.7, platelet count stable at 108,000, WBC 4.02, ANC 2.27.  Patient has had five cycles of chemotherapy and a CT scan examination obtained on 01/20/2023. The CT scan reported favorable response as the lesion is less obvious.   1/24/2023 recommended to continue chemotherapy for a total of 12 cycles. Then obtain PET scan examination. Due to worsening thrombocytopenia, and also peripheral neuropathy, for cycle #6, I will decrease oxaliplatin by another 25% so would it be 50% of the original dose. I will keep the same dose of 5-FU which is 75% of the original dose.  Full dose Avastin.   2/7/2023 due for cycle 7.  Platelet count is holding steady at 100,000.  We will proceed with treatment today with same doses as given on cycle 6.   2/21/2023 due for cycle #8 chemotherapy. We will repeat every 2 weeks.  3/7/2023 proceed with cycle #9 chemotherapy and hold Avastin for dental procedure on 3/13/2023.  On 3/21/2023 we will proceed ahead with cycle #10 chemotherapy FOLFOX6 regimen.  Continue to hold Avastin.  On 4/4/2023 patient presented for evaluation prior to cycle #11 FOLFOX 6 regimen.  Continue to hold Avastin.  This will be resumed from next cycle.   The patient presented 4/18/2023 for reevaluation prior to his last cycle #12 FOLFOX plus Avastin. The patient reports tolerating well. We will proceed with cycle12 today and resume Avastin.  I recommended having a PET scan examination for assessment of response, especially the small metastatic liver lesion which was only found on the previous PET scan.  Patient had a PET scan examination on 04/25/2023, which reported complete resolution of the solitary hypermetabolic lesion in the liver. He had a mildly active small lymph node, peritracheal, subcarinal with low activity. Etiology not clear.   On 05/01/2023, I reviewed the PET scan images with the patient and his wife,  "and two daughters. I recommended to refer the patient to [facility], surgical oncology, Dr. Griffin Tilley, for evaluation to see if he would be a candidate for surgical resection of the liver segment where he had metastatic disease, which now has resolution of hypermetabolic activity after chemotherapy.  Patient was seen by Dr. Tilley on 5/15/2023. After lengthy discussion, patient opted to not having surgical intervention currently. He would rather to have serial images studies.   Patient had a PET scan on 7/29/2023, which reported no evidence for recurrent disease.   I discussed with the patient and his wife on 8/8/2023, recommended CT scan for abdomen and pelvis with i.v. contrast in 3 months, together with lab study \"Guardant Reveal\" for ctDNA together with routine labs, CBC, CMP, CEA level, for assessment of colon cancer.  The patient had laboratory studies on 10/12/2023, which reported negative for Guardant Reveal, ctDNA 0%. The patient also had normal CEA and liver function panel. The CT scan for the abdomen and pelvis was no evidence of disease recurrence.      2. Iron deficiency anemia.  Subsequently anemia also caused by chemotherapy.  The patient has iron deficiency due to GI bleeding from his colon cancer. His laboratory study on 09/08/2022 reported ferritin 10.7 and iron saturation 4% with microcytic hypochromic anemia, hemoglobin 7.8, MCV 79.9 and MCHC 29.2.   Patient reports good tolerance to oral iron treatment and already has improved energy level. Laboratory study on 9/22/2022 did report slightly improved hemoglobin at 8.5 but normalized MCV 85.0. I think he is responding to oral iron treatment both physically and laboratory wise.  We advised patient to increase oral iron to twice a day.  On 11/2/2022 patient reports good tolerance to oral iron twice a day.  Labs today showed further improved hemoglobin 10.9.  He continues to maintain normal platelets and WBC.  Improved " hemoglobin 12.1 on 11/15/2022.  Cycle #1 chemotherapy will be started.  On 11/29/2022 hemoglobin 11.7.  On 12/13/2022, his hemoglobin is 12.8 g/dL.  1/10/2023 hemoglobin 11.7.  The patient has trending down hemoglobin 11.0 on 1/24/2023, however, he is asymptomatic.  Hemoglobin 2/7/2023, 10.6.  Iron study reported ferritin 229 and iron saturation 28% with free iron 108 TIBC 386.  No evidence for iron deficiency.  So his anemia is now secondary to chemotherapy.     The patient has stable mild anemia with hemoglobin 11.0 on 3/7/2023. We will continue to monitor.  Continue to hold ferrous sulfate.  On 3/21/2023, stable anemia with hemoglobin 10.0 .0.  Continue to monitor.  On 4/4/2023 patient has stable anemia Hb 10.2, .9.  The patient has stable anemia, hemoglobin 10.8 today on 04/18/2023. The patient reports no syncope.  On 05/01/2023, patient has a slightly improved hemoglobin 10.8. We will repeat iron studies in 3 months for reassessment.  Laboratory studies 7/31/2023 reported ferritin 140, free iron 124 and iron saturation 35%. He also had improved hemoglobin 11.9, .1. Discussed with him today 8/8/2023 and recommended monitoring.  The laboratory study on 10/12/2023 reported worsening ferritin by 50% to 69.1 ng/mL; however, maintains a reasonable iron saturation 30% with free iron 108. The patient also has persistent anemia with hemoglobin of 11.9. He has already been on oral vitamin B12 supplement. I asked the patient to start oral iron ferrous sulfate at 325 mg once a day. I discussed with him the possible side effects of constipation and stools becoming dark-colored constipation, nausea, and cramping, etc.       3.    Pancytopenia secondary to chemotherapy.    This patient had original iron deficiency, however now has ongoing anemia due to chemotherapy.  He also has thrombocytopenia and neutropenia from chemotherapy.  Normal neutrophil prior to starting chemotherapy.    This patient developed  first mild neutropenia with ANC 1610 on 3/21/2023.  Patient reports no fever sweating chills.  I recommended starting patient on Levaquin daily for 7 days, for prophylaxis of neutropenic fever.  We will continue to monitor for now.  Expecting this will getting worse with ongoing chemotherapy.  Question the patient to call us if he develops fever sweating chills.   4/4/2023, patient has slightly improved neutrophils 1870, and WBC 3270.  Continue to monitor.  On 04/18/2023, the patient has neutrophils 2030, WBC 3480. Continue with chemotherapy, monitor CBC.  On 05/01/2023 patient has improved WBC 4,590, including neutrophils 3,200.  7/31/2023 patient has normal WBC 4600, including ANC 2650 lymphocytes 1340.  The laboratory study on 10/12/2023 reported persistent thrombocytopenia with platelet counts of 132,000, and low normal WBC of 4720, and neutrophil count of 2930.    4.  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  Prior to starting chemotherapy patient had low normal platelets 156,000 on 11/15/2022.  Started on cycle #1 chemotherapy FOLFOX 6.  On 11/29/2022 patient had platelets of 108,000.  Patient reports no bleeding or bruising.  Discussed with the patient, because of foreseeable more severe thrombocytopenia, we recommended 25% dose reduction starting from cycle #2 chemotherapy.  On 12/13/2022, the patient has same decreased number of platelets 108,000. He will continue chemotherapy with same dose reduction of 25%.   1/10/2023 platelet count stable at 108,000.  On 01/24/2023, patient has worsening thrombocytopenia with platelets of 99,000 mcL. Discussed with the patient, we will cut oxaliplatin by another 25% to be at 50% of original dose. We will leave the 5-FU dose same as the previous at 75% of original dose. Avastin will be the same dose.  2/7/2023 platelet count 100,000.    On 02/21/2023 the patient has stable thrombocytopenia with platelets 102,000. The patient reports no bleeding or bruising. Continue  to monitor.   On 3/7/2023 the patient has stable thrombocytopenia with platelets 103,000. The patient reports no bleeding or bruising. Continue to monitor.   On 3/21/2023 persistent stable thrombocytopenia with platelets 100,000.   On 4/4/2023 platelets 96,000.  We will go ahead and to proceed with chemotherapy.   Today on 04/18/2023, platelets 91,000. We will proceed ahead with the chemotherapy today. This is cycle 12 of chemotherapy. We will reassess his treatment after PET scan examination.  On 05/01/2023, patient has a persistent thrombocytopenia with platelets of 86,000. Patient is asymptomatic.  On 7/31/2023 improved platelets 132,000.  On 10/12/2023, stable thrombocytopenia with 132,000 platelets.    5. Peripheral neuropathy  On 12/13/2022, he reports cold sensitivity in his mouth when he drinks cold water. The cold sensitivity usually dissipates after 3 to 4 days.  Patient reports he is more significant cold sensitivities lasting for more than a week. He does have moderate tingling, numbness involving the fingers, but not much as the toes.  2/7/2023 he feels like the neuropathy/cold sensitivity is lingering a little bit longer with each cycle.    On 4/4/2023 the patient reports stable mild peripheral neuropathy/cold sensitivity.    On 04/18/2023, patient reports some mild numbness and tingling involving both feet and hands, but this is only last for a few days and then resolves. We will continue dose reduced oxaliplatin 50%.  On 05/01/2023, patient continues to have peripheral neuropathy intermittent and mild overall. Continue to monitor.  Stable peripheral neuropathy today.    6.  Weight losses.    On 12/13/2022, the patient reports he has lost weight in the past couple of months. He reports a poor appetite for 3 days after chemotherapy and a poor taste in his mouth. His appetite has since improved. He only had mild nausea, but no vomiting. He was encouraged to use Ensure or Boost to improve nutrition  supplement. He already started using protein powder.  3/7/2023, patient's weight continues to improve and he has gained a couple pounds.  His weight is stable at 211 pounds today.  He reports his appetite continues to improve.  He denies any nausea or vomiting.   On 04/18/2023, patient weighs with 209 pounds.  8/18/2023 patient weighs about 213 pounds  10/31/2023, the patient has a stable weight of 213 pounds.    7.  [place] teeth extraction: Patient states that he needs to have his wisdom teeth pull out.  Reviewed with Dr. Rosario.  Patient will need to have this done on his off week of treatment.  We will likely hold bevacizumab 2 weeks prior to this procedure and 4 weeks following.  Patient states that this will likely not happen until March, but is going to contact his oral surgeon, Dr. Charlie Zazueta to try to go ahead and get an appointment.  [place] tooth extraction done on 3/13/2023.    Continue to hold Avastin 4/4/2023.  We will resume in 2 weeks for cycle #12 chemotherapy.    On 04/18/2023, the patient reports his gum has completely healed.  We will resume Avastin.    No specific complaints today.      8.  Low normal vitamin B12 level.    Patient had a marginal normal vitamin B12 level at 260 pg/mL on 08/24/2022.   Folate level on 9/22/2022 was normal at 15.5 ng/mL.    Continue oral vitamin B12 supplement at 1000 mcg daily.   Vitamin B12 was 658 pg/mL on 04/04/2023. This has improved. His macrocytosis is due to chemotherapy.  Patient continues taking oral vitamin B12.        PLAN:  Start oral ferrous sulfate at 325 mg once a day. I e-scribed it to his pharmacy with a 90-day supply and refills.  Continue port flush every 6 weeks.  Continue oral vitamin B12 with 1000 mcg daily.  Laboratory studies in 3 months for CBC, CMP, CEA, and the Guardant Reveal study.   The patient will see me for evaluation about 2-3 weeks after the above studies .  Will plan to have a CT scan of the abdomen and pelvis in about 6  months.    I discussed with the patient about laboratory results and results of CT scan examination.  We further discussed management plan.  Patient voiced understanding and agreeable.           Maya Rosario MD PhD        CC:  JULIA Santiago MD Richard Pokorny, MD   Oral surgeon: Dr. Charlie Zazueta Phone [phone]  Griffin Tilley M.D.     Transcribed from ambient dictation for Maya Rosario MD PhD by Delmy Mae.  10/31/23   13:19 EDT    Patient or patient representative verbalized consent to the visit recording.  I have personally performed the services described in this document as transcribed by the above individual, and it is both accurate and complete.      Maya Rosario MD PhD           Admitted

## 2023-11-27 ENCOUNTER — INFUSION (OUTPATIENT)
Dept: ONCOLOGY | Facility: HOSPITAL | Age: 73
End: 2023-11-27
Payer: MEDICARE

## 2023-11-27 DIAGNOSIS — Z45.2 FITTING AND ADJUSTMENT OF VASCULAR CATHETER: Primary | ICD-10-CM

## 2023-11-27 PROCEDURE — 96523 IRRIG DRUG DELIVERY DEVICE: CPT

## 2023-11-27 PROCEDURE — 25010000002 HEPARIN LOCK FLUSH PER 10 UNITS: Performed by: INTERNAL MEDICINE

## 2023-11-27 RX ORDER — SODIUM CHLORIDE 0.9 % (FLUSH) 0.9 %
10 SYRINGE (ML) INJECTION AS NEEDED
OUTPATIENT
Start: 2023-11-27

## 2023-11-27 RX ORDER — HEPARIN SODIUM (PORCINE) LOCK FLUSH IV SOLN 100 UNIT/ML 100 UNIT/ML
500 SOLUTION INTRAVENOUS AS NEEDED
Status: DISCONTINUED | OUTPATIENT
Start: 2023-11-27 | End: 2023-11-27 | Stop reason: HOSPADM

## 2023-11-27 RX ORDER — HEPARIN SODIUM (PORCINE) LOCK FLUSH IV SOLN 100 UNIT/ML 100 UNIT/ML
500 SOLUTION INTRAVENOUS AS NEEDED
OUTPATIENT
Start: 2023-11-27

## 2023-11-27 RX ORDER — SODIUM CHLORIDE 0.9 % (FLUSH) 0.9 %
10 SYRINGE (ML) INJECTION AS NEEDED
Status: DISCONTINUED | OUTPATIENT
Start: 2023-11-27 | End: 2023-11-27 | Stop reason: HOSPADM

## 2023-11-27 RX ADMIN — Medication 10 ML: at 13:24

## 2023-11-27 RX ADMIN — Medication 500 UNITS: at 13:24

## 2024-01-09 ENCOUNTER — INFUSION (OUTPATIENT)
Dept: ONCOLOGY | Facility: HOSPITAL | Age: 74
End: 2024-01-09
Payer: MEDICARE

## 2024-01-09 DIAGNOSIS — Z45.2 FITTING AND ADJUSTMENT OF VASCULAR CATHETER: Primary | ICD-10-CM

## 2024-01-09 DIAGNOSIS — C78.7 SECONDARY LIVER CANCER: ICD-10-CM

## 2024-01-09 DIAGNOSIS — C18.2 MALIGNANT NEOPLASM OF ASCENDING COLON: ICD-10-CM

## 2024-01-09 DIAGNOSIS — D50.9 IRON DEFICIENCY ANEMIA, UNSPECIFIED IRON DEFICIENCY ANEMIA TYPE: ICD-10-CM

## 2024-01-09 LAB
ALBUMIN SERPL-MCNC: 4.2 G/DL (ref 3.5–5.2)
ALBUMIN/GLOB SERPL: 1.5 G/DL
ALP SERPL-CCNC: 62 U/L (ref 39–117)
ALT SERPL W P-5'-P-CCNC: 49 U/L (ref 1–41)
ANION GAP SERPL CALCULATED.3IONS-SCNC: 9.3 MMOL/L (ref 5–15)
AST SERPL-CCNC: 37 U/L (ref 1–40)
BASOPHILS # BLD AUTO: 0.02 10*3/MM3 (ref 0–0.2)
BASOPHILS NFR BLD AUTO: 0.3 % (ref 0–1.5)
BILIRUB SERPL-MCNC: 1.4 MG/DL (ref 0–1.2)
BUN SERPL-MCNC: 20 MG/DL (ref 8–23)
BUN/CREAT SERPL: 18.5 (ref 7–25)
CALCIUM SPEC-SCNC: 9.4 MG/DL (ref 8.6–10.5)
CEA SERPL-MCNC: 1.93 NG/ML
CHLORIDE SERPL-SCNC: 101 MMOL/L (ref 98–107)
CO2 SERPL-SCNC: 27.7 MMOL/L (ref 22–29)
CREAT SERPL-MCNC: 1.08 MG/DL (ref 0.76–1.27)
DEPRECATED RDW RBC AUTO: 49.8 FL (ref 37–54)
EGFRCR SERPLBLD CKD-EPI 2021: 72.9 ML/MIN/1.73
EOSINOPHIL # BLD AUTO: 0.12 10*3/MM3 (ref 0–0.4)
EOSINOPHIL NFR BLD AUTO: 2 % (ref 0.3–6.2)
ERYTHROCYTE [DISTWIDTH] IN BLOOD BY AUTOMATED COUNT: 13.4 % (ref 12.3–15.4)
FERRITIN SERPL-MCNC: 245 NG/ML (ref 30–400)
GLOBULIN UR ELPH-MCNC: 2.8 GM/DL
GLUCOSE SERPL-MCNC: 169 MG/DL (ref 65–99)
HCT VFR BLD AUTO: 39.5 % (ref 37.5–51)
HGB BLD-MCNC: 13.3 G/DL (ref 13–17.7)
IMM GRANULOCYTES # BLD AUTO: 0.01 10*3/MM3 (ref 0–0.05)
IMM GRANULOCYTES NFR BLD AUTO: 0.2 % (ref 0–0.5)
IRON 24H UR-MRATE: 111 MCG/DL (ref 59–158)
IRON SATN MFR SERPL: 32 % (ref 20–50)
LYMPHOCYTES # BLD AUTO: 1.43 10*3/MM3 (ref 0.7–3.1)
LYMPHOCYTES NFR BLD AUTO: 23.8 % (ref 19.6–45.3)
MCH RBC QN AUTO: 33.8 PG (ref 26.6–33)
MCHC RBC AUTO-ENTMCNC: 33.7 G/DL (ref 31.5–35.7)
MCV RBC AUTO: 100.3 FL (ref 79–97)
MONOCYTES # BLD AUTO: 0.53 10*3/MM3 (ref 0.1–0.9)
MONOCYTES NFR BLD AUTO: 8.8 % (ref 5–12)
NEUTROPHILS NFR BLD AUTO: 3.89 10*3/MM3 (ref 1.7–7)
NEUTROPHILS NFR BLD AUTO: 64.9 % (ref 42.7–76)
NRBC BLD AUTO-RTO: 0 /100 WBC (ref 0–0.2)
PLATELET # BLD AUTO: 164 10*3/MM3 (ref 140–450)
PMV BLD AUTO: 10.2 FL (ref 6–12)
POTASSIUM SERPL-SCNC: 4.8 MMOL/L (ref 3.5–5.2)
PROT SERPL-MCNC: 7 G/DL (ref 6–8.5)
RBC # BLD AUTO: 3.94 10*6/MM3 (ref 4.14–5.8)
SODIUM SERPL-SCNC: 138 MMOL/L (ref 136–145)
TIBC SERPL-MCNC: 347 MCG/DL (ref 298–536)
TRANSFERRIN SERPL-MCNC: 248 MG/DL (ref 200–360)
WBC NRBC COR # BLD AUTO: 6 10*3/MM3 (ref 3.4–10.8)

## 2024-01-09 PROCEDURE — 85025 COMPLETE CBC W/AUTO DIFF WBC: CPT

## 2024-01-09 PROCEDURE — 84466 ASSAY OF TRANSFERRIN: CPT

## 2024-01-09 PROCEDURE — 83540 ASSAY OF IRON: CPT

## 2024-01-09 PROCEDURE — 82378 CARCINOEMBRYONIC ANTIGEN: CPT | Performed by: INTERNAL MEDICINE

## 2024-01-09 PROCEDURE — 36591 DRAW BLOOD OFF VENOUS DEVICE: CPT

## 2024-01-09 PROCEDURE — 80053 COMPREHEN METABOLIC PANEL: CPT

## 2024-01-09 PROCEDURE — 25010000002 HEPARIN LOCK FLUSH PER 10 UNITS: Performed by: INTERNAL MEDICINE

## 2024-01-09 PROCEDURE — 82728 ASSAY OF FERRITIN: CPT

## 2024-01-09 RX ORDER — HEPARIN SODIUM (PORCINE) LOCK FLUSH IV SOLN 100 UNIT/ML 100 UNIT/ML
500 SOLUTION INTRAVENOUS AS NEEDED
Status: DISCONTINUED | OUTPATIENT
Start: 2024-01-09 | End: 2024-01-09 | Stop reason: HOSPADM

## 2024-01-09 RX ORDER — SODIUM CHLORIDE 0.9 % (FLUSH) 0.9 %
10 SYRINGE (ML) INJECTION AS NEEDED
OUTPATIENT
Start: 2024-01-09

## 2024-01-09 RX ORDER — HEPARIN SODIUM (PORCINE) LOCK FLUSH IV SOLN 100 UNIT/ML 100 UNIT/ML
500 SOLUTION INTRAVENOUS AS NEEDED
OUTPATIENT
Start: 2024-01-09

## 2024-01-09 RX ORDER — SODIUM CHLORIDE 0.9 % (FLUSH) 0.9 %
10 SYRINGE (ML) INJECTION AS NEEDED
Status: DISCONTINUED | OUTPATIENT
Start: 2024-01-09 | End: 2024-01-09 | Stop reason: HOSPADM

## 2024-01-09 RX ADMIN — Medication 500 UNITS: at 13:51

## 2024-01-09 RX ADMIN — Medication 10 ML: at 13:51

## 2024-01-18 LAB — REF LAB TEST METHOD: NORMAL

## 2024-01-30 ENCOUNTER — OFFICE VISIT (OUTPATIENT)
Dept: ONCOLOGY | Facility: CLINIC | Age: 74
End: 2024-01-30
Payer: MEDICARE

## 2024-01-30 VITALS
TEMPERATURE: 99.4 F | BODY MASS INDEX: 32.73 KG/M2 | HEIGHT: 69 IN | DIASTOLIC BLOOD PRESSURE: 75 MMHG | RESPIRATION RATE: 18 BRPM | WEIGHT: 221 LBS | OXYGEN SATURATION: 96 % | HEART RATE: 66 BPM | SYSTOLIC BLOOD PRESSURE: 126 MMHG

## 2024-01-30 DIAGNOSIS — T45.1X5A CHEMOTHERAPY-INDUCED NEUTROPENIA: ICD-10-CM

## 2024-01-30 DIAGNOSIS — T45.1X5A ANEMIA ASSOCIATED WITH CHEMOTHERAPY: ICD-10-CM

## 2024-01-30 DIAGNOSIS — C78.7 SECONDARY LIVER CANCER: ICD-10-CM

## 2024-01-30 DIAGNOSIS — C18.2 MALIGNANT NEOPLASM OF ASCENDING COLON: ICD-10-CM

## 2024-01-30 DIAGNOSIS — D70.1 CHEMOTHERAPY-INDUCED NEUTROPENIA: ICD-10-CM

## 2024-01-30 DIAGNOSIS — D50.9 IRON DEFICIENCY ANEMIA, UNSPECIFIED IRON DEFICIENCY ANEMIA TYPE: Primary | ICD-10-CM

## 2024-01-30 DIAGNOSIS — D69.59 CHEMOTHERAPY-INDUCED THROMBOCYTOPENIA: ICD-10-CM

## 2024-01-30 DIAGNOSIS — E53.8 VITAMIN B12 DEFICIENCY: ICD-10-CM

## 2024-01-30 DIAGNOSIS — D64.81 ANEMIA ASSOCIATED WITH CHEMOTHERAPY: ICD-10-CM

## 2024-01-30 DIAGNOSIS — T45.1X5A CHEMOTHERAPY-INDUCED THROMBOCYTOPENIA: ICD-10-CM

## 2024-01-30 NOTE — PROGRESS NOTES
.     REASON FOR FOLLOWUP:     *Cecum colon cancer, status post right hemicolectomy performed on 10/11/2022, [id number].   CT scan examination on 9/26/2022 reported suspicious liver lesion 9 mm, otherwise no evidence for metastatic disease.    The patient underwent an abdominal MRI examination on 10/06/2022, which reported suspicious liver lesion 8mm.  Patient had sigmoidectomy on 10/11/2022.  Portacatheter placement on 10/21/2022.   PET scan examination on 11/14/2022 reported solitary hypermetabolic liver lesion.   Cycle #1 palliative chemotherapy FOLFOX 6 was started on 11/15/2022.   [facility] NGS study was positive for K-aashish mutation exon2 p.G13D.  Not a candidate for anti-EGFR monoclonal antibody treatment.   From cycle #2, bevacizumab/Avastin will be added to palliative chemotherapy.  Due to thrombocytopenia, all chemotherapy agents were 25% reduced from cycle #2.   1/24/203 further dose reduction of Oxaliplatin to 50% of original dose.  Cycle #12 chemotherapy was started on 4/18/2023.  *Iron deficiency anemia.  Oral iron treatment held on 2/21/2023.  Continue to monitor labs.                               HISTORY OF PRESENT ILLNESS:  The patient is a 73 y.o. 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     The patient reports he is doing well. He has normal bowel movements; no diarrhea, constipation, melena or hematochezia. He has no abdominal pain, nausea or vomiting. No chest pain or dyspnea. He has excellent performance status, ECOG 0.     We obtained laboratory studies on 01/09/2024 and the studies reported negative Guardant Reveal study with 0% ctDNA (circulating tumor DNA). Other laboratory studies reported both improved hemoglobin 13.3 and .3, MCHC 33.7. Maintains normalized  platelets 164,000 and improved WBC 6000 including neutrophils 3890, lymphocytes 1430. Chemistry lab reported much improved ferritin 245, free iron 111, TIBC 347, and iron saturation 32%. Chemistry lab reported slightly elevated total bilirubin at 1.4, and mildly elevated ALT at 49 and otherwise normal AST at 37, and alkaline phosphatase 62. Glucose was 169. Normal electrolytes and baseline creatinine of 1.08.         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    Full dentures     GI (gastrointestinal bleed) September 8 2022    Hip pain     RIGHT    Hyperlipidemia     Hypertension     Iron deficiency anemia 09/22/2022    Numbness and tingling     RIGHT FOOT    PONV (postoperative nausea and vomiting)     Right leg pain     Type 2 diabetes mellitus without complication, without long-term current use of insulin 10/08/2019     Past Surgical History:   Procedure Laterality Date    CERVICAL DISCECTOMY LAMINECTOMY DECOMPRESSION POSTERIOR FUSION WITH INSTRUMENTATION      COLON RESECTION N/A 10/11/2022    Procedure: LAPAROSCOPIC RIGHT COLON RESECTION;  Surgeon: Ga Samaniego MD;  Location: [facility] MAIN OR;  Service: General;  Laterality: N/A;    COLONOSCOPY N/A 02/28/2018    Procedure: COLONOSCOPY to TI and cecum with polypectomy;  Surgeon: Anil Garces MD;  Location: [facility] ENDOSCOPY;  Service:     COLONOSCOPY  2/28/2018 and sept. 2022    found cancer 2022    JOINT REPLACEMENT      LEFT HIP    KNEE ARTHROSCOPY Left     LUMBAR DISCECTOMY FUSION INSTRUMENTATION N/A 11/18/2020    Procedure: Lumbar 4 5 laminectomy with a posterior lateral fusion and instrumentation and  interbody fusion;  Surgeon: Jeffrey Garcia MD;  Location: [facility] OR;  Service: Neurosurgery;  Laterality: N/A;    SPINE SURGERY  December 2021    L4 and L5    TOTAL HIP ARTHROPLASTY Right 06/03/2021    Procedure: TOTAL HIP ARTHROPLASTY POSTERIOR;  Surgeon: Shlomo Campos MD;  Location: [facility] OR;  Service: Orthopedics;  Laterality: Right;    VASECTOMY      VENOUS ACCESS DEVICE (PORT) INSERTION N/A 10/21/2022    Procedure: INSERTION VENOUS ACCESS DEVICE;  Surgeon: Ga Samaniego MD;  Location: [facility]  Service: General;  Laterality: N/A;     HEMATOLOGY/MEDICAL ONCOLOGY HISTORY: The patient is a 72 y.o. year old male with hypertension, hyperlipidemia, type 2 diabetes, iron deficiency anemia, history of DVT, who presented on 9/22/2022 for initial evaluation because of iron deficiency anemia, referred by his primary care provider, JULIA Santiago. Patient is accompanied by his wife who helped with the history. They reported the patient actually was being diagnosed of colon cancer 2 days ago. His GI specialist, Dr. Anil Garces, performed colonoscopic examination and saw a distal colon mass. I do not have the report for the procedure nor do I have the pathology report but nevertheless there was a mass in the distal colon likely in the sigmoid colon. I will obtain a copy of those reports when available.      Patient reports he had no apparent melena or hematochezia before starting oral iron treatment. He did have however significant fatigue. He reports exertional dyspnea and no syncope. Patient had laboratory study recently on 09/08/2022 which reported severe iron deficiency anemia with hemoglobin 7.8, MCV 79.9, MCHC 29.2. Normal platelets 217,000 and WBC 7230 without differentiation. Iron study reported ferritin 10.7 ng/mL, free iron 23, TIBC 593 and iron saturation 4%. Chemistry lab reported creatinine 1.11, potassium 5.4, otherwise normal sodium chloride and calcium, glucose 137. Prior to  "that the patient had normal liver function on 08/24/2022. He had a normal TSH 2.5 and slightly elevated hemoglobin A1c of 6.8% on that day.      The patient reports he has been on oral iron for almost 2 weeks, once a day with good tolerance and he now noticed improved energy level and less exertional dyspnea. He does report stool becoming dark-colored after he started oral iron.  He reports no syncope.     Laboratory study on 09/22/2022 reported improved hemoglobin 8.5 and normalized MCV 85.0, stable MCHC 29.4. Maintains normal platelets and WBC.     I saw him originally on 9/22/2022 for initial evaluation for his sigmoid colon cancer and iron deficiency anemia.  Since that time patient had multiple imaging studies including CT for chest abdomen pelvis, subsequently with MRI for the abdomen for staging purposes.  He was also seen by Dr. Samaniego who performed right hemicolectomy on 10/11/2022.    Dr. Samaniego called me on the day of surgery, he also suspected this liver lesion is metastatic, however unable to reach that during the surgery.    Patient notes since surgery on 10/11/2022 of the bowel resection he is recovering \"okay\". The patient does report some abdominal pain when adrianna his stomach, specifically when getting out of bed. The patient denies any issues with his appetite, and is having normal urination and bowel movements. The patient denies constipation.  Patient reports no fever sweating or chills.    The patient has type II diabetes, which he manages with metformin. 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    Patient reports he is able to tolerate oral iron twice a day with no specific complaints.  No nausea vomiting or constipation.      Laboratory study today on 11/2/2022 showed improved anemia with Hb 10.9, normalized MCV 92.5.  His normal platelets 159,000 WBC 6870 including ANC 4580.      PET scan examination on 11/14/2022 " reported distal small 1.3 cm subtle low attenuation lesion in the medial hepatic segment with SUV 6.3.  There is no hypermetabolic enthesopathy in the abdomen or pelvis.  No suspicious hypermetabolic activity in the chest or neck.    Laboratory study on 11/15/2022 showed improved hemoglobin 12.1, normal platelets 156,000 and WBC 5720 including ANC 3750 and lymphocytes 1070.  Chemistry study reported glucose 156 otherwise unremarkable CMP.    Patient reports he developed a mild oral mucositis lasted about 4 days after the chemotherapy and now has resolved. He believes he has lost some weight, but states he does have an appetite and eats everyday.  He denies nausea vomiting.  He denies having diarrhea or constipation. He denies having any fever, chills, or sweating.  Denies peripheral neuropathy.     Laboratory results from on 11/29/2022 are; white cell count is 3950. neutrophils are 2340. Hemoglobin is 11.7 g/dL. Platelets are 108,000. Chemistry lab was unremarkable except glucose 255.    Laboratory studies on 3/21/2023 reported pancytopenia, with platelets 100,000, hemoglobin 10.0 .0, in the WBC 3130 including mild neutropenia with ANC 1610.     On 3/21/2023, we continue to hold his Avastin due to a tooth extraction on 3/13/2023.  He developed mild neutropenia with ANC 1610 so we gave the patient Levaquin daily for 7 days for prophylaxis.  Fortunately did not have infection.    Patient reports reasonable tolerance.  No specific complaints.    On 4/4/2023 patient has improved neutrophils 1870, and WBC 3270.  Continues to have anemia stable Hb 10.2, and platelets 96,000.  He reports no spontaneous bleeding or bruising.    The PET scan obtained 4/25/2023 reported resolution of the hypermetabolic subcapsular and medial hepatic segment lesion. However, there were developments of a few mildly hypermetabolic lymph nodes, one of them to the right subcarinal area with a maximum SUV 3.5, previously photopenic. There is  a 9 x 6 mm right supraclavicular node with a maximum SUV 2.9, previous 3 mm and photopenic. There is also a 10 mm x 7 mm right paratracheal node stable in size, but is slightly hypermetabolic.    Lab study today on 5/1/2023 reported moderate thrombocytopenia platelets 86,000, hemoglobin 10.8, .3, and WBC 4590 including neutrophils 3200.    He was last seen on 5/1/2023 , he was referred to Dr. Griffin Tilley at the [facility] for evaluation of metastatic solitary liver lesion. Patient was seen by Dr. Tilley on 5/15/2023 for evaluation and after discussion, patient decided not to pursue surgical intervention instead of getting images studies periodically.     This patient had a PET scan examination on 7/31/2023 and is to review the results. The study the PET study reported no significant changes of a small 9 mm x 7 mm superior right paratracheal node and this noted is photopenic with a maximum SUV one point of five. Previously slightly hypermetabolic right subcarinal lymph node appears smaller and also now photopenic. There has been resolution of the hypermetabolic sub centimeter right supraclavicular lymph nodes. There was no hypermetabolic lymphadenopathy in the neck, supraclavicular, or chest. No suspicious metabolic hypermetabolic activity within the abdomen and specifically the liver, and also no hypermetabolic lymphadenopathy in the abdomen and the pelvis.    Laboratory study 7/31/2023 reported mild anemia with a hemoglobin 11.9, MCV 2.865, and correction hemoglobin 11.09, .01, MCHC 31.9, and platelets 132,000, WBC 4600 including neutrophils 2650. Normal CEA 2.044 and normal iron studies with a ferritin 140 ng/mL and iron saturation 35% with a free iron 124, TIBC 358. Chemistry lab reported unremarkable CMP.      The CT scan examination on 10/12/2023 reported no evidence of disease recurrence.  There were postsurgical changes with the right hemicolectomy.  There is a right renal cyst,  technically indeterminate. Otherwise, the liver, spleen, adrenal glands, left kidney, pancreas, stomach, small bowels, urinary bladder, and abnormal vasculature were normal. There was no enlarged lymphadenopathy in the abdomen and pelvis and no bone lesions.    The Guardant Reveal test was obtained on 10/12/2023 and reported back on 10/25/2023 as negative for ctDNA.    Other laboratory study on 10/12/2023 reported persistent but stable anemia with hemoglobin of 11.9, an MCV of 108.0, an MCHC of 31.3, WBC of 4720, neutrophils of 2900, lymphocytes of 1000, and platelets of 132,000. Iron studies showed ferritin of 69.1, free iron of 108, TIBC of 361, and iron saturation of 30%. The chemistry lab reported glucose 120, sodium 135, and otherwise unremarkable CMP.          MEDICATIONS:    Current Outpatient Medications:     acetaminophen (TYLENOL) 500 MG tablet, Take 1 tablet by mouth Every 6 (Six) Hours As Needed for Mild Pain., Disp: , Rfl:     aspirin 81 MG EC tablet, Take 1 tablet by mouth Daily. INSTRUCTED PT TO FOLLOW MD INSTRUCTIONS REGARDING HOLDING FOR SURGERY/PT STATED TO HOLD 5 DAYS PRIOR TO SURGERY, Disp: , Rfl:     atorvastatin (LIPITOR) 40 MG tablet, Take 1 tablet by mouth Daily., Disp: 90 tablet, Rfl: 1    ferrous sulfate 325 (65 FE) MG tablet, Take 1 tablet by mouth Daily With Breakfast., Disp: 90 tablet, Rfl: 3    ibuprofen (ADVIL,MOTRIN) 600 MG tablet, Take  by mouth As Needed. Was for dental, Disp: , Rfl:     lisinopril-hydrochlorothiazide (PRINZIDE,ZESTORETIC) 20-12.5 MG per tablet, Take 1 tablet by mouth Every Night., Disp: 90 tablet, Rfl: 1    metFORMIN (GLUCOPHAGE) 500 MG tablet, Take 1 tablet by mouth 2 (Two) Times a Day With Meals., Disp: 180 tablet, Rfl: 1    tadalafil (CIALIS) 5 MG tablet, Take 1 tablet by mouth Daily As Needed for Erectile Dysfunction., Disp: 30 tablet, Rfl: 2    ALLERGIES:   No Known Allergies    SOCIAL HISTORY:       Social History     Socioeconomic History    Marital status:  "     Spouse name: Chelsie    Years of education: 12   Tobacco Use    Smoking status: Some Days     Types: Cigars    Smokeless tobacco: Never    Tobacco comments:     OCCASIONALLY   Vaping Use    Vaping Use: Never used   Substance and Sexual Activity    Alcohol use: Yes     Alcohol/week: 7.0 standard drinks of alcohol     Types: 1 Glasses of wine, 6 Cans of beer per week     Comment: sometimes in warm weather, wine sometimes in the winter    Drug use: No    Sexual activity: Yes     Partners: Female     Birth control/protection: Surgical         FAMILY HISTORY:  Family History   Problem Relation Age of Onset    Clotting disorder Other     Colon cancer Paternal Grandmother     Colon cancer Paternal Grandfather     Clotting disorder Father     Malig Hyperthermia Neg Hx          Vitals:    01/30/24 1135   BP: 126/75   Pulse: 66   Resp: 18   Temp: 99.4 °F (37.4 °C)   TempSrc: Temporal   SpO2: 96%   Weight: 100 kg (221 lb)   Height: 175.3 cm (69.02\")   PainSc: 0-No pain         1/30/2024    11:37 AM   Current Status   ECOG score 0     Physical Exam 1/30/2024.  GENERAL:  Well-developed, well-nourished in no acute distress.  Orientated to time place and the people.  SKIN:  Warm, dry without rashes, purpura or petechiae.  HEENT:  Normocephalic.   LYMPHATICS:  No cervical, supraclavicular adenopathy.  CHEST: Normal respiratory effort.  Lungs clear to auscultation. Good airflow.  CARDIAC:  Regular rate and rhythm without murmurs. Normal S1,S2.  ABDOMEN:  Soft, nontender with no organomegaly or masses.  Bowel sounds normal.  EXTREMITIES:  No lower extremity edema.          RECENT LABS:  Lab Results   Component Value Date    WBC 6.00 01/09/2024    HGB 13.3 01/09/2024    HCT 39.5 01/09/2024    .3 (H) 01/09/2024     01/09/2024     Lab Results   Component Value Date    NEUTROABS 3.89 01/09/2024     Lab Results   Component Value Date    GLUCOSE 169 (H) 01/09/2024    BUN 20 01/09/2024    CREATININE 1.08 01/09/2024 "    EGFRRESULT 71.0 09/08/2022    EGFR 72.9 01/09/2024    BCR 18.5 01/09/2024    K 4.8 01/09/2024    CO2 27.7 01/09/2024    CALCIUM 9.4 01/09/2024    PROTENTOTREF 7.2 08/24/2022    ALBUMIN 4.2 01/09/2024    BILITOT 1.4 (H) 01/09/2024    AST 37 01/09/2024    ALT 49 (H) 01/09/2024       Lab Results   Component Value Date    OTLDMYJU76 658 04/04/2023     Lab Results   Component Value Date    FOLATE >20.00 04/04/2023     Lab Results   Component Value Date    CEA 1.93 01/09/2024     Lab Results   Component Value Date    IRON 111 01/09/2024    TIBC 347 01/09/2024    FERRITIN 245.00 01/09/2024   Iron saturation 30% on 10/12/2023       IMAGING STUDY:  CT Abdomen Pelvis With Contrast  Narrative: EXAMINATION: CT OF THE ABDOMEN AND PELVIS WITH CONTRAST     TECHNIQUE: Computed tomography of the abdomen and pelvis after the  uneventful administration of nonionic intravenous contrast per protocol.  Radiation dose reduction techniques were utilized, including automated  exposure control and exposure modulation based on body size.     HISTORY: Ascending colon cancer     COMPARISON: PET/CT of 7/31/2023     FINDINGS: Limited evaluation of the inferior thorax demonstrates old  granulomatous disease and atelectasis, without consolidation, pleural  effusion, or pneumothorax. The heart is normal in size and  configuration, without pericardial effusion. Coronary calcifications are  seen. A punctate left lower lobe nodule on series 2, image 5 is stable,  too small to accurately measure.     There is cholelithiasis. The low-attenuation right renal lesion is  technically indeterminate, likely a cyst. Postsurgical changes are seen  of right hemicolectomy, without mass seen in the resection bed.     The liver, spleen, adrenal glands, left kidney, pancreas, stomach, small  bowel, urinary bladder, and abdominal vasculature are normal. No  intraperitoneal fluid collection or free gas are seen. No enlarged lymph  nodes are demonstrated.     Bone  windows demonstrate degenerative changes and bilateral hip  arthroplasties, without suspicious osseous lesion seen. There is lumbar  spinal fixation instrumentation.     Impression: Incidental findings as above, without evidence of residual  or recurrent disease.     This report was finalized on 10/13/2023 4:36 PM by Dr. Joss Day M.D  on Workstation: [id number]           Assessment & Plan     ASSESSMENT:    1.  Colon cancer post right hemicolectomy 10/11/2022.  Stage IV ([id number]).  Had positive stool occult blood test on 09/09/2022. Colonoscopic examination on 09/20/2022 by Dr. Anil Garces reported cecum colon mass.  Biopsy confirmed moderately differentiated adenocarcinoma.  MSI stable.  Mildly elevated CEA level 10.7 ng/mL on 9/22/2022.  CT scan for chest abdomen pelvis 9/26/2022 reported cecal mass.  There was also suspicious 9 mm hepatic lesion.  Abdomen MRI examination with and without contrast on 10/7/2022 confirmed 8 mm lesion hypervascularity in the segment 5 of the liver.  Patient had right hemicolectomy 10/11/2022.  Unable to biopsy at this time he liver lesion.  Pathology evaluation reported [id number] disease, this will be at least a stage IIIb colon cancer.  On 11/2/2022 I discussed with the patient, his wife and his daughter, recommending further imaging study with PET scan for assessment.  If patient is no hypermetabolic liver lesion, will treat with adjuvant FOLFOX 6 regimen every 2 weeks for 12 cycles.  However, if he has hypermetabolic lesion in the liver, we will treat him as metastatic disease, with FOLFOX plus Avastin.  Since patient has neuropathy already being his legs and feet, if he is indeed metastatic disease, we may switch him to irinotecan instead of oxaliplatin because of the neuropathy.  If indicated patient has metastatic disease, we would also entertain metastectomy after finishing 12 cycles of chemotherapy.    PATHOLOGY: Tumor sample for [facility] NGS study reported positive for  K-aashish mutation exon2 p.G13D, negative for HER2/dorie by IHC 0 and no amplification by FISH.  MSI stable by DNA sequencing, and also MMR proficient by IHC staining.  Tumor mutation burden is low 8 mut/Mb.  Patient was positive for PTEN 1+, 100% by IHC, PD-L1 was negative, only 1% by IHC.  Patient also positive for APC mutation exon 16p.[id number], positive for AMACR1 exon2 p.R358*.  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    Discussed with the patient and family members about potential side effects including bone marrow suppression, with anemia thrombocytopenia and leukocytopenia increased risk for infection, and also peripheral neuropathy, etc. patient is agreeable to proceed ahead with treatment.  The patient has PET scan examination on 11/14/2022 which reported a small 1.3 cm focus with elevated SUV 6.3, highly suspicious for metastatic disease. The patient now has stage IV. The patient had [facility] NGS study which reported K-aashish mutation, tumor mutation burden < 10, MSI was stable and anti-PD-L1 negative.  Not a candidate for anti-EGFR monoclonal antibody such as Vectibix, or chekpoint inhibitor immunotherapy.  We discussed the adding anti-VEGF monoclonal antibody, bevacizumab/Avastin starting in 2 weeks so that it would be after 6 weeks of primary surgery for the colon cancer resection.  We discussed this is now stage IV disease, however because only having 1 solitary metastatic lesion in the liver, it is potentially curable so we will be as aggressive as possible for chemotherapy.  If he has good response, in the future he will need metastectomy of the liver lesion.  On 11/29/2022 patient is presented for cycle #2 of chemotherapy. Patient tolerated therapy well except mild oral mucositis. However, laboratory studies showed significant decrease in platelets at only 108,000, as well as also decreasing WBC and hemoglobin. Discussed with the patient today if we do not carry out any dose reduction, he will likely  become more thrombocytopenic next time in 2 weeks and we will not be able to do cycle #3 chemotherapy on time. I discussed with the patient for dose reduction and to avoid potential delay of chemotherapy and he is agreeable. We will have 25% dose reduction for 5-FU leucovorin and oxaliplatin.  On 11/29/2022 he was started the first dose of Avastin/bevacizumab in conjunction with chemotherapy cycle #2.  On 12/13/2022, the patient presented for reevaluation prior to chemotherapy cycle #3. He has stable platelets of 108,000 and slightly improved WBC of 5100 and hemoglobin 12.8 g/dL. He will continue cycle 3 with the same 25% dose reduction.  1/10/2023 due for cycle 5 FOLFOX bevacizumab, overall is tolerating fairly well.  Hemoglobin 11.7, platelet count stable at 108,000, WBC 4.02, ANC 2.27.  Patient has had five cycles of chemotherapy and a CT scan examination obtained on 01/20/2023. The CT scan reported favorable response as the lesion is less obvious.   1/24/2023 recommended to continue chemotherapy for a total of 12 cycles. Then obtain PET scan examination. Due to worsening thrombocytopenia, and also peripheral neuropathy, for cycle #6, I will decrease oxaliplatin by another 25% so would it be 50% of the original dose. I will keep the same dose of 5-FU which is 75% of the original dose.  Full dose Avastin.   2/7/2023 due for cycle 7.  Platelet count is holding steady at 100,000.  We will proceed with treatment today with same doses as given on cycle 6.   2/21/2023 due for cycle #8 chemotherapy. We will repeat every 2 weeks.  3/7/2023 proceed with cycle #9 chemotherapy and hold Avastin for dental procedure on 3/13/2023.  On 3/21/2023 we will proceed ahead with cycle #10 chemotherapy FOLFOX6 regimen.  Continue to hold Avastin.  On 4/4/2023 patient presented for evaluation prior to cycle #11 FOLFOX 6 regimen.  Continue to hold Avastin.  This will be resumed from next cycle.   The patient presented 4/18/2023 for  "reevaluation prior to his last cycle #12 FOLFOX plus Avastin. The patient reports tolerating well. We will proceed with cycle12 today and resume Avastin.  I recommended having a PET scan examination for assessment of response, especially the small metastatic liver lesion which was only found on the previous PET scan.  Patient had a PET scan examination on 04/25/2023, which reported complete resolution of the solitary hypermetabolic lesion in the liver. He had a mildly active small lymph node, peritracheal, subcarinal with low activity. Etiology not clear.   On 05/01/2023, I reviewed the PET scan images with the patient and his wife, and two daughters. I recommended to refer the patient to [facility], surgical oncology, Dr. Griffin Tilley, for evaluation to see if he would be a candidate for surgical resection of the liver segment where he had metastatic disease, which now has resolution of hypermetabolic activity after chemotherapy.  Patient was seen by Dr. Tilley on 5/15/2023. After lengthy discussion, patient opted to not having surgical intervention currently. He would rather to have serial images studies.   Patient had a PET scan on 7/29/2023, which reported no evidence for recurrent disease.   I discussed with the patient and his wife on 8/8/2023, recommended CT scan for abdomen and pelvis with i.v. contrast in 3 months, together with lab study \"Guardant Reveal\" for ctDNA together with routine labs, CBC, CMP, CEA level, for assessment of colon cancer.  The patient had laboratory studies on 10/12/2023, which reported negative for Guardant Reveal, ctDNA 0%. The patient also had normal CEA and liver function panel. The CT scan for the abdomen and pelvis was no evidence of disease recurrence.   Patient had laboratory studies on 01/09/2024 which reported negative for Guardant Reveal, ctDNA 0%. Other studies reported normal CEA at 1.93 ng/mL, marginally elevated ALT at 49 and total bilirubin 1.4 but " normal AST at 37, alkaline phosphatase 62. No evidence for disease recurrence. I do recommend to have repeat laboratory studies same as above together with CT scan for abdomen and pelvis with IV contrast.  Patient is also overdue for colonoscopy examination 1 year post that he is colon surgery.     2. Iron deficiency anemia.  Subsequently anemia also caused by chemotherapy.  The patient has iron deficiency due to GI bleeding from his colon cancer. His laboratory study on 09/08/2022 reported ferritin 10.7 and iron saturation 4% with microcytic hypochromic anemia, hemoglobin 7.8, MCV 79.9 and MCHC 29.2.   Patient reports good tolerance to oral iron treatment and already has improved energy level. Laboratory study on 9/22/2022 did report slightly improved hemoglobin at 8.5 but normalized MCV 85.0. I think he is responding to oral iron treatment both physically and laboratory wise.  We advised patient to increase oral iron to twice a day.  On 11/2/2022 patient reports good tolerance to oral iron twice a day.  Labs today showed further improved hemoglobin 10.9.  He continues to maintain normal platelets and WBC.  Improved hemoglobin 12.1 on 11/15/2022.  Cycle #1 chemotherapy will be started.  On 11/29/2022 hemoglobin 11.7.  On 12/13/2022, his hemoglobin is 12.8 g/dL.  1/10/2023 hemoglobin 11.7.  The patient has trending down hemoglobin 11.0 on 1/24/2023, however, he is asymptomatic.  Hemoglobin 2/7/2023, 10.6.  Iron study reported ferritin 229 and iron saturation 28% with free iron 108 TIBC 386.  No evidence for iron deficiency.  So his anemia is now secondary to chemotherapy.     The patient has stable mild anemia with hemoglobin 11.0 on 3/7/2023. We will continue to monitor.  Continue to hold ferrous sulfate.  On 3/21/2023, stable anemia with hemoglobin 10.0 .0.  Continue to monitor.  On 4/4/2023 patient has stable anemia Hb 10.2, .9.  The patient has stable anemia, hemoglobin 10.8 today on 04/18/2023.  The patient reports no syncope.  On 05/01/2023, patient has a slightly improved hemoglobin 10.8. We will repeat iron studies in 3 months for reassessment.  Laboratory studies 7/31/2023 reported ferritin 140, free iron 124 and iron saturation 35%. He also had improved hemoglobin 11.9, .1. Discussed with him today 8/8/2023 and recommended monitoring.  The laboratory study on 10/12/2023 reported worsening ferritin by 50% to 69.1 ng/mL; however, maintains a reasonable iron saturation 30% with free iron 108. The patient also has persistent anemia with hemoglobin of 11.9. He has already been on oral vitamin B12 supplement. I asked the patient to start oral iron ferrous sulfate at 325 mg once a day. I discussed with him the possible side effects of constipation and stools becoming dark-colored constipation, nausea, and cramping, etc.    Iron deficiency anemia.  Laboratory studies on 01/09/2024 showed much improved hemoglobin 13.3 and also significantly improved ferritin 245, iron saturation 32% with free iron 111. I asked the patient to stop oral iron completely.        3.    Pancytopenia secondary to chemotherapy.    This patient had original iron deficiency, however now has ongoing anemia due to chemotherapy.  He also has thrombocytopenia and neutropenia from chemotherapy.  Normal neutrophil prior to starting chemotherapy.    This patient developed first mild neutropenia with ANC 1610 on 3/21/2023.  Patient reports no fever sweating chills.  I recommended starting patient on Levaquin daily for 7 days, for prophylaxis of neutropenic fever.  We will continue to monitor for now.  Expecting this will getting worse with ongoing chemotherapy.  Question the patient to call us if he develops fever sweating chills.   4/4/2023, patient has slightly improved neutrophils 1870, and WBC 3270.  Continue to monitor.  On 04/18/2023, the patient has neutrophils 2030, WBC 3480. Continue with chemotherapy, monitor CBC.  On 05/01/2023  patient has improved WBC 4,590, including neutrophils 3,200.  7/31/2023 patient has normal WBC 4600, including ANC 2650 lymphocytes 1340.  The laboratory study on 10/12/2023 reported persistent thrombocytopenia with platelet counts of 132,000, and low normal WBC of 4720, and neutrophil count of 2930.    Pancytopenia. Laboratory study on 01/09/2024 reported normalized platelets 164,000, improved WBC 6000, neutrophils 3890 and also improved hemoglobin at 13.3.     4.  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  Prior to starting chemotherapy patient had low normal platelets 156,000 on 11/15/2022.  Started on cycle #1 chemotherapy FOLFOX 6.  On 11/29/2022 patient had platelets of 108,000.  Patient reports no bleeding or bruising.  Discussed with the patient, because of foreseeable more severe thrombocytopenia, we recommended 25% dose reduction starting from cycle #2 chemotherapy.  On 12/13/2022, the patient has same decreased number of platelets 108,000. He will continue chemotherapy with same dose reduction of 25%.   1/10/2023 platelet count stable at 108,000.  On 01/24/2023, patient has worsening thrombocytopenia with platelets of 99,000 mcL. Discussed with the patient, we will cut oxaliplatin by another 25% to be at 50% of original dose. We will leave the 5-FU dose same as the previous at 75% of original dose. Avastin will be the same dose.  2/7/2023 platelet count 100,000.    On 02/21/2023 the patient has stable thrombocytopenia with platelets 102,000. The patient reports no bleeding or bruising. Continue to monitor.   On 3/7/2023 the patient has stable thrombocytopenia with platelets 103,000. The patient reports no bleeding or bruising. Continue to monitor.   On 3/21/2023 persistent stable thrombocytopenia with platelets 100,000.   On 4/4/2023 platelets 96,000.  We will go ahead and to proceed with chemotherapy.   Today on 04/18/2023, platelets 91,000. We will proceed ahead with the chemotherapy today. This is  cycle 12 of chemotherapy. We will reassess his treatment after PET scan examination.  On 05/01/2023, patient has a persistent thrombocytopenia with platelets of 86,000. Patient is asymptomatic.  On 7/31/2023 improved platelets 132,000.  On 10/12/2023, stable thrombocytopenia with 132,000 platelets.    5. Peripheral neuropathy  On 12/13/2022, he reports cold sensitivity in his mouth when he drinks cold water. The cold sensitivity usually dissipates after 3 to 4 days.  Patient reports he is more significant cold sensitivities lasting for more than a week. He does have moderate tingling, numbness involving the fingers, but not much as the toes.  2/7/2023 he feels like the neuropathy/cold sensitivity is lingering a little bit longer with each cycle.    On 4/4/2023 the patient reports stable mild peripheral neuropathy/cold sensitivity.    On 04/18/2023, patient reports some mild numbness and tingling involving both feet and hands, but this is only last for a few days and then resolves. We will continue dose reduced oxaliplatin 50%.  On 05/01/2023, patient continues to have peripheral neuropathy intermittent and mild overall. Continue to monitor.  Stable peripheral neuropathy today.   Peripheral neuropathy. Patient reports today on 01/30/2024 has stable mild peripheral neuropathy.       6.  Weight losses.    On 12/13/2022, the patient reports he has lost weight in the past couple of months. He reports a poor appetite for 3 days after chemotherapy and a poor taste in his mouth. His appetite has since improved. He only had mild nausea, but no vomiting. He was encouraged to use Ensure or Boost to improve nutrition supplement. He already started using protein powder.  3/7/2023, patient's weight continues to improve and he has gained a couple pounds.  His weight is stable at 211 pounds today.  He reports his appetite continues to improve.  He denies any nausea or vomiting.   On 04/18/2023, patient weighs with 209  pounds.  8/18/2023 patient weighs about 213 pounds  10/31/2023, the patient has a stable weight of 213 pounds.    The patient has slightly improved weight at 221 pounds today on 01/30/2024.     7.  Laurel teeth extraction: Patient states that he needs to have his wisdom teeth pull out.  Reviewed with Dr. Rosario.  Patient will need to have this done on his off week of treatment.  We will likely hold bevacizumab 2 weeks prior to this procedure and 4 weeks following.  Patient states that this will likely not happen until March, but is going to contact his oral surgeon, Dr. Charlie Zazueta to try to go ahead and get an appointment.  Laurel tooth extraction done on 3/13/2023.    Continue to hold Avastin 4/4/2023.  We will resume in 2 weeks for cycle #12 chemotherapy.    On 04/18/2023, the patient reports his gum has completely healed.  We will resume Avastin.    No specific complaints today.      8.  Low normal vitamin B12 level.    Patient had a marginal normal vitamin B12 level at 260 pg/mL on 08/24/2022.   Folate level on 9/22/2022 was normal at 15.5 ng/mL.    Continue oral vitamin B12 supplement at 1000 mcg daily.   Vitamin B12 was 658 pg/mL on 04/04/2023. This has improved. His macrocytosis is due to chemotherapy.  Patient continues taking oral vitamin B12.        PLAN:  1. Discontinue oral iron completely.   2. Continue port flush in 4 weeks and then repeat every 6 weeks afterwards.   3. Continue oral vitamin B12 at 1000 mcg daily.   4. This patient will need to repeat colonoscopic evaluation. We will make arrangements for patient to see his GI specialist Dr. Garces.   5. We will arrange the patient to have laboratory studies for Guardant Reveal, CBC, CMP, CEA, B12 level, iron studies in 3 months.   6. We will arrange for the patient to have CT scan for abdomen and pelvis with IV and oral contrast in 3 months for reevaluation.   7. Patient will see me 2 weeks after the above laboratory studies and CT scan for  reevaluation.    I discussed with the patient about laboratory results and further management plan.  Patient voiced understanding and agreeable.           Maya Rosario MD PhD        CC:  JULIA Santiago MD Richard Pokorny, MD   Oral surgeon: Dr. Charlie Zazueta Phone [phone]  Griffin Tilley M.D.

## 2024-01-31 ENCOUNTER — TELEPHONE (OUTPATIENT)
Dept: ONCOLOGY | Facility: CLINIC | Age: 74
End: 2024-01-31
Payer: MEDICARE

## 2024-01-31 DIAGNOSIS — C78.7 SECONDARY LIVER CANCER: ICD-10-CM

## 2024-01-31 DIAGNOSIS — C18.2 MALIGNANT NEOPLASM OF ASCENDING COLON: Primary | ICD-10-CM

## 2024-01-31 NOTE — TELEPHONE ENCOUNTER
Patient returning call from Dr Rosario on 1/30/2024. Per Dr Rosario patient needs a colonoscopy 1 year after surgery. If patient has not had a colonoscopy patient needs to be referred back to Dr Garces for appontment. Patient states he has not had a colonoscopy post-surgery. Referral palced to Dr Garces.    Patient v/u

## 2024-02-12 DIAGNOSIS — I10 BENIGN ESSENTIAL HYPERTENSION: Chronic | ICD-10-CM

## 2024-02-12 DIAGNOSIS — E11.9 TYPE 2 DIABETES MELLITUS WITHOUT COMPLICATION, WITHOUT LONG-TERM CURRENT USE OF INSULIN: Chronic | ICD-10-CM

## 2024-02-12 DIAGNOSIS — E78.00 HYPERCHOLESTEROLEMIA: Chronic | ICD-10-CM

## 2024-02-12 RX ORDER — LISINOPRIL AND HYDROCHLOROTHIAZIDE 20; 12.5 MG/1; MG/1
1 TABLET ORAL NIGHTLY
Qty: 90 TABLET | Refills: 3 | Status: SHIPPED | OUTPATIENT
Start: 2024-02-12

## 2024-02-12 RX ORDER — ATORVASTATIN CALCIUM 40 MG/1
40 TABLET, FILM COATED ORAL DAILY
Qty: 90 TABLET | Refills: 3 | Status: SHIPPED | OUTPATIENT
Start: 2024-02-12

## 2024-02-27 ENCOUNTER — INFUSION (OUTPATIENT)
Dept: ONCOLOGY | Facility: HOSPITAL | Age: 74
End: 2024-02-27
Payer: MEDICARE

## 2024-02-27 DIAGNOSIS — Z45.2 FITTING AND ADJUSTMENT OF VASCULAR CATHETER: Primary | ICD-10-CM

## 2024-02-27 PROCEDURE — 96523 IRRIG DRUG DELIVERY DEVICE: CPT

## 2024-02-27 PROCEDURE — 25010000002 HEPARIN LOCK FLUSH PER 10 UNITS: Performed by: INTERNAL MEDICINE

## 2024-02-27 RX ORDER — HEPARIN SODIUM (PORCINE) LOCK FLUSH IV SOLN 100 UNIT/ML 100 UNIT/ML
500 SOLUTION INTRAVENOUS AS NEEDED
Status: DISCONTINUED | OUTPATIENT
Start: 2024-02-27 | End: 2024-02-27 | Stop reason: HOSPADM

## 2024-02-27 RX ORDER — SODIUM CHLORIDE 0.9 % (FLUSH) 0.9 %
10 SYRINGE (ML) INJECTION AS NEEDED
Status: DISCONTINUED | OUTPATIENT
Start: 2024-02-27 | End: 2024-02-27 | Stop reason: HOSPADM

## 2024-02-27 RX ORDER — HEPARIN SODIUM (PORCINE) LOCK FLUSH IV SOLN 100 UNIT/ML 100 UNIT/ML
500 SOLUTION INTRAVENOUS AS NEEDED
OUTPATIENT
Start: 2024-02-27

## 2024-02-27 RX ORDER — SODIUM CHLORIDE 0.9 % (FLUSH) 0.9 %
10 SYRINGE (ML) INJECTION AS NEEDED
OUTPATIENT
Start: 2024-02-27

## 2024-02-27 RX ADMIN — Medication 10 ML: at 08:31

## 2024-02-27 RX ADMIN — Medication 500 UNITS: at 08:31

## 2024-03-06 ENCOUNTER — ANESTHESIA EVENT (OUTPATIENT)
Dept: GASTROENTEROLOGY | Facility: HOSPITAL | Age: 74
End: 2024-03-06
Payer: MEDICARE

## 2024-03-06 ENCOUNTER — ANESTHESIA (OUTPATIENT)
Dept: GASTROENTEROLOGY | Facility: HOSPITAL | Age: 74
End: 2024-03-06
Payer: MEDICARE

## 2024-03-06 ENCOUNTER — HOSPITAL ENCOUNTER (OUTPATIENT)
Facility: HOSPITAL | Age: 74
Setting detail: HOSPITAL OUTPATIENT SURGERY
Discharge: HOME | End: 2024-03-06
Attending: INTERNAL MEDICINE | Admitting: INTERNAL MEDICINE
Payer: MEDICARE

## 2024-03-06 ENCOUNTER — ON CAMPUS - OUTPATIENT (OUTPATIENT)
Dept: URBAN - METROPOLITAN AREA HOSPITAL 114 | Facility: HOSPITAL | Age: 74
End: 2024-03-06
Payer: MEDICARE

## 2024-03-06 VITALS
HEIGHT: 70 IN | HEART RATE: 48 BPM | DIASTOLIC BLOOD PRESSURE: 76 MMHG | WEIGHT: 212 LBS | OXYGEN SATURATION: 96 % | BODY MASS INDEX: 30.35 KG/M2 | RESPIRATION RATE: 12 BRPM | SYSTOLIC BLOOD PRESSURE: 140 MMHG

## 2024-03-06 DIAGNOSIS — Z08 ENCOUNTER FOR FOLLOW-UP EXAMINATION AFTER COMPLETED TREATMEN: ICD-10-CM

## 2024-03-06 DIAGNOSIS — Z85.038 PERSONAL HISTORY OF OTHER MALIGNANT NEOPLASM OF LARGE INTEST: ICD-10-CM

## 2024-03-06 DIAGNOSIS — Q43.8 OTHER SPECIFIED CONGENITAL MALFORMATIONS OF INTESTINE: ICD-10-CM

## 2024-03-06 DIAGNOSIS — Z98.0 INTESTINAL BYPASS AND ANASTOMOSIS STATUS: ICD-10-CM

## 2024-03-06 LAB — GLUCOSE BLDC GLUCOMTR-MCNC: 114 MG/DL (ref 70–130)

## 2024-03-06 PROCEDURE — 82948 REAGENT STRIP/BLOOD GLUCOSE: CPT

## 2024-03-06 PROCEDURE — 25810000003 LACTATED RINGERS PER 1000 ML: Performed by: INTERNAL MEDICINE

## 2024-03-06 PROCEDURE — 25010000002 PROPOFOL 10 MG/ML EMULSION

## 2024-03-06 PROCEDURE — 45378 DIAGNOSTIC COLONOSCOPY: CPT | Mod: PT | Performed by: INTERNAL MEDICINE

## 2024-03-06 RX ORDER — SODIUM CHLORIDE, SODIUM LACTATE, POTASSIUM CHLORIDE, CALCIUM CHLORIDE 600; 310; 30; 20 MG/100ML; MG/100ML; MG/100ML; MG/100ML
30 INJECTION, SOLUTION INTRAVENOUS CONTINUOUS PRN
Status: DISCONTINUED | OUTPATIENT
Start: 2024-03-06 | End: 2024-03-06 | Stop reason: HOSPADM

## 2024-03-06 RX ORDER — PROPOFOL 10 MG/ML
VIAL (ML) INTRAVENOUS CONTINUOUS PRN
Status: DISCONTINUED | OUTPATIENT
Start: 2024-03-06 | End: 2024-03-06 | Stop reason: SURG

## 2024-03-06 RX ORDER — LIDOCAINE HYDROCHLORIDE 20 MG/ML
INJECTION, SOLUTION INFILTRATION; PERINEURAL AS NEEDED
Status: DISCONTINUED | OUTPATIENT
Start: 2024-03-06 | End: 2024-03-06 | Stop reason: SURG

## 2024-03-06 RX ADMIN — SODIUM CHLORIDE, POTASSIUM CHLORIDE, SODIUM LACTATE AND CALCIUM CHLORIDE 30 ML/HR: 600; 310; 30; 20 INJECTION, SOLUTION INTRAVENOUS at 07:30

## 2024-03-06 RX ADMIN — LIDOCAINE HYDROCHLORIDE 100 MG: 20 INJECTION, SOLUTION INFILTRATION; PERINEURAL at 08:04

## 2024-03-06 RX ADMIN — PROPOFOL 160 MCG/KG/MIN: 10 INJECTION, EMULSION INTRAVENOUS at 08:05

## 2024-03-06 RX ADMIN — PROPOFOL 100 MG: 10 INJECTION, EMULSION INTRAVENOUS at 08:04

## 2024-03-06 NOTE — ANESTHESIA PREPROCEDURE EVALUATION
Anesthesia Evaluation     Patient summary reviewed and Nursing notes reviewed   history of anesthetic complications:  PONV               Airway   Mallampati: II  TM distance: >3 FB  Neck ROM: full  Dental      Pulmonary    (+) a smoker Current, COPD,  Cardiovascular     ECG reviewed  Rhythm: regular  Rate: normal    (+) hypertension, DVT, hyperlipidemia      Neuro/Psych- negative ROS  GI/Hepatic/Renal/Endo    (+) obesity, GI bleeding , liver disease, diabetes mellitus    Musculoskeletal     Abdominal    Substance History - negative use     OB/GYN negative ob/gyn ROS         Other   arthritis,   history of cancer                Anesthesia Plan    ASA 3     MAC     intravenous induction     Anesthetic plan, risks, benefits, and alternatives have been provided, discussed and informed consent has been obtained with: patient.    Plan discussed with CRNA.    CODE STATUS:

## 2024-03-06 NOTE — DISCHARGE INSTRUCTIONS
For the next 24 hours patient needs to be with a responsible adult.    For 24 hours DO NOT drive, operate machinery, appliances, drink alcohol, make important decisions or sign legal documents.    Start with a light or bland diet if you are feeling sick to your stomach otherwise advance to regular diet as tolerated.    Follow recommendations on procedure report if provided by your doctor.    Call Dr Garces for problems [phone].    Problems may include but not limited to: large amounts of bleeding, trouble breathing, repeated vomiting, severe unrelieved pain, fever or chills.

## 2024-03-06 NOTE — H&P
[facility]   HISTORY AND PHYSICAL    Patient Name: Taz Dickey  : 1950  MRN: [id number]  Primary Care Physician:  Aubrey Bro APRN  Date of admission: 3/6/2024    Subjective   Subjective     Chief Complaint: personal history of colon cancer     History of Present Illness  The patient presents with no gastrointestinal  Symptoms or issues at this time   Review of Systems   All other systems reviewed and are negative.       Personal History     Past Medical History:   Diagnosis Date    Abdominal hernia     Anemia     Arrhythmia     PT STATED DURING LAST COLONOSCOPY 2 WEEKS AGO    Arthritis     Chemotherapy-induced thrombocytopenia 2022    Colon cancer 2022    Colon polyp 2022    Colon polyps     Depression     DVT (deep venous thrombosis)     IN LEFT LEG    Erectile dysfunction of nonorganic origin 2016    Full dentures     GI (gastrointestinal bleed) 2022    Hip pain     RIGHT    Hyperlipidemia     Hypertension     Iron deficiency anemia 2022    Numbness and tingling     RIGHT FOOT    PONV (postoperative nausea and vomiting)     Right leg pain     Type 2 diabetes mellitus without complication, without long-term current use of insulin 10/08/2019       Past Surgical History:   Procedure Laterality Date    CERVICAL DISCECTOMY LAMINECTOMY DECOMPRESSION POSTERIOR FUSION WITH INSTRUMENTATION      COLON RESECTION N/A 10/11/2022    Procedure: LAPAROSCOPIC RIGHT COLON RESECTION;  Surgeon: Ga Samaniego MD;  Location: [facility] MAIN OR;  Service: General;  Laterality: N/A;    COLONOSCOPY N/A 2018    Procedure: COLONOSCOPY to TI and cecum with polypectomy;  Surgeon: Anil Garces MD;  Location: [facility] ENDOSCOPY;  Service:     COLONOSCOPY  2018 and 2022    found cancer     KNEE ARTHROSCOPY Left     LUMBAR DISCECTOMY FUSION INSTRUMENTATION N/A 2020    Procedure: Lumbar 4 5 laminectomy with a posterior lateral fusion and  instrumentation and interbody fusion;  Surgeon: Jeffrey Garcia MD;  Location: [facility] OR;  Service: Neurosurgery;  Laterality: N/A;    SPINE SURGERY  December 2021    L4 and L5    TOTAL HIP ARTHROPLASTY Right 06/03/2021    Procedure: TOTAL HIP ARTHROPLASTY POSTERIOR;  Surgeon: Shlomo Campos MD;  Location: [facility] OR;  Service: Orthopedics;  Laterality: Right;    VASECTOMY      VENOUS ACCESS DEVICE (PORT) INSERTION N/A 10/21/2022    Procedure: INSERTION VENOUS ACCESS DEVICE;  Surgeon: Ga Samaniego MD;  [place] [facility] [place] [facility]  Service: General;  Laterality: N/A;       Family History: family history includes Clotting disorder in his father and another family member; Colon cancer in his paternal grandfather and paternal grandmother. Otherwise pertinent FHx was reviewed and not pertinent to current issue.    Social History:  reports that he has been smoking cigars. He has never used smokeless tobacco. He reports current alcohol use of about 7.0 standard drinks of alcohol per week. He reports that he does not use drugs.    Home Medications:  acetaminophen, aspirin, atorvastatin, ferrous sulfate, ibuprofen, lisinopril-hydrochlorothiazide, metFORMIN, and tadalafil    Allergies:  No Known Allergies    Objective    Objective     Vitals:   Heart Rate:  [57] 57  Resp:  [16] 16  BP: (159)/(76) 159/76    Physical Exam  HENT:      Right Ear: External ear normal.      Left Ear: External ear normal.      Mouth/Throat:      Mouth: Mucous membranes are moist.   Eyes:      Pupils: Pupils are equal, round, and reactive to light.   Cardiovascular:      Rate and Rhythm: Normal rate.      Pulses: Normal pulses.   Pulmonary:      Effort: Pulmonary effort is normal.   Abdominal:      General: Abdomen is flat.   Skin:     General: Skin is warm.   Neurological:      General: No focal deficit present.      Mental Status: He is alert.   Psychiatric:         Mood and Affect: Mood normal.         Result Review    Result  Review:  I have personally reviewed the results from the time of this admission to 3/6/2024 08:06 EST and agree with these findings:  []  Laboratory list / accordion  []  Microbiology  []  Radiology  []  EKG/Telemetry   []  Cardiology/Vascular   []  Pathology  []  Old records  []  Other:  Most notable findings include:       Assessment & Plan   Assessment / Plan     Brief Patient Summary:  Taz Dickey is a 73 y.o. male who  Personal history of colon cancer     Active Hospital Problems:  There are no active hospital problems to display for this patient.    Plan: Colonoscopy risks, alternatives and benefits discussed with patient and the patient is agreeable to having procedure done.      DVT prophylaxis:  No DVT prophylaxis order currently exists.        CODE STATUS:       Admission Status:  I believe this patient meets outpatient  status.    Anil Garces MD

## 2024-03-06 NOTE — ANESTHESIA POSTPROCEDURE EVALUATION
Patient: Taz Dickey    Procedure Summary       Date: 03/06/24 Room / [facility] /  [facility]    Anesthesia Start: 0800 Anesthesia Stop: 0833    Procedure: COLONOSCOPY TO ANASTAMOSIS & T.I. Diagnosis:     Surgeons: Anil Garces MD Provider: Myles Abel MD    Anesthesia Type: MAC ASA Status: 3            Anesthesia Type: MAC    Vitals  Vitals Value Taken Time   /76 03/06/24 0854   Temp     Pulse 54 03/06/24 0857   Resp 12 03/06/24 0854   SpO2 95 % 03/06/24 0857   Vitals shown include unfiled device data.        Post Anesthesia Care and Evaluation    Patient location during evaluation: PACU  Patient participation: complete - patient participated  Level of consciousness: awake and alert  Pain management: adequate    Airway patency: patent  Anesthetic complications: No anesthetic complications    Cardiovascular status: acceptable  Respiratory status: acceptable  Hydration status: acceptable    Comments: --------------------            03/06/24               0854     --------------------   BP:       140/76     Pulse:    (!) 48     Resp:       12       SpO2:      96%      --------------------

## 2024-04-09 ENCOUNTER — INFUSION (OUTPATIENT)
Dept: ONCOLOGY | Facility: HOSPITAL | Age: 74
End: 2024-04-09
Payer: MEDICARE

## 2024-04-09 DIAGNOSIS — Z45.2 FITTING AND ADJUSTMENT OF VASCULAR CATHETER: Primary | ICD-10-CM

## 2024-04-09 PROCEDURE — 96523 IRRIG DRUG DELIVERY DEVICE: CPT

## 2024-04-09 PROCEDURE — 25010000002 HEPARIN LOCK FLUSH PER 10 UNITS: Performed by: INTERNAL MEDICINE

## 2024-04-09 RX ORDER — SODIUM CHLORIDE 0.9 % (FLUSH) 0.9 %
10 SYRINGE (ML) INJECTION AS NEEDED
Status: DISCONTINUED | OUTPATIENT
Start: 2024-04-09 | End: 2024-04-09 | Stop reason: HOSPADM

## 2024-04-09 RX ORDER — SODIUM CHLORIDE 0.9 % (FLUSH) 0.9 %
10 SYRINGE (ML) INJECTION AS NEEDED
OUTPATIENT
Start: 2024-04-09

## 2024-04-09 RX ORDER — HEPARIN SODIUM (PORCINE) LOCK FLUSH IV SOLN 100 UNIT/ML 100 UNIT/ML
500 SOLUTION INTRAVENOUS AS NEEDED
Status: DISCONTINUED | OUTPATIENT
Start: 2024-04-09 | End: 2024-04-09 | Stop reason: HOSPADM

## 2024-04-09 RX ORDER — HEPARIN SODIUM (PORCINE) LOCK FLUSH IV SOLN 100 UNIT/ML 100 UNIT/ML
500 SOLUTION INTRAVENOUS AS NEEDED
OUTPATIENT
Start: 2024-04-09

## 2024-04-09 RX ADMIN — Medication 10 ML: at 08:37

## 2024-04-09 RX ADMIN — Medication 500 UNITS: at 08:38

## 2024-04-16 ENCOUNTER — HOSPITAL ENCOUNTER (OUTPATIENT)
Dept: PET IMAGING | Facility: HOSPITAL | Age: 74
Discharge: HOME | End: 2024-04-16
Admitting: INTERNAL MEDICINE
Payer: MEDICARE

## 2024-04-16 ENCOUNTER — INFUSION (OUTPATIENT)
Dept: ONCOLOGY | Facility: HOSPITAL | Age: 74
End: 2024-04-16
Payer: MEDICARE

## 2024-04-16 DIAGNOSIS — C78.7 SECONDARY LIVER CANCER: ICD-10-CM

## 2024-04-16 DIAGNOSIS — C18.2 MALIGNANT NEOPLASM OF ASCENDING COLON: ICD-10-CM

## 2024-04-16 LAB — CREAT BLDA-MCNC: 1.5 MG/DL (ref 0.6–1.3)

## 2024-04-16 PROCEDURE — 82565 ASSAY OF CREATININE: CPT

## 2024-04-16 PROCEDURE — 74177 CT ABD & PELVIS W/CONTRAST: CPT

## 2024-04-16 PROCEDURE — 0 DIATRIZOATE MEGLUMINE & SODIUM PER 1 ML: Performed by: INTERNAL MEDICINE

## 2024-04-16 RX ADMIN — DIATRIZOATE MEGLUMINE AND DIATRIZOATE SODIUM 30 ML: 660; 100 LIQUID ORAL; RECTAL at 09:09

## 2024-04-30 ENCOUNTER — LAB (OUTPATIENT)
Dept: LAB | Facility: HOSPITAL | Age: 74
End: 2024-04-30
Payer: MEDICARE

## 2024-04-30 ENCOUNTER — OFFICE VISIT (OUTPATIENT)
Dept: ONCOLOGY | Facility: CLINIC | Age: 74
End: 2024-04-30
Payer: MEDICARE

## 2024-04-30 VITALS
DIASTOLIC BLOOD PRESSURE: 70 MMHG | OXYGEN SATURATION: 98 % | TEMPERATURE: 98.4 F | HEART RATE: 60 BPM | BODY MASS INDEX: 31.55 KG/M2 | WEIGHT: 213 LBS | HEIGHT: 69 IN | SYSTOLIC BLOOD PRESSURE: 130 MMHG

## 2024-04-30 DIAGNOSIS — C78.7 SECONDARY LIVER CANCER: Primary | ICD-10-CM

## 2024-04-30 DIAGNOSIS — E53.8 VITAMIN B12 DEFICIENCY: ICD-10-CM

## 2024-04-30 DIAGNOSIS — C18.2 MALIGNANT NEOPLASM OF ASCENDING COLON: ICD-10-CM

## 2024-04-30 DIAGNOSIS — D50.9 IRON DEFICIENCY ANEMIA, UNSPECIFIED IRON DEFICIENCY ANEMIA TYPE: ICD-10-CM

## 2024-04-30 DIAGNOSIS — C78.7 SECONDARY LIVER CANCER: ICD-10-CM

## 2024-04-30 LAB
ALBUMIN SERPL-MCNC: 4.3 G/DL (ref 3.5–5.2)
ALBUMIN/GLOB SERPL: 1.5 G/DL
ALP SERPL-CCNC: 59 U/L (ref 39–117)
ALT SERPL W P-5'-P-CCNC: 31 U/L (ref 1–41)
ANION GAP SERPL CALCULATED.3IONS-SCNC: 10.5 MMOL/L (ref 5–15)
AST SERPL-CCNC: 38 U/L (ref 1–40)
BASOPHILS # BLD AUTO: 0.06 10*3/MM3 (ref 0–0.2)
BASOPHILS NFR BLD AUTO: 1.1 % (ref 0–1.5)
BILIRUB SERPL-MCNC: 1.4 MG/DL (ref 0–1.2)
BUN SERPL-MCNC: 21 MG/DL (ref 8–23)
BUN/CREAT SERPL: 19.8 (ref 7–25)
CALCIUM SPEC-SCNC: 9.5 MG/DL (ref 8.6–10.5)
CEA SERPL-MCNC: 2.12 NG/ML
CHLORIDE SERPL-SCNC: 99 MMOL/L (ref 98–107)
CO2 SERPL-SCNC: 26.5 MMOL/L (ref 22–29)
CREAT SERPL-MCNC: 1.06 MG/DL (ref 0.76–1.27)
DEPRECATED RDW RBC AUTO: 53.8 FL (ref 37–54)
EGFRCR SERPLBLD CKD-EPI 2021: 74.6 ML/MIN/1.73
EOSINOPHIL # BLD AUTO: 0.29 10*3/MM3 (ref 0–0.4)
EOSINOPHIL NFR BLD AUTO: 5.2 % (ref 0.3–6.2)
ERYTHROCYTE [DISTWIDTH] IN BLOOD BY AUTOMATED COUNT: 14.2 % (ref 12.3–15.4)
FERRITIN SERPL-MCNC: 78.8 NG/ML (ref 30–400)
GLOBULIN UR ELPH-MCNC: 2.8 GM/DL
GLUCOSE SERPL-MCNC: 130 MG/DL (ref 65–99)
HCT VFR BLD AUTO: 38.7 % (ref 37.5–51)
HGB BLD-MCNC: 12.7 G/DL (ref 13–17.7)
IMM GRANULOCYTES # BLD AUTO: 0.02 10*3/MM3 (ref 0–0.05)
IMM GRANULOCYTES NFR BLD AUTO: 0.4 % (ref 0–0.5)
IRON 24H UR-MRATE: 89 MCG/DL (ref 59–158)
IRON SATN MFR SERPL: 23 % (ref 20–50)
LYMPHOCYTES # BLD AUTO: 1.26 10*3/MM3 (ref 0.7–3.1)
LYMPHOCYTES NFR BLD AUTO: 22.8 % (ref 19.6–45.3)
MCH RBC QN AUTO: 33.8 PG (ref 26.6–33)
MCHC RBC AUTO-ENTMCNC: 32.8 G/DL (ref 31.5–35.7)
MCV RBC AUTO: 102.9 FL (ref 79–97)
MONOCYTES # BLD AUTO: 0.57 10*3/MM3 (ref 0.1–0.9)
MONOCYTES NFR BLD AUTO: 10.3 % (ref 5–12)
NEUTROPHILS NFR BLD AUTO: 3.33 10*3/MM3 (ref 1.7–7)
NEUTROPHILS NFR BLD AUTO: 60.2 % (ref 42.7–76)
NRBC BLD AUTO-RTO: 0 /100 WBC (ref 0–0.2)
PLATELET # BLD AUTO: 141 10*3/MM3 (ref 140–450)
PMV BLD AUTO: 10.3 FL (ref 6–12)
POTASSIUM SERPL-SCNC: 5.1 MMOL/L (ref 3.5–5.2)
PROT SERPL-MCNC: 7.1 G/DL (ref 6–8.5)
RBC # BLD AUTO: 3.76 10*6/MM3 (ref 4.14–5.8)
SODIUM SERPL-SCNC: 136 MMOL/L (ref 136–145)
TIBC SERPL-MCNC: 389 MCG/DL (ref 298–536)
TRANSFERRIN SERPL-MCNC: 261 MG/DL (ref 200–360)
VIT B12 BLD-MCNC: 873 PG/ML (ref 211–946)
WBC NRBC COR # BLD AUTO: 5.53 10*3/MM3 (ref 3.4–10.8)

## 2024-04-30 PROCEDURE — 3075F SYST BP GE 130 - 139MM HG: CPT | Performed by: INTERNAL MEDICINE

## 2024-04-30 PROCEDURE — 1126F AMNT PAIN NOTED NONE PRSNT: CPT | Performed by: INTERNAL MEDICINE

## 2024-04-30 PROCEDURE — 82378 CARCINOEMBRYONIC ANTIGEN: CPT | Performed by: INTERNAL MEDICINE

## 2024-04-30 PROCEDURE — 84466 ASSAY OF TRANSFERRIN: CPT

## 2024-04-30 PROCEDURE — 83540 ASSAY OF IRON: CPT

## 2024-04-30 PROCEDURE — 82607 VITAMIN B-12: CPT | Performed by: INTERNAL MEDICINE

## 2024-04-30 PROCEDURE — 80053 COMPREHEN METABOLIC PANEL: CPT

## 2024-04-30 PROCEDURE — 82728 ASSAY OF FERRITIN: CPT

## 2024-04-30 PROCEDURE — 85025 COMPLETE CBC W/AUTO DIFF WBC: CPT

## 2024-04-30 PROCEDURE — 3078F DIAST BP <80 MM HG: CPT | Performed by: INTERNAL MEDICINE

## 2024-04-30 PROCEDURE — 99214 OFFICE O/P EST MOD 30 MIN: CPT | Performed by: INTERNAL MEDICINE

## 2024-04-30 PROCEDURE — 36415 COLL VENOUS BLD VENIPUNCTURE: CPT

## 2024-04-30 NOTE — PROGRESS NOTES
.     REASON FOR FOLLOWUP:     *Cecum colon cancer, status post right hemicolectomy performed on 10/11/2022, [id number].   CT scan examination on 9/26/2022 reported suspicious liver lesion 9 mm, otherwise no evidence for metastatic disease.    The patient underwent an abdominal MRI examination on 10/06/2022, which reported suspicious liver lesion 8mm.  Patient had sigmoidectomy on 10/11/2022.  Portacatheter placement on 10/21/2022.   PET scan examination on 11/14/2022 reported solitary hypermetabolic liver lesion.   Cycle #1 palliative chemotherapy FOLFOX 6 was started on 11/15/2022.   [facility] NGS study was positive for K-aashish mutation exon2 p.G13D.  Not a candidate for anti-EGFR monoclonal antibody treatment.   From cycle #2, bevacizumab/Avastin will be added to palliative chemotherapy.  Due to thrombocytopenia, all chemotherapy agents were 25% reduced from cycle #2.   1/24/203 further dose reduction of Oxaliplatin to 50% of original dose.  Cycle #12 chemotherapy was started on 4/18/2023.  *Iron deficiency anemia.  Oral iron treatment held on 2/21/2023.  Continue to monitor labs.                               HISTORY OF PRESENT ILLNESS:  The patient is a 73 y.o. 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  Laboratory study was not done 2 weeks ago.    Patient reports he is doing doing well.  He has been eating well.  No abdomen pain no nausea vomiting.  No melena hematochezia.  Has regular bowel movement.  Patient reports excellent performance status ECOG 0.    Patient stopped taking oral iron in January 2024 as we instructed.    Patient had a colonoscopy examination by Dr. Garces on 3/6/2024.  It was benign postsurgical changes, no sample collected.  Dr. Garces recommended repeating  colonoscopy in 5 years.    His CT scan for abdomen pelvis with IV contrast obtained 4/16/2024 reported no evidence for local disease recurrence or metastatic disease.    Laboratory studies today on 4/30/2024 reported worsening anemia Hb 12.7, and also worsening iron studies with deteriorated ferritin 78.8 ng/mL, and also trending down normal iron saturation 23% with free iron 89 TIBC 389.  Chemistry lab reported glucose 130, stable mild elevated total bilirubin 1.4 otherwise normal liver function panel, normal renal function and electrolytes.        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    Full dentures     GI (gastrointestinal bleed) September 8 2022    Hip pain     RIGHT    Hyperlipidemia     Hypertension     Iron deficiency anemia 09/22/2022    Numbness and tingling     RIGHT FOOT    PONV (postoperative nausea and vomiting)     Right leg pain     Type 2 diabetes mellitus without complication, without long-term current use of insulin 10/08/2019     Past Surgical History:   Procedure Laterality Date    CERVICAL DISCECTOMY LAMINECTOMY DECOMPRESSION POSTERIOR FUSION WITH INSTRUMENTATION      COLON RESECTION N/A 10/11/2022    Procedure: LAPAROSCOPIC RIGHT COLON RESECTION;  Surgeon: Ga Samaniego MD;  Location: [facility] MAIN OR;  Service: General;  Laterality: N/A;    COLONOSCOPY N/A 02/28/2018    Procedure: COLONOSCOPY to TI and cecum with polypectomy;  Surgeon: Anil Garces MD;  Location: [facility] ENDOSCOPY;  Service:     COLONOSCOPY  2/28/2018 and sept. 2022    found cancer 2022    COLONOSCOPY N/A 3/6/2024    Procedure: COLONOSCOPY TO ANASTAMOSIS & T.I.;   Surgeon: Anil Garces MD;  Location: [facility] ENDOSCOPY;  Service: Gastroenterology;  Laterality: N/A;  PRE- H/O COLON CANCER  POST-DIVERTICULI, NORMAL POST OP ANATOMY    KNEE ARTHROSCOPY Left     LUMBAR DISCECTOMY FUSION INSTRUMENTATION N/A 11/18/2020    Procedure: Lumbar 4 5 laminectomy with a posterior lateral fusion and instrumentation and interbody fusion;  Surgeon: Jeffrey Garcia MD;  Location: [facility] MAIN OR;  Service: Neurosurgery;  Laterality: N/A;    SPINE SURGERY  December 2021    L4 and L5    TOTAL HIP ARTHROPLASTY Right 06/03/2021    Procedure: TOTAL HIP ARTHROPLASTY POSTERIOR;  Surgeon: Shlomo Campos MD;  Location: [facility] MAIN OR;  Service: Orthopedics;  Laterality: Right;    VASECTOMY      VENOUS ACCESS DEVICE (PORT) INSERTION N/A 10/21/2022    Procedure: INSERTION VENOUS ACCESS DEVICE;  Surgeon: Ga Samaniego MD;  Location: [facility] OR OSC;  Service: General;  Laterality: N/A;     HEMATOLOGY/MEDICAL ONCOLOGY HISTORY: The patient is a 72 y.o. year old male with hypertension, hyperlipidemia, type 2 diabetes, iron deficiency anemia, history of DVT, who presented on 9/22/2022 for initial evaluation because of iron deficiency anemia, referred by his primary care provider, JULIA Santiago. Patient is accompanied by his wife who helped with the history. They reported the patient actually was being diagnosed of colon cancer 2 days ago. His GI specialist, Dr. Anil Garces, performed colonoscopic examination and saw a distal colon mass. I do not have the report for the procedure nor do I have the pathology report but nevertheless there was a mass in the distal colon likely in the sigmoid colon. I will obtain a copy of those reports when available.      Patient reports he had no apparent melena or hematochezia before starting oral iron treatment. He did have however significant fatigue. He reports exertional dyspnea and no syncope. Patient had laboratory study recently on 09/08/2022 which  "reported severe iron deficiency anemia with hemoglobin 7.8, MCV 79.9, MCHC 29.2. Normal platelets 217,000 and WBC 7230 without differentiation. Iron study reported ferritin 10.7 ng/mL, free iron 23, TIBC 593 and iron saturation 4%. Chemistry lab reported creatinine 1.11, potassium 5.4, otherwise normal sodium chloride and calcium, glucose 137. Prior to that the patient had normal liver function on 08/24/2022. He had a normal TSH 2.5 and slightly elevated hemoglobin A1c of 6.8% on that day.      The patient reports he has been on oral iron for almost 2 weeks, once a day with good tolerance and he now noticed improved energy level and less exertional dyspnea. He does report stool becoming dark-colored after he started oral iron.  He reports no syncope.     Laboratory study on 09/22/2022 reported improved hemoglobin 8.5 and normalized MCV 85.0, stable MCHC 29.4. Maintains normal platelets and WBC.     I saw him originally on 9/22/2022 for initial evaluation for his sigmoid colon cancer and iron deficiency anemia.  Since that time patient had multiple imaging studies including CT for chest abdomen pelvis, subsequently with MRI for the abdomen for staging purposes.  He was also seen by Dr. Samaniego who performed right hemicolectomy on 10/11/2022.    Dr. Samaniego called me on the day of surgery, he also suspected this liver lesion is metastatic, however unable to reach that during the surgery.    Patient notes since surgery on 10/11/2022 of the bowel resection he is recovering \"okay\". The patient does report some abdominal pain when adrianna his stomach, specifically when getting out of bed. The patient denies any issues with his appetite, and is having normal urination and bowel movements. The patient denies constipation.  Patient reports no fever sweating or chills.    The patient has type II diabetes, which he manages with metformin. 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    Patient reports he is able to tolerate oral iron twice a day with no specific complaints.  No nausea vomiting or constipation.      Laboratory study today on 11/2/2022 showed improved anemia with Hb 10.9, normalized MCV 92.5.  His normal platelets 159,000 WBC 6870 including ANC 4580.      PET scan examination on 11/14/2022 reported distal small 1.3 cm subtle low attenuation lesion in the medial hepatic segment with SUV 6.3.  There is no hypermetabolic enthesopathy in the abdomen or pelvis.  No suspicious hypermetabolic activity in the chest or neck.    Laboratory study on 11/15/2022 showed improved hemoglobin 12.1, normal platelets 156,000 and WBC 5720 including ANC 3750 and lymphocytes 1070.  Chemistry study reported glucose 156 otherwise unremarkable CMP.    Patient reports he developed a mild oral mucositis lasted about 4 days after the chemotherapy and now has resolved. He believes he has lost some weight, but states he does have an appetite and eats everyday.  He denies nausea vomiting.  He denies having diarrhea or constipation. He denies having any fever, chills, or sweating.  Denies peripheral neuropathy.     Laboratory results from on 11/29/2022 are; white cell count is 3950. neutrophils are 2340. Hemoglobin is 11.7 g/dL. Platelets are 108,000. Chemistry lab was unremarkable except glucose 255.    Laboratory studies on 3/21/2023 reported pancytopenia, with platelets 100,000, hemoglobin 10.0 .0, in the WBC 3130 including mild neutropenia with ANC 1610.     On 3/21/2023, we continue to hold his Avastin due to a tooth extraction on 3/13/2023.  He developed mild neutropenia with ANC 1610 so we gave the patient Levaquin daily for 7 days for prophylaxis.  Fortunately did not have infection.    Patient reports reasonable tolerance.  No specific complaints.    On 4/4/2023 patient has improved neutrophils 1870, and WBC 3270.  Continues to have anemia stable Hb 10.2, and platelets  96,000.  He reports no spontaneous bleeding or bruising.    The PET scan obtained 4/25/2023 reported resolution of the hypermetabolic subcapsular and medial hepatic segment lesion. However, there were developments of a few mildly hypermetabolic lymph nodes, one of them to the right subcarinal area with a maximum SUV 3.5, previously photopenic. There is a 9 x 6 mm right supraclavicular node with a maximum SUV 2.9, previous 3 mm and photopenic. There is also a 10 mm x 7 mm right paratracheal node stable in size, but is slightly hypermetabolic.    Lab study today on 5/1/2023 reported moderate thrombocytopenia platelets 86,000, hemoglobin 10.8, .3, and WBC 4590 including neutrophils 3200.    He was last seen on 5/1/2023 , he was referred to Dr. Griffin Tilley at the [facility] for evaluation of metastatic solitary liver lesion. Patient was seen by Dr. Tilley on 5/15/2023 for evaluation and after discussion, patient decided not to pursue surgical intervention instead of getting images studies periodically.     This patient had a PET scan examination on 7/31/2023 and is to review the results. The study the PET study reported no significant changes of a small 9 mm x 7 mm superior right paratracheal node and this noted is photopenic with a maximum SUV one point of five. Previously slightly hypermetabolic right subcarinal lymph node appears smaller and also now photopenic. There has been resolution of the hypermetabolic sub centimeter right supraclavicular lymph nodes. There was no hypermetabolic lymphadenopathy in the neck, supraclavicular, or chest. No suspicious metabolic hypermetabolic activity within the abdomen and specifically the liver, and also no hypermetabolic lymphadenopathy in the abdomen and the pelvis.    Laboratory study 7/31/2023 reported mild anemia with a hemoglobin 11.9, MCV 2.865, and correction hemoglobin 11.09, .01, MCHC 31.9, and platelets 132,000, WBC 4600 including  neutrophils 2650. Normal CEA 2.044 and normal iron studies with a ferritin 140 ng/mL and iron saturation 35% with a free iron 124, TIBC 358. Chemistry lab reported unremarkable CMP.      The CT scan examination on 10/12/2023 reported no evidence of disease recurrence.  There were postsurgical changes with the right hemicolectomy.  There is a right renal cyst, technically indeterminate. Otherwise, the liver, spleen, adrenal glands, left kidney, pancreas, stomach, small bowels, urinary bladder, and abnormal vasculature were normal. There was no enlarged lymphadenopathy in the abdomen and pelvis and no bone lesions.    The Guardant Reveal test was obtained on 10/12/2023 and reported back on 10/25/2023 as negative for ctDNA.    Other laboratory study on 10/12/2023 reported persistent but stable anemia with hemoglobin of 11.9, an MCV of 108.0, an MCHC of 31.3, WBC of 4720, neutrophils of 2900, lymphocytes of 1000, and platelets of 132,000. Iron studies showed ferritin of 69.1, free iron of 108, TIBC of 361, and iron saturation of 30%. The chemistry lab reported glucose 120, sodium 135, and otherwise unremarkable CMP.    We obtained laboratory studies on 01/09/2024 and the studies reported negative Guardant Reveal study with 0% ctDNA (circulating tumor DNA). Other laboratory studies reported both improved hemoglobin 13.3 and .3, MCHC 33.7. Maintains normalized platelets 164,000 and improved WBC 6000 including neutrophils 3890, lymphocytes 1430. Chemistry lab reported much improved ferritin 245, free iron 111, TIBC 347, and iron saturation 32%. Chemistry lab reported slightly elevated total bilirubin at 1.4, and mildly elevated ALT at 49 and otherwise normal AST at 37, and alkaline phosphatase 62. Glucose was 169. Normal electrolytes and baseline creatinine of 1.08.         MEDICATIONS:    Current Outpatient Medications:     acetaminophen (TYLENOL) 500 MG tablet, Take 1 tablet by mouth Every 6 (Six) Hours As Needed  "for Mild Pain., Disp: , Rfl:     aspirin 81 MG EC tablet, Take 1 tablet by mouth Daily. INSTRUCTED PT TO FOLLOW MD INSTRUCTIONS REGARDING HOLDING FOR SURGERY/PT STATED TO HOLD 5 DAYS PRIOR TO SURGERY, Disp: , Rfl:     atorvastatin (LIPITOR) 40 MG tablet, TAKE 1 TABLET EVERY DAY, Disp: 90 tablet, Rfl: 3    ferrous sulfate 325 (65 FE) MG tablet, Take 1 tablet by mouth Daily With Breakfast., Disp: 90 tablet, Rfl: 3    ibuprofen (ADVIL,MOTRIN) 600 MG tablet, Take  by mouth As Needed. Was for dental, Disp: , Rfl:     lisinopril-hydrochlorothiazide (PRINZIDE,ZESTORETIC) 20-12.5 MG per tablet, TAKE 1 TABLET EVERY NIGHT, Disp: 90 tablet, Rfl: 3    metFORMIN (GLUCOPHAGE) 500 MG tablet, TAKE 1 TABLET TWICE DAILY WITH MEALS, Disp: 180 tablet, Rfl: 3    tadalafil (CIALIS) 5 MG tablet, Take 1 tablet by mouth Daily As Needed for Erectile Dysfunction., Disp: 30 tablet, Rfl: 2    ALLERGIES:   No Known Allergies    SOCIAL HISTORY:       Social History     Socioeconomic History    Marital status:      Spouse name: Chelsie    Years of education: 12   Tobacco Use    Smoking status: Some Days     Types: Cigars    Smokeless tobacco: Never    Tobacco comments:     OCCASIONALLY   Vaping Use    Vaping status: Never Used   Substance and Sexual Activity    Alcohol use: Yes     Alcohol/week: 7.0 standard drinks of alcohol     Types: 1 Glasses of wine, 6 Cans of beer per week     Comment: sometimes in warm weather, wine sometimes in the winter    Drug use: No    Sexual activity: Yes     Partners: Female     Birth control/protection: Surgical         FAMILY HISTORY:  Family History   Problem Relation Age of Onset    Clotting disorder Other     Colon cancer Paternal Grandmother     Colon cancer Paternal Grandfather     Clotting disorder Father     Malig Hyperthermia Neg Hx          Vitals:    04/30/24 0919   BP: 130/70   Pulse: 60   Temp: 98.4 °F (36.9 °C)   TempSrc: Temporal   SpO2: 98%   Weight: 96.6 kg (213 lb)   Height: 175.3 cm (69.02\") "         4/30/2024     9:18 AM   Current Status   ECOG score 0     Physical Exam   GENERAL:  Well-developed, well-nourished in no acute distress.  Orientated to time place and the people.  SKIN:  Warm, dry without rashes, purpura or petechiae.  HEENT:  Normocephalic.   LYMPHATICS:  No cervical, supraclavicular adenopathy.  CHEST: Normal respiratory effort.  Lungs clear to auscultation. Good airflow.  CARDIAC:  Regular rate and rhythm. Normal S1,S2.  ABDOMEN:  Soft, nontender with no organomegaly or masses.  Bowel sounds normal.  EXTREMITIES:  No lower extremity edema.          RECENT LABS:  Lab Results   Component Value Date    WBC 5.53 04/30/2024    HGB 12.7 (L) 04/30/2024    HCT 38.7 04/30/2024    .9 (H) 04/30/2024     04/30/2024     Lab Results   Component Value Date    NEUTROABS 3.33 04/30/2024     Lab Results   Component Value Date    GLUCOSE 130 (H) 04/30/2024    BUN 21 04/30/2024    CREATININE 1.06 04/30/2024    EGFRRESULT 71.0 09/08/2022    EGFR 74.6 04/30/2024    BCR 19.8 04/30/2024    K 5.1 04/30/2024    CO2 26.5 04/30/2024    CALCIUM 9.5 04/30/2024    PROTENTOTREF 7.2 08/24/2022    ALBUMIN 4.3 04/30/2024    BILITOT 1.4 (H) 04/30/2024    AST 38 04/30/2024    ALT 31 04/30/2024       Lab Results   Component Value Date    HLWKOLBC62 658 04/04/2023     Lab Results   Component Value Date    FOLATE >20.00 04/04/2023     Lab Results   Component Value Date    CEA 1.93 01/09/2024     Lab Results   Component Value Date    IRON 89 04/30/2024    TIBC 389 04/30/2024    FERRITIN 78.80 04/30/2024   Iron saturation 23% on 4/30/2024, 32% on 1/9/2024.        IMAGING STUDY:  CT Abdomen Pelvis With Contrast  CT ABDOMEN PELVIS W CONTRAST-     Radiation dose reduction techniques were utilized, including automated  exposure control and exposure modulation based on body size.     CLINICAL: Metastatic colon carcinoma follow-up.     COMPARISON: CT scan of the abdomen 10/12/2023.     FINDINGS:  1. No suspicious lesion  is demonstrated at either lung bases. There is a  calcified benign granuloma again within the right lower lobe.     2. Previous right hemicolectomy, the anastomosis is stable and  satisfactory in appearance. Small bowel is normal. The colon is  satisfactory in appearance as well. No mesenteric lymphadenopathy or  edema. No free air nor free intraperitoneal fluid. No mesenteric implant  to suggest carcinomatosis. The liver and adrenal glands have a  satisfactory appearance.     3. Gallstones within an otherwise normal-appearing gallbladder, no  biliary duct dilatation and the pancreas is normal. The spleen is  satisfactory in size and shape. Small hypodense right renal cysts, the  kidneys are otherwise satisfactory in appearance. The bladder is  collapsed and largely obscured from artifact arising from the bilateral  hip prostheses. No lytic or sclerotic bone lesion. Atherosclerotic  calcification of a normal diameter aorta.     CONCLUSION: Stable CT of the abdomen and pelvis, no indication of local  tumor recurrence nor metastatic disease.              This report was finalized on 4/18/2024 3:12 PM by Dr. Kristopher Ceballos M.D  on Workstation: [id number]           Assessment & Plan     ASSESSMENT:    1.  Colon cancer post right hemicolectomy 10/11/2022.  Stage IV ([id number]).  Had positive stool occult blood test on 09/09/2022. Colonoscopic examination on 09/20/2022 by Dr. Anil Garces reported cecum colon mass.  Biopsy confirmed moderately differentiated adenocarcinoma.  MSI stable.  Mildly elevated CEA level 10.7 ng/mL on 9/22/2022.  CT scan for chest abdomen pelvis 9/26/2022 reported cecal mass.  There was also suspicious 9 mm hepatic lesion.  Abdomen MRI examination with and without contrast on 10/7/2022 confirmed 8 mm lesion hypervascularity in the segment 5 of the liver.  Patient had right hemicolectomy 10/11/2022.  Unable to biopsy at this time he liver lesion.  Pathology evaluation reported [id number] disease, this  will be at least a stage IIIb colon cancer.  On 11/2/2022 I discussed with the patient, his wife and his daughter, recommending further imaging study with PET scan for assessment.  If patient is no hypermetabolic liver lesion, will treat with adjuvant FOLFOX 6 regimen every 2 weeks for 12 cycles.  However, if he has hypermetabolic lesion in the liver, we will treat him as metastatic disease, with FOLFOX plus Avastin.  Since patient has neuropathy already being his legs and feet, if he is indeed metastatic disease, we may switch him to irinotecan instead of oxaliplatin because of the neuropathy.  If indicated patient has metastatic disease, we would also entertain metastectomy after finishing 12 cycles of chemotherapy.    PATHOLOGY: Tumor sample for [facility] NGS study reported positive for K-aashish mutation exon2 p.G13D, negative for HER2/dorie by IHC 0 and no amplification by FISH.  MSI stable by DNA sequencing, and also MMR proficient by IHC staining.  Tumor mutation burden is low 8 mut/Mb.  Patient was positive for PTEN 1+, 100% by IHC, PD-L1 was negative, only 1% by IHC.  Patient also positive for APC mutation exon 16p.G5620br, positive for AMACR1 exon2 p.R358*.  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    Discussed with the patient and family members about potential side effects including bone marrow suppression, with anemia thrombocytopenia and leukocytopenia increased risk for infection, and also peripheral neuropathy, etc. patient is agreeable to proceed ahead with treatment.  The patient has PET scan examination on 11/14/2022 which reported a small 1.3 cm focus with elevated SUV 6.3, highly suspicious for metastatic disease. The patient now has stage IV. The patient had [facility] NGS study which reported K-aashish mutation, tumor mutation burden < 10, MSI was stable and anti-PD-L1 negative.  Not a candidate for anti-EGFR monoclonal antibody such as Vectibix, or chekpoint inhibitor immunotherapy.  We discussed the  adding anti-VEGF monoclonal antibody, bevacizumab/Avastin starting in 2 weeks so that it would be after 6 weeks of primary surgery for the colon cancer resection.  We discussed this is now stage IV disease, however because only having 1 solitary metastatic lesion in the liver, it is potentially curable so we will be as aggressive as possible for chemotherapy.  If he has good response, in the future he will need metastectomy of the liver lesion.  On 11/29/2022 patient is presented for cycle #2 of chemotherapy. Patient tolerated therapy well except mild oral mucositis. However, laboratory studies showed significant decrease in platelets at only 108,000, as well as also decreasing WBC and hemoglobin. Discussed with the patient today if we do not carry out any dose reduction, he will likely become more thrombocytopenic next time in 2 weeks and we will not be able to do cycle #3 chemotherapy on time. I discussed with the patient for dose reduction and to avoid potential delay of chemotherapy and he is agreeable. We will have 25% dose reduction for 5-FU leucovorin and oxaliplatin.  On 11/29/2022 he was started the first dose of Avastin/bevacizumab in conjunction with chemotherapy cycle #2.  On 12/13/2022, the patient presented for reevaluation prior to chemotherapy cycle #3. He has stable platelets of 108,000 and slightly improved WBC of 5100 and hemoglobin 12.8 g/dL. He will continue cycle 3 with the same 25% dose reduction.  1/10/2023 due for cycle 5 FOLFOX bevacizumab, overall is tolerating fairly well.  Hemoglobin 11.7, platelet count stable at 108,000, WBC 4.02, ANC 2.27.  Patient has had five cycles of chemotherapy and a CT scan examination obtained on 01/20/2023. The CT scan reported favorable response as the lesion is less obvious.   1/24/2023 recommended to continue chemotherapy for a total of 12 cycles. Then obtain PET scan examination. Due to worsening thrombocytopenia, and also peripheral neuropathy, for cycle  #6, I will decrease oxaliplatin by another 25% so would it be 50% of the original dose. I will keep the same dose of 5-FU which is 75% of the original dose.  Full dose Avastin.   2/7/2023 due for cycle 7.  Platelet count is holding steady at 100,000.  We will proceed with treatment today with same doses as given on cycle 6.   2/21/2023 due for cycle #8 chemotherapy. We will repeat every 2 weeks.  3/7/2023 proceed with cycle #9 chemotherapy and hold Avastin for dental procedure on 3/13/2023.  On 3/21/2023 we will proceed ahead with cycle #10 chemotherapy FOLFOX6 regimen.  Continue to hold Avastin.  On 4/4/2023 patient presented for evaluation prior to cycle #11 FOLFOX 6 regimen.  Continue to hold Avastin.  This will be resumed from next cycle.   The patient presented 4/18/2023 for reevaluation prior to his last cycle #12 FOLFOX plus Avastin. The patient reports tolerating well. We will proceed with cycle12 today and resume Avastin.  I recommended having a PET scan examination for assessment of response, especially the small metastatic liver lesion which was only found on the previous PET scan.  Patient had a PET scan examination on 04/25/2023, which reported complete resolution of the solitary hypermetabolic lesion in the liver. He had a mildly active small lymph node, peritracheal, subcarinal with low activity. Etiology not clear.   On 05/01/2023, I reviewed the PET scan images with the patient and his wife, and two daughters. I recommended to refer the patient to [facility], surgical oncology, Dr. Griffin Tilley, for evaluation to see if he would be a candidate for surgical resection of the liver segment where he had metastatic disease, which now has resolution of hypermetabolic activity after chemotherapy.  Patient was seen by Dr. Tilley on 5/15/2023. After lengthy discussion, patient opted to not having surgical intervention currently. He would rather to have serial images studies.   Patient had  "a PET scan on 7/29/2023, which reported no evidence for recurrent disease.   I discussed with the patient and his wife on 8/8/2023, recommended CT scan for abdomen and pelvis with i.v. contrast in 3 months, together with lab study \"Guardant Reveal\" for ctDNA together with routine labs, CBC, CMP, CEA level, for assessment of colon cancer.  The patient had laboratory studies on 10/12/2023, which reported negative for Guardant Reveal, ctDNA 0%. The patient also had normal CEA and liver function panel. The CT scan for the abdomen and pelvis was no evidence of disease recurrence.   Patient had laboratory studies on 01/09/2024 which reported negative for Guardant Reveal, ctDNA 0%. Other studies reported normal CEA at 1.93 ng/mL, marginally elevated ALT at 49 and total bilirubin 1.4 but normal AST at 37, alkaline phosphatase 62. No evidence for disease recurrence. I do recommend to have repeat laboratory studies same as above together with CT scan for abdomen and pelvis with IV contrast.  Patient is also overdue for colonoscopy examination 1 year post his colon surgery.  Patient had a benign colonoscopy examination by Dr. Garces on 3/6/2024.  Dr. Garces recommended repeating colonoscopy in 5 years.  CT for abdomen pelvis with IV contrast on 4/16/2024 showed no evidence for cancer recurrence.  Pending lab studies for Guardant Reveal today on 4/30/2024.       2. Iron deficiency anemia.  Subsequently anemia also caused by chemotherapy.  The patient has iron deficiency due to GI bleeding from his colon cancer. His laboratory study on 09/08/2022 reported ferritin 10.7 and iron saturation 4% with microcytic hypochromic anemia, hemoglobin 7.8, MCV 79.9 and MCHC 29.2.   Patient reports good tolerance to oral iron treatment and already has improved energy level. Laboratory study on 9/22/2022 did report slightly improved hemoglobin at 8.5 but normalized MCV 85.0. I think he is responding to oral iron treatment both physically and " laboratory wise.  We advised patient to increase oral iron to twice a day.  On 11/2/2022 patient reports good tolerance to oral iron twice a day.  Labs today showed further improved hemoglobin 10.9.  He continues to maintain normal platelets and WBC.  Improved hemoglobin 12.1 on 11/15/2022.  Cycle #1 chemotherapy will be started.  On 11/29/2022 hemoglobin 11.7.  On 12/13/2022, his hemoglobin is 12.8 g/dL.  1/10/2023 hemoglobin 11.7.  The patient has trending down hemoglobin 11.0 on 1/24/2023, however, he is asymptomatic.  Hemoglobin 2/7/2023, 10.6.  Iron study reported ferritin 229 and iron saturation 28% with free iron 108 TIBC 386.  No evidence for iron deficiency.  So his anemia is now secondary to chemotherapy.     The patient has stable mild anemia with hemoglobin 11.0 on 3/7/2023. We will continue to monitor.  Continue to hold ferrous sulfate.  On 3/21/2023, stable anemia with hemoglobin 10.0 .0.  Continue to monitor.  On 4/4/2023 patient has stable anemia Hb 10.2, .9.  The patient has stable anemia, hemoglobin 10.8 today on 04/18/2023. The patient reports no syncope.  On 05/01/2023, patient has a slightly improved hemoglobin 10.8. We will repeat iron studies in 3 months for reassessment.  Laboratory studies 7/31/2023 reported ferritin 140, free iron 124 and iron saturation 35%. He also had improved hemoglobin 11.9, .1. Discussed with him today 8/8/2023 and recommended monitoring.  The laboratory study on 10/12/2023 reported worsening ferritin by 50% to 69.1 ng/mL; however, maintains a reasonable iron saturation 30% with free iron 108. The patient also has persistent anemia with hemoglobin of 11.9. He has already been on oral vitamin B12 supplement. I asked the patient to start oral iron ferrous sulfate at 325 mg once a day. I discussed with him the possible side effects of constipation and stools becoming dark-colored constipation, nausea, and cramping, etc.    Laboratory studies on  01/09/2024 showed much improved hemoglobin 13.3 and also significantly improved ferritin 245, iron saturation 32% with free iron 111. I asked the patient to stop oral iron completely.   Today on 4/30/2024 patient has recurrent anemia hemoglobin 12.7.  Also worsening iron studies with deteriorated ferritin 78.8 ng/mL, and also trending down normal iron saturation 23% with free iron 89 TIBC 389.  Discussed with the patient, recommended to restart taking oral iron once a day.       3.    Pancytopenia secondary to chemotherapy.    This patient had original iron deficiency, however now has ongoing anemia due to chemotherapy.  He also has thrombocytopenia and neutropenia from chemotherapy.  Normal neutrophil prior to starting chemotherapy.    This patient developed first mild neutropenia with ANC 1610 on 3/21/2023.  Patient reports no fever sweating chills.  I recommended starting patient on Levaquin daily for 7 days, for prophylaxis of neutropenic fever.  We will continue to monitor for now.  Expecting this will getting worse with ongoing chemotherapy.  Question the patient to call us if he develops fever sweating chills.   4/4/2023, patient has slightly improved neutrophils 1870, and WBC 3270.  Continue to monitor.  On 04/18/2023, the patient has neutrophils 2030, WBC 3480. Continue with chemotherapy, monitor CBC.  On 05/01/2023 patient has improved WBC 4,590, including neutrophils 3,200.  7/31/2023 patient has normal WBC 4600, including ANC 2650 lymphocytes 1340.  The laboratory study on 10/12/2023 reported persistent thrombocytopenia with platelet counts of 132,000, and low normal WBC of 4720, and neutrophil count of 2930.    Pancytopenia. Laboratory study on 01/09/2024 reported normalized platelets 164,000, improved WBC 6000, neutrophils 3890 and also improved hemoglobin at 13.3.   Today on 4/30/2024 patient has normal WBC 5530, neutrophils 3330, platelets 141,000 and hemoglobin 12.7.    4.  Thrombocytopenia  secondary to chemotherapy.  Prior to starting chemotherapy patient had low normal platelets 156,000 on 11/15/2022.  Started on cycle #1 chemotherapy FOLFOX 6.  On 11/29/2022 patient had platelets of 108,000.  Patient reports no bleeding or bruising.  Discussed with the patient, because of foreseeable more severe thrombocytopenia, we recommended 25% dose reduction starting from cycle #2 chemotherapy.  On 12/13/2022, the patient has same decreased number of platelets 108,000. He will continue chemotherapy with same dose reduction of 25%.   1/10/2023 platelet count stable at 108,000.  On 01/24/2023, patient has worsening thrombocytopenia with platelets of 99,000 mcL. Discussed with the patient, we will cut oxaliplatin by another 25% to be at 50% of original dose. We will leave the 5-FU dose same as the previous at 75% of original dose. Avastin will be the same dose.  2/7/2023 platelet count 100,000.    On 02/21/2023 the patient has stable thrombocytopenia with platelets 102,000. The patient reports no bleeding or bruising. Continue to monitor.   On 3/7/2023 the patient has stable thrombocytopenia with platelets 103,000. The patient reports no bleeding or bruising. Continue to monitor.   On 3/21/2023 persistent stable thrombocytopenia with platelets 100,000.   On 4/4/2023 platelets 96,000.  We will go ahead and to proceed with chemotherapy.   Today on 04/18/2023, platelets 91,000. We will proceed ahead with the chemotherapy today. This is cycle 12 of chemotherapy. We will reassess his treatment after PET scan examination.  On 05/01/2023, patient has a persistent thrombocytopenia with platelets of 86,000. Patient is asymptomatic.  On 7/31/2023 improved platelets 132,000.  On 10/12/2023, stable thrombocytopenia with 132,000 platelets.  1/9/2024 normal platelets 164,000.  4/30/2024 marginal normal platelets 141,000.    5. Peripheral neuropathy  On 12/13/2022, he reports cold sensitivity in his mouth when he drinks cold  water. The cold sensitivity usually dissipates after 3 to 4 days.  Patient reports he is more significant cold sensitivities lasting for more than a week. He does have moderate tingling, numbness involving the fingers, but not much as the toes.  2/7/2023 he feels like the neuropathy/cold sensitivity is lingering a little bit longer with each cycle.    On 4/4/2023 the patient reports stable mild peripheral neuropathy/cold sensitivity.    On 04/18/2023, patient reports some mild numbness and tingling involving both feet and hands, but this is only last for a few days and then resolves. We will continue dose reduced oxaliplatin 50%.  On 05/01/2023, patient continues to have peripheral neuropathy intermittent and mild overall. Continue to monitor.  Stable peripheral neuropathy today.   Patient reports today on 01/30/2024 has stable mild peripheral neuropathy.   Stable condition today.    6.  Weight losses.    On 12/13/2022, the patient reports he has lost weight in the past couple of months. He reports a poor appetite for 3 days after chemotherapy and a poor taste in his mouth. His appetite has since improved. He only had mild nausea, but no vomiting. He was encouraged to use Ensure or Boost to improve nutrition supplement. He already started using protein powder.  3/7/2023, patient's weight continues to improve and he has gained a couple pounds.  His weight is stable at 211 pounds today.  He reports his appetite continues to improve.  He denies any nausea or vomiting.   On 04/18/2023, patient weighs with 209 pounds.  8/18/2023 patient weighs about 213 pounds  10/31/2023, the patient has a stable weight of 213 pounds.    The patient has slightly improved weight at 221 pounds today on 01/30/2024.   4/30/2024 stable weight 213 pounds.    7.  [employer] teeth extraction: Patient states that he needs to have his wisdom teeth pull out.  Reviewed with Dr. Rosario.  Patient will need to have this done on his off week of treatment.   We will likely hold bevacizumab 2 weeks prior to this procedure and 4 weeks following.  Patient states that this will likely not happen until March, but is going to contact his oral surgeon, Dr. Charlie Zazueta to try to go ahead and get an appointment.  [place] tooth extraction done on 3/13/2023.    Continue to hold Avastin 4/4/2023.  We will resume in 2 weeks for cycle #12 chemotherapy.    On 04/18/2023, the patient reports his gum has completely healed.  We will resume Avastin.    No specific complaints today.      8.  Low normal vitamin B12 level.    Patient had a marginal normal vitamin B12 level at 260 pg/mL on 08/24/2022.   Folate level on 9/22/2022 was normal at 15.5 ng/mL.    Continue oral vitamin B12 supplement at 1000 mcg daily.   Vitamin B12 was 658 pg/mL on 04/04/2023. This has improved. His macrocytosis is due to chemotherapy.  On 4/30/2024 improved B12 level 873 pg/mL.  Patient continues taking oral vitamin B12.        PLAN:  Pending results for guardant reveal today.    Resume oral iron once a day.  Continue port flush every 6 weeks.   Continue oral vitamin B12 at 1000 mcg daily.   Will arrange the patient to have laboratory studies for Guardant Reveal, CBC, CMP, CEA, iron studies in 3 months.   Patient will see me 2 weeks after the above laboratory studies for reevaluation.  Plan for the patient to have CT scan for abdomen and pelvis with IV and oral contrast in 6 months for reevaluation.     I discussed with the patient about laboratory results and further management plan.  Patient voiced understanding and agreeable.      Addendum:   Completed negative guardant reveal study, 0% ctDNA.           Maya Rosario MD PhD        CC:  JULIA Santiago MD Richard Pokorny, MD   Oral surgeon: Dr. Charlie Zazueta Phone [phone]  Griffin Tilley M.D.

## 2024-05-09 LAB — REF LAB TEST METHOD: NORMAL

## 2024-05-22 NOTE — PROGRESS NOTES
.     REASON FOR FOLLOWUP:     *Cecum colon cancer, status post right hemicolectomy performed on 10/11/2022, [id number].   · CT scan examination on 9/26/2022 reported suspicious liver lesion 9 mm, otherwise no evidence for metastatic disease.    · The patient underwent an abdominal MRI examination on 10/06/2022, which reported suspicious liver lesion 8mm.  · Patient had sigmoidectomy on 10/11/2022.  Portacatheter placement on 10/21/2022.   · PET scan examination on 11/14/2022 reported solitary hypermetabolic liver lesion.   · Cycle #1 palliative chemotherapy FOLFOX 6 was started on 11/15/2022.   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  Not a candidate for anti-EGFR monoclonal antibody treatment.   · From cycle #2, bevacizumab/Avastin will be added to palliative chemotherapy.  Due to thrombocytopenia, all chemotherapy agents were 25% reduced from cycle #2.  *Iron deficiency anemia.  · On oral iron treatment.                                 HISTORY OF PRESENT ILLNESS:  The patient is a 72 y.o. 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  He returns today for lab review and evaluation and consideration of cycle 5 chemotherapy with FOLFOX plus bevacizumab.  He states that typically he feels achy for about 5 to 6 days following each treatment, and generally last for about 4 to 5 days.  Seems like it is lingering longer with each subsequent treatment.  He does report some issues with constipation, but uses stool softener with good relief intermittently.  He also notes some occasional issues with sinus bleeding typically in the mornings.     Patient does need to have his wisdom teeth extracted and is wanting to get clearance from our office.  He states that this procedure is not been scheduled yet and it might not be done until March.    Past Medical History:     05/22/24 1000   Pain Assessment   Pain Assessment Tool 0-10   Pain Score No Pain   Restrictions/Precautions   Precautions Aspiration;Bed/chair alarms;Cognitive;Fall Risk;Supervision on toilet/commode   Weight Bearing Restrictions No   ROM Restrictions No   Cognition   Overall Cognitive Status Impaired   Arousal/Participation Alert;Cooperative   Attention Attends with cues to redirect   Orientation Level Oriented X4   Memory Decreased recall of precautions   Following Commands Follows one step commands with increased time or repetition   Sit to Stand   Type of Assistance Needed Supervision;Adaptive equipment   Comment with RW   Sit to Stand CARE Score 4   Bed-Chair Transfer   Type of Assistance Needed Supervision;Adaptive equipment   Comment with RW   Chair/Bed-to-Chair Transfer CARE Score 4   Transfer Bed/Chair/Wheelchair   Adaptive Equipment Roller Walker   Walk 10 Feet   Type of Assistance Needed Adaptive equipment;Supervision   Comment CS with RW   Walk 10 Feet CARE Score 4   Walk 50 Feet with Two Turns   Type of Assistance Needed Incidental touching;Supervision;Adaptive equipment   Comment CS/CGA with RW   Walk 50 Feet with Two Turns CARE Score 4   Walk 150 Feet   Type of Assistance Needed Incidental touching;Supervision;Adaptive equipment   Comment CS/CGA with RW   Walk 150 Feet CARE Score 4   Ambulation   Primary Mode of Locomotion Prior to Admission Walk   Distance Walked (feet) 150 ft  (x2, 50' x4 with SPC)   Assist Device Roller Walker   Gait Pattern Inconsistant Sury;Decreased foot clearance;Slow Sury;Forward Flexion;Shuffle;Improper weight shift   Limitations Noted In Balance;Coordination;Endurance;Heel Strike;Posture;Safety;Speed;Strength;Swing   Provided Assistance with: Balance;Direction   Walk Assist Level Close Supervision;Supervision;Contact Guard   Findings VC's throughout to maintain close to RW and keep feet inside.   Does the patient walk? 2. Yes   Wheel 50 Feet with Two Turns   Reason    Diagnosis Date   • Abdominal hernia    • Anemia    • Arrhythmia     PT STATED DURING LAST COLONOSCOPY 2 WEEKS AGO   • Arthritis    • Chemotherapy-induced thrombocytopenia 12/4/2022   • Colon cancer (HCC) 09/22/2022   • Colon polyp September 20 2022   • Colon polyps    • Depression    • DVT (deep venous thrombosis) (HCC)     IN LEFT LEG   • Full dentures    • GI (gastrointestinal bleed) September 8 2022   • Hip pain     RIGHT   • Hyperlipidemia    • Hypertension    • Iron deficiency anemia 09/22/2022   • Numbness and tingling     RIGHT FOOT   • PONV (postoperative nausea and vomiting)    • Right leg pain    • Type 2 diabetes mellitus without complication, without long-term current use of insulin (HCC) 10/08/2019     Past Surgical History:   Procedure Laterality Date   • CERVICAL DISCECTOMY LAMINECTOMY DECOMPRESSION POSTERIOR FUSION WITH INSTRUMENTATION     • COLON RESECTION N/A 10/11/2022    Procedure: LAPAROSCOPIC RIGHT COLON RESECTION;  Surgeon: Ga Samaniego MD;  Location: [facility] OR;  Service: General;  Laterality: N/A;   • COLONOSCOPY N/A 2/28/2018    Procedure: COLONOSCOPY to TI and cecum with polypectomy;  Surgeon: Anil Garces MD;  Location: [facility] ENDOSCOPY;  Service:    • JOINT REPLACEMENT      LEFT HIP   • KNEE ARTHROSCOPY Left    • LUMBAR DISCECTOMY FUSION INSTRUMENTATION N/A 11/18/2020    Procedure: Lumbar 4 5 laminectomy with a posterior lateral fusion and instrumentation and interbody fusion;  Surgeon: Jeffrey Garcia MD;  Location: [facility] OR;  Service: Neurosurgery;  Laterality: N/A;   • TOTAL HIP ARTHROPLASTY Right 6/3/2021    Procedure: TOTAL HIP ARTHROPLASTY POSTERIOR;  Surgeon: Shlomo Campos MD;  Location: [facility] OR;  Service: Orthopedics;  Laterality: Right;   • VASECTOMY     • VENOUS ACCESS DEVICE (PORT) INSERTION N/A 10/21/2022    Procedure: INSERTION VENOUS ACCESS DEVICE;  Surgeon: Ga Samaniego MD;  Location: [facility] OR OSC;  Service: General;  Laterality:  if not Attempted Activity not applicable   Wheel 50 Feet with Two Turns CARE Score 9   Wheel 150 Feet   Reason if not Attempted Activity not applicable   Wheel 150 Feet CARE Score 9   Wheelchair mobility   Does the patient use a wheelchair? 0. No   Curb or Single Stair   Style negotiated Single stair   Type of Assistance Needed Physical assistance;Verbal cues;Adaptive equipment   Physical Assistance Level 25% or less   Comment B hands on RHR ascending, recip gait ascending, nonrecip descending   1 Step (Curb) CARE Score 3   4 Steps   Type of Assistance Needed Physical assistance;Verbal cues;Adaptive equipment   Physical Assistance Level 25% or less   Comment B hands on RHR ascending, recip gait ascending, nonrecip descending   4 Steps CARE Score 3   12 Steps   Type of Assistance Needed Physical assistance;Verbal cues;Adaptive equipment   Physical Assistance Level 25% or less   Comment B hands on RHR ascending, recip gait ascending, nonrecip descending   12 Steps CARE Score 3   Stairs   Type Stairs   # of Steps 12   Weight Bearing Precautions Fall Risk   Assist Devices Single Rail   Findings FF on NW 4   Picking Up Object   Type of Assistance Needed Incidental touching   Comment CGA with RW no AD   Picking Up Object CARE Score 4   Therapeutic Interventions   Neuromuscular Re-Education 50' x4 with SPC in R hand CGA to PAUL. Pt ;imited by fatigue. VC' for sequencing at times. Standing ball toss x2 min. Alt toe taps MODA with RHHA 2 x10 reps. Weaving through cones with RW for safety and balance. VC's to maintain within RW required.   Equipment Use   NuStep L2 x10 min   Other Comments   Comments daughter called start of session to request clothing. Per her it was in closet with shoes. Clothes found in closet behind door so all clothers moves to other closet for access. Pt's suitcase left in other closet.   Assessment   Treatment Assessment Pt participated in skilled PT session with increased focus on gait, balance,  N/A;     HEMATOLOGY/MEDICAL ONCOLOGY HISTORY: The patient is a 72 y.o. year old male with hypertension, hyperlipidemia, type 2 diabetes, iron deficiency anemia, history of DVT, who presented on 9/22/2022 for initial evaluation because of iron deficiency anemia, referred by his primary care provider, JULIA Santiago. Patient is accompanied by his wife who helped with the history. They reported the patient actually was being diagnosed of colon cancer 2 days ago. His GI specialist, Dr. Anil Garces, performed colonoscopic examination and saw a distal colon mass. I do not have the report for the procedure nor do I have the pathology report but nevertheless there was a mass in the distal colon likely in the sigmoid colon. I will obtain a copy of those reports when available.      Patient reports he had no apparent melena or hematochezia before starting oral iron treatment. He did have however significant fatigue. He reports exertional dyspnea and no syncope. Patient had laboratory study recently on 09/08/2022 which reported severe iron deficiency anemia with hemoglobin 7.8, MCV 79.9, MCHC 29.2. Normal platelets 217,000 and WBC 7230 without differentiation. Iron study reported ferritin 10.7 ng/mL, free iron 23, TIBC 593 and iron saturation 4%. Chemistry lab reported creatinine 1.11, potassium 5.4, otherwise normal sodium chloride and calcium, glucose 137. Prior to that the patient had normal liver function on 08/24/2022. He had a normal TSH 2.5 and slightly elevated hemoglobin A1c of 6.8% on that day.      The patient reports he has been on oral iron for almost 2 weeks, once a day with good tolerance and he now noticed improved energy level and less exertional dyspnea. He does report stool becoming dark-colored after he started oral iron.  He reports no syncope.     Laboratory study on 09/22/2022 reported improved hemoglobin 8.5 and normalized MCV 85.0, stable MCHC 29.4. Maintains normal platelets and WBC.       I saw  coordination, stair management and endurance training. Pt cont to be heavily limited by decreased endurance. Pt stated no more than 13 on BURTON with all act but noted GARCIA with all task performed. O2 was WNL throughout session but purse lip breathing was encouraged to increase intake. Pt also encouraged to sit up and stand taller 2/2 kyphotic stance. Pt is recommended discharge with use of RW 2/2 fatigue and safety but should cont working on SPC as this was pt's previous method of gait. Pt will cont POC as tolerated with cont focus on safety, endurance, balance, coordination and stair management.   Problem List Decreased strength;Decreased endurance;Impaired balance;Decreased mobility;Decreased cognition;Impaired judgement;Decreased safety awareness;Decreased skin integrity   Barriers to Discharge Inaccessible home environment;Decreased caregiver support   PT Barriers   Functional Limitation Car transfers;Ramp negotiation;Stair negotiation;Standing;Transfers;Walking   Plan   Treatment/Interventions LE strengthening/ROM;Functional transfer training;Endurance training;Therapeutic exercise;Patient/family training;Bed mobility;Gait training;Cognitive reorientation   Progress Progressing toward goals   Discharge Recommendation   Equipment Recommended Walker   PT Therapy Minutes   PT Time In 1000   PT Time Out 1130   PT Total Time (minutes) 90   PT Mode of treatment - Individual (minutes) 90   PT Mode of treatment - Concurrent (minutes) 0   PT Mode of treatment - Group (minutes) 0   PT Mode of treatment - Co-treat (minutes) 0   PT Mode of Treatment - Total time(minutes) 90 minutes   PT Cumulative Minutes 913   Therapy Time missed   Time missed? No      him originally on 9/22/2022 for initial evaluation for his sigmoid colon cancer and iron deficiency anemia.  Since that time patient had multiple imaging studies including CT for chest abdomen pelvis, subsequently with MRI for the abdomen for staging purposes.  He was also seen by Dr. Samaniego who performed right hemicolectomy on 10/11/2022.    Dr. Samaniego called me on the day of surgery, he also suspected this liver lesion is metastatic, however unable to reach that during the surgery.    Patient notes since surgery on 10/11/2022 of the bowel resection he is recovering \"okay\". The patient does report some abdominal pain when adrianna his stomach, specifically when getting out of bed. The patient denies any issues with his appetite, and is having normal urination and bowel movements. The patient denies constipation.  Patient reports no fever sweating or chills.    The patient has type II diabetes, which he manages with metformin. 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    Patient reports he is able to tolerate oral iron twice a day with no specific complaints.  No nausea vomiting or constipation.      Laboratory study today on 11/2/2022 showed improved anemia with Hb 10.9, normalized MCV 92.5.  His normal platelets 159,000 WBC 6870 including ANC 4580.      PET scan examination on 11/14/2022 reported distal small 1.3 cm subtle low attenuation lesion in the medial hepatic segment with SUV 6.3.  There is no hypermetabolic enthesopathy in the abdomen or pelvis.  No suspicious hypermetabolic activity in the chest or neck.    Laboratory study on 11/15/2022 showed improved hemoglobin 12.1, normal platelets 156,000 and WBC 5720 including ANC 3750 and lymphocytes 1070.  Chemistry study reported glucose 156 otherwise unremarkable CMP.    Patient reports he developed a mild oral mucositis lasted about 4 days after the chemotherapy and now has resolved. He  believes he has lost some weight, but states he does have an appetite and eats everyday.  He denies nausea vomiting.  He denies having diarrhea or constipation. He denies having any fever, chills, or sweating.  Denies peripheral neuropathy.     Laboratory results from on 11/29/2022 are; white cell count is 3950. neutrophils are 2340. Hemoglobin is 11.7 g/dL. Platelets are 108,000. Chemistry lab was unremarkable except glucose 255.    MEDICATIONS:    Current Outpatient Medications:   •  acetaminophen (TYLENOL) 500 MG tablet, Take 500 mg by mouth Every 6 (Six) Hours As Needed for Mild Pain ., Disp: , Rfl:   •  amoxicillin (AMOXIL) 500 MG capsule, , Disp: , Rfl:   •  aspirin 81 MG EC tablet, Take 81 mg by mouth Daily. INSTRUCTED PT TO FOLLOW MD INSTRUCTIONS REGARDING HOLDING FOR SURGERY/PT STATED TO HOLD 5 DAYS PRIOR TO SURGERY, Disp: , Rfl:   •  atorvastatin (LIPITOR) 40 MG tablet, TAKE ONE TABLET BY MOUTH DAILY (Patient taking differently: Take 40 mg by mouth Every Night.), Disp: 90 tablet, Rfl: 3  •  ferrous sulfate (FerrouSul) 325 (65 FE) MG tablet, Take 1 tablet by mouth 2 (Two) Times a Day., Disp: 180 tablet, Rfl: 1  •  ibuprofen (ADVIL,MOTRIN) 600 MG tablet, , Disp: , Rfl:   •  lisinopril-hydrochlorothiazide (PRINZIDE,ZESTORETIC) 20-12.5 MG per tablet, TAKE ONE TABLET BY MOUTH ONCE NIGHTLY (Patient taking differently: Take 1 tablet by mouth Every Night.), Disp: 90 tablet, Rfl: 3  •  metFORMIN (GLUCOPHAGE) 500 MG tablet, Take 1 tablet by mouth 2 (Two) Times a Day With Meals., Disp: 180 tablet, Rfl: 3  •  ondansetron (ZOFRAN) 8 MG tablet, Take 1 tablet by mouth 3 (Three) Times a Day As Needed for Nausea or Vomiting., Disp: 60 tablet, Rfl: 5  •  tadalafil (CIALIS) 5 MG tablet, Take 1 tablet by mouth Daily As Needed for Erectile Dysfunction. (Patient taking differently: Take 5 mg by mouth Daily As Needed for Erectile Dysfunction. HOLD PRIOR TO SURGERY), Disp: 30 tablet, Rfl: 2  No current facility-administered  medications for this visit.    Facility-Administered Medications Ordered in Other Visits:   •  fluorouracil (ADRUCIL) 3,850 mg in sodium chloride 0.9 % 240 mL chemo infusion - FOR HOME USE, 1,800 mg/m2 (Treatment Plan Recorded), Intravenous, Once, Niya Olmos APRN, 3,850 mg at 01/10/23 1431    ALLERGIES:   No Known Allergies    SOCIAL HISTORY:       Social History     Socioeconomic History   • Marital status:      Spouse name: Chelsie   • Years of education: 12   Tobacco Use   • Smoking status: Some Days     Packs/day: 1.00     Years: 20.00     Pack years: 20.00     Types: Cigars, Cigarettes   • Smokeless tobacco: Never   • Tobacco comments:     OCCASIONALLY   Vaping Use   • Vaping Use: Never used   Substance and Sexual Activity   • Alcohol use: Yes     Alcohol/week: 6.0 standard drinks     Types: 6 Cans of beer per week     Comment: 6 drinks weekly   • Drug use: No         FAMILY HISTORY:  Family History   Problem Relation Age of Onset   • Clotting disorder Other    • Colon cancer Paternal Grandmother    • Colon cancer Paternal Grandfather    • Clotting disorder Father    • Malig Hyperthermia Neg Hx          Vitals:    01/10/23 1024   BP: 139/80   Pulse: 70   Resp: 16   Temp: 97.1 °F (36.2 °C)   TempSrc: Temporal   SpO2: 97%   Weight: 93.4 kg (206 lb)   Height: 177.8 cm (70\")   PainSc:   3   PainLoc: Teeth     Current Status 1/10/2023   ECOG score 0     Physical Exam  Vitals reviewed.   Constitutional:       General: He is not in acute distress.     Appearance: Normal appearance. He is well-developed.   HENT:      Head: Normocephalic and atraumatic.   Eyes:      Pupils: Pupils are equal, round, and reactive to light.   Cardiovascular:      Rate and Rhythm: Normal rate and regular rhythm.      Heart sounds: Normal heart sounds. No murmur heard.  Pulmonary:      Effort: Pulmonary effort is normal. No respiratory distress.      Breath sounds: Normal breath sounds. No wheezing, rhonchi or rales.    Abdominal:      General: Bowel sounds are normal. There is no distension.      Palpations: Abdomen is soft.   Musculoskeletal:         General: Normal range of motion.      Cervical back: Normal range of motion.   Skin:     General: Skin is warm and dry.      Findings: No rash.   Neurological:      Mental Status: He is alert and oriented to person, place, and time.       RECENT LABS:    Results from last 7 days   Lab Units 01/10/23  0958   WBC 10*3/mm3 4.02   HEMOGLOBIN g/dL 11.7*   HEMATOCRIT % 36.5*   PLATELETS 10*3/mm3 108*     Component      Latest Ref Rng & Units 12/13/2022   Glucose      74 - 124 mg/dL 128 (H)   BUN      6 - 20 mg/dL 20   Creatinine      0.70 - 1.30 mg/dL 1.01   Sodium      134 - 145 mmol/L 137   Potassium      3.5 - 4.7 mmol/L 4.7   Chloride      98 - 107 mmol/L 99   CO2      22.0 - 29.0 mmol/L 27.0   Calcium      8.5 - 10.2 mg/dL 10.2   Total Protein      6.3 - 8.0 g/dL 7.1   Albumin      3.50 - 5.20 g/dL 4.20   ALT (SGPT)      0 - 41 U/L 28   AST (SGOT)      0 - 40 U/L 25   Alkaline Phosphatase      38 - 116 U/L 83   Total Bilirubin      0.2 - 1.2 mg/dL 0.5   Globulin      1.8 - 3.5 gm/dL 2.9   A/G Ratio      1.1 - 2.4 g/dL 1.4   BUN/Creatinine Ratio      7.3 - 30.0 19.8   Anion Gap      5.0 - 15.0 mmol/L 11.0   eGFR      >60.0 mL/min/1.73 79.5      Lab Results   Component Value Date    GLUCOSE 159 (H) 01/10/2023    BUN 19 01/10/2023    CREATININE 0.94 01/10/2023    EGFRIFNONA 70 02/23/2022    EGFRIFAFRI 81 02/23/2022    BCR 20.2 01/10/2023    K 4.3 01/10/2023    CO2 26.4 01/10/2023    CALCIUM 10.1 01/10/2023    PROTENTOTREF 7.2 08/24/2022    ALBUMIN 4.2 01/10/2023    LABIL2 1.9 08/24/2022    AST 24 01/10/2023    ALT 20 01/10/2023     Lab Results   Component Value Date    MZWXRHQZ02 260 08/24/2022     Lab Results   Component Value Date    FOLATE 15.50 09/22/2022     Lab Results   Component Value Date    TIBC 593 09/08/2022    FERRITIN 10.70 (L) 09/08/2022   Iron saturation 4% on  9/18/2022.    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  MSI stable by DNA sequencing, and also MMR proficient by IHC staining.  Tumor mutation burden is low 8 mut/Mb.  Patient was positive for PTEN 1+, 100% by IHC, PD-L1 was negative, only 1% by IHC.  Patient also positive for APC mutation exon 16p.[id number], positive for AMACR1 exon2 p.R358*.  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        IMAGING STUDY:      Assessment & Plan     ASSESSMENT:    * Colon cancer post right hemicolectomy 10/11/2022.  Stage IV ([id number]).  · Had positive stool occult blood test on 09/09/2022. Colonoscopic examination on 09/20/2022 by Dr. Anil Garces reported cecum colon mass.  Biopsy confirmed moderately differentiated adenocarcinoma.  MSI stable.  · Mildly elevated CEA level 10.7 ng/mL on 9/22/2022.  · CT scan for chest abdomen pelvis 9/26/2022 reported cecal mass.  There was also suspicious 9 mm hepatic lesion.  · Abdomen MRI examination with and without contrast on 10/7/2022 confirmed 8 mm lesion hypervascularity in the segment 5 of the liver.  · Patient had right hemicolectomy 10/11/2022.  Unable to biopsy at this time he liver lesion.  Pathology evaluation reported [id number] disease, this will be at least a stage IIIb colon cancer.  · On 11/2/2022 I discussed with the patient, his wife and his daughter, recommending further imaging study with PET scan for assessment.  If patient is no hypermetabolic liver lesion, will treat with adjuvant FOLFOX 6 regimen every 2 weeks for 12 cycles.  However, if he has hypermetabolic lesion in the liver, we will treated him as metastatic disease, with FOLFOX plus Avastin.  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  If indicated patient has metastatic disease, we would also entertain metastectomy after finishing 12 cycles of chemotherapy.  · We should also testing for [facility] NGS genetic study to see if he would be a candidate for other treatment.  · Discussed with the patient and family members about potential side effects including bone marrow suppression, with anemia thrombocytopenia and leukocytopenia increased risk for infection, and also peripheral neuropathy, etc. patient is agreeable to proceed ahead with treatment.  · The patient has PET scan examination on 11/14/2022 which reported a small 1.3 cm focus with elevated SUV 6.03, highly suspicious for metastatic disease. The patient now has stage IV. The patient had [facility] NGS study which reported K-aashish mutation, tumor mutation burden < 10, MSI was stable and anti-PD-L1 negative.  Not a candidate for anti-EGFR monoclonal antibody such as Vectibix, or chekpoint inhibitor immunotherapy.  We discussed the adding anti-VEGF monoclonal antibody, bevacizumab/Avastin starting in 2 weeks so that it would be after 6 weeks of primary surgery for the colon cancer resection.  We discussed this is now stage IV disease, however because only having 1 solitary metastatic lesion in the liver, it is potentially curable so we will be as aggressive as possible for chemotherapy.  If he has good response, in the future he will need metastectomy of the liver lesion.  · Today on 11/29/2022 patient is presented for cycle #2 of chemotherapy. Patient tolerated therapy well except mild oral mucositis. However, laboratory studies showed significant decrease in platelets at only 108,000, as well as also decreasing WBC and hemoglobin. Discussed with the patient today if we do not carry out any dose reduction, he will likely become more thrombocytopenic next time in 2 weeks and we will not be able to do cycle #3 chemotherapy on time. I discussed with the patient for  dose reduction and to avoid potential delay of chemotherapy and he is agreeable. We will have 25% dose reduction for 5-FU leucovorin and oxaliplatin.  · On 11/29/2022 he was started the first dose of Avastin/bevacizumab in conjunction with chemotherapy cycle #2.  · On 12/13/2022, the patient presented for reevaluation prior to chemotherapy cycle #3. He has stable platelets of 108,000 and slightly improved WBC of 5100 and hemoglobin 12.8 g/dL. He will continue cycle 3 with the same 25% dose reduction.  · 1/10/2023 due for cycle 5 FOLFOX bevacizumab, overall is tolerating fairly well.  Hemoglobin 11.7, platelet count stable at 108,000, WBC 4.02, ANC 2.27.     2. Iron deficiency anemia.   · The patient has iron deficiency due to GI bleeding from his colon cancer. His laboratory study on 09/08/2022 reported ferritin 10.7 and iron saturation 4% with microcytic hypochromic anemia, hemoglobin 7.8, MCV 79.9 and MCHC 29.2.   · Patient reports good tolerance to oral iron treatment and already has improved energy level. Laboratory study on 9/22/2022 did report slightly improved hemoglobin at 8.5 but normalized MCV 85.0. I think he is responding to oral iron treatment both physically and laboratory wise.  We advised patient to increase oral iron to twice a day.  · On 11/2/2022 patient reports good tolerance to oral iron twice a day.  Labs today showed further improved hemoglobin 10.9.  He continues to maintain normal platelets and WBC.  · Improved hemoglobin 12.1 on 11/15/2019.  Cycle #1 chemotherapy will be started.  · On 11/29/2022 hemoglobin 11.7.  · On 12/13/2022, his hemoglobin is 12.8 g/dL.  · 1/10/2023 hemoglobin 11.7.       3.  Low normal vitamin B12 level.    · Patient had a marginal normal vitamin B12 level at 260 pg/mL on 08/24/2022.   · We obtained a folate level on 9/22/2022 which was normal at 15.5 ng/mL.    · We asked patient to take oral vitamin B12 supplement at 1000 mcg daily.        4.  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  · Prior to starting chemotherapy patient had low normal platelets 156,000 on 11/15/2022.  Started on cycle #1 chemotherapy FOLFOX 6.  · On 11/29/2022 patient had platelets of 108,000.  Patient reports no bleeding or bruising.  Discussed with the patient, because of foreseeable more severe thrombocytopenia, we recommended 25% dose reduction starting from cycle #2 chemotherapy.  · On 12/13/2022, the patient has same decreased number of platelets 108,000. He will continue chemotherapy with same dose reduction of 25%.   · 1/10/2023 platelet count stable at 108,000.    5. Peripheral neuropathy  * On 12/13/2022, he reports cold sensitivity in his mouth when he drinks cold water. The cold sensitivity usually dissipates after 3 to 4 days.    *Weight losses.    · On 12/13/2022, the patient reports he has lost weight in the past couple of months. He reports a poor appetite for 3 days after chemotherapy and a poor taste in his mouth. His appetite has since improved. He only had mild nausea, but no vomiting. He was encouraged to use Ensure or Boost to improve nutrition supplement. He already started using protein powder.      *[place] extraction: Tooth patient states that he needs to have his wisdom teeth cut out, currently not scheduled.  Reviewed with Dr. Rosario.  Patient will need to have this done on his off week of treatment.  We will likely hold bevacizumab 2 weeks prior to this procedure in 4 weeks following.  Patient states that this will likely not happen until March, but is going to contact his oral surgeon, Dr. Charlie Zazueta to try to go ahead and get an appointment.    PLAN:  1. Proceed with cycle 5 FOLFOX plus Avastin today with continued 25% dose reduction in chemotherapy however full dose Avastin.  2. Continue antiemetics if needed.  3. Continue oral iron twice daily.  4. Continue oral vitamin B12 1000 mcg daily.  5. Continue to follow with PCP for management of hypertension and  diabetes.  6. Schedule patient for CT scan of the chest, abdomen, pelvis to evaluate treatment response.  7. Return in 2 weeks for follow-up with Dr. Rosario to review scan results with repeat labs reevaluation and continued FOLFOX plus bevacizumab.    8. Call/ return sooner should he develop any new concerns or problems.      Patient is on a high risk medication requiring close monitoring for toxicity.         JULIA Nathan        CC:  JULIA Santiago MD Richard Pokorny, MD    Oral surgeon:  Dr. Charlie Zazueta -- [address]--   Phone [phone]

## 2024-06-11 ENCOUNTER — INFUSION (OUTPATIENT)
Dept: ONCOLOGY | Facility: HOSPITAL | Age: 74
End: 2024-06-11
Payer: MEDICARE

## 2024-06-11 DIAGNOSIS — Z45.2 FITTING AND ADJUSTMENT OF VASCULAR CATHETER: Primary | ICD-10-CM

## 2024-06-11 PROCEDURE — 96523 IRRIG DRUG DELIVERY DEVICE: CPT

## 2024-06-11 PROCEDURE — 25010000002 HEPARIN LOCK FLUSH PER 10 UNITS: Performed by: INTERNAL MEDICINE

## 2024-06-11 RX ORDER — SODIUM CHLORIDE 0.9 % (FLUSH) 0.9 %
10 SYRINGE (ML) INJECTION AS NEEDED
Status: DISCONTINUED | OUTPATIENT
Start: 2024-06-11 | End: 2024-06-11 | Stop reason: HOSPADM

## 2024-06-11 RX ORDER — HEPARIN SODIUM (PORCINE) LOCK FLUSH IV SOLN 100 UNIT/ML 100 UNIT/ML
500 SOLUTION INTRAVENOUS AS NEEDED
Status: DISCONTINUED | OUTPATIENT
Start: 2024-06-11 | End: 2024-06-11 | Stop reason: HOSPADM

## 2024-06-11 RX ORDER — HEPARIN SODIUM (PORCINE) LOCK FLUSH IV SOLN 100 UNIT/ML 100 UNIT/ML
500 SOLUTION INTRAVENOUS AS NEEDED
OUTPATIENT
Start: 2024-06-11

## 2024-06-11 RX ORDER — SODIUM CHLORIDE 0.9 % (FLUSH) 0.9 %
10 SYRINGE (ML) INJECTION AS NEEDED
OUTPATIENT
Start: 2024-06-11

## 2024-06-11 RX ADMIN — Medication 10 ML: at 08:59

## 2024-06-11 RX ADMIN — Medication 500 UNITS: at 08:59

## 2024-07-23 ENCOUNTER — INFUSION (OUTPATIENT)
Dept: ONCOLOGY | Facility: HOSPITAL | Age: 74
End: 2024-07-23
Payer: MEDICARE

## 2024-07-23 DIAGNOSIS — Z45.2 FITTING AND ADJUSTMENT OF VASCULAR CATHETER: Primary | ICD-10-CM

## 2024-07-23 DIAGNOSIS — C78.7 SECONDARY LIVER CANCER: ICD-10-CM

## 2024-07-23 DIAGNOSIS — C18.2 MALIGNANT NEOPLASM OF ASCENDING COLON: ICD-10-CM

## 2024-07-23 LAB
ALBUMIN SERPL-MCNC: 4.2 G/DL (ref 3.5–5.2)
ALBUMIN/GLOB SERPL: 1.6 G/DL
ALP SERPL-CCNC: 65 U/L (ref 39–117)
ALT SERPL W P-5'-P-CCNC: 28 U/L (ref 1–41)
ANION GAP SERPL CALCULATED.3IONS-SCNC: 9.9 MMOL/L (ref 5–15)
AST SERPL-CCNC: 32 U/L (ref 1–40)
BASOPHILS # BLD AUTO: 0.04 10*3/MM3 (ref 0–0.2)
BASOPHILS NFR BLD AUTO: 0.8 % (ref 0–1.5)
BILIRUB SERPL-MCNC: 0.9 MG/DL (ref 0–1.2)
BUN SERPL-MCNC: 23 MG/DL (ref 8–23)
BUN/CREAT SERPL: 20.4 (ref 7–25)
CALCIUM SPEC-SCNC: 9.4 MG/DL (ref 8.6–10.5)
CEA SERPL-MCNC: 2.34 NG/ML
CHLORIDE SERPL-SCNC: 100 MMOL/L (ref 98–107)
CO2 SERPL-SCNC: 26.1 MMOL/L (ref 22–29)
CREAT SERPL-MCNC: 1.13 MG/DL (ref 0.76–1.27)
DEPRECATED RDW RBC AUTO: 52.5 FL (ref 37–54)
EGFRCR SERPLBLD CKD-EPI 2021: 68.6 ML/MIN/1.73
EOSINOPHIL # BLD AUTO: 0.41 10*3/MM3 (ref 0–0.4)
EOSINOPHIL NFR BLD AUTO: 8.1 % (ref 0.3–6.2)
ERYTHROCYTE [DISTWIDTH] IN BLOOD BY AUTOMATED COUNT: 13.7 % (ref 12.3–15.4)
FERRITIN SERPL-MCNC: 134 NG/ML (ref 30–400)
GLOBULIN UR ELPH-MCNC: 2.6 GM/DL
GLUCOSE SERPL-MCNC: 158 MG/DL (ref 65–99)
HCT VFR BLD AUTO: 37.5 % (ref 37.5–51)
HGB BLD-MCNC: 12.5 G/DL (ref 13–17.7)
IMM GRANULOCYTES # BLD AUTO: 0.01 10*3/MM3 (ref 0–0.05)
IMM GRANULOCYTES NFR BLD AUTO: 0.2 % (ref 0–0.5)
IRON 24H UR-MRATE: 102 MCG/DL (ref 59–158)
IRON SATN MFR SERPL: 30 % (ref 20–50)
LYMPHOCYTES # BLD AUTO: 1.09 10*3/MM3 (ref 0.7–3.1)
LYMPHOCYTES NFR BLD AUTO: 21.5 % (ref 19.6–45.3)
MCH RBC QN AUTO: 34.3 PG (ref 26.6–33)
MCHC RBC AUTO-ENTMCNC: 33.3 G/DL (ref 31.5–35.7)
MCV RBC AUTO: 103 FL (ref 79–97)
MONOCYTES # BLD AUTO: 0.4 10*3/MM3 (ref 0.1–0.9)
MONOCYTES NFR BLD AUTO: 7.9 % (ref 5–12)
NEUTROPHILS NFR BLD AUTO: 3.12 10*3/MM3 (ref 1.7–7)
NEUTROPHILS NFR BLD AUTO: 61.5 % (ref 42.7–76)
NRBC BLD AUTO-RTO: 0 /100 WBC (ref 0–0.2)
PLATELET # BLD AUTO: 130 10*3/MM3 (ref 140–450)
PMV BLD AUTO: 10.9 FL (ref 6–12)
POTASSIUM SERPL-SCNC: 4.4 MMOL/L (ref 3.5–5.2)
PROT SERPL-MCNC: 6.8 G/DL (ref 6–8.5)
RBC # BLD AUTO: 3.64 10*6/MM3 (ref 4.14–5.8)
SODIUM SERPL-SCNC: 136 MMOL/L (ref 136–145)
TIBC SERPL-MCNC: 346 MCG/DL (ref 298–536)
TRANSFERRIN SERPL-MCNC: 232 MG/DL (ref 200–360)
WBC NRBC COR # BLD AUTO: 5.07 10*3/MM3 (ref 3.4–10.8)

## 2024-07-23 PROCEDURE — 84466 ASSAY OF TRANSFERRIN: CPT

## 2024-07-23 PROCEDURE — 85025 COMPLETE CBC W/AUTO DIFF WBC: CPT

## 2024-07-23 PROCEDURE — 82728 ASSAY OF FERRITIN: CPT

## 2024-07-23 PROCEDURE — 36591 DRAW BLOOD OFF VENOUS DEVICE: CPT

## 2024-07-23 PROCEDURE — 25010000002 HEPARIN LOCK FLUSH PER 10 UNITS: Performed by: INTERNAL MEDICINE

## 2024-07-23 PROCEDURE — 82378 CARCINOEMBRYONIC ANTIGEN: CPT | Performed by: INTERNAL MEDICINE

## 2024-07-23 PROCEDURE — 80053 COMPREHEN METABOLIC PANEL: CPT

## 2024-07-23 PROCEDURE — 83540 ASSAY OF IRON: CPT

## 2024-07-23 RX ORDER — SODIUM CHLORIDE 0.9 % (FLUSH) 0.9 %
10 SYRINGE (ML) INJECTION AS NEEDED
Status: DISCONTINUED | OUTPATIENT
Start: 2024-07-23 | End: 2024-07-23 | Stop reason: HOSPADM

## 2024-07-23 RX ORDER — HEPARIN SODIUM (PORCINE) LOCK FLUSH IV SOLN 100 UNIT/ML 100 UNIT/ML
500 SOLUTION INTRAVENOUS AS NEEDED
Status: DISCONTINUED | OUTPATIENT
Start: 2024-07-23 | End: 2024-07-23 | Stop reason: HOSPADM

## 2024-07-23 RX ORDER — HEPARIN SODIUM (PORCINE) LOCK FLUSH IV SOLN 100 UNIT/ML 100 UNIT/ML
500 SOLUTION INTRAVENOUS AS NEEDED
OUTPATIENT
Start: 2024-07-23

## 2024-07-23 RX ORDER — SODIUM CHLORIDE 0.9 % (FLUSH) 0.9 %
10 SYRINGE (ML) INJECTION AS NEEDED
OUTPATIENT
Start: 2024-07-23

## 2024-07-23 RX ADMIN — Medication 500 UNITS: at 08:51

## 2024-07-23 RX ADMIN — Medication 10 ML: at 08:51

## 2024-07-29 LAB — REF LAB TEST METHOD: NORMAL

## 2024-08-06 ENCOUNTER — OFFICE VISIT (OUTPATIENT)
Dept: ONCOLOGY | Facility: CLINIC | Age: 74
End: 2024-08-06
Payer: MEDICARE

## 2024-08-06 VITALS
TEMPERATURE: 98.2 F | SYSTOLIC BLOOD PRESSURE: 144 MMHG | WEIGHT: 211.9 LBS | HEART RATE: 58 BPM | BODY MASS INDEX: 31.39 KG/M2 | DIASTOLIC BLOOD PRESSURE: 66 MMHG | HEIGHT: 69 IN | OXYGEN SATURATION: 96 %

## 2024-08-06 DIAGNOSIS — C78.7 SECONDARY LIVER CANCER: Primary | ICD-10-CM

## 2024-08-06 DIAGNOSIS — C18.2 MALIGNANT NEOPLASM OF ASCENDING COLON: ICD-10-CM

## 2024-08-06 DIAGNOSIS — T45.1X5A ANEMIA ASSOCIATED WITH CHEMOTHERAPY: ICD-10-CM

## 2024-08-06 DIAGNOSIS — D50.9 IRON DEFICIENCY ANEMIA, UNSPECIFIED IRON DEFICIENCY ANEMIA TYPE: ICD-10-CM

## 2024-08-06 DIAGNOSIS — D64.81 ANEMIA ASSOCIATED WITH CHEMOTHERAPY: ICD-10-CM

## 2024-08-06 NOTE — PROGRESS NOTES
.     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   CT scan examination on 9/26/2022 reported suspicious liver lesion 9 mm, otherwise no evidence for metastatic disease.    The patient underwent an abdominal MRI examination on 10/06/2022, which reported suspicious liver lesion 8mm.  Patient had sigmoidectomy on 10/11/2022.  Portacatheter placement on 10/21/2022.   PET scan examination on 11/14/2022 reported solitary hypermetabolic liver lesion.   Cycle #1 palliative chemotherapy FOLFOX 6 was started on 11/15/2022.   [facility] NGS study was positive for K-aashish mutation exon2 p.G13D.  Not a candidate for anti-EGFR monoclonal antibody treatment.   From cycle #2, bevacizumab/Avastin will be added to palliative chemotherapy.  Due to thrombocytopenia, all chemotherapy agents were 25% reduced from cycle #2.   1/24/203 further dose reduction of Oxaliplatin to 50% of original dose.  Cycle #12 chemotherapy was started on 4/18/2023.  *Iron deficiency anemia.  Oral iron treatment held on 2/21/2023.  Continue to monitor labs.                               HISTORY OF PRESENT ILLNESS:  The patient is a 74 y.o. 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 The lab study Guardant Reveal reported weakly positive ctDNA < 0.061%. His previous multiple studies were negative.    The patient reports overall well-being, with no complaints of abdominal pain, nausea, or vomiting. His bowel movements are regular, and he denies any instances of bleeding. His current medication regimen includes vitamin B12 and iron supplements,  taken once daily. He reports no weight loss and maintains a healthy appetite. However, he has observed a darkened stool due to oral iron intake, but otherwise, he maintains good tolerance.    Results  Laboratory Studies on 7/23/2024  Ferritin 134 ng/mL, iron saturation 30%, free iron 102, hemoglobin 12.5, .0, MCHC 33.3, WBC 5070 including neutrophils 3120, lymphocytes 1090. Glucose 158, creatinine 1.13. Total bilirubin 0.9 normal liver function panel.      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    Full dentures     GI (gastrointestinal bleed) September 8 2022    Hip pain     RIGHT    Hyperlipidemia     Hypertension     Iron deficiency anemia 09/22/2022    Numbness and tingling     RIGHT FOOT    PONV (postoperative nausea and vomiting)     Right leg pain     Type 2 diabetes mellitus without complication, without long-term current use of insulin 10/08/2019     Past Surgical History:   Procedure Laterality Date    CERVICAL DISCECTOMY LAMINECTOMY DECOMPRESSION POSTERIOR FUSION WITH INSTRUMENTATION      COLON RESECTION N/A 10/11/2022    Procedure: LAPAROSCOPIC RIGHT COLON RESECTION;  Surgeon: Ga Samaniego MD;  Location: [facility] MAIN OR;  Service: General;  Laterality: N/A;    COLONOSCOPY N/A 02/28/2018    Procedure: COLONOSCOPY to TI and cecum with polypectomy;  Surgeon: Anil Garces MD;  Location: [facility] ENDOSCOPY;  Service:     COLONOSCOPY  2/28/2018 and sept. 2022    found cancer 2022    COLONOSCOPY N/A 3/6/2024    Procedure: COLONOSCOPY TO ANASTAMOSIS & T.I.;  Surgeon: Anil Garces MD;  Location: [facility] ENDOSCOPY;  Service: Gastroenterology;   Laterality: N/A;  PRE- H/O COLON CANCER  POST-DIVERTICULI, NORMAL POST OP ANATOMY    KNEE ARTHROSCOPY Left     LUMBAR DISCECTOMY FUSION INSTRUMENTATION N/A 11/18/2020    Procedure: Lumbar 4 5 laminectomy with a posterior lateral fusion and instrumentation and interbody fusion;  Surgeon: Jeffrey Garcia MD;  Location: [facility];  Service: Neurosurgery;  Laterality: N/A;    SPINE SURGERY  December 2021    L4 and L5    TOTAL HIP ARTHROPLASTY Right 06/03/2021    Procedure: TOTAL HIP ARTHROPLASTY POSTERIOR;  Surgeon: Shlomo Campos MD;  Location: [facility] OR;  Service: Orthopedics;  Laterality: Right;    VASECTOMY      VENOUS ACCESS DEVICE (PORT) INSERTION N/A 10/21/2022    Procedure: INSERTION VENOUS ACCESS DEVICE;  Surgeon: Ga Samaniego MD;  Location: [facility];  Service: General;  Laterality: N/A;     HEMATOLOGY/MEDICAL ONCOLOGY HISTORY: The patient is a 72 y.o. year old male with hypertension, hyperlipidemia, type 2 diabetes, iron deficiency anemia, history of DVT, who presented on 9/22/2022 for initial evaluation because of iron deficiency anemia, referred by his primary care provider, JULIA Santiago. Patient is accompanied by his wife who helped with the history. They reported the patient actually was being diagnosed of colon cancer 2 days ago. His GI specialist, Dr. Anil Garces, performed colonoscopic examination and saw a distal colon mass. I do not have the report for the procedure nor do I have the pathology report but nevertheless there was a mass in the distal colon likely in the sigmoid colon. I will obtain a copy of those reports when available.      Patient reports he had no apparent melena or hematochezia before starting oral iron treatment. He did have however significant fatigue. He reports exertional dyspnea and no syncope. Patient had laboratory study recently on 09/08/2022 which reported severe iron deficiency anemia with hemoglobin 7.8, MCV 79.9, MCHC 29.2. Normal  "platelets 217,000 and WBC 7230 without differentiation. Iron study reported ferritin 10.7 ng/mL, free iron 23, TIBC 593 and iron saturation 4%. Chemistry lab reported creatinine 1.11, potassium 5.4, otherwise normal sodium chloride and calcium, glucose 137. Prior to that the patient had normal liver function on 08/24/2022. He had a normal TSH 2.5 and slightly elevated hemoglobin A1c of 6.8% on that day.      The patient reports he has been on oral iron for almost 2 weeks, once a day with good tolerance and he now noticed improved energy level and less exertional dyspnea. He does report stool becoming dark-colored after he started oral iron.  He reports no syncope.     Laboratory study on 09/22/2022 reported improved hemoglobin 8.5 and normalized MCV 85.0, stable MCHC 29.4. Maintains normal platelets and WBC.     I saw him originally on 9/22/2022 for initial evaluation for his sigmoid colon cancer and iron deficiency anemia.  Since that time patient had multiple imaging studies including CT for chest abdomen pelvis, subsequently with MRI for the abdomen for staging purposes.  He was also seen by Dr. Samaniego who performed right hemicolectomy on 10/11/2022.    Dr. Samaniego called me on the day of surgery, he also suspected this liver lesion is metastatic, however unable to reach that during the surgery.    Patient notes since surgery on 10/11/2022 of the bowel resection he is recovering \"okay\". The patient does report some abdominal pain when adrianna his stomach, specifically when getting out of bed. The patient denies any issues with his appetite, and is having normal urination and bowel movements. The patient denies constipation.  Patient reports no fever sweating or chills.    The patient has type II diabetes, which he manages with metformin. 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    Patient reports he is able to tolerate oral iron twice a day " with no specific complaints.  No nausea vomiting or constipation.      Laboratory study today on 11/2/2022 showed improved anemia with Hb 10.9, normalized MCV 92.5.  His normal platelets 159,000 WBC 6870 including ANC 4580.      PET scan examination on 11/14/2022 reported distal small 1.3 cm subtle low attenuation lesion in the medial hepatic segment with SUV 6.3.  There is no hypermetabolic enthesopathy in the abdomen or pelvis.  No suspicious hypermetabolic activity in the chest or neck.    Laboratory study on 11/15/2022 showed improved hemoglobin 12.1, normal platelets 156,000 and WBC 5720 including ANC 3750 and lymphocytes 1070.  Chemistry study reported glucose 156 otherwise unremarkable CMP.    Patient reports he developed a mild oral mucositis lasted about 4 days after the chemotherapy and now has resolved. He believes he has lost some weight, but states he does have an appetite and eats everyday.  He denies nausea vomiting.  He denies having diarrhea or constipation. He denies having any fever, chills, or sweating.  Denies peripheral neuropathy.     Laboratory results from on 11/29/2022 are; white cell count is 3950. neutrophils are 2340. Hemoglobin is 11.7 g/dL. Platelets are 108,000. Chemistry lab was unremarkable except glucose 255.    Laboratory studies on 3/21/2023 reported pancytopenia, with platelets 100,000, hemoglobin 10.0 .0, in the WBC 3130 including mild neutropenia with ANC 1610.     On 3/21/2023, we continue to hold his Avastin due to a tooth extraction on 3/13/2023.  He developed mild neutropenia with ANC 1610 so we gave the patient Levaquin daily for 7 days for prophylaxis.  Fortunately did not have infection.    Patient reports reasonable tolerance.  No specific complaints.    On 4/4/2023 patient has improved neutrophils 1870, and WBC 3270.  Continues to have anemia stable Hb 10.2, and platelets 96,000.  He reports no spontaneous bleeding or bruising.    The PET scan obtained  4/25/2023 reported resolution of the hypermetabolic subcapsular and medial hepatic segment lesion. However, there were developments of a few mildly hypermetabolic lymph nodes, one of them to the right subcarinal area with a maximum SUV 3.5, previously photopenic. There is a 9 x 6 mm right supraclavicular node with a maximum SUV 2.9, previous 3 mm and photopenic. There is also a 10 mm x 7 mm right paratracheal node stable in size, but is slightly hypermetabolic.    Lab study today on 5/1/2023 reported moderate thrombocytopenia platelets 86,000, hemoglobin 10.8, .3, and WBC 4590 including neutrophils 3200.    He was last seen on 5/1/2023 , he was referred to Dr. Griffin Tilley at the [facility] for evaluation of metastatic solitary liver lesion. Patient was seen by Dr. Tilley on 5/15/2023 for evaluation and after discussion, patient decided not to pursue surgical intervention instead of getting images studies periodically.     This patient had a PET scan examination on 7/31/2023 and is to review the results. The study the PET study reported no significant changes of a small 9 mm x 7 mm superior right paratracheal node and this noted is photopenic with a maximum SUV one point of five. Previously slightly hypermetabolic right subcarinal lymph node appears smaller and also now photopenic. There has been resolution of the hypermetabolic sub centimeter right supraclavicular lymph nodes. There was no hypermetabolic lymphadenopathy in the neck, supraclavicular, or chest. No suspicious metabolic hypermetabolic activity within the abdomen and specifically the liver, and also no hypermetabolic lymphadenopathy in the abdomen and the pelvis.    Laboratory study 7/31/2023 reported mild anemia with a hemoglobin 11.9, MCV 2.865, and correction hemoglobin 11.09, .01, MCHC 31.9, and platelets 132,000, WBC 4600 including neutrophils 2650. Normal CEA 2.044 and normal iron studies with a ferritin 140 ng/mL and  iron saturation 35% with a free iron 124, TIBC 358. Chemistry lab reported unremarkable CMP.      The CT scan examination on 10/12/2023 reported no evidence of disease recurrence.  There were postsurgical changes with the right hemicolectomy.  There is a right renal cyst, technically indeterminate. Otherwise, the liver, spleen, adrenal glands, left kidney, pancreas, stomach, small bowels, urinary bladder, and abnormal vasculature were normal. There was no enlarged lymphadenopathy in the abdomen and pelvis and no bone lesions.    The Guardant Reveal test was obtained on 10/12/2023 and reported back on 10/25/2023 as negative for ctDNA.    Other laboratory study on 10/12/2023 reported persistent but stable anemia with hemoglobin of 11.9, an MCV of 108.0, an MCHC of 31.3, WBC of 4720, neutrophils of 2900, lymphocytes of 1000, and platelets of 132,000. Iron studies showed ferritin of 69.1, free iron of 108, TIBC of 361, and iron saturation of 30%. The chemistry lab reported glucose 120, sodium 135, and otherwise unremarkable CMP.    We obtained laboratory studies on 01/09/2024 and the studies reported negative Guardant Reveal study with 0% ctDNA (circulating tumor DNA). Other laboratory studies reported both improved hemoglobin 13.3 and .3, MCHC 33.7. Maintains normalized platelets 164,000 and improved WBC 6000 including neutrophils 3890, lymphocytes 1430. Chemistry lab reported much improved ferritin 245, free iron 111, TIBC 347, and iron saturation 32%. Chemistry lab reported slightly elevated total bilirubin at 1.4, and mildly elevated ALT at 49 and otherwise normal AST at 37, and alkaline phosphatase 62. Glucose was 169. Normal electrolytes and baseline creatinine of 1.08.       Patient had a colonoscopy examination by Dr. Garces on 3/6/2024.  It was benign postsurgical changes, no sample collected.  Dr. Garces recommended repeating colonoscopy in 5 years.    His CT scan for abdomen pelvis with IV contrast  obtained 4/16/2024 reported no evidence for local disease recurrence or metastatic disease.    Laboratory studies today on 4/30/2024 reported worsening anemia Hb 12.7, and also worsening iron studies with deteriorated ferritin 78.8 ng/mL, and also trending down normal iron saturation 23% with free iron 89 TIBC 389.  Chemistry lab reported glucose 130, stable mild elevated total bilirubin 1.4 otherwise normal liver function panel, normal renal function and electrolytes.        MEDICATIONS:    Current Outpatient Medications:     acetaminophen (TYLENOL) 500 MG tablet, Take 1 tablet by mouth Every 6 (Six) Hours As Needed for Mild Pain., Disp: , Rfl:     aspirin 81 MG EC tablet, Take 1 tablet by mouth Daily. INSTRUCTED PT TO FOLLOW MD INSTRUCTIONS REGARDING HOLDING FOR SURGERY/PT STATED TO HOLD 5 DAYS PRIOR TO SURGERY, Disp: , Rfl:     atorvastatin (LIPITOR) 40 MG tablet, TAKE 1 TABLET EVERY DAY, Disp: 90 tablet, Rfl: 3    ferrous sulfate 325 (65 FE) MG tablet, Take 1 tablet by mouth Daily With Breakfast., Disp: 90 tablet, Rfl: 3    ibuprofen (ADVIL,MOTRIN) 600 MG tablet, Take  by mouth As Needed. Was for dental, Disp: , Rfl:     lisinopril-hydrochlorothiazide (PRINZIDE,ZESTORETIC) 20-12.5 MG per tablet, TAKE 1 TABLET EVERY NIGHT, Disp: 90 tablet, Rfl: 3    metFORMIN (GLUCOPHAGE) 500 MG tablet, TAKE 1 TABLET TWICE DAILY WITH MEALS, Disp: 180 tablet, Rfl: 3    tadalafil (CIALIS) 5 MG tablet, Take 1 tablet by mouth Daily As Needed for Erectile Dysfunction., Disp: 30 tablet, Rfl: 2    ALLERGIES:   No Known Allergies    SOCIAL HISTORY:       Social History     Socioeconomic History    Marital status:      Spouse name: Chelsie    Years of education: 12   Tobacco Use    Smoking status: Some Days     Types: Cigars    Smokeless tobacco: Never    Tobacco comments:     OCCASIONALLY   Vaping Use    Vaping status: Never Used   Substance and Sexual Activity    Alcohol use: Yes     Alcohol/week: 7.0 standard drinks of alcohol      "Types: 1 Glasses of wine, 6 Cans of beer per week     Comment: sometimes in warm weather, wine sometimes in the winter    Drug use: No    Sexual activity: Yes     Partners: Female     Birth control/protection: Surgical         FAMILY HISTORY:  Family History   Problem Relation Age of Onset    Clotting disorder Other     Colon cancer Paternal Grandmother     Colon cancer Paternal Grandfather     Clotting disorder Father     Malig Hyperthermia Neg Hx          Vitals:    08/06/24 0817   BP: 144/66   Pulse: 58   Temp: 98.2 °F (36.8 °C)   TempSrc: Temporal   SpO2: 96%   Weight: 96.1 kg (211 lb 14.4 oz)   Height: 175.3 cm (69.02\")   PainSc:   2   PainLoc: Arm         8/6/2024     8:16 AM   Current Status   ECOG score 0     Physical Exam  Vital Signs  Patient's weight is 211 pounds today.  GENERAL:  Well-developed, well-nourished in no acute distress.    SKIN:  Warm, dry without rashes, purpura or petechiae.  HEENT:  Normocephalic.   LYMPHATICS:  No cervical, supraclavicular or axillary adenopathy.  CHEST: Normal respiratory effort.  Lungs clear to auscultation. Good airflow.  CARDIAC:  Regular rate and rhythm. Normal S1,S2.  ABDOMEN:  Soft, no tender.  Bowel sounds normal.  EXTREMITIES:  No lower extremity edema.      RECENT LABS:  Lab Results   Component Value Date    WBC 5.07 07/23/2024    HGB 12.5 (L) 07/23/2024    HCT 37.5 07/23/2024    .0 (H) 07/23/2024     (L) 07/23/2024     Lab Results   Component Value Date    NEUTROABS 3.12 07/23/2024     Lab Results   Component Value Date    GLUCOSE 158 (H) 07/23/2024    BUN 23 07/23/2024    CREATININE 1.13 07/23/2024    EGFRRESULT 71.0 09/08/2022    EGFR 68.6 07/23/2024    BCR 20.4 07/23/2024    K 4.4 07/23/2024    CO2 26.1 07/23/2024    CALCIUM 9.4 07/23/2024    PROTENTOTREF 7.2 08/24/2022    ALBUMIN 4.2 07/23/2024    BILITOT 0.9 07/23/2024    AST 32 07/23/2024    ALT 28 07/23/2024       Lab Results   Component Value Date    MECTXLZO43 873 04/30/2024     Lab " Results   Component Value Date    FOLATE >20.00 04/04/2023     Lab Results   Component Value Date    CEA 2.34 07/23/2024     Lab Results   Component Value Date    IRON 102 07/23/2024    TIBC 346 07/23/2024    FERRITIN 134.00 07/23/2024   Iron saturation 30% on 7/23/2024 was 23% on 4/30/2024, 32% on 1/9/2024.        IMAGING STUDY:      Assessment & Plan       Assessment & Plan      ASSESSMENT:    1.  Colon cancer post right hemicolectomy 10/11/2022.  Stage IV ([id number]).  Had positive stool occult blood test on 09/09/2022. Colonoscopic examination on 09/20/2022 by Dr. Anil Garces reported cecum colon mass.  Biopsy confirmed moderately differentiated adenocarcinoma.  MSI stable.  Mildly elevated CEA level 10.7 ng/mL on 9/22/2022.  CT scan for chest abdomen pelvis 9/26/2022 reported cecal mass.  There was also suspicious 9 mm hepatic lesion.  Abdomen MRI examination with and without contrast on 10/7/2022 confirmed 8 mm lesion hypervascularity in the segment 5 of the liver.  Patient had right hemicolectomy 10/11/2022.  Unable to biopsy at this time he liver lesion.  Pathology evaluation reported [id number] disease, this will be at least a stage IIIb colon cancer.  On 11/2/2022 I discussed with the patient, his wife and his daughter, recommending further imaging study with PET scan for assessment.  If patient is no hypermetabolic liver lesion, will treat with adjuvant FOLFOX 6 regimen every 2 weeks for 12 cycles.  However, if he has hypermetabolic lesion in the liver, we will treat him as metastatic disease, with FOLFOX plus Avastin.  Since patient has neuropathy already being his legs and feet, if he is indeed metastatic disease, we may switch him to irinotecan instead of oxaliplatin because of the neuropathy.  If indicated patient has metastatic disease, we would also entertain metastectomy after finishing 12 cycles of chemotherapy.    PATHOLOGY: Tumor sample for [facility] NGS study reported positive for K-aashish mutation  exon2 p.G13D, negative for HER2/dorie by IHC 0 and no amplification by FISH.  MSI stable by DNA sequencing, and also MMR proficient by IHC staining.  Tumor mutation burden is low 8 mut/Mb.  Patient was positive for PTEN 1+, 100% by IHC, PD-L1 was negative, only 1% by IHC.  Patient also positive for APC mutation exon 16p.[id number], positive for AMACR1 exon2 p.R358*.  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    Discussed with the patient and family members about potential side effects including bone marrow suppression, with anemia thrombocytopenia and leukocytopenia increased risk for infection, and also peripheral neuropathy, etc. patient is agreeable to proceed ahead with treatment.  The patient has PET scan examination on 11/14/2022 which reported a small 1.3 cm focus with elevated SUV 6.3, highly suspicious for metastatic disease. The patient now has stage IV. The patient had [facility] NGS study which reported K-aashish mutation, tumor mutation burden < 10, MSI was stable and anti-PD-L1 negative.  Not a candidate for anti-EGFR monoclonal antibody such as Vectibix, or chekpoint inhibitor immunotherapy.  We discussed the adding anti-VEGF monoclonal antibody, bevacizumab/Avastin starting in 2 weeks so that it would be after 6 weeks of primary surgery for the colon cancer resection.  We discussed this is now stage IV disease, however because only having 1 solitary metastatic lesion in the liver, it is potentially curable so we will be as aggressive as possible for chemotherapy.  If he has good response, in the future he will need metastectomy of the liver lesion.  On 11/29/2022 patient is presented for cycle #2 of chemotherapy. Patient tolerated therapy well except mild oral mucositis. However, laboratory studies showed significant decrease in platelets at only 108,000, as well as also decreasing WBC and hemoglobin. Discussed with the patient today if we do not carry out any dose reduction, he will likely become more  thrombocytopenic next time in 2 weeks and we will not be able to do cycle #3 chemotherapy on time. I discussed with the patient for dose reduction and to avoid potential delay of chemotherapy and he is agreeable. We will have 25% dose reduction for 5-FU leucovorin and oxaliplatin.  On 11/29/2022 he was started the first dose of Avastin/bevacizumab in conjunction with chemotherapy cycle #2.  On 12/13/2022, the patient presented for reevaluation prior to chemotherapy cycle #3. He has stable platelets of 108,000 and slightly improved WBC of 5100 and hemoglobin 12.8 g/dL. He will continue cycle 3 with the same 25% dose reduction.  1/10/2023 due for cycle 5 FOLFOX bevacizumab, overall is tolerating fairly well.  Hemoglobin 11.7, platelet count stable at 108,000, WBC 4.02, ANC 2.27.  Patient has had five cycles of chemotherapy and a CT scan examination obtained on 01/20/2023. The CT scan reported favorable response as the lesion is less obvious.   1/24/2023 recommended to continue chemotherapy for a total of 12 cycles. Then obtain PET scan examination. Due to worsening thrombocytopenia, and also peripheral neuropathy, for cycle #6, I will decrease oxaliplatin by another 25% so would it be 50% of the original dose. I will keep the same dose of 5-FU which is 75% of the original dose.  Full dose Avastin.   2/7/2023 due for cycle 7.  Platelet count is holding steady at 100,000.  We will proceed with treatment today with same doses as given on cycle 6.   2/21/2023 due for cycle #8 chemotherapy. We will repeat every 2 weeks.  3/7/2023 proceed with cycle #9 chemotherapy and hold Avastin for dental procedure on 3/13/2023.  On 3/21/2023 we will proceed ahead with cycle #10 chemotherapy FOLFOX6 regimen.  Continue to hold Avastin.  On 4/4/2023 patient presented for evaluation prior to cycle #11 FOLFOX 6 regimen.  Continue to hold Avastin.  This will be resumed from next cycle.   The patient presented 4/18/2023 for reevaluation prior  "to his last cycle #12 FOLFOX plus Avastin. The patient reports tolerating well. We will proceed with cycle12 today and resume Avastin.  I recommended having a PET scan examination for assessment of response, especially the small metastatic liver lesion which was only found on the previous PET scan.  Patient had a PET scan examination on 04/25/2023, which reported complete resolution of the solitary hypermetabolic lesion in the liver. He had a mildly active small lymph node, peritracheal, subcarinal with low activity. Etiology not clear.   On 05/01/2023, I reviewed the PET scan images with the patient and his wife, and two daughters. I recommended to refer the patient to [facility], surgical oncology, Dr. Griffin Tilley, for evaluation to see if he would be a candidate for surgical resection of the liver segment where he had metastatic disease, which now has resolution of hypermetabolic activity after chemotherapy.  Patient was seen by Dr. Tilley on 5/15/2023. After lengthy discussion, patient opted to not having surgical intervention currently. He would rather to have serial images studies.   Patient had a PET scan on 7/29/2023, which reported no evidence for recurrent disease.   I discussed with the patient and his wife on 8/8/2023, recommended CT scan for abdomen and pelvis with i.v. contrast in 3 months, together with lab study \"Guardant Reveal\" for ctDNA together with routine labs, CBC, CMP, CEA level, for assessment of colon cancer.  The patient had laboratory studies on 10/12/2023, which reported negative for Guardant Reveal, ctDNA 0%. The patient also had normal CEA and liver function panel. The CT scan for the abdomen and pelvis was no evidence of disease recurrence.   Patient had laboratory studies on 01/09/2024 which reported negative for Guardant Reveal, ctDNA 0%. Other studies reported normal CEA at 1.93 ng/mL, marginally elevated ALT at 49 and total bilirubin 1.4 but normal AST at 37, " alkaline phosphatase 62. No evidence for disease recurrence. I do recommend to have repeat laboratory studies same as above together with CT scan for abdomen and pelvis with IV contrast.  Patient is also overdue for colonoscopy examination 1 year post his colon surgery.  Patient had a benign colonoscopy examination by Dr. Garces on 3/6/2024.  Dr. Garces recommended repeating colonoscopy in 5 years.  CT for abdomen pelvis with IV contrast on 4/16/2024 showed no evidence for cancer recurrence.  Guardant Reveal today on 4/30/2024 reported 0% ctDNA.   On 7/23/2024 the Guardant Reveal study reporting positive ctDNA, albeit < 0.061%.  Had  normal liver function panel, the physical examination remains unremarkable. Considering the positive ctDNA result, a CT scan of the abdomen and pelvis with oral and IV contrast is recommended within a week for reassessment. If the study is positive, a PET scan examination will be conducted. However, if the study is negative, the same laboratory studies will be repeated together with a CT scan for evaluation in 3 months.       2. Iron deficiency anemia.  Subsequently anemia also caused by chemotherapy.  The patient has iron deficiency due to GI bleeding from his colon cancer. His laboratory study on 09/08/2022 reported ferritin 10.7 and iron saturation 4% with microcytic hypochromic anemia, hemoglobin 7.8, MCV 79.9 and MCHC 29.2.   Patient reports good tolerance to oral iron treatment and already has improved energy level. Laboratory study on 9/22/2022 did report slightly improved hemoglobin at 8.5 but normalized MCV 85.0. I think he is responding to oral iron treatment both physically and laboratory wise.  We advised patient to increase oral iron to twice a day.  On 11/2/2022 patient reports good tolerance to oral iron twice a day.  Labs today showed further improved hemoglobin 10.9.  He continues to maintain normal platelets and WBC.  Improved hemoglobin 12.1 on 11/15/2022.  Cycle #1  chemotherapy will be started.  On 11/29/2022 hemoglobin 11.7.  On 12/13/2022, his hemoglobin is 12.8 g/dL.  1/10/2023 hemoglobin 11.7.  The patient has trending down hemoglobin 11.0 on 1/24/2023, however, he is asymptomatic.  Hemoglobin 2/7/2023, 10.6.  Iron study reported ferritin 229 and iron saturation 28% with free iron 108 TIBC 386.  No evidence for iron deficiency.  So his anemia is now secondary to chemotherapy.     The patient has stable mild anemia with hemoglobin 11.0 on 3/7/2023. We will continue to monitor.  Continue to hold ferrous sulfate.  On 3/21/2023, stable anemia with hemoglobin 10.0 .0.  Continue to monitor.  On 4/4/2023 patient has stable anemia Hb 10.2, .9.  The patient has stable anemia, hemoglobin 10.8 today on 04/18/2023. The patient reports no syncope.  On 05/01/2023, patient has a slightly improved hemoglobin 10.8. We will repeat iron studies in 3 months for reassessment.  Laboratory studies 7/31/2023 reported ferritin 140, free iron 124 and iron saturation 35%. He also had improved hemoglobin 11.9, .1. Discussed with him today 8/8/2023 and recommended monitoring.  The laboratory study on 10/12/2023 reported worsening ferritin by 50% to 69.1 ng/mL; however, maintains a reasonable iron saturation 30% with free iron 108. The patient also has persistent anemia with hemoglobin of 11.9. He has already been on oral vitamin B12 supplement. I asked the patient to start oral iron ferrous sulfate at 325 mg once a day. I discussed with him the possible side effects of constipation and stools becoming dark-colored constipation, nausea, and cramping, etc.    Laboratory studies on 01/09/2024 showed much improved hemoglobin 13.3 and also significantly improved ferritin 245, iron saturation 32% with free iron 111. I asked the patient to stop oral iron completely.   on 4/30/2024 patient has recurrent anemia hemoglobin 12.7.  Also worsening iron studies with deteriorated ferritin 78.8  ng/mL, and also trending down normal iron saturation 23% with free iron 89 TIBC 389.  Discussed with the patient, recommended to restart taking oral iron once a day.   On 8/6/2024 the patient reports tolerating oral iron treatment once daily. Laboratory studies from 07/23/2024 showed stable anemia and improved iron with ferritin 134 and iron saturating 30%. The patient has been advised to discontinue oral iron treatment for the time being.       3.    Pancytopenia secondary to chemotherapy.    This patient had original iron deficiency, however now has ongoing anemia due to chemotherapy.  He also has thrombocytopenia and neutropenia from chemotherapy.  Normal neutrophil prior to starting chemotherapy.    This patient developed first mild neutropenia with ANC 1610 on 3/21/2023.  Patient reports no fever sweating chills.  I recommended starting patient on Levaquin daily for 7 days, for prophylaxis of neutropenic fever.  We will continue to monitor for now.  Expecting this will getting worse with ongoing chemotherapy.  Question the patient to call us if he develops fever sweating chills.   4/4/2023, patient has slightly improved neutrophils 1870, and WBC 3270.  Continue to monitor.  On 04/18/2023, the patient has neutrophils 2030, WBC 3480. Continue with chemotherapy, monitor CBC.  On 05/01/2023 patient has improved WBC 4,590, including neutrophils 3,200.  7/31/2023 patient has normal WBC 4600, including ANC 2650 lymphocytes 1340.  The laboratory study on 10/12/2023 reported persistent thrombocytopenia with platelet counts of 132,000, and low normal WBC of 4720, and neutrophil count of 2930.    Pancytopenia. Laboratory study on 01/09/2024 reported normalized platelets 164,000, improved WBC 6000, neutrophils 3890 and also improved hemoglobin at 13.3.   On 4/30/2024 patient has normal WBC 5530, neutrophils 3330, platelets 141,000 and hemoglobin 12.7.   Laboratory study from 07/23/2024 reported normal WBC 5070, neutrophils  3120, lymphocytes 1090, mild thrombocytopenia platelets 130,000, stable anemia, hemoglobin 10.5 on 07/23/2024.    4.  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  Prior to starting chemotherapy patient had low normal platelets 156,000 on 11/15/2022.  Started on cycle #1 chemotherapy FOLFOX 6.  On 11/29/2022 patient had platelets of 108,000.  Patient reports no bleeding or bruising.  Discussed with the patient, because of foreseeable more severe thrombocytopenia, we recommended 25% dose reduction starting from cycle #2 chemotherapy.  On 12/13/2022, the patient has same decreased number of platelets 108,000. He will continue chemotherapy with same dose reduction of 25%.   1/10/2023 platelet count stable at 108,000.  On 01/24/2023, patient has worsening thrombocytopenia with platelets of 99,000 mcL. Discussed with the patient, we will cut oxaliplatin by another 25% to be at 50% of original dose. We will leave the 5-FU dose same as the previous at 75% of original dose. Avastin will be the same dose.  2/7/2023 platelet count 100,000.    On 02/21/2023 the patient has stable thrombocytopenia with platelets 102,000. The patient reports no bleeding or bruising. Continue to monitor.   On 3/7/2023 the patient has stable thrombocytopenia with platelets 103,000. The patient reports no bleeding or bruising. Continue to monitor.   On 3/21/2023 persistent stable thrombocytopenia with platelets 100,000.   On 4/4/2023 platelets 96,000.  We will go ahead and to proceed with chemotherapy.   Today on 04/18/2023, platelets 91,000. We will proceed ahead with the chemotherapy today. This is cycle 12 of chemotherapy. We will reassess his treatment after PET scan examination.  On 05/01/2023, patient has a persistent thrombocytopenia with platelets of 86,000. Patient is asymptomatic.  On 7/31/2023 improved platelets 132,000.  On 10/12/2023, stable thrombocytopenia with 132,000 platelets.  1/9/2024 normal platelets 164,000.  4/30/2024 marginal  normal platelets 141,000.  Laboratory study from 07/23/2024 reported mild thrombocytopenia platelets 130,000, stable anemia, hemoglobin 10.5 on 07/23/2024.    5. Peripheral neuropathy  On 12/13/2022, he reports cold sensitivity in his mouth when he drinks cold water. The cold sensitivity usually dissipates after 3 to 4 days.  Patient reports he is more significant cold sensitivities lasting for more than a week. He does have moderate tingling, numbness involving the fingers, but not much as the toes.  2/7/2023 he feels like the neuropathy/cold sensitivity is lingering a little bit longer with each cycle.    On 4/4/2023 the patient reports stable mild peripheral neuropathy/cold sensitivity.    On 04/18/2023, patient reports some mild numbness and tingling involving both feet and hands, but this is only last for a few days and then resolves. We will continue dose reduced oxaliplatin 50%.  On 05/01/2023, patient continues to have peripheral neuropathy intermittent and mild overall. Continue to monitor.  Stable peripheral neuropathy today.   Patient reports today on 01/30/2024 has stable mild peripheral neuropathy.   Stable condition today.    6.  Weight losses.    On 12/13/2022, the patient reports he has lost weight in the past couple of months. He reports a poor appetite for 3 days after chemotherapy and a poor taste in his mouth. His appetite has since improved. He only had mild nausea, but no vomiting. He was encouraged to use Ensure or Boost to improve nutrition supplement. He already started using protein powder.  3/7/2023, patient's weight continues to improve and he has gained a couple pounds.  His weight is stable at 211 pounds today.  He reports his appetite continues to improve.  He denies any nausea or vomiting.   On 04/18/2023, patient weighs with 209 pounds.  8/18/2023 patient weighs about 213 pounds  10/31/2023, the patient has a stable weight of 213 pounds.    The patient has slightly improved weight at  221 pounds today on 01/30/2024.   4/30/2024 stable weight 213 pounds.   8/6/2024, the patient's weight has remained stable for the past 3 months.    7.  [place] teeth extraction: Patient states that he needs to have his wisdom teeth pull out.  Reviewed with Dr. Rosario.  Patient will need to have this done on his off week of treatment.  We will likely hold bevacizumab 2 weeks prior to this procedure and 4 weeks following.  Patient states that this will likely not happen until March, but is going to contact his oral surgeon, Dr. Charlie Zazueta to try to go ahead and get an appointment.  [place] tooth extraction done on 3/13/2023.    Continue to hold Avastin 4/4/2023.  We will resume in 2 weeks for cycle #12 chemotherapy.    On 04/18/2023, the patient reports his gum has completely healed.  We will resume Avastin.    No specific complaints today.      8.  Low normal vitamin B12 level.    Patient had a marginal normal vitamin B12 level at 260 pg/mL on 08/24/2022.   Folate level on 9/22/2022 was normal at 15.5 ng/mL.    Continue oral vitamin B12 supplement at 1000 mcg daily.   Vitamin B12 was 658 pg/mL on 04/04/2023. This has improved. His macrocytosis is due to chemotherapy.  On 4/30/2024 improved B12 level 873 pg/mL.  Patient continues taking oral vitamin B12.  The patient has reported this condition today on 08/06/2024. He continues oral vitamin B12 daily. The patient is advised to continue oral vitamin B12 daily and to continue luis eduardo flush every 6 weeks.        PLAN:  Arrange CT scan for abdomen pelvis with IV contrast.    If positive we will obtain PET scan examination.  If negative we will repeat a CT scan in 3 months.  Discontinue oral iron once a day.  Continue port flush every 6 weeks.   Continue oral vitamin B12 at 1000 mcg daily.   Will make a further decision once the CT scan results available.    I spent 40 minutes caring for Taz on this date of service. This time includes time spent by me in the following  activities: preparing for the visit, reviewing tests, obtaining and/or reviewing a separately obtained history, performing a medically appropriate examination and/or evaluation, counseling and educating the patient/family/caregiver, ordering medications, tests, or procedures, referring and communicating with other health care professionals, documenting information in the medical record, independently interpreting results and communicating that information with the patient/family/caregiver and care coordination            Maya Rosario MD PhD        Addendum:  CT Abdomen Pelvis With Contrast  Narrative: CT ABDOMEN AND PELVIS WITH IV CONTRAST     HISTORY: Follow-up colon cancer, secondary liver cancer     TECHNIQUE: Radiation dose reduction techniques were utilized, including  automated exposure control and exposure modulation based on body size.  Axial images were obtained through the abdomen and pelvis after the  administration of IV contrast. Coronal and sagittal reformatted images  obtained.     COMPARISON: Multiple prior PET CTs and CT abdomen and pelvis  examinations     FINDINGS:      ABDOMEN:  Stable atelectasis or scarring in the right lung base. Small hiatal  hernia. There is a subtle rounded area of low-attenuation in the  anterior left lobe of the liver measuring only about 9 mm. This  corresponds with a metastatic lesion that was best seen on CT abdomen  pelvis 9/26/2022 and PET/CT 11/14/2022. However, it was not seen on the  subsequent PET CTs and was not appreciated on the most recent CT scan  from 4/16/2024. No other liver lesions are seen cholelithiasis. The  spleen is unremarkable. There is a cyst in the right kidney. The left  kidney is unremarkable. The adrenal glands are unremarkable. Pancreas is  unremarkable.     PELVIS:  Lower pelvis obscured by streak artifact. Prior right hemicolectomy.  Bone windows show bilateral hip arthroplasties and postsurgical changes  of the lumbar spine.      Impression: There is a small 9 mm rounded area of low-attenuation in the anterior  left lobe of the liver. This corresponds with the location of a  metastatic lesion best seen on PET/CT 11/14/2022 however not  metabolically active on subsequent PET CTs and not appreciated on most  recent contrast-enhanced CT of the abdomen pelvis. This is concerning  for possibility of disease recurrence although could reflect an old  metastatic lesion that has been treated and is no longer metabolically  active but is more conspicuous on this examination due to differences in  phase of contrast. Would recommend at this time evaluation with a PET/CT  to see if this lesion is metabolically active or not.     Radiation dose reduction techniques were utilized, including automated  exposure control and exposure modulation based on body size.        This report was finalized on 8/21/2024 4:05 PM by Dr. Oswald Larose M.D on Workstation: [id number]         Abnormal CT scan showed a suspicious liver lesion growing again.  Radiology recommended PET scan examination.  This fits with discussion with the patient on 8/6/2024.    Will obtain PET scan examination before he come back to see me.    GAMALIEL ROGERS M.D., Ph.D.            CC:  JULIA Santiago MD Richard Pokorny, MD   Oral surgeon: Dr. Charlie Zazueta Phone [phone]  Griffin Tilley M.D.

## 2024-08-21 ENCOUNTER — HOSPITAL ENCOUNTER (OUTPATIENT)
Dept: PET IMAGING | Facility: HOSPITAL | Age: 74
Discharge: HOME | End: 2024-08-21
Admitting: INTERNAL MEDICINE
Payer: MEDICARE

## 2024-08-21 ENCOUNTER — INFUSION (OUTPATIENT)
Dept: ONCOLOGY | Facility: HOSPITAL | Age: 74
End: 2024-08-21
Payer: MEDICARE

## 2024-08-21 DIAGNOSIS — C78.7 SECONDARY LIVER CANCER: ICD-10-CM

## 2024-08-21 DIAGNOSIS — C18.2 MALIGNANT NEOPLASM OF ASCENDING COLON: ICD-10-CM

## 2024-08-21 PROCEDURE — 0 DIATRIZOATE MEGLUMINE & SODIUM PER 1 ML: Performed by: INTERNAL MEDICINE

## 2024-08-21 PROCEDURE — 74177 CT ABD & PELVIS W/CONTRAST: CPT

## 2024-08-21 PROCEDURE — 25510000001 IOPAMIDOL 61 % SOLUTION: Performed by: INTERNAL MEDICINE

## 2024-08-21 RX ADMIN — DIATRIZOATE MEGLUMINE AND DIATRIZOATE SODIUM 30 ML: 660; 100 LIQUID ORAL; RECTAL at 08:01

## 2024-08-21 RX ADMIN — IOPAMIDOL 85 ML: 612 INJECTION, SOLUTION INTRAVENOUS at 08:01

## 2024-08-23 ENCOUNTER — TELEPHONE (OUTPATIENT)
Dept: GENERAL RADIOLOGY | Facility: HOSPITAL | Age: 74
End: 2024-08-23
Payer: MEDICARE

## 2024-08-23 ENCOUNTER — TELEPHONE (OUTPATIENT)
Dept: ONCOLOGY | Facility: CLINIC | Age: 74
End: 2024-08-23
Payer: MEDICARE

## 2024-08-23 DIAGNOSIS — C78.7 SECONDARY LIVER CANCER: Primary | ICD-10-CM

## 2024-08-23 DIAGNOSIS — C18.2 MALIGNANT NEOPLASM OF ASCENDING COLON: ICD-10-CM

## 2024-08-23 DIAGNOSIS — R93.89 ABNORMAL FINDING ON IMAGING: ICD-10-CM

## 2024-08-23 NOTE — TELEPHONE ENCOUNTER
----- Message from Maya Rosario sent at 8/22/2024 11:49 PM EDT -----  Regarding: PET scan  Bela and Maida,    CT scan showed a recurrent suspicious lesion in the liver.  Please let the patient know we will get PET scan examination as soon as possible and I can see him a few days to a week after that.    Thank you very much!    Abraham

## 2024-08-23 NOTE — TELEPHONE ENCOUNTER
Per Dr Rosario's request patient called and inform him there was a suspicious lesion in the liver therefore Dr Rosario is ordering a PET per the recommendations of the Radiologist.    Patient v/u

## 2024-08-26 ENCOUNTER — TELEPHONE (OUTPATIENT)
Dept: ONCOLOGY | Facility: CLINIC | Age: 74
End: 2024-08-26
Payer: MEDICARE

## 2024-08-26 NOTE — TELEPHONE ENCOUNTER
Called and lvm for the patient PET scan and f/u - with Dr Rosario asked that if he had questions to call timothy or mario

## 2024-08-30 ENCOUNTER — HOSPITAL ENCOUNTER (OUTPATIENT)
Dept: PET IMAGING | Facility: HOSPITAL | Age: 74
Discharge: HOME | End: 2024-08-30
Payer: MEDICARE

## 2024-08-30 DIAGNOSIS — R93.89 ABNORMAL FINDING ON IMAGING: ICD-10-CM

## 2024-08-30 DIAGNOSIS — C78.7 SECONDARY LIVER CANCER: ICD-10-CM

## 2024-08-30 DIAGNOSIS — C18.2 MALIGNANT NEOPLASM OF ASCENDING COLON: ICD-10-CM

## 2024-08-30 LAB — GLUCOSE BLDC GLUCOMTR-MCNC: 100 MG/DL (ref 70–130)

## 2024-08-30 PROCEDURE — 0 FLUDEOXYGLUCOSE F18 SOLUTION: Performed by: INTERNAL MEDICINE

## 2024-08-30 PROCEDURE — 82948 REAGENT STRIP/BLOOD GLUCOSE: CPT

## 2024-08-30 PROCEDURE — 78815 PET IMAGE W/CT SKULL-THIGH: CPT

## 2024-08-30 PROCEDURE — A9552 F18 FDG: HCPCS | Performed by: INTERNAL MEDICINE

## 2024-08-30 RX ADMIN — FLUDEOXYGLUCOSE F 18 1 DOSE: 200 INJECTION, SOLUTION INTRAVENOUS at 07:50

## 2024-09-05 ENCOUNTER — OFFICE VISIT (OUTPATIENT)
Dept: ONCOLOGY | Facility: CLINIC | Age: 74
End: 2024-09-05
Payer: MEDICARE

## 2024-09-05 VITALS
OXYGEN SATURATION: 97 % | RESPIRATION RATE: 14 BRPM | HEART RATE: 62 BPM | DIASTOLIC BLOOD PRESSURE: 73 MMHG | TEMPERATURE: 98 F | BODY MASS INDEX: 30.66 KG/M2 | WEIGHT: 207 LBS | SYSTOLIC BLOOD PRESSURE: 147 MMHG | HEIGHT: 69 IN

## 2024-09-05 DIAGNOSIS — D69.59 CHEMOTHERAPY-INDUCED THROMBOCYTOPENIA: ICD-10-CM

## 2024-09-05 DIAGNOSIS — D64.81 ANEMIA ASSOCIATED WITH CHEMOTHERAPY: ICD-10-CM

## 2024-09-05 DIAGNOSIS — R93.89 ABNORMAL FINDING ON IMAGING: ICD-10-CM

## 2024-09-05 DIAGNOSIS — C78.7 SECONDARY LIVER CANCER: Primary | ICD-10-CM

## 2024-09-05 DIAGNOSIS — T45.1X5A CHEMOTHERAPY-INDUCED THROMBOCYTOPENIA: ICD-10-CM

## 2024-09-05 DIAGNOSIS — E53.8 VITAMIN B12 DEFICIENCY: ICD-10-CM

## 2024-09-05 DIAGNOSIS — C18.2 MALIGNANT NEOPLASM OF ASCENDING COLON: ICD-10-CM

## 2024-09-05 DIAGNOSIS — T45.1X5A ANEMIA ASSOCIATED WITH CHEMOTHERAPY: ICD-10-CM

## 2024-09-05 PROCEDURE — 3078F DIAST BP <80 MM HG: CPT | Performed by: INTERNAL MEDICINE

## 2024-09-05 PROCEDURE — 1126F AMNT PAIN NOTED NONE PRSNT: CPT | Performed by: INTERNAL MEDICINE

## 2024-09-05 PROCEDURE — 3077F SYST BP >= 140 MM HG: CPT | Performed by: INTERNAL MEDICINE

## 2024-09-05 PROCEDURE — 99215 OFFICE O/P EST HI 40 MIN: CPT | Performed by: INTERNAL MEDICINE

## 2024-10-02 ENCOUNTER — INFUSION (OUTPATIENT)
Dept: ONCOLOGY | Facility: HOSPITAL | Age: 74
End: 2024-10-02
Payer: MEDICARE

## 2024-10-02 DIAGNOSIS — Z45.2 FITTING AND ADJUSTMENT OF VASCULAR CATHETER: Primary | ICD-10-CM

## 2024-10-02 PROCEDURE — 25010000002 HEPARIN LOCK FLUSH PER 10 UNITS: Performed by: INTERNAL MEDICINE

## 2024-10-02 PROCEDURE — 96523 IRRIG DRUG DELIVERY DEVICE: CPT

## 2024-10-02 RX ORDER — HEPARIN SODIUM (PORCINE) LOCK FLUSH IV SOLN 100 UNIT/ML 100 UNIT/ML
500 SOLUTION INTRAVENOUS AS NEEDED
OUTPATIENT
Start: 2024-10-02

## 2024-10-02 RX ORDER — HEPARIN SODIUM (PORCINE) LOCK FLUSH IV SOLN 100 UNIT/ML 100 UNIT/ML
500 SOLUTION INTRAVENOUS AS NEEDED
Status: DISCONTINUED | OUTPATIENT
Start: 2024-10-02 | End: 2024-10-02 | Stop reason: HOSPADM

## 2024-10-02 RX ORDER — SODIUM CHLORIDE 0.9 % (FLUSH) 0.9 %
10 SYRINGE (ML) INJECTION AS NEEDED
OUTPATIENT
Start: 2024-10-02

## 2024-10-02 RX ORDER — SODIUM CHLORIDE 0.9 % (FLUSH) 0.9 %
10 SYRINGE (ML) INJECTION AS NEEDED
Status: DISCONTINUED | OUTPATIENT
Start: 2024-10-02 | End: 2024-10-02 | Stop reason: HOSPADM

## 2024-10-02 RX ADMIN — Medication 10 ML: at 08:33

## 2024-10-02 RX ADMIN — Medication 500 UNITS: at 08:34

## 2024-10-09 ENCOUNTER — READMISSION MANAGEMENT (OUTPATIENT)
Dept: CALL CENTER | Facility: HOSPITAL | Age: 74
End: 2024-10-09
Payer: MEDICARE

## 2024-10-09 NOTE — OUTREACH NOTE
Prep Survey      Flowsheet Row Responses   [facility] facility patient discharged from? Non-[facility]   Is LACE score < 7 ? Non-BH Discharge   Eligibility [facility]   Date of Admission 10/08/24   Date of Discharge 10/09/24   Discharge Disposition Home or Self Care   Discharge diagnosis Metastatic colon cancer to liver   Does the patient have one of the following disease processes/diagnoses(primary or secondary)? Other   Does the patient have Home health ordered? No   Is there a DME ordered? No   Prep survey completed? Yes            STELLA MANTILLA - Registered Nurse

## 2024-10-10 ENCOUNTER — TRANSITIONAL CARE MANAGEMENT TELEPHONE ENCOUNTER (OUTPATIENT)
Dept: CALL CENTER | Facility: HOSPITAL | Age: 74
End: 2024-10-10
Payer: MEDICARE

## 2024-10-10 NOTE — OUTREACH NOTE
Call Center TCM Note      Flowsheet Row Responses   [facility] patient discharged from? Non-   Does the patient have one of the following disease processes/diagnoses(primary or secondary)? Other   TCM attempt successful? Yes   Call start time 1311   Call end time 1317   Discharge diagnosis Metastatic colon cancer to liver   Person spoke with today (if not patient) and relationship Patient   Meds reviewed with patient/caregiver? Yes   Is the patient having any side effects they believe may be caused by any medication additions or changes? No   Does the patient have all medications ordered at discharge? Yes   Is the patient taking all medications as directed (includes completed medication regime)? Yes   Does the patient have an appointment with their PCP within 7-14 days of discharge? No   Nursing Interventions Patient declined scheduling/rescheduling appointment at this time, Patient desires to follow up with specialty only   Has home health visited the patient within 72 hours of discharge? N/A   Psychosocial issues? No   Comments Patient states he is still sore from the lap liver resection. Incisions look good, no s/s of infection. Reviewed post op care with patient. He states he has a f/u appt with Oncology on 11/6/24 at 11:20 AM with Dr. Maya Rosario. He believes he will have to restart chemo again.   Did the patient receive a copy of their discharge instructions? Yes   Nursing interventions Reviewed instructions with patient   What is the patient's perception of their health status since discharge? Improving   Is the patient/caregiver able to teach back signs and symptoms related to disease process for when to call PCP? Yes   Is the patient/caregiver able to teach back signs and symptoms related to disease process for when to call 911? Yes   Is the patient/caregiver able to teach back the hierarchy of who to call/visit for symptoms/problems? PCP, Specialist, Home health nurse, Urgent Care, ED, 911 Yes    TCM call completed? Yes   Wrap up additional comments Patient does want to see JULIA Valle for a f/u appt but wishes to wait until after the Oncology f/u appt on 11/6/24. He will call and schedule an appt.   Call end time 1317   Would this patient benefit from a Referral to [facility]? No   Is the patient interested in additional calls from an ambulatory ? No            Kristine Alcantara RN    10/10/2024, 13:17 EDT

## 2024-11-06 ENCOUNTER — INFUSION (OUTPATIENT)
Dept: ONCOLOGY | Facility: HOSPITAL | Age: 74
End: 2024-11-06
Payer: MEDICARE

## 2024-11-06 ENCOUNTER — OFFICE VISIT (OUTPATIENT)
Dept: ONCOLOGY | Facility: CLINIC | Age: 74
End: 2024-11-06
Payer: MEDICARE

## 2024-11-06 VITALS
BODY MASS INDEX: 31.1 KG/M2 | DIASTOLIC BLOOD PRESSURE: 89 MMHG | TEMPERATURE: 98.1 F | OXYGEN SATURATION: 98 % | SYSTOLIC BLOOD PRESSURE: 159 MMHG | RESPIRATION RATE: 16 BRPM | HEIGHT: 69 IN | WEIGHT: 210 LBS | HEART RATE: 56 BPM

## 2024-11-06 DIAGNOSIS — D50.9 IRON DEFICIENCY ANEMIA, UNSPECIFIED IRON DEFICIENCY ANEMIA TYPE: ICD-10-CM

## 2024-11-06 DIAGNOSIS — R93.89 ABNORMAL FINDING ON IMAGING: ICD-10-CM

## 2024-11-06 DIAGNOSIS — C18.2 MALIGNANT NEOPLASM OF ASCENDING COLON: ICD-10-CM

## 2024-11-06 DIAGNOSIS — Z45.2 FITTING AND ADJUSTMENT OF VASCULAR CATHETER: Primary | ICD-10-CM

## 2024-11-06 DIAGNOSIS — E53.8 VITAMIN B12 DEFICIENCY: ICD-10-CM

## 2024-11-06 DIAGNOSIS — C78.7 SECONDARY LIVER CANCER: Primary | ICD-10-CM

## 2024-11-06 DIAGNOSIS — E11.9 TYPE 2 DIABETES MELLITUS WITHOUT COMPLICATION, WITHOUT LONG-TERM CURRENT USE OF INSULIN: Chronic | ICD-10-CM

## 2024-11-06 DIAGNOSIS — C78.7 SECONDARY LIVER CANCER: ICD-10-CM

## 2024-11-06 DIAGNOSIS — T45.1X5A ANEMIA ASSOCIATED WITH CHEMOTHERAPY: ICD-10-CM

## 2024-11-06 DIAGNOSIS — D64.81 ANEMIA ASSOCIATED WITH CHEMOTHERAPY: ICD-10-CM

## 2024-11-06 LAB
ALBUMIN SERPL-MCNC: 4 G/DL (ref 3.5–5.2)
ALBUMIN/GLOB SERPL: 1.3 G/DL
ALP SERPL-CCNC: 56 U/L (ref 39–117)
ALT SERPL W P-5'-P-CCNC: 23 U/L (ref 1–41)
ANION GAP SERPL CALCULATED.3IONS-SCNC: 12.5 MMOL/L (ref 5–15)
AST SERPL-CCNC: 28 U/L (ref 1–40)
BASOPHILS # BLD AUTO: 0.08 10*3/MM3 (ref 0–0.2)
BASOPHILS NFR BLD AUTO: 1.4 % (ref 0–1.5)
BILIRUB SERPL-MCNC: 0.7 MG/DL (ref 0–1.2)
BUN SERPL-MCNC: 17 MG/DL (ref 8–23)
BUN/CREAT SERPL: 19.5 (ref 7–25)
CALCIUM SPEC-SCNC: 9.1 MG/DL (ref 8.6–10.5)
CEA SERPL-MCNC: 1.94 NG/ML
CHLORIDE SERPL-SCNC: 103 MMOL/L (ref 98–107)
CO2 SERPL-SCNC: 23.5 MMOL/L (ref 22–29)
CREAT SERPL-MCNC: 0.87 MG/DL (ref 0.76–1.27)
DEPRECATED RDW RBC AUTO: 50.4 FL (ref 37–54)
EGFRCR SERPLBLD CKD-EPI 2021: 91.1 ML/MIN/1.73
EOSINOPHIL # BLD AUTO: 0.22 10*3/MM3 (ref 0–0.4)
EOSINOPHIL NFR BLD AUTO: 3.8 % (ref 0.3–6.2)
ERYTHROCYTE [DISTWIDTH] IN BLOOD BY AUTOMATED COUNT: 13.2 % (ref 12.3–15.4)
GLOBULIN UR ELPH-MCNC: 3 GM/DL
GLUCOSE SERPL-MCNC: 103 MG/DL (ref 65–99)
HCT VFR BLD AUTO: 36.2 % (ref 37.5–51)
HGB BLD-MCNC: 11.7 G/DL (ref 13–17.7)
IMM GRANULOCYTES # BLD AUTO: 0.01 10*3/MM3 (ref 0–0.05)
IMM GRANULOCYTES NFR BLD AUTO: 0.2 % (ref 0–0.5)
LYMPHOCYTES # BLD AUTO: 1.24 10*3/MM3 (ref 0.7–3.1)
LYMPHOCYTES NFR BLD AUTO: 21.5 % (ref 19.6–45.3)
MCH RBC QN AUTO: 33.6 PG (ref 26.6–33)
MCHC RBC AUTO-ENTMCNC: 32.3 G/DL (ref 31.5–35.7)
MCV RBC AUTO: 104 FL (ref 79–97)
MONOCYTES # BLD AUTO: 0.56 10*3/MM3 (ref 0.1–0.9)
MONOCYTES NFR BLD AUTO: 9.7 % (ref 5–12)
NEUTROPHILS NFR BLD AUTO: 3.67 10*3/MM3 (ref 1.7–7)
NEUTROPHILS NFR BLD AUTO: 63.4 % (ref 42.7–76)
NRBC BLD AUTO-RTO: 0 /100 WBC (ref 0–0.2)
PLATELET # BLD AUTO: 139 10*3/MM3 (ref 140–450)
PMV BLD AUTO: 10.3 FL (ref 6–12)
POTASSIUM SERPL-SCNC: 4.8 MMOL/L (ref 3.5–5.2)
PROT SERPL-MCNC: 7 G/DL (ref 6–8.5)
RBC # BLD AUTO: 3.48 10*6/MM3 (ref 4.14–5.8)
SODIUM SERPL-SCNC: 139 MMOL/L (ref 136–145)
WBC NRBC COR # BLD AUTO: 5.78 10*3/MM3 (ref 3.4–10.8)

## 2024-11-06 PROCEDURE — 36591 DRAW BLOOD OFF VENOUS DEVICE: CPT

## 2024-11-06 PROCEDURE — 85025 COMPLETE CBC W/AUTO DIFF WBC: CPT

## 2024-11-06 PROCEDURE — 80053 COMPREHEN METABOLIC PANEL: CPT

## 2024-11-06 PROCEDURE — 25010000002 HEPARIN LOCK FLUSH PER 10 UNITS: Performed by: INTERNAL MEDICINE

## 2024-11-06 PROCEDURE — 82378 CARCINOEMBRYONIC ANTIGEN: CPT | Performed by: INTERNAL MEDICINE

## 2024-11-06 RX ORDER — HEPARIN SODIUM (PORCINE) LOCK FLUSH IV SOLN 100 UNIT/ML 100 UNIT/ML
500 SOLUTION INTRAVENOUS AS NEEDED
Status: DISCONTINUED | OUTPATIENT
Start: 2024-11-06 | End: 2024-11-06 | Stop reason: HOSPADM

## 2024-11-06 RX ORDER — HEPARIN SODIUM (PORCINE) LOCK FLUSH IV SOLN 100 UNIT/ML 100 UNIT/ML
500 SOLUTION INTRAVENOUS AS NEEDED
OUTPATIENT
Start: 2024-11-06

## 2024-11-06 RX ORDER — SODIUM CHLORIDE 0.9 % (FLUSH) 0.9 %
10 SYRINGE (ML) INJECTION AS NEEDED
Status: DISCONTINUED | OUTPATIENT
Start: 2024-11-06 | End: 2024-11-06 | Stop reason: HOSPADM

## 2024-11-06 RX ORDER — SODIUM CHLORIDE 0.9 % (FLUSH) 0.9 %
10 SYRINGE (ML) INJECTION AS NEEDED
OUTPATIENT
Start: 2024-11-06

## 2024-11-06 RX ADMIN — Medication 500 UNITS: at 11:03

## 2024-11-06 RX ADMIN — Medication 10 ML: at 11:02

## 2024-11-06 NOTE — PROGRESS NOTES
.     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   CT scan examination on 9/26/2022 reported suspicious liver lesion 9 mm, otherwise no evidence for metastatic disease.    The patient underwent an abdominal MRI examination on 10/06/2022, which reported suspicious liver lesion 8mm.  Patient had sigmoidectomy on 10/11/2022.  Portacatheter placement on 10/21/2022.   PET scan examination on 11/14/2022 reported solitary hypermetabolic liver lesion.   Cycle #1 palliative chemotherapy FOLFOX 6 was started on 11/15/2022.   [facility] NGS study was positive for K-aashish mutation exon2 p.G13D.  Not a candidate for anti-EGFR monoclonal antibody treatment.   From cycle #2, bevacizumab/Avastin will be added to palliative chemotherapy.  Due to thrombocytopenia, all chemotherapy agents were 25% reduced from cycle #2.   1/24/203 further dose reduction of Oxaliplatin to 50% of original dose.  Cycle #12 chemotherapy was started on 4/18/2023.  *Iron deficiency anemia.  Oral iron treatment held on 2/21/2023.  Continue to monitor labs.                               HISTORY OF PRESENT ILLNESS:  The patient is a 74 y.o. 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    He was last seen on 08/06/2024 for an evaluation. At that time, he had a positive ctDNA, which was < 0.061%. Consequently, a CT scan of the abdomen and pelvis was obtained for further evaluation, revealing a vague area in the liver measuring approximately 2 cm.    Subsequently a PET scan examination 8/30/2024 reported a hypermetabolic lesion in the  liver with SUV 8.7.  No evidence for metastatic disease otherwise.    Patient was referred to Dr. Tilley at surgical oncology of the [facility].    The patient presented today on 11/06/2024 for evaluation after his recent surgery for resection of a solitary liver lesion on 10/08/2024 by Dr. Tilley at [facility].    He reports no significant complaints, although he does experience occasional soreness, which is gradually improving. His appetite is good, and he has regular bowel movements. He reports no presence of blood in his stools. His energy levels are satisfactory, and he does not experience any dizziness or lightheadedness.    He is currently on metformin for glucose control and has discontinued his iron supplement. He has an appointment with Dr. Tilley scheduled for tomorrow morning.    Results  Laboratory Studies on 11/6/2024  Hemoglobin 11.7, .0, platelets 139,000, WBC 5780, neutrophils 3670. Glucose 103, creatinine 0.87.  CEA 1.94 ng/mL, and unremarkable CMP.        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    Full dentures     GI (gastrointestinal bleed) September 8 2022    Hip pain     RIGHT    Hyperlipidemia     Hypertension     Iron deficiency anemia 09/22/2022    Numbness and tingling     RIGHT FOOT    PONV (postoperative nausea and vomiting)     Right leg pain     Type 2 diabetes mellitus without complication, without long-term current use of insulin 10/08/2019     Past Surgical History:   Procedure Laterality Date    CERVICAL DISCECTOMY LAMINECTOMY DECOMPRESSION POSTERIOR FUSION WITH INSTRUMENTATION       COLON RESECTION N/A 10/11/2022    Procedure: LAPAROSCOPIC RIGHT COLON RESECTION;  Surgeon: Ga Samaniego MD;  Location: [facility] MAIN OR;  Service: General;  Laterality: N/A;    COLONOSCOPY N/A 02/28/2018    Procedure: COLONOSCOPY to TI and cecum with polypectomy;  Surgeon: Anil Garces MD;  Location: [facility] ENDOSCOPY;  Service:     COLONOSCOPY  2/28/2018 and sept. 2022    found cancer 2022    COLONOSCOPY N/A 3/6/2024    Procedure: COLONOSCOPY TO ANASTAMOSIS & T.I.;  Surgeon: Anil Garces MD;  Location: [facility] ENDOSCOPY;  Service: Gastroenterology;  Laterality: N/A;  PRE- H/O COLON CANCER  POST-DIVERTICULI, NORMAL POST OP ANATOMY    KNEE ARTHROSCOPY Left     LUMBAR DISCECTOMY FUSION INSTRUMENTATION N/A 11/18/2020    Procedure: Lumbar 4 5 laminectomy with a posterior lateral fusion and instrumentation and interbody fusion;  Surgeon: Jeffrey Garcia MD;  Location: [facility] MAIN OR;  Service: Neurosurgery;  Laterality: N/A;    SPINE SURGERY  December 2021    L4 and L5    TOTAL HIP ARTHROPLASTY Right 06/03/2021    Procedure: TOTAL HIP ARTHROPLASTY POSTERIOR;  Surgeon: Shlomo Campos MD;  Location: [facility] MAIN OR;  Service: Orthopedics;  Laterality: Right;    VASECTOMY      VENOUS ACCESS DEVICE (PORT) INSERTION N/A 10/21/2022    Procedure: INSERTION VENOUS ACCESS DEVICE;  Surgeon: Ga Samaniego MD;  Location: [facility] OR OSC;  Service: General;  Laterality: N/A;     HEMATOLOGY/MEDICAL ONCOLOGY HISTORY: The patient is a 72 y.o. year old male with hypertension, hyperlipidemia, type 2 diabetes, iron deficiency anemia, history of DVT, who presented on 9/22/2022 for initial evaluation because of iron deficiency anemia, referred by his primary care provider, JULIA Santiago. Patient is accompanied by his wife who helped with the history. They reported the patient actually was being diagnosed of colon cancer 2 days ago. His GI specialist, Dr. Anil Garces, performed colonoscopic examination and saw a distal  colon mass. I do not have the report for the procedure nor do I have the pathology report but nevertheless there was a mass in the distal colon likely in the sigmoid colon. I will obtain a copy of those reports when available.      Patient reports he had no apparent melena or hematochezia before starting oral iron treatment. He did have however significant fatigue. He reports exertional dyspnea and no syncope. Patient had laboratory study recently on 09/08/2022 which reported severe iron deficiency anemia with hemoglobin 7.8, MCV 79.9, MCHC 29.2. Normal platelets 217,000 and WBC 7230 without differentiation. Iron study reported ferritin 10.7 ng/mL, free iron 23, TIBC 593 and iron saturation 4%. Chemistry lab reported creatinine 1.11, potassium 5.4, otherwise normal sodium chloride and calcium, glucose 137. Prior to that the patient had normal liver function on 08/24/2022. He had a normal TSH 2.5 and slightly elevated hemoglobin A1c of 6.8% on that day.      The patient reports he has been on oral iron for almost 2 weeks, once a day with good tolerance and he now noticed improved energy level and less exertional dyspnea. He does report stool becoming dark-colored after he started oral iron.  He reports no syncope.     Laboratory study on 09/22/2022 reported improved hemoglobin 8.5 and normalized MCV 85.0, stable MCHC 29.4. Maintains normal platelets and WBC.     I saw him originally on 9/22/2022 for initial evaluation for his sigmoid colon cancer and iron deficiency anemia.  Since that time patient had multiple imaging studies including CT for chest abdomen pelvis, subsequently with MRI for the abdomen for staging purposes.  He was also seen by Dr. Samaniego who performed right hemicolectomy on 10/11/2022.    Dr. Samaniego called me on the day of surgery, he also suspected this liver lesion is metastatic, however unable to reach that during the surgery.    Patient notes since surgery on 10/11/2022 of the bowel resection  "he is recovering \"okay\". The patient does report some abdominal pain when adrianna his stomach, specifically when getting out of bed. The patient denies any issues with his appetite, and is having normal urination and bowel movements. The patient denies constipation.  Patient reports no fever sweating or chills.    The patient has type II diabetes, which he manages with metformin. 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    Patient reports he is able to tolerate oral iron twice a day with no specific complaints.  No nausea vomiting or constipation.      Laboratory study today on 11/2/2022 showed improved anemia with Hb 10.9, normalized MCV 92.5.  His normal platelets 159,000 WBC 6870 including ANC 4580.      PET scan examination on 11/14/2022 reported distal small 1.3 cm subtle low attenuation lesion in the medial hepatic segment with SUV 6.3.  There is no hypermetabolic enthesopathy in the abdomen or pelvis.  No suspicious hypermetabolic activity in the chest or neck.    Laboratory study on 11/15/2022 showed improved hemoglobin 12.1, normal platelets 156,000 and WBC 5720 including ANC 3750 and lymphocytes 1070.  Chemistry study reported glucose 156 otherwise unremarkable CMP.    Patient reports he developed a mild oral mucositis lasted about 4 days after the chemotherapy and now has resolved. He believes he has lost some weight, but states he does have an appetite and eats everyday.  He denies nausea vomiting.  He denies having diarrhea or constipation. He denies having any fever, chills, or sweating.  Denies peripheral neuropathy.     Laboratory results from on 11/29/2022 are; white cell count is 3950. neutrophils are 2340. Hemoglobin is 11.7 g/dL. Platelets are 108,000. Chemistry lab was unremarkable except glucose 255.    Laboratory studies on 3/21/2023 reported pancytopenia, with platelets 100,000, hemoglobin 10.0 .0, in the WBC 3130 " including mild neutropenia with ANC 1610.     On 3/21/2023, we continue to hold his Avastin due to a tooth extraction on 3/13/2023.  He developed mild neutropenia with ANC 1610 so we gave the patient Levaquin daily for 7 days for prophylaxis.  Fortunately did not have infection.    Patient reports reasonable tolerance.  No specific complaints.    On 4/4/2023 patient has improved neutrophils 1870, and WBC 3270.  Continues to have anemia stable Hb 10.2, and platelets 96,000.  He reports no spontaneous bleeding or bruising.    The PET scan obtained 4/25/2023 reported resolution of the hypermetabolic subcapsular and medial hepatic segment lesion. However, there were developments of a few mildly hypermetabolic lymph nodes, one of them to the right subcarinal area with a maximum SUV 3.5, previously photopenic. There is a 9 x 6 mm right supraclavicular node with a maximum SUV 2.9, previous 3 mm and photopenic. There is also a 10 mm x 7 mm right paratracheal node stable in size, but is slightly hypermetabolic.    Lab study today on 5/1/2023 reported moderate thrombocytopenia platelets 86,000, hemoglobin 10.8, .3, and WBC 4590 including neutrophils 3200.    He was last seen on 5/1/2023 , he was referred to Dr. Griffin Tilley at the [facility] for evaluation of metastatic solitary liver lesion. Patient was seen by Dr. Tilley on 5/15/2023 for evaluation and after discussion, patient decided not to pursue surgical intervention instead of getting images studies periodically.     This patient had a PET scan examination on 7/31/2023 and is to review the results. The study the PET study reported no significant changes of a small 9 mm x 7 mm superior right paratracheal node and this noted is photopenic with a maximum SUV one point of five. Previously slightly hypermetabolic right subcarinal lymph node appears smaller and also now photopenic. There has been resolution of the hypermetabolic sub centimeter right  supraclavicular lymph nodes. There was no hypermetabolic lymphadenopathy in the neck, supraclavicular, or chest. No suspicious metabolic hypermetabolic activity within the abdomen and specifically the liver, and also no hypermetabolic lymphadenopathy in the abdomen and the pelvis.    Laboratory study 7/31/2023 reported mild anemia with a hemoglobin 11.9, MCV 2.865, and correction hemoglobin 11.09, .01, MCHC 31.9, and platelets 132,000, WBC 4600 including neutrophils 2650. Normal CEA 2.044 and normal iron studies with a ferritin 140 ng/mL and iron saturation 35% with a free iron 124, TIBC 358. Chemistry lab reported unremarkable CMP.      The CT scan examination on 10/12/2023 reported no evidence of disease recurrence.  There were postsurgical changes with the right hemicolectomy.  There is a right renal cyst, technically indeterminate. Otherwise, the liver, spleen, adrenal glands, left kidney, pancreas, stomach, small bowels, urinary bladder, and abnormal vasculature were normal. There was no enlarged lymphadenopathy in the abdomen and pelvis and no bone lesions.    The Guardant Reveal test was obtained on 10/12/2023 and reported back on 10/25/2023 as negative for ctDNA.    Other laboratory study on 10/12/2023 reported persistent but stable anemia with hemoglobin of 11.9, an MCV of 108.0, an MCHC of 31.3, WBC of 4720, neutrophils of 2900, lymphocytes of 1000, and platelets of 132,000. Iron studies showed ferritin of 69.1, free iron of 108, TIBC of 361, and iron saturation of 30%. The chemistry lab reported glucose 120, sodium 135, and otherwise unremarkable CMP.    We obtained laboratory studies on 01/09/2024 and the studies reported negative Guardant Reveal study with 0% ctDNA (circulating tumor DNA). Other laboratory studies reported both improved hemoglobin 13.3 and .3, MCHC 33.7. Maintains normalized platelets 164,000 and improved WBC 6000 including neutrophils 3890, lymphocytes 1430. Chemistry lab  reported much improved ferritin 245, free iron 111, TIBC 347, and iron saturation 32%. Chemistry lab reported slightly elevated total bilirubin at 1.4, and mildly elevated ALT at 49 and otherwise normal AST at 37, and alkaline phosphatase 62. Glucose was 169. Normal electrolytes and baseline creatinine of 1.08.       Patient had a colonoscopy examination by Dr. Garces on 3/6/2024.  It was benign postsurgical changes, no sample collected.  Dr. Garces recommended repeating colonoscopy in 5 years.    His CT scan for abdomen pelvis with IV contrast obtained 4/16/2024 reported no evidence for local disease recurrence or metastatic disease.    Laboratory studies today on 4/30/2024 reported worsening anemia Hb 12.7, and also worsening iron studies with deteriorated ferritin 78.8 ng/mL, and also trending down normal iron saturation 23% with free iron 89 TIBC 389.  Chemistry lab reported glucose 130, stable mild elevated total bilirubin 1.4 otherwise normal liver function panel, normal renal function and electrolytes.      Laboratory Studies on 7/23/2024  Ferritin 134 ng/mL, iron saturation 30%, free iron 102, hemoglobin 12.5, .0, MCHC 33.3, WBC 5070 including neutrophils 3120, lymphocytes 1090. Glucose 158, creatinine 1.13. Total bilirubin 0.9 normal liver function panel.    Laboratory Studies 8/6/2024 Positive circulating tumor DNA, < 0.061%.    CT scan of the abdomen and pelvis reported a vague area in the liver measuring about 2 cm.      MEDICATIONS:    Current Outpatient Medications:     acetaminophen (TYLENOL) 500 MG tablet, Take 1 tablet by mouth Every 6 (Six) Hours As Needed for Mild Pain., Disp: , Rfl:     aspirin 81 MG EC tablet, Take 1 tablet by mouth Daily. INSTRUCTED PT TO FOLLOW MD INSTRUCTIONS REGARDING HOLDING FOR SURGERY/PT STATED TO HOLD 5 DAYS PRIOR TO SURGERY, Disp: , Rfl:     atorvastatin (LIPITOR) 40 MG tablet, TAKE 1 TABLET EVERY DAY, Disp: 90 tablet, Rfl: 3    ferrous sulfate 325 (65 FE) MG tablet,  "Take 1 tablet by mouth Daily With Breakfast., Disp: 90 tablet, Rfl: 3    ibuprofen (ADVIL,MOTRIN) 600 MG tablet, Take  by mouth As Needed. Was for dental, Disp: , Rfl:     lisinopril-hydrochlorothiazide (PRINZIDE,ZESTORETIC) 20-12.5 MG per tablet, TAKE 1 TABLET EVERY NIGHT, Disp: 90 tablet, Rfl: 3    metFORMIN (GLUCOPHAGE) 500 MG tablet, TAKE 1 TABLET TWICE DAILY WITH MEALS, Disp: 180 tablet, Rfl: 3    tadalafil (CIALIS) 5 MG tablet, Take 1 tablet by mouth Daily As Needed for Erectile Dysfunction., Disp: 30 tablet, Rfl: 2  No current facility-administered medications for this visit.    ALLERGIES:   No Known Allergies    SOCIAL HISTORY:       Social History     Socioeconomic History    Marital status:      Spouse name: Chelsie    Years of education: 12   Tobacco Use    Smoking status: Some Days     Types: Cigars    Smokeless tobacco: Never    Tobacco comments:     OCCASIONALLY   Vaping Use    Vaping status: Never Used   Substance and Sexual Activity    Alcohol use: Yes     Alcohol/week: 7.0 standard drinks of alcohol     Types: 1 Glasses of wine, 6 Cans of beer per week     Comment: sometimes in warm weather, wine sometimes in the winter    Drug use: No    Sexual activity: Yes     Partners: Female     Birth control/protection: Surgical         FAMILY HISTORY:  Family History   Problem Relation Age of Onset    Clotting disorder Other     Colon cancer Paternal Grandmother     Colon cancer Paternal Grandfather     Clotting disorder Father     Malig Hyperthermia Neg Hx          Vitals:    11/06/24 1125   BP: 159/89   Pulse: 56   Resp: 16   Temp: 98.1 °F (36.7 °C)   TempSrc: Oral   SpO2: 98%   Weight: 95.3 kg (210 lb)   Height: 175.3 cm (69.02\")   PainSc: 0-No pain         11/6/2024    11:25 AM   Current Status   ECOG score 0     Physical Exam    GENERAL:  Well-developed, well-nourished male in no acute distress.    SKIN:  Warm, dry without rashes, purpura or petechiae.  LYMPHATICS:  No cervical, supraclavicular or " axillary adenopathy.  CHEST: Normal respiratory effort.  Lungs clear to auscultation. Good airflow.  CARDIAC:  Regular rate and rhythm. Normal S1,S2.  ABDOMEN:  Soft, no tender.  Bowel sounds normal.  EXTREMITIES:  No lower extremity edema.      RECENT LABS:  Lab Results   Component Value Date    WBC 5.78 11/06/2024    HGB 11.7 (L) 11/06/2024    HCT 36.2 (L) 11/06/2024    .0 (H) 11/06/2024     (L) 11/06/2024     Lab Results   Component Value Date    NEUTROABS 3.67 11/06/2024     Lab Results   Component Value Date    GLUCOSE 158 (H) 07/23/2024    BUN 23 07/23/2024    CREATININE 1.13 07/23/2024    EGFRRESULT 71.0 09/08/2022    EGFR 68.6 07/23/2024    BCR 20.4 07/23/2024    K 4.4 07/23/2024    CO2 26.1 07/23/2024    CALCIUM 9.4 07/23/2024    PROTENTOTREF 7.2 08/24/2022    ALBUMIN 4.2 07/23/2024    BILITOT 0.9 07/23/2024    AST 32 07/23/2024    ALT 28 07/23/2024       Lab Results   Component Value Date    [id number] 873 04/30/2024     Lab Results   Component Value Date    FOLATE >20.00 04/04/2023     Lab Results   Component Value Date    CEA 2.34 07/23/2024     Lab Results   Component Value Date    IRON 102 07/23/2024    TIBC 346 07/23/2024    FERRITIN 134.00 07/23/2024   Iron saturation 30% on 7/23/2024 was 23% on 4/30/2024, 32% on 1/9/2024.      PATHOLOGY:    Specimen: Tissue - Specimen from liver (specimen)  Component  Ref Range & Units 1 mo ago   Case Report Surgical Pathology Report                         Case: [id number]                                  Authorizing Provider:  Griffin Tilley MD   Collected:           10/08/2024 1446              Ordering Location:     [facility]     Received:            10/09/2024 0710              Pathologist:           Nj Thomas MD                                                          Specimen:    Liver, SEGMENT B TUMOR                                                                 Diagnosis    LIVER, SEGMENT 4B, LAPAROSCOPIC PARTIAL  HEPATECTOMY:               MODERATELY-DIFFERENTIATED ADENOCARCINOMA, UNIFOCAL, CONSISTENT WITH   METASTASIS FROM PATIENT'S REPORTED COLON PRIMARY.               METASTASIS MEASURES 1.5 CM IN GREATEST DIMENSION.               RESECTION MARGIN UNINVOLVED BY CARCINOMA BY 0.7 CM.               NON-NEOPLASTIC LIVER WITH MILD MACROVESICULAR STEATOSIS, NEGATIVE FOR  BRIDGING FIBROSIS AND CIRRHOSIS ON SPECIAL STAIN (TRICHROME).  SEE COMMENT.      Electronically signed by Nj Thomas MD on 10/14/2024 at  4:37 PM       IMAGING STUDY:      Assessment & Plan     Assessment & Plan    ASSESSMENT:    1.  Colon cancer post right hemicolectomy 10/11/2022.  Stage IV ([id number]).  Had positive stool occult blood test on 09/09/2022. Colonoscopic examination on 09/20/2022 by Dr. Anil Garces reported cecum colon mass.  Biopsy confirmed moderately differentiated adenocarcinoma.  MSI stable.  Mildly elevated CEA level 10.7 ng/mL on 9/22/2022.  CT scan for chest abdomen pelvis 9/26/2022 reported cecal mass.  There was also suspicious 9 mm hepatic lesion.  Abdomen MRI examination with and without contrast on 10/7/2022 confirmed 8 mm lesion hypervascularity in the segment 5 of the liver.  Patient had right hemicolectomy 10/11/2022.  Unable to biopsy he liver lesion.  Pathology evaluation reported [id number] disease, this will be at least a stage IIIb colon cancer.  On 11/2/2022 I discussed with the patient, his wife and his daughter, recommending further imaging study with PET scan for assessment.  If patient is no hypermetabolic liver lesion, will treat with adjuvant FOLFOX 6 regimen every 2 weeks for 12 cycles.  However, if he has hypermetabolic lesion in the liver, we will treat him as metastatic disease, with FOLFOX plus Avastin.  Since patient has neuropathy already being his legs and feet, if he is indeed metastatic disease, we may switch him to irinotecan instead of oxaliplatin because of the neuropathy.  If indicated patient has  metastatic disease, we would also entertain metastectomy after finishing 12 cycles of chemotherapy.    PATHOLOGY: Tumor sample for [facility] NGS study reported positive for K-aashish mutation exon2 p.G13D, negative for HER2/doire by IHC 0 and no amplification by FISH.  MSI stable by DNA sequencing, and also MMR proficient by IHC staining.  Tumor mutation burden is low 8 mut/Mb.  Patient was positive for PTEN 1+, 100% by IHC, PD-L1 was negative, only 1% by IHC.  Patient also positive for APC mutation exon 16p.[id number], positive for AMACR1 exon2 p.R358*.  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    Discussed with the patient and family members about potential side effects including bone marrow suppression, with anemia thrombocytopenia and leukocytopenia increased risk for infection, and also peripheral neuropathy, etc. patient is agreeable to proceed ahead with treatment.  The patient has PET scan examination on 11/14/2022 which reported a small 1.3 cm hepatic focus with elevated SUV 6.3, highly suspicious for metastatic disease. The patient now has stage IV. The patient had [facility] NGS study which reported K-aashish mutation, tumor mutation burden < 10, MSI was stable and anti-PD-L1 negative.  Not a candidate for anti-EGFR monoclonal antibody such as Vectibix, or chekpoint inhibitor immunotherapy.  We discussed the adding anti-VEGF monoclonal antibody, bevacizumab/Avastin starting in 2 weeks so that it would be after 6 weeks of primary surgery for the colon cancer resection.  We discussed this is now stage IV disease, however because only having 1 solitary metastatic lesion in the liver, it is potentially curable so we will be as aggressive as possible for chemotherapy.  If he has good response, in the future he will need metastectomy of the liver lesion.  On 11/29/2022 patient is presented for cycle #2 of chemotherapy. Patient tolerated therapy well except mild oral mucositis. However, laboratory studies showed significant  decrease in platelets at only 108,000, as well as also decreasing WBC and hemoglobin. Discussed with the patient today if we do not carry out any dose reduction, he will likely become more thrombocytopenic next time in 2 weeks and we will not be able to do cycle #3 chemotherapy on time. I discussed with the patient for dose reduction and to avoid potential delay of chemotherapy and he is agreeable. We will have 25% dose reduction for 5-FU leucovorin and oxaliplatin.  On 11/29/2022 he was started the first dose of Avastin/bevacizumab in conjunction with chemotherapy cycle #2.  On 12/13/2022, the patient presented for reevaluation prior to chemotherapy cycle #3. He has stable platelets of 108,000 and slightly improved WBC of 5100 and hemoglobin 12.8 g/dL. He will continue cycle 3 with the same 25% dose reduction.  1/10/2023 due for cycle 5 FOLFOX bevacizumab, overall is tolerating fairly well.  Hemoglobin 11.7, platelet count stable at 108,000, WBC 4.02, ANC 2.27.  Patient has had five cycles of chemotherapy and a CT scan examination obtained on 01/20/2023. The CT scan reported favorable response as the lesion is less obvious.   1/24/2023 recommended to continue chemotherapy for a total of 12 cycles. Then obtain PET scan examination. Due to worsening thrombocytopenia, and also peripheral neuropathy, for cycle #6, I will decrease oxaliplatin by another 25% so would it be 50% of the original dose. I will keep the same dose of 5-FU which is 75% of the original dose.  Full dose Avastin.   2/7/2023 due for cycle 7.  Platelet count is holding steady at 100,000.  We will proceed with treatment today with same doses as given on cycle 6.   2/21/2023 due for cycle #8 chemotherapy. We will repeat every 2 weeks.  3/7/2023 proceed with cycle #9 chemotherapy and hold Avastin for dental procedure on 3/13/2023.  On 3/21/2023 we will proceed ahead with cycle #10 chemotherapy FOLFOX6 regimen.  Continue to hold Avastin.  On 4/4/2023  "patient presented for evaluation prior to cycle #11 FOLFOX 6 regimen.  Continue to hold Avastin.  This will be resumed from next cycle.   The patient presented 4/18/2023 for reevaluation prior to his last cycle #12 FOLFOX plus Avastin. The patient reports tolerating well. We will proceed with cycle12 today and resume Avastin.  I recommended having a PET scan examination for assessment of response, especially the small metastatic liver lesion which was only found on the previous PET scan.  Patient had a PET scan examination on 04/25/2023, which reported complete resolution of the solitary hypermetabolic lesion in the liver. He had a mildly active small lymph node, peritracheal, subcarinal with low activity. Etiology not clear.   On 05/01/2023, I reviewed the PET scan images with the patient and his wife, and two daughters. I recommended to refer the patient to [facility], surgical oncology, Dr. Griffin Tilley, for evaluation to see if he would be a candidate for surgical resection of the liver segment where he had metastatic disease, which now has resolution of hypermetabolic activity after chemotherapy.  Patient was seen by Dr. Tilley on 5/15/2023. After lengthy discussion, patient opted to not having surgical intervention currently. He would rather to have serial images studies.   Patient had a PET scan on 7/29/2023, which reported no evidence for recurrent disease.   I discussed with the patient and his wife on 8/8/2023, recommended CT scan for abdomen and pelvis with i.v. contrast in 3 months, together with lab study \"Guardant Reveal\" for ctDNA together with routine labs, CBC, CMP, CEA level, for assessment of colon cancer.  The patient had laboratory studies on 10/12/2023, which reported negative for Guardant Reveal, ctDNA 0%. The patient also had normal CEA and liver function panel. The CT scan for the abdomen and pelvis was no evidence of disease recurrence.   Patient had laboratory studies " on 01/09/2024 which reported negative for Guardant Reveal, ctDNA 0%. Other studies reported normal CEA at 1.93 ng/mL, marginally elevated ALT at 49 and total bilirubin 1.4 but normal AST at 37, alkaline phosphatase 62. No evidence for disease recurrence. I do recommend to have repeat laboratory studies same as above together with CT scan for abdomen and pelvis with IV contrast.  Patient is also overdue for colonoscopy examination 1 year post his colon surgery.  Patient had a benign colonoscopy examination by Dr. Garces on 3/6/2024.  Dr. Garces recommended repeating colonoscopy in 5 years.  CT for abdomen pelvis with IV contrast on 4/16/2024 showed no evidence for cancer recurrence.  Guardant Reveal today on 4/30/2024 reported 0% ctDNA.   On 7/23/2024 the Guardant Reveal study reporting positive ctDNA, albeit < 0.061%.  Had  normal liver function panel, the physical examination remains unremarkable. Considering the positive ctDNA result, a CT scan of the abdomen and pelvis with oral and IV contrast is recommended within a week for reassessment. If the study is positive, a PET scan examination will be conducted. However, if the study is negative, the same laboratory studies will be repeated together with a CT scan for evaluation in 3 months.   A CT scan of the abdomen and pelvis with IV contrast on 08/21/2024 reported a lesion in the anterior part of the left liver measuring 9 mm, which corresponds to a previous hypermetabolic activity noted on the PET scan in November 2022. Due to the high suspicion of disease recurrence, a PET scan was performed on 08/30/2024, revealing a hypermetabolic lesion with an SUV of 8.7 in the anterior subcapsular region of the liver. The patient reports no symptoms related to recurrence and remains physically active, running a small business. After discussing the findings and treatment options on 09/05/2024, he agreed to be evaluated by Dr. Tilley for possible surgical resection.  Chemotherapy was considered but deemed unsuitable due to its bi-weekly schedule conflicting with his business operations.   resection of a solitary liver lesion on 10/08/2024 by Dr. Tilley at [facility]. The pathology reported moderately differentiated adenocarcinoma, unifocal, consistent with his primary colon cancer. The metastatic lesion measures 1.5 cm at its largest dimension. The resection margin is clear by 0.7 cm. There is mild macrovascular steatosis, and no bridging fibrosis or cirrhosis as confirmed by special stain. The tumor sample tested negative for HER2 by IHC (IHC 0) and MMR IHC was 0/7. Biomarkers indicate stable DAVID.   On 11/6/2024 today's laboratory studies reveal liver function panel is normal, glucose is at 103, creatinine at 0.87, and electrolytes are within normal range. He reports no complaints other than occasional soreness from the surgery, which is improving daily. He has a good appetite, regular bowel movements without blood in the stools, and no dizziness or lightheadedness.      2. Iron deficiency anemia.  Subsequently anemia also caused by chemotherapy.  The patient has iron deficiency due to GI bleeding from his colon cancer. His laboratory study on 09/08/2022 reported ferritin 10.7 and iron saturation 4% with microcytic hypochromic anemia, hemoglobin 7.8, MCV 79.9 and MCHC 29.2.   Patient reports good tolerance to oral iron treatment and already has improved energy level. Laboratory study on 9/22/2022 did report slightly improved hemoglobin at 8.5 but normalized MCV 85.0. I think he is responding to oral iron treatment both physically and laboratory wise.  We advised patient to increase oral iron to twice a day.  On 11/2/2022 patient reports good tolerance to oral iron twice a day.  Labs today showed further improved hemoglobin 10.9.  He continues to maintain normal platelets and WBC.  Improved hemoglobin 12.1 on 11/15/2022.  Cycle #1 chemotherapy will be started.  On  11/29/2022 hemoglobin 11.7.  On 12/13/2022, his hemoglobin is 12.8 g/dL.  1/10/2023 hemoglobin 11.7.  The patient has trending down hemoglobin 11.0 on 1/24/2023, however, he is asymptomatic.  Hemoglobin 2/7/2023, 10.6.  Iron study reported ferritin 229 and iron saturation 28% with free iron 108 TIBC 386.  No evidence for iron deficiency.  So his anemia is now secondary to chemotherapy.     The patient has stable mild anemia with hemoglobin 11.0 on 3/7/2023. We will continue to monitor.  Continue to hold ferrous sulfate.  On 3/21/2023, stable anemia with hemoglobin 10.0 .0.  Continue to monitor.  On 4/4/2023 patient has stable anemia Hb 10.2, .9.  The patient has stable anemia, hemoglobin 10.8 today on 04/18/2023. The patient reports no syncope.  On 05/01/2023, patient has a slightly improved hemoglobin 10.8. We will repeat iron studies in 3 months for reassessment.  Laboratory studies 7/31/2023 reported ferritin 140, free iron 124 and iron saturation 35%. He also had improved hemoglobin 11.9, .1. Discussed with him today 8/8/2023 and recommended monitoring.  The laboratory study on 10/12/2023 reported worsening ferritin by 50% to 69.1 ng/mL; however, maintains a reasonable iron saturation 30% with free iron 108. The patient also has persistent anemia with hemoglobin of 11.9. He has already been on oral vitamin B12 supplement. I asked the patient to start oral iron ferrous sulfate at 325 mg once a day. I discussed with him the possible side effects of constipation and stools becoming dark-colored constipation, nausea, and cramping, etc.    Laboratory studies on 01/09/2024 showed much improved hemoglobin 13.3 and also significantly improved ferritin 245, iron saturation 32% with free iron 111. I asked the patient to stop oral iron completely.   on 4/30/2024 patient has recurrent anemia hemoglobin 12.7.  Also worsening iron studies with deteriorated ferritin 78.8 ng/mL, and also trending down  normal iron saturation 23% with free iron 89 TIBC 389.  Discussed with the patient, recommended to restart taking oral iron once a day.   On 8/6/2024 the patient reports tolerating oral iron treatment once daily. Laboratory studies from 07/23/2024 showed stable anemia and improved iron with ferritin 134 and iron saturating 30%. The patient has been advised to discontinue oral iron treatment for the time being.   On 11/6/2024 today's laboratory studies reveal mild anemia with hemoglobin at 11.7, MCV at 104. He has stopped taking iron supplements. Monitoring will continue to assess if iron supplementation needs to be resumed.      3.    Pancytopenia secondary to chemotherapy.    This patient had original iron deficiency, however now has ongoing anemia due to chemotherapy.  He also has thrombocytopenia and neutropenia from chemotherapy.  Normal neutrophil prior to starting chemotherapy.    This patient developed first mild neutropenia with ANC 1610 on 3/21/2023.  Patient reports no fever sweating chills.  I recommended starting patient on Levaquin daily for 7 days, for prophylaxis of neutropenic fever.  We will continue to monitor for now.  Expecting this will getting worse with ongoing chemotherapy.  Question the patient to call us if he develops fever sweating chills.   4/4/2023, patient has slightly improved neutrophils 1870, and WBC 3270.  Continue to monitor.  On 04/18/2023, the patient has neutrophils 2030, WBC 3480. Continue with chemotherapy, monitor CBC.  On 05/01/2023 patient has improved WBC 4,590, including neutrophils 3,200.  7/31/2023 patient has normal WBC 4600, including ANC 2650 lymphocytes 1340.  The laboratory study on 10/12/2023 reported persistent thrombocytopenia with platelet counts of 132,000, and low normal WBC of 4720, and neutrophil count of 2930.    Pancytopenia. Laboratory study on 01/09/2024 reported normalized platelets 164,000, improved WBC 6000, neutrophils 3890 and also improved  hemoglobin at 13.3.   On 4/30/2024 patient has normal WBC 5530, neutrophils 3330, platelets 141,000 and hemoglobin 12.7.   Laboratory study from 07/23/2024 reported normal WBC 5070, neutrophils 3120, lymphocytes 1090, mild thrombocytopenia platelets 130,000, stable anemia, hemoglobin 10.5 on 07/23/2024.  11/6/2024 WBC 5780 neutrophils 3670.    4.  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  Prior to starting chemotherapy patient had low normal platelets 156,000 on 11/15/2022.  Started on cycle #1 chemotherapy FOLFOX 6.  On 11/29/2022 patient had platelets of 108,000.  Patient reports no bleeding or bruising.  Discussed with the patient, because of foreseeable more severe thrombocytopenia, we recommended 25% dose reduction starting from cycle #2 chemotherapy.  On 12/13/2022, the patient has same decreased number of platelets 108,000. He will continue chemotherapy with same dose reduction of 25%.   1/10/2023 platelet count stable at 108,000.  On 01/24/2023, patient has worsening thrombocytopenia with platelets of 99,000 mcL. Discussed with the patient, we will cut oxaliplatin by another 25% to be at 50% of original dose. We will leave the 5-FU dose same as the previous at 75% of original dose. Avastin will be the same dose.  2/7/2023 platelet count 100,000.    On 02/21/2023 the patient has stable thrombocytopenia with platelets 102,000. The patient reports no bleeding or bruising. Continue to monitor.   On 3/7/2023 the patient has stable thrombocytopenia with platelets 103,000. The patient reports no bleeding or bruising. Continue to monitor.   On 3/21/2023 persistent stable thrombocytopenia with platelets 100,000.   On 4/4/2023 platelets 96,000.  We will go ahead and to proceed with chemotherapy.   Today on 04/18/2023, platelets 91,000. We will proceed ahead with the chemotherapy today. This is cycle 12 of chemotherapy. We will reassess his treatment after PET scan examination.  On 05/01/2023, patient has a  persistent thrombocytopenia with platelets of 86,000. Patient is asymptomatic.  On 7/31/2023 improved platelets 132,000.  On 10/12/2023, stable thrombocytopenia with 132,000 platelets.  1/9/2024 normal platelets 164,000.  4/30/2024 marginal normal platelets 141,000.  Laboratory study from 07/23/2024 reported mild thrombocytopenia platelets 130,000, stable anemia, hemoglobin 10.5 on 07/23/2024.   11/6/2024 platelets 139,000.       5. Peripheral neuropathy  On 12/13/2022, he reports cold sensitivity in his mouth when he drinks cold water. The cold sensitivity usually dissipates after 3 to 4 days.  Patient reports he is more significant cold sensitivities lasting for more than a week. He does have moderate tingling, numbness involving the fingers, but not much as the toes.  2/7/2023 he feels like the neuropathy/cold sensitivity is lingering a little bit longer with each cycle.    On 4/4/2023 the patient reports stable mild peripheral neuropathy/cold sensitivity.    On 04/18/2023, patient reports some mild numbness and tingling involving both feet and hands, but this is only last for a few days and then resolves. We will continue dose reduced oxaliplatin 50%.  On 05/01/2023, patient continues to have peripheral neuropathy intermittent and mild overall. Continue to monitor.   Patient reports today on 01/30/2024 has stable mild peripheral neuropathy.   Stable condition today.    6.  Weight losses.    On 12/13/2022, the patient reports he has lost weight in the past couple of months. He reports a poor appetite for 3 days after chemotherapy and a poor taste in his mouth. His appetite has since improved. He only had mild nausea, but no vomiting. He was encouraged to use Ensure or Boost to improve nutrition supplement. He already started using protein powder.  3/7/2023, patient's weight continues to improve and he has gained a couple pounds.  His weight is stable at 211 pounds today.  He reports his appetite continues to  improve.  He denies any nausea or vomiting.   On 04/18/2023, patient weighs with 209 pounds.  8/18/2023 patient weighs about 213 pounds  10/31/2023, the patient has a stable weight of 213 pounds.    The patient has slightly improved weight at 221 pounds today on 01/30/2024.   4/30/2024 stable weight 213 pounds.   8/6/2024, the patient's weight has remained stable for the past 3 months.  9/5/2024 patient weights 207 pounds.      7.  [place] teeth extraction: Patient states that he needs to have his wisdom teeth pull out.  Reviewed with Dr. Rosario.  Patient will need to have this done on his off week of treatment.  We will likely hold bevacizumab 2 weeks prior to this procedure and 4 weeks following.  Patient states that this will likely not happen until March, but is going to contact his oral surgeon, Dr. Charlie Zazueta to try to go ahead and get an appointment.  [place] tooth extraction done on 3/13/2023.    Continue to hold Avastin 4/4/2023.  We will resume in 2 weeks for cycle #12 chemotherapy.    On 04/18/2023, the patient reports his gum has completely healed.  We will resume Avastin.    No specific complaints today.      8.  Low normal vitamin B12 level.    Patient had a marginal normal vitamin B12 level at 260 pg/mL on 08/24/2022.   Folate level on 9/22/2022 was normal at 15.5 ng/mL.    Continue oral vitamin B12 supplement at 1000 mcg daily.   Vitamin B12 was 658 pg/mL on 04/04/2023. This has improved. His macrocytosis is due to chemotherapy.  On 4/30/2024 improved B12 level 873 pg/mL.  Patient continues taking oral vitamin B12.   The patient has reported this on 08/06/2024 that he continues oral vitamin B12 daily.       9. Type 2 Diabetes Mellitus.  He is currently taking metformin for glucose management. Glucose level is at 103 today 11/6/2024. He should continue his current medication regimen.      PLAN:  Arrange CT scan for abdomen pelvis with IV contrast in 6 weeks for reevaluation.  Laboratory studies CBC CMP  CEA, guardant reveal in 6 weeks.  Also repeat ferritin iron profile B12 and folate in 6 weeks for reassessment of anemia.    Port flush in 6 weeks.  Continue oral vitamin B12 at 1000 mcg daily.    I will see patient in 8 weeks for reevaluation to discuss results of imaging studies and laboratory studies..     I reviewed the surgical notes from Dr. Tilley and also pathology report from the [facility].    I spent 35 minutes caring for Taz on this date of service. This time includes time spent by me in the following activities: preparing for the visit, reviewing tests, obtaining and/or reviewing a separately obtained history, performing a medically appropriate examination and/or evaluation, counseling and educating the patient/family/caregiver, ordering medications, tests, or procedures, referring and communicating with other health care professionals, documenting information in the medical record, independently interpreting results and communicating that information with the patient/family/caregiver and care coordination            Maya Rosario MD PhD          CC:  JULIA Santiago MD Richard Pokorny, MD   Oral surgeon: Dr. Charlie Zazueta Phone [phone]  Griffin Tilley M.D.

## 2024-12-17 ENCOUNTER — OFFICE VISIT (OUTPATIENT)
Dept: INTERNAL MEDICINE | Facility: CLINIC | Age: 74
End: 2024-12-17
Payer: MEDICARE

## 2024-12-17 VITALS
SYSTOLIC BLOOD PRESSURE: 135 MMHG | OXYGEN SATURATION: 99 % | WEIGHT: 215.8 LBS | HEART RATE: 62 BPM | HEIGHT: 69 IN | BODY MASS INDEX: 31.96 KG/M2 | DIASTOLIC BLOOD PRESSURE: 82 MMHG | TEMPERATURE: 98.3 F

## 2024-12-17 DIAGNOSIS — F52.21 ERECTILE DYSFUNCTION OF NONORGANIC ORIGIN: ICD-10-CM

## 2024-12-17 DIAGNOSIS — I10 BENIGN ESSENTIAL HYPERTENSION: ICD-10-CM

## 2024-12-17 DIAGNOSIS — Z00.00 MEDICARE ANNUAL WELLNESS VISIT, SUBSEQUENT: ICD-10-CM

## 2024-12-17 DIAGNOSIS — E11.9 TYPE 2 DIABETES MELLITUS WITHOUT COMPLICATION, WITHOUT LONG-TERM CURRENT USE OF INSULIN: Primary | ICD-10-CM

## 2024-12-17 DIAGNOSIS — E78.00 HYPERCHOLESTEROLEMIA: ICD-10-CM

## 2024-12-17 PROCEDURE — 1126F AMNT PAIN NOTED NONE PRSNT: CPT

## 2024-12-17 PROCEDURE — 3075F SYST BP GE 130 - 139MM HG: CPT

## 2024-12-17 PROCEDURE — 1159F MED LIST DOCD IN RCRD: CPT

## 2024-12-17 PROCEDURE — 3079F DIAST BP 80-89 MM HG: CPT

## 2024-12-17 PROCEDURE — G0439 PPPS, SUBSEQ VISIT: HCPCS

## 2024-12-17 PROCEDURE — 96160 PT-FOCUSED HLTH RISK ASSMT: CPT

## 2024-12-17 PROCEDURE — 1160F RVW MEDS BY RX/DR IN RCRD: CPT

## 2024-12-17 RX ORDER — TADALAFIL 5 MG/1
5 TABLET ORAL DAILY PRN
Qty: 30 TABLET | Refills: 2 | Status: SHIPPED | OUTPATIENT
Start: 2024-12-17

## 2024-12-17 NOTE — ASSESSMENT & PLAN NOTE
Orders:    Urinalysis With Microscopic If Indicated (No Culture) - Urine, Clean Catch    Microalbumin / Creatinine Urine Ratio - Urine, Clean Catch    Hemoglobin A1c; Future

## 2024-12-17 NOTE — ASSESSMENT & PLAN NOTE
Orders:    Lipid Panel With / Chol / HDL Ratio; Future    TSH Rfx On Abnormal To Free T4; Future

## 2024-12-17 NOTE — PROGRESS NOTES
Subjective   The ABCs of the Annual Wellness Visit  Medicare Wellness Visit      Taz Dickey is a 74 y.o. patient who presents for a Medicare Wellness Visit.    The following portions of the patient's history were reviewed and   updated as appropriate: allergies, current medications, past family history, past medical history, past social history, past surgical history, and problem list.    Compared to one year ago, the patient's physical   health is the same.  Compared to one year ago, the patient's mental   health is the same.    Recent Hospitalizations:  He was admitted within the past 365 days at [facility].     Current Medical Providers:  Patient Care Team:  Aubrey Bro APRN as PCP - General (Nurse Practitioner)  Jeffrey Garcia MD as Surgeon (Neurosurgery)  Wallace Pleitez MD as Consulting Physician (Pain Medicine)  Shlomo Campos MD as Consulting Physician (Orthopedic Surgery)  Maya Rosario MD PhD as Consulting Physician (Hematology and Oncology)  Jade King APRN as Referring Physician (Nurse Practitioner)  Lenore Baker RD, LD as Dietitian (Nutrition)    Outpatient Medications Prior to Visit   Medication Sig Dispense Refill    aspirin 81 MG EC tablet Take 1 tablet by mouth Daily. INSTRUCTED PT TO FOLLOW MD INSTRUCTIONS REGARDING HOLDING FOR SURGERY/PT STATED TO HOLD 5 DAYS PRIOR TO SURGERY      atorvastatin (LIPITOR) 40 MG tablet TAKE 1 TABLET EVERY DAY 90 tablet 3    lisinopril-hydrochlorothiazide (PRINZIDE,ZESTORETIC) 20-12.5 MG per tablet TAKE 1 TABLET EVERY NIGHT 90 tablet 3    metFORMIN (GLUCOPHAGE) 500 MG tablet TAKE 1 TABLET TWICE DAILY WITH MEALS 180 tablet 3    tadalafil (CIALIS) 5 MG tablet Take 1 tablet by mouth Daily As Needed for Erectile Dysfunction. 30 tablet 2    acetaminophen (TYLENOL) 500 MG tablet Take 1 tablet by mouth Every 6 (Six) Hours As Needed for Mild Pain.      ferrous sulfate 325 (65 FE) MG tablet Take 1 tablet by mouth Daily With Breakfast.  90 tablet 3    ibuprofen (ADVIL,MOTRIN) 600 MG tablet Take  by mouth As Needed. Was for dental       No facility-administered medications prior to visit.     No opioid medication identified on active medication list. I have reviewed chart for other potential  high risk medication/s and harmful drug interactions in the elderly.      Aspirin is on active medication list. Aspirin use is indicated based on review of current medical condition/s. Pros and cons of this therapy have been discussed today. Benefits of this medication outweigh potential harm.  Patient has been encouraged to continue taking this medication.  .      Patient Active Problem List   Diagnosis    Spondylolisthesis of lumbar region    Lumbar radiculopathy    Benign essential hypertension    Erectile dysfunction of nonorganic origin    Hypercholesterolemia    Osteoarthritis    Tinnitus of both ears    Encounter for screening colonoscopy    Medicare annual wellness visit, subsequent    Nocturia    Type 2 diabetes mellitus without complication, without long-term current use of insulin    Healthcare maintenance    Acute pain of right knee    Primary osteoarthritis of right hip    DJD (degenerative joint disease)    Colon cancer    Iron deficiency anemia    Fitting and adjustment of vascular catheter    Encounter for long-term (current) use of medications    Vitamin B12 deficiency    Secondary liver cancer    Chemotherapy-induced thrombocytopenia    Anemia associated with chemotherapy    Pancytopenia due to antineoplastic chemotherapy    Chemotherapy-induced neutropenia     Advance Care Planning Advance Directive is not on file.  ACP discussion was declined by the patient. Patient does not have an advance directive, declines further assistance.            Objective   Vitals:    12/17/24 0817   BP: 135/82   BP Location: Left arm   Patient Position: Sitting   Cuff Size: Adult   Pulse: 62   Temp: 98.3 °F (36.8 °C)   TempSrc: Oral   SpO2: 99%   Weight: 97.9 kg  "(215 lb 12.8 oz)   Height: 175.3 cm (69\")   PainSc: 0-No pain       Estimated body mass index is 31.87 kg/m² as calculated from the following:    Height as of this encounter: 175.3 cm (69\").    Weight as of this encounter: 97.9 kg (215 lb 12.8 oz).    BMI is >= 30 and <35. (Class 1 Obesity). The following options were offered after discussion;: exercise counseling/recommendations and nutrition counseling/recommendations           Does the patient have evidence of cognitive impairment? No  Lab Results   Component Value Date    HGBA1C 6.1 (H) 2024                                                                                                Health  Risk Assessment    Smoking Status:  Social History     Tobacco Use   Smoking Status Some Days    Types: Cigars   Smokeless Tobacco Never   Tobacco Comments    OCCASIONALLY     Alcohol Consumption:  Social History     Substance and Sexual Activity   Alcohol Use Yes    Alcohol/week: 7.0 standard drinks of alcohol    Types: 1 Glasses of wine, 6 Cans of beer per week    Comment: sometimes in warm weather, wine sometimes in the winter       Fall Risk Screen  STEADI Fall Risk Assessment was completed, and patient is at MODERATE risk for falls. Assessment completed on:2024    Depression Screening   Little interest or pleasure in doing things? Not at all   Feeling down, depressed, or hopeless? Not at all   PHQ-2 Total Score 0      Health Habits and Functional and Cognitive Screenin/11/2024     8:52 AM   Functional & Cognitive Status   Do you have difficulty preparing food and eating? No    Do you have difficulty bathing yourself, getting dressed or grooming yourself? No    Do you have difficulty using the toilet? No    Do you have difficulty moving around from place to place? No    Do you have trouble with steps or getting out of a bed or a chair? No    Current Diet Well Balanced Diet    Dental Exam Up to date    Eye Exam Unknown    Exercise (times per week) " Other    Current Exercises Include No Regular Exercise    Do you need help using the phone?  No    Are you deaf or do you have serious difficulty hearing?  No    Do you need help to go to places out of walking distance? No    Do you need help shopping? No    Do you need help preparing meals?  No    Do you need help with housework?  No    Do you need help with laundry? No    Do you need help taking your medications? No    Do you need help managing money? No    Do you ever drive or ride in a car without wearing a seat belt? No    Have you felt unusual stress, anger or loneliness in the last month? No    Who do you live with? Spouse    If you need help, do you have trouble finding someone available to you? No    Have you been bothered in the last four weeks by sexual problems? No    Do you have difficulty concentrating, remembering or making decisions? No        Patient-reported           Age-appropriate Screening Schedule:  Refer to the list below for future screening recommendations based on patient's age, sex and/or medical conditions. Orders for these recommended tests are listed in the plan section. The patient has been provided with a written plan.    Health Maintenance List  Health Maintenance   Topic Date Due    DIABETIC EYE EXAM  Never done    TDAP/TD VACCINES (1 - Tdap) Never done    ZOSTER VACCINE (2 of 2) 11/20/2017    DIABETIC FOOT EXAM  01/25/2022    BMI FOLLOWUP  03/23/2024    LIPID PANEL  06/26/2024    COVID-19 Vaccine (4 - 2024-25 season) 09/01/2024    HEMOGLOBIN A1C  03/24/2025    ANNUAL WELLNESS VISIT  12/17/2025    INFLUENZA VACCINE  Completed    Pneumococcal Vaccine 65+  Completed    AAA SCREEN (ONE-TIME)  Completed    HEPATITIS C SCREENING  Addressed    URINE MICROALBUMIN  Discontinued    LUNG CANCER SCREENING  Discontinued    COLORECTAL CANCER SCREENING  Discontinued                                                                                                                                      "           CMS Preventative Services Quick Reference  Risk Factors Identified During Encounter  Fall Risk-High or Moderate: Discussed Fall Prevention in the home    The above risks/problems have been discussed with the patient.  Pertinent information has been shared with the patient in the After Visit Summary.  An After Visit Summary and PPPS were made available to the patient.    Follow Up:   Next Medicare Wellness visit to be scheduled in 1 year.         Additional E&M Note during same encounter follows:  Patient has additional, significant, and separately identifiable condition(s)/problem(s) that require work above and beyond the Medicare Wellness Visit     Chief Complaint  Medicare Wellness-subsequent    Subjective   74-year-old male presenting with type 2 diabetes, metastatic colon cancer and Medicare wellness.  Is working regularly.  Would like to get a pedometer to determine how many steps he is getting at work.  States he feels good health wise overall.  Has had a few falls in the past couple months.  Both related to tripping and carrying gear that made him off balance.One was while duck hunting with family and the other was on Thanksgiving.  Denies any lightheadedness or dizziness throughout the day.    Taking medications as prescribed and tolerating well.    Gets labs drawn regularly with oncology.  Has labs upcoming and would like to have blood work completed all-in-one go.      Satinder is also being seen today for additional medical problem/s.    Review of Systems   All other systems reviewed and are negative.             Objective   Vital Signs:  /82 (BP Location: Left arm, Patient Position: Sitting, Cuff Size: Adult)   Pulse 62   Temp 98.3 °F (36.8 °C) (Oral)   Ht 175.3 cm (69\")   Wt 97.9 kg (215 lb 12.8 oz)   SpO2 99%   BMI 31.87 kg/m²   Physical Exam  Vitals reviewed.   Constitutional:       Appearance: Normal appearance.   Cardiovascular:      Rate and Rhythm: Normal rate and regular " rhythm.      Pulses: Normal pulses.      Heart sounds: Normal heart sounds.   Pulmonary:      Effort: Pulmonary effort is normal.      Breath sounds: Normal breath sounds.   Musculoskeletal:         General: Normal range of motion.      Cervical back: Normal range of motion.   Skin:     General: Skin is warm and dry.      Capillary Refill: Capillary refill takes less than 2 seconds.   Neurological:      General: No focal deficit present.      Mental Status: He is alert and oriented to person, place, and time.   Psychiatric:         Mood and Affect: Mood normal.         Behavior: Behavior normal.         Thought Content: Thought content normal.         Judgment: Judgment normal.             Common labs          7/23/2024    08:41 7/23/2024    09:01 9/24/2024    09:00 11/6/2024    10:58   Common Labs   Glucose  158   103    BUN  23   17    Creatinine  1.13   0.87    Sodium  136   139    Potassium  4.4   4.8    Chloride  100   103    Calcium  9.4   9.1    Albumin  4.2   4.0    Total Bilirubin  0.9   0.7    Alkaline Phosphatase  65   56    AST (SGOT)  32   28    ALT (SGPT)  28   23    WBC 5.07   5.72     5.78    Hemoglobin 12.5   12.9     11.7    Hematocrit 37.5   38.9     36.2    Platelets 130   139     139    Hemoglobin A1C   6.1           Details          This result is from an external source.                     Assessment and Plan            Type 2 diabetes mellitus without complication, without long-term current use of insulin      Orders:    Urinalysis With Microscopic If Indicated (No Culture) - Urine, Clean Catch    Microalbumin / Creatinine Urine Ratio - Urine, Clean Catch    Hemoglobin A1c; Future    Hypercholesterolemia       Orders:    Lipid Panel With / Chol / HDL Ratio; Future    TSH Rfx On Abnormal To Free T4; Future    Benign essential hypertension      Orders:    Lipid Panel With / Chol / HDL Ratio; Future    TSH Rfx On Abnormal To Free T4; Future    Medicare annual wellness visit,  subsequent    Orders:    Lipid Panel With / Chol / HDL Ratio; Future    TSH Rfx On Abnormal To Free T4; Future    Erectile dysfunction of nonorganic origin    Orders:    tadalafil (CIALIS) 5 MG tablet; Take 1 tablet by mouth Daily As Needed for Erectile Dysfunction.       Patient Instructions   Continue medications as prescribed. Stay hydrated with water. Urine sample today. Fasting bloodwork to be completed with oncology. Follow-up in 6 months for recheck.       Medicare Wellness  Personal Prevention Plan of Service     Date of Office Visit:    Encounter Provider:  JULIA Cage  Place of Service:  [facility]  Patient Name: Taz Dickey  :  1950    As part of the Medicare Wellness portion of your visit today, we are providing you with this personalized preventive plan of services (PPPS). This plan is based upon recommendations of the [place] Preventive Services Task Force (USPSTF) and the Advisory Committee on Immunization Practices (ACIP).    This lists the preventive care services that should be considered, and provides dates of when you are due. Items listed as completed are up-to-date and do not require any further intervention.    Health Maintenance   Topic Date Due    DIABETIC EYE EXAM  Never done    TDAP/TD VACCINES (1 - Tdap) Never done    ZOSTER VACCINE (2 of 2) 2017    DIABETIC FOOT EXAM  2022    BMI FOLLOWUP  2024    LIPID PANEL  2024    COVID-19 Vaccine ( -  season) 2024    HEMOGLOBIN A1C  2025    ANNUAL WELLNESS VISIT  2025    INFLUENZA VACCINE  Completed    Pneumococcal Vaccine 65+  Completed    AAA SCREEN (ONE-TIME)  Completed    HEPATITIS C SCREENING  Addressed    URINE MICROALBUMIN  Discontinued    LUNG CANCER SCREENING  Discontinued    COLORECTAL CANCER SCREENING  Discontinued       Orders Placed This Encounter   Procedures    Urinalysis With Microscopic If Indicated (No Culture) - Urine, Clean  Catch     Order Specific Question:   Release to patient     Answer:   Routine Release [[id number]]    Microalbumin / Creatinine Urine Ratio - Urine, Clean Catch     Order Specific Question:   Release to patient     Answer:   Routine Release [[id number]]    Hemoglobin A1c     Standing Status:   Future     Standing Expiration Date:   3/7/2025     Order Specific Question:   Release to patient     Answer:   Routine Release [[id number]]    Lipid Panel With / Chol / HDL Ratio     Standing Status:   Future     Standing Expiration Date:   3/7/2025     Order Specific Question:   Release to patient     Answer:   Routine Release [[id number]]    TSH Rfx On Abnormal To Free T4     Standing Status:   Future     Standing Expiration Date:   3/7/2025     Order Specific Question:   Release to patient     Answer:   Routine Release [[id number]]       No follow-ups on file.                 Follow Up   No follow-ups on file.  Patient was given instructions and counseling regarding his condition or for health maintenance advice. Please see specific information pulled into the AVS if appropriate.

## 2024-12-17 NOTE — PATIENT INSTRUCTIONS
Continue medications as prescribed. Stay hydrated with water. Urine sample today. Fasting bloodwork to be completed with oncology. Follow-up in 6 months for recheck.       Medicare Wellness  Personal Prevention Plan of Service     Date of Office Visit:    Encounter Provider:  JULIA Cage  Place of Service:  [facility]  Patient Name: Taz Dickey  :  1950    As part of the Medicare Wellness portion of your visit today, we are providing you with this personalized preventive plan of services (PPPS). This plan is based upon recommendations of the [place] Preventive Services Task Force (USPSTF) and the Advisory Committee on Immunization Practices (ACIP).    This lists the preventive care services that should be considered, and provides dates of when you are due. Items listed as completed are up-to-date and do not require any further intervention.    Health Maintenance   Topic Date Due    DIABETIC EYE EXAM  Never done    TDAP/TD VACCINES (1 - Tdap) Never done    ZOSTER VACCINE (2 of 2) 2017    DIABETIC FOOT EXAM  2022    BMI FOLLOWUP  2024    LIPID PANEL  2024    COVID-19 Vaccine ( - - season) 2024    HEMOGLOBIN A1C  2025    ANNUAL WELLNESS VISIT  2025    INFLUENZA VACCINE  Completed    Pneumococcal Vaccine 65+  Completed    AAA SCREEN (ONE-TIME)  Completed    HEPATITIS C SCREENING  Addressed    URINE MICROALBUMIN  Discontinued    LUNG CANCER SCREENING  Discontinued    COLORECTAL CANCER SCREENING  Discontinued       Orders Placed This Encounter   Procedures    Urinalysis With Microscopic If Indicated (No Culture) - Urine, Clean Catch     Order Specific Question:   Release to patient     Answer:   Routine Release [[id number]]    Microalbumin / Creatinine Urine Ratio - Urine, Clean Catch     Order Specific Question:   Release to patient     Answer:   Routine Release [[id number]]    Hemoglobin A1c     Standing Status:    Future     Standing Expiration Date:   3/7/2025     Order Specific Question:   Release to patient     Answer:   Routine Release [[id number]]    Lipid Panel With / Chol / HDL Ratio     Standing Status:   Future     Standing Expiration Date:   3/7/2025     Order Specific Question:   Release to patient     Answer:   Routine Release [[id number]]    TSH Rfx On Abnormal To Free T4     Standing Status:   Future     Standing Expiration Date:   3/7/2025     Order Specific Question:   Release to patient     Answer:   Routine Release [[id number]]       No follow-ups on file.

## 2024-12-17 NOTE — ASSESSMENT & PLAN NOTE
Orders:    tadalafil (CIALIS) 5 MG tablet; Take 1 tablet by mouth Daily As Needed for Erectile Dysfunction.

## 2024-12-18 ENCOUNTER — INFUSION (OUTPATIENT)
Dept: ONCOLOGY | Facility: HOSPITAL | Age: 74
End: 2024-12-18
Payer: MEDICARE

## 2024-12-18 DIAGNOSIS — C78.7 SECONDARY LIVER CANCER: ICD-10-CM

## 2024-12-18 DIAGNOSIS — C18.2 MALIGNANT NEOPLASM OF ASCENDING COLON: ICD-10-CM

## 2024-12-18 DIAGNOSIS — D50.9 IRON DEFICIENCY ANEMIA, UNSPECIFIED IRON DEFICIENCY ANEMIA TYPE: ICD-10-CM

## 2024-12-18 DIAGNOSIS — Z45.2 FITTING AND ADJUSTMENT OF VASCULAR CATHETER: Primary | ICD-10-CM

## 2024-12-18 DIAGNOSIS — E53.8 VITAMIN B12 DEFICIENCY: ICD-10-CM

## 2024-12-18 LAB
ALBUMIN SERPL-MCNC: 4.3 G/DL (ref 3.5–5.2)
ALBUMIN/CREAT UR: 39 MG/G CREAT (ref 0–29)
ALBUMIN/GLOB SERPL: 1.5 G/DL
ALP SERPL-CCNC: 73 U/L (ref 39–117)
ALT SERPL W P-5'-P-CCNC: 32 U/L (ref 1–41)
ANION GAP SERPL CALCULATED.3IONS-SCNC: 9.6 MMOL/L (ref 5–15)
APPEARANCE UR: CLEAR
AST SERPL-CCNC: 28 U/L (ref 1–40)
BASOPHILS # BLD AUTO: 0.06 10*3/MM3 (ref 0–0.2)
BASOPHILS NFR BLD AUTO: 1.2 % (ref 0–1.5)
BILIRUB SERPL-MCNC: 0.8 MG/DL (ref 0–1.2)
BILIRUB UR QL STRIP: NEGATIVE
BUN SERPL-MCNC: 23 MG/DL (ref 8–23)
BUN/CREAT SERPL: 24.2 (ref 7–25)
CALCIUM SPEC-SCNC: 9.4 MG/DL (ref 8.6–10.5)
CEA SERPL-MCNC: 1.89 NG/ML
CHLORIDE SERPL-SCNC: 99 MMOL/L (ref 98–107)
CO2 SERPL-SCNC: 24.4 MMOL/L (ref 22–29)
COLOR UR: YELLOW
CREAT SERPL-MCNC: 0.95 MG/DL (ref 0.76–1.27)
CREAT UR-MCNC: 93.4 MG/DL
DEPRECATED RDW RBC AUTO: 48.8 FL (ref 37–54)
EGFRCR SERPLBLD CKD-EPI 2021: 84.5 ML/MIN/1.73
EOSINOPHIL # BLD AUTO: 0.17 10*3/MM3 (ref 0–0.4)
EOSINOPHIL NFR BLD AUTO: 3.3 % (ref 0.3–6.2)
ERYTHROCYTE [DISTWIDTH] IN BLOOD BY AUTOMATED COUNT: 13 % (ref 12.3–15.4)
FERRITIN SERPL-MCNC: 132 NG/ML (ref 30–400)
FOLATE SERPL-MCNC: 9.86 NG/ML (ref 4.78–24.2)
GLOBULIN UR ELPH-MCNC: 2.8 GM/DL
GLUCOSE SERPL-MCNC: 171 MG/DL (ref 65–99)
GLUCOSE UR QL STRIP: NEGATIVE
HCT VFR BLD AUTO: 39.2 % (ref 37.5–51)
HGB BLD-MCNC: 12.6 G/DL (ref 13–17.7)
HGB UR QL STRIP: NEGATIVE
IMM GRANULOCYTES # BLD AUTO: 0.01 10*3/MM3 (ref 0–0.05)
IMM GRANULOCYTES NFR BLD AUTO: 0.2 % (ref 0–0.5)
IRON 24H UR-MRATE: 83 MCG/DL (ref 59–158)
IRON SATN MFR SERPL: 21 % (ref 20–50)
KETONES UR QL STRIP: NEGATIVE
LEUKOCYTE ESTERASE UR QL STRIP: NEGATIVE
LYMPHOCYTES # BLD AUTO: 1.24 10*3/MM3 (ref 0.7–3.1)
LYMPHOCYTES NFR BLD AUTO: 23.9 % (ref 19.6–45.3)
MCH RBC QN AUTO: 33 PG (ref 26.6–33)
MCHC RBC AUTO-ENTMCNC: 32.1 G/DL (ref 31.5–35.7)
MCV RBC AUTO: 102.6 FL (ref 79–97)
MICRO URNS: NORMAL
MICROALBUMIN UR-MCNC: 36.3 UG/ML
MONOCYTES # BLD AUTO: 0.46 10*3/MM3 (ref 0.1–0.9)
MONOCYTES NFR BLD AUTO: 8.9 % (ref 5–12)
NEUTROPHILS NFR BLD AUTO: 3.24 10*3/MM3 (ref 1.7–7)
NEUTROPHILS NFR BLD AUTO: 62.5 % (ref 42.7–76)
NITRITE UR QL STRIP: NEGATIVE
NRBC BLD AUTO-RTO: 0 /100 WBC (ref 0–0.2)
PH UR STRIP: 7.5 [PH] (ref 5–7.5)
PLATELET # BLD AUTO: 159 10*3/MM3 (ref 140–450)
PMV BLD AUTO: 10.5 FL (ref 6–12)
POTASSIUM SERPL-SCNC: 4.6 MMOL/L (ref 3.5–5.2)
PROT SERPL-MCNC: 7.1 G/DL (ref 6–8.5)
PROT UR QL STRIP: NORMAL
RBC # BLD AUTO: 3.82 10*6/MM3 (ref 4.14–5.8)
SODIUM SERPL-SCNC: 133 MMOL/L (ref 136–145)
SP GR UR STRIP: 1.02 (ref 1–1.03)
TIBC SERPL-MCNC: 389 MCG/DL (ref 298–536)
TRANSFERRIN SERPL-MCNC: 261 MG/DL (ref 200–360)
UROBILINOGEN UR STRIP-MCNC: 0.2 MG/DL (ref 0.2–1)
VIT B12 BLD-MCNC: 699 PG/ML (ref 211–946)
WBC NRBC COR # BLD AUTO: 5.18 10*3/MM3 (ref 3.4–10.8)

## 2024-12-18 PROCEDURE — 84466 ASSAY OF TRANSFERRIN: CPT

## 2024-12-18 PROCEDURE — 36591 DRAW BLOOD OFF VENOUS DEVICE: CPT

## 2024-12-18 PROCEDURE — 82378 CARCINOEMBRYONIC ANTIGEN: CPT | Performed by: INTERNAL MEDICINE

## 2024-12-18 PROCEDURE — 82728 ASSAY OF FERRITIN: CPT

## 2024-12-18 PROCEDURE — 80053 COMPREHEN METABOLIC PANEL: CPT

## 2024-12-18 PROCEDURE — 85025 COMPLETE CBC W/AUTO DIFF WBC: CPT

## 2024-12-18 PROCEDURE — 82607 VITAMIN B-12: CPT | Performed by: INTERNAL MEDICINE

## 2024-12-18 PROCEDURE — 83540 ASSAY OF IRON: CPT

## 2024-12-18 PROCEDURE — 25010000002 HEPARIN LOCK FLUSH PER 10 UNITS: Performed by: INTERNAL MEDICINE

## 2024-12-18 PROCEDURE — 82746 ASSAY OF FOLIC ACID SERUM: CPT | Performed by: INTERNAL MEDICINE

## 2024-12-18 RX ORDER — SODIUM CHLORIDE 0.9 % (FLUSH) 0.9 %
10 SYRINGE (ML) INJECTION AS NEEDED
Status: DISCONTINUED | OUTPATIENT
Start: 2024-12-18 | End: 2024-12-18 | Stop reason: HOSPADM

## 2024-12-18 RX ORDER — SODIUM CHLORIDE 0.9 % (FLUSH) 0.9 %
10 SYRINGE (ML) INJECTION AS NEEDED
OUTPATIENT
Start: 2024-12-18

## 2024-12-18 RX ORDER — HEPARIN SODIUM (PORCINE) LOCK FLUSH IV SOLN 100 UNIT/ML 100 UNIT/ML
500 SOLUTION INTRAVENOUS AS NEEDED
Status: DISCONTINUED | OUTPATIENT
Start: 2024-12-18 | End: 2024-12-18 | Stop reason: HOSPADM

## 2024-12-18 RX ORDER — HEPARIN SODIUM (PORCINE) LOCK FLUSH IV SOLN 100 UNIT/ML 100 UNIT/ML
500 SOLUTION INTRAVENOUS AS NEEDED
OUTPATIENT
Start: 2024-12-18

## 2024-12-18 RX ADMIN — Medication 10 ML: at 12:58

## 2024-12-18 RX ADMIN — Medication 500 UNITS: at 12:58

## 2024-12-18 NOTE — PROGRESS NOTES
Slightly elevated microalbumin/creatinine ratio.  Recommend continuing lisinopril-hydrochlorothiazide at current dosage to help protect kidney function.

## 2024-12-19 ENCOUNTER — TELEPHONE (OUTPATIENT)
Dept: INTERNAL MEDICINE | Facility: CLINIC | Age: 74
End: 2024-12-19
Payer: MEDICARE

## 2024-12-26 LAB — REF LAB TEST RESULTS: NORMAL

## 2024-12-30 DIAGNOSIS — I10 BENIGN ESSENTIAL HYPERTENSION: Chronic | ICD-10-CM

## 2024-12-30 DIAGNOSIS — E78.00 HYPERCHOLESTEROLEMIA: Chronic | ICD-10-CM

## 2024-12-30 DIAGNOSIS — E11.9 TYPE 2 DIABETES MELLITUS WITHOUT COMPLICATION, WITHOUT LONG-TERM CURRENT USE OF INSULIN: Chronic | ICD-10-CM

## 2024-12-31 RX ORDER — ATORVASTATIN CALCIUM 40 MG/1
40 TABLET, FILM COATED ORAL DAILY
Qty: 90 TABLET | Refills: 1 | Status: SHIPPED | OUTPATIENT
Start: 2024-12-31

## 2024-12-31 RX ORDER — LISINOPRIL AND HYDROCHLOROTHIAZIDE 12.5; 2 MG/1; MG/1
1 TABLET ORAL NIGHTLY
Qty: 90 TABLET | Refills: 1 | Status: SHIPPED | OUTPATIENT
Start: 2024-12-31

## 2024-12-31 NOTE — TELEPHONE ENCOUNTER
Rx Refill Note  Requested Prescriptions     Pending Prescriptions Disp Refills    lisinopril-hydrochlorothiazide (PRINZIDE,ZESTORETIC) 20-12.5 MG per tablet [Pharmacy Med Name: Lisinopril-hydroCHLOROthiazide Oral Tablet 20-12.5 MG] 90 tablet 1     Sig: TAKE 1 TABLET EVERY NIGHT    metFORMIN (GLUCOPHAGE) 500 MG tablet [Pharmacy Med Name: metFORMIN HCl Oral Tablet 500 MG] 180 tablet 1     Sig: TAKE 1 TABLET TWICE DAILY WITH MEALS    atorvastatin (LIPITOR) 40 MG tablet [Pharmacy Med Name: Atorvastatin Calcium Oral Tablet 40 MG] 90 tablet 1     Sig: TAKE 1 TABLET EVERY DAY      Last office visit with prescribing clinician: 12/17/2024   Last telemedicine visit with prescribing clinician: Visit date not found   Next office visit with prescribing clinician: 6/17/2025       Mag Segura MA  12/31/24, 11:50 EST

## 2025-01-03 ENCOUNTER — HOSPITAL ENCOUNTER (OUTPATIENT)
Facility: HOSPITAL | Age: 75
Discharge: HOME | End: 2025-01-03
Payer: MEDICARE

## 2025-01-03 DIAGNOSIS — C78.7 SECONDARY LIVER CANCER: ICD-10-CM

## 2025-01-03 DIAGNOSIS — C18.2 MALIGNANT NEOPLASM OF ASCENDING COLON: ICD-10-CM

## 2025-01-03 PROCEDURE — 74177 CT ABD & PELVIS W/CONTRAST: CPT

## 2025-01-03 PROCEDURE — 25510000001 IOPAMIDOL 61 % SOLUTION: Performed by: INTERNAL MEDICINE

## 2025-01-03 PROCEDURE — 25510000002 DIATRIZOATE MEGLUMINE & SODIUM PER 1 ML: Performed by: INTERNAL MEDICINE

## 2025-01-03 RX ORDER — IOPAMIDOL 612 MG/ML
100 INJECTION, SOLUTION INTRAVASCULAR
Status: COMPLETED | OUTPATIENT
Start: 2025-01-03 | End: 2025-01-03

## 2025-01-03 RX ORDER — DIATRIZOATE MEGLUMINE AND DIATRIZOATE SODIUM 660; 100 MG/ML; MG/ML
30 SOLUTION ORAL; RECTAL
Status: COMPLETED | OUTPATIENT
Start: 2025-01-03 | End: 2025-01-03

## 2025-01-03 RX ADMIN — DIATRIZOATE MEGLUMINE AND DIATRIZOATE SODIUM 30 ML: 600; 100 SOLUTION ORAL; RECTAL at 09:15

## 2025-01-03 RX ADMIN — IOPAMIDOL 85 ML: 612 INJECTION, SOLUTION INTRAVENOUS at 10:50

## 2025-01-08 ENCOUNTER — OFFICE VISIT (OUTPATIENT)
Dept: ONCOLOGY | Facility: CLINIC | Age: 75
End: 2025-01-08
Payer: MEDICARE

## 2025-01-08 VITALS
TEMPERATURE: 97.6 F | DIASTOLIC BLOOD PRESSURE: 87 MMHG | HEART RATE: 72 BPM | WEIGHT: 226.6 LBS | BODY MASS INDEX: 33.56 KG/M2 | SYSTOLIC BLOOD PRESSURE: 164 MMHG | HEIGHT: 69 IN | OXYGEN SATURATION: 94 % | RESPIRATION RATE: 16 BRPM

## 2025-01-08 DIAGNOSIS — C18.2 MALIGNANT NEOPLASM OF ASCENDING COLON: ICD-10-CM

## 2025-01-08 DIAGNOSIS — C78.7 SECONDARY LIVER CANCER: Primary | ICD-10-CM

## 2025-01-08 NOTE — PROGRESS NOTES
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 hypermetabolic liver lesion, will treat with adjuvant FOLFOX 6 regimen every 2 weeks for 12 cycles.  However, if he has hypermetabolic lesion in the liver, we will treat him as metastatic disease, with FOLFOX plus Avastin.  Since patient has neuropathy already being his legs and feet, if he is indeed metastatic disease, we may switch him to irinotecan instead of oxaliplatin because of the neuropathy.  If indicated patient has metastatic disease, we would also entertain metastectomy after finishing 12 cycles of chemotherapy.    PATHOLOGY: Tumor sample for [facility] NGS study reported positive for K-aashish mutation exon2 p.G13D, negative for HER2/dorie by IHC 0 and no amplification by FISH.  MSI stable by DNA sequencing, and also MMR proficient by IHC staining.  Tumor mutation burden is low 8 mut/Mb.  Patient was positive for PTEN 1+, 100% by IHC, PD-L1 was negative, only 1% by IHC.  Patient also positive for APC mutation exon 16p.[id number], positive for AMACR1 exon2 p.R358*.  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    Discussed with the patient and family members about potential side effects including bone marrow suppression, with anemia thrombocytopenia and leukocytopenia increased risk for infection, and also peripheral neuropathy, etc.  patient is agreeable to proceed ahead with treatment.  The patient has PET scan examination on 11/14/2022 which reported a small 1.3 cm hepatic focus with elevated SUV 6.3, highly suspicious for metastatic disease. The patient now has stage IV. The patient had [facility] NGS study which reported K-aashish mutation, tumor mutation burden < 10, MSI was stable and anti-PD-L1 negative.  Not a candidate for anti-EGFR monoclonal antibody such as Vectibix, or chekpoint inhibitor immunotherapy.  We discussed the adding anti-VEGF monoclonal antibody, bevacizumab/Avastin starting in 2 weeks so that it would be after 6 weeks of primary surgery for the colon cancer resection.  We discussed this is now stage IV disease, however because only having 1 solitary metastatic lesion in the liver, it is potentially curable so we will be as aggressive as possible for chemotherapy.  If he has good response, in the future he will need metastectomy of the liver lesion.  On 11/29/2022 patient is presented for cycle #2 of chemotherapy. Patient tolerated therapy well except mild oral mucositis. However, laboratory studies showed significant decrease in platelets at only 108,000, as well as also decreasing WBC and hemoglobin. Discussed with the patient today if we do not carry out any dose reduction, he will likely become more thrombocytopenic next time in 2 weeks and we will not be able to do cycle #3 chemotherapy on time. I discussed with the patient for dose reduction and to avoid potential delay of chemotherapy and he is agreeable. We will have 25% dose reduction for 5-FU leucovorin and oxaliplatin.  On 11/29/2022 he was started the first dose of Avastin/bevacizumab in conjunction with chemotherapy cycle #2.  On 12/13/2022, the patient presented for reevaluation prior to chemotherapy cycle #3. He has stable platelets of 108,000 and slightly improved WBC of 5100 and hemoglobin 12.8 g/dL. He will continue cycle 3 with the same 25% dose  reduction.  1/10/2023 due for cycle 5 FOLFOX bevacizumab, overall is tolerating fairly well.  Hemoglobin 11.7, platelet count stable at 108,000, WBC 4.02, ANC 2.27.  Patient has had five cycles of chemotherapy and a CT scan examination obtained on 01/20/2023. The CT scan reported favorable response as the lesion is less obvious.   1/24/2023 recommended to continue chemotherapy for a total of 12 cycles. Then obtain PET scan examination. Due to worsening thrombocytopenia, and also peripheral neuropathy, for cycle #6, I will decrease oxaliplatin by another 25% so would it be 50% of the original dose. I will keep the same dose of 5-FU which is 75% of the original dose.  Full dose Avastin.   2/7/2023 due for cycle 7.  Platelet count is holding steady at 100,000.  We will proceed with treatment today with same doses as given on cycle 6.   2/21/2023 due for cycle #8 chemotherapy. We will repeat every 2 weeks.  3/7/2023 proceed with cycle #9 chemotherapy and hold Avastin for dental procedure on 3/13/2023.  On 3/21/2023 we will proceed ahead with cycle #10 chemotherapy FOLFOX6 regimen.  Continue to hold Avastin.  On 4/4/2023 patient presented for evaluation prior to cycle #11 FOLFOX 6 regimen.  Continue to hold Avastin.  This will be resumed from next cycle.   The patient presented 4/18/2023 for reevaluation prior to his last cycle #12 FOLFOX plus Avastin. The patient reports tolerating well. We will proceed with cycle12 today and resume Avastin.  I recommended having a PET scan examination for assessment of response, especially the small metastatic liver lesion which was only found on the previous PET scan.  Patient had a PET scan examination on 04/25/2023, which reported complete resolution of the solitary hypermetabolic lesion in the liver. He had a mildly active small lymph node, peritracheal, subcarinal with low activity. Etiology not clear.   On 05/01/2023, I reviewed the PET scan images with the patient and his wife,  "and two daughters. I recommended to refer the patient to [facility], surgical oncology, Dr. Griffin Tilley, for evaluation to see if he would be a candidate for surgical resection of the liver segment where he had metastatic disease, which now has resolution of hypermetabolic activity after chemotherapy.  Patient was seen by Dr. Tilley on 5/15/2023. After lengthy discussion, patient opted to not having surgical intervention currently. He would rather to have serial images studies.   Patient had a PET scan on 7/29/2023, which reported no evidence for recurrent disease.   I discussed with the patient and his wife on 8/8/2023, recommended CT scan for abdomen and pelvis with i.v. contrast in 3 months, together with lab study \"Guardant Reveal\" for ctDNA together with routine labs, CBC, CMP, CEA level, for assessment of colon cancer.  The patient had laboratory studies on 10/12/2023, which reported negative for Guardant Reveal, ctDNA 0%. The patient also had normal CEA and liver function panel. The CT scan for the abdomen and pelvis was no evidence of disease recurrence.   Patient had laboratory studies on 01/09/2024 which reported negative for Guardant Reveal, ctDNA 0%. Other studies reported normal CEA at 1.93 ng/mL, marginally elevated ALT at 49 and total bilirubin 1.4 but normal AST at 37, alkaline phosphatase 62. No evidence for disease recurrence. I do recommend to have repeat laboratory studies same as above together with CT scan for abdomen and pelvis with IV contrast.  Patient is also overdue for colonoscopy examination 1 year post his colon surgery.  Patient had a benign colonoscopy examination by Dr. Garces on 3/6/2024.  Dr. Garces recommended repeating colonoscopy in 5 years.  CT for abdomen pelvis with IV contrast on 4/16/2024 showed no evidence for cancer recurrence.  Guardant Reveal today on 4/30/2024 reported 0% ctDNA.   On 7/23/2024 the Guardant Reveal study reporting positive ctDNA, albeit " < 0.061%.  Had  normal liver function panel, the physical examination remains unremarkable. Considering the positive ctDNA result, a CT scan of the abdomen and pelvis with oral and IV contrast is recommended within a week for reassessment. If the study is positive, a PET scan examination will be conducted. However, if the study is negative, the same laboratory studies will be repeated together with a CT scan for evaluation in 3 months.   A CT scan of the abdomen and pelvis with IV contrast on 08/21/2024 reported a lesion in the anterior part of the left liver measuring 9 mm, which corresponds to a previous hypermetabolic activity noted on the PET scan in November 2022. Due to the high suspicion of disease recurrence, a PET scan was performed on 08/30/2024, revealing a hypermetabolic lesion with an SUV of 8.7 in the anterior subcapsular region of the liver. The patient reports no symptoms related to recurrence and remains physically active, running a small business. After discussing the findings and treatment options on 09/05/2024, he agreed to be evaluated by Dr. Tilley for possible surgical resection. Chemotherapy was considered but deemed unsuitable due to its bi-weekly schedule conflicting with his business operations.   resection of a solitary liver lesion on 10/08/2024 by Dr. Tilley at [facility]. The pathology reported moderately differentiated adenocarcinoma, unifocal, consistent with his primary colon cancer. The metastatic lesion measures 1.5 cm at its largest dimension. The resection margin is clear by 0.7 cm. There is mild macrovascular steatosis, and no bridging fibrosis or cirrhosis as confirmed by special stain. The tumor sample tested negative for HER2 by IHC (IHC 0) and MMR IHC was 0/7. Biomarkers indicate stable DAVID.   On 11/6/2024 today's laboratory studies reveal liver function panel is normal, glucose is at 103, creatinine at 0.87, and electrolytes are within normal range. He reports  no complaints other than occasional soreness from the surgery, which is improving daily. He has a good appetite, regular bowel movements without blood in the stools, and no dizziness or lightheadedness.     CT scan of the abdomen and pelvis from 01/03/2025 reported a peripheral 3.5 cm hypoattenuation in segment 4A of the liver with overlying capsule retraction, likely treatment-related.  I shared imaging with the patient and we recommended to have a repeating CT scan of the abdomen and pelvis with IV contrast in 3 months for reassessment, along with guardant reveal study for circulating tumor DNA, together with routine labs, CBC, CMP, and CEA level.        2. Iron deficiency anemia.  Subsequently anemia also caused by chemotherapy.  The patient has iron deficiency due to GI bleeding from his colon cancer. His laboratory study on 09/08/2022 reported ferritin 10.7 and iron saturation 4% with microcytic hypochromic anemia, hemoglobin 7.8, MCV 79.9 and MCHC 29.2.   Patient reports good tolerance to oral iron treatment and already has improved energy level. Laboratory study on 9/22/2022 did report slightly improved hemoglobin at 8.5 but normalized MCV 85.0. I think he is responding to oral iron treatment both physically and laboratory wise.  We advised patient to increase oral iron to twice a day.  On 11/2/2022 patient reports good tolerance to oral iron twice a day.  Labs today showed further improved hemoglobin 10.9.  He continues to maintain normal platelets and WBC.  Improved hemoglobin 12.1 on 11/15/2022.  Cycle #1 chemotherapy will be started.  On 11/29/2022 hemoglobin 11.7.  On 12/13/2022, his hemoglobin is 12.8 g/dL.  1/10/2023 hemoglobin 11.7.  The patient has trending down hemoglobin 11.0 on 1/24/2023, however, he is asymptomatic.  Hemoglobin 2/7/2023, 10.6.  Iron study reported ferritin 229 and iron saturation 28% with free iron 108 TIBC 386.  No evidence for iron deficiency.  So his anemia is now secondary to  chemotherapy.     The patient has stable mild anemia with hemoglobin 11.0 on 3/7/2023. We will continue to monitor.  Continue to hold ferrous sulfate.  On 3/21/2023, stable anemia with hemoglobin 10.0 .0.  Continue to monitor.  On 4/4/2023 patient has stable anemia Hb 10.2, .9.  The patient has stable anemia, hemoglobin 10.8 today on 04/18/2023. The patient reports no syncope.  On 05/01/2023, patient has a slightly improved hemoglobin 10.8. We will repeat iron studies in 3 months for reassessment.  Laboratory studies 7/31/2023 reported ferritin 140, free iron 124 and iron saturation 35%. He also had improved hemoglobin 11.9, .1. Discussed with him today 8/8/2023 and recommended monitoring.  The laboratory study on 10/12/2023 reported worsening ferritin by 50% to 69.1 ng/mL; however, maintains a reasonable iron saturation 30% with free iron 108. The patient also has persistent anemia with hemoglobin of 11.9. He has already been on oral vitamin B12 supplement. I asked the patient to start oral iron ferrous sulfate at 325 mg once a day. I discussed with him the possible side effects of constipation and stools becoming dark-colored constipation, nausea, and cramping, etc.    Laboratory studies on 01/09/2024 showed much improved hemoglobin 13.3 and also significantly improved ferritin 245, iron saturation 32% with free iron 111. I asked the patient to stop oral iron completely.   on 4/30/2024 patient has recurrent anemia hemoglobin 12.7.  Also worsening iron studies with deteriorated ferritin 78.8 ng/mL, and also trending down normal iron saturation 23% with free iron 89 TIBC 389.  Discussed with the patient, recommended to restart taking oral iron once a day.   On 8/6/2024 the patient reports tolerating oral iron treatment once daily. Laboratory studies from 07/23/2024 showed stable anemia and improved iron with ferritin 134 and iron saturating 30%. The patient has been advised to discontinue oral  iron treatment for the time being.   On 11/6/2024 laboratory studies reveal mild anemia with hemoglobin at 11.7, MCV at 104. He has stopped taking iron supplements. Monitoring will continue to assess if iron supplementation needs to be resumed.    Laboratory studies from 12/18/2024 reported mild anemia with hemoglobin at 12.6, MCV at 102.6, and MCHC at 32.1. Iron studies showed ferritin at 132, free iron at 83, iron saturation at 21%, B12 level at 699, and folate at 9.86 ng/mL. He has been taking iron once a day without any side effects such as constipation. Continue current iron supplementation.      3.    Pancytopenia secondary to chemotherapy.    This patient had original iron deficiency, however now has ongoing anemia due to chemotherapy.  He also has thrombocytopenia and neutropenia from chemotherapy.  Normal neutrophil prior to starting chemotherapy.    This patient developed first mild neutropenia with ANC 1610 on 3/21/2023.  Patient reports no fever sweating chills.  I recommended starting patient on Levaquin daily for 7 days, for prophylaxis of neutropenic fever.  We will continue to monitor for now.  Expecting this will getting worse with ongoing chemotherapy.  Question the patient to call us if he develops fever sweating chills.   4/4/2023, patient has slightly improved neutrophils 1870, and WBC 3270.  Continue to monitor.  On 04/18/2023, the patient has neutrophils 2030, WBC 3480. Continue with chemotherapy, monitor CBC.  On 05/01/2023 patient has improved WBC 4,590, including neutrophils 3,200.  7/31/2023 patient has normal WBC 4600, including ANC 2650 lymphocytes 1340.  The laboratory study on 10/12/2023 reported persistent thrombocytopenia with platelet counts of 132,000, and low normal WBC of 4720, and neutrophil count of 2930.    Pancytopenia. Laboratory study on 01/09/2024 reported normalized platelets 164,000, improved WBC 6000, neutrophils 3890 and also improved hemoglobin at 13.3.   On 4/30/2024  patient has normal WBC 5530, neutrophils 3330, platelets 141,000 and hemoglobin 12.7.   Laboratory study from 07/23/2024 reported normal WBC 5070, neutrophils 3120, lymphocytes 1090, mild thrombocytopenia platelets 130,000, stable anemia, hemoglobin 10.5 on 07/23/2024.  11/6/2024 WBC 5780 neutrophils 3670.  12/18/2024 WBC 5180 neutrophils 3240 lymphocytes 1240.    4.  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  Prior to starting chemotherapy patient had low normal platelets 156,000 on 11/15/2022.  Started on cycle #1 chemotherapy FOLFOX 6.  On 11/29/2022 patient had platelets of 108,000.  Patient reports no bleeding or bruising.  Discussed with the patient, because of foreseeable more severe thrombocytopenia, we recommended 25% dose reduction starting from cycle #2 chemotherapy.  On 12/13/2022, the patient has same decreased number of platelets 108,000. He will continue chemotherapy with same dose reduction of 25%.   1/10/2023 platelet count stable at 108,000.  On 01/24/2023, patient has worsening thrombocytopenia with platelets of 99,000 mcL. Discussed with the patient, we will cut oxaliplatin by another 25% to be at 50% of original dose. We will leave the 5-FU dose same as the previous at 75% of original dose. Avastin will be the same dose.  2/7/2023 platelet count 100,000.    On 02/21/2023 the patient has stable thrombocytopenia with platelets 102,000. The patient reports no bleeding or bruising. Continue to monitor.   On 3/7/2023 the patient has stable thrombocytopenia with platelets 103,000. The patient reports no bleeding or bruising. Continue to monitor.   On 3/21/2023 persistent stable thrombocytopenia with platelets 100,000.   On 4/4/2023 platelets 96,000.  We will go ahead and to proceed with chemotherapy.   Today on 04/18/2023, platelets 91,000. We will proceed ahead with the chemotherapy today. This is cycle 12 of chemotherapy. We will reassess his treatment after PET scan examination.  On 05/01/2023,  patient has a persistent thrombocytopenia with platelets of 86,000. Patient is asymptomatic.  On 7/31/2023 improved platelets 132,000.  On 10/12/2023, stable thrombocytopenia with 132,000 platelets.  1/9/2024 normal platelets 164,000.  4/30/2024 marginal normal platelets 141,000.  Laboratory study from 07/23/2024 reported mild thrombocytopenia platelets 130,000, stable anemia, hemoglobin 10.5 on 07/23/2024.   11/6/2024 platelets 139,000.   12/18/2024 slightly improved platelets 159,000.    5. Peripheral neuropathy  On 12/13/2022, he reports cold sensitivity in his mouth when he drinks cold water. The cold sensitivity usually dissipates after 3 to 4 days.  Patient reports he is more significant cold sensitivities lasting for more than a week. He does have moderate tingling, numbness involving the fingers, but not much as the toes.  2/7/2023 he feels like the neuropathy/cold sensitivity is lingering a little bit longer with each cycle.    On 4/4/2023 the patient reports stable mild peripheral neuropathy/cold sensitivity.    On 04/18/2023, patient reports some mild numbness and tingling involving both feet and hands, but this is only last for a few days and then resolves. We will continue dose reduced oxaliplatin 50%.  On 05/01/2023, patient continues to have peripheral neuropathy intermittent and mild overall. Continue to monitor.   Patient reports today on 01/30/2024 has stable mild peripheral neuropathy.   Stable condition today.    6.  Weight losses.    On 12/13/2022, the patient reports he has lost weight in the past couple of months. He reports a poor appetite for 3 days after chemotherapy and a poor taste in his mouth. His appetite has since improved. He only had mild nausea, but no vomiting. He was encouraged to use Ensure or Boost to improve nutrition supplement. He already started using protein powder.  3/7/2023, patient's weight continues to improve and he has gained a couple pounds.  His weight is stable at  211 pounds today.  He reports his appetite continues to improve.  He denies any nausea or vomiting.   On 04/18/2023, patient weighs with 209 pounds.  8/18/2023 patient weighs about 213 pounds  10/31/2023, the patient has a stable weight of 213 pounds.    The patient has slightly improved weight at 221 pounds today on 01/30/2024.   4/30/2024 stable weight 213 pounds.   8/6/2024, the patient's weight has remained stable for the past 3 months.  9/5/2024 patient weights 207 pounds.  1/8/2025 patient reports stable weight.  Nevertheless due to the weather condition he has more close and also heavy boots which make his weight 226 pounds.    7.  [place] teeth extraction: Patient states that he needs to have his wisdom teeth pull out.  Reviewed with Dr. Rosario.  Patient will need to have this done on his off week of treatment.  We will likely hold bevacizumab 2 weeks prior to this procedure and 4 weeks following.  Patient states that this will likely not happen until March, but is going to contact his oral surgeon, Dr. Charlie Zazueta to try to go ahead and get an appointment.  [place] tooth extraction done on 3/13/2023.    Continue to hold Avastin 4/4/2023.  We will resume in 2 weeks for cycle #12 chemotherapy.    On 04/18/2023, the patient reports his gum has completely healed.  We will resume Avastin.    No specific complaints today.      8.  Low normal vitamin B12 level.    Patient had a marginal normal vitamin B12 level at 260 pg/mL on 08/24/2022.   Folate level on 9/22/2022 was normal at 15.5 ng/mL.    Continue oral vitamin B12 supplement at 1000 mcg daily.   Vitamin B12 was 658 pg/mL on 04/04/2023. This has improved. His macrocytosis is due to chemotherapy.  On 4/30/2024 improved B12 level 873 pg/mL.  Patient continues taking oral vitamin B12.   The patient has reported this on 08/06/2024 that he continues oral vitamin B12 daily.   12/18/2024 B12 level 699 pg/mL.  Patient reports that he continues on oral B12  supplement.      9. Type 2 Diabetes Mellitus.  He is currently taking metformin for glucose management. Glucose level is at 103 today 11/6/2024. He should continue his current medication regimen.  12/18/2024 glucose level was 171, which is high.    10. Low sodium level.    On 12/18/2024 sodium level was slightly low at 133.  Continue to monitor.        PLAN:  Continue port flush about every 6 weeks, next in 4 weeks and 10 weeks.    Continue routine health care per PCP.  Laboratory studies for guardant reveal, CBC CMP CEA level in 10 weeks for reassessment.  We recommended to have a repeating CT scan of the abdomen and pelvis with IV contrast in 11 weeks for reassessment,   Continue oral vitamin B12 at 1000 mcg daily.    I will see patient in 12 weeks for reevaluation to discuss results of imaging studies and laboratory studies..     I personally reviewed images of CT scan obtained on 1/3/2024 and compared to the previous CT scan examination from 8/21/2024 in the PET scan on 8/30/2024.  I shared those images with the patient.     I spent 48 minutes caring for Taz on this date of service. This time includes time spent by me in the following activities: preparing for the visit, reviewing tests, obtaining and/or reviewing a separately obtained history, performing a medically appropriate examination and/or evaluation, counseling and educating the patient/family/caregiver, ordering medications, tests, or procedures, referring and communicating with other health care professionals, documenting information in the medical record, independently interpreting results and communicating that information with the patient/family/caregiver and care coordination              Maya Rosario MD PhD          CC:  JULIA Santiago MD Richard Pokorny, MD   Oral surgeon: Dr. Charlie Zazueta Phone [phone]  Griffin Tilley M.D.

## 2025-02-05 ENCOUNTER — INFUSION (OUTPATIENT)
Dept: ONCOLOGY | Facility: HOSPITAL | Age: 75
End: 2025-02-05
Payer: MEDICARE

## 2025-02-05 DIAGNOSIS — Z45.2 FITTING AND ADJUSTMENT OF VASCULAR CATHETER: Primary | ICD-10-CM

## 2025-02-05 PROCEDURE — 96523 IRRIG DRUG DELIVERY DEVICE: CPT

## 2025-02-05 PROCEDURE — 25010000002 HEPARIN LOCK FLUSH PER 10 UNITS: Performed by: INTERNAL MEDICINE

## 2025-02-05 RX ORDER — HEPARIN SODIUM (PORCINE) LOCK FLUSH IV SOLN 100 UNIT/ML 100 UNIT/ML
500 SOLUTION INTRAVENOUS AS NEEDED
OUTPATIENT
Start: 2025-02-05

## 2025-02-05 RX ORDER — SODIUM CHLORIDE 0.9 % (FLUSH) 0.9 %
10 SYRINGE (ML) INJECTION AS NEEDED
Status: DISCONTINUED | OUTPATIENT
Start: 2025-02-05 | End: 2025-02-05 | Stop reason: HOSPADM

## 2025-02-05 RX ORDER — SODIUM CHLORIDE 0.9 % (FLUSH) 0.9 %
10 SYRINGE (ML) INJECTION AS NEEDED
OUTPATIENT
Start: 2025-02-05

## 2025-02-05 RX ORDER — HEPARIN SODIUM (PORCINE) LOCK FLUSH IV SOLN 100 UNIT/ML 100 UNIT/ML
500 SOLUTION INTRAVENOUS AS NEEDED
Status: DISCONTINUED | OUTPATIENT
Start: 2025-02-05 | End: 2025-02-05 | Stop reason: HOSPADM

## 2025-02-05 RX ADMIN — Medication 500 UNITS: at 08:28

## 2025-02-05 RX ADMIN — Medication 10 ML: at 08:28

## 2025-03-18 DIAGNOSIS — C78.7 SECONDARY LIVER CANCER: Primary | ICD-10-CM

## 2025-03-18 DIAGNOSIS — C18.2 MALIGNANT NEOPLASM OF ASCENDING COLON: ICD-10-CM

## 2025-03-19 ENCOUNTER — INFUSION (OUTPATIENT)
Dept: ONCOLOGY | Facility: HOSPITAL | Age: 75
End: 2025-03-19
Payer: MEDICARE

## 2025-03-19 DIAGNOSIS — C18.2 MALIGNANT NEOPLASM OF ASCENDING COLON: ICD-10-CM

## 2025-03-19 DIAGNOSIS — C78.7 SECONDARY LIVER CANCER: ICD-10-CM

## 2025-03-19 DIAGNOSIS — Z45.2 FITTING AND ADJUSTMENT OF VASCULAR CATHETER: Primary | ICD-10-CM

## 2025-03-19 PROCEDURE — 36591 DRAW BLOOD OFF VENOUS DEVICE: CPT

## 2025-03-19 PROCEDURE — 25010000002 HEPARIN LOCK FLUSH PER 10 UNITS: Performed by: INTERNAL MEDICINE

## 2025-03-19 RX ORDER — HEPARIN SODIUM (PORCINE) LOCK FLUSH IV SOLN 100 UNIT/ML 100 UNIT/ML
500 SOLUTION INTRAVENOUS AS NEEDED
OUTPATIENT
Start: 2025-03-19

## 2025-03-19 RX ORDER — HEPARIN SODIUM (PORCINE) LOCK FLUSH IV SOLN 100 UNIT/ML 100 UNIT/ML
500 SOLUTION INTRAVENOUS AS NEEDED
Status: DISCONTINUED | OUTPATIENT
Start: 2025-03-19 | End: 2025-03-19 | Stop reason: HOSPADM

## 2025-03-19 RX ORDER — SODIUM CHLORIDE 0.9 % (FLUSH) 0.9 %
10 SYRINGE (ML) INJECTION AS NEEDED
OUTPATIENT
Start: 2025-03-19

## 2025-03-19 RX ADMIN — Medication 500 UNITS: at 08:37

## 2025-03-26 ENCOUNTER — HOSPITAL ENCOUNTER (OUTPATIENT)
Dept: PET IMAGING | Facility: HOSPITAL | Age: 75
Discharge: HOME | End: 2025-03-26
Admitting: INTERNAL MEDICINE
Payer: MEDICARE

## 2025-03-26 ENCOUNTER — INFUSION (OUTPATIENT)
Dept: ONCOLOGY | Facility: HOSPITAL | Age: 75
End: 2025-03-26
Payer: MEDICARE

## 2025-03-26 DIAGNOSIS — C78.7 SECONDARY LIVER CANCER: ICD-10-CM

## 2025-03-26 DIAGNOSIS — C18.2 MALIGNANT NEOPLASM OF ASCENDING COLON: ICD-10-CM

## 2025-03-26 LAB — CREAT BLDA-MCNC: 1.1 MG/DL (ref 0.6–1.3)

## 2025-03-26 PROCEDURE — 25510000002 DIATRIZOATE MEGLUMINE & SODIUM PER 1 ML: Performed by: INTERNAL MEDICINE

## 2025-03-26 PROCEDURE — 25510000001 IOPAMIDOL PER 1 ML: Performed by: INTERNAL MEDICINE

## 2025-03-26 PROCEDURE — 74177 CT ABD & PELVIS W/CONTRAST: CPT

## 2025-03-26 PROCEDURE — 82565 ASSAY OF CREATININE: CPT

## 2025-03-26 RX ORDER — IOPAMIDOL 755 MG/ML
100 INJECTION, SOLUTION INTRAVASCULAR
Status: COMPLETED | OUTPATIENT
Start: 2025-03-26 | End: 2025-03-26

## 2025-03-26 RX ORDER — DIATRIZOATE MEGLUMINE AND DIATRIZOATE SODIUM 660; 100 MG/ML; MG/ML
30 SOLUTION ORAL; RECTAL
Status: COMPLETED | OUTPATIENT
Start: 2025-03-26 | End: 2025-03-26

## 2025-03-26 RX ADMIN — DIATRIZOATE MEGLUMINE AND DIATRIZOATE SODIUM 30 ML: 660; 100 LIQUID ORAL; RECTAL at 09:24

## 2025-03-26 RX ADMIN — IOPAMIDOL 85 ML: 755 INJECTION, SOLUTION INTRAVENOUS at 09:24

## 2025-03-27 LAB — REF LAB TEST RESULTS: NORMAL

## 2025-04-09 ENCOUNTER — INFUSION (OUTPATIENT)
Dept: ONCOLOGY | Facility: HOSPITAL | Age: 75
End: 2025-04-09
Payer: MEDICARE

## 2025-04-09 ENCOUNTER — OFFICE VISIT (OUTPATIENT)
Dept: ONCOLOGY | Facility: CLINIC | Age: 75
End: 2025-04-09
Payer: MEDICARE

## 2025-04-09 VITALS
OXYGEN SATURATION: 96 % | SYSTOLIC BLOOD PRESSURE: 142 MMHG | BODY MASS INDEX: 33.37 KG/M2 | HEIGHT: 69 IN | RESPIRATION RATE: 17 BRPM | WEIGHT: 225.3 LBS | DIASTOLIC BLOOD PRESSURE: 91 MMHG | TEMPERATURE: 97.6 F | HEART RATE: 53 BPM

## 2025-04-09 DIAGNOSIS — D50.9 IRON DEFICIENCY ANEMIA, UNSPECIFIED IRON DEFICIENCY ANEMIA TYPE: ICD-10-CM

## 2025-04-09 DIAGNOSIS — E53.8 VITAMIN B12 DEFICIENCY: ICD-10-CM

## 2025-04-09 DIAGNOSIS — C18.2 MALIGNANT NEOPLASM OF ASCENDING COLON: ICD-10-CM

## 2025-04-09 DIAGNOSIS — Z45.2 FITTING AND ADJUSTMENT OF VASCULAR CATHETER: Primary | ICD-10-CM

## 2025-04-09 DIAGNOSIS — C78.7 SECONDARY LIVER CANCER: ICD-10-CM

## 2025-04-09 DIAGNOSIS — D69.6 THROMBOCYTOPENIA: ICD-10-CM

## 2025-04-09 DIAGNOSIS — C78.7 SECONDARY LIVER CANCER: Primary | ICD-10-CM

## 2025-04-09 LAB
ALBUMIN SERPL-MCNC: 4.1 G/DL (ref 3.5–5.2)
ALBUMIN/GLOB SERPL: 1.6 G/DL
ALP SERPL-CCNC: 59 U/L (ref 39–117)
ALT SERPL W P-5'-P-CCNC: 36 U/L (ref 1–41)
ANION GAP SERPL CALCULATED.3IONS-SCNC: 11.5 MMOL/L (ref 5–15)
AST SERPL-CCNC: 34 U/L (ref 1–40)
BASOPHILS # BLD AUTO: 0.05 10*3/MM3 (ref 0–0.2)
BASOPHILS NFR BLD AUTO: 1.1 % (ref 0–1.5)
BILIRUB SERPL-MCNC: 1 MG/DL (ref 0–1.2)
BUN SERPL-MCNC: 16 MG/DL (ref 8–23)
BUN/CREAT SERPL: 16 (ref 7–25)
CALCIUM SPEC-SCNC: 9.5 MG/DL (ref 8.6–10.5)
CEA SERPL-MCNC: 2.12 NG/ML
CHLORIDE SERPL-SCNC: 99 MMOL/L (ref 98–107)
CO2 SERPL-SCNC: 23.5 MMOL/L (ref 22–29)
CREAT SERPL-MCNC: 1 MG/DL (ref 0.76–1.27)
DEPRECATED RDW RBC AUTO: 50.9 FL (ref 37–54)
EGFRCR SERPLBLD CKD-EPI 2021: 79.5 ML/MIN/1.73
EOSINOPHIL # BLD AUTO: 0.19 10*3/MM3 (ref 0–0.4)
EOSINOPHIL NFR BLD AUTO: 4.3 % (ref 0.3–6.2)
ERYTHROCYTE [DISTWIDTH] IN BLOOD BY AUTOMATED COUNT: 13.6 % (ref 12.3–15.4)
GLOBULIN UR ELPH-MCNC: 2.6 GM/DL
GLUCOSE SERPL-MCNC: 140 MG/DL (ref 65–99)
HCT VFR BLD AUTO: 37.7 % (ref 37.5–51)
HGB BLD-MCNC: 12.2 G/DL (ref 13–17.7)
IMM GRANULOCYTES # BLD AUTO: 0.01 10*3/MM3 (ref 0–0.05)
IMM GRANULOCYTES NFR BLD AUTO: 0.2 % (ref 0–0.5)
LYMPHOCYTES # BLD AUTO: 0.85 10*3/MM3 (ref 0.7–3.1)
LYMPHOCYTES NFR BLD AUTO: 19.1 % (ref 19.6–45.3)
MCH RBC QN AUTO: 32.6 PG (ref 26.6–33)
MCHC RBC AUTO-ENTMCNC: 32.4 G/DL (ref 31.5–35.7)
MCV RBC AUTO: 100.8 FL (ref 79–97)
MONOCYTES # BLD AUTO: 0.48 10*3/MM3 (ref 0.1–0.9)
MONOCYTES NFR BLD AUTO: 10.8 % (ref 5–12)
NEUTROPHILS NFR BLD AUTO: 2.87 10*3/MM3 (ref 1.7–7)
NEUTROPHILS NFR BLD AUTO: 64.5 % (ref 42.7–76)
NRBC BLD AUTO-RTO: 0 /100 WBC (ref 0–0.2)
PLATELET # BLD AUTO: 125 10*3/MM3 (ref 140–450)
PMV BLD AUTO: 10 FL (ref 6–12)
POTASSIUM SERPL-SCNC: 4.8 MMOL/L (ref 3.5–5.2)
PROT SERPL-MCNC: 6.7 G/DL (ref 6–8.5)
RBC # BLD AUTO: 3.74 10*6/MM3 (ref 4.14–5.8)
SODIUM SERPL-SCNC: 134 MMOL/L (ref 136–145)
WBC NRBC COR # BLD AUTO: 4.45 10*3/MM3 (ref 3.4–10.8)

## 2025-04-09 PROCEDURE — 80053 COMPREHEN METABOLIC PANEL: CPT

## 2025-04-09 PROCEDURE — 85025 COMPLETE CBC W/AUTO DIFF WBC: CPT

## 2025-04-09 PROCEDURE — 82378 CARCINOEMBRYONIC ANTIGEN: CPT | Performed by: INTERNAL MEDICINE

## 2025-04-09 PROCEDURE — 36591 DRAW BLOOD OFF VENOUS DEVICE: CPT

## 2025-04-09 PROCEDURE — 25010000002 HEPARIN LOCK FLUSH PER 10 UNITS: Performed by: INTERNAL MEDICINE

## 2025-04-09 RX ORDER — HEPARIN SODIUM (PORCINE) LOCK FLUSH IV SOLN 100 UNIT/ML 100 UNIT/ML
500 SOLUTION INTRAVENOUS AS NEEDED
Status: DISCONTINUED | OUTPATIENT
Start: 2025-04-09 | End: 2025-04-09 | Stop reason: HOSPADM

## 2025-04-09 RX ORDER — HEPARIN SODIUM (PORCINE) LOCK FLUSH IV SOLN 100 UNIT/ML 100 UNIT/ML
500 SOLUTION INTRAVENOUS AS NEEDED
OUTPATIENT
Start: 2025-04-09

## 2025-04-09 RX ORDER — SODIUM CHLORIDE 0.9 % (FLUSH) 0.9 %
10 SYRINGE (ML) INJECTION AS NEEDED
OUTPATIENT
Start: 2025-04-09

## 2025-04-09 RX ADMIN — Medication 500 UNITS: at 08:51

## 2025-04-09 NOTE — PROGRESS NOTES
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 Workstation: [id number]           Assessment & Plan     Assessment & Plan      ASSESSMENT:    1.  Colon cancer post right hemicolectomy 10/11/2022.  Stage IV ([id number]).  Had positive stool occult blood test on 09/09/2022. Colonoscopic examination on 09/20/2022 by Dr. Anil Garces reported cecum colon mass.  Biopsy confirmed moderately differentiated adenocarcinoma.  MSI stable.  Mildly elevated CEA level 10.7 ng/mL on 9/22/2022.  CT scan for chest abdomen pelvis 9/26/2022 reported cecal mass.  There was also suspicious 9 mm hepatic lesion.  Abdomen MRI examination with and without contrast on 10/7/2022 confirmed 8 mm lesion hypervascularity in the segment 5 of the liver.  Patient had right hemicolectomy 10/11/2022.  Unable to biopsy he liver lesion.  Pathology evaluation reported [id number] disease, this will be at least a stage IIIb colon cancer.  On 11/2/2022 I discussed with the patient, his wife and his daughter, recommending further imaging study with PET scan for assessment.  If patient is no hypermetabolic liver lesion, will treat with adjuvant FOLFOX 6 regimen every 2 weeks for 12 cycles.  However, if he has hypermetabolic lesion in the liver, we will treat him as  metastatic disease, with FOLFOX plus Avastin.  Since patient has neuropathy already being his legs and feet, if he is indeed metastatic disease, we may switch him to irinotecan instead of oxaliplatin because of the neuropathy.  If indicated patient has metastatic disease, we would also entertain metastectomy after finishing 12 cycles of chemotherapy.    PATHOLOGY: Tumor sample for [facility] NGS study reported positive for K-aashish mutation exon2 p.G13D, negative for HER2/dorie by IHC 0 and no amplification by FISH.  MSI stable by DNA sequencing, and also MMR proficient by IHC staining.  Tumor mutation burden is low 8 mut/Mb.  Patient was positive for PTEN 1+, 100% by IHC, PD-L1 was negative, only 1% by IHC.  Patient also positive for APC mutation exon 16p.[id number], positive for AMACR1 exon2 p.R358*.  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    Discussed with the patient and family members about potential side effects including bone marrow suppression, with anemia thrombocytopenia and leukocytopenia increased risk for infection, and also peripheral neuropathy, etc. patient is agreeable to proceed ahead with treatment.  The patient has PET scan examination on 11/14/2022 which reported a small 1.3 cm hepatic focus with elevated SUV 6.3, highly suspicious for metastatic disease. The patient now has stage IV. The patient had [facility] NGS study which reported K-aashish mutation, tumor mutation burden < 10, MSI was stable and anti-PD-L1 negative.  Not a candidate for anti-EGFR monoclonal antibody such as Vectibix, or chekpoint inhibitor immunotherapy.  We discussed the adding anti-VEGF monoclonal antibody, bevacizumab/Avastin starting in 2 weeks so that it would be after 6 weeks of primary surgery for the colon cancer resection.  We discussed this is now stage IV disease, however because only having 1 solitary metastatic lesion in the liver, it is potentially curable so we will be as aggressive as possible for chemotherapy.  If he has  good response, in the future he will need metastectomy of the liver lesion.  On 11/29/2022 patient is presented for cycle #2 of chemotherapy. Patient tolerated therapy well except mild oral mucositis. However, laboratory studies showed significant decrease in platelets at only 108,000, as well as also decreasing WBC and hemoglobin. Discussed with the patient today if we do not carry out any dose reduction, he will likely become more thrombocytopenic next time in 2 weeks and we will not be able to do cycle #3 chemotherapy on time. I discussed with the patient for dose reduction and to avoid potential delay of chemotherapy and he is agreeable. We will have 25% dose reduction for 5-FU leucovorin and oxaliplatin.  On 11/29/2022 he was started the first dose of Avastin/bevacizumab in conjunction with chemotherapy cycle #2.  On 12/13/2022, the patient presented for reevaluation prior to chemotherapy cycle #3. He has stable platelets of 108,000 and slightly improved WBC of 5100 and hemoglobin 12.8 g/dL. He will continue cycle 3 with the same 25% dose reduction.  1/10/2023 due for cycle 5 FOLFOX bevacizumab, overall is tolerating fairly well.  Hemoglobin 11.7, platelet count stable at 108,000, WBC 4.02, ANC 2.27.  Patient has had five cycles of chemotherapy and a CT scan examination obtained on 01/20/2023. The CT scan reported favorable response as the lesion is less obvious.   1/24/2023 recommended to continue chemotherapy for a total of 12 cycles. Then obtain PET scan examination. Due to worsening thrombocytopenia, and also peripheral neuropathy, for cycle #6, I will decrease oxaliplatin by another 25% so would it be 50% of the original dose. I will keep the same dose of 5-FU which is 75% of the original dose.  Full dose Avastin.   2/7/2023 due for cycle 7.  Platelet count is holding steady at 100,000.  We will proceed with treatment today with same doses as given on cycle 6.   2/21/2023 due for cycle #8 chemotherapy. We  "will repeat every 2 weeks.  3/7/2023 proceed with cycle #9 chemotherapy and hold Avastin for dental procedure on 3/13/2023.  On 3/21/2023 we will proceed ahead with cycle #10 chemotherapy FOLFOX6 regimen.  Continue to hold Avastin.  On 4/4/2023 patient presented for evaluation prior to cycle #11 FOLFOX 6 regimen.  Continue to hold Avastin.  This will be resumed from next cycle.   The patient presented 4/18/2023 for reevaluation prior to his last cycle #12 FOLFOX plus Avastin. The patient reports tolerating well. We will proceed with cycle12 today and resume Avastin.  I recommended having a PET scan examination for assessment of response, especially the small metastatic liver lesion which was only found on the previous PET scan.  Patient had a PET scan examination on 04/25/2023, which reported complete resolution of the solitary hypermetabolic lesion in the liver. He had a mildly active small lymph node, peritracheal, subcarinal with low activity. Etiology not clear.   On 05/01/2023, I reviewed the PET scan images with the patient and his wife, and two daughters. I recommended to refer the patient to [facility], surgical oncology, Dr. Griffin Tilley, for evaluation to see if he would be a candidate for surgical resection of the liver segment where he had metastatic disease, which now has resolution of hypermetabolic activity after chemotherapy.  Patient was seen by Dr. Tilley on 5/15/2023. After lengthy discussion, patient opted to not having surgical intervention currently. He would rather to have serial images studies.   Patient had a PET scan on 7/29/2023, which reported no evidence for recurrent disease.   I discussed with the patient and his wife on 8/8/2023, recommended CT scan for abdomen and pelvis with i.v. contrast in 3 months, together with lab study \"Guardant Reveal\" for ctDNA together with routine labs, CBC, CMP, CEA level, for assessment of colon cancer.  The patient had laboratory " studies on 10/12/2023, which reported negative for Guardant Reveal, ctDNA 0%. The patient also had normal CEA and liver function panel. The CT scan for the abdomen and pelvis was no evidence of disease recurrence.   Patient had laboratory studies on 01/09/2024 which reported negative for Guardant Reveal, ctDNA 0%. Other studies reported normal CEA at 1.93 ng/mL, marginally elevated ALT at 49 and total bilirubin 1.4 but normal AST at 37, alkaline phosphatase 62. No evidence for disease recurrence. I do recommend to have repeat laboratory studies same as above together with CT scan for abdomen and pelvis with IV contrast.  Patient is also overdue for colonoscopy examination 1 year post his colon surgery.  Patient had a benign colonoscopy examination by Dr. Garces on 3/6/2024.  Dr. Garces recommended repeating colonoscopy in 5 years.  CT for abdomen pelvis with IV contrast on 4/16/2024 showed no evidence for cancer recurrence.  Guardant Reveal on 4/30/2024 reported 0% ctDNA.   On 7/23/2024 the Guardant Reveal study reporting positive ctDNA, albeit < 0.061%.  Had  normal liver function panel, the physical examination remains unremarkable. Considering the positive ctDNA result, a CT scan of the abdomen and pelvis with oral and IV contrast is recommended within a week for reassessment. If the study is positive, a PET scan examination will be conducted. However, if the study is negative, the same laboratory studies will be repeated together with a CT scan for evaluation in 3 months.   A CT scan of the abdomen and pelvis with IV contrast on 08/21/2024 reported a lesion in the anterior part of the left liver measuring 9 mm, which corresponds to a previous hypermetabolic activity noted on the PET scan in November 2022. Due to the high suspicion of disease recurrence, a PET scan was performed on 08/30/2024, revealing a hypermetabolic lesion with an SUV of 8.7 in the anterior subcapsular region of the liver. The patient reports no  symptoms related to recurrence and remains physically active, running a small business. After discussing the findings and treatment options on 09/05/2024, he agreed to be evaluated by Dr. Tilley for possible surgical resection. Chemotherapy was considered but deemed unsuitable due to its bi-weekly schedule conflicting with his business operations.   Resection of a solitary liver lesion on 10/08/2024 by Dr. Tilley at [facility]. The pathology reported moderately differentiated adenocarcinoma, unifocal, consistent with his primary colon cancer. The metastatic lesion measures 1.5 cm at its largest dimension. The resection margin is clear by 0.7 cm. There is mild macrovascular steatosis, and no bridging fibrosis or cirrhosis as confirmed by special stain. The tumor sample tested negative for HER2 by IHC (IHC 0) and MMR IHC was 0/7. Biomarkers indicate stable DAVID.   On 11/6/2024 today's laboratory studies reveal liver function panel is normal, glucose is at 103, creatinine at 0.87, and electrolytes are within normal range. He reports no complaints other than occasional soreness from the surgery, which is improving daily. He has a good appetite, regular bowel movements without blood in the stools, and no dizziness or lightheadedness.     CT scan of the abdomen and pelvis from 01/03/2025 reported a peripheral 3.5 cm hypoattenuation in segment 4A of the liver with overlying capsule retraction, likely treatment-related.  I shared imaging with the patient and we recommended to have a repeating CT scan of the abdomen and pelvis with IV contrast in 3 months for reassessment, along with guardant reveal study for circulating tumor DNA, together with routine labs, CBC, CMP, and CEA level.     He had a negative Guardant Reveal study on 03/19/2025, and a benign CT scan of the abdomen and pelvis on 03/26/2025 showed no evidence of metastatic disease or new liver lesions. Physically, he is doing well. Laboratory studies  including Guardant Reveal are recommended in 3 months for reassessment.       2. Iron deficiency anemia.  Subsequently anemia also caused by chemotherapy.  The patient has iron deficiency due to GI bleeding from his colon cancer. His laboratory study on 09/08/2022 reported ferritin 10.7 and iron saturation 4% with microcytic hypochromic anemia, hemoglobin 7.8, MCV 79.9 and MCHC 29.2.   Patient reports good tolerance to oral iron treatment and already has improved energy level. Laboratory study on 9/22/2022 did report slightly improved hemoglobin at 8.5 but normalized MCV 85.0. I think he is responding to oral iron treatment both physically and laboratory wise.  We advised patient to increase oral iron to twice a day.  On 11/2/2022 patient reports good tolerance to oral iron twice a day.  Labs today showed further improved hemoglobin 10.9.  He continues to maintain normal platelets and WBC.  Improved hemoglobin 12.1 on 11/15/2022.  Cycle #1 chemotherapy will be started.  On 11/29/2022 hemoglobin 11.7.  On 12/13/2022, his hemoglobin is 12.8 g/dL.  1/10/2023 hemoglobin 11.7.  The patient has trending down hemoglobin 11.0 on 1/24/2023, however, he is asymptomatic.  Hemoglobin 2/7/2023, 10.6.  Iron study reported ferritin 229 and iron saturation 28% with free iron 108 TIBC 386.  No evidence for iron deficiency.  So his anemia is now secondary to chemotherapy.     The patient has stable mild anemia with hemoglobin 11.0 on 3/7/2023. We will continue to monitor.  Continue to hold ferrous sulfate.  On 3/21/2023, stable anemia with hemoglobin 10.0 .0.  Continue to monitor.  On 4/4/2023 patient has stable anemia Hb 10.2, .9.  The patient has stable anemia, hemoglobin 10.8 today on 04/18/2023. The patient reports no syncope.  On 05/01/2023, patient has a slightly improved hemoglobin 10.8. We will repeat iron studies in 3 months for reassessment.  Laboratory studies 7/31/2023 reported ferritin 140, free iron 124  and iron saturation 35%. He also had improved hemoglobin 11.9, .1. Discussed with him today 8/8/2023 and recommended monitoring.  The laboratory study on 10/12/2023 reported worsening ferritin by 50% to 69.1 ng/mL; however, maintains a reasonable iron saturation 30% with free iron 108. The patient also has persistent anemia with hemoglobin of 11.9. He has already been on oral vitamin B12 supplement. I asked the patient to start oral iron ferrous sulfate at 325 mg once a day. I discussed with him the possible side effects of constipation and stools becoming dark-colored constipation, nausea, and cramping, etc.    Laboratory studies on 01/09/2024 showed much improved hemoglobin 13.3 and also significantly improved ferritin 245, iron saturation 32% with free iron 111. I asked the patient to stop oral iron completely.   on 4/30/2024 patient has recurrent anemia hemoglobin 12.7.  Also worsening iron studies with deteriorated ferritin 78.8 ng/mL, and also trending down normal iron saturation 23% with free iron 89 TIBC 389.  Discussed with the patient, recommended to restart taking oral iron once a day.   On 8/6/2024 the patient reports tolerating oral iron treatment once daily. Laboratory studies from 07/23/2024 showed stable anemia and improved iron with ferritin 134 and iron saturating 30%. The patient has been advised to discontinue oral iron treatment for the time being.   On 11/6/2024 laboratory studies reveal mild anemia with hemoglobin at 11.7, MCV at 104. He has stopped taking iron supplements. Monitoring will continue to assess if iron supplementation needs to be resumed.    Laboratory studies from 12/18/2024 reported mild anemia with hemoglobin at 12.6, MCV at 102.6, and MCHC at 32.1. Iron studies showed ferritin at 132, free iron at 83, iron saturation at 21%, B12 level at 699, and folate at 9.86 ng/mL. He has been taking iron once a day without any side effects such as constipation.   On 4/9/2025  patient has been taking oral vitamin B12 daily but not iron or folic acid. he still has persistent mild anemia with hemoglobin 12.2. He is not symptomatic, with no melena or hematochezia. Repeat laboratory studies for ferritin, iron profile, B12, and folate are recommended in 3 months for reevaluation.      3.    Pancytopenia secondary to chemotherapy.    This patient had original iron deficiency, however now has ongoing anemia due to chemotherapy.  He also has thrombocytopenia and neutropenia from chemotherapy.  Normal neutrophil prior to starting chemotherapy.    This patient developed first mild neutropenia with ANC 1610 on 3/21/2023.  Patient reports no fever sweating chills.  I recommended starting patient on Levaquin daily for 7 days, for prophylaxis of neutropenic fever.  We will continue to monitor for now.  Expecting this will getting worse with ongoing chemotherapy.  Question the patient to call us if he develops fever sweating chills.   4/4/2023, patient has slightly improved neutrophils 1870, and WBC 3270.  Continue to monitor.  On 04/18/2023, the patient has neutrophils 2030, WBC 3480. Continue with chemotherapy, monitor CBC.  On 05/01/2023 patient has improved WBC 4,590, including neutrophils 3,200.  7/31/2023 patient has normal WBC 4600, including ANC 2650 lymphocytes 1340.  The laboratory study on 10/12/2023 reported persistent thrombocytopenia with platelet counts of 132,000, and low normal WBC of 4720, and neutrophil count of 2930.    Pancytopenia. Laboratory study on 01/09/2024 reported normalized platelets 164,000, improved WBC 6000, neutrophils 3890 and also improved hemoglobin at 13.3.   On 4/30/2024 patient has normal WBC 5530, neutrophils 3330, platelets 141,000 and hemoglobin 12.7.   Laboratory study from 07/23/2024 reported normal WBC 5070, neutrophils 3120, lymphocytes 1090, mild thrombocytopenia platelets 130,000, stable anemia, hemoglobin 10.5 on 07/23/2024.  11/6/2024 WBC 5780  neutrophils 3670.  12/18/2024 WBC 5180 neutrophils 3240 lymphocytes 1240.  4/9/2025 WBC 4450 neutrophils 2870.  Continue to monitor.    4.  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  Prior to starting chemotherapy patient had low normal platelets 156,000 on 11/15/2022.  Started on cycle #1 chemotherapy FOLFOX 6.  On 11/29/2022 patient had platelets of 108,000.  Patient reports no bleeding or bruising.  Discussed with the patient, because of foreseeable more severe thrombocytopenia, we recommended 25% dose reduction starting from cycle #2 chemotherapy.  On 12/13/2022, the patient has same decreased number of platelets 108,000. He will continue chemotherapy with same dose reduction of 25%.   1/10/2023 platelet count stable at 108,000.  On 01/24/2023, patient has worsening thrombocytopenia with platelets of 99,000 mcL. Discussed with the patient, we will cut oxaliplatin by another 25% to be at 50% of original dose. We will leave the 5-FU dose same as the previous at 75% of original dose. Avastin will be the same dose.  2/7/2023 platelet count 100,000.    On 02/21/2023 the patient has stable thrombocytopenia with platelets 102,000. The patient reports no bleeding or bruising. Continue to monitor.   On 3/7/2023 the patient has stable thrombocytopenia with platelets 103,000. The patient reports no bleeding or bruising. Continue to monitor.   On 3/21/2023 persistent stable thrombocytopenia with platelets 100,000.   On 4/4/2023 platelets 96,000.  We will go ahead and to proceed with chemotherapy.   Today on 04/18/2023, platelets 91,000. We will proceed ahead with the chemotherapy today. This is cycle 12 of chemotherapy. We will reassess his treatment after PET scan examination.  On 05/01/2023, patient has a persistent thrombocytopenia with platelets of 86,000. Patient is asymptomatic.  On 7/31/2023 improved platelets 132,000.  On 10/12/2023, stable thrombocytopenia with 132,000 platelets.  1/9/2024 normal platelets  164,000.  4/30/2024 marginal normal platelets 141,000.  Laboratory study from 07/23/2024 reported mild thrombocytopenia platelets 130,000, stable anemia, hemoglobin 10.5 on 07/23/2024.   11/6/2024 platelets 139,000.   12/18/2024 slightly improved platelets 159,000.  Today, 4/9/2025 patient has recurrent mild thrombocytopenia with platelets 125,000.  Patient is asymptomatic.  Continue to monitor.    5. Peripheral neuropathy  On 12/13/2022, he reports cold sensitivity in his mouth when he drinks cold water. The cold sensitivity usually dissipates after 3 to 4 days.  Patient reports he is more significant cold sensitivities lasting for more than a week. He does have moderate tingling, numbness involving the fingers, but not much as the toes.  2/7/2023 he feels like the neuropathy/cold sensitivity is lingering a little bit longer with each cycle.    On 4/4/2023 the patient reports stable mild peripheral neuropathy/cold sensitivity.    On 04/18/2023, patient reports some mild numbness and tingling involving both feet and hands, but this is only last for a few days and then resolves. We will continue dose reduced oxaliplatin 50%.  On 05/01/2023, patient continues to have peripheral neuropathy intermittent and mild overall. Continue to monitor.   Patient reports today on 01/30/2024 has stable mild peripheral neuropathy.   Stable condition today.    6.  Weight losses.    On 12/13/2022, the patient reports he has lost weight in the past couple of months. He reports a poor appetite for 3 days after chemotherapy and a poor taste in his mouth. His appetite has since improved. He only had mild nausea, but no vomiting. He was encouraged to use Ensure or Boost to improve nutrition supplement. He already started using protein powder.  3/7/2023, patient's weight continues to improve and he has gained a couple pounds.  His weight is stable at 211 pounds today.  He reports his appetite continues to improve.  He denies any nausea or  vomiting.   On 04/18/2023, patient weighs with 209 pounds.  8/18/2023 patient weighs about 213 pounds  10/31/2023, the patient has a stable weight of 213 pounds.    The patient has slightly improved weight at 221 pounds today on 01/30/2024.   4/30/2024 stable weight 213 pounds.   8/6/2024, the patient's weight has remained stable for the past 3 months.  9/5/2024 patient weights 207 pounds.  1/8/2025 patient reports stable weight.  Nevertheless due to the weather condition he has more close and also heavy boots which make his weight 226 pounds.  4/9/2025 stable weight 225 pounds.      7.  Low normal vitamin B12 level.    Patient had a marginal normal vitamin B12 level at 260 pg/mL on 08/24/2022.   Folate level on 9/22/2022 was normal at 15.5 ng/mL.    Continue oral vitamin B12 supplement at 1000 mcg daily.   Vitamin B12 was 658 pg/mL on 04/04/2023. This has improved. His macrocytosis is due to chemotherapy.  On 4/30/2024 improved B12 level 873 pg/mL.  Patient continues taking oral vitamin B12.   The patient has reported this on 08/06/2024 that he continues oral vitamin B12 daily.   12/18/2024 B12 level 699 pg/mL.  Patient reports that he continues on oral B12 supplement.      8. Type 2 Diabetes Mellitus.  He is currently taking metformin for glucose management. Glucose level is at 103 today 11/6/2024. He should continue his current medication regimen.  12/18/2024 glucose level was 171, which is high.  4/9/2025 glucose 140.     9. Low sodium level.    On 12/18/2024 sodium level was slightly low at 133.  Continue to monitor.  4/9/2025 sodium 134.        PLAN:  Continue port flush about every 6 weeks.    Repeat laboratory studies for Guardant Reveal, ferritin, iron profile, B12, and folate are recommended in 3 months for reevaluation.  Continue oral vitamin B12 at 1000 mcg daily.  I will see patient 2 weeks after the above laboratory studies for reassessment.  Plan to have CT scan for abdomen pelvis with IV contrast  every 6 months due to history of metastatic colon cancer.  Continue routine health care per PCP.      I personally reviewed images of CT scan obtained on 3/26/2025 and compared to the previous CT on 1/3/2025.  I shared those images with the patient.    I discussed with the patient about laboratory results and further management plan.  Patient voiced understanding and agreeable.         Maya Rosario MD PhD          CC:  JULIA Santiago MD Richard Pokorny, MD   Oral surgeon: Dr. Charlie Zazueta Phone [phone]  Griffin Tilley M.D.

## 2025-04-20 PROBLEM — D69.6 THROMBOCYTOPENIA: Status: ACTIVE | Noted: 2025-04-20

## 2025-05-21 ENCOUNTER — INFUSION (OUTPATIENT)
Dept: ONCOLOGY | Facility: HOSPITAL | Age: 75
End: 2025-05-21
Payer: MEDICARE

## 2025-05-21 DIAGNOSIS — Z45.2 FITTING AND ADJUSTMENT OF VASCULAR CATHETER: Primary | ICD-10-CM

## 2025-05-21 PROCEDURE — 25010000002 HEPARIN LOCK FLUSH PER 10 UNITS: Performed by: INTERNAL MEDICINE

## 2025-05-21 PROCEDURE — 96523 IRRIG DRUG DELIVERY DEVICE: CPT

## 2025-05-21 RX ORDER — HEPARIN SODIUM (PORCINE) LOCK FLUSH IV SOLN 100 UNIT/ML 100 UNIT/ML
500 SOLUTION INTRAVENOUS AS NEEDED
OUTPATIENT
Start: 2025-05-21

## 2025-05-21 RX ORDER — SODIUM CHLORIDE 0.9 % (FLUSH) 0.9 %
10 SYRINGE (ML) INJECTION AS NEEDED
Status: DISCONTINUED | OUTPATIENT
Start: 2025-05-21 | End: 2025-05-21 | Stop reason: HOSPADM

## 2025-05-21 RX ORDER — SODIUM CHLORIDE 0.9 % (FLUSH) 0.9 %
10 SYRINGE (ML) INJECTION AS NEEDED
OUTPATIENT
Start: 2025-05-21

## 2025-05-21 RX ORDER — HEPARIN SODIUM (PORCINE) LOCK FLUSH IV SOLN 100 UNIT/ML 100 UNIT/ML
500 SOLUTION INTRAVENOUS AS NEEDED
Status: DISCONTINUED | OUTPATIENT
Start: 2025-05-21 | End: 2025-05-21 | Stop reason: HOSPADM

## 2025-05-21 RX ADMIN — Medication 10 ML: at 10:21

## 2025-05-21 RX ADMIN — Medication 500 UNITS: at 10:25

## 2025-06-17 ENCOUNTER — OFFICE VISIT (OUTPATIENT)
Dept: INTERNAL MEDICINE | Facility: CLINIC | Age: 75
End: 2025-06-17
Payer: MEDICARE

## 2025-06-17 VITALS
HEART RATE: 57 BPM | DIASTOLIC BLOOD PRESSURE: 88 MMHG | TEMPERATURE: 97.5 F | OXYGEN SATURATION: 98 % | HEIGHT: 69 IN | BODY MASS INDEX: 31.9 KG/M2 | SYSTOLIC BLOOD PRESSURE: 153 MMHG | WEIGHT: 215.4 LBS

## 2025-06-17 DIAGNOSIS — I10 BENIGN ESSENTIAL HYPERTENSION: Primary | Chronic | ICD-10-CM

## 2025-06-17 DIAGNOSIS — E78.00 HYPERCHOLESTEROLEMIA: Chronic | ICD-10-CM

## 2025-06-17 DIAGNOSIS — E11.9 TYPE 2 DIABETES MELLITUS WITHOUT COMPLICATION, WITHOUT LONG-TERM CURRENT USE OF INSULIN: Chronic | ICD-10-CM

## 2025-06-17 DIAGNOSIS — R25.2 LEG CRAMPING: ICD-10-CM

## 2025-06-17 LAB
APPEARANCE UR: CLEAR
BILIRUB UR QL STRIP: NEGATIVE
BUN SERPL-MCNC: 24 MG/DL (ref 8–23)
BUN/CREAT SERPL: 20.5 (ref 7–25)
CALCIUM SERPL-MCNC: 9.9 MG/DL (ref 8.6–10.5)
CHLORIDE SERPL-SCNC: 96 MMOL/L (ref 98–107)
CHOLEST SERPL-MCNC: 154 MG/DL (ref 0–200)
CHOLEST/HDLC SERPL: 2.66 {RATIO}
CO2 SERPL-SCNC: 26.8 MMOL/L (ref 22–29)
COLOR UR: YELLOW
CREAT SERPL-MCNC: 1.17 MG/DL (ref 0.76–1.27)
EGFRCR SERPLBLD CKD-EPI 2021: 65.8 ML/MIN/1.73
GLUCOSE SERPL-MCNC: 119 MG/DL (ref 65–99)
GLUCOSE UR QL STRIP: NEGATIVE
HBA1C MFR BLD: 6.4 % (ref 4.8–5.6)
HDLC SERPL-MCNC: 58 MG/DL (ref 40–60)
HGB UR QL STRIP: NEGATIVE
KETONES UR QL STRIP: NEGATIVE
LDLC SERPL CALC-MCNC: 79 MG/DL (ref 0–100)
LEUKOCYTE ESTERASE UR QL STRIP: NEGATIVE
NITRITE UR QL STRIP: NEGATIVE
PH UR STRIP: 6.5 [PH] (ref 5–8)
POTASSIUM SERPL-SCNC: 5.2 MMOL/L (ref 3.5–5.2)
PROT UR QL STRIP: NEGATIVE
SODIUM SERPL-SCNC: 134 MMOL/L (ref 136–145)
SP GR UR STRIP: 1.02 (ref 1–1.03)
TRIGL SERPL-MCNC: 90 MG/DL (ref 0–150)
UROBILINOGEN UR STRIP-MCNC: NORMAL MG/DL
VLDLC SERPL CALC-MCNC: 17 MG/DL (ref 5–40)

## 2025-06-17 PROCEDURE — 3079F DIAST BP 80-89 MM HG: CPT

## 2025-06-17 PROCEDURE — 99214 OFFICE O/P EST MOD 30 MIN: CPT

## 2025-06-17 PROCEDURE — 3077F SYST BP >= 140 MM HG: CPT

## 2025-06-17 PROCEDURE — 1126F AMNT PAIN NOTED NONE PRSNT: CPT

## 2025-06-17 PROCEDURE — 1159F MED LIST DOCD IN RCRD: CPT

## 2025-06-17 PROCEDURE — 1160F RVW MEDS BY RX/DR IN RCRD: CPT

## 2025-06-17 RX ORDER — LISINOPRIL 40 MG/1
40 TABLET ORAL DAILY
Qty: 90 TABLET | Refills: 1 | Status: SHIPPED | OUTPATIENT
Start: 2025-06-17

## 2025-06-17 NOTE — PROGRESS NOTES
"Chief Complaint  Hyperlipidemia    Subjective        Taz Dickey presents to [facility]  History of Present Illness  74-year-old male presenting with hypertension, hyperlipidemia and diabetes.  Has been doing yard work for the past couple weeks.  Notices that his legs are cramping at the end of the night.  Also had 3 nights of dizzy spells when lying down.  Started drinking Gatorade after working.  Cramping has resolved.  Takes over-the-counter potassium supplement for the past couple days as well.  Has been taking lisinopril-hydrochlorothiazide as prescribed.      Stop taking atorvastatin 3 days ago due to concerns of dementia.  Will check labs today.    Blood pressure has been elevated during recent medical provider visits.  Due to cramping and elevated blood pressure, will stop hydrochlorothiazide and increase lisinopril.    Taking metformin as prescribed.  Tolerating well.    Symptoms are: recurrent.   Onset was at an unknown time.   Symptoms occur: monthly.  Symptoms include: vertigo and weakness.   Pertinent negative symptoms include no abdominal pain, no anorexia, no joint pain, no change in stool, no chills, no congestion, no cough, no diaphoresis, no fatigue, no fever, no headaches, no joint swelling, no nausea, no neck pain, no rash, no sore throat, no swollen glands, no dysuria, no visual change and no vomiting.       Objective   Vital Signs:  /88 (BP Location: Right arm, Patient Position: Sitting, Cuff Size: Large Adult)   Pulse 57   Temp 97.5 °F (36.4 °C) (Temporal)   Ht 175.3 cm (69.02\")   Wt 97.7 kg (215 lb 6.4 oz)   SpO2 98%   BMI 31.79 kg/m²   Estimated body mass index is 31.79 kg/m² as calculated from the following:    Height as of this encounter: 175.3 cm (69.02\").    Weight as of this encounter: 97.7 kg (215 lb 6.4 oz).               Physical Exam  Vitals reviewed.   Constitutional:       Appearance: Normal appearance.   Cardiovascular:      Rate and " Rhythm: Normal rate and regular rhythm.      Pulses: Normal pulses.      Heart sounds: Normal heart sounds.   Pulmonary:      Effort: Pulmonary effort is normal.      Breath sounds: Normal breath sounds.   Musculoskeletal:         General: Normal range of motion.      Cervical back: Normal range of motion.   Skin:     General: Skin is warm and dry.      Capillary Refill: Capillary refill takes less than 2 seconds.   Neurological:      General: No focal deficit present.      Mental Status: He is alert and oriented to person, place, and time.   Psychiatric:         Mood and Affect: Mood normal.         Behavior: Behavior normal.         Thought Content: Thought content normal.         Judgment: Judgment normal.        Result Review :      Common labs          12/18/2024    12:47 3/26/2025    09:09 4/9/2025    08:43   Common Labs   Glucose 171   140    BUN 23   16    Creatinine 0.95  1.10  1.00    Sodium 133   134    Potassium 4.6   4.8    Chloride 99   99    Calcium 9.4   9.5    Albumin 4.3   4.1    Total Bilirubin 0.8   1.0    Alkaline Phosphatase 73   59    AST (SGOT) 28   34    ALT (SGPT) 32   36    WBC 5.18   4.45    Hemoglobin 12.6   12.2    Hematocrit 39.2   37.7    Platelets 159   125          Current Outpatient Medications on File Prior to Visit   Medication Sig Dispense Refill    acetaminophen (TYLENOL) 500 MG tablet Take 1 tablet by mouth Every 6 (Six) Hours As Needed for Mild Pain.      aspirin 81 MG EC tablet Take 1 tablet by mouth Daily. INSTRUCTED PT TO FOLLOW MD INSTRUCTIONS REGARDING HOLDING FOR SURGERY/PT STATED TO HOLD 5 DAYS PRIOR TO SURGERY      atorvastatin (LIPITOR) 40 MG tablet TAKE 1 TABLET EVERY DAY 90 tablet 1    ferrous sulfate 325 (65 FE) MG tablet Take 1 tablet by mouth Daily With Breakfast. 90 tablet 3    metFORMIN (GLUCOPHAGE) 500 MG tablet TAKE 1 TABLET TWICE DAILY WITH MEALS 180 tablet 1    tadalafil (CIALIS) 5 MG tablet Take 1 tablet by mouth Daily As Needed for Erectile Dysfunction.  30 tablet 2    [DISCONTINUED] lisinopril-hydrochlorothiazide (PRINZIDE,ZESTORETIC) 20-12.5 MG per tablet TAKE 1 TABLET EVERY NIGHT 90 tablet 1     No current facility-administered medications on file prior to visit.                Assessment and Plan     Diagnoses and all orders for this visit:    1. Benign essential hypertension (Primary)  -     Basic Metabolic Panel  -     lisinopril (PRINIVIL,ZESTRIL) 40 MG tablet; Take 1 tablet by mouth Daily.  Dispense: 90 tablet; Refill: 1    2. Hypercholesterolemia  -     Lipid Panel With / Chol / HDL Ratio    3. Type 2 diabetes mellitus without complication, without long-term current use of insulin  -     Hemoglobin A1c  -     Urinalysis With Microscopic If Indicated (No Culture) - Urine, Clean Catch    4. Leg cramping        Patient Instructions   Start lisinopril 40 mg daily. Stop lisinopril-hydrochlorothiazide once new prescription arrives. Continue medications as prescribed. Increase fluid intake. Add Gatorade once a day as needed. Stretch. Monitor blood pressure at home. Labs today. Follow-up in 4 weeks for recheck.            Follow Up     Return in about 4 weeks (around 7/15/2025) for Recheck.  Patient was given instructions and counseling regarding his condition or for health maintenance advice. Please see specific information pulled into the AVS if appropriate.

## 2025-06-17 NOTE — PATIENT INSTRUCTIONS
Start lisinopril 40 mg daily. Stop lisinopril-hydrochlorothiazide once new prescription arrives. Continue medications as prescribed. Increase fluid intake. Add Gatorade once a day as needed. Stretch. Monitor blood pressure at home. Labs today. Follow-up in 4 weeks for recheck.

## 2025-06-25 ENCOUNTER — TELEPHONE (OUTPATIENT)
Dept: ONCOLOGY | Facility: CLINIC | Age: 75
End: 2025-06-25
Payer: MEDICARE

## 2025-06-25 ENCOUNTER — INFUSION (OUTPATIENT)
Dept: ONCOLOGY | Facility: HOSPITAL | Age: 75
End: 2025-06-25
Payer: MEDICARE

## 2025-06-25 DIAGNOSIS — C78.7 SECONDARY LIVER CANCER: ICD-10-CM

## 2025-06-25 DIAGNOSIS — C18.2 MALIGNANT NEOPLASM OF ASCENDING COLON: ICD-10-CM

## 2025-06-25 DIAGNOSIS — E53.8 VITAMIN B12 DEFICIENCY: ICD-10-CM

## 2025-06-25 DIAGNOSIS — C78.7 SECONDARY LIVER CANCER: Primary | ICD-10-CM

## 2025-06-25 DIAGNOSIS — D50.9 IRON DEFICIENCY ANEMIA, UNSPECIFIED IRON DEFICIENCY ANEMIA TYPE: ICD-10-CM

## 2025-06-25 LAB
ALBUMIN SERPL-MCNC: 4.7 G/DL (ref 3.5–5.2)
ALBUMIN/GLOB SERPL: 2 G/DL
ALP SERPL-CCNC: 60 U/L (ref 39–117)
ALT SERPL W P-5'-P-CCNC: 27 U/L (ref 1–41)
ANION GAP SERPL CALCULATED.3IONS-SCNC: 10 MMOL/L (ref 5–15)
AST SERPL-CCNC: 35 U/L (ref 1–40)
BASOPHILS # BLD AUTO: 0.05 10*3/MM3 (ref 0–0.2)
BASOPHILS NFR BLD AUTO: 0.9 % (ref 0–1.5)
BILIRUB SERPL-MCNC: 1.1 MG/DL (ref 0–1.2)
BUN SERPL-MCNC: 26.1 MG/DL (ref 8–23)
BUN/CREAT SERPL: 22.5 (ref 7–25)
CALCIUM SPEC-SCNC: 9.8 MG/DL (ref 8.6–10.5)
CEA SERPL-MCNC: 2.52 NG/ML
CHLORIDE SERPL-SCNC: 97 MMOL/L (ref 98–107)
CO2 SERPL-SCNC: 24 MMOL/L (ref 22–29)
CREAT SERPL-MCNC: 1.16 MG/DL (ref 0.76–1.27)
DEPRECATED RDW RBC AUTO: 52.6 FL (ref 37–54)
EGFRCR SERPLBLD CKD-EPI 2021: 66.5 ML/MIN/1.73
EOSINOPHIL # BLD AUTO: 0.15 10*3/MM3 (ref 0–0.4)
EOSINOPHIL NFR BLD AUTO: 2.6 % (ref 0.3–6.2)
ERYTHROCYTE [DISTWIDTH] IN BLOOD BY AUTOMATED COUNT: 13.8 % (ref 12.3–15.4)
FERRITIN SERPL-MCNC: 192 NG/ML (ref 30–400)
FOLATE SERPL-MCNC: 11.3 NG/ML (ref 4.78–24.2)
GLOBULIN UR ELPH-MCNC: 2.4 GM/DL
GLUCOSE SERPL-MCNC: 111 MG/DL (ref 65–99)
HCT VFR BLD AUTO: 36.3 % (ref 37.5–51)
HGB BLD-MCNC: 12.2 G/DL (ref 13–17.7)
IMM GRANULOCYTES # BLD AUTO: 0.01 10*3/MM3 (ref 0–0.05)
IMM GRANULOCYTES NFR BLD AUTO: 0.2 % (ref 0–0.5)
IRON 24H UR-MRATE: 109 MCG/DL (ref 59–158)
IRON SATN MFR SERPL: 29 % (ref 20–50)
LYMPHOCYTES # BLD AUTO: 1 10*3/MM3 (ref 0.7–3.1)
LYMPHOCYTES NFR BLD AUTO: 17 % (ref 19.6–45.3)
MCH RBC QN AUTO: 34.3 PG (ref 26.6–33)
MCHC RBC AUTO-ENTMCNC: 33.6 G/DL (ref 31.5–35.7)
MCV RBC AUTO: 102 FL (ref 79–97)
MONOCYTES # BLD AUTO: 0.58 10*3/MM3 (ref 0.1–0.9)
MONOCYTES NFR BLD AUTO: 9.9 % (ref 5–12)
NEUTROPHILS NFR BLD AUTO: 4.08 10*3/MM3 (ref 1.7–7)
NEUTROPHILS NFR BLD AUTO: 69.4 % (ref 42.7–76)
NRBC BLD AUTO-RTO: 0 /100 WBC (ref 0–0.2)
PLATELET # BLD AUTO: 154 10*3/MM3 (ref 140–450)
PMV BLD AUTO: 10.5 FL (ref 6–12)
POTASSIUM SERPL-SCNC: 5.7 MMOL/L (ref 3.5–5.2)
PROT SERPL-MCNC: 7.1 G/DL (ref 6–8.5)
RBC # BLD AUTO: 3.56 10*6/MM3 (ref 4.14–5.8)
SODIUM SERPL-SCNC: 131 MMOL/L (ref 136–145)
TIBC SERPL-MCNC: 381 MCG/DL (ref 298–536)
TRANSFERRIN SERPL-MCNC: 256 MG/DL (ref 200–360)
VIT B12 BLD-MCNC: 640 PG/ML (ref 211–946)
WBC NRBC COR # BLD AUTO: 5.87 10*3/MM3 (ref 3.4–10.8)

## 2025-06-25 PROCEDURE — 36591 DRAW BLOOD OFF VENOUS DEVICE: CPT

## 2025-06-25 PROCEDURE — 82728 ASSAY OF FERRITIN: CPT

## 2025-06-25 PROCEDURE — 83540 ASSAY OF IRON: CPT

## 2025-06-25 PROCEDURE — 84466 ASSAY OF TRANSFERRIN: CPT

## 2025-06-25 PROCEDURE — 82607 VITAMIN B-12: CPT | Performed by: INTERNAL MEDICINE

## 2025-06-25 PROCEDURE — 82378 CARCINOEMBRYONIC ANTIGEN: CPT | Performed by: INTERNAL MEDICINE

## 2025-06-25 PROCEDURE — 80053 COMPREHEN METABOLIC PANEL: CPT

## 2025-06-25 PROCEDURE — 85025 COMPLETE CBC W/AUTO DIFF WBC: CPT

## 2025-06-25 PROCEDURE — 82746 ASSAY OF FOLIC ACID SERUM: CPT | Performed by: INTERNAL MEDICINE

## 2025-06-25 NOTE — TELEPHONE ENCOUNTER
Patient presented for routine laboratory studies today and CMP reported elevated potassium 5.7, sodium 131, creatinine 1.16.    I called and spoke with the patient, he reports he has been taking oral potassium supplement because of leg cramping, which has resolved.  He also recently 2 weeks ago was not started on lisinopril 40 mg daily due to poorly controlled BP.  He has BP now is better in the 120s over 60s.    I asked patient to stop taking oral potassium supplement.  Also consume less banana and orange juice.    Will repeat BMP in 2 weeks when he comes back for reevaluation.    Patient voiced understanding.    GAMALIEL ROGERS M.D., Ph.D.

## 2025-07-03 LAB — REF LAB TEST RESULTS: NORMAL

## 2025-07-09 ENCOUNTER — LAB (OUTPATIENT)
Dept: LAB | Facility: HOSPITAL | Age: 75
End: 2025-07-09
Payer: MEDICARE

## 2025-07-09 ENCOUNTER — OFFICE VISIT (OUTPATIENT)
Dept: ONCOLOGY | Facility: CLINIC | Age: 75
End: 2025-07-09
Payer: MEDICARE

## 2025-07-09 VITALS
BODY MASS INDEX: 31.71 KG/M2 | WEIGHT: 214.1 LBS | OXYGEN SATURATION: 96 % | DIASTOLIC BLOOD PRESSURE: 80 MMHG | TEMPERATURE: 98.1 F | SYSTOLIC BLOOD PRESSURE: 151 MMHG | HEIGHT: 69 IN | HEART RATE: 62 BPM

## 2025-07-09 DIAGNOSIS — C78.7 SECONDARY LIVER CANCER: ICD-10-CM

## 2025-07-09 DIAGNOSIS — D50.9 IRON DEFICIENCY ANEMIA, UNSPECIFIED IRON DEFICIENCY ANEMIA TYPE: ICD-10-CM

## 2025-07-09 DIAGNOSIS — C18.2 MALIGNANT NEOPLASM OF ASCENDING COLON: ICD-10-CM

## 2025-07-09 DIAGNOSIS — E53.8 VITAMIN B12 DEFICIENCY: ICD-10-CM

## 2025-07-09 LAB
ANION GAP SERPL CALCULATED.3IONS-SCNC: 10.3 MMOL/L (ref 5–15)
BASOPHILS # BLD AUTO: 0.04 10*3/MM3 (ref 0–0.2)
BASOPHILS NFR BLD AUTO: 0.7 % (ref 0–1.5)
BUN SERPL-MCNC: 17.7 MG/DL (ref 8–23)
BUN/CREAT SERPL: 19.2 (ref 7–25)
CALCIUM SPEC-SCNC: 9.7 MG/DL (ref 8.6–10.5)
CHLORIDE SERPL-SCNC: 101 MMOL/L (ref 98–107)
CO2 SERPL-SCNC: 23.7 MMOL/L (ref 22–29)
CREAT SERPL-MCNC: 0.92 MG/DL (ref 0.76–1.27)
DEPRECATED RDW RBC AUTO: 55.3 FL (ref 37–54)
EGFRCR SERPLBLD CKD-EPI 2021: 87.8 ML/MIN/1.73
EOSINOPHIL # BLD AUTO: 0.24 10*3/MM3 (ref 0–0.4)
EOSINOPHIL NFR BLD AUTO: 4.4 % (ref 0.3–6.2)
ERYTHROCYTE [DISTWIDTH] IN BLOOD BY AUTOMATED COUNT: 14.6 % (ref 12.3–15.4)
GLUCOSE SERPL-MCNC: 131 MG/DL (ref 65–99)
HCT VFR BLD AUTO: 36.5 % (ref 37.5–51)
HGB BLD-MCNC: 12 G/DL (ref 13–17.7)
IMM GRANULOCYTES # BLD AUTO: 0.02 10*3/MM3 (ref 0–0.05)
IMM GRANULOCYTES NFR BLD AUTO: 0.4 % (ref 0–0.5)
LYMPHOCYTES # BLD AUTO: 1.1 10*3/MM3 (ref 0.7–3.1)
LYMPHOCYTES NFR BLD AUTO: 20.2 % (ref 19.6–45.3)
MCH RBC QN AUTO: 34.2 PG (ref 26.6–33)
MCHC RBC AUTO-ENTMCNC: 32.9 G/DL (ref 31.5–35.7)
MCV RBC AUTO: 104 FL (ref 79–97)
MONOCYTES # BLD AUTO: 0.58 10*3/MM3 (ref 0.1–0.9)
MONOCYTES NFR BLD AUTO: 10.6 % (ref 5–12)
NEUTROPHILS NFR BLD AUTO: 3.47 10*3/MM3 (ref 1.7–7)
NEUTROPHILS NFR BLD AUTO: 63.7 % (ref 42.7–76)
NRBC BLD AUTO-RTO: 0 /100 WBC (ref 0–0.2)
PLATELET # BLD AUTO: 139 10*3/MM3 (ref 140–450)
PMV BLD AUTO: 9.7 FL (ref 6–12)
POTASSIUM SERPL-SCNC: 5.1 MMOL/L (ref 3.5–5.2)
RBC # BLD AUTO: 3.51 10*6/MM3 (ref 4.14–5.8)
SODIUM SERPL-SCNC: 135 MMOL/L (ref 136–145)
WBC NRBC COR # BLD AUTO: 5.45 10*3/MM3 (ref 3.4–10.8)

## 2025-07-09 PROCEDURE — 80048 BASIC METABOLIC PNL TOTAL CA: CPT

## 2025-07-09 PROCEDURE — 85025 COMPLETE CBC W/AUTO DIFF WBC: CPT

## 2025-07-09 PROCEDURE — 36415 COLL VENOUS BLD VENIPUNCTURE: CPT

## 2025-07-09 RX ORDER — METHYLPREDNISOLONE 4 MG/1
TABLET ORAL
Qty: 21 TABLET | Refills: 0 | Status: SHIPPED | OUTPATIENT
Start: 2025-07-09

## 2025-07-09 NOTE — PROGRESS NOTES
.     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   CT scan examination on 9/26/2022 reported suspicious liver lesion 9 mm, otherwise no evidence for metastatic disease.    The patient underwent an abdominal MRI examination on 10/06/2022, which reported suspicious liver lesion 8mm.  Patient had sigmoidectomy on 10/11/2022.  Portacatheter placement on 10/21/2022.   PET scan examination on 11/14/2022 reported solitary hypermetabolic liver lesion.   Cycle #1 palliative chemotherapy FOLFOX 6 was started on 11/15/2022.   [facility] NGS study was positive for K-aashish mutation exon2 p.G13D.  Not a candidate for anti-EGFR monoclonal antibody treatment.   From cycle #2, bevacizumab/Avastin was added to palliative chemotherapy.  Due to thrombocytopenia, all chemotherapy agents were 25% reduced from cycle #2.  On 1/24/203 further dose reduction of Oxaliplatin to 50% of original dose.  Cycle #12 chemotherapy was started on 4/18/2023.  Resection of a solitary liver lesion on 10/08/2024 by Dr. Tilley at [facility].  *Iron deficiency anemia.  Oral iron treatment held on 2/21/2023.  Continue to monitor labs.                               HISTORY OF PRESENT ILLNESS:  The patient is a 74 y.o. 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  He remains in observation regarding his previous colon cancer.  He continues to undergo frequent Mediport flushing with monitoring of guardant reveal.  He reports he is overall feeling well.  He has no new pain or changes in his bowel habits.  He has no blood in his stool.  His only concern today is a pruritic rash which she suspects is poison oak due to contact  while cutting grass.  This is present on his legs and back.    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    Full dentures     GI (gastrointestinal bleed) September 8 2022    Hip pain     RIGHT    Hyperlipidemia     Hypertension     Lisinopril 1 dally    Iron deficiency anemia 09/22/2022    Numbness and tingling     RIGHT FOOT    PONV (postoperative nausea and vomiting)     Right leg pain     Type 2 diabetes mellitus without complication, without long-term current use of insulin 10/08/2019     Past Surgical History:   Procedure Laterality Date    CERVICAL DISCECTOMY LAMINECTOMY DECOMPRESSION POSTERIOR FUSION WITH INSTRUMENTATION      COLON RESECTION N/A 10/11/2022    Procedure: LAPAROSCOPIC RIGHT COLON RESECTION;  Surgeon: Ga Samaniego MD;  Location: [facility] OR;  Service: General;  Laterality: N/A;    COLON SURGERY      COLONOSCOPY N/A 02/28/2018    Procedure: COLONOSCOPY to TI and cecum with polypectomy;  Surgeon: Anil Garces MD;  Location: [facility] ENDOSCOPY;  Service:     COLONOSCOPY  2/28/2018 and sept. 2022    found cancer 2022    COLONOSCOPY N/A 03/06/2024    Procedure: COLONOSCOPY TO ANASTAMOSIS & T.I.;  Surgeon: Anil Garces MD;  Location: [facility] ENDOSCOPY;  Service: Gastroenterology;  Laterality: N/A;  PRE- H/O COLON CANCER  POST-DIVERTICULI, NORMAL POST OP ANATOMY    JOINT REPLACEMENT      KNEE ARTHROSCOPY Left     LUMBAR DISCECTOMY FUSION INSTRUMENTATION N/A 11/18/2020    Procedure: Lumbar 4 5 laminectomy with a posterior lateral fusion and instrumentation and interbody fusion;  Surgeon: Jeffrey Garcia MD;  Location: [facility] OR;   Service: Neurosurgery;  Laterality: N/A;    SPINE SURGERY  December 2021    L4 and L5    TOTAL HIP ARTHROPLASTY Right 06/03/2021    Procedure: TOTAL HIP ARTHROPLASTY POSTERIOR;  Surgeon: Shlomo Campos MD;  Location: [facility] OR;  Service: Orthopedics;  Laterality: Right;    VASECTOMY      VENOUS ACCESS DEVICE (PORT) INSERTION N/A 10/21/2022    Procedure: INSERTION VENOUS ACCESS DEVICE;  Surgeon: Ga Samaniego MD;  Location: [facility];  Service: General;  Laterality: N/A;     HEMATOLOGY/MEDICAL ONCOLOGY HISTORY: The patient is a 72 y.o. year old male with hypertension, hyperlipidemia, type 2 diabetes, iron deficiency anemia, history of DVT, who presented on 9/22/2022 for initial evaluation because of iron deficiency anemia, referred by his primary care provider, JULIA Santiago. Patient is accompanied by his wife who helped with the history. They reported the patient actually was being diagnosed of colon cancer 2 days ago. His GI specialist, Dr. Anil Garces, performed colonoscopic examination and saw a distal colon mass. I do not have the report for the procedure nor do I have the pathology report but nevertheless there was a mass in the distal colon likely in the sigmoid colon. I will obtain a copy of those reports when available.      Patient reports he had no apparent melena or hematochezia before starting oral iron treatment. He did have however significant fatigue. He reports exertional dyspnea and no syncope. Patient had laboratory study recently on 09/08/2022 which reported severe iron deficiency anemia with hemoglobin 7.8, MCV 79.9, MCHC 29.2. Normal platelets 217,000 and WBC 7230 without differentiation. Iron study reported ferritin 10.7 ng/mL, free iron 23, TIBC 593 and iron saturation 4%. Chemistry lab reported creatinine 1.11, potassium 5.4, otherwise normal sodium chloride and calcium, glucose 137. Prior to that the patient had normal liver function on 08/24/2022. He had a normal TSH  "2.5 and slightly elevated hemoglobin A1c of 6.8% on that day.      The patient reports he has been on oral iron for almost 2 weeks, once a day with good tolerance and he now noticed improved energy level and less exertional dyspnea. He does report stool becoming dark-colored after he started oral iron.  He reports no syncope.     Laboratory study on 09/22/2022 reported improved hemoglobin 8.5 and normalized MCV 85.0, stable MCHC 29.4. Maintains normal platelets and WBC.     I saw him originally on 9/22/2022 for initial evaluation for his sigmoid colon cancer and iron deficiency anemia.  Since that time patient had multiple imaging studies including CT for chest abdomen pelvis, subsequently with MRI for the abdomen for staging purposes.  He was also seen by Dr. Samaniego who performed right hemicolectomy on 10/11/2022.    Dr. Samaniego called me on the day of surgery, he also suspected this liver lesion is metastatic, however unable to reach that during the surgery.    Patient notes since surgery on 10/11/2022 of the bowel resection he is recovering \"okay\". The patient does report some abdominal pain when adrianna his stomach, specifically when getting out of bed. The patient denies any issues with his appetite, and is having normal urination and bowel movements. The patient denies constipation.  Patient reports no fever sweating or chills.    The patient has type II diabetes, which he manages with metformin. 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    Patient reports he is able to tolerate oral iron twice a day with no specific complaints.  No nausea vomiting or constipation.      Laboratory study today on 11/2/2022 showed improved anemia with Hb 10.9, normalized MCV 92.5.  His normal platelets 159,000 WBC 6870 including ANC 4580.      PET scan examination on 11/14/2022 reported distal small 1.3 cm subtle low attenuation lesion in the medial hepatic " segment with SUV 6.3.  There is no hypermetabolic enthesopathy in the abdomen or pelvis.  No suspicious hypermetabolic activity in the chest or neck.    Laboratory study on 11/15/2022 showed improved hemoglobin 12.1, normal platelets 156,000 and WBC 5720 including ANC 3750 and lymphocytes 1070.  Chemistry study reported glucose 156 otherwise unremarkable CMP.    Patient reports he developed a mild oral mucositis lasted about 4 days after the chemotherapy and now has resolved. He believes he has lost some weight, but states he does have an appetite and eats everyday.  He denies nausea vomiting.  He denies having diarrhea or constipation. He denies having any fever, chills, or sweating.  Denies peripheral neuropathy.     Laboratory results from on 11/29/2022 are; white cell count is 3950. neutrophils are 2340. Hemoglobin is 11.7 g/dL. Platelets are 108,000. Chemistry lab was unremarkable except glucose 255.    Laboratory studies on 3/21/2023 reported pancytopenia, with platelets 100,000, hemoglobin 10.0 .0, in the WBC 3130 including mild neutropenia with ANC 1610.     On 3/21/2023, we continue to hold his Avastin due to a tooth extraction on 3/13/2023.  He developed mild neutropenia with ANC 1610 so we gave the patient Levaquin daily for 7 days for prophylaxis.  Fortunately did not have infection.    Patient reports reasonable tolerance.  No specific complaints.    On 4/4/2023 patient has improved neutrophils 1870, and WBC 3270.  Continues to have anemia stable Hb 10.2, and platelets 96,000.  He reports no spontaneous bleeding or bruising.    The PET scan obtained 4/25/2023 reported resolution of the hypermetabolic subcapsular and medial hepatic segment lesion. However, there were developments of a few mildly hypermetabolic lymph nodes, one of them to the right subcarinal area with a maximum SUV 3.5, previously photopenic. There is a 9 x 6 mm right supraclavicular node with a maximum SUV 2.9, previous 3 mm and  photopenic. There is also a 10 mm x 7 mm right paratracheal node stable in size, but is slightly hypermetabolic.    Lab study today on 5/1/2023 reported moderate thrombocytopenia platelets 86,000, hemoglobin 10.8, .3, and WBC 4590 including neutrophils 3200.    He was last seen on 5/1/2023 , he was referred to Dr. Griffin Tilley at the [facility] for evaluation of metastatic solitary liver lesion. Patient was seen by Dr. Tilley on 5/15/2023 for evaluation and after discussion, patient decided not to pursue surgical intervention instead of getting images studies periodically.     This patient had a PET scan examination on 7/31/2023 and is to review the results. The study the PET study reported no significant changes of a small 9 mm x 7 mm superior right paratracheal node and this noted is photopenic with a maximum SUV one point of five. Previously slightly hypermetabolic right subcarinal lymph node appears smaller and also now photopenic. There has been resolution of the hypermetabolic sub centimeter right supraclavicular lymph nodes. There was no hypermetabolic lymphadenopathy in the neck, supraclavicular, or chest. No suspicious metabolic hypermetabolic activity within the abdomen and specifically the liver, and also no hypermetabolic lymphadenopathy in the abdomen and the pelvis.    Laboratory study 7/31/2023 reported mild anemia with a hemoglobin 11.9, MCV 2.865, and correction hemoglobin 11.09, .01, MCHC 31.9, and platelets 132,000, WBC 4600 including neutrophils 2650. Normal CEA 2.044 and normal iron studies with a ferritin 140 ng/mL and iron saturation 35% with a free iron 124, TIBC 358. Chemistry lab reported unremarkable CMP.      The CT scan examination on 10/12/2023 reported no evidence of disease recurrence.  There were postsurgical changes with the right hemicolectomy.  There is a right renal cyst, technically indeterminate. Otherwise, the liver, spleen, adrenal glands, left  kidney, pancreas, stomach, small bowels, urinary bladder, and abnormal vasculature were normal. There was no enlarged lymphadenopathy in the abdomen and pelvis and no bone lesions.    The Guardant Reveal test was obtained on 10/12/2023 and reported back on 10/25/2023 as negative for ctDNA.    Other laboratory study on 10/12/2023 reported persistent but stable anemia with hemoglobin of 11.9, an MCV of 108.0, an MCHC of 31.3, WBC of 4720, neutrophils of 2900, lymphocytes of 1000, and platelets of 132,000. Iron studies showed ferritin of 69.1, free iron of 108, TIBC of 361, and iron saturation of 30%. The chemistry lab reported glucose 120, sodium 135, and otherwise unremarkable CMP.    We obtained laboratory studies on 01/09/2024 and the studies reported negative Guardant Reveal study with 0% ctDNA (circulating tumor DNA). Other laboratory studies reported both improved hemoglobin 13.3 and .3, MCHC 33.7. Maintains normalized platelets 164,000 and improved WBC 6000 including neutrophils 3890, lymphocytes 1430. Chemistry lab reported much improved ferritin 245, free iron 111, TIBC 347, and iron saturation 32%. Chemistry lab reported slightly elevated total bilirubin at 1.4, and mildly elevated ALT at 49 and otherwise normal AST at 37, and alkaline phosphatase 62. Glucose was 169. Normal electrolytes and baseline creatinine of 1.08.       Patient had a colonoscopy examination by Dr. Garces on 3/6/2024.  It was benign postsurgical changes, no sample collected.  Dr. Garces recommended repeating colonoscopy in 5 years.    His CT scan for abdomen pelvis with IV contrast obtained 4/16/2024 reported no evidence for local disease recurrence or metastatic disease.    Laboratory studies today on 4/30/2024 reported worsening anemia Hb 12.7, and also worsening iron studies with deteriorated ferritin 78.8 ng/mL, and also trending down normal iron saturation 23% with free iron 89 TIBC 389.  Chemistry lab reported glucose 130,  stable mild elevated total bilirubin 1.4 otherwise normal liver function panel, normal renal function and electrolytes.      Laboratory Studies on 7/23/2024 Ferritin 134 ng/mL, iron saturation 30%, free iron 102, hemoglobin 12.5, .0, MCHC 33.3, WBC 5070 including neutrophils 3120, lymphocytes 1090. Glucose 158, creatinine 1.13. Total bilirubin 0.9 normal liver function panel.    Laboratory Studies 8/6/2024 Positive circulating tumor DNA, < 0.061%.    CT scan of the abdomen and pelvis reported a vague area in the liver measuring about 2 cm.    The patient presents today 11/6/2024 for evaluation after recent imaging studies obtained due to positive circulating tumor DNA.    He was last seen on 08/06/2024 for an evaluation. At that time, he had a positive ctDNA, which was < 0.061%. Consequently, a CT scan of the abdomen and pelvis was obtained for further evaluation, revealing a vague area in the liver measuring approximately 2 cm.    Subsequently a PET scan examination 8/30/2024 reported a hypermetabolic lesion in the liver with SUV 8.7.  No evidence for metastatic disease otherwise.    Patient was referred to Dr. Tilley at surgical oncology of the [facility].    The patient presented today on 11/06/2024 for evaluation after his recent surgery for resection of a solitary liver lesion on 10/08/2024 by Dr. Tilley at [facility].    He reports no significant complaints, although he does experience occasional soreness, which is gradually improving. His appetite is good, and he has regular bowel movements. He reports no presence of blood in his stools. His energy levels are satisfactory, and he does not experience any dizziness or lightheadedness.    He is currently on metformin for glucose control and has discontinued his iron supplement. He has an appointment with Dr. Tilley scheduled for tomorrow morning.       Laboratory Studies  On 12/18/2024, the Guardant Reveal  study circulating tumor DNA was 0%. CEA level was 1.89 ng/mL. Hemoglobin was 12.6, MCV was 102.6, MCHC was 32.1, platelets were 159,000, and WBC was 5180 with neutrophil 3248. Ferritin was 132, free iron was 83, iron saturation was 21%, B12 level was 699, and folate was 9.86 ng/mL.    Imaging  CT for the abdomen and pelvis on 01/03/2025 reported a peripheral 3.5 cm hypoattenuation in segment 4A of the liver with capsular retraction. The hypoattenuation is likely treatment related.        MEDICATIONS:    Current Outpatient Medications:     acetaminophen (TYLENOL) 500 MG tablet, Take 1 tablet by mouth Every 6 (Six) Hours As Needed for Mild Pain., Disp: , Rfl:     aspirin 81 MG EC tablet, Take 1 tablet by mouth Daily. INSTRUCTED PT TO FOLLOW MD INSTRUCTIONS REGARDING HOLDING FOR SURGERY/PT STATED TO HOLD 5 DAYS PRIOR TO SURGERY, Disp: , Rfl:     lisinopril (PRINIVIL,ZESTRIL) 40 MG tablet, Take 1 tablet by mouth Daily., Disp: 90 tablet, Rfl: 1    metFORMIN (GLUCOPHAGE) 500 MG tablet, TAKE 1 TABLET TWICE DAILY WITH MEALS, Disp: 180 tablet, Rfl: 1    atorvastatin (LIPITOR) 40 MG tablet, TAKE 1 TABLET EVERY DAY (Patient not taking: Reported on 7/9/2025), Disp: 90 tablet, Rfl: 1    methylPREDNISolone (MEDROL) 4 MG dose pack, Take as directed on package instructions., Disp: 21 tablet, Rfl: 0    tadalafil (CIALIS) 5 MG tablet, Take 1 tablet by mouth Daily As Needed for Erectile Dysfunction. (Patient not taking: Reported on 7/9/2025), Disp: 30 tablet, Rfl: 2    ALLERGIES:   No Known Allergies    SOCIAL HISTORY:       Social History     Socioeconomic History    Marital status:      Spouse name: Chelsie    Years of education: 12   Tobacco Use    Smoking status: Some Days     Types: Cigars    Smokeless tobacco: Never    Tobacco comments:     OCCASIONALLY   Vaping Use    Vaping status: Never Used   Substance and Sexual Activity    Alcohol use: Yes     Alcohol/week: 7.0 standard drinks of alcohol     Types: 1 Glasses of wine,  "6 Cans of beer per week     Comment: sometimes in warm weather, wine sometimes in the winter    Drug use: No    Sexual activity: Yes     Partners: Female     Birth control/protection: Surgical         FAMILY HISTORY:  Family History   Problem Relation Age of Onset    Clotting disorder Other     Colon cancer Paternal Grandmother     Colon cancer Paternal Grandfather     Clotting disorder Father     Malig Hyperthermia Neg Hx          Vitals:    07/09/25 0939   BP: 151/80   Pulse: 62   Temp: 98.1 °F (36.7 °C)   TempSrc: Oral   SpO2: 96%   Weight: 97.1 kg (214 lb 1.6 oz)   Height: 175.3 cm (69.02\")   PainSc: 0-No pain         7/9/2025     9:43 AM   Current Status   ECOG score 0       PHYSICAL EXAM  GENERAL:  Well-developed, well-nourished in no acute distress.    SKIN:  Warm, dry.  Vesicular erythematous rash on the upper legs, back consistent with contact dermatitis  HEENT:  Normocephalic.   LYMPHATICS:  No cervical, supraclavicular or axillary adenopathy.  CHEST: Normal respiratory effort.  Lungs clear to auscultation. Good airflow.  CARDIAC:  Regular rate and rhythm. Normal S1,S2.  ABDOMEN:  Soft, no tender.  Bowel sounds normal.  EXTREMITIES:  No lower extremity edema.      RECENT LABS:  Results from last 7 days   Lab Units 07/09/25  0930   WBC 10*3/mm3 5.45   NEUTROS ABS 10*3/mm3 3.47   HEMOGLOBIN g/dL 12.0*   HEMATOCRIT % 36.5*   PLATELETS 10*3/mm3 139*     Results from last 7 days   Lab Units 07/09/25  0930   SODIUM mmol/L 135*   POTASSIUM mmol/L 5.1   CHLORIDE mmol/L 101   CO2 mmol/L 23.7   BUN mg/dL 17.7   CREATININE mg/dL 0.92   CALCIUM mg/dL 9.7   GLUCOSE mg/dL 131*           IMAGING STUDY:  CT Abdomen Pelvis With Contrast  Narrative: CT ABDOMEN PELVIS W CONTRAST-     HISTORY: 74 years of age, Male. Colon cancer with liver metastatic  disease. Follow-up exam. On 10/08/2024, patient underwent laparoscopic  partial hepatectomy with segment 4B resection.     TECHNIQUE:  CT includes axial imaging from the lung " bases to the  trochanters with intravenous contrast and with use of oral contrast.  Data reconstructed in coronal and sagittal planes. Radiation dose  reduction techniques were utilized, including automated exposure control  and exposure modulation based on body size.     COMPARISON: CT abdomen and pelvis 01/03/2025, PET/CT 08/30/2024, CT  abdomen and pelvis 08/21/2024, 04/16/2024, 10/12/2023.     FINDINGS: Low subcapsular lesion within the left lobe of the liver near  the junction of the lateral and medial segments measure 2.9 x 1.6 cm,  which is slightly smaller than on the previous exam. No new hepatic  abnormality has developed     Several gallstones are present.     Spleen, adrenal glands, pancreas, left kidney appear normal. Right lower  pole renal cyst.     No bowel dilatation or evidence for bowel obstruction. There has been  left partial laparoscopic right colon resection. Atherosclerotic  calcifications are present involving the abdominal aorta and iliac  vasculature. No mckenzie enlargement in the abdomen or pelvis. Bilateral  total hip arthroplasties are present. Right inguinal hernia contains  fat.     Impression: 1. Subcapsular low-density within the left lobe of liver near the  junction of the lateral and medial segments measures slightly smaller  compared to the previous exam and is favored to represent postoperative  change in patient with previous partial hepatectomy with resection of  segment 4B. No new hepatic abnormality or evidence for new metastatic  disease.  2. Cholelithiasis.  3. Previous partial right colon resection.     Radiation dose reduction techniques were utilized, including automated  exposure control and exposure modulation based on body size.        This report was finalized on 3/28/2025 10:22 AM by Clive Frey M.D  on Workstation: [id number]           Assessment & Plan     Assessment & Plan      ASSESSMENT:    1.  Colon cancer post right hemicolectomy 10/11/2022.  Stage IV  ([id number]).  Had positive stool occult blood test on 09/09/2022. Colonoscopic examination on 09/20/2022 by Dr. Anil Garces reported cecum colon mass.  Biopsy confirmed moderately differentiated adenocarcinoma.  MSI stable.  Mildly elevated CEA level 10.7 ng/mL on 9/22/2022.  CT scan for chest abdomen pelvis 9/26/2022 reported cecal mass.  There was also suspicious 9 mm hepatic lesion.  Abdomen MRI examination with and without contrast on 10/7/2022 confirmed 8 mm lesion hypervascularity in the segment 5 of the liver.  Patient had right hemicolectomy 10/11/2022.  Unable to biopsy he liver lesion.  Pathology evaluation reported [id number] disease, this will be at least a stage IIIb colon cancer.  On 11/2/2022 I discussed with the patient, his wife and his daughter, recommending further imaging study with PET scan for assessment.  If patient is no hypermetabolic liver lesion, will treat with adjuvant FOLFOX 6 regimen every 2 weeks for 12 cycles.  However, if he has hypermetabolic lesion in the liver, we will treat him as metastatic disease, with FOLFOX plus Avastin.  Since patient has neuropathy already being his legs and feet, if he is indeed metastatic disease, we may switch him to irinotecan instead of oxaliplatin because of the neuropathy.  If indicated patient has metastatic disease, we would also entertain metastectomy after finishing 12 cycles of chemotherapy.    PATHOLOGY: Tumor sample for [facility] NGS study reported positive for K-aashish mutation exon2 p.G13D, negative for HER2/dorie by IHC 0 and no amplification by FISH.  MSI stable by DNA sequencing, and also MMR proficient by IHC staining.  Tumor mutation burden is low 8 mut/Mb.  Patient was positive for PTEN 1+, 100% by IHC, PD-L1 was negative, only 1% by IHC.  Patient also positive for APC mutation exon 16p.[id number], positive for AMACR1 exon2 p.R358*.  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    Discussed with the patient and family members about potential side  effects including bone marrow suppression, with anemia thrombocytopenia and leukocytopenia increased risk for infection, and also peripheral neuropathy, etc. patient is agreeable to proceed ahead with treatment.  The patient has PET scan examination on 11/14/2022 which reported a small 1.3 cm hepatic focus with elevated SUV 6.3, highly suspicious for metastatic disease. The patient now has stage IV. The patient had [facility] NGS study which reported K-aashish mutation, tumor mutation burden < 10, MSI was stable and anti-PD-L1 negative.  Not a candidate for anti-EGFR monoclonal antibody such as Vectibix, or chekpoint inhibitor immunotherapy.  We discussed the adding anti-VEGF monoclonal antibody, bevacizumab/Avastin starting in 2 weeks so that it would be after 6 weeks of primary surgery for the colon cancer resection.  We discussed this is now stage IV disease, however because only having 1 solitary metastatic lesion in the liver, it is potentially curable so we will be as aggressive as possible for chemotherapy.  If he has good response, in the future he will need metastectomy of the liver lesion.  On 11/29/2022 patient is presented for cycle #2 of chemotherapy. Patient tolerated therapy well except mild oral mucositis. However, laboratory studies showed significant decrease in platelets at only 108,000, as well as also decreasing WBC and hemoglobin. Discussed with the patient today if we do not carry out any dose reduction, he will likely become more thrombocytopenic next time in 2 weeks and we will not be able to do cycle #3 chemotherapy on time. I discussed with the patient for dose reduction and to avoid potential delay of chemotherapy and he is agreeable. We will have 25% dose reduction for 5-FU leucovorin and oxaliplatin.  On 11/29/2022 he was started the first dose of Avastin/bevacizumab in conjunction with chemotherapy cycle #2.  On 12/13/2022, the patient presented for reevaluation prior to chemotherapy cycle #3.  He has stable platelets of 108,000 and slightly improved WBC of 5100 and hemoglobin 12.8 g/dL. He will continue cycle 3 with the same 25% dose reduction.  1/10/2023 due for cycle 5 FOLFOX bevacizumab, overall is tolerating fairly well.  Hemoglobin 11.7, platelet count stable at 108,000, WBC 4.02, ANC 2.27.  Patient has had five cycles of chemotherapy and a CT scan examination obtained on 01/20/2023. The CT scan reported favorable response as the lesion is less obvious.   1/24/2023 recommended to continue chemotherapy for a total of 12 cycles. Then obtain PET scan examination. Due to worsening thrombocytopenia, and also peripheral neuropathy, for cycle #6, I will decrease oxaliplatin by another 25% so would it be 50% of the original dose. I will keep the same dose of 5-FU which is 75% of the original dose.  Full dose Avastin.   2/7/2023 due for cycle 7.  Platelet count is holding steady at 100,000.  We will proceed with treatment today with same doses as given on cycle 6.   2/21/2023 due for cycle #8 chemotherapy. We will repeat every 2 weeks.  3/7/2023 proceed with cycle #9 chemotherapy and hold Avastin for dental procedure on 3/13/2023.  On 3/21/2023 we will proceed ahead with cycle #10 chemotherapy FOLFOX6 regimen.  Continue to hold Avastin.  On 4/4/2023 patient presented for evaluation prior to cycle #11 FOLFOX 6 regimen.  Continue to hold Avastin.  This will be resumed from next cycle.   The patient presented 4/18/2023 for reevaluation prior to his last cycle #12 FOLFOX plus Avastin. The patient reports tolerating well. We will proceed with cycle12 today and resume Avastin.  I recommended having a PET scan examination for assessment of response, especially the small metastatic liver lesion which was only found on the previous PET scan.  Patient had a PET scan examination on 04/25/2023, which reported complete resolution of the solitary hypermetabolic lesion in the liver. He had a mildly active small lymph node,  "peritracheal, subcarinal with low activity. Etiology not clear.   On 05/01/2023, I reviewed the PET scan images with the patient and his wife, and two daughters. I recommended to refer the patient to [facility], surgical oncology, Dr. Griffin Tilley, for evaluation to see if he would be a candidate for surgical resection of the liver segment where he had metastatic disease, which now has resolution of hypermetabolic activity after chemotherapy.  Patient was seen by Dr. Tilley on 5/15/2023. After lengthy discussion, patient opted to not having surgical intervention currently. He would rather to have serial images studies.   Patient had a PET scan on 7/29/2023, which reported no evidence for recurrent disease.   I discussed with the patient and his wife on 8/8/2023, recommended CT scan for abdomen and pelvis with i.v. contrast in 3 months, together with lab study \"Guardant Reveal\" for ctDNA together with routine labs, CBC, CMP, CEA level, for assessment of colon cancer.  The patient had laboratory studies on 10/12/2023, which reported negative for Guardant Reveal, ctDNA 0%. The patient also had normal CEA and liver function panel. The CT scan for the abdomen and pelvis was no evidence of disease recurrence.   Patient had laboratory studies on 01/09/2024 which reported negative for Guardant Reveal, ctDNA 0%. Other studies reported normal CEA at 1.93 ng/mL, marginally elevated ALT at 49 and total bilirubin 1.4 but normal AST at 37, alkaline phosphatase 62. No evidence for disease recurrence. I do recommend to have repeat laboratory studies same as above together with CT scan for abdomen and pelvis with IV contrast.  Patient is also overdue for colonoscopy examination 1 year post his colon surgery.  Patient had a benign colonoscopy examination by Dr. Garces on 3/6/2024.  Dr. Garces recommended repeating colonoscopy in 5 years.  CT for abdomen pelvis with IV contrast on 4/16/2024 showed no evidence for " cancer recurrence.  Guardant Reveal on 4/30/2024 reported 0% ctDNA.   On 7/23/2024 the Guardant Reveal study reporting positive ctDNA, albeit < 0.061%.  Had  normal liver function panel, the physical examination remains unremarkable. Considering the positive ctDNA result, a CT scan of the abdomen and pelvis with oral and IV contrast is recommended within a week for reassessment. If the study is positive, a PET scan examination will be conducted. However, if the study is negative, the same laboratory studies will be repeated together with a CT scan for evaluation in 3 months.   A CT scan of the abdomen and pelvis with IV contrast on 08/21/2024 reported a lesion in the anterior part of the left liver measuring 9 mm, which corresponds to a previous hypermetabolic activity noted on the PET scan in November 2022. Due to the high suspicion of disease recurrence, a PET scan was performed on 08/30/2024, revealing a hypermetabolic lesion with an SUV of 8.7 in the anterior subcapsular region of the liver. The patient reports no symptoms related to recurrence and remains physically active, running a small business. After discussing the findings and treatment options on 09/05/2024, he agreed to be evaluated by Dr. Tilley for possible surgical resection. Chemotherapy was considered but deemed unsuitable due to its bi-weekly schedule conflicting with his business operations.   Resection of a solitary liver lesion on 10/08/2024 by Dr. Tilley at [facility]. The pathology reported moderately differentiated adenocarcinoma, unifocal, consistent with his primary colon cancer. The metastatic lesion measures 1.5 cm at its largest dimension. The resection margin is clear by 0.7 cm. There is mild macrovascular steatosis, and no bridging fibrosis or cirrhosis as confirmed by special stain. The tumor sample tested negative for HER2 by IHC (IHC 0) and MMR IHC was 0/7. Biomarkers indicate stable DAVID.   On 11/6/2024 today's  laboratory studies reveal liver function panel is normal, glucose is at 103, creatinine at 0.87, and electrolytes are within normal range. He reports no complaints other than occasional soreness from the surgery, which is improving daily. He has a good appetite, regular bowel movements without blood in the stools, and no dizziness or lightheadedness.     CT scan of the abdomen and pelvis from 01/03/2025 reported a peripheral 3.5 cm hypoattenuation in segment 4A of the liver with overlying capsule retraction, likely treatment-related.  I shared imaging with the patient and we recommended to have a repeating CT scan of the abdomen and pelvis with IV contrast in 3 months for reassessment, along with guardant reveal study for circulating tumor DNA, together with routine labs, CBC, CMP, and CEA level.     He had a negative Guardant Reveal study on 03/19/2025, and a benign CT scan of the abdomen and pelvis on 03/26/2025 showed no evidence of metastatic disease or new liver lesions.   6/25/2025 guardant reveal with negative ctDNA  Patient seen in follow-up 7/9/2025 with review of guardant reveal results.  Patient will remain in observation with no indication for imaging at this time.  He does wish to delay imaging would have been planned at 3 months and continue monitoring CT DNA due to the high cost to CT scan       2. Iron deficiency anemia.  Subsequently anemia also caused by chemotherapy.  The patient has iron deficiency due to GI bleeding from his colon cancer. His laboratory study on 09/08/2022 reported ferritin 10.7 and iron saturation 4% with microcytic hypochromic anemia, hemoglobin 7.8, MCV 79.9 and MCHC 29.2.   Patient reports good tolerance to oral iron treatment and already has improved energy level. Laboratory study on 9/22/2022 did report slightly improved hemoglobin at 8.5 but normalized MCV 85.0. I think he is responding to oral iron treatment both physically and laboratory wise.  We advised patient to  increase oral iron to twice a day.  On 11/2/2022 patient reports good tolerance to oral iron twice a day.  Labs today showed further improved hemoglobin 10.9.  He continues to maintain normal platelets and WBC.  Improved hemoglobin 12.1 on 11/15/2022.  Cycle #1 chemotherapy will be started.  On 11/29/2022 hemoglobin 11.7.  On 12/13/2022, his hemoglobin is 12.8 g/dL.  1/10/2023 hemoglobin 11.7.  The patient has trending down hemoglobin 11.0 on 1/24/2023, however, he is asymptomatic.  Hemoglobin 2/7/2023, 10.6.  Iron study reported ferritin 229 and iron saturation 28% with free iron 108 TIBC 386.  No evidence for iron deficiency.  So his anemia is now secondary to chemotherapy.     The patient has stable mild anemia with hemoglobin 11.0 on 3/7/2023. We will continue to monitor.  Continue to hold ferrous sulfate.  On 3/21/2023, stable anemia with hemoglobin 10.0 .0.  Continue to monitor.  On 4/4/2023 patient has stable anemia Hb 10.2, .9.  The patient has stable anemia, hemoglobin 10.8 today on 04/18/2023. The patient reports no syncope.  On 05/01/2023, patient has a slightly improved hemoglobin 10.8. We will repeat iron studies in 3 months for reassessment.  Laboratory studies 7/31/2023 reported ferritin 140, free iron 124 and iron saturation 35%. He also had improved hemoglobin 11.9, .1. Discussed with him today 8/8/2023 and recommended monitoring.  The laboratory study on 10/12/2023 reported worsening ferritin by 50% to 69.1 ng/mL; however, maintains a reasonable iron saturation 30% with free iron 108. The patient also has persistent anemia with hemoglobin of 11.9. He has already been on oral vitamin B12 supplement. I asked the patient to start oral iron ferrous sulfate at 325 mg once a day. I discussed with him the possible side effects of constipation and stools becoming dark-colored constipation, nausea, and cramping, etc.    Laboratory studies on 01/09/2024 showed much improved hemoglobin  13.3 and also significantly improved ferritin 245, iron saturation 32% with free iron 111. I asked the patient to stop oral iron completely.   on 4/30/2024 patient has recurrent anemia hemoglobin 12.7.  Also worsening iron studies with deteriorated ferritin 78.8 ng/mL, and also trending down normal iron saturation 23% with free iron 89 TIBC 389.  Discussed with the patient, recommended to restart taking oral iron once a day.   On 8/6/2024 the patient reports tolerating oral iron treatment once daily. Laboratory studies from 07/23/2024 showed stable anemia and improved iron with ferritin 134 and iron saturating 30%. The patient has been advised to discontinue oral iron treatment for the time being.   On 11/6/2024 laboratory studies reveal mild anemia with hemoglobin at 11.7, MCV at 104. He has stopped taking iron supplements. Monitoring will continue to assess if iron supplementation needs to be resumed.    Laboratory studies from 12/18/2024 reported mild anemia with hemoglobin at 12.6, MCV at 102.6, and MCHC at 32.1. Iron studies showed ferritin at 132, free iron at 83, iron saturation at 21%, B12 level at 699, and folate at 9.86 ng/mL. He has been taking iron once a day without any side effects such as constipation.   On 4/9/2025 patient has been taking oral vitamin B12 daily but not iron or folic acid. he still has persistent mild anemia with hemoglobin 12.2. He is not symptomatic, with no melena or hematochezia. Repeat laboratory studies for ferritin, iron profile, B12, and folate are recommended in 3 months for reevaluation.  6/25/2025 hemoglobin 12.2, ferritin 192, iron saturation 29%, folate and B12 normal    3.    Pancytopenia secondary to chemotherapy.    This patient had original iron deficiency, however now has ongoing anemia due to chemotherapy.  He also has thrombocytopenia and neutropenia from chemotherapy.  Normal neutrophil prior to starting chemotherapy.    This patient developed first mild  neutropenia with ANC 1610 on 3/21/2023.  Patient reports no fever sweating chills.  I recommended starting patient on Levaquin daily for 7 days, for prophylaxis of neutropenic fever.  We will continue to monitor for now.  Expecting this will getting worse with ongoing chemotherapy.  Question the patient to call us if he develops fever sweating chills.   4/4/2023, patient has slightly improved neutrophils 1870, and WBC 3270.  Continue to monitor.  On 04/18/2023, the patient has neutrophils 2030, WBC 3480. Continue with chemotherapy, monitor CBC.  On 05/01/2023 patient has improved WBC 4,590, including neutrophils 3,200.  7/31/2023 patient has normal WBC 4600, including ANC 2650 lymphocytes 1340.  The laboratory study on 10/12/2023 reported persistent thrombocytopenia with platelet counts of 132,000, and low normal WBC of 4720, and neutrophil count of 2930.    Pancytopenia. Laboratory study on 01/09/2024 reported normalized platelets 164,000, improved WBC 6000, neutrophils 3890 and also improved hemoglobin at 13.3.   On 4/30/2024 patient has normal WBC 5530, neutrophils 3330, platelets 141,000 and hemoglobin 12.7.   Laboratory study from 07/23/2024 reported normal WBC 5070, neutrophils 3120, lymphocytes 1090, mild thrombocytopenia platelets 130,000, stable anemia, hemoglobin 10.5 on 07/23/2024.  Resolution of neutropenia now off chemotherapy    4.  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  Prior to starting chemotherapy patient had low normal platelets 156,000 on 11/15/2022.  Started on cycle #1 chemotherapy FOLFOX 6.  On 11/29/2022 patient had platelets of 108,000.  Patient reports no bleeding or bruising.  Discussed with the patient, because of foreseeable more severe thrombocytopenia, we recommended 25% dose reduction starting from cycle #2 chemotherapy.  On 12/13/2022, the patient has same decreased number of platelets 108,000. He will continue chemotherapy with same dose reduction of 25%.   1/10/2023 platelet  count stable at 108,000.  On 01/24/2023, patient has worsening thrombocytopenia with platelets of 99,000 mcL. Discussed with the patient, we will cut oxaliplatin by another 25% to be at 50% of original dose. We will leave the 5-FU dose same as the previous at 75% of original dose. Avastin will be the same dose.  2/7/2023 platelet count 100,000.    On 02/21/2023 the patient has stable thrombocytopenia with platelets 102,000. The patient reports no bleeding or bruising. Continue to monitor.   On 3/7/2023 the patient has stable thrombocytopenia with platelets 103,000. The patient reports no bleeding or bruising. Continue to monitor.   On 3/21/2023 persistent stable thrombocytopenia with platelets 100,000.   On 4/4/2023 platelets 96,000.  We will go ahead and to proceed with chemotherapy.   04/18/2023, platelets 91,000. We will proceed ahead with the chemotherapy today. This is cycle 12 of chemotherapy. We will reassess his treatment after PET scan examination.  Since the patient has remained off chemotherapy his platelet count has remained normal, currently 154,000    5. Peripheral neuropathy  On 12/13/2022, he reports cold sensitivity in his mouth when he drinks cold water. The cold sensitivity usually dissipates after 3 to 4 days.  Patient reports he is more significant cold sensitivities lasting for more than a week. He does have moderate tingling, numbness involving the fingers, but not much as the toes.  2/7/2023 he feels like the neuropathy/cold sensitivity is lingering a little bit longer with each cycle.    On 4/4/2023 the patient reports stable mild peripheral neuropathy/cold sensitivity.    On 04/18/2023, patient reports some mild numbness and tingling involving both feet and hands, but this is only last for a few days and then resolves. We will continue dose reduced oxaliplatin 50%.  On 05/01/2023, patient continues to have peripheral neuropathy intermittent and mild overall. Continue to monitor.   Patient  reports today on 01/30/2024 has stable mild peripheral neuropathy.   Stable condition today, 7/9/2025    6.  Weight losses.    On 12/13/2022, the patient reports he has lost weight in the past couple of months. He reports a poor appetite for 3 days after chemotherapy and a poor taste in his mouth. His appetite has since improved. He only had mild nausea, but no vomiting. He was encouraged to use Ensure or Boost to improve nutrition supplement. He already started using protein powder.  3/7/2023, patient's weight continues to improve and he has gained a couple pounds.  His weight is stable at 211 pounds today.  He reports his appetite continues to improve.  He denies any nausea or vomiting.   On 04/18/2023, patient weighs with 209 pounds.  8/18/2023 patient weighs about 213 pounds  10/31/2023, the patient has a stable weight of 213 pounds.    The patient has slightly improved weight at 221 pounds today on 01/30/2024.   4/30/2024 stable weight 213 pounds.   8/6/2024, the patient's weight has remained stable for the past 3 months.  9/5/2024 patient weights 207 pounds.  1/8/2025 patient reports stable weight.  Nevertheless due to the weather condition he has more close and also heavy boots which make his weight 226 pounds.  7/9/2025 weight 214 pounds, the patient feels this is stable during the summertime when he is more active and cutting grass      7.  Low normal vitamin B12 level.    Patient had a marginal normal vitamin B12 level at 260 pg/mL on 08/24/2022.   Patient reports that he continues on oral B12 supplement  Most recent B12 level normal      8. Type 2 Diabetes Mellitus.  He is currently taking metformin for glucose management. Glucose level is at 103 today 11/6/2024. He should continue his current medication regimen.  12/18/2024 glucose level was 171, which is high.  7/9/2025 glucose 131    9. Low sodium level.    On 12/18/2024 sodium level was slightly low at 133.  Continue to monitor.  4/9/2025 sodium  134.  9/20/2025 sodium 135    10.  Poison oak contact dermatitis  Medrol Dosepak prescribed today        PLAN:  The patient will remain in observation regarding his previous malignancy with most recent guardant reveal negative for ct DNA  Medrol Dosepak prescribed for poison oak contact dermatitis  Continue oral vitamin B12 at 1000 mcg daily  Continue port flushes every 6 to 8 weeks  We had planned to repeat imaging with CT of the abdomen pelvis every 6 months at the patient request we await upcoming ct DNA in 3 months as he has a high out-of-pocket cost for imaging.  He understands the ctDNA is noted to be positive we would then proceed with CT scans.  Continue routine health care per PCP.  Follow-up with Dr. Rosario in 3 months with labs 2-week prior with Guardant Reveal, ferritin, iron profile, B12, and folate     JULIA Vasquez    07/09/2025       CC:  JULIA Santiago MD Richard Pokorny, MD   Oral surgeon: Dr. Charlie Zazueta Phone [phone]  Griffin Tilley M.D.

## 2025-07-15 ENCOUNTER — OFFICE VISIT (OUTPATIENT)
Dept: INTERNAL MEDICINE | Facility: CLINIC | Age: 75
End: 2025-07-15
Payer: MEDICARE

## 2025-07-15 VITALS
OXYGEN SATURATION: 96 % | TEMPERATURE: 97.5 F | BODY MASS INDEX: 31.22 KG/M2 | HEART RATE: 57 BPM | SYSTOLIC BLOOD PRESSURE: 159 MMHG | HEIGHT: 69 IN | DIASTOLIC BLOOD PRESSURE: 77 MMHG | WEIGHT: 210.8 LBS

## 2025-07-15 DIAGNOSIS — I10 BENIGN ESSENTIAL HYPERTENSION: Chronic | ICD-10-CM

## 2025-07-15 DIAGNOSIS — L23.9 ALLERGIC DERMATITIS: Primary | ICD-10-CM

## 2025-07-15 PROCEDURE — 1126F AMNT PAIN NOTED NONE PRSNT: CPT

## 2025-07-15 PROCEDURE — 3077F SYST BP >= 140 MM HG: CPT

## 2025-07-15 PROCEDURE — 99213 OFFICE O/P EST LOW 20 MIN: CPT

## 2025-07-15 PROCEDURE — 3078F DIAST BP <80 MM HG: CPT

## 2025-07-15 PROCEDURE — 1160F RVW MEDS BY RX/DR IN RCRD: CPT

## 2025-07-15 PROCEDURE — 3044F HG A1C LEVEL LT 7.0%: CPT

## 2025-07-15 PROCEDURE — 1159F MED LIST DOCD IN RCRD: CPT

## 2025-07-15 RX ORDER — PREDNISONE 10 MG/1
TABLET ORAL
Qty: 26 TABLET | Refills: 0 | Status: SHIPPED | OUTPATIENT
Start: 2025-07-15

## 2025-07-15 NOTE — PATIENT INSTRUCTIONS
Take prednisone as instructed. Continue medications as prescribed. Monitor blood pressure at home. Send update on MyChart. Stay hydrated with water. Follow-up in 6 months for medicare wellness and labs.

## 2025-07-15 NOTE — PROGRESS NOTES
"Chief Complaint  Hypertension    Subjective        Taz Dickey presents to [facility]  History of Present Illness  74-year-old male presenting with poison oak and hypertension.  Taking blood pressure measurement in the morning.  Is ranging between 120//70.  States he has been taking his lisinopril 40 mg in the evening.  States he is feeling good overall.    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  Was previously prescribed a Medrol Dosepak.  Patient took 1 dose that helped significantly but then accidentally dropped the rest of the pills down the sink.  Rashes on arms, torso and legs.  Hypertension      Objective   Vital Signs:  /77 (BP Location: Right arm, Patient Position: Sitting, Cuff Size: Large Adult)   Pulse 57   Temp 97.5 °F (36.4 °C) (Temporal)   Ht 175.3 cm (69.02\")   Wt 95.6 kg (210 lb 12.8 oz)   SpO2 96%   BMI 31.12 kg/m²   Estimated body mass index is 31.12 kg/m² as calculated from the following:    Height as of this encounter: 175.3 cm (69.02\").    Weight as of this encounter: 95.6 kg (210 lb 12.8 oz).               Physical Exam  Vitals reviewed.   Constitutional:       Appearance: Normal appearance.   Musculoskeletal:         General: Normal range of motion.      Cervical back: Normal range of motion.   Skin:     General: Skin is warm and dry.      Capillary Refill: Capillary refill takes less than 2 seconds.      Findings: Rash present.   Neurological:      General: No focal deficit present.      Mental Status: He is alert and oriented to person, place, and time.   Psychiatric:         Mood and Affect: Mood normal.         Behavior: Behavior normal.         Thought Content: Thought content normal.         Judgment: Judgment normal.        Result Review :      Common labs          6/17/2025    08:31 6/25/2025    10:45 7/9/2025    09:30   Common Labs   Glucose 119  111  131    BUN 24.0  26.1  17.7    Creatinine 1.17  1.16  0.92  "   Sodium 134  131  135    Potassium 5.2  5.7  5.1    Chloride 96  97  101    Calcium 9.9  9.8  9.7    Albumin  4.7     Total Bilirubin  1.1     Alkaline Phosphatase  60     AST (SGOT)  35     ALT (SGPT)  27     WBC  5.87  5.45    Hemoglobin  12.2  12.0    Hematocrit  36.3  36.5    Platelets  154  139    Total Cholesterol 154      Triglycerides 90      HDL Cholesterol 58      LDL Cholesterol  79      Hemoglobin A1C 6.40            Current Outpatient Medications on File Prior to Visit   Medication Sig Dispense Refill    acetaminophen (TYLENOL) 500 MG tablet Take 1 tablet by mouth Every 6 (Six) Hours As Needed for Mild Pain.      aspirin 81 MG EC tablet Take 1 tablet by mouth Daily. INSTRUCTED PT TO FOLLOW MD INSTRUCTIONS REGARDING HOLDING FOR SURGERY/PT STATED TO HOLD 5 DAYS PRIOR TO SURGERY      lisinopril (PRINIVIL,ZESTRIL) 40 MG tablet Take 1 tablet by mouth Daily. 90 tablet 1    metFORMIN (GLUCOPHAGE) 500 MG tablet TAKE 1 TABLET TWICE DAILY WITH MEALS 180 tablet 1    tadalafil (CIALIS) 5 MG tablet Take 1 tablet by mouth Daily As Needed for Erectile Dysfunction. 30 tablet 2    [DISCONTINUED] methylPREDNISolone (MEDROL) 4 MG dose pack Take as directed on package instructions. 21 tablet 0    atorvastatin (LIPITOR) 40 MG tablet TAKE 1 TABLET EVERY DAY (Patient not taking: Reported on 7/15/2025) 90 tablet 1     No current facility-administered medications on file prior to visit.                Assessment and Plan     Diagnoses and all orders for this visit:    1. Allergic dermatitis (Primary)  -     predniSONE (DELTASONE) 10 MG tablet; Take 4 tablets by mouth on days 1-2. Take 3 tablets by mouth on days 3-4. Take 2 tablets by mouth on days 5-8. Take 1 tablet by mouth on days 9-12.  Dispense: 26 tablet; Refill: 0    2. Benign essential hypertension        Patient Instructions   Take prednisone as instructed. Continue medications as prescribed. Monitor blood pressure at home. Send update on MyChart. Stay hydrated with  water. Follow-up in 6 months for medicare wellness and labs.            Follow Up     Return in about 6 months (around 1/15/2026) for Medicare Wellness.  Patient was given instructions and counseling regarding his condition or for health maintenance advice. Please see specific information pulled into the AVS if appropriate.

## 2025-07-21 ENCOUNTER — TELEPHONE (OUTPATIENT)
Dept: ONCOLOGY | Facility: CLINIC | Age: 75
End: 2025-07-21
Payer: MEDICARE

## 2025-07-21 NOTE — TELEPHONE ENCOUNTER
Patient calling stating Patient's wife EGD was cancelled and the reason was because [facility] has a dispute with [facility] again. Advised patient not aware of any dispute at this time with [facility].

## 2025-08-01 ENCOUNTER — PATIENT MESSAGE (OUTPATIENT)
Dept: INTERNAL MEDICINE | Facility: CLINIC | Age: 75
End: 2025-08-01
Payer: MEDICARE

## 2025-08-01 DIAGNOSIS — L23.9 ALLERGIC DERMATITIS: Primary | ICD-10-CM

## 2025-08-01 RX ORDER — METHYLPREDNISOLONE 4 MG/1
TABLET ORAL
Qty: 21 TABLET | Refills: 0 | Status: SHIPPED | OUTPATIENT
Start: 2025-08-01

## 2025-08-08 ENCOUNTER — PATIENT MESSAGE (OUTPATIENT)
Dept: INTERNAL MEDICINE | Facility: CLINIC | Age: 75
End: 2025-08-08
Payer: MEDICARE

## 2025-08-20 ENCOUNTER — INFUSION (OUTPATIENT)
Dept: ONCOLOGY | Facility: HOSPITAL | Age: 75
End: 2025-08-20
Payer: MEDICARE

## 2025-08-20 DIAGNOSIS — Z45.2 FITTING AND ADJUSTMENT OF VASCULAR CATHETER: Primary | ICD-10-CM

## 2025-08-20 PROCEDURE — 25010000002 HEPARIN LOCK FLUSH PER 10 UNITS: Performed by: INTERNAL MEDICINE

## 2025-08-20 PROCEDURE — 96523 IRRIG DRUG DELIVERY DEVICE: CPT

## 2025-08-20 RX ORDER — HEPARIN SODIUM (PORCINE) LOCK FLUSH IV SOLN 100 UNIT/ML 100 UNIT/ML
500 SOLUTION INTRAVENOUS AS NEEDED
Status: DISCONTINUED | OUTPATIENT
Start: 2025-08-20 | End: 2025-08-20 | Stop reason: HOSPADM

## 2025-08-20 RX ORDER — SODIUM CHLORIDE 0.9 % (FLUSH) 0.9 %
10 SYRINGE (ML) INJECTION AS NEEDED
OUTPATIENT
Start: 2025-08-20

## 2025-08-20 RX ORDER — HEPARIN SODIUM (PORCINE) LOCK FLUSH IV SOLN 100 UNIT/ML 100 UNIT/ML
500 SOLUTION INTRAVENOUS AS NEEDED
OUTPATIENT
Start: 2025-08-20

## 2025-08-20 RX ORDER — SODIUM CHLORIDE 0.9 % (FLUSH) 0.9 %
10 SYRINGE (ML) INJECTION AS NEEDED
Status: DISCONTINUED | OUTPATIENT
Start: 2025-08-20 | End: 2025-08-20 | Stop reason: HOSPADM

## 2025-08-20 RX ADMIN — Medication 10 ML: at 12:52

## 2025-08-20 RX ADMIN — Medication 500 UNITS: at 12:52

## (undated) DEVICE — APPL CHLORAPREP HI/LITE 26ML ORNG

## (undated) DEVICE — SYR LL TP 10ML STRL

## (undated) DEVICE — TRAP FLD MINIVAC MEGADYNE 100ML

## (undated) DEVICE — SUT GUT CHRM 3/0 SH 36IN G172H

## (undated) DEVICE — SOL NACL 0.9PCT 1000ML

## (undated) DEVICE — GOWN ,SIRUS,NONREINFORCED SMALL: Brand: MEDLINE

## (undated) DEVICE — PILLW ABD SM

## (undated) DEVICE — CLIP LIGAT VASC HORIZON TI LG ORNG 6CT: Type: IMPLANTABLE DEVICE | Site: ABDOMEN | Status: NON-FUNCTIONAL

## (undated) DEVICE — ANTIBACTERIAL UNDYED BRAIDED (POLYGLACTIN 910), SYNTHETIC ABSORBABLE SUTURE: Brand: COATED VICRYL

## (undated) DEVICE — DISPOSABLE IRRIGATION BIPOLAR CORD, M1000 TYPE: Brand: KIRWAN

## (undated) DEVICE — BLOOD TRANSFUSION FILTER: Brand: HAEMONETICS

## (undated) DEVICE — SUT MNCRYL 3/0 PS2 18IN MCP497G

## (undated) DEVICE — DEV COND GAS LAP INSUFLOW W/LUER CONN

## (undated) DEVICE — SUT SILK 2/0 TIES 18IN A185H

## (undated) DEVICE — GLV SURG PREMIERPRO ORTHO LTX PF SZ7.5 BRN

## (undated) DEVICE — LOU LAP SIGMOID COLON: Brand: MEDLINE INDUSTRIES, INC.

## (undated) DEVICE — KT ORCA ORCAPOD DISP STRL

## (undated) DEVICE — GLV SURG BIOGEL LTX PF 6

## (undated) DEVICE — TOTAL TRAY, 16FR 10ML SIL FOLEY, URN: Brand: MEDLINE

## (undated) DEVICE — SUT SILK 3/0 TIES 18IN A184H

## (undated) DEVICE — THE TORRENT IRRIGATION SCOPE CONNECTOR IS USED WITH THE TORRENT IRRIGATION TUBING TO PROVIDE IRRIGATION FLUIDS SUCH AS STERILE WATER DURING GASTROINTESTINAL ENDOSCOPIC PROCEDURES WHEN USED IN CONJUNCTION WITH AN IRRIGATION PUMP (OR ELECTROSURGICAL UNIT).: Brand: TORRENT

## (undated) DEVICE — SKIN PREP TRAY W/CHG: Brand: MEDLINE INDUSTRIES, INC.

## (undated) DEVICE — TUBING, SUCTION, 1/4" X 10', STRAIGHT: Brand: MEDLINE

## (undated) DEVICE — ENDOPATH XCEL UNIVERSAL TROCAR STABLILITY SLEEVES: Brand: ENDOPATH XCEL

## (undated) DEVICE — TBG PENCL TELESCP MEGADYNE SMOKE EVAC 10FT

## (undated) DEVICE — GLV SURG SIGNATURE ESSENTIAL PF LTX SZ7.5

## (undated) DEVICE — SPONGE,LAP,12"X12",XR,ST,5/PK,40PK/CS: Brand: MEDLINE

## (undated) DEVICE — JACKSON-PRATT 100CC BULB RESERVOIR: Brand: CARDINAL HEALTH

## (undated) DEVICE — CANN O2 ETCO2 FITS ALL CONN CO2 SMPL A/ 7IN DISP LF

## (undated) DEVICE — SUT VIC 5/0 PS2 18IN J495H

## (undated) DEVICE — CONN TBG Y 5 IN 1 LF STRL

## (undated) DEVICE — SINGLE-USE BIOPSY FORCEPS: Brand: RADIAL JAW 4

## (undated) DEVICE — NEEDLE, QUINCKE, 20GX3.5": Brand: MEDLINE

## (undated) DEVICE — DRSNG WND GZ PAD BORDERED 4X8IN STRL

## (undated) DEVICE — ENDOPATH XCEL BLADELESS TROCARS WITH STABILITY SLEEVES: Brand: ENDOPATH XCEL

## (undated) DEVICE — ADHS SKIN DERMABOND TOP ADVANCED

## (undated) DEVICE — PK ATS CUST W CARDIOTOMY RESEVOIR

## (undated) DEVICE — SUT SILK 2/0 SH CR8 18IN CR8 C012D

## (undated) DEVICE — SYR LUERLOK 30CC

## (undated) DEVICE — SMOKE EVACUATION TUBING WITH 7/8 IN TO 1/4 IN REDUCER: Brand: BUFFALO FILTER

## (undated) DEVICE — PREP SOL POVIDONE/IODINE BT 4OZ

## (undated) DEVICE — RECIPROCATING BLADE HEAVY DUTY LONG, OFFSET  (77.6 X 0.77 X 11.2MM)

## (undated) DEVICE — ECHELON FLEX 60 ARTICULATING ENDOSCOPIC LINEAR CUTTER (NO CARTRIDGE): Brand: ECHELON FLEX ENDOPATH

## (undated) DEVICE — ENDOPATH XCEL WITH OPTIVIEW TECHNOLOGY UNIVERSAL TROCAR STABILITY SLEEVES: Brand: ENDOPATH XCEL OPTIVIEW

## (undated) DEVICE — CANNULA,ADULT,SOFT-TOUCH,7'TUBE,UC: Brand: PENDING

## (undated) DEVICE — ADAPT CLN BIOGUARD AIR/H2O DISP

## (undated) DEVICE — SUT VIC 0 TN 27IN DYED JTN0G

## (undated) DEVICE — ZIP 16 SURGICAL SKIN CLOSURE DEVICE, PSA: Brand: ZIP 16 SURGICAL SKIN CLOSURE DEVICE

## (undated) DEVICE — WOUND RETRACTOR AND PROTECTOR: Brand: ALEXIS WOUND PROTECTOR-RETRACTOR

## (undated) DEVICE — SUT PDS 0 CT1 36IN Z346H

## (undated) DEVICE — LAPAROSCOPIC SMOKE ELIMINATION DEVICE: Brand: PNEUVIEW XE

## (undated) DEVICE — DRSNG GZ PETROLTM XEROFORM CURAD 1X8IN STRL

## (undated) DEVICE — SYR LUERLOK 20CC BX/50

## (undated) DEVICE — DRN WND JP RND W TROC SIL 10F 1/8IN

## (undated) DEVICE — GLV SURG SENSICARE W/ALOE PF LF 7.5 STRL

## (undated) DEVICE — DRP C/ARM 41X74IN

## (undated) DEVICE — HARMONIC ACE +7 LAPAROSCOPIC SHEARS ADVANCED HEMOSTASIS 5MM DIAMETER 36CM SHAFT LENGTH  FOR USE WITH GRAY HAND PIECE ONLY: Brand: HARMONIC ACE

## (undated) DEVICE — Device: Brand: DEFENDO AIR/WATER/SUCTION AND BIOPSY VALVE

## (undated) DEVICE — 6.0MM PRECISION ROUND

## (undated) DEVICE — ELECTRD BLD EZ CLN MOD XLNG 2.75IN

## (undated) DEVICE — DISPOSABLE GRASPER CARTRIDGE: Brand: DIRECT DRIVE REPOSABLE GRASPERS

## (undated) DEVICE — ENDOCUT SCISSOR TIP, DISPOSABLE: Brand: RENEW

## (undated) DEVICE — 3.0MM PRECISION NEURO (MATCH HEAD)

## (undated) DEVICE — LAPAROVUE VISIBILITY SYSTEM LAPAROSCOPIC SOLUTIONS: Brand: LAPAROVUE

## (undated) DEVICE — PATIENT RETURN ELECTRODE, SINGLE-USE, CONTACT QUALITY MONITORING, ADULT, WITH 9FT CORD, FOR PATIENTS WEIGING OVER 33LBS. (15KG): Brand: MEGADYNE

## (undated) DEVICE — SENSR O2 OXIMAX FNGR A/ 18IN NONSTR

## (undated) DEVICE — SUT TEVDEX 5 K61 30IN 79745

## (undated) DEVICE — PK NEURO SPINE 40

## (undated) DEVICE — DECANT BG O JET

## (undated) DEVICE — ADHS SKIN SURG TISS VISC PREMIERPRO EXOFIN HI/VISC FAST/DRY

## (undated) DEVICE — SUT VIC 3/0 SH 27IN J416H

## (undated) DEVICE — SYR CONTRL LUERLOK 10CC

## (undated) DEVICE — Device

## (undated) DEVICE — DRAPE,REIN 53X77,STERILE: Brand: MEDLINE

## (undated) DEVICE — NDL HYPO PRECISIONGLIDE REG 25G 1 1/2

## (undated) DEVICE — 3M™ IOBAN™ 2 ANTIMICROBIAL INCISE DRAPE 6648EZ: Brand: IOBAN™ 2

## (undated) DEVICE — ENDOPATH PNEUMONEEDLE INSUFFLATION NEEDLES WITH LUER LOCK CONNECTORS 120MM: Brand: ENDOPATH

## (undated) DEVICE — PK HIP TOTL 40

## (undated) DEVICE — HORIZON TI ML 6 CLIPS/CART
Type: IMPLANTABLE DEVICE | Site: ABDOMEN | Status: NON-FUNCTIONAL
Brand: WECK

## (undated) DEVICE — HEWSON SUTURE RETRIEVER: Brand: HEWSON SUTURE RETRIEVER

## (undated) DEVICE — HANDPIECE SET WITH COAXIAL HIGH FLOW TIP AND SUCTION TUBE: Brand: INTERPULSE

## (undated) DEVICE — PENCL E/S ULTRAVAC TELESCP NOSE HOLSTR 10FT

## (undated) DEVICE — PK PROC MINOR TOWER 40

## (undated) DEVICE — GLV SURG BIOGEL LTX PF 7

## (undated) DEVICE — CODMAN® SURGICAL PATTIES 3/4" X 3/4" (1.91CM X 1.91CM): Brand: CODMAN®

## (undated) DEVICE — INTENDED FOR TISSUE SEPARATION, AND OTHER PROCEDURES THAT REQUIRE A SHARP SURGICAL BLADE TO PUNCTURE OR CUT.: Brand: BARD-PARKER ® STAINLESS STEEL BLADES

## (undated) DEVICE — DRP MICROSCOPE 4 BINOCULAR CV 54X150IN

## (undated) DEVICE — GLV SURG BIOGEL LTX PF 7 1/2

## (undated) DEVICE — LN SMPL CO2 SHTRM SD STREAM W/M LUER